# Patient Record
Sex: MALE | Race: BLACK OR AFRICAN AMERICAN | Employment: UNEMPLOYED | URBAN - METROPOLITAN AREA
[De-identification: names, ages, dates, MRNs, and addresses within clinical notes are randomized per-mention and may not be internally consistent; named-entity substitution may affect disease eponyms.]

---

## 2019-04-18 ENCOUNTER — OP HISTORICAL/CONVERTED ENCOUNTER (OUTPATIENT)
Dept: OTHER | Age: 59
End: 2019-04-18

## 2020-01-27 ENCOUNTER — ED HISTORICAL/CONVERTED ENCOUNTER (OUTPATIENT)
Dept: OTHER | Age: 60
End: 2020-01-27

## 2020-06-16 ENCOUNTER — OP HISTORICAL/CONVERTED ENCOUNTER (OUTPATIENT)
Dept: OTHER | Age: 60
End: 2020-06-16

## 2020-07-14 ENCOUNTER — OP HISTORICAL/CONVERTED ENCOUNTER (OUTPATIENT)
Dept: OTHER | Age: 60
End: 2020-07-14

## 2020-07-16 ENCOUNTER — OP HISTORICAL/CONVERTED ENCOUNTER (OUTPATIENT)
Dept: OTHER | Age: 60
End: 2020-07-16

## 2021-01-01 ENCOUNTER — TRANSCRIBE ORDER (OUTPATIENT)
Dept: SCHEDULING | Age: 61
End: 2021-01-01

## 2021-01-01 ENCOUNTER — HOSPITAL ENCOUNTER (OUTPATIENT)
Dept: MAMMOGRAPHY | Age: 61
Discharge: HOME | End: 2021-07-01
Payer: COMMERCIAL

## 2021-01-01 ENCOUNTER — HOSPITAL ENCOUNTER (OUTPATIENT)
Dept: ULTRASOUND IMAGING | Age: 61
Discharge: HOME | End: 2021-06-28
Payer: COMMERCIAL

## 2021-01-01 DIAGNOSIS — Z13.29 SCREENING FOR THYROID DISORDER: ICD-10-CM

## 2021-01-01 DIAGNOSIS — N63.20 LEFT BREAST MASS: ICD-10-CM

## 2021-01-01 DIAGNOSIS — N63.20 LEFT BREAST MASS: Primary | ICD-10-CM

## 2021-01-01 DIAGNOSIS — Z13.29 SCREENING FOR THYROID DISORDER: Primary | ICD-10-CM

## 2021-01-01 PROCEDURE — 77062 BREAST TOMOSYNTHESIS BI: CPT

## 2021-01-01 PROCEDURE — 76700 US EXAM ABDOM COMPLETE: CPT

## 2021-01-01 PROCEDURE — 76642 ULTRASOUND BREAST LIMITED: CPT

## 2022-01-01 ENCOUNTER — APPOINTMENT (OUTPATIENT)
Dept: GENERAL RADIOLOGY | Age: 62
DRG: 316 | End: 2022-01-01
Attending: FAMILY MEDICINE
Payer: MEDICAID

## 2022-01-01 ENCOUNTER — APPOINTMENT (OUTPATIENT)
Dept: INTERVENTIONAL RADIOLOGY/VASCULAR | Age: 62
DRG: 720 | End: 2022-01-01
Attending: INTERNAL MEDICINE
Payer: COMMERCIAL

## 2022-01-01 ENCOUNTER — APPOINTMENT (OUTPATIENT)
Dept: INTERVENTIONAL RADIOLOGY/VASCULAR | Age: 62
End: 2022-01-01
Attending: FAMILY MEDICINE
Payer: COMMERCIAL

## 2022-01-01 ENCOUNTER — HOSPITAL ENCOUNTER (INPATIENT)
Age: 62
LOS: 1 days | DRG: 951 | End: 2022-04-29
Attending: FAMILY MEDICINE | Admitting: FAMILY MEDICINE
Payer: OTHER MISCELLANEOUS

## 2022-01-01 ENCOUNTER — TELEPHONE (OUTPATIENT)
Dept: UROLOGY | Age: 62
End: 2022-01-01

## 2022-01-01 ENCOUNTER — APPOINTMENT (OUTPATIENT)
Dept: ENDOSCOPY | Age: 62
DRG: 720 | End: 2022-01-01
Attending: INTERNAL MEDICINE
Payer: COMMERCIAL

## 2022-01-01 ENCOUNTER — APPOINTMENT (OUTPATIENT)
Dept: CT IMAGING | Age: 62
DRG: 720 | End: 2022-01-01
Attending: NURSE PRACTITIONER
Payer: COMMERCIAL

## 2022-01-01 ENCOUNTER — APPOINTMENT (OUTPATIENT)
Dept: CT IMAGING | Age: 62
DRG: 720 | End: 2022-01-01
Attending: EMERGENCY MEDICINE
Payer: COMMERCIAL

## 2022-01-01 ENCOUNTER — HOSPITAL ENCOUNTER (INPATIENT)
Age: 62
LOS: 8 days | Discharge: NURSING, REHAB OR LONG-TERM CARE | DRG: 720 | End: 2022-03-04
Attending: EMERGENCY MEDICINE | Admitting: INTERNAL MEDICINE
Payer: COMMERCIAL

## 2022-01-01 ENCOUNTER — APPOINTMENT (OUTPATIENT)
Dept: INTERVENTIONAL RADIOLOGY/VASCULAR | Age: 62
DRG: 720 | End: 2022-01-01
Attending: FAMILY MEDICINE
Payer: COMMERCIAL

## 2022-01-01 ENCOUNTER — APPOINTMENT (OUTPATIENT)
Dept: ULTRASOUND IMAGING | Age: 62
End: 2022-01-01
Attending: FAMILY MEDICINE
Payer: COMMERCIAL

## 2022-01-01 ENCOUNTER — APPOINTMENT (OUTPATIENT)
Dept: ULTRASOUND IMAGING | Age: 62
DRG: 720 | End: 2022-01-01
Attending: FAMILY MEDICINE
Payer: COMMERCIAL

## 2022-01-01 ENCOUNTER — APPOINTMENT (OUTPATIENT)
Dept: GENERAL RADIOLOGY | Age: 62
DRG: 720 | End: 2022-01-01
Attending: NURSE PRACTITIONER
Payer: COMMERCIAL

## 2022-01-01 ENCOUNTER — APPOINTMENT (OUTPATIENT)
Dept: GENERAL RADIOLOGY | Age: 62
DRG: 720 | End: 2022-01-01
Attending: FAMILY MEDICINE
Payer: COMMERCIAL

## 2022-01-01 ENCOUNTER — ANESTHESIA (OUTPATIENT)
Dept: ENDOSCOPY | Age: 62
DRG: 720 | End: 2022-01-01
Payer: COMMERCIAL

## 2022-01-01 ENCOUNTER — APPOINTMENT (OUTPATIENT)
Dept: CT IMAGING | Age: 62
End: 2022-01-01
Attending: PHYSICIAN ASSISTANT
Payer: COMMERCIAL

## 2022-01-01 ENCOUNTER — APPOINTMENT (OUTPATIENT)
Dept: GENERAL RADIOLOGY | Age: 62
DRG: 316 | End: 2022-01-01
Attending: NURSE PRACTITIONER
Payer: MEDICAID

## 2022-01-01 ENCOUNTER — ANESTHESIA EVENT (OUTPATIENT)
Dept: ENDOSCOPY | Age: 62
DRG: 720 | End: 2022-01-01
Payer: COMMERCIAL

## 2022-01-01 ENCOUNTER — ANESTHESIA (OUTPATIENT)
Dept: SURGERY | Age: 62
DRG: 316 | End: 2022-01-01
Payer: MEDICAID

## 2022-01-01 ENCOUNTER — APPOINTMENT (OUTPATIENT)
Dept: GENERAL RADIOLOGY | Age: 62
DRG: 720 | End: 2022-01-01
Attending: INTERNAL MEDICINE
Payer: COMMERCIAL

## 2022-01-01 ENCOUNTER — APPOINTMENT (OUTPATIENT)
Dept: GENERAL RADIOLOGY | Age: 62
End: 2022-01-01
Attending: PHYSICIAN ASSISTANT
Payer: COMMERCIAL

## 2022-01-01 ENCOUNTER — APPOINTMENT (OUTPATIENT)
Dept: GENERAL RADIOLOGY | Age: 62
DRG: 316 | End: 2022-01-01
Attending: STUDENT IN AN ORGANIZED HEALTH CARE EDUCATION/TRAINING PROGRAM
Payer: MEDICAID

## 2022-01-01 ENCOUNTER — HOSPITAL ENCOUNTER (OUTPATIENT)
Age: 62
Setting detail: OBSERVATION
Discharge: HOME | End: 2022-01-23
Attending: FAMILY MEDICINE | Admitting: FAMILY MEDICINE
Payer: COMMERCIAL

## 2022-01-01 ENCOUNTER — APPOINTMENT (OUTPATIENT)
Dept: NON INVASIVE DIAGNOSTICS | Age: 62
DRG: 720 | End: 2022-01-01
Attending: INTERNAL MEDICINE
Payer: COMMERCIAL

## 2022-01-01 ENCOUNTER — APPOINTMENT (OUTPATIENT)
Dept: GENERAL RADIOLOGY | Age: 62
DRG: 720 | End: 2022-01-01
Attending: EMERGENCY MEDICINE
Payer: COMMERCIAL

## 2022-01-01 ENCOUNTER — APPOINTMENT (OUTPATIENT)
Dept: INTERVENTIONAL RADIOLOGY/VASCULAR | Age: 62
DRG: 316 | End: 2022-01-01
Attending: FAMILY MEDICINE
Payer: MEDICAID

## 2022-01-01 ENCOUNTER — APPOINTMENT (OUTPATIENT)
Dept: ULTRASOUND IMAGING | Age: 62
DRG: 720 | End: 2022-01-01
Attending: INTERNAL MEDICINE
Payer: COMMERCIAL

## 2022-01-01 ENCOUNTER — HOSPITAL ENCOUNTER (INPATIENT)
Age: 62
LOS: 21 days | Discharge: HOSPICE | DRG: 720 | End: 2022-04-28
Attending: FAMILY MEDICINE | Admitting: FAMILY MEDICINE
Payer: COMMERCIAL

## 2022-01-01 ENCOUNTER — ANESTHESIA EVENT (OUTPATIENT)
Dept: SURGERY | Age: 62
DRG: 316 | End: 2022-01-01
Payer: MEDICAID

## 2022-01-01 ENCOUNTER — APPOINTMENT (OUTPATIENT)
Dept: NON INVASIVE DIAGNOSTICS | Age: 62
DRG: 316 | End: 2022-01-01
Attending: FAMILY MEDICINE
Payer: MEDICAID

## 2022-01-01 ENCOUNTER — HOSPICE ADMISSION (OUTPATIENT)
Dept: HOSPICE | Facility: HOSPICE | Age: 62
End: 2022-01-01
Payer: COMMERCIAL

## 2022-01-01 ENCOUNTER — HOSPITAL ENCOUNTER (INPATIENT)
Age: 62
LOS: 14 days | Discharge: NURSING, REHAB OR LONG-TERM CARE | DRG: 316 | End: 2022-04-05
Attending: STUDENT IN AN ORGANIZED HEALTH CARE EDUCATION/TRAINING PROGRAM | Admitting: FAMILY MEDICINE
Payer: MEDICAID

## 2022-01-01 ENCOUNTER — HOSPITAL ENCOUNTER (EMERGENCY)
Age: 62
Discharge: HOME | DRG: 720 | End: 2022-04-06
Attending: STUDENT IN AN ORGANIZED HEALTH CARE EDUCATION/TRAINING PROGRAM
Payer: COMMERCIAL

## 2022-01-01 VITALS
SYSTOLIC BLOOD PRESSURE: 151 MMHG | DIASTOLIC BLOOD PRESSURE: 64 MMHG | WEIGHT: 190 LBS | TEMPERATURE: 98.5 F | OXYGEN SATURATION: 100 % | HEIGHT: 75 IN | RESPIRATION RATE: 18 BRPM | HEART RATE: 100 BPM | BODY MASS INDEX: 23.62 KG/M2

## 2022-01-01 VITALS
BODY MASS INDEX: 23.85 KG/M2 | TEMPERATURE: 98.1 F | SYSTOLIC BLOOD PRESSURE: 98 MMHG | HEART RATE: 75 BPM | RESPIRATION RATE: 16 BRPM | DIASTOLIC BLOOD PRESSURE: 65 MMHG | OXYGEN SATURATION: 96 % | WEIGHT: 185.85 LBS | HEIGHT: 74 IN

## 2022-01-01 VITALS
OXYGEN SATURATION: 93 % | HEIGHT: 70 IN | RESPIRATION RATE: 16 BRPM | WEIGHT: 204.15 LBS | HEART RATE: 70 BPM | BODY MASS INDEX: 29.23 KG/M2 | DIASTOLIC BLOOD PRESSURE: 34 MMHG | SYSTOLIC BLOOD PRESSURE: 45 MMHG | TEMPERATURE: 97.4 F

## 2022-01-01 VITALS
HEIGHT: 74 IN | BODY MASS INDEX: 23.74 KG/M2 | OXYGEN SATURATION: 100 % | TEMPERATURE: 97.5 F | HEART RATE: 82 BPM | DIASTOLIC BLOOD PRESSURE: 110 MMHG | RESPIRATION RATE: 25 BRPM | WEIGHT: 185 LBS | SYSTOLIC BLOOD PRESSURE: 140 MMHG

## 2022-01-01 VITALS
WEIGHT: 204.15 LBS | HEART RATE: 89 BPM | BODY MASS INDEX: 29.23 KG/M2 | OXYGEN SATURATION: 98 % | SYSTOLIC BLOOD PRESSURE: 107 MMHG | TEMPERATURE: 98.6 F | RESPIRATION RATE: 18 BRPM | HEIGHT: 70 IN | DIASTOLIC BLOOD PRESSURE: 80 MMHG

## 2022-01-01 VITALS
OXYGEN SATURATION: 99 % | SYSTOLIC BLOOD PRESSURE: 109 MMHG | DIASTOLIC BLOOD PRESSURE: 79 MMHG | WEIGHT: 185.19 LBS | HEIGHT: 74 IN | HEART RATE: 74 BPM | TEMPERATURE: 98.1 F | RESPIRATION RATE: 18 BRPM | BODY MASS INDEX: 23.77 KG/M2

## 2022-01-01 DIAGNOSIS — K70.11 ASCITES DUE TO ALCOHOLIC HEPATITIS: ICD-10-CM

## 2022-01-01 DIAGNOSIS — N17.9 ACUTE RENAL FAILURE, UNSPECIFIED ACUTE RENAL FAILURE TYPE (HCC): Primary | ICD-10-CM

## 2022-01-01 DIAGNOSIS — R56.9 SEIZURE (HCC): Primary | ICD-10-CM

## 2022-01-01 DIAGNOSIS — A41.9 SEVERE SEPSIS (HCC): ICD-10-CM

## 2022-01-01 DIAGNOSIS — R65.20 SEVERE SEPSIS (HCC): ICD-10-CM

## 2022-01-01 DIAGNOSIS — L08.9 FINGER INFECTION: ICD-10-CM

## 2022-01-01 DIAGNOSIS — N39.0 URINARY TRACT INFECTION WITHOUT HEMATURIA, SITE UNSPECIFIED: ICD-10-CM

## 2022-01-01 DIAGNOSIS — N17.9 AKI (ACUTE KIDNEY INJURY) (HCC): ICD-10-CM

## 2022-01-01 DIAGNOSIS — R18.8 CIRRHOSIS OF LIVER WITH ASCITES, UNSPECIFIED HEPATIC CIRRHOSIS TYPE (HCC): ICD-10-CM

## 2022-01-01 DIAGNOSIS — A41.9 SEPSIS, DUE TO UNSPECIFIED ORGANISM, UNSPECIFIED WHETHER ACUTE ORGAN DYSFUNCTION PRESENT (HCC): Primary | ICD-10-CM

## 2022-01-01 DIAGNOSIS — M86.9 OSTEOMYELITIS OF RIGHT HAND, UNSPECIFIED TYPE (HCC): ICD-10-CM

## 2022-01-01 DIAGNOSIS — R46.89 ABNORMAL BEHAVIOR: ICD-10-CM

## 2022-01-01 DIAGNOSIS — M86.9 FINGER OSTEOMYELITIS, RIGHT (HCC): ICD-10-CM

## 2022-01-01 DIAGNOSIS — F10.930 ALCOHOL WITHDRAWAL SYNDROME WITHOUT COMPLICATION (HCC): ICD-10-CM

## 2022-01-01 DIAGNOSIS — E87.20 LACTIC ACIDOSIS: ICD-10-CM

## 2022-01-01 DIAGNOSIS — K74.60 CIRRHOSIS OF LIVER WITHOUT ASCITES, UNSPECIFIED HEPATIC CIRRHOSIS TYPE (HCC): ICD-10-CM

## 2022-01-01 DIAGNOSIS — K74.60 CIRRHOSIS OF LIVER WITH ASCITES, UNSPECIFIED HEPATIC CIRRHOSIS TYPE (HCC): ICD-10-CM

## 2022-01-01 DIAGNOSIS — R33.9 URINARY RETENTION: ICD-10-CM

## 2022-01-01 DIAGNOSIS — R18.8 OTHER ASCITES: ICD-10-CM

## 2022-01-01 DIAGNOSIS — G93.41 METABOLIC ENCEPHALOPATHY: ICD-10-CM

## 2022-01-01 DIAGNOSIS — T68.XXXS HYPOTHERMIA, SEQUELA: ICD-10-CM

## 2022-01-01 DIAGNOSIS — N39.0 URINARY TRACT INFECTION WITHOUT HEMATURIA, SITE UNSPECIFIED: Primary | ICD-10-CM

## 2022-01-01 DIAGNOSIS — M25.441 FINGER JOINT SWELLING, RIGHT: Primary | ICD-10-CM

## 2022-01-01 LAB
25(OH)D3 SERPL-MCNC: 17.6 NG/ML (ref 30–100)
ABO + RH BLD: NORMAL
ABO + RH BLD: NORMAL
ALBUMIN FLD-MCNC: 0.4 G/DL
ALBUMIN SERPL ELPH-MCNC: 2.8 G/DL (ref 2.9–4.4)
ALBUMIN SERPL-MCNC: 1.3 G/DL (ref 3.5–5)
ALBUMIN SERPL-MCNC: 1.6 G/DL (ref 3.5–5)
ALBUMIN SERPL-MCNC: 1.6 G/DL (ref 3.5–5)
ALBUMIN SERPL-MCNC: 1.7 G/DL (ref 3.5–5)
ALBUMIN SERPL-MCNC: 1.8 G/DL (ref 3.5–5)
ALBUMIN SERPL-MCNC: 1.9 G/DL (ref 3.5–5)
ALBUMIN SERPL-MCNC: 2 G/DL (ref 3.5–5)
ALBUMIN SERPL-MCNC: 2.1 G/DL (ref 3.5–5)
ALBUMIN SERPL-MCNC: 2.2 G/DL (ref 3.5–5)
ALBUMIN SERPL-MCNC: 2.3 G/DL (ref 3.5–5)
ALBUMIN SERPL-MCNC: 2.3 G/DL (ref 3.5–5)
ALBUMIN SERPL-MCNC: 2.4 G/DL (ref 3.5–5)
ALBUMIN SERPL-MCNC: 2.5 G/DL (ref 3.5–5)
ALBUMIN SERPL-MCNC: 2.7 G/DL (ref 3.5–5)
ALBUMIN SERPL-MCNC: 2.7 G/DL (ref 3.5–5)
ALBUMIN/GLOB SERPL: 0.3 {RATIO} (ref 1.1–2.2)
ALBUMIN/GLOB SERPL: 0.4 {RATIO} (ref 1.1–2.2)
ALBUMIN/GLOB SERPL: 0.5 {RATIO} (ref 1.1–2.2)
ALBUMIN/GLOB SERPL: 0.6 {RATIO} (ref 0.7–1.7)
ALBUMIN/GLOB SERPL: 0.6 {RATIO} (ref 1.1–2.2)
ALBUMIN/GLOB SERPL: 0.7 {RATIO} (ref 1.1–2.2)
ALBUMIN/GLOB SERPL: 0.8 {RATIO} (ref 1.1–2.2)
ALBUMIN/GLOB SERPL: 0.9 {RATIO} (ref 1.1–2.2)
ALBUMIN/GLOB SERPL: 1 {RATIO} (ref 1.1–2.2)
ALP SERPL-CCNC: 107 U/L (ref 45–117)
ALP SERPL-CCNC: 121 U/L (ref 45–117)
ALP SERPL-CCNC: 189 U/L (ref 45–117)
ALP SERPL-CCNC: 244 U/L (ref 45–117)
ALP SERPL-CCNC: 46 U/L (ref 45–117)
ALP SERPL-CCNC: 48 U/L (ref 45–117)
ALP SERPL-CCNC: 49 U/L (ref 45–117)
ALP SERPL-CCNC: 53 U/L (ref 45–117)
ALP SERPL-CCNC: 55 U/L (ref 45–117)
ALP SERPL-CCNC: 57 U/L (ref 45–117)
ALP SERPL-CCNC: 59 U/L (ref 45–117)
ALP SERPL-CCNC: 61 U/L (ref 45–117)
ALP SERPL-CCNC: 61 U/L (ref 45–117)
ALP SERPL-CCNC: 64 U/L (ref 45–117)
ALP SERPL-CCNC: 66 U/L (ref 45–117)
ALP SERPL-CCNC: 67 U/L (ref 45–117)
ALP SERPL-CCNC: 69 U/L (ref 45–117)
ALP SERPL-CCNC: 72 U/L (ref 45–117)
ALP SERPL-CCNC: 73 U/L (ref 45–117)
ALP SERPL-CCNC: 77 U/L (ref 45–117)
ALP SERPL-CCNC: 82 U/L (ref 45–117)
ALP SERPL-CCNC: 84 U/L (ref 45–117)
ALP SERPL-CCNC: 95 U/L (ref 45–117)
ALP SERPL-CCNC: 96 U/L (ref 45–117)
ALPHA1 GLOB SERPL ELPH-MCNC: 0.2 G/DL (ref 0–0.4)
ALPHA2 GLOB SERPL ELPH-MCNC: 0.5 G/DL (ref 0.4–1)
ALT SERPL-CCNC: 10 U/L (ref 12–78)
ALT SERPL-CCNC: 11 U/L (ref 12–78)
ALT SERPL-CCNC: 12 U/L (ref 12–78)
ALT SERPL-CCNC: 13 U/L (ref 12–78)
ALT SERPL-CCNC: 14 U/L (ref 12–78)
ALT SERPL-CCNC: 14 U/L (ref 12–78)
ALT SERPL-CCNC: 15 U/L (ref 12–78)
ALT SERPL-CCNC: 16 U/L (ref 12–78)
ALT SERPL-CCNC: 17 U/L (ref 12–78)
ALT SERPL-CCNC: 20 U/L (ref 12–78)
ALT SERPL-CCNC: 20 U/L (ref 12–78)
ALT SERPL-CCNC: 23 U/L (ref 12–78)
ALT SERPL-CCNC: 25 U/L (ref 12–78)
ALT SERPL-CCNC: 28 U/L (ref 12–78)
ALT SERPL-CCNC: 30 U/L (ref 12–78)
AMMONIA PLAS-SCNC: 104 UMOL/L
AMMONIA PLAS-SCNC: 111 UMOL/L
AMMONIA PLAS-SCNC: 14 UMOL/L
AMMONIA PLAS-SCNC: 19 UMOL/L
AMMONIA PLAS-SCNC: 24 UMOL/L
AMMONIA PLAS-SCNC: 25 UMOL/L
AMMONIA PLAS-SCNC: 28 UMOL/L
AMMONIA PLAS-SCNC: 30 UMOL/L
AMMONIA PLAS-SCNC: 34 UMOL/L
AMMONIA PLAS-SCNC: 36 UMOL/L
AMMONIA PLAS-SCNC: 39 UMOL/L
AMMONIA PLAS-SCNC: 48 UMOL/L
AMMONIA PLAS-SCNC: 65 UMOL/L
AMPHET UR QL SCN: NEGATIVE
AMYLASE SERPL-CCNC: 105 U/L (ref 25–115)
ANION GAP SERPL CALC-SCNC: 10 MMOL/L (ref 5–15)
ANION GAP SERPL CALC-SCNC: 11 MMOL/L (ref 5–15)
ANION GAP SERPL CALC-SCNC: 12 MMOL/L (ref 5–15)
ANION GAP SERPL CALC-SCNC: 4 MMOL/L (ref 5–15)
ANION GAP SERPL CALC-SCNC: 4 MMOL/L (ref 5–15)
ANION GAP SERPL CALC-SCNC: 5 MMOL/L (ref 5–15)
ANION GAP SERPL CALC-SCNC: 6 MMOL/L (ref 5–15)
ANION GAP SERPL CALC-SCNC: 7 MMOL/L (ref 5–15)
ANION GAP SERPL CALC-SCNC: 8 MMOL/L (ref 5–15)
ANION GAP SERPL CALC-SCNC: 9 MMOL/L (ref 5–15)
APPEARANCE FLD: ABNORMAL
APPEARANCE FLD: CLEAR
APPEARANCE UR: ABNORMAL
APTT PPP: 40.2 SEC (ref 21.2–34.1)
ARTERIAL PATENCY WRIST A: ABNORMAL
AST SERPL W P-5'-P-CCNC: 100 U/L (ref 15–37)
AST SERPL W P-5'-P-CCNC: 18 U/L (ref 15–37)
AST SERPL W P-5'-P-CCNC: 20 U/L (ref 15–37)
AST SERPL W P-5'-P-CCNC: 21 U/L (ref 15–37)
AST SERPL W P-5'-P-CCNC: 22 U/L (ref 15–37)
AST SERPL W P-5'-P-CCNC: 24 U/L (ref 15–37)
AST SERPL W P-5'-P-CCNC: 26 U/L (ref 15–37)
AST SERPL W P-5'-P-CCNC: 27 U/L (ref 15–37)
AST SERPL W P-5'-P-CCNC: 29 U/L (ref 15–37)
AST SERPL W P-5'-P-CCNC: 31 U/L (ref 15–37)
AST SERPL W P-5'-P-CCNC: 32 U/L (ref 15–37)
AST SERPL W P-5'-P-CCNC: 35 U/L (ref 15–37)
AST SERPL W P-5'-P-CCNC: 35 U/L (ref 15–37)
AST SERPL W P-5'-P-CCNC: 36 U/L (ref 15–37)
AST SERPL W P-5'-P-CCNC: 38 U/L (ref 15–37)
AST SERPL W P-5'-P-CCNC: 39 U/L (ref 15–37)
AST SERPL W P-5'-P-CCNC: 42 U/L (ref 15–37)
AST SERPL W P-5'-P-CCNC: 42 U/L (ref 15–37)
AST SERPL W P-5'-P-CCNC: 47 U/L (ref 15–37)
AST SERPL W P-5'-P-CCNC: 51 U/L (ref 15–37)
AST SERPL W P-5'-P-CCNC: 67 U/L (ref 15–37)
AST SERPL W P-5'-P-CCNC: 70 U/L (ref 15–37)
AST SERPL W P-5'-P-CCNC: ABNORMAL U/L (ref 15–37)
ATRIAL RATE: 113 BPM
ATRIAL RATE: 277 BPM
ATRIAL RATE: 72 BPM
ATRIAL RATE: 98 BPM
B-GLOBULIN SERPL ELPH-MCNC: 0.9 G/DL (ref 0.7–1.3)
BACTERIA SPEC CULT: ABNORMAL
BACTERIA SPEC CULT: NORMAL
BACTERIA URNS QL MICRO: ABNORMAL /HPF
BACTERIA URNS QL MICRO: ABNORMAL /HPF
BACTERIA URNS QL MICRO: NEGATIVE /HPF
BARBITURATES UR QL SCN: NEGATIVE
BASE DEFICIT BLDA-SCNC: 1.1 MMOL/L (ref 0–2)
BASE DEFICIT BLDA-SCNC: 1.7 MMOL/L (ref 0–2)
BASE DEFICIT BLDA-SCNC: 2.1 MMOL/L (ref 0–2)
BASE DEFICIT BLDA-SCNC: 2.2 MMOL/L (ref 0–2)
BASE DEFICIT BLDA-SCNC: 4.3 MMOL/L (ref 0–2)
BASE DEFICIT BLDA-SCNC: 4.8 MMOL/L (ref 0–2)
BASE DEFICIT BLDA-SCNC: 5.4 MMOL/L (ref 0–2)
BASOPHILS # BLD: 0 K/UL (ref 0–0.1)
BASOPHILS # BLD: 0.1 K/UL (ref 0–0.1)
BASOPHILS NFR BLD: 0 % (ref 0–1)
BASOPHILS NFR BLD: 1 % (ref 0–1)
BDY SITE: ABNORMAL
BENZODIAZ UR QL: POSITIVE
BILIRUB DIRECT SERPL-MCNC: 1.2 MG/DL (ref 0–0.2)
BILIRUB SERPL-MCNC: 0.9 MG/DL (ref 0.2–1)
BILIRUB SERPL-MCNC: 0.9 MG/DL (ref 0.2–1)
BILIRUB SERPL-MCNC: 1 MG/DL (ref 0.2–1)
BILIRUB SERPL-MCNC: 1.1 MG/DL (ref 0.2–1)
BILIRUB SERPL-MCNC: 1.2 MG/DL (ref 0.2–1)
BILIRUB SERPL-MCNC: 1.2 MG/DL (ref 0.2–1)
BILIRUB SERPL-MCNC: 1.3 MG/DL (ref 0.2–1)
BILIRUB SERPL-MCNC: 1.4 MG/DL (ref 0.2–1)
BILIRUB SERPL-MCNC: 1.5 MG/DL (ref 0.2–1)
BILIRUB SERPL-MCNC: 1.6 MG/DL (ref 0.2–1)
BILIRUB SERPL-MCNC: 1.8 MG/DL (ref 0.2–1)
BILIRUB SERPL-MCNC: 1.8 MG/DL (ref 0.2–1)
BILIRUB SERPL-MCNC: 1.9 MG/DL (ref 0.2–1)
BILIRUB SERPL-MCNC: 1.9 MG/DL (ref 0.2–1)
BILIRUB SERPL-MCNC: 2.3 MG/DL (ref 0.2–1)
BILIRUB SERPL-MCNC: 3.3 MG/DL (ref 0.2–1)
BILIRUB SERPL-MCNC: 3.4 MG/DL (ref 0.2–1)
BILIRUB SERPL-MCNC: 3.6 MG/DL (ref 0.2–1)
BILIRUB UR QL: NEGATIVE
BLD PROD TYP BPU: NORMAL
BLOOD GROUP ANTIBODIES SERPL: NEGATIVE
BLOOD GROUP ANTIBODIES SERPL: NEGATIVE
BNP SERPL-MCNC: 1142 PG/ML
BNP SERPL-MCNC: 1765 PG/ML
BNP SERPL-MCNC: 1922 PG/ML
BNP SERPL-MCNC: 2485 PG/ML
BNP SERPL-MCNC: 4645 PG/ML
BNP SERPL-MCNC: 667 PG/ML
BNP SERPL-MCNC: 990 PG/ML
BPU ID: NORMAL
BUN SERPL-MCNC: 14 MG/DL (ref 6–20)
BUN SERPL-MCNC: 15 MG/DL (ref 6–20)
BUN SERPL-MCNC: 16 MG/DL (ref 6–20)
BUN SERPL-MCNC: 17 MG/DL (ref 6–20)
BUN SERPL-MCNC: 17 MG/DL (ref 6–20)
BUN SERPL-MCNC: 18 MG/DL (ref 6–20)
BUN SERPL-MCNC: 19 MG/DL (ref 6–20)
BUN SERPL-MCNC: 20 MG/DL (ref 6–20)
BUN SERPL-MCNC: 21 MG/DL (ref 6–20)
BUN SERPL-MCNC: 21 MG/DL (ref 6–20)
BUN SERPL-MCNC: 22 MG/DL (ref 6–20)
BUN SERPL-MCNC: 23 MG/DL (ref 6–20)
BUN SERPL-MCNC: 23 MG/DL (ref 6–20)
BUN SERPL-MCNC: 24 MG/DL (ref 6–20)
BUN SERPL-MCNC: 25 MG/DL (ref 6–20)
BUN SERPL-MCNC: 26 MG/DL (ref 6–20)
BUN SERPL-MCNC: 29 MG/DL (ref 6–20)
BUN SERPL-MCNC: 30 MG/DL (ref 6–20)
BUN SERPL-MCNC: 31 MG/DL (ref 6–20)
BUN SERPL-MCNC: 32 MG/DL (ref 6–20)
BUN SERPL-MCNC: 33 MG/DL (ref 6–20)
BUN SERPL-MCNC: 33 MG/DL (ref 6–20)
BUN SERPL-MCNC: 34 MG/DL (ref 6–20)
BUN SERPL-MCNC: 34 MG/DL (ref 6–20)
BUN SERPL-MCNC: 35 MG/DL (ref 6–20)
BUN SERPL-MCNC: 42 MG/DL (ref 6–20)
BUN SERPL-MCNC: 43 MG/DL (ref 6–20)
BUN SERPL-MCNC: 45 MG/DL (ref 6–20)
BUN SERPL-MCNC: 51 MG/DL (ref 6–20)
BUN/CREAT SERPL: 10 (ref 12–20)
BUN/CREAT SERPL: 11 (ref 12–20)
BUN/CREAT SERPL: 12 (ref 12–20)
BUN/CREAT SERPL: 13 (ref 12–20)
BUN/CREAT SERPL: 14 (ref 12–20)
BUN/CREAT SERPL: 14 (ref 12–20)
BUN/CREAT SERPL: 15 (ref 12–20)
BUN/CREAT SERPL: 16 (ref 12–20)
BUN/CREAT SERPL: 16 (ref 12–20)
BUN/CREAT SERPL: 17 (ref 12–20)
BUN/CREAT SERPL: 8 (ref 12–20)
BUN/CREAT SERPL: 9 (ref 12–20)
C DIFF TOX GENS STL QL NAA+PROBE: NEGATIVE
CA-I BLD-MCNC: 7.4 MG/DL (ref 8.5–10.1)
CA-I BLD-MCNC: 7.6 MG/DL (ref 8.5–10.1)
CA-I BLD-MCNC: 7.6 MG/DL (ref 8.5–10.1)
CA-I BLD-MCNC: 7.7 MG/DL (ref 8.5–10.1)
CA-I BLD-MCNC: 7.7 MG/DL (ref 8.5–10.1)
CA-I BLD-MCNC: 7.8 MG/DL (ref 8.5–10.1)
CA-I BLD-MCNC: 7.9 MG/DL (ref 8.5–10.1)
CA-I BLD-MCNC: 7.9 MG/DL (ref 8.5–10.1)
CA-I BLD-MCNC: 8 MG/DL (ref 8.5–10.1)
CA-I BLD-MCNC: 8.1 MG/DL (ref 8.5–10.1)
CA-I BLD-MCNC: 8.1 MG/DL (ref 8.5–10.1)
CA-I BLD-MCNC: 8.2 MG/DL (ref 8.5–10.1)
CA-I BLD-MCNC: 8.3 MG/DL (ref 8.5–10.1)
CA-I BLD-MCNC: 8.3 MG/DL (ref 8.5–10.1)
CA-I BLD-MCNC: 8.4 MG/DL (ref 8.5–10.1)
CA-I BLD-MCNC: 8.5 MG/DL (ref 8.5–10.1)
CA-I BLD-MCNC: 8.6 MG/DL (ref 8.5–10.1)
CA-I BLD-MCNC: 8.7 MG/DL (ref 8.5–10.1)
CA-I BLD-MCNC: 8.8 MG/DL (ref 8.5–10.1)
CA-I BLD-MCNC: 8.9 MG/DL (ref 8.5–10.1)
CA-I BLD-MCNC: 9 MG/DL (ref 8.5–10.1)
CA-I BLD-MCNC: 9.2 MG/DL (ref 8.5–10.1)
CALCULATED P AXIS, ECG09: 20 DEGREES
CALCULATED P AXIS, ECG09: 25 DEGREES
CALCULATED P AXIS, ECG09: 49 DEGREES
CALCULATED R AXIS, ECG10: -11 DEGREES
CALCULATED R AXIS, ECG10: -18 DEGREES
CALCULATED R AXIS, ECG10: 42 DEGREES
CALCULATED T AXIS, ECG11: 36 DEGREES
CALCULATED T AXIS, ECG11: 67 DEGREES
CALCULATED T AXIS, ECG11: 9 DEGREES
CALCULATED T AXIS, ECG11: 99 DEGREES
CANNABINOIDS UR QL SCN: POSITIVE
CAOX CRY URNS QL MICRO: ABNORMAL
CHLORIDE SERPL-SCNC: 100 MMOL/L (ref 97–108)
CHLORIDE SERPL-SCNC: 101 MMOL/L (ref 97–108)
CHLORIDE SERPL-SCNC: 101 MMOL/L (ref 97–108)
CHLORIDE SERPL-SCNC: 102 MMOL/L (ref 97–108)
CHLORIDE SERPL-SCNC: 105 MMOL/L (ref 97–108)
CHLORIDE SERPL-SCNC: 108 MMOL/L (ref 97–108)
CHLORIDE SERPL-SCNC: 108 MMOL/L (ref 97–108)
CHLORIDE SERPL-SCNC: 109 MMOL/L (ref 97–108)
CHLORIDE SERPL-SCNC: 110 MMOL/L (ref 97–108)
CHLORIDE SERPL-SCNC: 110 MMOL/L (ref 97–108)
CHLORIDE SERPL-SCNC: 111 MMOL/L (ref 97–108)
CHLORIDE SERPL-SCNC: 112 MMOL/L (ref 97–108)
CHLORIDE SERPL-SCNC: 112 MMOL/L (ref 97–108)
CHLORIDE SERPL-SCNC: 113 MMOL/L (ref 97–108)
CHLORIDE SERPL-SCNC: 114 MMOL/L (ref 97–108)
CHLORIDE SERPL-SCNC: 115 MMOL/L (ref 97–108)
CHLORIDE SERPL-SCNC: 116 MMOL/L (ref 97–108)
CHLORIDE SERPL-SCNC: 117 MMOL/L (ref 97–108)
CHLORIDE SERPL-SCNC: 118 MMOL/L (ref 97–108)
CHLORIDE SERPL-SCNC: 119 MMOL/L (ref 97–108)
CHLORIDE SERPL-SCNC: 120 MMOL/L (ref 97–108)
CHLORIDE SERPL-SCNC: 121 MMOL/L (ref 97–108)
CHLORIDE SERPL-SCNC: 121 MMOL/L (ref 97–108)
CHLORIDE SERPL-SCNC: 122 MMOL/L (ref 97–108)
CHLORIDE SERPL-SCNC: 124 MMOL/L (ref 97–108)
CHLORIDE SERPL-SCNC: 124 MMOL/L (ref 97–108)
CHLORIDE SERPL-SCNC: 125 MMOL/L (ref 97–108)
CHLORIDE SERPL-SCNC: 125 MMOL/L (ref 97–108)
CHLORIDE SERPL-SCNC: 96 MMOL/L (ref 97–108)
CHLORIDE UR-SCNC: 56 MMOL/L
CK SERPL-CCNC: 26 U/L (ref 39–308)
CO2 SERPL-SCNC: 16 MMOL/L (ref 21–32)
CO2 SERPL-SCNC: 17 MMOL/L (ref 21–32)
CO2 SERPL-SCNC: 18 MMOL/L (ref 21–32)
CO2 SERPL-SCNC: 19 MMOL/L (ref 21–32)
CO2 SERPL-SCNC: 20 MMOL/L (ref 21–32)
CO2 SERPL-SCNC: 21 MMOL/L (ref 21–32)
CO2 SERPL-SCNC: 22 MMOL/L (ref 21–32)
CO2 SERPL-SCNC: 22 MMOL/L (ref 21–32)
CO2 SERPL-SCNC: 23 MMOL/L (ref 21–32)
CO2 SERPL-SCNC: 24 MMOL/L (ref 21–32)
CO2 SERPL-SCNC: 26 MMOL/L (ref 21–32)
COCAINE UR QL SCN: NEGATIVE
COLONY COUNT,CNT: ABNORMAL
COLOR FLD: YELLOW
COLOR FLD: YELLOW
COLOR UR: ABNORMAL
COLOR UR: YELLOW
CORTIS SERPL-MCNC: 11.2 UG/DL
CORTIS SERPL-MCNC: 9 UG/DL
COVID-19 RAPID TEST, COVR: NOT DETECTED
CREAT SERPL-MCNC: 1.38 MG/DL (ref 0.7–1.3)
CREAT SERPL-MCNC: 1.43 MG/DL (ref 0.7–1.3)
CREAT SERPL-MCNC: 1.47 MG/DL (ref 0.7–1.3)
CREAT SERPL-MCNC: 1.47 MG/DL (ref 0.7–1.3)
CREAT SERPL-MCNC: 1.49 MG/DL (ref 0.7–1.3)
CREAT SERPL-MCNC: 1.52 MG/DL (ref 0.7–1.3)
CREAT SERPL-MCNC: 1.61 MG/DL (ref 0.7–1.3)
CREAT SERPL-MCNC: 1.62 MG/DL (ref 0.7–1.3)
CREAT SERPL-MCNC: 1.63 MG/DL (ref 0.7–1.3)
CREAT SERPL-MCNC: 1.68 MG/DL (ref 0.7–1.3)
CREAT SERPL-MCNC: 1.68 MG/DL (ref 0.7–1.3)
CREAT SERPL-MCNC: 1.69 MG/DL (ref 0.7–1.3)
CREAT SERPL-MCNC: 1.72 MG/DL (ref 0.7–1.3)
CREAT SERPL-MCNC: 1.74 MG/DL (ref 0.7–1.3)
CREAT SERPL-MCNC: 1.74 MG/DL (ref 0.7–1.3)
CREAT SERPL-MCNC: 1.75 MG/DL (ref 0.7–1.3)
CREAT SERPL-MCNC: 1.76 MG/DL (ref 0.7–1.3)
CREAT SERPL-MCNC: 1.77 MG/DL (ref 0.7–1.3)
CREAT SERPL-MCNC: 1.77 MG/DL (ref 0.7–1.3)
CREAT SERPL-MCNC: 1.78 MG/DL (ref 0.7–1.3)
CREAT SERPL-MCNC: 1.82 MG/DL (ref 0.7–1.3)
CREAT SERPL-MCNC: 1.88 MG/DL (ref 0.7–1.3)
CREAT SERPL-MCNC: 1.88 MG/DL (ref 0.7–1.3)
CREAT SERPL-MCNC: 1.89 MG/DL (ref 0.7–1.3)
CREAT SERPL-MCNC: 1.91 MG/DL (ref 0.7–1.3)
CREAT SERPL-MCNC: 1.95 MG/DL (ref 0.7–1.3)
CREAT SERPL-MCNC: 1.97 MG/DL (ref 0.7–1.3)
CREAT SERPL-MCNC: 2.01 MG/DL (ref 0.7–1.3)
CREAT SERPL-MCNC: 2.05 MG/DL (ref 0.7–1.3)
CREAT SERPL-MCNC: 2.09 MG/DL (ref 0.7–1.3)
CREAT SERPL-MCNC: 2.16 MG/DL (ref 0.7–1.3)
CREAT SERPL-MCNC: 2.17 MG/DL (ref 0.7–1.3)
CREAT SERPL-MCNC: 2.18 MG/DL (ref 0.7–1.3)
CREAT SERPL-MCNC: 2.18 MG/DL (ref 0.7–1.3)
CREAT SERPL-MCNC: 2.21 MG/DL (ref 0.7–1.3)
CREAT SERPL-MCNC: 2.25 MG/DL (ref 0.7–1.3)
CREAT SERPL-MCNC: 2.31 MG/DL (ref 0.7–1.3)
CREAT SERPL-MCNC: 2.4 MG/DL (ref 0.7–1.3)
CREAT SERPL-MCNC: 2.43 MG/DL (ref 0.7–1.3)
CREAT SERPL-MCNC: 2.47 MG/DL (ref 0.7–1.3)
CREAT SERPL-MCNC: 2.51 MG/DL (ref 0.7–1.3)
CREAT SERPL-MCNC: 2.52 MG/DL (ref 0.7–1.3)
CREAT SERPL-MCNC: 2.59 MG/DL (ref 0.7–1.3)
CREAT SERPL-MCNC: 2.63 MG/DL (ref 0.7–1.3)
CREAT SERPL-MCNC: 2.66 MG/DL (ref 0.7–1.3)
CREAT SERPL-MCNC: 2.69 MG/DL (ref 0.7–1.3)
CREAT SERPL-MCNC: 2.75 MG/DL (ref 0.7–1.3)
CREAT SERPL-MCNC: 3.11 MG/DL (ref 0.7–1.3)
CREAT SERPL-MCNC: 3.15 MG/DL (ref 0.7–1.3)
CREAT SERPL-MCNC: 3.52 MG/DL (ref 0.7–1.3)
CREAT SERPL-MCNC: 4.12 MG/DL (ref 0.7–1.3)
CREAT SERPL-MCNC: 4.21 MG/DL (ref 0.7–1.3)
CREAT UR-MCNC: 217 MG/DL
CROSSMATCH RESULT,%XM: NORMAL
CRP SERPL-MCNC: 0.6 MG/DL (ref 0–0.6)
CRP SERPL-MCNC: 1.38 MG/DL (ref 0–0.6)
CRP SERPL-MCNC: 1.59 MG/DL (ref 0–0.6)
CRP SERPL-MCNC: 3.15 MG/DL (ref 0–0.6)
DATE LAST DOSE: ABNORMAL
DATE LAST DOSE: NORMAL
DATE LAST DOSE: NORMAL
DIAGNOSIS, 93000: NORMAL
DIFFERENTIAL METHOD BLD: ABNORMAL
DRUG SCRN COMMENT,DRGCM: ABNORMAL
ECHO AO ASC DIAM: 3.2 CM
ECHO AO ASCENDING AORTA INDEX: 1.5 CM/M2
ECHO AO ROOT DIAM: 3.7 CM
ECHO AO ROOT INDEX: 1.74 CM/M2
ECHO AV AREA PEAK VELOCITY: 5.1 CM2
ECHO AV AREA VTI: 5.1 CM2
ECHO AV AREA/BSA PEAK VELOCITY: 2.4 CM2/M2
ECHO AV AREA/BSA VTI: 2.4 CM2/M2
ECHO AV MEAN GRADIENT: 8 MMHG
ECHO AV MEAN VELOCITY: 1.3 M/S
ECHO AV PEAK GRADIENT: 13 MMHG
ECHO AV PEAK VELOCITY: 1.8 M/S
ECHO AV VELOCITY RATIO: 1.22
ECHO AV VTI: 26.3 CM
ECHO EST RA PRESSURE: 8 MMHG
ECHO LA AREA 4C: 11.9 CM2
ECHO LA DIAMETER INDEX: 1.17 CM/M2
ECHO LA DIAMETER: 2.5 CM
ECHO LA MAJOR AXIS: 4.6 CM
ECHO LA TO AORTIC ROOT RATIO: 0.68
ECHO LV E' LATERAL VELOCITY: 7 CM/S
ECHO LV E' SEPTAL VELOCITY: 6 CM/S
ECHO LV EDV A4C: 22 ML
ECHO LV EDV INDEX A4C: 10 ML/M2
ECHO LV EJECTION FRACTION A4C: 77 %
ECHO LV ESV A4C: 5 ML
ECHO LV ESV INDEX A4C: 2 ML/M2
ECHO LV FRACTIONAL SHORTENING: 8 % (ref 28–44)
ECHO LV INTERNAL DIMENSION DIASTOLE INDEX: 2.49 CM/M2
ECHO LV INTERNAL DIMENSION DIASTOLIC: 5.3 CM (ref 4.2–5.9)
ECHO LV INTERNAL DIMENSION SYSTOLIC INDEX: 2.3 CM/M2
ECHO LV INTERNAL DIMENSION SYSTOLIC: 4.9 CM
ECHO LV IVSD: 1 CM (ref 0.6–1)
ECHO LV MASS 2D: 200.4 G (ref 88–224)
ECHO LV MASS INDEX 2D: 94.1 G/M2 (ref 49–115)
ECHO LV POSTERIOR WALL DIASTOLIC: 1 CM (ref 0.6–1)
ECHO LV RELATIVE WALL THICKNESS RATIO: 0.38
ECHO LVOT AREA: 4.2 CM2
ECHO LVOT AV VTI INDEX: 1.22
ECHO LVOT DIAM: 2.3 CM
ECHO LVOT MEAN GRADIENT: 10 MMHG
ECHO LVOT PEAK GRADIENT: 20 MMHG
ECHO LVOT PEAK VELOCITY: 2.2 M/S
ECHO LVOT STROKE VOLUME INDEX: 62.6 ML/M2
ECHO LVOT SV: 133.3 ML
ECHO LVOT VTI: 32.1 CM
ECHO MV A VELOCITY: 0.98 M/S
ECHO MV E VELOCITY: 0.57 M/S
ECHO MV E/A RATIO: 0.58
ECHO MV E/E' LATERAL: 8.14
ECHO MV E/E' RATIO (AVERAGED): 8.82
ECHO MV E/E' SEPTAL: 9.5
ECHO MV REGURGITANT PEAK GRADIENT: 77 MMHG
ECHO MV REGURGITANT PEAK VELOCITY: 4.4 M/S
ECHO MV REGURGITANT VTIA: 66.9 CM
ECHO PERICARDIUM POSTERIOR DIMENSION: 0.9 CM
ECHO PV MAX VELOCITY: 0.8 M/S
ECHO PV MEAN GRADIENT: 2 MMHG
ECHO PV MEAN VELOCITY: 0.6 M/S
ECHO PV PEAK GRADIENT: 3 MMHG
ECHO PV VTI: 16.6 CM
ECHO RV BASAL DIMENSION: 3 CM
ECHO RV MID DIMENSION: 2.8 CM
EOSINOPHIL # BLD: 0 K/UL (ref 0–0.4)
EOSINOPHIL # BLD: 0.1 K/UL (ref 0–0.4)
EOSINOPHIL # BLD: 0.2 K/UL (ref 0–0.4)
EOSINOPHIL # BLD: 0.2 K/UL (ref 0–0.4)
EOSINOPHIL # BLD: 0.3 K/UL (ref 0–0.4)
EOSINOPHIL NFR BLD: 0 % (ref 0–7)
EOSINOPHIL NFR BLD: 1 % (ref 0–7)
EOSINOPHIL NFR BLD: 2 % (ref 0–7)
EOSINOPHIL NFR BLD: 3 % (ref 0–7)
EOSINOPHIL NFR BLD: 3 % (ref 0–7)
EOSINOPHIL NFR BLD: 6 % (ref 0–7)
EPAP/CPAP/PEEP, PAPEEP: 5
EPAP/CPAP/PEEP, PAPEEP: 8
ERYTHROCYTE [DISTWIDTH] IN BLOOD BY AUTOMATED COUNT: 15.6 % (ref 11.5–14.5)
ERYTHROCYTE [DISTWIDTH] IN BLOOD BY AUTOMATED COUNT: 16.1 % (ref 11.5–14.5)
ERYTHROCYTE [DISTWIDTH] IN BLOOD BY AUTOMATED COUNT: 16.2 % (ref 11.5–14.5)
ERYTHROCYTE [DISTWIDTH] IN BLOOD BY AUTOMATED COUNT: 16.6 % (ref 11.5–14.5)
ERYTHROCYTE [DISTWIDTH] IN BLOOD BY AUTOMATED COUNT: 16.9 % (ref 11.5–14.5)
ERYTHROCYTE [DISTWIDTH] IN BLOOD BY AUTOMATED COUNT: 17.3 % (ref 11.5–14.5)
ERYTHROCYTE [DISTWIDTH] IN BLOOD BY AUTOMATED COUNT: 17.4 % (ref 11.5–14.5)
ERYTHROCYTE [DISTWIDTH] IN BLOOD BY AUTOMATED COUNT: 17.5 % (ref 11.5–14.5)
ERYTHROCYTE [DISTWIDTH] IN BLOOD BY AUTOMATED COUNT: 17.6 % (ref 11.5–14.5)
ERYTHROCYTE [DISTWIDTH] IN BLOOD BY AUTOMATED COUNT: 17.7 % (ref 11.5–14.5)
ERYTHROCYTE [DISTWIDTH] IN BLOOD BY AUTOMATED COUNT: 17.9 % (ref 11.5–14.5)
ERYTHROCYTE [DISTWIDTH] IN BLOOD BY AUTOMATED COUNT: 18.1 % (ref 11.5–14.5)
ERYTHROCYTE [DISTWIDTH] IN BLOOD BY AUTOMATED COUNT: 18.2 % (ref 11.5–14.5)
ERYTHROCYTE [DISTWIDTH] IN BLOOD BY AUTOMATED COUNT: 18.2 % (ref 11.5–14.5)
ERYTHROCYTE [DISTWIDTH] IN BLOOD BY AUTOMATED COUNT: 18.3 % (ref 11.5–14.5)
ERYTHROCYTE [DISTWIDTH] IN BLOOD BY AUTOMATED COUNT: 18.3 % (ref 11.5–14.5)
ERYTHROCYTE [DISTWIDTH] IN BLOOD BY AUTOMATED COUNT: 18.4 % (ref 11.5–14.5)
ERYTHROCYTE [DISTWIDTH] IN BLOOD BY AUTOMATED COUNT: 18.5 % (ref 11.5–14.5)
ERYTHROCYTE [DISTWIDTH] IN BLOOD BY AUTOMATED COUNT: 18.5 % (ref 11.5–14.5)
ERYTHROCYTE [DISTWIDTH] IN BLOOD BY AUTOMATED COUNT: 18.6 % (ref 11.5–14.5)
ERYTHROCYTE [DISTWIDTH] IN BLOOD BY AUTOMATED COUNT: 18.7 % (ref 11.5–14.5)
ERYTHROCYTE [DISTWIDTH] IN BLOOD BY AUTOMATED COUNT: 18.8 % (ref 11.5–14.5)
ERYTHROCYTE [DISTWIDTH] IN BLOOD BY AUTOMATED COUNT: 19 % (ref 11.5–14.5)
ERYTHROCYTE [DISTWIDTH] IN BLOOD BY AUTOMATED COUNT: 19.1 % (ref 11.5–14.5)
ERYTHROCYTE [DISTWIDTH] IN BLOOD BY AUTOMATED COUNT: 19.4 % (ref 11.5–14.5)
ERYTHROCYTE [DISTWIDTH] IN BLOOD BY AUTOMATED COUNT: 19.4 % (ref 11.5–14.5)
ERYTHROCYTE [DISTWIDTH] IN BLOOD BY AUTOMATED COUNT: 20.4 % (ref 11.5–14.5)
ERYTHROCYTE [DISTWIDTH] IN BLOOD BY AUTOMATED COUNT: 22.4 % (ref 11.5–14.5)
ERYTHROCYTE [DISTWIDTH] IN BLOOD BY AUTOMATED COUNT: 22.6 % (ref 11.5–14.5)
ERYTHROCYTE [DISTWIDTH] IN BLOOD BY AUTOMATED COUNT: 22.8 % (ref 11.5–14.5)
ERYTHROCYTE [DISTWIDTH] IN BLOOD BY AUTOMATED COUNT: 22.8 % (ref 11.5–14.5)
ERYTHROCYTE [DISTWIDTH] IN BLOOD BY AUTOMATED COUNT: 23.5 % (ref 11.5–14.5)
ERYTHROCYTE [DISTWIDTH] IN BLOOD BY AUTOMATED COUNT: 23.6 % (ref 11.5–14.5)
ERYTHROCYTE [DISTWIDTH] IN BLOOD BY AUTOMATED COUNT: 23.6 % (ref 11.5–14.5)
ERYTHROCYTE [DISTWIDTH] IN BLOOD BY AUTOMATED COUNT: 23.7 % (ref 11.5–14.5)
ERYTHROCYTE [DISTWIDTH] IN BLOOD BY AUTOMATED COUNT: 24.1 % (ref 11.5–14.5)
ERYTHROCYTE [SEDIMENTATION RATE] IN BLOOD: 28 MM/HR (ref 0–20)
ERYTHROCYTE [SEDIMENTATION RATE] IN BLOOD: 35 MM/HR (ref 0–20)
ERYTHROCYTE [SEDIMENTATION RATE] IN BLOOD: 36 MM/HR (ref 0–20)
ETHANOL SERPL-MCNC: <4 MG/DL
ETHANOL SERPL-MCNC: <4 MG/DL
FIO2 ON VENT: 21 %
FIO2 ON VENT: 30 %
FLUAV RNA SPEC QL NAA+PROBE: NOT DETECTED
FLUBV RNA SPEC QL NAA+PROBE: NOT DETECTED
GAMMA GLOB SERPL ELPH-MCNC: 2.7 G/DL (ref 0.4–1.8)
GAS FLOW.O2 SETTING OXYMISER: 16 L/MIN
GAS FLOW.O2 SETTING OXYMISER: 16 L/MIN
GAS FLOW.O2 SETTING OXYMISER: 20 L/MIN
GLOBULIN SER CALC-MCNC: 2.7 G/DL (ref 2–4)
GLOBULIN SER CALC-MCNC: 3 G/DL (ref 2–4)
GLOBULIN SER CALC-MCNC: 3 G/DL (ref 2–4)
GLOBULIN SER CALC-MCNC: 3.1 G/DL (ref 2–4)
GLOBULIN SER CALC-MCNC: 3.1 G/DL (ref 2–4)
GLOBULIN SER CALC-MCNC: 3.2 G/DL (ref 2–4)
GLOBULIN SER CALC-MCNC: 3.3 G/DL (ref 2–4)
GLOBULIN SER CALC-MCNC: 3.3 G/DL (ref 2–4)
GLOBULIN SER CALC-MCNC: 3.5 G/DL (ref 2–4)
GLOBULIN SER CALC-MCNC: 3.6 G/DL (ref 2–4)
GLOBULIN SER CALC-MCNC: 3.7 G/DL (ref 2–4)
GLOBULIN SER CALC-MCNC: 3.9 G/DL (ref 2–4)
GLOBULIN SER CALC-MCNC: 4.1 G/DL (ref 2–4)
GLOBULIN SER CALC-MCNC: 4.1 G/DL (ref 2–4)
GLOBULIN SER CALC-MCNC: 4.2 G/DL (ref 2–4)
GLOBULIN SER CALC-MCNC: 4.3 G/DL (ref 2–4)
GLOBULIN SER CALC-MCNC: 4.3 G/DL (ref 2–4)
GLOBULIN SER CALC-MCNC: 4.4 G/DL (ref 2.2–3.9)
GLOBULIN SER CALC-MCNC: 4.4 G/DL (ref 2–4)
GLOBULIN SER CALC-MCNC: 4.5 G/DL (ref 2–4)
GLOBULIN SER CALC-MCNC: 4.7 G/DL (ref 2–4)
GLOBULIN SER CALC-MCNC: 5.1 G/DL (ref 2–4)
GLOBULIN SER CALC-MCNC: 5.3 G/DL (ref 2–4)
GLOBULIN SER CALC-MCNC: 5.5 G/DL (ref 2–4)
GLOBULIN SER CALC-MCNC: 6.1 G/DL (ref 2–4)
GLUCOSE BLD STRIP.AUTO-MCNC: 100 MG/DL (ref 65–117)
GLUCOSE BLD STRIP.AUTO-MCNC: 104 MG/DL (ref 65–117)
GLUCOSE BLD STRIP.AUTO-MCNC: 106 MG/DL (ref 65–117)
GLUCOSE BLD STRIP.AUTO-MCNC: 107 MG/DL (ref 65–117)
GLUCOSE BLD STRIP.AUTO-MCNC: 111 MG/DL (ref 65–117)
GLUCOSE BLD STRIP.AUTO-MCNC: 114 MG/DL (ref 65–117)
GLUCOSE BLD STRIP.AUTO-MCNC: 122 MG/DL (ref 65–117)
GLUCOSE BLD STRIP.AUTO-MCNC: 66 MG/DL (ref 65–117)
GLUCOSE BLD STRIP.AUTO-MCNC: 72 MG/DL (ref 65–117)
GLUCOSE BLD STRIP.AUTO-MCNC: 74 MG/DL (ref 65–117)
GLUCOSE BLD STRIP.AUTO-MCNC: 76 MG/DL (ref 65–117)
GLUCOSE BLD STRIP.AUTO-MCNC: 77 MG/DL (ref 65–117)
GLUCOSE BLD STRIP.AUTO-MCNC: 79 MG/DL (ref 65–117)
GLUCOSE BLD STRIP.AUTO-MCNC: 80 MG/DL (ref 65–117)
GLUCOSE BLD STRIP.AUTO-MCNC: 83 MG/DL (ref 65–117)
GLUCOSE BLD STRIP.AUTO-MCNC: 84 MG/DL (ref 65–117)
GLUCOSE BLD STRIP.AUTO-MCNC: 84 MG/DL (ref 65–117)
GLUCOSE BLD STRIP.AUTO-MCNC: 87 MG/DL (ref 65–117)
GLUCOSE BLD STRIP.AUTO-MCNC: 91 MG/DL (ref 65–117)
GLUCOSE BLD STRIP.AUTO-MCNC: 98 MG/DL (ref 65–117)
GLUCOSE FLD-MCNC: 93 MG/DL
GLUCOSE SERPL-MCNC: 102 MG/DL (ref 65–100)
GLUCOSE SERPL-MCNC: 103 MG/DL (ref 65–100)
GLUCOSE SERPL-MCNC: 104 MG/DL (ref 65–100)
GLUCOSE SERPL-MCNC: 105 MG/DL (ref 65–100)
GLUCOSE SERPL-MCNC: 107 MG/DL (ref 65–100)
GLUCOSE SERPL-MCNC: 107 MG/DL (ref 65–100)
GLUCOSE SERPL-MCNC: 108 MG/DL (ref 65–100)
GLUCOSE SERPL-MCNC: 108 MG/DL (ref 65–100)
GLUCOSE SERPL-MCNC: 111 MG/DL (ref 65–100)
GLUCOSE SERPL-MCNC: 114 MG/DL (ref 65–100)
GLUCOSE SERPL-MCNC: 117 MG/DL (ref 65–100)
GLUCOSE SERPL-MCNC: 66 MG/DL (ref 65–100)
GLUCOSE SERPL-MCNC: 67 MG/DL (ref 65–100)
GLUCOSE SERPL-MCNC: 68 MG/DL (ref 65–100)
GLUCOSE SERPL-MCNC: 68 MG/DL (ref 65–100)
GLUCOSE SERPL-MCNC: 72 MG/DL (ref 65–100)
GLUCOSE SERPL-MCNC: 74 MG/DL (ref 65–100)
GLUCOSE SERPL-MCNC: 75 MG/DL (ref 65–100)
GLUCOSE SERPL-MCNC: 76 MG/DL (ref 65–100)
GLUCOSE SERPL-MCNC: 79 MG/DL (ref 65–100)
GLUCOSE SERPL-MCNC: 80 MG/DL (ref 65–100)
GLUCOSE SERPL-MCNC: 81 MG/DL (ref 65–100)
GLUCOSE SERPL-MCNC: 81 MG/DL (ref 65–100)
GLUCOSE SERPL-MCNC: 83 MG/DL (ref 65–100)
GLUCOSE SERPL-MCNC: 84 MG/DL (ref 65–100)
GLUCOSE SERPL-MCNC: 86 MG/DL (ref 65–100)
GLUCOSE SERPL-MCNC: 86 MG/DL (ref 65–100)
GLUCOSE SERPL-MCNC: 87 MG/DL (ref 65–100)
GLUCOSE SERPL-MCNC: 87 MG/DL (ref 65–100)
GLUCOSE SERPL-MCNC: 88 MG/DL (ref 65–100)
GLUCOSE SERPL-MCNC: 90 MG/DL (ref 65–100)
GLUCOSE SERPL-MCNC: 90 MG/DL (ref 65–100)
GLUCOSE SERPL-MCNC: 92 MG/DL (ref 65–100)
GLUCOSE SERPL-MCNC: 93 MG/DL (ref 65–100)
GLUCOSE SERPL-MCNC: 94 MG/DL (ref 65–100)
GLUCOSE SERPL-MCNC: 94 MG/DL (ref 65–100)
GLUCOSE SERPL-MCNC: 95 MG/DL (ref 65–100)
GLUCOSE SERPL-MCNC: 96 MG/DL (ref 65–100)
GLUCOSE SERPL-MCNC: 96 MG/DL (ref 65–100)
GLUCOSE SERPL-MCNC: 97 MG/DL (ref 65–100)
GLUCOSE SERPL-MCNC: 97 MG/DL (ref 65–100)
GLUCOSE SERPL-MCNC: 98 MG/DL (ref 65–100)
GLUCOSE UR STRIP.AUTO-MCNC: NEGATIVE MG/DL
GRAM STN SPEC: NORMAL
HCO3 BLDA-SCNC: 20 MMOL/L (ref 22–26)
HCO3 BLDA-SCNC: 20 MMOL/L (ref 22–26)
HCO3 BLDA-SCNC: 21 MMOL/L (ref 22–26)
HCO3 BLDA-SCNC: 23 MMOL/L (ref 22–26)
HCO3 BLDA-SCNC: 24 MMOL/L (ref 22–26)
HCT VFR BLD AUTO: 18.6 % (ref 36.6–50.3)
HCT VFR BLD AUTO: 19.4 % (ref 36.6–50.3)
HCT VFR BLD AUTO: 19.8 % (ref 36.6–50.3)
HCT VFR BLD AUTO: 20.2 % (ref 36.6–50.3)
HCT VFR BLD AUTO: 20.6 % (ref 36.6–50.3)
HCT VFR BLD AUTO: 21.2 % (ref 36.6–50.3)
HCT VFR BLD AUTO: 21.6 % (ref 36.6–50.3)
HCT VFR BLD AUTO: 22.7 % (ref 36.6–50.3)
HCT VFR BLD AUTO: 22.8 % (ref 36.6–50.3)
HCT VFR BLD AUTO: 22.9 % (ref 36.6–50.3)
HCT VFR BLD AUTO: 23.1 % (ref 36.6–50.3)
HCT VFR BLD AUTO: 23.6 % (ref 36.6–50.3)
HCT VFR BLD AUTO: 23.8 % (ref 36.6–50.3)
HCT VFR BLD AUTO: 24.1 % (ref 36.6–50.3)
HCT VFR BLD AUTO: 24.4 % (ref 36.6–50.3)
HCT VFR BLD AUTO: 24.5 % (ref 36.6–50.3)
HCT VFR BLD AUTO: 24.5 % (ref 36.6–50.3)
HCT VFR BLD AUTO: 24.8 % (ref 36.6–50.3)
HCT VFR BLD AUTO: 25.1 % (ref 36.6–50.3)
HCT VFR BLD AUTO: 25.5 % (ref 36.6–50.3)
HCT VFR BLD AUTO: 26 % (ref 36.6–50.3)
HCT VFR BLD AUTO: 26 % (ref 36.6–50.3)
HCT VFR BLD AUTO: 26.2 % (ref 36.6–50.3)
HCT VFR BLD AUTO: 26.2 % (ref 36.6–50.3)
HCT VFR BLD AUTO: 26.6 % (ref 36.6–50.3)
HCT VFR BLD AUTO: 26.7 % (ref 36.6–50.3)
HCT VFR BLD AUTO: 26.7 % (ref 36.6–50.3)
HCT VFR BLD AUTO: 27.4 % (ref 36.6–50.3)
HCT VFR BLD AUTO: 27.6 % (ref 36.6–50.3)
HCT VFR BLD AUTO: 27.7 % (ref 36.6–50.3)
HCT VFR BLD AUTO: 28 % (ref 36.6–50.3)
HCT VFR BLD AUTO: 28.1 % (ref 36.6–50.3)
HCT VFR BLD AUTO: 28.1 % (ref 36.6–50.3)
HCT VFR BLD AUTO: 28.3 % (ref 36.6–50.3)
HCT VFR BLD AUTO: 28.7 % (ref 36.6–50.3)
HCT VFR BLD AUTO: 28.9 % (ref 36.6–50.3)
HCT VFR BLD AUTO: 30.8 % (ref 36.6–50.3)
HCT VFR BLD AUTO: 31.7 % (ref 36.6–50.3)
HCT VFR BLD AUTO: 31.9 % (ref 36.6–50.3)
HGB BLD-MCNC: 10.1 G/DL (ref 12.1–17)
HGB BLD-MCNC: 10.1 G/DL (ref 12.1–17)
HGB BLD-MCNC: 10.7 G/DL (ref 12.1–17)
HGB BLD-MCNC: 10.7 G/DL (ref 12.1–17)
HGB BLD-MCNC: 11.1 G/DL (ref 12.1–17)
HGB BLD-MCNC: 6.5 G/DL (ref 12.1–17)
HGB BLD-MCNC: 6.6 G/DL (ref 12.1–17)
HGB BLD-MCNC: 6.8 G/DL (ref 12.1–17)
HGB BLD-MCNC: 6.9 G/DL (ref 12.1–17)
HGB BLD-MCNC: 7 G/DL (ref 12.1–17)
HGB BLD-MCNC: 7.4 G/DL (ref 12.1–17)
HGB BLD-MCNC: 7.4 G/DL (ref 12.1–17)
HGB BLD-MCNC: 7.7 G/DL (ref 12.1–17)
HGB BLD-MCNC: 7.7 G/DL (ref 12.1–17)
HGB BLD-MCNC: 7.8 G/DL (ref 12.1–17)
HGB BLD-MCNC: 7.9 G/DL (ref 12.1–17)
HGB BLD-MCNC: 8 G/DL (ref 12.1–17)
HGB BLD-MCNC: 8.2 G/DL (ref 12.1–17)
HGB BLD-MCNC: 8.2 G/DL (ref 12.1–17)
HGB BLD-MCNC: 8.3 G/DL (ref 12.1–17)
HGB BLD-MCNC: 8.3 G/DL (ref 12.1–17)
HGB BLD-MCNC: 8.5 G/DL (ref 12.1–17)
HGB BLD-MCNC: 8.5 G/DL (ref 12.1–17)
HGB BLD-MCNC: 8.6 G/DL (ref 12.1–17)
HGB BLD-MCNC: 8.8 G/DL (ref 12.1–17)
HGB BLD-MCNC: 9 G/DL (ref 12.1–17)
HGB BLD-MCNC: 9.1 G/DL (ref 12.1–17)
HGB BLD-MCNC: 9.2 G/DL (ref 12.1–17)
HGB BLD-MCNC: 9.2 G/DL (ref 12.1–17)
HGB BLD-MCNC: 9.3 G/DL (ref 12.1–17)
HGB BLD-MCNC: 9.4 G/DL (ref 12.1–17)
HGB BLD-MCNC: 9.5 G/DL (ref 12.1–17)
HGB BLD-MCNC: 9.6 G/DL (ref 12.1–17)
HGB BLD-MCNC: 9.6 G/DL (ref 12.1–17)
HGB BLD-MCNC: 9.7 G/DL (ref 12.1–17)
HGB BLD-MCNC: 9.9 G/DL (ref 12.1–17)
HGB BLD-MCNC: 9.9 G/DL (ref 12.1–17)
HGB UR QL STRIP: ABNORMAL
HGB UR QL STRIP: NEGATIVE
HYALINE CASTS URNS QL MICRO: ABNORMAL /LPF (ref 0–5)
IGA SERPL-MCNC: 1072 MG/DL (ref 61–437)
IGG SERPL-MCNC: 2302 MG/DL (ref 603–1613)
IGM SERPL-MCNC: 253 MG/DL (ref 20–172)
IMM GRANULOCYTES # BLD AUTO: 0 K/UL (ref 0–0.04)
IMM GRANULOCYTES # BLD AUTO: 0.1 K/UL (ref 0–0.04)
IMM GRANULOCYTES # BLD AUTO: 0.1 K/UL (ref 0–0.04)
IMM GRANULOCYTES NFR BLD AUTO: 0 % (ref 0–0.5)
IMM GRANULOCYTES NFR BLD AUTO: 1 % (ref 0–0.5)
INR PPP: 1.4 (ref 0.9–1.1)
INR PPP: 1.4 (ref 0.9–1.1)
IPAP/PIP, IPAPIP: 0
KAPPA LC FREE SER-MCNC: 135.1 MG/L (ref 3.3–19.4)
KAPPA LC FREE/LAMBDA FREE SER: 1.52 {RATIO} (ref 0.26–1.65)
KETONES UR QL STRIP.AUTO: NEGATIVE MG/DL
LACTATE SERPL-SCNC: 2.5 MMOL/L (ref 0.4–2)
LACTATE SERPL-SCNC: 2.6 MMOL/L (ref 0.4–2)
LACTATE SERPL-SCNC: 3 MMOL/L (ref 0.4–2)
LACTATE SERPL-SCNC: 3.4 MMOL/L (ref 0.4–2)
LACTATE SERPL-SCNC: 3.6 MMOL/L (ref 0.4–2)
LACTATE SERPL-SCNC: 4 MMOL/L (ref 0.4–2)
LAMBDA LC FREE SERPL-MCNC: 88.6 MG/L (ref 5.7–26.3)
LDH FLD L TO P-CCNC: 42 U/L
LDH FLD L TO P-CCNC: 59 U/L
LEUKOCYTE ESTERASE UR QL STRIP.AUTO: ABNORMAL
LEUKOCYTE ESTERASE UR QL STRIP.AUTO: NEGATIVE
LEUKOCYTE ESTERASE UR QL STRIP.AUTO: NEGATIVE
LIPASE SERPL-CCNC: 129 U/L (ref 73–393)
LIPASE SERPL-CCNC: 192 U/L (ref 73–393)
LYMPHOCYTES # BLD: 0.6 K/UL (ref 0.8–3.5)
LYMPHOCYTES # BLD: 0.7 K/UL (ref 0.8–3.5)
LYMPHOCYTES # BLD: 0.7 K/UL (ref 0.8–3.5)
LYMPHOCYTES # BLD: 0.9 K/UL (ref 0.8–3.5)
LYMPHOCYTES # BLD: 0.9 K/UL (ref 0.8–3.5)
LYMPHOCYTES # BLD: 1 K/UL (ref 0.8–3.5)
LYMPHOCYTES # BLD: 1.2 K/UL (ref 0.8–3.5)
LYMPHOCYTES # BLD: 1.2 K/UL (ref 0.8–3.5)
LYMPHOCYTES # BLD: 1.3 K/UL (ref 0.8–3.5)
LYMPHOCYTES # BLD: 1.5 K/UL (ref 0.8–3.5)
LYMPHOCYTES # BLD: 1.9 K/UL (ref 0.8–3.5)
LYMPHOCYTES NFR BLD: 11 % (ref 12–49)
LYMPHOCYTES NFR BLD: 11 % (ref 12–49)
LYMPHOCYTES NFR BLD: 15 % (ref 12–49)
LYMPHOCYTES NFR BLD: 18 % (ref 12–49)
LYMPHOCYTES NFR BLD: 22 % (ref 12–49)
LYMPHOCYTES NFR BLD: 22 % (ref 12–49)
LYMPHOCYTES NFR BLD: 23 % (ref 12–49)
LYMPHOCYTES NFR BLD: 24 % (ref 12–49)
LYMPHOCYTES NFR BLD: 26 % (ref 12–49)
LYMPHOCYTES NFR BLD: 5 % (ref 12–49)
LYMPHOCYTES NFR BLD: 6 % (ref 12–49)
LYMPHOCYTES NFR BLD: 8 % (ref 12–49)
LYMPHOCYTES NFR BLD: 9 % (ref 12–49)
LYMPHOCYTES NFR FLD: 27 %
M PROTEIN SERPL ELPH-MCNC: 0.8 G/DL
MAGNESIUM SERPL-MCNC: 1.6 MG/DL (ref 1.6–2.4)
MAGNESIUM SERPL-MCNC: 1.6 MG/DL (ref 1.6–2.4)
MAGNESIUM SERPL-MCNC: 1.7 MG/DL (ref 1.6–2.4)
MAGNESIUM SERPL-MCNC: 1.9 MG/DL (ref 1.6–2.4)
MAGNESIUM SERPL-MCNC: 1.9 MG/DL (ref 1.6–2.4)
MCH RBC QN AUTO: 27.3 PG (ref 26–34)
MCH RBC QN AUTO: 27.4 PG (ref 26–34)
MCH RBC QN AUTO: 27.4 PG (ref 26–34)
MCH RBC QN AUTO: 27.6 PG (ref 26–34)
MCH RBC QN AUTO: 27.6 PG (ref 26–34)
MCH RBC QN AUTO: 27.8 PG (ref 26–34)
MCH RBC QN AUTO: 27.8 PG (ref 26–34)
MCH RBC QN AUTO: 28 PG (ref 26–34)
MCH RBC QN AUTO: 28.1 PG (ref 26–34)
MCH RBC QN AUTO: 28.2 PG (ref 26–34)
MCH RBC QN AUTO: 28.3 PG (ref 26–34)
MCH RBC QN AUTO: 28.4 PG (ref 26–34)
MCH RBC QN AUTO: 28.4 PG (ref 26–34)
MCH RBC QN AUTO: 28.5 PG (ref 26–34)
MCH RBC QN AUTO: 28.7 PG (ref 26–34)
MCH RBC QN AUTO: 28.7 PG (ref 26–34)
MCH RBC QN AUTO: 28.8 PG (ref 26–34)
MCH RBC QN AUTO: 28.8 PG (ref 26–34)
MCH RBC QN AUTO: 29 PG (ref 26–34)
MCH RBC QN AUTO: 29.2 PG (ref 26–34)
MCH RBC QN AUTO: 29.4 PG (ref 26–34)
MCH RBC QN AUTO: 29.5 PG (ref 26–34)
MCH RBC QN AUTO: 29.5 PG (ref 26–34)
MCH RBC QN AUTO: 29.6 PG (ref 26–34)
MCH RBC QN AUTO: 29.7 PG (ref 26–34)
MCH RBC QN AUTO: 29.7 PG (ref 26–34)
MCH RBC QN AUTO: 30 PG (ref 26–34)
MCH RBC QN AUTO: 30.5 PG (ref 26–34)
MCH RBC QN AUTO: 30.9 PG (ref 26–34)
MCHC RBC AUTO-ENTMCNC: 33.2 G/DL (ref 30–36.5)
MCHC RBC AUTO-ENTMCNC: 33.5 G/DL (ref 30–36.5)
MCHC RBC AUTO-ENTMCNC: 33.6 G/DL (ref 30–36.5)
MCHC RBC AUTO-ENTMCNC: 33.6 G/DL (ref 30–36.5)
MCHC RBC AUTO-ENTMCNC: 33.7 G/DL (ref 30–36.5)
MCHC RBC AUTO-ENTMCNC: 33.8 G/DL (ref 30–36.5)
MCHC RBC AUTO-ENTMCNC: 33.9 G/DL (ref 30–36.5)
MCHC RBC AUTO-ENTMCNC: 34 G/DL (ref 30–36.5)
MCHC RBC AUTO-ENTMCNC: 34 G/DL (ref 30–36.5)
MCHC RBC AUTO-ENTMCNC: 34.2 G/DL (ref 30–36.5)
MCHC RBC AUTO-ENTMCNC: 34.3 G/DL (ref 30–36.5)
MCHC RBC AUTO-ENTMCNC: 34.4 G/DL (ref 30–36.5)
MCHC RBC AUTO-ENTMCNC: 34.5 G/DL (ref 30–36.5)
MCHC RBC AUTO-ENTMCNC: 34.6 G/DL (ref 30–36.5)
MCHC RBC AUTO-ENTMCNC: 34.7 G/DL (ref 30–36.5)
MCHC RBC AUTO-ENTMCNC: 34.7 G/DL (ref 30–36.5)
MCHC RBC AUTO-ENTMCNC: 34.8 G/DL (ref 30–36.5)
MCHC RBC AUTO-ENTMCNC: 34.8 G/DL (ref 30–36.5)
MCHC RBC AUTO-ENTMCNC: 34.9 G/DL (ref 30–36.5)
MCHC RBC AUTO-ENTMCNC: 35 G/DL (ref 30–36.5)
MCHC RBC AUTO-ENTMCNC: 35.1 G/DL (ref 30–36.5)
MCHC RBC AUTO-ENTMCNC: 35.2 G/DL (ref 30–36.5)
MCHC RBC AUTO-ENTMCNC: 35.3 G/DL (ref 30–36.5)
MCHC RBC AUTO-ENTMCNC: 35.5 G/DL (ref 30–36.5)
MCHC RBC AUTO-ENTMCNC: 35.7 G/DL (ref 30–36.5)
MCV RBC AUTO: 78.2 FL (ref 80–99)
MCV RBC AUTO: 78.2 FL (ref 80–99)
MCV RBC AUTO: 78.9 FL (ref 80–99)
MCV RBC AUTO: 78.9 FL (ref 80–99)
MCV RBC AUTO: 79 FL (ref 80–99)
MCV RBC AUTO: 79 FL (ref 80–99)
MCV RBC AUTO: 79.3 FL (ref 80–99)
MCV RBC AUTO: 80.3 FL (ref 80–99)
MCV RBC AUTO: 80.6 FL (ref 80–99)
MCV RBC AUTO: 82.3 FL (ref 80–99)
MCV RBC AUTO: 82.4 FL (ref 80–99)
MCV RBC AUTO: 82.5 FL (ref 80–99)
MCV RBC AUTO: 82.8 FL (ref 80–99)
MCV RBC AUTO: 82.9 FL (ref 80–99)
MCV RBC AUTO: 83.1 FL (ref 80–99)
MCV RBC AUTO: 83.4 FL (ref 80–99)
MCV RBC AUTO: 83.7 FL (ref 80–99)
MCV RBC AUTO: 83.8 FL (ref 80–99)
MCV RBC AUTO: 83.9 FL (ref 80–99)
MCV RBC AUTO: 84.1 FL (ref 80–99)
MCV RBC AUTO: 84.1 FL (ref 80–99)
MCV RBC AUTO: 84.2 FL (ref 80–99)
MCV RBC AUTO: 84.5 FL (ref 80–99)
MCV RBC AUTO: 84.7 FL (ref 80–99)
MCV RBC AUTO: 84.8 FL (ref 80–99)
MCV RBC AUTO: 84.8 FL (ref 80–99)
MCV RBC AUTO: 84.9 FL (ref 80–99)
MCV RBC AUTO: 85.1 FL (ref 80–99)
MCV RBC AUTO: 86.1 FL (ref 80–99)
MCV RBC AUTO: 86.3 FL (ref 80–99)
MCV RBC AUTO: 86.7 FL (ref 80–99)
MCV RBC AUTO: 87 FL (ref 80–99)
MCV RBC AUTO: 87.6 FL (ref 80–99)
MCV RBC AUTO: 87.9 FL (ref 80–99)
MCV RBC AUTO: 88.4 FL (ref 80–99)
MESOTHL CELL NFR FLD: 2 %
METHADONE UR QL: NEGATIVE
MONOCYTES # BLD: 0.5 K/UL (ref 0–1)
MONOCYTES # BLD: 0.6 K/UL (ref 0–1)
MONOCYTES # BLD: 0.7 K/UL (ref 0–1)
MONOCYTES # BLD: 0.7 K/UL (ref 0–1)
MONOCYTES # BLD: 1 K/UL (ref 0–1)
MONOCYTES # BLD: 1.2 K/UL (ref 0–1)
MONOCYTES # BLD: 1.2 K/UL (ref 0–1)
MONOCYTES NFR BLD: 10 % (ref 5–13)
MONOCYTES NFR BLD: 10 % (ref 5–13)
MONOCYTES NFR BLD: 12 % (ref 5–13)
MONOCYTES NFR BLD: 7 % (ref 5–13)
MONOCYTES NFR BLD: 7 % (ref 5–13)
MONOCYTES NFR BLD: 8 % (ref 5–13)
MONOCYTES NFR BLD: 9 % (ref 5–13)
MONOCYTES NFR FLD: 50 %
MONOS+MACROS NFR FLD: 18 %
MRSA DNA SPEC QL NAA+PROBE: NOT DETECTED
MUCOUS THREADS URNS QL MICRO: ABNORMAL /LPF
NEUTROPHILS NFR FLD: 3 %
NEUTS SEG # BLD: 11.2 K/UL (ref 1.8–8)
NEUTS SEG # BLD: 12.5 K/UL (ref 1.8–8)
NEUTS SEG # BLD: 3 K/UL (ref 1.8–8)
NEUTS SEG # BLD: 3.8 K/UL (ref 1.8–8)
NEUTS SEG # BLD: 3.8 K/UL (ref 1.8–8)
NEUTS SEG # BLD: 3.9 K/UL (ref 1.8–8)
NEUTS SEG # BLD: 4 K/UL (ref 1.8–8)
NEUTS SEG # BLD: 4.2 K/UL (ref 1.8–8)
NEUTS SEG # BLD: 4.7 K/UL (ref 1.8–8)
NEUTS SEG # BLD: 6.6 K/UL (ref 1.8–8)
NEUTS SEG # BLD: 7.6 K/UL (ref 1.8–8)
NEUTS SEG # BLD: 9.1 K/UL (ref 1.8–8)
NEUTS SEG # BLD: 9.5 K/UL (ref 1.8–8)
NEUTS SEG NFR BLD: 57 % (ref 32–75)
NEUTS SEG NFR BLD: 60 % (ref 32–75)
NEUTS SEG NFR BLD: 65 % (ref 32–75)
NEUTS SEG NFR BLD: 65 % (ref 32–75)
NEUTS SEG NFR BLD: 67 % (ref 32–75)
NEUTS SEG NFR BLD: 72 % (ref 32–75)
NEUTS SEG NFR BLD: 75 % (ref 32–75)
NEUTS SEG NFR BLD: 80 % (ref 32–75)
NEUTS SEG NFR BLD: 81 % (ref 32–75)
NEUTS SEG NFR BLD: 83 % (ref 32–75)
NEUTS SEG NFR BLD: 84 % (ref 32–75)
NEUTS SEG NFR BLD: 85 % (ref 32–75)
NEUTS SEG NFR BLD: 85 % (ref 32–75)
NITRITE UR QL STRIP.AUTO: NEGATIVE
NRBC # BLD: 0 K/UL (ref 0–0.01)
NRBC # BLD: 0.02 K/UL (ref 0–0.01)
NRBC # BLD: 0.03 K/UL (ref 0–0.01)
NRBC # BLD: 0.03 K/UL (ref 0–0.01)
NRBC # BLD: 0.04 K/UL (ref 0–0.01)
NRBC BLD-RTO: 0 PER 100 WBC
NRBC BLD-RTO: 0.2 PER 100 WBC
NRBC BLD-RTO: 0.3 PER 100 WBC
NRBC BLD-RTO: 0.4 PER 100 WBC
NRBC BLD-RTO: 0.4 PER 100 WBC
NRBC BLD-RTO: 0.5 PER 100 WBC
NUC CELL # FLD: 16 /CU MM
NUC CELL # FLD: 270 /CU MM
OPIATES UR QL: NEGATIVE
P-R INTERVAL, ECG05: 164 MS
P-R INTERVAL, ECG05: 174 MS
P-R INTERVAL, ECG05: 186 MS
PCO2 BLDA: 24 MMHG (ref 35–45)
PCO2 BLDA: 25 MMHG (ref 35–45)
PCO2 BLDA: 27 MMHG (ref 35–45)
PCO2 BLDA: 28 MMHG (ref 35–45)
PCO2 BLDA: 31 MMHG (ref 35–45)
PCO2 BLDA: 32 MMHG (ref 35–45)
PCO2 BLDA: 34 MMHG (ref 35–45)
PCP UR QL: NEGATIVE
PERFORMED BY, TECHID: ABNORMAL
PERFORMED BY, TECHID: NORMAL
PH BLDA: 7.38 [PH] (ref 7.35–7.45)
PH BLDA: 7.4 [PH] (ref 7.35–7.45)
PH BLDA: 7.41 [PH] (ref 7.35–7.45)
PH BLDA: 7.48 [PH] (ref 7.35–7.45)
PH BLDA: 7.48 [PH] (ref 7.35–7.45)
PH BLDA: 7.52 [PH] (ref 7.35–7.45)
PH BLDA: 7.53 [PH] (ref 7.35–7.45)
PH UR STRIP: 5 [PH] (ref 5–8)
PH UR STRIP: 6 [PH] (ref 5–8)
PHOSPHATE SERPL-MCNC: 2.5 MG/DL (ref 2.6–4.7)
PHOSPHATE SERPL-MCNC: 2.7 MG/DL (ref 2.6–4.7)
PHOSPHATE SERPL-MCNC: 2.9 MG/DL (ref 2.6–4.7)
PHOSPHATE SERPL-MCNC: 3 MG/DL (ref 2.6–4.7)
PHOSPHATE SERPL-MCNC: 3.1 MG/DL (ref 2.6–4.7)
PHOSPHATE SERPL-MCNC: 3.2 MG/DL (ref 2.6–4.7)
PHOSPHATE SERPL-MCNC: 3.4 MG/DL (ref 2.6–4.7)
PHOSPHATE SERPL-MCNC: 3.4 MG/DL (ref 2.6–4.7)
PHOSPHATE SERPL-MCNC: 3.6 MG/DL (ref 2.6–4.7)
PHOSPHATE SERPL-MCNC: 3.8 MG/DL (ref 2.6–4.7)
PHOSPHATE SERPL-MCNC: 4.3 MG/DL (ref 2.6–4.7)
PHOSPHATE SERPL-MCNC: 4.3 MG/DL (ref 2.6–4.7)
PLATELET # BLD AUTO: 105 K/UL (ref 150–400)
PLATELET # BLD AUTO: 106 K/UL (ref 150–400)
PLATELET # BLD AUTO: 108 K/UL (ref 150–400)
PLATELET # BLD AUTO: 109 K/UL (ref 150–400)
PLATELET # BLD AUTO: 109 K/UL (ref 150–400)
PLATELET # BLD AUTO: 110 K/UL (ref 150–400)
PLATELET # BLD AUTO: 124 K/UL (ref 150–400)
PLATELET # BLD AUTO: 135 K/UL (ref 150–400)
PLATELET # BLD AUTO: 53 K/UL (ref 150–400)
PLATELET # BLD AUTO: 53 K/UL (ref 150–400)
PLATELET # BLD AUTO: 57 K/UL (ref 150–400)
PLATELET # BLD AUTO: 57 K/UL (ref 150–400)
PLATELET # BLD AUTO: 58 K/UL (ref 150–400)
PLATELET # BLD AUTO: 61 K/UL (ref 150–400)
PLATELET # BLD AUTO: 64 K/UL (ref 150–400)
PLATELET # BLD AUTO: 66 K/UL (ref 150–400)
PLATELET # BLD AUTO: 67 K/UL (ref 150–400)
PLATELET # BLD AUTO: 69 K/UL (ref 150–400)
PLATELET # BLD AUTO: 69 K/UL (ref 150–400)
PLATELET # BLD AUTO: 70 K/UL (ref 150–400)
PLATELET # BLD AUTO: 70 K/UL (ref 150–400)
PLATELET # BLD AUTO: 72 K/UL (ref 150–400)
PLATELET # BLD AUTO: 73 K/UL (ref 150–400)
PLATELET # BLD AUTO: 73 K/UL (ref 150–400)
PLATELET # BLD AUTO: 76 K/UL (ref 150–400)
PLATELET # BLD AUTO: 77 K/UL (ref 150–400)
PLATELET # BLD AUTO: 77 K/UL (ref 150–400)
PLATELET # BLD AUTO: 79 K/UL (ref 150–400)
PLATELET # BLD AUTO: 80 K/UL (ref 150–400)
PLATELET # BLD AUTO: 81 K/UL (ref 150–400)
PLATELET # BLD AUTO: 83 K/UL (ref 150–400)
PLATELET # BLD AUTO: 89 K/UL (ref 150–400)
PLATELET # BLD AUTO: 94 K/UL (ref 150–400)
PLATELET # BLD AUTO: 99 K/UL (ref 150–400)
PMV BLD AUTO: 10.2 FL (ref 8.9–12.9)
PMV BLD AUTO: 10.4 FL (ref 8.9–12.9)
PMV BLD AUTO: 10.6 FL (ref 8.9–12.9)
PMV BLD AUTO: 10.6 FL (ref 8.9–12.9)
PMV BLD AUTO: 10.7 FL (ref 8.9–12.9)
PMV BLD AUTO: 10.8 FL (ref 8.9–12.9)
PMV BLD AUTO: 10.9 FL (ref 8.9–12.9)
PMV BLD AUTO: 11.1 FL (ref 8.9–12.9)
PMV BLD AUTO: 11.2 FL (ref 8.9–12.9)
PMV BLD AUTO: 11.3 FL (ref 8.9–12.9)
PMV BLD AUTO: 11.5 FL (ref 8.9–12.9)
PMV BLD AUTO: 11.6 FL (ref 8.9–12.9)
PMV BLD AUTO: 11.7 FL (ref 8.9–12.9)
PMV BLD AUTO: 11.7 FL (ref 8.9–12.9)
PMV BLD AUTO: 11.8 FL (ref 8.9–12.9)
PMV BLD AUTO: 11.9 FL (ref 8.9–12.9)
PMV BLD AUTO: 12 FL (ref 8.9–12.9)
PMV BLD AUTO: 12.3 FL (ref 8.9–12.9)
PMV BLD AUTO: 12.4 FL (ref 8.9–12.9)
PMV BLD AUTO: 12.5 FL (ref 8.9–12.9)
PMV BLD AUTO: 12.6 FL (ref 8.9–12.9)
PMV BLD AUTO: 12.7 FL (ref 8.9–12.9)
PMV BLD AUTO: 9.7 FL (ref 8.9–12.9)
PO2 BLDA: 107 MMHG (ref 75–100)
PO2 BLDA: 59 MMHG (ref 75–100)
PO2 BLDA: 68 MMHG (ref 75–100)
PO2 BLDA: 72 MMHG (ref 75–100)
PO2 BLDA: 80 MMHG (ref 75–100)
PO2 BLDA: 81 MMHG (ref 75–100)
PO2 BLDA: 81 MMHG (ref 75–100)
POTASSIUM SERPL-SCNC: 2.6 MMOL/L (ref 3.5–5.1)
POTASSIUM SERPL-SCNC: 2.6 MMOL/L (ref 3.5–5.1)
POTASSIUM SERPL-SCNC: 3.1 MMOL/L (ref 3.5–5.1)
POTASSIUM SERPL-SCNC: 3.2 MMOL/L (ref 3.5–5.1)
POTASSIUM SERPL-SCNC: 3.2 MMOL/L (ref 3.5–5.1)
POTASSIUM SERPL-SCNC: 3.3 MMOL/L (ref 3.5–5.1)
POTASSIUM SERPL-SCNC: 3.4 MMOL/L (ref 3.5–5.1)
POTASSIUM SERPL-SCNC: 3.5 MMOL/L (ref 3.5–5.1)
POTASSIUM SERPL-SCNC: 3.5 MMOL/L (ref 3.5–5.1)
POTASSIUM SERPL-SCNC: 3.6 MMOL/L (ref 3.5–5.1)
POTASSIUM SERPL-SCNC: 3.7 MMOL/L (ref 3.5–5.1)
POTASSIUM SERPL-SCNC: 3.8 MMOL/L (ref 3.5–5.1)
POTASSIUM SERPL-SCNC: 3.9 MMOL/L (ref 3.5–5.1)
POTASSIUM SERPL-SCNC: 4 MMOL/L (ref 3.5–5.1)
POTASSIUM SERPL-SCNC: 4.2 MMOL/L (ref 3.5–5.1)
POTASSIUM SERPL-SCNC: 4.7 MMOL/L (ref 3.5–5.1)
POTASSIUM SERPL-SCNC: 4.8 MMOL/L (ref 3.5–5.1)
POTASSIUM SERPL-SCNC: 5 MMOL/L (ref 3.5–5.1)
POTASSIUM SERPL-SCNC: 5.2 MMOL/L (ref 3.5–5.1)
POTASSIUM SERPL-SCNC: 5.3 MMOL/L (ref 3.5–5.1)
POTASSIUM SERPL-SCNC: 5.3 MMOL/L (ref 3.5–5.1)
POTASSIUM SERPL-SCNC: ABNORMAL MMOL/L (ref 3.5–5.1)
POTASSIUM UR-SCNC: 65 MMOL/L
PRESSURE SUPPORT SETTING VENT: 6 CM[H2O]
PROT FLD-MCNC: 1 G/DL
PROT PATTERN SERPL IFE-IMP: ABNORMAL
PROT SERPL-MCNC: 5 G/DL (ref 6.4–8.2)
PROT SERPL-MCNC: 5.1 G/DL (ref 6.4–8.2)
PROT SERPL-MCNC: 5.3 G/DL (ref 6.4–8.2)
PROT SERPL-MCNC: 5.3 G/DL (ref 6.4–8.2)
PROT SERPL-MCNC: 5.4 G/DL (ref 6.4–8.2)
PROT SERPL-MCNC: 5.5 G/DL (ref 6.4–8.2)
PROT SERPL-MCNC: 5.5 G/DL (ref 6.4–8.2)
PROT SERPL-MCNC: 5.6 G/DL (ref 6.4–8.2)
PROT SERPL-MCNC: 5.7 G/DL (ref 6.4–8.2)
PROT SERPL-MCNC: 5.8 G/DL (ref 6.4–8.2)
PROT SERPL-MCNC: 5.9 G/DL (ref 6.4–8.2)
PROT SERPL-MCNC: 6.1 G/DL (ref 6.4–8.2)
PROT SERPL-MCNC: 6.1 G/DL (ref 6.4–8.2)
PROT SERPL-MCNC: 6.3 G/DL (ref 6.4–8.2)
PROT SERPL-MCNC: 6.5 G/DL (ref 6.4–8.2)
PROT SERPL-MCNC: 6.8 G/DL (ref 6.4–8.2)
PROT SERPL-MCNC: 7.1 G/DL (ref 6.4–8.2)
PROT SERPL-MCNC: 7.2 G/DL (ref 6–8.5)
PROT SERPL-MCNC: 7.5 G/DL (ref 6.4–8.2)
PROT SERPL-MCNC: 7.6 G/DL (ref 6.4–8.2)
PROT SERPL-MCNC: 8.2 G/DL (ref 6.4–8.2)
PROT UR STRIP-MCNC: 100 MG/DL
PROT UR STRIP-MCNC: NEGATIVE MG/DL
PROT UR STRIP-MCNC: NEGATIVE MG/DL
PROT UR-MCNC: 43 MG/DL (ref 0–11.9)
PROT/CREAT UR-RTO: 0.2
PROTHROMBIN TIME: 16.5 SEC (ref 11.9–14.6)
PROTHROMBIN TIME: 16.5 SEC (ref 11.9–14.6)
PTH-INTACT SERPL-MCNC: 45.9 PG/ML (ref 18.4–88)
Q-T INTERVAL, ECG07: 340 MS
Q-T INTERVAL, ECG07: 372 MS
Q-T INTERVAL, ECG07: 404 MS
Q-T INTERVAL, ECG07: 540 MS
QRS DURATION, ECG06: 76 MS
QRS DURATION, ECG06: 80 MS
QRS DURATION, ECG06: 86 MS
QRS DURATION, ECG06: 86 MS
QTC CALCULATION (BEZET), ECG08: 457 MS
QTC CALCULATION (BEZET), ECG08: 466 MS
QTC CALCULATION (BEZET), ECG08: 474 MS
QTC CALCULATION (BEZET), ECG08: 591 MS
RBC # BLD AUTO: 2.26 M/UL (ref 4.1–5.7)
RBC # BLD AUTO: 2.29 M/UL (ref 4.1–5.7)
RBC # BLD AUTO: 2.4 M/UL (ref 4.1–5.7)
RBC # BLD AUTO: 2.45 M/UL (ref 4.1–5.7)
RBC # BLD AUTO: 2.46 M/UL (ref 4.1–5.7)
RBC # BLD AUTO: 2.6 M/UL (ref 4.1–5.7)
RBC # BLD AUTO: 2.66 M/UL (ref 4.1–5.7)
RBC # BLD AUTO: 2.7 M/UL (ref 4.1–5.7)
RBC # BLD AUTO: 2.71 M/UL (ref 4.1–5.7)
RBC # BLD AUTO: 2.72 M/UL (ref 4.1–5.7)
RBC # BLD AUTO: 2.77 M/UL (ref 4.1–5.7)
RBC # BLD AUTO: 2.83 M/UL (ref 4.1–5.7)
RBC # BLD AUTO: 2.91 M/UL (ref 4.1–5.7)
RBC # BLD AUTO: 2.92 M/UL (ref 4.1–5.7)
RBC # BLD AUTO: 2.98 M/UL (ref 4.1–5.7)
RBC # BLD AUTO: 2.99 M/UL (ref 4.1–5.7)
RBC # BLD AUTO: 2.99 M/UL (ref 4.1–5.7)
RBC # BLD AUTO: 3.04 M/UL (ref 4.1–5.7)
RBC # BLD AUTO: 3.08 M/UL (ref 4.1–5.7)
RBC # BLD AUTO: 3.14 M/UL (ref 4.1–5.7)
RBC # BLD AUTO: 3.15 M/UL (ref 4.1–5.7)
RBC # BLD AUTO: 3.16 M/UL (ref 4.1–5.7)
RBC # BLD AUTO: 3.17 M/UL (ref 4.1–5.7)
RBC # BLD AUTO: 3.17 M/UL (ref 4.1–5.7)
RBC # BLD AUTO: 3.23 M/UL (ref 4.1–5.7)
RBC # BLD AUTO: 3.23 M/UL (ref 4.1–5.7)
RBC # BLD AUTO: 3.25 M/UL (ref 4.1–5.7)
RBC # BLD AUTO: 3.29 M/UL (ref 4.1–5.7)
RBC # BLD AUTO: 3.31 M/UL (ref 4.1–5.7)
RBC # BLD AUTO: 3.34 M/UL (ref 4.1–5.7)
RBC # BLD AUTO: 3.34 M/UL (ref 4.1–5.7)
RBC # BLD AUTO: 3.51 M/UL (ref 4.1–5.7)
RBC # BLD AUTO: 3.56 M/UL (ref 4.1–5.7)
RBC # BLD AUTO: 3.57 M/UL (ref 4.1–5.7)
RBC # BLD AUTO: 3.61 M/UL (ref 4.1–5.7)
RBC # BLD AUTO: 3.66 M/UL (ref 4.1–5.7)
RBC # BLD AUTO: 3.95 M/UL (ref 4.1–5.7)
RBC # FLD: 0 /CU MM
RBC # FLD: >100 /CU MM
RBC #/AREA URNS HPF: >100 /HPF (ref 0–5)
RBC #/AREA URNS HPF: >100 /HPF (ref 0–5)
RBC #/AREA URNS HPF: ABNORMAL /HPF (ref 0–5)
REPORTED DOSE,DOSE: ABNORMAL UNITS
REPORTED DOSE,DOSE: NORMAL UNITS
REPORTED DOSE,DOSE: NORMAL UNITS
SAO2 % BLD: 95 %
SAO2 % BLD: 95 %
SAO2 % BLD: 96 %
SAO2 % BLD: 98 %
SAO2 % BLD: 99 %
SAO2% DEVICE SAO2% SENSOR NAME: ABNORMAL
SARS-COV-2, COV2: NOT DETECTED
SODIUM SERPL-SCNC: 132 MMOL/L (ref 136–145)
SODIUM SERPL-SCNC: 135 MMOL/L (ref 136–145)
SODIUM SERPL-SCNC: 136 MMOL/L (ref 136–145)
SODIUM SERPL-SCNC: 138 MMOL/L (ref 136–145)
SODIUM SERPL-SCNC: 139 MMOL/L (ref 136–145)
SODIUM SERPL-SCNC: 140 MMOL/L (ref 136–145)
SODIUM SERPL-SCNC: 140 MMOL/L (ref 136–145)
SODIUM SERPL-SCNC: 141 MMOL/L (ref 136–145)
SODIUM SERPL-SCNC: 142 MMOL/L (ref 136–145)
SODIUM SERPL-SCNC: 143 MMOL/L (ref 136–145)
SODIUM SERPL-SCNC: 144 MMOL/L (ref 136–145)
SODIUM SERPL-SCNC: 145 MMOL/L (ref 136–145)
SODIUM SERPL-SCNC: 146 MMOL/L (ref 136–145)
SODIUM SERPL-SCNC: 147 MMOL/L (ref 136–145)
SODIUM SERPL-SCNC: 147 MMOL/L (ref 136–145)
SODIUM SERPL-SCNC: 148 MMOL/L (ref 136–145)
SODIUM SERPL-SCNC: 148 MMOL/L (ref 136–145)
SODIUM SERPL-SCNC: 149 MMOL/L (ref 136–145)
SODIUM SERPL-SCNC: 150 MMOL/L (ref 136–145)
SODIUM SERPL-SCNC: 150 MMOL/L (ref 136–145)
SODIUM UR-SCNC: 43 MMOL/L
SP GR UR REFRACTOMETRY: 1.01 (ref 1–1.03)
SP GR UR REFRACTOMETRY: 1.02 (ref 1–1.03)
SP GR UR REFRACTOMETRY: 1.03 (ref 1–1.03)
SPECIAL REQUESTS,SREQ: ABNORMAL
SPECIAL REQUESTS,SREQ: NORMAL
SPECIMEN EXP DATE BLD: NORMAL
SPECIMEN EXP DATE BLD: NORMAL
SPECIMEN SOURCE FLD: ABNORMAL
SPECIMEN SOURCE FLD: ABNORMAL
SPECIMEN SOURCE FLD: NORMAL
SPERM URNS QL MICRO: PRESENT
STATUS OF UNIT,%ST: NORMAL
T4 FREE SERPL-MCNC: 1.2 NG/DL (ref 0.8–1.5)
THERAPEUTIC RANGE,PTTT: ABNORMAL SEC (ref 82–109)
TRANSFUSION STATUS PATIENT QL: NORMAL
TROPONIN-HIGH SENSITIVITY: 11 NG/L (ref 0–76)
TROPONIN-HIGH SENSITIVITY: 20 NG/L (ref 0–76)
TROPONIN-HIGH SENSITIVITY: 34 NG/L (ref 0–76)
TSH SERPL DL<=0.05 MIU/L-ACNC: 29 UIU/ML (ref 0.36–3.74)
UA: UC IF INDICATED,UAUC: ABNORMAL
UNIT DIVISION, %UDIV: 0
URATE SERPL-MCNC: 8.7 MG/DL (ref 3.5–7.2)
UROBILINOGEN UR QL STRIP.AUTO: 0.1 EU/DL (ref 0.1–1)
VANCOMYCIN SERPL-MCNC: 11.6 UG/ML
VANCOMYCIN SERPL-MCNC: 12.5 UG/ML
VANCOMYCIN SERPL-MCNC: 13.4 UG/ML
VANCOMYCIN SERPL-MCNC: 13.7 UG/ML
VANCOMYCIN SERPL-MCNC: 13.9 UG/ML
VANCOMYCIN SERPL-MCNC: 14.6 UG/ML
VANCOMYCIN SERPL-MCNC: 15.7 UG/ML
VANCOMYCIN SERPL-MCNC: 15.7 UG/ML
VANCOMYCIN SERPL-MCNC: 16 UG/ML
VANCOMYCIN SERPL-MCNC: 16 UG/ML
VANCOMYCIN SERPL-MCNC: 16.1 UG/ML
VANCOMYCIN SERPL-MCNC: 16.3 UG/ML
VANCOMYCIN SERPL-MCNC: 16.5 UG/ML
VANCOMYCIN SERPL-MCNC: 16.7 UG/ML
VANCOMYCIN SERPL-MCNC: 16.7 UG/ML
VANCOMYCIN SERPL-MCNC: 17.6 UG/ML
VANCOMYCIN SERPL-MCNC: 17.7 UG/ML
VANCOMYCIN SERPL-MCNC: 20.6 UG/ML
VANCOMYCIN SERPL-MCNC: 21.5 UG/ML
VANCOMYCIN SERPL-MCNC: 7.2 UG/ML
VANCOMYCIN SERPL-MCNC: 9.6 UG/ML
VANCOMYCIN TROUGH SERPL-MCNC: 16.2 UG/ML (ref 5–10)
VENTILATION MODE VENT: ABNORMAL
VENTRICULAR RATE, ECG03: 113 BPM
VENTRICULAR RATE, ECG03: 72 BPM
VENTRICULAR RATE, ECG03: 77 BPM
VENTRICULAR RATE, ECG03: 98 BPM
VT SETTING VENT: 500 ML
VT SETTING VENT: 540 ML
WBC # BLD AUTO: 10.5 K/UL (ref 4.1–11.1)
WBC # BLD AUTO: 10.6 K/UL (ref 4.1–11.1)
WBC # BLD AUTO: 10.8 K/UL (ref 4.1–11.1)
WBC # BLD AUTO: 11 K/UL (ref 4.1–11.1)
WBC # BLD AUTO: 11.4 K/UL (ref 4.1–11.1)
WBC # BLD AUTO: 12.6 K/UL (ref 4.1–11.1)
WBC # BLD AUTO: 13.2 K/UL (ref 4.1–11.1)
WBC # BLD AUTO: 14.8 K/UL (ref 4.1–11.1)
WBC # BLD AUTO: 3.8 K/UL (ref 4.1–11.1)
WBC # BLD AUTO: 4.1 K/UL (ref 4.1–11.1)
WBC # BLD AUTO: 4.3 K/UL (ref 4.1–11.1)
WBC # BLD AUTO: 4.5 K/UL (ref 4.1–11.1)
WBC # BLD AUTO: 5.1 K/UL (ref 4.1–11.1)
WBC # BLD AUTO: 5.1 K/UL (ref 4.1–11.1)
WBC # BLD AUTO: 5.2 K/UL (ref 4.1–11.1)
WBC # BLD AUTO: 5.3 K/UL (ref 4.1–11.1)
WBC # BLD AUTO: 5.6 K/UL (ref 4.1–11.1)
WBC # BLD AUTO: 5.7 K/UL (ref 4.1–11.1)
WBC # BLD AUTO: 5.7 K/UL (ref 4.1–11.1)
WBC # BLD AUTO: 6 K/UL (ref 4.1–11.1)
WBC # BLD AUTO: 6.1 K/UL (ref 4.1–11.1)
WBC # BLD AUTO: 6.1 K/UL (ref 4.1–11.1)
WBC # BLD AUTO: 6.2 K/UL (ref 4.1–11.1)
WBC # BLD AUTO: 6.2 K/UL (ref 4.1–11.1)
WBC # BLD AUTO: 6.3 K/UL (ref 4.1–11.1)
WBC # BLD AUTO: 6.4 K/UL (ref 4.1–11.1)
WBC # BLD AUTO: 6.5 K/UL (ref 4.1–11.1)
WBC # BLD AUTO: 6.6 K/UL (ref 4.1–11.1)
WBC # BLD AUTO: 6.6 K/UL (ref 4.1–11.1)
WBC # BLD AUTO: 6.8 K/UL (ref 4.1–11.1)
WBC # BLD AUTO: 7.2 K/UL (ref 4.1–11.1)
WBC # BLD AUTO: 7.3 K/UL (ref 4.1–11.1)
WBC # BLD AUTO: 7.4 K/UL (ref 4.1–11.1)
WBC # BLD AUTO: 8 K/UL (ref 4.1–11.1)
WBC # BLD AUTO: 8.1 K/UL (ref 4.1–11.1)
WBC # BLD AUTO: 8.1 K/UL (ref 4.1–11.1)
WBC # BLD AUTO: 8.6 K/UL (ref 4.1–11.1)
WBC # BLD AUTO: 9 K/UL (ref 4.1–11.1)
WBC # BLD AUTO: 9.4 K/UL (ref 4.1–11.1)
WBC URNS QL MICRO: >100 /HPF (ref 0–4)
WBC URNS QL MICRO: >100 /HPF (ref 0–4)
WBC URNS QL MICRO: ABNORMAL /HPF (ref 0–4)
YEAST URNS QL MICRO: PRESENT

## 2022-01-01 PROCEDURE — 65610000006 HC RM INTENSIVE CARE

## 2022-01-01 PROCEDURE — 65270000029 HC RM PRIVATE

## 2022-01-01 PROCEDURE — 30233N1 TRANSFUSION OF NONAUTOLOGOUS RED BLOOD CELLS INTO PERIPHERAL VEIN, PERCUTANEOUS APPROACH: ICD-10-PCS | Performed by: FAMILY MEDICINE

## 2022-01-01 PROCEDURE — 85027 COMPLETE CBC AUTOMATED: CPT

## 2022-01-01 PROCEDURE — 74011250637 HC RX REV CODE- 250/637: Performed by: INTERNAL MEDICINE

## 2022-01-01 PROCEDURE — 36415 COLL VENOUS BLD VENIPUNCTURE: CPT

## 2022-01-01 PROCEDURE — 80202 ASSAY OF VANCOMYCIN: CPT

## 2022-01-01 PROCEDURE — 87641 MR-STAPH DNA AMP PROBE: CPT

## 2022-01-01 PROCEDURE — 74011250637 HC RX REV CODE- 250/637: Performed by: FAMILY MEDICINE

## 2022-01-01 PROCEDURE — 74011250636 HC RX REV CODE- 250/636: Performed by: INTERNAL MEDICINE

## 2022-01-01 PROCEDURE — C9113 INJ PANTOPRAZOLE SODIUM, VIA: HCPCS | Performed by: INTERNAL MEDICINE

## 2022-01-01 PROCEDURE — P9016 RBC LEUKOCYTES REDUCED: HCPCS

## 2022-01-01 PROCEDURE — 74011250636 HC RX REV CODE- 250/636: Performed by: FAMILY MEDICINE

## 2022-01-01 PROCEDURE — 74011000258 HC RX REV CODE- 258: Performed by: INTERNAL MEDICINE

## 2022-01-01 PROCEDURE — 74011250636 HC RX REV CODE- 250/636: Performed by: NURSE ANESTHETIST, CERTIFIED REGISTERED

## 2022-01-01 PROCEDURE — 74011250636 HC RX REV CODE- 250/636: Performed by: PSYCHIATRY & NEUROLOGY

## 2022-01-01 PROCEDURE — 77030012058: Performed by: INTERNAL MEDICINE

## 2022-01-01 PROCEDURE — 97530 THERAPEUTIC ACTIVITIES: CPT

## 2022-01-01 PROCEDURE — 74011250637 HC RX REV CODE- 250/637: Performed by: ORTHOPAEDIC SURGERY

## 2022-01-01 PROCEDURE — 82042 OTHER SOURCE ALBUMIN QUAN EA: CPT

## 2022-01-01 PROCEDURE — 80069 RENAL FUNCTION PANEL: CPT

## 2022-01-01 PROCEDURE — 74018 RADEX ABDOMEN 1 VIEW: CPT

## 2022-01-01 PROCEDURE — 73130 X-RAY EXAM OF HAND: CPT

## 2022-01-01 PROCEDURE — 97161 PT EVAL LOW COMPLEX 20 MIN: CPT

## 2022-01-01 PROCEDURE — 82140 ASSAY OF AMMONIA: CPT

## 2022-01-01 PROCEDURE — 71045 X-RAY EXAM CHEST 1 VIEW: CPT

## 2022-01-01 PROCEDURE — 87205 SMEAR GRAM STAIN: CPT

## 2022-01-01 PROCEDURE — 87086 URINE CULTURE/COLONY COUNT: CPT

## 2022-01-01 PROCEDURE — 87040 BLOOD CULTURE FOR BACTERIA: CPT

## 2022-01-01 PROCEDURE — 85025 COMPLETE CBC W/AUTO DIFF WBC: CPT

## 2022-01-01 PROCEDURE — 82306 VITAMIN D 25 HYDROXY: CPT

## 2022-01-01 PROCEDURE — 82533 TOTAL CORTISOL: CPT

## 2022-01-01 PROCEDURE — 81001 URINALYSIS AUTO W/SCOPE: CPT

## 2022-01-01 PROCEDURE — 99232 SBSQ HOSP IP/OBS MODERATE 35: CPT | Performed by: INTERNAL MEDICINE

## 2022-01-01 PROCEDURE — 84484 ASSAY OF TROPONIN QUANT: CPT

## 2022-01-01 PROCEDURE — 74011000250 HC RX REV CODE- 250: Performed by: ORTHOPAEDIC SURGERY

## 2022-01-01 PROCEDURE — 84157 ASSAY OF PROTEIN OTHER: CPT

## 2022-01-01 PROCEDURE — 71046 X-RAY EXAM CHEST 2 VIEWS: CPT

## 2022-01-01 PROCEDURE — 74011000250 HC RX REV CODE- 250: Performed by: INTERNAL MEDICINE

## 2022-01-01 PROCEDURE — 82436 ASSAY OF URINE CHLORIDE: CPT

## 2022-01-01 PROCEDURE — 80053 COMPREHEN METABOLIC PANEL: CPT

## 2022-01-01 PROCEDURE — 49083 ABD PARACENTESIS W/IMAGING: CPT

## 2022-01-01 PROCEDURE — 77010033678 HC OXYGEN DAILY

## 2022-01-01 PROCEDURE — 74011000258 HC RX REV CODE- 258: Performed by: FAMILY MEDICINE

## 2022-01-01 PROCEDURE — 92610 EVALUATE SWALLOWING FUNCTION: CPT

## 2022-01-01 PROCEDURE — 96374 THER/PROPH/DIAG INJ IV PUSH: CPT

## 2022-01-01 PROCEDURE — 83605 ASSAY OF LACTIC ACID: CPT

## 2022-01-01 PROCEDURE — 82803 BLOOD GASES ANY COMBINATION: CPT

## 2022-01-01 PROCEDURE — 74011000250 HC RX REV CODE- 250: Performed by: STUDENT IN AN ORGANIZED HEALTH CARE EDUCATION/TRAINING PROGRAM

## 2022-01-01 PROCEDURE — 51700 IRRIGATION OF BLADDER: CPT

## 2022-01-01 PROCEDURE — 74011000250 HC RX REV CODE- 250

## 2022-01-01 PROCEDURE — 74011000250 HC RX REV CODE- 250: Performed by: ANESTHESIOLOGY

## 2022-01-01 PROCEDURE — 74011000250 HC RX REV CODE- 250: Performed by: NURSE ANESTHETIST, CERTIFIED REGISTERED

## 2022-01-01 PROCEDURE — 70450 CT HEAD/BRAIN W/O DYE: CPT

## 2022-01-01 PROCEDURE — 99203 OFFICE O/P NEW LOW 30 MIN: CPT | Performed by: UROLOGY

## 2022-01-01 PROCEDURE — 36430 TRANSFUSION BLD/BLD COMPNT: CPT

## 2022-01-01 PROCEDURE — 77030018870 HC TY PARCNT BD -B: Performed by: INTERNAL MEDICINE

## 2022-01-01 PROCEDURE — 86923 COMPATIBILITY TEST ELECTRIC: CPT

## 2022-01-01 PROCEDURE — 74011250636 HC RX REV CODE- 250/636: Performed by: NURSE PRACTITIONER

## 2022-01-01 PROCEDURE — 0W9G3ZZ DRAINAGE OF PERITONEAL CAVITY, PERCUTANEOUS APPROACH: ICD-10-PCS | Performed by: INTERNAL MEDICINE

## 2022-01-01 PROCEDURE — 36600 WITHDRAWAL OF ARTERIAL BLOOD: CPT

## 2022-01-01 PROCEDURE — C1894 INTRO/SHEATH, NON-LASER: HCPCS

## 2022-01-01 PROCEDURE — 74011250636 HC RX REV CODE- 250/636: Performed by: EMERGENCY MEDICINE

## 2022-01-01 PROCEDURE — 5A1945Z RESPIRATORY VENTILATION, 24-96 CONSECUTIVE HOURS: ICD-10-PCS | Performed by: INTERNAL MEDICINE

## 2022-01-01 PROCEDURE — P9047 ALBUMIN (HUMAN), 25%, 50ML: HCPCS | Performed by: RADIOLOGY

## 2022-01-01 PROCEDURE — 76060000032 HC ANESTHESIA 0.5 TO 1 HR: Performed by: ORTHOPAEDIC SURGERY

## 2022-01-01 PROCEDURE — 86140 C-REACTIVE PROTEIN: CPT

## 2022-01-01 PROCEDURE — 74011250636 HC RX REV CODE- 250/636: Performed by: STUDENT IN AN ORGANIZED HEALTH CARE EDUCATION/TRAINING PROGRAM

## 2022-01-01 PROCEDURE — P9045 ALBUMIN (HUMAN), 5%, 250 ML: HCPCS | Performed by: INTERNAL MEDICINE

## 2022-01-01 PROCEDURE — 84300 ASSAY OF URINE SODIUM: CPT

## 2022-01-01 PROCEDURE — 06L38CZ OCCLUSION OF ESOPHAGEAL VEIN WITH EXTRALUMINAL DEVICE, VIA NATURAL OR ARTIFICIAL OPENING ENDOSCOPIC: ICD-10-PCS | Performed by: INTERNAL MEDICINE

## 2022-01-01 PROCEDURE — 82962 GLUCOSE BLOOD TEST: CPT

## 2022-01-01 PROCEDURE — 76705 ECHO EXAM OF ABDOMEN: CPT

## 2022-01-01 PROCEDURE — 94003 VENT MGMT INPAT SUBQ DAY: CPT

## 2022-01-01 PROCEDURE — 0T9B70Z DRAINAGE OF BLADDER WITH DRAINAGE DEVICE, VIA NATURAL OR ARTIFICIAL OPENING: ICD-10-PCS | Performed by: FAMILY MEDICINE

## 2022-01-01 PROCEDURE — 76010000138 HC OR TIME 0.5 TO 1 HR: Performed by: ORTHOPAEDIC SURGERY

## 2022-01-01 PROCEDURE — 83880 ASSAY OF NATRIURETIC PEPTIDE: CPT

## 2022-01-01 PROCEDURE — 74011250636 HC RX REV CODE- 250/636: Performed by: RADIOLOGY

## 2022-01-01 PROCEDURE — 74011000250 HC RX REV CODE- 250: Performed by: PSYCHIATRY & NEUROLOGY

## 2022-01-01 PROCEDURE — 3E0G76Z INTRODUCTION OF NUTRITIONAL SUBSTANCE INTO UPPER GI, VIA NATURAL OR ARTIFICIAL OPENING: ICD-10-PCS | Performed by: FAMILY MEDICINE

## 2022-01-01 PROCEDURE — 74011250636 HC RX REV CODE- 250/636

## 2022-01-01 PROCEDURE — 92526 ORAL FUNCTION THERAPY: CPT

## 2022-01-01 PROCEDURE — 80048 BASIC METABOLIC PNL TOTAL CA: CPT

## 2022-01-01 PROCEDURE — 2709999900 HC NON-CHARGEABLE SUPPLY: Performed by: INTERNAL MEDICINE

## 2022-01-01 PROCEDURE — 31500 INSERT EMERGENCY AIRWAY: CPT

## 2022-01-01 PROCEDURE — 85730 THROMBOPLASTIN TIME PARTIAL: CPT

## 2022-01-01 PROCEDURE — 97162 PT EVAL MOD COMPLEX 30 MIN: CPT

## 2022-01-01 PROCEDURE — 93005 ELECTROCARDIOGRAM TRACING: CPT

## 2022-01-01 PROCEDURE — 0656 HSPC GENERAL INPATIENT

## 2022-01-01 PROCEDURE — G0378 HOSPITAL OBSERVATION PER HR: HCPCS

## 2022-01-01 PROCEDURE — 0W9G3ZX DRAINAGE OF PERITONEAL CAVITY, PERCUTANEOUS APPROACH, DIAGNOSTIC: ICD-10-PCS | Performed by: RADIOLOGY

## 2022-01-01 PROCEDURE — 76770 US EXAM ABDO BACK WALL COMP: CPT

## 2022-01-01 PROCEDURE — C1729 CATH, DRAINAGE: HCPCS

## 2022-01-01 PROCEDURE — 0W9G3ZX DRAINAGE OF PERITONEAL CAVITY, PERCUTANEOUS APPROACH, DIAGNOSTIC: ICD-10-PCS | Performed by: INTERNAL MEDICINE

## 2022-01-01 PROCEDURE — 85610 PROTHROMBIN TIME: CPT

## 2022-01-01 PROCEDURE — 77001 FLUOROGUIDE FOR VEIN DEVICE: CPT

## 2022-01-01 PROCEDURE — 83735 ASSAY OF MAGNESIUM: CPT

## 2022-01-01 PROCEDURE — 82077 ASSAY SPEC XCP UR&BREATH IA: CPT

## 2022-01-01 PROCEDURE — 0H9FXZZ DRAINAGE OF RIGHT HAND SKIN, EXTERNAL APPROACH: ICD-10-PCS | Performed by: ORTHOPAEDIC SURGERY

## 2022-01-01 PROCEDURE — 87186 SC STD MICRODIL/AGAR DIL: CPT

## 2022-01-01 PROCEDURE — 74011250637 HC RX REV CODE- 250/637: Performed by: NURSE PRACTITIONER

## 2022-01-01 PROCEDURE — 0PBT0ZZ EXCISION OF RIGHT FINGER PHALANX, OPEN APPROACH: ICD-10-PCS | Performed by: ORTHOPAEDIC SURGERY

## 2022-01-01 PROCEDURE — 99285 EMERGENCY DEPT VISIT HI MDM: CPT

## 2022-01-01 PROCEDURE — 97165 OT EVAL LOW COMPLEX 30 MIN: CPT

## 2022-01-01 PROCEDURE — 51798 US URINE CAPACITY MEASURE: CPT

## 2022-01-01 PROCEDURE — 97110 THERAPEUTIC EXERCISES: CPT

## 2022-01-01 PROCEDURE — 88108 CYTOPATH CONCENTRATE TECH: CPT

## 2022-01-01 PROCEDURE — 87493 C DIFF AMPLIFIED PROBE: CPT

## 2022-01-01 PROCEDURE — 99283 EMERGENCY DEPT VISIT LOW MDM: CPT

## 2022-01-01 PROCEDURE — 74176 CT ABD & PELVIS W/O CONTRAST: CPT

## 2022-01-01 PROCEDURE — 84100 ASSAY OF PHOSPHORUS: CPT

## 2022-01-01 PROCEDURE — 99222 1ST HOSP IP/OBS MODERATE 55: CPT | Performed by: INTERNAL MEDICINE

## 2022-01-01 PROCEDURE — 76060000033 HC ANESTHESIA 1 TO 1.5 HR: Performed by: INTERNAL MEDICINE

## 2022-01-01 PROCEDURE — 83970 ASSAY OF PARATHORMONE: CPT

## 2022-01-01 PROCEDURE — 02HV33Z INSERTION OF INFUSION DEVICE INTO SUPERIOR VENA CAVA, PERCUTANEOUS APPROACH: ICD-10-PCS | Performed by: NURSE PRACTITIONER

## 2022-01-01 PROCEDURE — 85652 RBC SED RATE AUTOMATED: CPT

## 2022-01-01 PROCEDURE — 77030013805 HC LIGTR VRCES BSC -B: Performed by: INTERNAL MEDICINE

## 2022-01-01 PROCEDURE — 84165 PROTEIN E-PHORESIS SERUM: CPT

## 2022-01-01 PROCEDURE — 84443 ASSAY THYROID STIM HORMONE: CPT

## 2022-01-01 PROCEDURE — 82150 ASSAY OF AMYLASE: CPT

## 2022-01-01 PROCEDURE — 74011000258 HC RX REV CODE- 258: Performed by: NURSE PRACTITIONER

## 2022-01-01 PROCEDURE — 83690 ASSAY OF LIPASE: CPT

## 2022-01-01 PROCEDURE — 76060000031 HC ANESTHESIA FIRST 0.5 HR: Performed by: INTERNAL MEDICINE

## 2022-01-01 PROCEDURE — 87635 SARS-COV-2 COVID-19 AMP PRB: CPT

## 2022-01-01 PROCEDURE — 3336500001 HSPC ELECTION

## 2022-01-01 PROCEDURE — 0W9G3ZZ DRAINAGE OF PERITONEAL CAVITY, PERCUTANEOUS APPROACH: ICD-10-PCS | Performed by: NURSE PRACTITIONER

## 2022-01-01 PROCEDURE — 96375 TX/PRO/DX INJ NEW DRUG ADDON: CPT

## 2022-01-01 PROCEDURE — 84550 ASSAY OF BLOOD/URIC ACID: CPT

## 2022-01-01 PROCEDURE — 74011000258 HC RX REV CODE- 258: Performed by: ORTHOPAEDIC SURGERY

## 2022-01-01 PROCEDURE — 87015 SPECIMEN INFECT AGNT CONCNTJ: CPT

## 2022-01-01 PROCEDURE — 83615 LACTATE (LD) (LDH) ENZYME: CPT

## 2022-01-01 PROCEDURE — 99284 EMERGENCY DEPT VISIT MOD MDM: CPT

## 2022-01-01 PROCEDURE — 89050 BODY FLUID CELL COUNT: CPT

## 2022-01-01 PROCEDURE — 0DH63UZ INSERTION OF FEEDING DEVICE INTO STOMACH, PERCUTANEOUS APPROACH: ICD-10-PCS | Performed by: INTERNAL MEDICINE

## 2022-01-01 PROCEDURE — 86900 BLOOD TYPING SEROLOGIC ABO: CPT

## 2022-01-01 PROCEDURE — 74011000250 HC RX REV CODE- 250: Performed by: FAMILY MEDICINE

## 2022-01-01 PROCEDURE — 77030040361 HC SLV COMPR DVT MDII -B: Performed by: ORTHOPAEDIC SURGERY

## 2022-01-01 PROCEDURE — 94002 VENT MGMT INPAT INIT DAY: CPT

## 2022-01-01 PROCEDURE — 73140 X-RAY EXAM OF FINGER(S): CPT

## 2022-01-01 PROCEDURE — 84133 ASSAY OF URINE POTASSIUM: CPT

## 2022-01-01 PROCEDURE — 96361 HYDRATE IV INFUSION ADD-ON: CPT

## 2022-01-01 PROCEDURE — P9047 ALBUMIN (HUMAN), 25%, 50ML: HCPCS | Performed by: INTERNAL MEDICINE

## 2022-01-01 PROCEDURE — 76937 US GUIDE VASCULAR ACCESS: CPT

## 2022-01-01 PROCEDURE — 76040000008: Performed by: INTERNAL MEDICINE

## 2022-01-01 PROCEDURE — 88304 TISSUE EXAM BY PATHOLOGIST: CPT

## 2022-01-01 PROCEDURE — 74011000250 HC RX REV CODE- 250: Performed by: EMERGENCY MEDICINE

## 2022-01-01 PROCEDURE — 93306 TTE W/DOPPLER COMPLETE: CPT

## 2022-01-01 PROCEDURE — 0RBW0ZZ EXCISION OF RIGHT FINGER PHALANGEAL JOINT, OPEN APPROACH: ICD-10-PCS | Performed by: ORTHOPAEDIC SURGERY

## 2022-01-01 PROCEDURE — 84156 ASSAY OF PROTEIN URINE: CPT

## 2022-01-01 PROCEDURE — 82565 ASSAY OF CREATININE: CPT

## 2022-01-01 PROCEDURE — 80076 HEPATIC FUNCTION PANEL: CPT

## 2022-01-01 PROCEDURE — 83521 IG LIGHT CHAINS FREE EACH: CPT

## 2022-01-01 PROCEDURE — 76040000007: Performed by: INTERNAL MEDICINE

## 2022-01-01 PROCEDURE — 77030013708 HC HNDPC SUC IRR PULS STRY –B: Performed by: ORTHOPAEDIC SURGERY

## 2022-01-01 PROCEDURE — 87077 CULTURE AEROBIC IDENTIFY: CPT

## 2022-01-01 PROCEDURE — 87106 FUNGI IDENTIFICATION YEAST: CPT

## 2022-01-01 PROCEDURE — P9045 ALBUMIN (HUMAN), 5%, 250 ML: HCPCS

## 2022-01-01 PROCEDURE — 84439 ASSAY OF FREE THYROXINE: CPT

## 2022-01-01 PROCEDURE — 82945 GLUCOSE OTHER FLUID: CPT

## 2022-01-01 PROCEDURE — 76040000019: Performed by: INTERNAL MEDICINE

## 2022-01-01 PROCEDURE — 87636 SARSCOV2 & INF A&B AMP PRB: CPT

## 2022-01-01 PROCEDURE — 75810000289 HC I&D ABSCESS SIMP/COMP/MULT

## 2022-01-01 PROCEDURE — 0BH17EZ INSERTION OF ENDOTRACHEAL AIRWAY INTO TRACHEA, VIA NATURAL OR ARTIFICIAL OPENING: ICD-10-PCS | Performed by: INTERNAL MEDICINE

## 2022-01-01 PROCEDURE — 80307 DRUG TEST PRSMV CHEM ANLYZR: CPT

## 2022-01-01 PROCEDURE — 0DJ08ZZ INSPECTION OF UPPER INTESTINAL TRACT, VIA NATURAL OR ARTIFICIAL OPENING ENDOSCOPIC: ICD-10-PCS | Performed by: INTERNAL MEDICINE

## 2022-01-01 PROCEDURE — 82550 ASSAY OF CK (CPK): CPT

## 2022-01-01 PROCEDURE — 2709999900 HC NON-CHARGEABLE SUPPLY: Performed by: ORTHOPAEDIC SURGERY

## 2022-01-01 PROCEDURE — 99231 SBSQ HOSP IP/OBS SF/LOW 25: CPT | Performed by: INTERNAL MEDICINE

## 2022-01-01 PROCEDURE — 74011250636 HC RX REV CODE- 250/636: Performed by: ORTHOPAEDIC SURGERY

## 2022-01-01 PROCEDURE — 77030005122 HC CATH GASTMY PEG BSC -B: Performed by: INTERNAL MEDICINE

## 2022-01-01 PROCEDURE — 76210000063 HC OR PH I REC FIRST 0.5 HR: Performed by: ORTHOPAEDIC SURGERY

## 2022-01-01 PROCEDURE — 82784 ASSAY IGA/IGD/IGG/IGM EACH: CPT

## 2022-01-01 PROCEDURE — 93971 EXTREMITY STUDY: CPT

## 2022-01-01 RX ORDER — ETOMIDATE 2 MG/ML
20 INJECTION INTRAVENOUS ONCE
Status: COMPLETED | OUTPATIENT
Start: 2022-01-01 | End: 2022-01-01

## 2022-01-01 RX ORDER — HYDROCODONE BITARTRATE AND ACETAMINOPHEN 5; 325 MG/1; MG/1
1 TABLET ORAL
Qty: 10 TABLET | Refills: 0 | Status: SHIPPED | OUTPATIENT
Start: 2022-01-01 | End: 2022-01-01

## 2022-01-01 RX ORDER — METOCLOPRAMIDE HYDROCHLORIDE 5 MG/ML
10 INJECTION INTRAMUSCULAR; INTRAVENOUS 4 TIMES DAILY
Status: DISCONTINUED | OUTPATIENT
Start: 2022-01-01 | End: 2022-01-01 | Stop reason: HOSPADM

## 2022-01-01 RX ORDER — KETOROLAC TROMETHAMINE 30 MG/ML
30 INJECTION, SOLUTION INTRAMUSCULAR; INTRAVENOUS
Status: DISCONTINUED | OUTPATIENT
Start: 2022-01-01 | End: 2022-01-01 | Stop reason: HOSPADM

## 2022-01-01 RX ORDER — ONDANSETRON 4 MG/1
4 TABLET, ORALLY DISINTEGRATING ORAL
Status: DISCONTINUED | OUTPATIENT
Start: 2022-01-01 | End: 2022-01-01 | Stop reason: HOSPADM

## 2022-01-01 RX ORDER — SODIUM CHLORIDE, SODIUM LACTATE, POTASSIUM CHLORIDE, CALCIUM CHLORIDE 600; 310; 30; 20 MG/100ML; MG/100ML; MG/100ML; MG/100ML
INJECTION, SOLUTION INTRAVENOUS
Status: DISCONTINUED | OUTPATIENT
Start: 2022-01-01 | End: 2022-01-01 | Stop reason: HOSPADM

## 2022-01-01 RX ORDER — POTASSIUM CHLORIDE 7.45 MG/ML
10 INJECTION INTRAVENOUS
Status: COMPLETED | OUTPATIENT
Start: 2022-01-01 | End: 2022-01-01

## 2022-01-01 RX ORDER — LORAZEPAM 2 MG/ML
1 INJECTION INTRAMUSCULAR
Status: DISCONTINUED | OUTPATIENT
Start: 2022-01-01 | End: 2022-01-01 | Stop reason: HOSPADM

## 2022-01-01 RX ORDER — POTASSIUM CHLORIDE 7.45 MG/ML
10 INJECTION INTRAVENOUS
Status: DISCONTINUED | OUTPATIENT
Start: 2022-01-01 | End: 2022-01-01

## 2022-01-01 RX ORDER — DEXTROSE MONOHYDRATE AND SODIUM CHLORIDE 5; .45 G/100ML; G/100ML
100 INJECTION, SOLUTION INTRAVENOUS CONTINUOUS
Status: DISCONTINUED | OUTPATIENT
Start: 2022-01-01 | End: 2022-01-01

## 2022-01-01 RX ORDER — ONDANSETRON 2 MG/ML
4 INJECTION INTRAMUSCULAR; INTRAVENOUS
Status: DISCONTINUED | OUTPATIENT
Start: 2022-01-01 | End: 2022-01-01 | Stop reason: HOSPADM

## 2022-01-01 RX ORDER — LIDOCAINE HYDROCHLORIDE 10 MG/ML
INJECTION INFILTRATION; PERINEURAL AS NEEDED
Status: DISCONTINUED | OUTPATIENT
Start: 2022-01-01 | End: 2022-01-01 | Stop reason: HOSPADM

## 2022-01-01 RX ORDER — LORAZEPAM 2 MG/ML
4 INJECTION INTRAMUSCULAR
Status: COMPLETED | OUTPATIENT
Start: 2022-01-01 | End: 2022-01-01

## 2022-01-01 RX ORDER — SODIUM BICARBONATE 650 MG/1
650 TABLET ORAL 3 TIMES DAILY
Qty: 90 TABLET | Refills: 0 | Status: SHIPPED | OUTPATIENT
Start: 2022-01-01

## 2022-01-01 RX ORDER — TAMSULOSIN HYDROCHLORIDE 0.4 MG/1
0.4 CAPSULE ORAL DAILY
Status: DISCONTINUED | OUTPATIENT
Start: 2022-01-01 | End: 2022-01-01 | Stop reason: HOSPADM

## 2022-01-01 RX ORDER — LIDOCAINE HYDROCHLORIDE 10 MG/ML
2 INJECTION INFILTRATION; PERINEURAL ONCE
Status: COMPLETED | OUTPATIENT
Start: 2022-01-01 | End: 2022-01-01

## 2022-01-01 RX ORDER — ALLOPURINOL 300 MG/1
300 TABLET ORAL DAILY
Status: DISCONTINUED | OUTPATIENT
Start: 2022-01-01 | End: 2022-01-01

## 2022-01-01 RX ORDER — MIDAZOLAM HYDROCHLORIDE 5 MG/ML
5 INJECTION INTRAMUSCULAR; INTRAVENOUS
Status: DISCONTINUED | OUTPATIENT
Start: 2022-01-01 | End: 2022-01-01 | Stop reason: HOSPADM

## 2022-01-01 RX ORDER — SODIUM POLYSTYRENE SULFONATE 15 G/60ML
30 SUSPENSION ORAL; RECTAL
Status: COMPLETED | OUTPATIENT
Start: 2022-01-01 | End: 2022-01-01

## 2022-01-01 RX ORDER — CHLORDIAZEPOXIDE HYDROCHLORIDE 5 MG/1
10 CAPSULE, GELATIN COATED ORAL 3 TIMES DAILY
Status: DISCONTINUED | OUTPATIENT
Start: 2022-01-01 | End: 2022-01-01 | Stop reason: HOSPADM

## 2022-01-01 RX ORDER — FUROSEMIDE 10 MG/ML
40 INJECTION INTRAMUSCULAR; INTRAVENOUS ONCE
Status: COMPLETED | OUTPATIENT
Start: 2022-01-01 | End: 2022-01-01

## 2022-01-01 RX ORDER — PROPRANOLOL HYDROCHLORIDE 10 MG/1
20 TABLET ORAL 2 TIMES DAILY
Status: DISCONTINUED | OUTPATIENT
Start: 2022-01-01 | End: 2022-01-01 | Stop reason: HOSPADM

## 2022-01-01 RX ORDER — LEVETIRACETAM 500 MG/1
1000 TABLET ORAL 2 TIMES DAILY
Status: DISCONTINUED | OUTPATIENT
Start: 2022-01-01 | End: 2022-01-01

## 2022-01-01 RX ORDER — SODIUM CHLORIDE 0.9 % (FLUSH) 0.9 %
5-40 SYRINGE (ML) INJECTION AS NEEDED
Status: DISCONTINUED | OUTPATIENT
Start: 2022-01-01 | End: 2022-01-01

## 2022-01-01 RX ORDER — FENTANYL CITRATE 50 UG/ML
INJECTION, SOLUTION INTRAMUSCULAR; INTRAVENOUS AS NEEDED
Status: DISCONTINUED | OUTPATIENT
Start: 2022-01-01 | End: 2022-01-01 | Stop reason: HOSPADM

## 2022-01-01 RX ORDER — FOLIC ACID 1 MG/1
1 TABLET ORAL DAILY
Status: DISCONTINUED | OUTPATIENT
Start: 2022-01-01 | End: 2022-01-01 | Stop reason: HOSPADM

## 2022-01-01 RX ORDER — LEVETIRACETAM 500 MG/1
1500 TABLET ORAL 2 TIMES DAILY
Status: DISCONTINUED | OUTPATIENT
Start: 2022-01-01 | End: 2022-01-01

## 2022-01-01 RX ORDER — AMILORIDE HYDROCHLORIDE 5 MG/1
5 TABLET ORAL DAILY
Status: DISCONTINUED | OUTPATIENT
Start: 2022-01-01 | End: 2022-01-01 | Stop reason: HOSPADM

## 2022-01-01 RX ORDER — ALBUMIN HUMAN 250 G/1000ML
12.5 SOLUTION INTRAVENOUS ONCE
Status: COMPLETED | OUTPATIENT
Start: 2022-01-01 | End: 2022-01-01

## 2022-01-01 RX ORDER — LEVOFLOXACIN 250 MG/1
500 TABLET ORAL DAILY
Status: DISCONTINUED | OUTPATIENT
Start: 2022-01-01 | End: 2022-01-01 | Stop reason: HOSPADM

## 2022-01-01 RX ORDER — LEVOTHYROXINE SODIUM 25 UG/1
25 TABLET ORAL
Status: DISCONTINUED | OUTPATIENT
Start: 2022-01-01 | End: 2022-01-01

## 2022-01-01 RX ORDER — SODIUM CHLORIDE 9 MG/ML
50 INJECTION, SOLUTION INTRAVENOUS CONTINUOUS
Status: DISCONTINUED | OUTPATIENT
Start: 2022-01-01 | End: 2022-01-01

## 2022-01-01 RX ORDER — ONDANSETRON 2 MG/ML
4 INJECTION INTRAMUSCULAR; INTRAVENOUS AS NEEDED
Status: DISCONTINUED | OUTPATIENT
Start: 2022-01-01 | End: 2022-01-01

## 2022-01-01 RX ORDER — LEVETIRACETAM 15 MG/ML
1500 INJECTION INTRAVASCULAR EVERY 12 HOURS
Status: DISCONTINUED | OUTPATIENT
Start: 2022-01-01 | End: 2022-01-01

## 2022-01-01 RX ORDER — PROPOFOL 10 MG/ML
INJECTION, EMULSION INTRAVENOUS AS NEEDED
Status: DISCONTINUED | OUTPATIENT
Start: 2022-01-01 | End: 2022-01-01 | Stop reason: HOSPADM

## 2022-01-01 RX ORDER — ASPIRIN 325 MG/1
100 TABLET, FILM COATED ORAL DAILY
Status: DISCONTINUED | OUTPATIENT
Start: 2022-01-01 | End: 2022-01-01 | Stop reason: SDUPTHER

## 2022-01-01 RX ORDER — NORETHINDRONE AND ETHINYL ESTRADIOL 0.5-0.035
5 KIT ORAL AS NEEDED
Status: DISCONTINUED | OUTPATIENT
Start: 2022-01-01 | End: 2022-01-01

## 2022-01-01 RX ORDER — POLYETHYLENE GLYCOL 3350 17 G/17G
17 POWDER, FOR SOLUTION ORAL DAILY PRN
Status: DISCONTINUED | OUTPATIENT
Start: 2022-01-01 | End: 2022-01-01 | Stop reason: HOSPADM

## 2022-01-01 RX ORDER — LEVETIRACETAM 10 MG/ML
1000 INJECTION INTRAVASCULAR EVERY 12 HOURS
Status: DISCONTINUED | OUTPATIENT
Start: 2022-01-01 | End: 2022-01-01

## 2022-01-01 RX ORDER — SODIUM CHLORIDE 0.9 % (FLUSH) 0.9 %
5-40 SYRINGE (ML) INJECTION EVERY 8 HOURS
Status: DISCONTINUED | OUTPATIENT
Start: 2022-01-01 | End: 2022-01-01 | Stop reason: SDUPTHER

## 2022-01-01 RX ORDER — LEVOFLOXACIN 500 MG/1
500 TABLET, FILM COATED ORAL DAILY
Qty: 7 TABLET | Refills: 0 | Status: SHIPPED | OUTPATIENT
Start: 2022-01-01 | End: 2022-01-01

## 2022-01-01 RX ORDER — FAMOTIDINE 20 MG/1
20 TABLET, FILM COATED ORAL EVERY 12 HOURS
Status: DISCONTINUED | OUTPATIENT
Start: 2022-01-01 | End: 2022-01-01

## 2022-01-01 RX ORDER — ALLOPURINOL 300 MG/1
300 TABLET ORAL DAILY
Status: DISCONTINUED | OUTPATIENT
Start: 2022-01-01 | End: 2022-01-01 | Stop reason: HOSPADM

## 2022-01-01 RX ORDER — SODIUM CHLORIDE 9 MG/ML
75 INJECTION, SOLUTION INTRAVENOUS CONTINUOUS
Status: DISCONTINUED | OUTPATIENT
Start: 2022-01-01 | End: 2022-01-01

## 2022-01-01 RX ORDER — LIDOCAINE HYDROCHLORIDE 20 MG/ML
INJECTION, SOLUTION EPIDURAL; INFILTRATION; INTRACAUDAL; PERINEURAL AS NEEDED
Status: DISCONTINUED | OUTPATIENT
Start: 2022-01-01 | End: 2022-01-01 | Stop reason: HOSPADM

## 2022-01-01 RX ORDER — AMILORIDE HYDROCHLORIDE 5 MG/1
5 TABLET ORAL DAILY
Status: DISCONTINUED | OUTPATIENT
Start: 2022-01-01 | End: 2022-01-01

## 2022-01-01 RX ORDER — ALBUMIN HUMAN 50 G/1000ML
25 SOLUTION INTRAVENOUS EVERY 8 HOURS
Status: COMPLETED | OUTPATIENT
Start: 2022-01-01 | End: 2022-01-01

## 2022-01-01 RX ORDER — DIPHENHYDRAMINE HYDROCHLORIDE 50 MG/ML
12.5 INJECTION, SOLUTION INTRAMUSCULAR; INTRAVENOUS AS NEEDED
Status: DISCONTINUED | OUTPATIENT
Start: 2022-01-01 | End: 2022-01-01

## 2022-01-01 RX ORDER — SODIUM CHLORIDE 0.9 % (FLUSH) 0.9 %
5 SYRINGE (ML) INJECTION AS NEEDED
Status: DISCONTINUED | OUTPATIENT
Start: 2022-01-01 | End: 2022-01-01 | Stop reason: HOSPADM

## 2022-01-01 RX ORDER — POTASSIUM CHLORIDE 750 MG/1
40 TABLET, FILM COATED, EXTENDED RELEASE ORAL
Status: COMPLETED | OUTPATIENT
Start: 2022-01-01 | End: 2022-01-01

## 2022-01-01 RX ORDER — SODIUM BICARBONATE 650 MG/1
1300 TABLET ORAL 3 TIMES DAILY
Status: DISCONTINUED | OUTPATIENT
Start: 2022-01-01 | End: 2022-01-01

## 2022-01-01 RX ORDER — PROPOFOL 10 MG/ML
0-50 VIAL (ML) INTRAVENOUS
Status: DISCONTINUED | OUTPATIENT
Start: 2022-01-01 | End: 2022-01-01

## 2022-01-01 RX ORDER — SODIUM BICARBONATE 650 MG/1
1300 TABLET ORAL 2 TIMES DAILY
Status: DISCONTINUED | OUTPATIENT
Start: 2022-01-01 | End: 2022-01-01 | Stop reason: HOSPADM

## 2022-01-01 RX ORDER — MAGNESIUM SULFATE HEPTAHYDRATE 40 MG/ML
2 INJECTION, SOLUTION INTRAVENOUS ONCE
Status: COMPLETED | OUTPATIENT
Start: 2022-01-01 | End: 2022-01-01

## 2022-01-01 RX ORDER — HYDROXYZINE PAMOATE 25 MG/1
25 CAPSULE ORAL
Status: DISCONTINUED | OUTPATIENT
Start: 2022-01-01 | End: 2022-01-01 | Stop reason: HOSPADM

## 2022-01-01 RX ORDER — HYDROXYZINE 50 MG/ML
25 INJECTION, SOLUTION INTRAMUSCULAR
Status: DISCONTINUED | OUTPATIENT
Start: 2022-01-01 | End: 2022-01-01

## 2022-01-01 RX ORDER — SODIUM CHLORIDE 0.9 % (FLUSH) 0.9 %
5-40 SYRINGE (ML) INJECTION EVERY 8 HOURS
Status: DISCONTINUED | OUTPATIENT
Start: 2022-01-01 | End: 2022-01-01

## 2022-01-01 RX ORDER — METOPROLOL TARTRATE 5 MG/5ML
2.5 INJECTION INTRAVENOUS
Status: CANCELLED | OUTPATIENT
Start: 2022-01-01

## 2022-01-01 RX ORDER — HEPARIN SODIUM 5000 [USP'U]/ML
5000 INJECTION, SOLUTION INTRAVENOUS; SUBCUTANEOUS EVERY 8 HOURS
Status: DISCONTINUED | OUTPATIENT
Start: 2022-01-01 | End: 2022-01-01

## 2022-01-01 RX ORDER — LEVOTHYROXINE SODIUM 25 UG/1
25 TABLET ORAL
COMMUNITY

## 2022-01-01 RX ORDER — ASPIRIN 325 MG/1
100 TABLET, FILM COATED ORAL DAILY
Status: DISCONTINUED | OUTPATIENT
Start: 2022-01-01 | End: 2022-01-01 | Stop reason: HOSPADM

## 2022-01-01 RX ORDER — ERGOCALCIFEROL 1.25 MG/1
50000 CAPSULE ORAL
Status: DISCONTINUED | OUTPATIENT
Start: 2022-01-01 | End: 2022-01-01 | Stop reason: HOSPADM

## 2022-01-01 RX ORDER — ACETAMINOPHEN 325 MG/1
650 TABLET ORAL
Status: DISCONTINUED | OUTPATIENT
Start: 2022-01-01 | End: 2022-01-01 | Stop reason: HOSPADM

## 2022-01-01 RX ORDER — NOREPINEPHRINE BIT/0.9 % NACL 8 MG/250ML
.5-12 INFUSION BOTTLE (ML) INTRAVENOUS
Status: DISCONTINUED | OUTPATIENT
Start: 2022-01-01 | End: 2022-01-01

## 2022-01-01 RX ORDER — CHLORDIAZEPOXIDE HYDROCHLORIDE 5 MG/1
10 CAPSULE, GELATIN COATED ORAL
Qty: 9 CAPSULE | Refills: 0 | Status: SHIPPED | OUTPATIENT
Start: 2022-01-01 | End: 2022-01-01

## 2022-01-01 RX ORDER — PROPRANOLOL HYDROCHLORIDE 20 MG/1
20 TABLET ORAL 2 TIMES DAILY
Status: DISCONTINUED | OUTPATIENT
Start: 2022-01-01 | End: 2022-01-01 | Stop reason: HOSPADM

## 2022-01-01 RX ORDER — LIDOCAINE HYDROCHLORIDE 10 MG/ML
0.1 INJECTION, SOLUTION EPIDURAL; INFILTRATION; INTRACAUDAL; PERINEURAL AS NEEDED
Status: CANCELLED | OUTPATIENT
Start: 2022-01-01

## 2022-01-01 RX ORDER — HYDROCODONE BITARTRATE AND ACETAMINOPHEN 5; 325 MG/1; MG/1
1 TABLET ORAL
Status: DISCONTINUED | OUTPATIENT
Start: 2022-01-01 | End: 2022-01-01 | Stop reason: HOSPADM

## 2022-01-01 RX ORDER — DEXTROSE MONOHYDRATE 50 MG/ML
75 INJECTION, SOLUTION INTRAVENOUS CONTINUOUS
Status: DISCONTINUED | OUTPATIENT
Start: 2022-01-01 | End: 2022-01-01

## 2022-01-01 RX ORDER — ALBUMIN HUMAN 50 G/1000ML
50 SOLUTION INTRAVENOUS ONCE
Status: DISCONTINUED | OUTPATIENT
Start: 2022-01-01 | End: 2022-01-01

## 2022-01-01 RX ORDER — MIDAZOLAM HYDROCHLORIDE 1 MG/ML
1 INJECTION, SOLUTION INTRAMUSCULAR; INTRAVENOUS AS NEEDED
Status: CANCELLED | OUTPATIENT
Start: 2022-01-01

## 2022-01-01 RX ORDER — MIDODRINE HYDROCHLORIDE 5 MG/1
5 TABLET ORAL 3 TIMES DAILY
Status: DISCONTINUED | OUTPATIENT
Start: 2022-01-01 | End: 2022-01-01

## 2022-01-01 RX ORDER — SODIUM BICARBONATE 650 MG/1
1300 TABLET ORAL 4 TIMES DAILY
Status: DISCONTINUED | OUTPATIENT
Start: 2022-01-01 | End: 2022-01-01 | Stop reason: HOSPADM

## 2022-01-01 RX ORDER — POTASSIUM CHLORIDE 20 MEQ/1
40 TABLET, EXTENDED RELEASE ORAL EVERY 4 HOURS
Status: COMPLETED | OUTPATIENT
Start: 2022-01-01 | End: 2022-01-01

## 2022-01-01 RX ORDER — ALLOPURINOL 300 MG/1
1 TABLET ORAL DAILY
COMMUNITY
Start: 2022-01-01

## 2022-01-01 RX ORDER — LEVOTHYROXINE SODIUM 25 UG/1
25 TABLET ORAL
Status: DISCONTINUED | OUTPATIENT
Start: 2022-01-01 | End: 2022-01-01 | Stop reason: HOSPADM

## 2022-01-01 RX ORDER — MIDODRINE HYDROCHLORIDE 5 MG/1
10 TABLET ORAL
Status: DISCONTINUED | OUTPATIENT
Start: 2022-01-01 | End: 2022-01-01 | Stop reason: HOSPADM

## 2022-01-01 RX ORDER — FACIAL-BODY WIPES
10 EACH TOPICAL DAILY PRN
Status: DISCONTINUED | OUTPATIENT
Start: 2022-01-01 | End: 2022-01-01 | Stop reason: HOSPADM

## 2022-01-01 RX ORDER — SODIUM BICARBONATE 650 MG/1
650 TABLET ORAL 3 TIMES DAILY
Status: DISCONTINUED | OUTPATIENT
Start: 2022-01-01 | End: 2022-01-01

## 2022-01-01 RX ORDER — AMILORIDE HYDROCHLORIDE 5 MG/1
5 TABLET ORAL DAILY
COMMUNITY

## 2022-01-01 RX ORDER — LEVETIRACETAM 1000 MG/1
1500 TABLET ORAL 2 TIMES DAILY
Qty: 60 TABLET | Refills: 0 | Status: SHIPPED | OUTPATIENT
Start: 2022-01-01

## 2022-01-01 RX ORDER — MIDAZOLAM HYDROCHLORIDE 1 MG/ML
INJECTION INTRAMUSCULAR; INTRAVENOUS AS NEEDED
Status: DISCONTINUED | OUTPATIENT
Start: 2022-01-01 | End: 2022-01-01

## 2022-01-01 RX ORDER — ASPIRIN 325 MG/1
100 TABLET, FILM COATED ORAL DAILY
Qty: 30 TABLET | Refills: 0 | Status: SHIPPED | OUTPATIENT
Start: 2022-01-01

## 2022-01-01 RX ORDER — SODIUM BICARBONATE 1 MEQ/ML
50 SYRINGE (ML) INTRAVENOUS ONCE
Status: COMPLETED | OUTPATIENT
Start: 2022-01-01 | End: 2022-01-01

## 2022-01-01 RX ORDER — LORAZEPAM 2 MG/ML
INJECTION INTRAMUSCULAR
Status: COMPLETED
Start: 2022-01-01 | End: 2022-01-01

## 2022-01-01 RX ORDER — HYDROXYZINE 50 MG/ML
50 INJECTION, SOLUTION INTRAMUSCULAR
Status: DISCONTINUED | OUTPATIENT
Start: 2022-01-01 | End: 2022-01-01

## 2022-01-01 RX ORDER — LINEZOLID 600 MG/1
600 TABLET, FILM COATED ORAL 2 TIMES DAILY
Qty: 20 TABLET | Refills: 0 | Status: SHIPPED | OUTPATIENT
Start: 2022-01-01 | End: 2022-01-01 | Stop reason: SDUPTHER

## 2022-01-01 RX ORDER — FAMOTIDINE 20 MG/1
20 TABLET, FILM COATED ORAL 2 TIMES DAILY
Status: DISCONTINUED | OUTPATIENT
Start: 2022-01-01 | End: 2022-01-01 | Stop reason: HOSPADM

## 2022-01-01 RX ORDER — BUPIVACAINE HYDROCHLORIDE 2.5 MG/ML
INJECTION, SOLUTION EPIDURAL; INFILTRATION; INTRACAUDAL AS NEEDED
Status: DISCONTINUED | OUTPATIENT
Start: 2022-01-01 | End: 2022-01-01 | Stop reason: HOSPADM

## 2022-01-01 RX ORDER — FENTANYL CITRATE 50 UG/ML
INJECTION, SOLUTION INTRAMUSCULAR; INTRAVENOUS AS NEEDED
Status: DISCONTINUED | OUTPATIENT
Start: 2022-01-01 | End: 2022-01-01

## 2022-01-01 RX ORDER — LEVOFLOXACIN 5 MG/ML
750 INJECTION, SOLUTION INTRAVENOUS EVERY 24 HOURS
Status: DISCONTINUED | OUTPATIENT
Start: 2022-01-01 | End: 2022-01-01

## 2022-01-01 RX ORDER — LIDOCAINE HYDROCHLORIDE 10 MG/ML
INJECTION INFILTRATION; PERINEURAL
Status: DISPENSED
Start: 2022-01-01 | End: 2022-01-01

## 2022-01-01 RX ORDER — SUCCINYLCHOLINE CHLORIDE 20 MG/ML INJECTION SOLUTION
100 SOLUTION
Status: COMPLETED | OUTPATIENT
Start: 2022-01-01 | End: 2022-01-01

## 2022-01-01 RX ORDER — ACETAMINOPHEN 325 MG/1
650 TABLET ORAL
COMMUNITY
End: 2022-01-01

## 2022-01-01 RX ORDER — TAMSULOSIN HYDROCHLORIDE 0.4 MG/1
0.4 CAPSULE ORAL DAILY
Qty: 30 CAPSULE | Refills: 1 | Status: SHIPPED | OUTPATIENT
Start: 2022-01-01

## 2022-01-01 RX ORDER — MAGNESIUM SULFATE 1 G/100ML
1 INJECTION INTRAVENOUS ONCE
Status: COMPLETED | OUTPATIENT
Start: 2022-01-01 | End: 2022-01-01

## 2022-01-01 RX ORDER — ALBUMIN HUMAN 50 G/1000ML
12.5 SOLUTION INTRAVENOUS ONCE
Status: COMPLETED | OUTPATIENT
Start: 2022-01-01 | End: 2022-01-01

## 2022-01-01 RX ORDER — MIDAZOLAM HYDROCHLORIDE 1 MG/ML
INJECTION, SOLUTION INTRAMUSCULAR; INTRAVENOUS AS NEEDED
Status: DISCONTINUED | OUTPATIENT
Start: 2022-01-01 | End: 2022-01-01 | Stop reason: HOSPADM

## 2022-01-01 RX ORDER — POTASSIUM CHLORIDE 20 MEQ/1
40 TABLET, EXTENDED RELEASE ORAL
Status: COMPLETED | OUTPATIENT
Start: 2022-01-01 | End: 2022-01-01

## 2022-01-01 RX ORDER — HYDROMORPHONE HYDROCHLORIDE 1 MG/ML
1 INJECTION, SOLUTION INTRAMUSCULAR; INTRAVENOUS; SUBCUTANEOUS
Status: DISCONTINUED | OUTPATIENT
Start: 2022-01-01 | End: 2022-01-01 | Stop reason: HOSPADM

## 2022-01-01 RX ORDER — ALLOPURINOL 100 MG/1
300 TABLET ORAL DAILY
Status: DISCONTINUED | OUTPATIENT
Start: 2022-01-01 | End: 2022-01-01 | Stop reason: HOSPADM

## 2022-01-01 RX ORDER — SODIUM CHLORIDE 9 MG/ML
INJECTION, SOLUTION INTRAVENOUS
Status: DISCONTINUED | OUTPATIENT
Start: 2022-01-01 | End: 2022-01-01 | Stop reason: HOSPADM

## 2022-01-01 RX ORDER — LORAZEPAM 2 MG/ML
2 INJECTION INTRAMUSCULAR
Status: DISCONTINUED | OUTPATIENT
Start: 2022-01-01 | End: 2022-01-01 | Stop reason: HOSPADM

## 2022-01-01 RX ORDER — ACETAMINOPHEN 650 MG/1
650 SUPPOSITORY RECTAL
Status: DISCONTINUED | OUTPATIENT
Start: 2022-01-01 | End: 2022-01-01 | Stop reason: HOSPADM

## 2022-01-01 RX ORDER — POTASSIUM CHLORIDE 750 MG/1
40 TABLET, FILM COATED, EXTENDED RELEASE ORAL
Status: DISCONTINUED | OUTPATIENT
Start: 2022-01-01 | End: 2022-01-01

## 2022-01-01 RX ORDER — DEXTROSE MONOHYDRATE AND SODIUM CHLORIDE 5; .9 G/100ML; G/100ML
100 INJECTION, SOLUTION INTRAVENOUS CONTINUOUS
Status: DISCONTINUED | OUTPATIENT
Start: 2022-01-01 | End: 2022-01-01

## 2022-01-01 RX ORDER — SODIUM CHLORIDE 9 MG/ML
40 INJECTION, SOLUTION INTRAVENOUS CONTINUOUS
Status: DISCONTINUED | OUTPATIENT
Start: 2022-01-01 | End: 2022-01-01

## 2022-01-01 RX ORDER — INDOMETHACIN 50 MG/1
1 CAPSULE ORAL 3 TIMES DAILY
COMMUNITY
Start: 2022-01-01 | End: 2022-01-01

## 2022-01-01 RX ORDER — ENOXAPARIN SODIUM 100 MG/ML
30 INJECTION SUBCUTANEOUS DAILY
Status: DISCONTINUED | OUTPATIENT
Start: 2022-01-01 | End: 2022-01-01 | Stop reason: HOSPADM

## 2022-01-01 RX ORDER — NALOXONE HYDROCHLORIDE 0.4 MG/ML
0.4 INJECTION, SOLUTION INTRAMUSCULAR; INTRAVENOUS; SUBCUTANEOUS AS NEEDED
Status: DISCONTINUED | OUTPATIENT
Start: 2022-01-01 | End: 2022-01-01 | Stop reason: HOSPADM

## 2022-01-01 RX ORDER — MORPHINE SULFATE 2 MG/ML
2 INJECTION, SOLUTION INTRAMUSCULAR; INTRAVENOUS
Status: DISCONTINUED | OUTPATIENT
Start: 2022-01-01 | End: 2022-01-01 | Stop reason: HOSPADM

## 2022-01-01 RX ORDER — SODIUM CHLORIDE 0.9 % (FLUSH) 0.9 %
5-40 SYRINGE (ML) INJECTION EVERY 8 HOURS
Status: CANCELLED | OUTPATIENT
Start: 2022-01-01

## 2022-01-01 RX ORDER — SODIUM CHLORIDE 0.9 % (FLUSH) 0.9 %
5-40 SYRINGE (ML) INJECTION AS NEEDED
Status: DISCONTINUED | OUTPATIENT
Start: 2022-01-01 | End: 2022-01-01 | Stop reason: HOSPADM

## 2022-01-01 RX ORDER — ERGOCALCIFEROL 1.25 MG/1
1 CAPSULE ORAL
COMMUNITY
Start: 2022-01-01

## 2022-01-01 RX ORDER — HYDROCODONE BITARTRATE AND ACETAMINOPHEN 5; 325 MG/1; MG/1
1 TABLET ORAL AS NEEDED
Status: DISCONTINUED | OUTPATIENT
Start: 2022-01-01 | End: 2022-01-01

## 2022-01-01 RX ORDER — FAMOTIDINE 20 MG/1
20 TABLET, FILM COATED ORAL AT BEDTIME
COMMUNITY

## 2022-01-01 RX ORDER — LORAZEPAM 2 MG/ML
2 INJECTION INTRAMUSCULAR ONCE
Status: COMPLETED | OUTPATIENT
Start: 2022-01-01 | End: 2022-01-01

## 2022-01-01 RX ORDER — POTASSIUM CHLORIDE 20 MEQ/1
20 TABLET, EXTENDED RELEASE ORAL DAILY
Qty: 30 TABLET | Refills: 0 | Status: SHIPPED | OUTPATIENT
Start: 2022-01-01

## 2022-01-01 RX ORDER — FAMOTIDINE 20 MG/1
20 TABLET, FILM COATED ORAL EVERY EVENING
Status: DISCONTINUED | OUTPATIENT
Start: 2022-01-01 | End: 2022-01-01 | Stop reason: HOSPADM

## 2022-01-01 RX ORDER — LABETALOL HCL 20 MG/4 ML
5 SYRINGE (ML) INTRAVENOUS
Status: CANCELLED | OUTPATIENT
Start: 2022-01-01

## 2022-01-01 RX ORDER — SODIUM CHLORIDE 9 MG/ML
250 INJECTION, SOLUTION INTRAVENOUS AS NEEDED
Status: DISCONTINUED | OUTPATIENT
Start: 2022-01-01 | End: 2022-01-01 | Stop reason: HOSPADM

## 2022-01-01 RX ORDER — GLYCOPYRROLATE 0.2 MG/ML
INJECTION INTRAMUSCULAR; INTRAVENOUS AS NEEDED
Status: DISCONTINUED | OUTPATIENT
Start: 2022-01-01 | End: 2022-01-01 | Stop reason: HOSPADM

## 2022-01-01 RX ORDER — GLYCOPYRROLATE 0.2 MG/ML
0.2 INJECTION INTRAMUSCULAR; INTRAVENOUS
Status: DISCONTINUED | OUTPATIENT
Start: 2022-01-01 | End: 2022-01-01 | Stop reason: HOSPADM

## 2022-01-01 RX ORDER — SODIUM BICARBONATE 1 MEQ/ML
SYRINGE (ML) INTRAVENOUS
Status: COMPLETED
Start: 2022-01-01 | End: 2022-01-01

## 2022-01-01 RX ORDER — DEXTROSE MONOHYDRATE 50 MG/ML
50 INJECTION, SOLUTION INTRAVENOUS CONTINUOUS
Status: DISCONTINUED | OUTPATIENT
Start: 2022-01-01 | End: 2022-01-01

## 2022-01-01 RX ORDER — LEVOTHYROXINE SODIUM 25 UG/1
50 TABLET ORAL
Status: DISCONTINUED | OUTPATIENT
Start: 2022-01-01 | End: 2022-01-01 | Stop reason: HOSPADM

## 2022-01-01 RX ORDER — MIDAZOLAM HYDROCHLORIDE 1 MG/ML
0.5 INJECTION, SOLUTION INTRAMUSCULAR; INTRAVENOUS
Status: DISCONTINUED | OUTPATIENT
Start: 2022-01-01 | End: 2022-01-01

## 2022-01-01 RX ORDER — SODIUM BICARBONATE 650 MG/1
650 TABLET ORAL 2 TIMES DAILY
Status: DISCONTINUED | OUTPATIENT
Start: 2022-01-01 | End: 2022-01-01

## 2022-01-01 RX ORDER — FOLIC ACID 1 MG/1
1 TABLET ORAL DAILY
COMMUNITY

## 2022-01-01 RX ORDER — LANOLIN ALCOHOL/MO/W.PET/CERES
100 CREAM (GRAM) TOPICAL DAILY
COMMUNITY

## 2022-01-01 RX ORDER — FUROSEMIDE 40 MG/1
40 TABLET ORAL DAILY
COMMUNITY
End: 2022-01-01

## 2022-01-01 RX ORDER — POTASSIUM CHLORIDE 750 MG/1
40 TABLET, FILM COATED, EXTENDED RELEASE ORAL EVERY 4 HOURS
Status: COMPLETED | OUTPATIENT
Start: 2022-01-01 | End: 2022-01-01

## 2022-01-01 RX ORDER — HYDRALAZINE HYDROCHLORIDE 20 MG/ML
10 INJECTION INTRAMUSCULAR; INTRAVENOUS
Status: CANCELLED | OUTPATIENT
Start: 2022-01-01

## 2022-01-01 RX ORDER — CHLORDIAZEPOXIDE HYDROCHLORIDE 25 MG/1
25 CAPSULE, GELATIN COATED ORAL 3 TIMES DAILY
Status: DISCONTINUED | OUTPATIENT
Start: 2022-01-01 | End: 2022-01-01

## 2022-01-01 RX ORDER — SODIUM CHLORIDE 0.9 % (FLUSH) 0.9 %
5-40 SYRINGE (ML) INJECTION EVERY 8 HOURS
Status: DISCONTINUED | OUTPATIENT
Start: 2022-01-01 | End: 2022-01-01 | Stop reason: HOSPADM

## 2022-01-01 RX ORDER — SODIUM CHLORIDE 0.9 % (FLUSH) 0.9 %
5-40 SYRINGE (ML) INJECTION AS NEEDED
Status: CANCELLED | OUTPATIENT
Start: 2022-01-01

## 2022-01-01 RX ORDER — LEVETIRACETAM 15 MG/ML
1500 INJECTION INTRAVASCULAR EVERY 12 HOURS
Status: DISCONTINUED | OUTPATIENT
Start: 2022-01-01 | End: 2022-01-01 | Stop reason: HOSPADM

## 2022-01-01 RX ORDER — POTASSIUM CHLORIDE 20 MEQ/1
20 TABLET, EXTENDED RELEASE ORAL DAILY
Status: DISCONTINUED | OUTPATIENT
Start: 2022-01-01 | End: 2022-01-01 | Stop reason: HOSPADM

## 2022-01-01 RX ORDER — SODIUM CHLORIDE 0.9 % (FLUSH) 0.9 %
5-10 SYRINGE (ML) INJECTION AS NEEDED
Status: DISCONTINUED | OUTPATIENT
Start: 2022-01-01 | End: 2022-01-01 | Stop reason: HOSPADM

## 2022-01-01 RX ORDER — FLUDROCORTISONE ACETATE 0.1 MG/1
0.1 TABLET ORAL DAILY
Status: DISCONTINUED | OUTPATIENT
Start: 2022-01-01 | End: 2022-01-01 | Stop reason: HOSPADM

## 2022-01-01 RX ORDER — SODIUM CHLORIDE 9 MG/ML
25 INJECTION, SOLUTION INTRAVENOUS CONTINUOUS
Status: DISCONTINUED | OUTPATIENT
Start: 2022-01-01 | End: 2022-01-01 | Stop reason: HOSPADM

## 2022-01-01 RX ORDER — PROPRANOLOL HYDROCHLORIDE 20 MG/1
20 TABLET ORAL 2 TIMES DAILY
COMMUNITY

## 2022-01-01 RX ORDER — SODIUM BICARBONATE 650 MG/1
650 TABLET ORAL 3 TIMES DAILY
Status: DISCONTINUED | OUTPATIENT
Start: 2022-01-01 | End: 2022-01-01 | Stop reason: HOSPADM

## 2022-01-01 RX ORDER — FLUCONAZOLE 100 MG/1
200 TABLET ORAL DAILY
Status: DISPENSED | OUTPATIENT
Start: 2022-01-01 | End: 2022-01-01

## 2022-01-01 RX ORDER — FENTANYL CITRATE 50 UG/ML
50 INJECTION, SOLUTION INTRAMUSCULAR; INTRAVENOUS AS NEEDED
Status: CANCELLED | OUTPATIENT
Start: 2022-01-01

## 2022-01-01 RX ORDER — ALBUMIN HUMAN 250 G/1000ML
25 SOLUTION INTRAVENOUS EVERY 6 HOURS
Status: COMPLETED | OUTPATIENT
Start: 2022-01-01 | End: 2022-01-01

## 2022-01-01 RX ORDER — ALBUMIN HUMAN 50 G/1000ML
SOLUTION INTRAVENOUS
Status: COMPLETED
Start: 2022-01-01 | End: 2022-01-01

## 2022-01-01 RX ORDER — POTASSIUM CHLORIDE 1.5 G/1.77G
40 POWDER, FOR SOLUTION ORAL
Status: COMPLETED | OUTPATIENT
Start: 2022-01-01 | End: 2022-01-01

## 2022-01-01 RX ORDER — BUSPIRONE HYDROCHLORIDE 5 MG/1
15 TABLET ORAL 2 TIMES DAILY
Status: DISCONTINUED | OUTPATIENT
Start: 2022-01-01 | End: 2022-01-01 | Stop reason: HOSPADM

## 2022-01-01 RX ORDER — FENTANYL CITRATE 50 UG/ML
50 INJECTION, SOLUTION INTRAMUSCULAR; INTRAVENOUS
Status: DISCONTINUED | OUTPATIENT
Start: 2022-01-01 | End: 2022-01-01

## 2022-01-01 RX ORDER — FAMOTIDINE 20 MG/1
20 TABLET, FILM COATED ORAL
Status: DISCONTINUED | OUTPATIENT
Start: 2022-01-01 | End: 2022-01-01 | Stop reason: CLARIF

## 2022-01-01 RX ORDER — LEVETIRACETAM 10 MG/ML
1000 INJECTION INTRAVASCULAR EVERY 12 HOURS
Status: DISCONTINUED | OUTPATIENT
Start: 2022-01-01 | End: 2022-01-01 | Stop reason: HOSPADM

## 2022-01-01 RX ORDER — LINEZOLID 600 MG/1
600 TABLET, FILM COATED ORAL 2 TIMES DAILY
Qty: 20 TABLET | Refills: 0 | Status: SHIPPED | OUTPATIENT
Start: 2022-01-01 | End: 2022-01-01

## 2022-01-01 RX ORDER — ACETAMINOPHEN 325 MG/1
650 TABLET ORAL EVERY 8 HOURS
Status: DISCONTINUED | OUTPATIENT
Start: 2022-01-01 | End: 2022-01-01 | Stop reason: HOSPADM

## 2022-01-01 RX ORDER — HYDROMORPHONE HYDROCHLORIDE 1 MG/ML
0.4 INJECTION, SOLUTION INTRAMUSCULAR; INTRAVENOUS; SUBCUTANEOUS
Status: DISCONTINUED | OUTPATIENT
Start: 2022-01-01 | End: 2022-01-01

## 2022-01-01 RX ADMIN — SODIUM BICARBONATE 1300 MG: 650 TABLET ORAL at 17:34

## 2022-01-01 RX ADMIN — TAMSULOSIN HYDROCHLORIDE 0.4 MG: 0.4 CAPSULE ORAL at 11:47

## 2022-01-01 RX ADMIN — SODIUM CHLORIDE, PRESERVATIVE FREE 10 ML: 5 INJECTION INTRAVENOUS at 05:26

## 2022-01-01 RX ADMIN — FAMOTIDINE 20 MG: 20 TABLET, FILM COATED ORAL at 22:14

## 2022-01-01 RX ADMIN — MIDODRINE HYDROCHLORIDE 10 MG: 5 TABLET ORAL at 09:06

## 2022-01-01 RX ADMIN — LEVETIRACETAM 1500 MG: 15 INJECTION INTRAVENOUS at 17:29

## 2022-01-01 RX ADMIN — SODIUM CHLORIDE 75 ML/HR: 9 INJECTION, SOLUTION INTRAVENOUS at 06:25

## 2022-01-01 RX ADMIN — MIDODRINE HYDROCHLORIDE 10 MG: 5 TABLET ORAL at 16:13

## 2022-01-01 RX ADMIN — LACTULOSE 15 ML: 20 SOLUTION ORAL at 21:50

## 2022-01-01 RX ADMIN — SODIUM CHLORIDE 125 ML/HR: 9 INJECTION, SOLUTION INTRAVENOUS at 21:53

## 2022-01-01 RX ADMIN — PIPERACILLIN AND TAZOBACTAM 3.38 G: 3; .375 INJECTION, POWDER, LYOPHILIZED, FOR SOLUTION INTRAVENOUS at 09:22

## 2022-01-01 RX ADMIN — LEVETIRACETAM 1500 MG: 15 INJECTION INTRAVENOUS at 23:37

## 2022-01-01 RX ADMIN — ALLOPURINOL 300 MG: 100 TABLET ORAL at 08:11

## 2022-01-01 RX ADMIN — TAMSULOSIN HYDROCHLORIDE 0.4 MG: 0.4 CAPSULE ORAL at 08:41

## 2022-01-01 RX ADMIN — FAMOTIDINE 20 MG: 20 TABLET ORAL at 17:06

## 2022-01-01 RX ADMIN — SODIUM CHLORIDE, PRESERVATIVE FREE 10 ML: 5 INJECTION INTRAVENOUS at 05:14

## 2022-01-01 RX ADMIN — MIDODRINE HYDROCHLORIDE 5 MG: 5 TABLET ORAL at 08:41

## 2022-01-01 RX ADMIN — LACTULOSE 15 ML: 20 SOLUTION ORAL at 08:04

## 2022-01-01 RX ADMIN — POTASSIUM CHLORIDE 40 MEQ: 750 TABLET, FILM COATED, EXTENDED RELEASE ORAL at 11:14

## 2022-01-01 RX ADMIN — FOLIC ACID 1 MG: 1 TABLET ORAL at 09:11

## 2022-01-01 RX ADMIN — SODIUM BICARBONATE 1300 MG: 650 TABLET ORAL at 18:18

## 2022-01-01 RX ADMIN — SODIUM CHLORIDE, POTASSIUM CHLORIDE, SODIUM LACTATE AND CALCIUM CHLORIDE: 600; 310; 30; 20 INJECTION, SOLUTION INTRAVENOUS at 14:50

## 2022-01-01 RX ADMIN — VANCOMYCIN HYDROCHLORIDE 1000 MG: 1 INJECTION, POWDER, LYOPHILIZED, FOR SOLUTION INTRAVENOUS at 09:01

## 2022-01-01 RX ADMIN — ALBUMIN (HUMAN) 25 G: 0.25 INJECTION, SOLUTION INTRAVENOUS at 13:29

## 2022-01-01 RX ADMIN — PROPRANOLOL HYDROCHLORIDE 20 MG: 10 TABLET ORAL at 21:51

## 2022-01-01 RX ADMIN — DEXTROSE AND SODIUM CHLORIDE 100 ML/HR: 5; 450 INJECTION, SOLUTION INTRAVENOUS at 08:05

## 2022-01-01 RX ADMIN — ALLOPURINOL 300 MG: 100 TABLET ORAL at 09:03

## 2022-01-01 RX ADMIN — PIPERACILLIN AND TAZOBACTAM 3.38 G: 3; .375 INJECTION, POWDER, LYOPHILIZED, FOR SOLUTION INTRAVENOUS at 08:18

## 2022-01-01 RX ADMIN — SODIUM BICARBONATE 1300 MG: 650 TABLET ORAL at 09:15

## 2022-01-01 RX ADMIN — HYDROXYZINE HYDROCHLORIDE 25 MG: 50 INJECTION, SOLUTION INTRAMUSCULAR at 21:26

## 2022-01-01 RX ADMIN — SODIUM CHLORIDE, PRESERVATIVE FREE 10 ML: 5 INJECTION INTRAVENOUS at 05:11

## 2022-01-01 RX ADMIN — LEVOFLOXACIN 500 MG: 500 TABLET, FILM COATED ORAL at 09:51

## 2022-01-01 RX ADMIN — LACTULOSE 15 ML: 20 SOLUTION ORAL at 12:30

## 2022-01-01 RX ADMIN — HEPARIN SODIUM 5000 UNITS: 5000 INJECTION INTRAVENOUS; SUBCUTANEOUS at 14:22

## 2022-01-01 RX ADMIN — ALLOPURINOL 300 MG: 300 TABLET ORAL at 09:14

## 2022-01-01 RX ADMIN — FAMOTIDINE 20 MG: 20 TABLET ORAL at 17:37

## 2022-01-01 RX ADMIN — PROPRANOLOL HYDROCHLORIDE 20 MG: 10 TABLET ORAL at 20:26

## 2022-01-01 RX ADMIN — PROPRANOLOL HYDROCHLORIDE 20 MG: 10 TABLET ORAL at 20:40

## 2022-01-01 RX ADMIN — SODIUM CHLORIDE 1000 ML: 9 INJECTION, SOLUTION INTRAVENOUS at 10:10

## 2022-01-01 RX ADMIN — SODIUM CHLORIDE, PRESERVATIVE FREE 10 ML: 5 INJECTION INTRAVENOUS at 21:05

## 2022-01-01 RX ADMIN — FAMOTIDINE 20 MG: 20 TABLET ORAL at 09:49

## 2022-01-01 RX ADMIN — LACTULOSE 15 ML: 20 SOLUTION ORAL at 22:32

## 2022-01-01 RX ADMIN — LORAZEPAM 2 MG: 2 INJECTION INTRAMUSCULAR; INTRAVENOUS at 22:14

## 2022-01-01 RX ADMIN — FAMOTIDINE 20 MG: 20 TABLET ORAL at 21:35

## 2022-01-01 RX ADMIN — LEVETIRACETAM 1000 MG: 10 INJECTION, SOLUTION INTRAVENOUS at 17:58

## 2022-01-01 RX ADMIN — SODIUM BICARBONATE 1300 MG: 650 TABLET ORAL at 09:33

## 2022-01-01 RX ADMIN — THIAMINE HCL TAB 100 MG 100 MG: 100 TAB at 08:40

## 2022-01-01 RX ADMIN — MAGNESIUM SULFATE HEPTAHYDRATE 1 G: 1 INJECTION, SOLUTION INTRAVENOUS at 10:19

## 2022-01-01 RX ADMIN — FLUCONAZOLE 200 MG: 100 TABLET ORAL at 09:04

## 2022-01-01 RX ADMIN — LEVETIRACETAM 1500 MG: 15 INJECTION INTRAVENOUS at 17:11

## 2022-01-01 RX ADMIN — MIDODRINE HYDROCHLORIDE 5 MG: 5 TABLET ORAL at 21:27

## 2022-01-01 RX ADMIN — FOLIC ACID 1 MG: 1 TABLET ORAL at 11:47

## 2022-01-01 RX ADMIN — ACETAMINOPHEN 650 MG: 325 TABLET, FILM COATED ORAL at 05:12

## 2022-01-01 RX ADMIN — FLUDROCORTISONE ACETATE 0.1 MG: 0.1 TABLET ORAL at 18:20

## 2022-01-01 RX ADMIN — LEVETIRACETAM 1500 MG: 500 TABLET, FILM COATED ORAL at 08:13

## 2022-01-01 RX ADMIN — LEVETIRACETAM 1000 MG: 10 INJECTION, SOLUTION INTRAVENOUS at 06:18

## 2022-01-01 RX ADMIN — SODIUM CHLORIDE, PRESERVATIVE FREE 10 ML: 5 INJECTION INTRAVENOUS at 21:52

## 2022-01-01 RX ADMIN — LEVETIRACETAM 1500 MG: 15 INJECTION INTRAVENOUS at 12:44

## 2022-01-01 RX ADMIN — LEVOTHYROXINE SODIUM 25 MCG: 0.03 TABLET ORAL at 06:06

## 2022-01-01 RX ADMIN — LEVOTHYROXINE SODIUM 50 MCG: 0.03 TABLET ORAL at 05:30

## 2022-01-01 RX ADMIN — MIDODRINE HYDROCHLORIDE 5 MG: 5 TABLET ORAL at 08:11

## 2022-01-01 RX ADMIN — FOLIC ACID 1 MG: 1 TABLET ORAL at 09:17

## 2022-01-01 RX ADMIN — SODIUM CHLORIDE, PRESERVATIVE FREE 10 ML: 5 INJECTION INTRAVENOUS at 06:49

## 2022-01-01 RX ADMIN — SODIUM CHLORIDE 40 ML/HR: 9 INJECTION, SOLUTION INTRAVENOUS at 03:25

## 2022-01-01 RX ADMIN — SODIUM BICARBONATE 650 MG: 650 TABLET ORAL at 10:34

## 2022-01-01 RX ADMIN — SODIUM BICARBONATE 1300 MG: 650 TABLET ORAL at 12:44

## 2022-01-01 RX ADMIN — LEVETIRACETAM 1000 MG: 500 TABLET, FILM COATED ORAL at 21:25

## 2022-01-01 RX ADMIN — HYDROXYZINE PAMOATE 25 MG: 25 CAPSULE ORAL at 21:12

## 2022-01-01 RX ADMIN — SODIUM CHLORIDE, PRESERVATIVE FREE 40 MG: 5 INJECTION INTRAVENOUS at 17:09

## 2022-01-01 RX ADMIN — LACTULOSE 15 ML: 20 SOLUTION ORAL at 21:49

## 2022-01-01 RX ADMIN — FAMOTIDINE 20 MG: 20 TABLET ORAL at 08:30

## 2022-01-01 RX ADMIN — LACTULOSE 15 ML: 20 SOLUTION ORAL at 08:00

## 2022-01-01 RX ADMIN — LEVETIRACETAM 1500 MG: 15 INJECTION INTRAVENOUS at 11:44

## 2022-01-01 RX ADMIN — LEVETIRACETAM 1500 MG: 15 INJECTION INTRAVENOUS at 17:40

## 2022-01-01 RX ADMIN — SODIUM BICARBONATE 1300 MG: 650 TABLET ORAL at 22:01

## 2022-01-01 RX ADMIN — LEVETIRACETAM 1500 MG: 15 INJECTION INTRAVENOUS at 11:56

## 2022-01-01 RX ADMIN — HEPARIN SODIUM 5000 UNITS: 5000 INJECTION INTRAVENOUS; SUBCUTANEOUS at 06:25

## 2022-01-01 RX ADMIN — FLUCONAZOLE 200 MG: 100 TABLET ORAL at 09:49

## 2022-01-01 RX ADMIN — FOLIC ACID 1 MG: 1 TABLET ORAL at 08:13

## 2022-01-01 RX ADMIN — TAMSULOSIN HYDROCHLORIDE 0.4 MG: 0.4 CAPSULE ORAL at 09:17

## 2022-01-01 RX ADMIN — LACTULOSE 30 ML: 20 SOLUTION ORAL at 08:02

## 2022-01-01 RX ADMIN — PROPRANOLOL HYDROCHLORIDE 20 MG: 10 TABLET ORAL at 09:03

## 2022-01-01 RX ADMIN — LEVETIRACETAM 1000 MG: 500 TABLET, FILM COATED ORAL at 21:52

## 2022-01-01 RX ADMIN — LEVOTHYROXINE SODIUM 50 MCG: 0.03 TABLET ORAL at 05:07

## 2022-01-01 RX ADMIN — SODIUM CHLORIDE, PRESERVATIVE FREE 10 ML: 5 INJECTION INTRAVENOUS at 21:12

## 2022-01-01 RX ADMIN — SODIUM CHLORIDE, PRESERVATIVE FREE 10 ML: 5 INJECTION INTRAVENOUS at 21:58

## 2022-01-01 RX ADMIN — SODIUM CHLORIDE, PRESERVATIVE FREE 10 ML: 5 INJECTION INTRAVENOUS at 05:46

## 2022-01-01 RX ADMIN — LACTULOSE 15 ML: 20 SOLUTION ORAL at 09:17

## 2022-01-01 RX ADMIN — LEVETIRACETAM 1500 MG: 15 INJECTION INTRAVENOUS at 23:32

## 2022-01-01 RX ADMIN — LACTULOSE 15 ML: 20 SOLUTION ORAL at 23:40

## 2022-01-01 RX ADMIN — LACTULOSE 15 ML: 20 SOLUTION ORAL at 18:28

## 2022-01-01 RX ADMIN — PROPRANOLOL HYDROCHLORIDE 20 MG: 10 TABLET ORAL at 21:35

## 2022-01-01 RX ADMIN — SODIUM CHLORIDE, PRESERVATIVE FREE 5 ML: 5 INJECTION INTRAVENOUS at 22:00

## 2022-01-01 RX ADMIN — LEVETIRACETAM 1500 MG: 15 INJECTION INTRAVENOUS at 23:45

## 2022-01-01 RX ADMIN — PROPOFOL 10 MG: 10 INJECTION, EMULSION INTRAVENOUS at 14:55

## 2022-01-01 RX ADMIN — LEVETIRACETAM 1500 MG: 15 INJECTION INTRAVENOUS at 17:09

## 2022-01-01 RX ADMIN — LACTULOSE 15 ML: 20 SOLUTION ORAL at 18:18

## 2022-01-01 RX ADMIN — PIPERACILLIN AND TAZOBACTAM 3.38 G: 3; .375 INJECTION, POWDER, LYOPHILIZED, FOR SOLUTION INTRAVENOUS at 19:59

## 2022-01-01 RX ADMIN — POTASSIUM CHLORIDE 40 MEQ: 750 TABLET, FILM COATED, EXTENDED RELEASE ORAL at 20:55

## 2022-01-01 RX ADMIN — LACTULOSE 15 ML: 20 SOLUTION ORAL at 15:23

## 2022-01-01 RX ADMIN — SODIUM BICARBONATE 1300 MG: 650 TABLET ORAL at 21:04

## 2022-01-01 RX ADMIN — THIAMINE HCL TAB 100 MG 100 MG: 100 TAB at 09:49

## 2022-01-01 RX ADMIN — SODIUM BICARBONATE 650 MG: 650 TABLET ORAL at 21:18

## 2022-01-01 RX ADMIN — SODIUM CHLORIDE, PRESERVATIVE FREE 10 ML: 5 INJECTION INTRAVENOUS at 14:00

## 2022-01-01 RX ADMIN — SODIUM CHLORIDE 40 ML/HR: 9 INJECTION, SOLUTION INTRAVENOUS at 23:36

## 2022-01-01 RX ADMIN — SODIUM CHLORIDE, POTASSIUM CHLORIDE, SODIUM LACTATE AND CALCIUM CHLORIDE: 600; 310; 30; 20 INJECTION, SOLUTION INTRAVENOUS at 16:38

## 2022-01-01 RX ADMIN — PROPRANOLOL HYDROCHLORIDE 20 MG: 10 TABLET ORAL at 22:32

## 2022-01-01 RX ADMIN — LACTULOSE 15 ML: 20 SOLUTION ORAL at 21:15

## 2022-01-01 RX ADMIN — SODIUM CHLORIDE, PRESERVATIVE FREE 10 ML: 5 INJECTION INTRAVENOUS at 21:30

## 2022-01-01 RX ADMIN — SODIUM CHLORIDE, PRESERVATIVE FREE 10 ML: 5 INJECTION INTRAVENOUS at 22:12

## 2022-01-01 RX ADMIN — SODIUM CHLORIDE 100 MG: 0.9 INJECTION INTRAVENOUS at 16:26

## 2022-01-01 RX ADMIN — POTASSIUM CHLORIDE 40 MEQ: 1.5 POWDER, FOR SOLUTION ORAL at 12:49

## 2022-01-01 RX ADMIN — LEVETIRACETAM 1500 MG: 15 INJECTION INTRAVENOUS at 00:14

## 2022-01-01 RX ADMIN — FUROSEMIDE 40 MG: 10 INJECTION, SOLUTION INTRAMUSCULAR; INTRAVENOUS at 13:21

## 2022-01-01 RX ADMIN — SODIUM CHLORIDE 100 ML/HR: 9 INJECTION, SOLUTION INTRAVENOUS at 01:38

## 2022-01-01 RX ADMIN — LACTULOSE 15 ML: 20 SOLUTION ORAL at 08:41

## 2022-01-01 RX ADMIN — MIDODRINE HYDROCHLORIDE 10 MG: 5 TABLET ORAL at 17:42

## 2022-01-01 RX ADMIN — ALLOPURINOL 300 MG: 100 TABLET ORAL at 09:22

## 2022-01-01 RX ADMIN — SODIUM BICARBONATE 1300 MG: 650 TABLET ORAL at 08:00

## 2022-01-01 RX ADMIN — LEVOTHYROXINE SODIUM 25 MCG: 0.03 TABLET ORAL at 08:13

## 2022-01-01 RX ADMIN — POTASSIUM CHLORIDE 40 MEQ: 750 TABLET, FILM COATED, EXTENDED RELEASE ORAL at 19:56

## 2022-01-01 RX ADMIN — THIAMINE HCL TAB 100 MG 100 MG: 100 TAB at 09:10

## 2022-01-01 RX ADMIN — PROPRANOLOL HYDROCHLORIDE 20 MG: 10 TABLET ORAL at 21:05

## 2022-01-01 RX ADMIN — SODIUM BICARBONATE 1300 MG: 650 TABLET ORAL at 21:41

## 2022-01-01 RX ADMIN — WHITE PETROLATUM,ZINC OXIDE: 20; 75 PASTE TOPICAL at 02:45

## 2022-01-01 RX ADMIN — LACTULOSE 15 ML: 20 SOLUTION ORAL at 10:16

## 2022-01-01 RX ADMIN — MIDODRINE HYDROCHLORIDE 10 MG: 5 TABLET ORAL at 17:28

## 2022-01-01 RX ADMIN — SODIUM BICARBONATE 1300 MG: 650 TABLET ORAL at 21:42

## 2022-01-01 RX ADMIN — LACTULOSE 15 ML: 20 SOLUTION ORAL at 08:05

## 2022-01-01 RX ADMIN — PROPRANOLOL HYDROCHLORIDE 20 MG: 20 TABLET ORAL at 23:31

## 2022-01-01 RX ADMIN — LEVOTHYROXINE SODIUM 50 MCG: 0.03 TABLET ORAL at 05:10

## 2022-01-01 RX ADMIN — LACTULOSE 15 ML: 20 SOLUTION ORAL at 21:03

## 2022-01-01 RX ADMIN — LEVETIRACETAM 1000 MG: 10 INJECTION INTRAVENOUS at 00:21

## 2022-01-01 RX ADMIN — FLUCONAZOLE 200 MG: 100 TABLET ORAL at 09:14

## 2022-01-01 RX ADMIN — MIDODRINE HYDROCHLORIDE 5 MG: 5 TABLET ORAL at 17:24

## 2022-01-01 RX ADMIN — MIDODRINE HYDROCHLORIDE 10 MG: 5 TABLET ORAL at 12:30

## 2022-01-01 RX ADMIN — ACETAMINOPHEN 650 MG: 325 TABLET, FILM COATED ORAL at 05:39

## 2022-01-01 RX ADMIN — LEVETIRACETAM 1500 MG: 15 INJECTION INTRAVENOUS at 06:45

## 2022-01-01 RX ADMIN — PIPERACILLIN AND TAZOBACTAM 3.38 G: 3; .375 INJECTION, POWDER, LYOPHILIZED, FOR SOLUTION INTRAVENOUS at 20:44

## 2022-01-01 RX ADMIN — FAMOTIDINE 20 MG: 20 TABLET ORAL at 11:47

## 2022-01-01 RX ADMIN — SODIUM BICARBONATE 650 MG: 650 TABLET ORAL at 16:09

## 2022-01-01 RX ADMIN — ACETAMINOPHEN 650 MG: 325 TABLET, FILM COATED ORAL at 21:27

## 2022-01-01 RX ADMIN — FAMOTIDINE 20 MG: 20 TABLET ORAL at 09:05

## 2022-01-01 RX ADMIN — POLYETHYLENE GLYCOL 3350 17 G: 17 POWDER, FOR SOLUTION ORAL at 09:30

## 2022-01-01 RX ADMIN — LEVOTHYROXINE SODIUM 25 MCG: 0.03 TABLET ORAL at 04:12

## 2022-01-01 RX ADMIN — LEVETIRACETAM 1500 MG: 15 INJECTION INTRAVENOUS at 00:25

## 2022-01-01 RX ADMIN — LACTULOSE 15 ML: 20 SOLUTION ORAL at 21:32

## 2022-01-01 RX ADMIN — THIAMINE HCL TAB 100 MG 100 MG: 100 TAB at 11:18

## 2022-01-01 RX ADMIN — LEVETIRACETAM 1500 MG: 500 TABLET, FILM COATED ORAL at 21:40

## 2022-01-01 RX ADMIN — ALBUMIN (HUMAN) 12.5 G: 0.25 INJECTION, SOLUTION INTRAVENOUS at 14:20

## 2022-01-01 RX ADMIN — ALBUMIN (HUMAN) 25 G: 0.25 INJECTION, SOLUTION INTRAVENOUS at 09:39

## 2022-01-01 RX ADMIN — SODIUM BICARBONATE 1300 MG: 650 TABLET ORAL at 21:11

## 2022-01-01 RX ADMIN — DEXTROSE MONOHYDRATE 50 ML/HR: 50 INJECTION, SOLUTION INTRAVENOUS at 05:38

## 2022-01-01 RX ADMIN — FOLIC ACID 1 MG: 1 TABLET ORAL at 09:03

## 2022-01-01 RX ADMIN — ALLOPURINOL 300 MG: 300 TABLET ORAL at 09:00

## 2022-01-01 RX ADMIN — HYDROXYZINE PAMOATE 25 MG: 25 CAPSULE ORAL at 11:18

## 2022-01-01 RX ADMIN — TAMSULOSIN HYDROCHLORIDE 0.4 MG: 0.4 CAPSULE ORAL at 09:04

## 2022-01-01 RX ADMIN — SODIUM CHLORIDE, PRESERVATIVE FREE 10 ML: 5 INJECTION INTRAVENOUS at 06:27

## 2022-01-01 RX ADMIN — DEXTROSE MONOHYDRATE 75 ML/HR: 50 INJECTION, SOLUTION INTRAVENOUS at 21:28

## 2022-01-01 RX ADMIN — LEVETIRACETAM 1500 MG: 15 INJECTION INTRAVENOUS at 13:21

## 2022-01-01 RX ADMIN — LORAZEPAM 2 MG: 2 INJECTION INTRAMUSCULAR; INTRAVENOUS at 04:40

## 2022-01-01 RX ADMIN — FAMOTIDINE 20 MG: 20 TABLET ORAL at 21:00

## 2022-01-01 RX ADMIN — ALLOPURINOL 300 MG: 100 TABLET ORAL at 09:15

## 2022-01-01 RX ADMIN — MIDODRINE HYDROCHLORIDE 10 MG: 5 TABLET ORAL at 09:17

## 2022-01-01 RX ADMIN — SODIUM CHLORIDE, PRESERVATIVE FREE 10 ML: 5 INJECTION INTRAVENOUS at 05:47

## 2022-01-01 RX ADMIN — THIAMINE HCL TAB 100 MG 100 MG: 100 TAB at 08:11

## 2022-01-01 RX ADMIN — FLUDROCORTISONE ACETATE 0.1 MG: 0.1 TABLET ORAL at 11:55

## 2022-01-01 RX ADMIN — PIPERACILLIN AND TAZOBACTAM 3.38 G: 3; .375 INJECTION, POWDER, LYOPHILIZED, FOR SOLUTION INTRAVENOUS at 20:57

## 2022-01-01 RX ADMIN — MIDODRINE HYDROCHLORIDE 10 MG: 5 TABLET ORAL at 07:57

## 2022-01-01 RX ADMIN — THIAMINE HCL TAB 100 MG 100 MG: 100 TAB at 09:04

## 2022-01-01 RX ADMIN — PROPRANOLOL HYDROCHLORIDE 20 MG: 10 TABLET ORAL at 08:55

## 2022-01-01 RX ADMIN — MIDODRINE HYDROCHLORIDE 10 MG: 5 TABLET ORAL at 08:06

## 2022-01-01 RX ADMIN — PIPERACILLIN AND TAZOBACTAM 3.38 G: 3; .375 INJECTION, POWDER, LYOPHILIZED, FOR SOLUTION INTRAVENOUS at 08:40

## 2022-01-01 RX ADMIN — PROPOFOL 30 MG: 10 INJECTION, EMULSION INTRAVENOUS at 12:41

## 2022-01-01 RX ADMIN — ALLOPURINOL 300 MG: 100 TABLET ORAL at 08:05

## 2022-01-01 RX ADMIN — FOLIC ACID 1 MG: 1 TABLET ORAL at 09:00

## 2022-01-01 RX ADMIN — ALBUMIN (HUMAN) 25 G: 12.5 INJECTION, SOLUTION INTRAVENOUS at 14:11

## 2022-01-01 RX ADMIN — ALBUMIN (HUMAN) 12.5 G: 0.25 INJECTION, SOLUTION INTRAVENOUS at 14:30

## 2022-01-01 RX ADMIN — LACTULOSE 15 ML: 20 SOLUTION ORAL at 22:11

## 2022-01-01 RX ADMIN — SODIUM BICARBONATE 1300 MG: 650 TABLET ORAL at 08:07

## 2022-01-01 RX ADMIN — PIPERACILLIN AND TAZOBACTAM 3.38 G: 3; .375 INJECTION, POWDER, LYOPHILIZED, FOR SOLUTION INTRAVENOUS at 08:33

## 2022-01-01 RX ADMIN — SUCCINYLCHOLINE CHLORIDE 100 MG: 20 INJECTION, SOLUTION INTRAMUSCULAR; INTRAVENOUS at 17:57

## 2022-01-01 RX ADMIN — FLUCONAZOLE 200 MG: 100 TABLET ORAL at 09:17

## 2022-01-01 RX ADMIN — ACETAMINOPHEN 650 MG: 325 TABLET, FILM COATED ORAL at 16:09

## 2022-01-01 RX ADMIN — THIAMINE HCL TAB 100 MG 100 MG: 100 TAB at 09:43

## 2022-01-01 RX ADMIN — LEVETIRACETAM 1500 MG: 15 INJECTION INTRAVENOUS at 13:01

## 2022-01-01 RX ADMIN — SODIUM CHLORIDE, PRESERVATIVE FREE 10 ML: 5 INJECTION INTRAVENOUS at 06:13

## 2022-01-01 RX ADMIN — SODIUM BICARBONATE 1300 MG: 650 TABLET ORAL at 17:13

## 2022-01-01 RX ADMIN — ALLOPURINOL 300 MG: 100 TABLET ORAL at 08:30

## 2022-01-01 RX ADMIN — ACETAMINOPHEN 650 MG: 325 TABLET, FILM COATED ORAL at 21:11

## 2022-01-01 RX ADMIN — LORAZEPAM 2 MG: 2 INJECTION INTRAMUSCULAR; INTRAVENOUS at 06:22

## 2022-01-01 RX ADMIN — LACTULOSE 15 ML: 20 SOLUTION ORAL at 11:17

## 2022-01-01 RX ADMIN — PROPRANOLOL HYDROCHLORIDE 20 MG: 10 TABLET ORAL at 11:47

## 2022-01-01 RX ADMIN — FLUCONAZOLE 200 MG: 100 TABLET ORAL at 09:32

## 2022-01-01 RX ADMIN — HYDROXYZINE PAMOATE 25 MG: 25 CAPSULE ORAL at 11:39

## 2022-01-01 RX ADMIN — LACTULOSE 15 ML: 20 SOLUTION ORAL at 21:30

## 2022-01-01 RX ADMIN — LEVETIRACETAM 1500 MG: 15 INJECTION INTRAVENOUS at 12:37

## 2022-01-01 RX ADMIN — LEVETIRACETAM 1500 MG: 500 TABLET, FILM COATED ORAL at 08:40

## 2022-01-01 RX ADMIN — PROPRANOLOL HYDROCHLORIDE 20 MG: 10 TABLET ORAL at 08:39

## 2022-01-01 RX ADMIN — SODIUM CHLORIDE 500 ML: 9 INJECTION, SOLUTION INTRAVENOUS at 20:16

## 2022-01-01 RX ADMIN — FAMOTIDINE 20 MG: 20 TABLET ORAL at 09:13

## 2022-01-01 RX ADMIN — LEVOTHYROXINE SODIUM 50 MCG: 0.03 TABLET ORAL at 05:28

## 2022-01-01 RX ADMIN — SODIUM CHLORIDE, PRESERVATIVE FREE 10 ML: 5 INJECTION INTRAVENOUS at 13:47

## 2022-01-01 RX ADMIN — CHLORDIAZEPOXIDE HYDROCHLORIDE 10 MG: 5 CAPSULE ORAL at 17:29

## 2022-01-01 RX ADMIN — LEVETIRACETAM 1500 MG: 15 INJECTION INTRAVENOUS at 23:40

## 2022-01-01 RX ADMIN — PIPERACILLIN AND TAZOBACTAM 3.38 G: 3; .375 INJECTION, POWDER, LYOPHILIZED, FOR SOLUTION INTRAVENOUS at 12:46

## 2022-01-01 RX ADMIN — MIDODRINE HYDROCHLORIDE 10 MG: 5 TABLET ORAL at 16:39

## 2022-01-01 RX ADMIN — PROPRANOLOL HYDROCHLORIDE 20 MG: 10 TABLET ORAL at 09:23

## 2022-01-01 RX ADMIN — HEPARIN SODIUM 5000 UNITS: 5000 INJECTION INTRAVENOUS; SUBCUTANEOUS at 21:16

## 2022-01-01 RX ADMIN — FLUDROCORTISONE ACETATE 0.1 MG: 0.1 TABLET ORAL at 09:33

## 2022-01-01 RX ADMIN — DEXTROSE MONOHYDRATE 50 ML/HR: 50 INJECTION, SOLUTION INTRAVENOUS at 16:46

## 2022-01-01 RX ADMIN — LACTULOSE 15 ML: 20 SOLUTION ORAL at 09:42

## 2022-01-01 RX ADMIN — LEVETIRACETAM 1500 MG: 15 INJECTION INTRAVENOUS at 23:57

## 2022-01-01 RX ADMIN — FAMOTIDINE 20 MG: 20 TABLET ORAL at 21:39

## 2022-01-01 RX ADMIN — SODIUM CHLORIDE 50 ML/HR: 9 INJECTION, SOLUTION INTRAVENOUS at 18:36

## 2022-01-01 RX ADMIN — LACTULOSE 15 ML: 20 SOLUTION ORAL at 16:37

## 2022-01-01 RX ADMIN — LORAZEPAM 1 MG: 2 INJECTION INTRAMUSCULAR; INTRAVENOUS at 17:33

## 2022-01-01 RX ADMIN — PROPRANOLOL HYDROCHLORIDE 20 MG: 10 TABLET ORAL at 20:58

## 2022-01-01 RX ADMIN — SODIUM BICARBONATE 1300 MG: 650 TABLET ORAL at 17:42

## 2022-01-01 RX ADMIN — LACTULOSE 15 ML: 20 SOLUTION ORAL at 22:13

## 2022-01-01 RX ADMIN — LEVETIRACETAM 1500 MG: 500 SOLUTION ORAL at 21:49

## 2022-01-01 RX ADMIN — LACTULOSE 15 ML: 20 SOLUTION ORAL at 22:07

## 2022-01-01 RX ADMIN — PROPOFOL 30 MCG/KG/MIN: 10 INJECTION, EMULSION INTRAVENOUS at 06:18

## 2022-01-01 RX ADMIN — LACTULOSE 30 ML: 20 SOLUTION ORAL at 08:42

## 2022-01-01 RX ADMIN — ZIPRASIDONE MESYLATE 10 MG: 20 INJECTION, POWDER, LYOPHILIZED, FOR SOLUTION INTRAMUSCULAR at 22:17

## 2022-01-01 RX ADMIN — LIDOCAINE HYDROCHLORIDE 100 MG: 20 INJECTION, SOLUTION EPIDURAL; INFILTRATION; INTRACAUDAL; PERINEURAL at 14:52

## 2022-01-01 RX ADMIN — ALLOPURINOL 300 MG: 300 TABLET ORAL at 11:32

## 2022-01-01 RX ADMIN — SODIUM BICARBONATE 1300 MG: 650 TABLET ORAL at 16:06

## 2022-01-01 RX ADMIN — LACTULOSE 15 ML: 20 SOLUTION ORAL at 08:32

## 2022-01-01 RX ADMIN — PIPERACILLIN AND TAZOBACTAM 3.38 G: 3; .375 INJECTION, POWDER, LYOPHILIZED, FOR SOLUTION INTRAVENOUS at 03:04

## 2022-01-01 RX ADMIN — VANCOMYCIN HYDROCHLORIDE 500 MG: 500 INJECTION, POWDER, LYOPHILIZED, FOR SOLUTION INTRAVENOUS at 18:46

## 2022-01-01 RX ADMIN — SODIUM BICARBONATE 650 MG: 650 TABLET ORAL at 17:29

## 2022-01-01 RX ADMIN — FLUCONAZOLE 200 MG: 100 TABLET ORAL at 11:46

## 2022-01-01 RX ADMIN — LEVOTHYROXINE SODIUM 25 MCG: 0.03 TABLET ORAL at 07:57

## 2022-01-01 RX ADMIN — LEVETIRACETAM 1500 MG: 15 INJECTION INTRAVENOUS at 01:32

## 2022-01-01 RX ADMIN — LEVETIRACETAM 1500 MG: 15 INJECTION INTRAVENOUS at 12:33

## 2022-01-01 RX ADMIN — LACTULOSE 15 ML: 20 SOLUTION ORAL at 20:40

## 2022-01-01 RX ADMIN — LACTULOSE 15 ML: 20 SOLUTION ORAL at 21:35

## 2022-01-01 RX ADMIN — HEPARIN SODIUM 5000 UNITS: 5000 INJECTION INTRAVENOUS; SUBCUTANEOUS at 05:58

## 2022-01-01 RX ADMIN — FAMOTIDINE 20 MG: 20 TABLET ORAL at 17:20

## 2022-01-01 RX ADMIN — LACTULOSE 30 ML: 20 SOLUTION ORAL at 23:58

## 2022-01-01 RX ADMIN — MIDODRINE HYDROCHLORIDE 10 MG: 5 TABLET ORAL at 11:37

## 2022-01-01 RX ADMIN — ACETAMINOPHEN 325MG 650 MG: 325 TABLET ORAL at 20:07

## 2022-01-01 RX ADMIN — SODIUM BICARBONATE 1300 MG: 650 TABLET ORAL at 16:52

## 2022-01-01 RX ADMIN — PROPRANOLOL HYDROCHLORIDE 20 MG: 20 TABLET ORAL at 10:22

## 2022-01-01 RX ADMIN — PIPERACILLIN AND TAZOBACTAM 3.38 G: 3; .375 INJECTION, POWDER, LYOPHILIZED, FOR SOLUTION INTRAVENOUS at 03:23

## 2022-01-01 RX ADMIN — VANCOMYCIN HYDROCHLORIDE 500 MG: 500 INJECTION, POWDER, LYOPHILIZED, FOR SOLUTION INTRAVENOUS at 09:15

## 2022-01-01 RX ADMIN — LACTULOSE 15 ML: 20 SOLUTION ORAL at 09:10

## 2022-01-01 RX ADMIN — KETOROLAC TROMETHAMINE 30 MG: 30 INJECTION, SOLUTION INTRAMUSCULAR; INTRAVENOUS at 19:56

## 2022-01-01 RX ADMIN — SODIUM BICARBONATE 50 MEQ: 84 INJECTION, SOLUTION INTRAVENOUS at 11:33

## 2022-01-01 RX ADMIN — PIPERACILLIN AND TAZOBACTAM 3.38 G: 3; .375 INJECTION, POWDER, LYOPHILIZED, FOR SOLUTION INTRAVENOUS at 08:28

## 2022-01-01 RX ADMIN — HYDROXYZINE HYDROCHLORIDE 50 MG: 50 INJECTION, SOLUTION INTRAMUSCULAR at 02:35

## 2022-01-01 RX ADMIN — LACTULOSE 15 ML: 20 SOLUTION ORAL at 18:20

## 2022-01-01 RX ADMIN — EPOETIN ALFA-EPBX 10000 UNITS: 10000 INJECTION, SOLUTION INTRAVENOUS; SUBCUTANEOUS at 19:12

## 2022-01-01 RX ADMIN — MIDODRINE HYDROCHLORIDE 10 MG: 5 TABLET ORAL at 16:31

## 2022-01-01 RX ADMIN — LEVETIRACETAM 1500 MG: 15 INJECTION INTRAVENOUS at 05:46

## 2022-01-01 RX ADMIN — LEVETIRACETAM 1000 MG: 100 SOLUTION ORAL at 08:55

## 2022-01-01 RX ADMIN — LEVOTHYROXINE SODIUM 50 MCG: 0.03 TABLET ORAL at 05:12

## 2022-01-01 RX ADMIN — LEVETIRACETAM 1500 MG: 15 INJECTION INTRAVENOUS at 05:36

## 2022-01-01 RX ADMIN — THIAMINE HCL TAB 100 MG 100 MG: 100 TAB at 08:06

## 2022-01-01 RX ADMIN — LACTULOSE 15 ML: 20 SOLUTION ORAL at 10:11

## 2022-01-01 RX ADMIN — PROPOFOL 25 MCG/KG/MIN: 10 INJECTION, EMULSION INTRAVENOUS at 13:38

## 2022-01-01 RX ADMIN — TAMSULOSIN HYDROCHLORIDE 0.4 MG: 0.4 CAPSULE ORAL at 08:35

## 2022-01-01 RX ADMIN — FOLIC ACID 1 MG: 1 TABLET ORAL at 08:36

## 2022-01-01 RX ADMIN — SODIUM BICARBONATE 1300 MG: 650 TABLET ORAL at 08:55

## 2022-01-01 RX ADMIN — CHLORDIAZEPOXIDE HYDROCHLORIDE 10 MG: 5 CAPSULE ORAL at 00:29

## 2022-01-01 RX ADMIN — LACTULOSE 15 ML: 20 SOLUTION ORAL at 16:54

## 2022-01-01 RX ADMIN — ACETAMINOPHEN 650 MG: 325 TABLET, FILM COATED ORAL at 05:30

## 2022-01-01 RX ADMIN — LEVETIRACETAM 1500 MG: 15 INJECTION INTRAVENOUS at 12:19

## 2022-01-01 RX ADMIN — ALBUMIN (HUMAN) 25 G: 12.5 INJECTION, SOLUTION INTRAVENOUS at 21:29

## 2022-01-01 RX ADMIN — LORAZEPAM 2 MG: 2 INJECTION INTRAMUSCULAR; INTRAVENOUS at 16:31

## 2022-01-01 RX ADMIN — METOCLOPRAMIDE HYDROCHLORIDE 10 MG: 5 INJECTION INTRAMUSCULAR; INTRAVENOUS at 18:09

## 2022-01-01 RX ADMIN — DEXTROSE AND SODIUM CHLORIDE 125 ML/HR: 5; 450 INJECTION, SOLUTION INTRAVENOUS at 09:10

## 2022-01-01 RX ADMIN — LACTULOSE 15 ML: 20 SOLUTION ORAL at 15:26

## 2022-01-01 RX ADMIN — LEVETIRACETAM 1000 MG: 500 TABLET, FILM COATED ORAL at 21:32

## 2022-01-01 RX ADMIN — POTASSIUM CHLORIDE 40 MEQ: 1500 TABLET, EXTENDED RELEASE ORAL at 16:00

## 2022-01-01 RX ADMIN — FENTANYL CITRATE 50 MCG: 50 INJECTION, SOLUTION INTRAMUSCULAR; INTRAVENOUS at 16:45

## 2022-01-01 RX ADMIN — MIDODRINE HYDROCHLORIDE 10 MG: 5 TABLET ORAL at 12:44

## 2022-01-01 RX ADMIN — LEVETIRACETAM 1500 MG: 15 INJECTION INTRAVENOUS at 13:42

## 2022-01-01 RX ADMIN — SODIUM BICARBONATE 1300 MG: 650 TABLET ORAL at 21:30

## 2022-01-01 RX ADMIN — ACETAMINOPHEN 650 MG: 325 TABLET ORAL at 06:12

## 2022-01-01 RX ADMIN — SODIUM CHLORIDE, PRESERVATIVE FREE 10 ML: 5 INJECTION INTRAVENOUS at 05:36

## 2022-01-01 RX ADMIN — MIDODRINE HYDROCHLORIDE 10 MG: 5 TABLET ORAL at 12:37

## 2022-01-01 RX ADMIN — Medication 50 MEQ: at 11:33

## 2022-01-01 RX ADMIN — SODIUM BICARBONATE 1300 MG: 650 TABLET ORAL at 14:30

## 2022-01-01 RX ADMIN — FAMOTIDINE 20 MG: 20 TABLET ORAL at 16:09

## 2022-01-01 RX ADMIN — FOLIC ACID 1 MG: 1 TABLET ORAL at 09:43

## 2022-01-01 RX ADMIN — SODIUM CHLORIDE, PRESERVATIVE FREE 10 ML: 5 INJECTION INTRAVENOUS at 13:56

## 2022-01-01 RX ADMIN — LEVETIRACETAM 1000 MG: 100 SOLUTION ORAL at 20:28

## 2022-01-01 RX ADMIN — FAMOTIDINE 20 MG: 20 TABLET ORAL at 23:44

## 2022-01-01 RX ADMIN — ACETAMINOPHEN 650 MG: 325 TABLET, FILM COATED ORAL at 05:40

## 2022-01-01 RX ADMIN — ALLOPURINOL 300 MG: 100 TABLET ORAL at 08:07

## 2022-01-01 RX ADMIN — PROPRANOLOL HYDROCHLORIDE 20 MG: 10 TABLET ORAL at 21:46

## 2022-01-01 RX ADMIN — LEVETIRACETAM 1000 MG: 10 INJECTION INTRAVENOUS at 00:04

## 2022-01-01 RX ADMIN — ERGOCALCIFEROL 50000 UNITS: 1.25 CAPSULE ORAL at 16:06

## 2022-01-01 RX ADMIN — CHLORDIAZEPOXIDE HYDROCHLORIDE 10 MG: 5 CAPSULE ORAL at 10:19

## 2022-01-01 RX ADMIN — SODIUM BICARBONATE 1300 MG: 650 TABLET ORAL at 17:51

## 2022-01-01 RX ADMIN — DEXTROSE MONOHYDRATE 50 ML/HR: 50 INJECTION, SOLUTION INTRAVENOUS at 11:35

## 2022-01-01 RX ADMIN — SODIUM BICARBONATE 1300 MG: 650 TABLET ORAL at 21:00

## 2022-01-01 RX ADMIN — LACTULOSE 15 ML: 20 SOLUTION ORAL at 09:52

## 2022-01-01 RX ADMIN — MIDODRINE HYDROCHLORIDE 5 MG: 5 TABLET ORAL at 22:14

## 2022-01-01 RX ADMIN — MIDODRINE HYDROCHLORIDE 10 MG: 5 TABLET ORAL at 11:47

## 2022-01-01 RX ADMIN — ONDANSETRON 4 MG: 2 INJECTION INTRAMUSCULAR; INTRAVENOUS at 00:50

## 2022-01-01 RX ADMIN — PROPRANOLOL HYDROCHLORIDE 20 MG: 10 TABLET ORAL at 20:39

## 2022-01-01 RX ADMIN — TAMSULOSIN HYDROCHLORIDE 0.4 MG: 0.4 CAPSULE ORAL at 08:30

## 2022-01-01 RX ADMIN — THIAMINE HCL TAB 100 MG 100 MG: 100 TAB at 09:15

## 2022-01-01 RX ADMIN — PROPOFOL 25 MCG/KG/MIN: 10 INJECTION, EMULSION INTRAVENOUS at 01:54

## 2022-01-01 RX ADMIN — LEVETIRACETAM 1500 MG: 15 INJECTION INTRAVENOUS at 06:27

## 2022-01-01 RX ADMIN — SODIUM CHLORIDE, PRESERVATIVE FREE 10 ML: 5 INJECTION INTRAVENOUS at 06:00

## 2022-01-01 RX ADMIN — LACTULOSE 15 ML: 20 SOLUTION ORAL at 17:08

## 2022-01-01 RX ADMIN — LEVETIRACETAM 1000 MG: 10 INJECTION, SOLUTION INTRAVENOUS at 05:47

## 2022-01-01 RX ADMIN — TAMSULOSIN HYDROCHLORIDE 0.4 MG: 0.4 CAPSULE ORAL at 08:13

## 2022-01-01 RX ADMIN — METOCLOPRAMIDE HYDROCHLORIDE 10 MG: 5 INJECTION INTRAMUSCULAR; INTRAVENOUS at 12:44

## 2022-01-01 RX ADMIN — SODIUM CHLORIDE, PRESERVATIVE FREE 40 MG: 5 INJECTION INTRAVENOUS at 21:10

## 2022-01-01 RX ADMIN — FLUDROCORTISONE ACETATE 0.1 MG: 0.1 TABLET ORAL at 09:13

## 2022-01-01 RX ADMIN — LEVETIRACETAM 1500 MG: 15 INJECTION INTRAVENOUS at 02:05

## 2022-01-01 RX ADMIN — SODIUM BICARBONATE 650 MG: 650 TABLET ORAL at 09:42

## 2022-01-01 RX ADMIN — POTASSIUM CHLORIDE 40 MEQ: 1500 TABLET, EXTENDED RELEASE ORAL at 12:00

## 2022-01-01 RX ADMIN — SODIUM CHLORIDE 500 ML: 9 INJECTION, SOLUTION INTRAVENOUS at 12:25

## 2022-01-01 RX ADMIN — MIDODRINE HYDROCHLORIDE 10 MG: 5 TABLET ORAL at 09:04

## 2022-01-01 RX ADMIN — SODIUM CHLORIDE 75 ML/HR: 9 INJECTION, SOLUTION INTRAVENOUS at 13:59

## 2022-01-01 RX ADMIN — ALLOPURINOL 300 MG: 300 TABLET ORAL at 08:29

## 2022-01-01 RX ADMIN — PROPRANOLOL HYDROCHLORIDE 20 MG: 20 TABLET ORAL at 00:29

## 2022-01-01 RX ADMIN — HYDROXYZINE PAMOATE 25 MG: 25 CAPSULE ORAL at 22:12

## 2022-01-01 RX ADMIN — SODIUM CHLORIDE, PRESERVATIVE FREE 10 ML: 5 INJECTION INTRAVENOUS at 14:12

## 2022-01-01 RX ADMIN — POTASSIUM CHLORIDE 20 MEQ: 1500 TABLET, EXTENDED RELEASE ORAL at 09:51

## 2022-01-01 RX ADMIN — MIDODRINE HYDROCHLORIDE 10 MG: 5 TABLET ORAL at 13:22

## 2022-01-01 RX ADMIN — FOLIC ACID 1 MG: 1 TABLET ORAL at 09:30

## 2022-01-01 RX ADMIN — HEPARIN SODIUM 5000 UNITS: 5000 INJECTION INTRAVENOUS; SUBCUTANEOUS at 22:11

## 2022-01-01 RX ADMIN — FAMOTIDINE 20 MG: 20 TABLET ORAL at 21:04

## 2022-01-01 RX ADMIN — EPOETIN ALFA-EPBX 10000 UNITS: 10000 INJECTION, SOLUTION INTRAVENOUS; SUBCUTANEOUS at 21:07

## 2022-01-01 RX ADMIN — SODIUM BICARBONATE 650 MG: 650 TABLET ORAL at 22:00

## 2022-01-01 RX ADMIN — POTASSIUM CHLORIDE 40 MEQ: 750 TABLET, FILM COATED, EXTENDED RELEASE ORAL at 05:18

## 2022-01-01 RX ADMIN — FAMOTIDINE 20 MG: 20 TABLET ORAL at 22:32

## 2022-01-01 RX ADMIN — PIPERACILLIN AND TAZOBACTAM 3.38 G: 3; .375 INJECTION, POWDER, LYOPHILIZED, FOR SOLUTION INTRAVENOUS at 10:10

## 2022-01-01 RX ADMIN — METOCLOPRAMIDE HYDROCHLORIDE 10 MG: 5 INJECTION INTRAMUSCULAR; INTRAVENOUS at 09:31

## 2022-01-01 RX ADMIN — LACTULOSE 15 ML: 20 SOLUTION ORAL at 21:04

## 2022-01-01 RX ADMIN — SODIUM BICARBONATE 1300 MG: 650 TABLET ORAL at 09:17

## 2022-01-01 RX ADMIN — PROPOFOL 20 MCG/KG/MIN: 10 INJECTION, EMULSION INTRAVENOUS at 18:01

## 2022-01-01 RX ADMIN — HEPARIN SODIUM 5000 UNITS: 5000 INJECTION INTRAVENOUS; SUBCUTANEOUS at 05:38

## 2022-01-01 RX ADMIN — LACTULOSE 15 ML: 20 SOLUTION ORAL at 17:31

## 2022-01-01 RX ADMIN — LEVETIRACETAM 1500 MG: 15 INJECTION INTRAVENOUS at 06:46

## 2022-01-01 RX ADMIN — MIDAZOLAM HYDROCHLORIDE 2 MG: 2 INJECTION, SOLUTION INTRAMUSCULAR; INTRAVENOUS at 16:38

## 2022-01-01 RX ADMIN — LEVETIRACETAM 1500 MG: 15 INJECTION INTRAVENOUS at 13:08

## 2022-01-01 RX ADMIN — LEVETIRACETAM 1500 MG: 15 INJECTION INTRAVENOUS at 12:50

## 2022-01-01 RX ADMIN — SODIUM CHLORIDE, PRESERVATIVE FREE 10 ML: 5 INJECTION INTRAVENOUS at 18:28

## 2022-01-01 RX ADMIN — MIDODRINE HYDROCHLORIDE 10 MG: 5 TABLET ORAL at 09:33

## 2022-01-01 RX ADMIN — FAMOTIDINE 20 MG: 20 TABLET ORAL at 08:40

## 2022-01-01 RX ADMIN — LACTULOSE 15 ML: 20 SOLUTION ORAL at 16:13

## 2022-01-01 RX ADMIN — ACETAMINOPHEN 650 MG: 325 TABLET, FILM COATED ORAL at 05:07

## 2022-01-01 RX ADMIN — THIAMINE HCL TAB 100 MG 100 MG: 100 TAB at 08:35

## 2022-01-01 RX ADMIN — SODIUM CHLORIDE, PRESERVATIVE FREE 10 ML: 5 INJECTION INTRAVENOUS at 05:19

## 2022-01-01 RX ADMIN — FLUDROCORTISONE ACETATE 0.1 MG: 0.1 TABLET ORAL at 09:17

## 2022-01-01 RX ADMIN — PIPERACILLIN AND TAZOBACTAM 3.38 G: 3; .375 INJECTION, POWDER, LYOPHILIZED, FOR SOLUTION INTRAVENOUS at 20:16

## 2022-01-01 RX ADMIN — VANCOMYCIN HYDROCHLORIDE 500 MG: 500 INJECTION, POWDER, LYOPHILIZED, FOR SOLUTION INTRAVENOUS at 09:27

## 2022-01-01 RX ADMIN — SODIUM CHLORIDE 125 ML/HR: 9 INJECTION, SOLUTION INTRAVENOUS at 05:35

## 2022-01-01 RX ADMIN — PIPERACILLIN AND TAZOBACTAM 3.38 G: 3; .375 INJECTION, POWDER, LYOPHILIZED, FOR SOLUTION INTRAVENOUS at 20:07

## 2022-01-01 RX ADMIN — SODIUM CHLORIDE, PRESERVATIVE FREE 10 ML: 5 INJECTION INTRAVENOUS at 17:17

## 2022-01-01 RX ADMIN — PIPERACILLIN AND TAZOBACTAM 3.38 G: 3; .375 INJECTION, POWDER, LYOPHILIZED, FOR SOLUTION INTRAVENOUS at 08:58

## 2022-01-01 RX ADMIN — VANCOMYCIN HYDROCHLORIDE 500 MG: 500 INJECTION, POWDER, LYOPHILIZED, FOR SOLUTION INTRAVENOUS at 17:37

## 2022-01-01 RX ADMIN — BUSPIRONE HYDROCHLORIDE 15 MG: 5 TABLET ORAL at 09:01

## 2022-01-01 RX ADMIN — METOCLOPRAMIDE HYDROCHLORIDE 10 MG: 5 INJECTION INTRAMUSCULAR; INTRAVENOUS at 08:31

## 2022-01-01 RX ADMIN — SODIUM CHLORIDE 50 ML/HR: 9 INJECTION, SOLUTION INTRAVENOUS at 06:08

## 2022-01-01 RX ADMIN — VANCOMYCIN HYDROCHLORIDE 1000 MG: 1 INJECTION, POWDER, LYOPHILIZED, FOR SOLUTION INTRAVENOUS at 22:52

## 2022-01-01 RX ADMIN — LACTULOSE 15 ML: 20 SOLUTION ORAL at 21:24

## 2022-01-01 RX ADMIN — ACETAMINOPHEN 650 MG: 325 TABLET, FILM COATED ORAL at 13:59

## 2022-01-01 RX ADMIN — SODIUM BICARBONATE 1300 MG: 650 TABLET ORAL at 21:03

## 2022-01-01 RX ADMIN — MIDODRINE HYDROCHLORIDE 10 MG: 5 TABLET ORAL at 09:12

## 2022-01-01 RX ADMIN — SODIUM BICARBONATE 650 MG: 650 TABLET ORAL at 00:29

## 2022-01-01 RX ADMIN — SODIUM CHLORIDE 250 ML: 9 INJECTION, SOLUTION INTRAVENOUS at 14:13

## 2022-01-01 RX ADMIN — LEVETIRACETAM 1000 MG: 10 INJECTION, SOLUTION INTRAVENOUS at 18:01

## 2022-01-01 RX ADMIN — FAMOTIDINE 20 MG: 20 TABLET ORAL at 21:16

## 2022-01-01 RX ADMIN — ALLOPURINOL 300 MG: 300 TABLET ORAL at 08:40

## 2022-01-01 RX ADMIN — LACTULOSE 15 ML: 20 SOLUTION ORAL at 08:11

## 2022-01-01 RX ADMIN — LORAZEPAM 2 MG: 2 INJECTION INTRAMUSCULAR; INTRAVENOUS at 02:14

## 2022-01-01 RX ADMIN — VANCOMYCIN HYDROCHLORIDE 750 MG: 750 INJECTION, POWDER, LYOPHILIZED, FOR SOLUTION INTRAVENOUS at 18:00

## 2022-01-01 RX ADMIN — LACTULOSE 15 ML: 20 SOLUTION ORAL at 09:14

## 2022-01-01 RX ADMIN — LACTULOSE 15 ML: 20 SOLUTION ORAL at 21:28

## 2022-01-01 RX ADMIN — THIAMINE HCL TAB 100 MG 100 MG: 100 TAB at 10:17

## 2022-01-01 RX ADMIN — SODIUM BICARBONATE 1300 MG: 650 TABLET ORAL at 09:10

## 2022-01-01 RX ADMIN — POTASSIUM CHLORIDE 20 MEQ: 1500 TABLET, EXTENDED RELEASE ORAL at 15:23

## 2022-01-01 RX ADMIN — LEVETIRACETAM 1500 MG: 15 INJECTION INTRAVENOUS at 05:19

## 2022-01-01 RX ADMIN — THIAMINE HCL TAB 100 MG 100 MG: 100 TAB at 09:51

## 2022-01-01 RX ADMIN — FOLIC ACID 1 MG: 1 TABLET ORAL at 08:02

## 2022-01-01 RX ADMIN — THIAMINE HCL TAB 100 MG 100 MG: 100 TAB at 11:47

## 2022-01-01 RX ADMIN — PROPOFOL 35 MCG/KG/MIN: 10 INJECTION, EMULSION INTRAVENOUS at 00:38

## 2022-01-01 RX ADMIN — LACTULOSE 15 ML: 20 SOLUTION ORAL at 17:05

## 2022-01-01 RX ADMIN — PROPRANOLOL HYDROCHLORIDE 20 MG: 10 TABLET ORAL at 11:32

## 2022-01-01 RX ADMIN — MIDODRINE HYDROCHLORIDE 10 MG: 5 TABLET ORAL at 09:18

## 2022-01-01 RX ADMIN — FENTANYL CITRATE 25 MCG: 50 INJECTION, SOLUTION INTRAMUSCULAR; INTRAVENOUS at 16:52

## 2022-01-01 RX ADMIN — FAMOTIDINE 20 MG: 20 TABLET ORAL at 17:31

## 2022-01-01 RX ADMIN — SODIUM CHLORIDE, PRESERVATIVE FREE 10 ML: 5 INJECTION INTRAVENOUS at 23:54

## 2022-01-01 RX ADMIN — LACTULOSE 15 ML: 20 SOLUTION ORAL at 17:09

## 2022-01-01 RX ADMIN — TAMSULOSIN HYDROCHLORIDE 0.4 MG: 0.4 CAPSULE ORAL at 10:10

## 2022-01-01 RX ADMIN — VANCOMYCIN HYDROCHLORIDE 500 MG: 500 INJECTION, POWDER, LYOPHILIZED, FOR SOLUTION INTRAVENOUS at 12:23

## 2022-01-01 RX ADMIN — SODIUM BICARBONATE 1300 MG: 650 TABLET ORAL at 21:32

## 2022-01-01 RX ADMIN — PROPRANOLOL HYDROCHLORIDE 20 MG: 10 TABLET ORAL at 21:04

## 2022-01-01 RX ADMIN — MIDODRINE HYDROCHLORIDE 10 MG: 5 TABLET ORAL at 17:13

## 2022-01-01 RX ADMIN — PHENYLEPHRINE HYDROCHLORIDE 100 MCG: 10 INJECTION INTRAVENOUS at 14:57

## 2022-01-01 RX ADMIN — Medication 12 MCG/MIN: at 20:10

## 2022-01-01 RX ADMIN — SODIUM BICARBONATE 1300 MG: 650 TABLET ORAL at 09:01

## 2022-01-01 RX ADMIN — TAMSULOSIN HYDROCHLORIDE 0.4 MG: 0.4 CAPSULE ORAL at 09:01

## 2022-01-01 RX ADMIN — SODIUM BICARBONATE 1300 MG: 650 TABLET ORAL at 09:49

## 2022-01-01 RX ADMIN — LEVETIRACETAM 1500 MG: 15 INJECTION INTRAVENOUS at 23:58

## 2022-01-01 RX ADMIN — SODIUM BICARBONATE: 84 INJECTION, SOLUTION INTRAVENOUS at 02:20

## 2022-01-01 RX ADMIN — LACTULOSE 15 ML: 20 SOLUTION ORAL at 18:24

## 2022-01-01 RX ADMIN — FAMOTIDINE 20 MG: 20 TABLET ORAL at 17:15

## 2022-01-01 RX ADMIN — SODIUM CHLORIDE, PRESERVATIVE FREE 10 ML: 5 INJECTION INTRAVENOUS at 22:00

## 2022-01-01 RX ADMIN — FAMOTIDINE 20 MG: 20 TABLET ORAL at 21:03

## 2022-01-01 RX ADMIN — FOLIC ACID 1 MG: 1 TABLET ORAL at 10:17

## 2022-01-01 RX ADMIN — FOLIC ACID 1 MG: 1 TABLET ORAL at 08:07

## 2022-01-01 RX ADMIN — LEVETIRACETAM 1500 MG: 15 INJECTION INTRAVENOUS at 12:06

## 2022-01-01 RX ADMIN — SODIUM CHLORIDE, PRESERVATIVE FREE 10 ML: 5 INJECTION INTRAVENOUS at 21:32

## 2022-01-01 RX ADMIN — FAMOTIDINE 20 MG: 20 TABLET ORAL at 22:07

## 2022-01-01 RX ADMIN — MIDODRINE HYDROCHLORIDE 10 MG: 5 TABLET ORAL at 17:34

## 2022-01-01 RX ADMIN — LACTULOSE 15 ML: 20 SOLUTION ORAL at 21:41

## 2022-01-01 RX ADMIN — PIPERACILLIN AND TAZOBACTAM 3.38 G: 3; .375 INJECTION, POWDER, LYOPHILIZED, FOR SOLUTION INTRAVENOUS at 11:17

## 2022-01-01 RX ADMIN — SODIUM BICARBONATE 1300 MG: 650 TABLET ORAL at 23:44

## 2022-01-01 RX ADMIN — CHLORDIAZEPOXIDE HYDROCHLORIDE 25 MG: 25 CAPSULE ORAL at 15:23

## 2022-01-01 RX ADMIN — ALLOPURINOL 300 MG: 300 TABLET ORAL at 09:04

## 2022-01-01 RX ADMIN — SODIUM CHLORIDE, PRESERVATIVE FREE 10 ML: 5 INJECTION INTRAVENOUS at 22:15

## 2022-01-01 RX ADMIN — ACETAMINOPHEN 650 MG: 325 TABLET, FILM COATED ORAL at 05:05

## 2022-01-01 RX ADMIN — LEVOTHYROXINE SODIUM 50 MCG: 0.03 TABLET ORAL at 06:12

## 2022-01-01 RX ADMIN — PROPRANOLOL HYDROCHLORIDE 20 MG: 20 TABLET ORAL at 09:43

## 2022-01-01 RX ADMIN — BUSPIRONE HYDROCHLORIDE 15 MG: 5 TABLET ORAL at 20:26

## 2022-01-01 RX ADMIN — BUSPIRONE HYDROCHLORIDE 15 MG: 5 TABLET ORAL at 09:13

## 2022-01-01 RX ADMIN — PROPRANOLOL HYDROCHLORIDE 20 MG: 10 TABLET ORAL at 22:07

## 2022-01-01 RX ADMIN — SODIUM CHLORIDE, PRESERVATIVE FREE 10 ML: 5 INJECTION INTRAVENOUS at 21:18

## 2022-01-01 RX ADMIN — PIPERACILLIN AND TAZOBACTAM 3.38 G: 3; .375 INJECTION, POWDER, LYOPHILIZED, FOR SOLUTION INTRAVENOUS at 17:14

## 2022-01-01 RX ADMIN — ACETAMINOPHEN 650 MG: 325 TABLET, FILM COATED ORAL at 13:06

## 2022-01-01 RX ADMIN — PROPRANOLOL HYDROCHLORIDE 20 MG: 10 TABLET ORAL at 20:50

## 2022-01-01 RX ADMIN — POTASSIUM CHLORIDE 20 MEQ: 1500 TABLET, EXTENDED RELEASE ORAL at 10:20

## 2022-01-01 RX ADMIN — POTASSIUM CHLORIDE 40 MEQ: 750 TABLET, FILM COATED, EXTENDED RELEASE ORAL at 11:44

## 2022-01-01 RX ADMIN — ALLOPURINOL 300 MG: 300 TABLET ORAL at 08:06

## 2022-01-01 RX ADMIN — LORAZEPAM 2 MG: 2 INJECTION INTRAMUSCULAR; INTRAVENOUS at 19:06

## 2022-01-01 RX ADMIN — THIAMINE HCL TAB 100 MG 100 MG: 100 TAB at 09:00

## 2022-01-01 RX ADMIN — LEVOTHYROXINE SODIUM 25 MCG: 0.03 TABLET ORAL at 06:09

## 2022-01-01 RX ADMIN — PROPRANOLOL HYDROCHLORIDE 20 MG: 10 TABLET ORAL at 21:30

## 2022-01-01 RX ADMIN — FLUDROCORTISONE ACETATE 0.1 MG: 0.1 TABLET ORAL at 09:00

## 2022-01-01 RX ADMIN — BUSPIRONE HYDROCHLORIDE 15 MG: 5 TABLET ORAL at 09:17

## 2022-01-01 RX ADMIN — ALLOPURINOL 300 MG: 300 TABLET ORAL at 09:17

## 2022-01-01 RX ADMIN — LEVETIRACETAM 1000 MG: 100 SOLUTION ORAL at 22:01

## 2022-01-01 RX ADMIN — LEVETIRACETAM 1000 MG: 100 SOLUTION ORAL at 09:23

## 2022-01-01 RX ADMIN — PIPERACILLIN AND TAZOBACTAM 3.38 G: 3; .375 INJECTION, POWDER, LYOPHILIZED, FOR SOLUTION INTRAVENOUS at 01:07

## 2022-01-01 RX ADMIN — MIDODRINE HYDROCHLORIDE 10 MG: 5 TABLET ORAL at 12:36

## 2022-01-01 RX ADMIN — MIDODRINE HYDROCHLORIDE 10 MG: 5 TABLET ORAL at 11:44

## 2022-01-01 RX ADMIN — LACTULOSE 15 ML: 20 SOLUTION ORAL at 16:51

## 2022-01-01 RX ADMIN — LACTULOSE 15 ML: 20 SOLUTION ORAL at 21:34

## 2022-01-01 RX ADMIN — PIPERACILLIN AND TAZOBACTAM 3.38 G: 3; .375 INJECTION, POWDER, LYOPHILIZED, FOR SOLUTION INTRAVENOUS at 11:35

## 2022-01-01 RX ADMIN — FAMOTIDINE 20 MG: 20 TABLET ORAL at 22:11

## 2022-01-01 RX ADMIN — THIAMINE HCL TAB 100 MG 100 MG: 100 TAB at 08:08

## 2022-01-01 RX ADMIN — THIAMINE HCL TAB 100 MG 100 MG: 100 TAB at 09:24

## 2022-01-01 RX ADMIN — LACTULOSE 15 ML: 20 SOLUTION ORAL at 17:28

## 2022-01-01 RX ADMIN — HYDROXYZINE HYDROCHLORIDE 50 MG: 50 INJECTION, SOLUTION INTRAMUSCULAR at 04:17

## 2022-01-01 RX ADMIN — ERGOCALCIFEROL 50000 UNITS: 1.25 CAPSULE ORAL at 16:47

## 2022-01-01 RX ADMIN — LORAZEPAM 1 MG: 2 INJECTION INTRAMUSCULAR; INTRAVENOUS at 09:06

## 2022-01-01 RX ADMIN — LACTULOSE 30 ML: 20 SOLUTION ORAL at 09:30

## 2022-01-01 RX ADMIN — LEVOTHYROXINE SODIUM 25 MCG: 0.03 TABLET ORAL at 09:13

## 2022-01-01 RX ADMIN — METOCLOPRAMIDE HYDROCHLORIDE 10 MG: 5 INJECTION INTRAMUSCULAR; INTRAVENOUS at 20:39

## 2022-01-01 RX ADMIN — ALBUMIN (HUMAN) 12.5 G: 0.25 INJECTION, SOLUTION INTRAVENOUS at 14:37

## 2022-01-01 RX ADMIN — SODIUM BICARBONATE 1300 MG: 650 TABLET ORAL at 09:13

## 2022-01-01 RX ADMIN — MIDODRINE HYDROCHLORIDE 10 MG: 5 TABLET ORAL at 11:33

## 2022-01-01 RX ADMIN — BUSPIRONE HYDROCHLORIDE 15 MG: 5 TABLET ORAL at 20:39

## 2022-01-01 RX ADMIN — SODIUM CHLORIDE, PRESERVATIVE FREE 10 ML: 5 INJECTION INTRAVENOUS at 21:50

## 2022-01-01 RX ADMIN — FAMOTIDINE 20 MG: 20 TABLET ORAL at 08:00

## 2022-01-01 RX ADMIN — LEVOTHYROXINE SODIUM 25 MCG: 0.03 TABLET ORAL at 06:14

## 2022-01-01 RX ADMIN — PROPRANOLOL HYDROCHLORIDE 20 MG: 10 TABLET ORAL at 21:02

## 2022-01-01 RX ADMIN — Medication 12 MCG/MIN: at 06:32

## 2022-01-01 RX ADMIN — PROPRANOLOL HYDROCHLORIDE 20 MG: 10 TABLET ORAL at 08:29

## 2022-01-01 RX ADMIN — ACETAMINOPHEN 650 MG: 325 TABLET ORAL at 08:02

## 2022-01-01 RX ADMIN — LORAZEPAM 4 MG: 2 INJECTION INTRAMUSCULAR; INTRAVENOUS at 17:49

## 2022-01-01 RX ADMIN — LEVETIRACETAM 1000 MG: 100 SOLUTION ORAL at 21:04

## 2022-01-01 RX ADMIN — DEXTROSE MONOHYDRATE 100 ML/HR: 50 INJECTION, SOLUTION INTRAVENOUS at 02:00

## 2022-01-01 RX ADMIN — LEVOTHYROXINE SODIUM 25 MCG: 0.03 TABLET ORAL at 08:40

## 2022-01-01 RX ADMIN — LEVOTHYROXINE SODIUM 25 MCG: 0.03 TABLET ORAL at 08:06

## 2022-01-01 RX ADMIN — THIAMINE HCL TAB 100 MG 100 MG: 100 TAB at 10:10

## 2022-01-01 RX ADMIN — FOLIC ACID 1 MG: 1 TABLET ORAL at 08:00

## 2022-01-01 RX ADMIN — PIPERACILLIN AND TAZOBACTAM 3.38 G: 3; .375 INJECTION, POWDER, LYOPHILIZED, FOR SOLUTION INTRAVENOUS at 03:59

## 2022-01-01 RX ADMIN — LEVOFLOXACIN 500 MG: 500 TABLET, FILM COATED ORAL at 10:35

## 2022-01-01 RX ADMIN — ACETAMINOPHEN 650 MG: 325 TABLET, FILM COATED ORAL at 13:56

## 2022-01-01 RX ADMIN — ALBUMIN (HUMAN) 25 G: 0.25 INJECTION, SOLUTION INTRAVENOUS at 18:27

## 2022-01-01 RX ADMIN — SODIUM BICARBONATE 1300 MG: 650 TABLET ORAL at 08:06

## 2022-01-01 RX ADMIN — CHLORDIAZEPOXIDE HYDROCHLORIDE 25 MG: 25 CAPSULE ORAL at 21:18

## 2022-01-01 RX ADMIN — LEVETIRACETAM 1500 MG: 15 INJECTION INTRAVENOUS at 12:43

## 2022-01-01 RX ADMIN — TAMSULOSIN HYDROCHLORIDE 0.4 MG: 0.4 CAPSULE ORAL at 08:08

## 2022-01-01 RX ADMIN — SODIUM BICARBONATE 650 MG: 650 TABLET ORAL at 09:52

## 2022-01-01 RX ADMIN — ACETAMINOPHEN 650 MG: 325 TABLET, FILM COATED ORAL at 21:04

## 2022-01-01 RX ADMIN — PIPERACILLIN AND TAZOBACTAM 3.38 G: 3; .375 INJECTION, POWDER, LYOPHILIZED, FOR SOLUTION INTRAVENOUS at 20:00

## 2022-01-01 RX ADMIN — FOLIC ACID 1 MG: 1 TABLET ORAL at 09:51

## 2022-01-01 RX ADMIN — METOCLOPRAMIDE HYDROCHLORIDE 10 MG: 5 INJECTION INTRAMUSCULAR; INTRAVENOUS at 18:24

## 2022-01-01 RX ADMIN — LACTULOSE 15 ML: 20 SOLUTION ORAL at 21:00

## 2022-01-01 RX ADMIN — HYDROXYZINE PAMOATE 25 MG: 25 CAPSULE ORAL at 20:39

## 2022-01-01 RX ADMIN — THIAMINE HCL TAB 100 MG 100 MG: 100 TAB at 09:30

## 2022-01-01 RX ADMIN — ACETAMINOPHEN 325MG 650 MG: 325 TABLET ORAL at 05:46

## 2022-01-01 RX ADMIN — THIAMINE HCL TAB 100 MG 100 MG: 100 TAB at 08:13

## 2022-01-01 RX ADMIN — SODIUM BICARBONATE 1300 MG: 650 TABLET ORAL at 11:33

## 2022-01-01 RX ADMIN — MIDODRINE HYDROCHLORIDE 5 MG: 5 TABLET ORAL at 15:47

## 2022-01-01 RX ADMIN — TAMSULOSIN HYDROCHLORIDE 0.4 MG: 0.4 CAPSULE ORAL at 09:14

## 2022-01-01 RX ADMIN — ALLOPURINOL 300 MG: 300 TABLET ORAL at 09:33

## 2022-01-01 RX ADMIN — LACTULOSE 15 ML: 20 SOLUTION ORAL at 17:24

## 2022-01-01 RX ADMIN — LEVETIRACETAM 1000 MG: 10 INJECTION INTRAVENOUS at 01:13

## 2022-01-01 RX ADMIN — ALBUMIN HUMAN 12.5 G: 50 SOLUTION INTRAVENOUS at 14:42

## 2022-01-01 RX ADMIN — TAMSULOSIN HYDROCHLORIDE 0.4 MG: 0.4 CAPSULE ORAL at 09:10

## 2022-01-01 RX ADMIN — SODIUM BICARBONATE 1300 MG: 650 TABLET ORAL at 20:26

## 2022-01-01 RX ADMIN — POTASSIUM CHLORIDE 40 MEQ: 750 TABLET, FILM COATED, EXTENDED RELEASE ORAL at 12:50

## 2022-01-01 RX ADMIN — VANCOMYCIN HYDROCHLORIDE 500 MG: 500 INJECTION, POWDER, LYOPHILIZED, FOR SOLUTION INTRAVENOUS at 22:43

## 2022-01-01 RX ADMIN — FOLIC ACID 1 MG: 1 TABLET ORAL at 08:05

## 2022-01-01 RX ADMIN — ACETAMINOPHEN 650 MG: 325 TABLET, FILM COATED ORAL at 14:05

## 2022-01-01 RX ADMIN — PROPRANOLOL HYDROCHLORIDE 20 MG: 10 TABLET ORAL at 11:17

## 2022-01-01 RX ADMIN — SODIUM CHLORIDE, PRESERVATIVE FREE 10 ML: 5 INJECTION INTRAVENOUS at 22:21

## 2022-01-01 RX ADMIN — PROPRANOLOL HYDROCHLORIDE 20 MG: 10 TABLET ORAL at 09:00

## 2022-01-01 RX ADMIN — LEVOTHYROXINE SODIUM 25 MCG: 0.03 TABLET ORAL at 11:32

## 2022-01-01 RX ADMIN — PROPOFOL 10 MG: 10 INJECTION, EMULSION INTRAVENOUS at 15:01

## 2022-01-01 RX ADMIN — ALLOPURINOL 300 MG: 300 TABLET ORAL at 09:01

## 2022-01-01 RX ADMIN — MIDODRINE HYDROCHLORIDE 10 MG: 5 TABLET ORAL at 16:01

## 2022-01-01 RX ADMIN — ACETAMINOPHEN 650 MG: 325 TABLET, FILM COATED ORAL at 22:01

## 2022-01-01 RX ADMIN — ACETAMINOPHEN 650 MG: 325 TABLET, FILM COATED ORAL at 05:28

## 2022-01-01 RX ADMIN — SODIUM CHLORIDE, PRESERVATIVE FREE 10 ML: 5 INJECTION INTRAVENOUS at 15:23

## 2022-01-01 RX ADMIN — SODIUM CHLORIDE 50 ML/HR: 9 INJECTION, SOLUTION INTRAVENOUS at 16:09

## 2022-01-01 RX ADMIN — FAMOTIDINE 20 MG: 20 TABLET ORAL at 17:07

## 2022-01-01 RX ADMIN — PROPRANOLOL HYDROCHLORIDE 20 MG: 10 TABLET ORAL at 23:44

## 2022-01-01 RX ADMIN — LACTULOSE 15 ML: 20 SOLUTION ORAL at 09:32

## 2022-01-01 RX ADMIN — ALBUMIN (HUMAN) 12.5 G: 0.25 INJECTION, SOLUTION INTRAVENOUS at 14:44

## 2022-01-01 RX ADMIN — POTASSIUM CHLORIDE 40 MEQ: 750 TABLET, FILM COATED, EXTENDED RELEASE ORAL at 00:05

## 2022-01-01 RX ADMIN — LEVETIRACETAM 1000 MG: 100 SOLUTION ORAL at 20:39

## 2022-01-01 RX ADMIN — ALLOPURINOL 300 MG: 300 TABLET ORAL at 09:10

## 2022-01-01 RX ADMIN — TAMSULOSIN HYDROCHLORIDE 0.4 MG: 0.4 CAPSULE ORAL at 09:15

## 2022-01-01 RX ADMIN — LEVETIRACETAM 1000 MG: 100 SOLUTION ORAL at 09:03

## 2022-01-01 RX ADMIN — SODIUM BICARBONATE 1300 MG: 650 TABLET ORAL at 10:08

## 2022-01-01 RX ADMIN — LEVETIRACETAM 1000 MG: 10 INJECTION, SOLUTION INTRAVENOUS at 17:42

## 2022-01-01 RX ADMIN — FOLIC ACID 1 MG: 1 TABLET ORAL at 08:30

## 2022-01-01 RX ADMIN — HEPARIN SODIUM 5000 UNITS: 5000 INJECTION INTRAVENOUS; SUBCUTANEOUS at 16:09

## 2022-01-01 RX ADMIN — LACTULOSE 15 ML: 20 SOLUTION ORAL at 17:42

## 2022-01-01 RX ADMIN — MIDODRINE HYDROCHLORIDE 10 MG: 5 TABLET ORAL at 09:49

## 2022-01-01 RX ADMIN — POTASSIUM CHLORIDE 10 MEQ: 7.46 INJECTION, SOLUTION INTRAVENOUS at 09:22

## 2022-01-01 RX ADMIN — SODIUM BICARBONATE 1300 MG: 650 TABLET ORAL at 22:07

## 2022-01-01 RX ADMIN — ERGOCALCIFEROL 50000 UNITS: 1.25 CAPSULE ORAL at 09:23

## 2022-01-01 RX ADMIN — POTASSIUM CHLORIDE 10 MEQ: 7.46 INJECTION, SOLUTION INTRAVENOUS at 20:57

## 2022-01-01 RX ADMIN — FAMOTIDINE 20 MG: 20 TABLET ORAL at 17:30

## 2022-01-01 RX ADMIN — SODIUM BICARBONATE 1300 MG: 650 TABLET ORAL at 21:15

## 2022-01-01 RX ADMIN — LEVETIRACETAM 1500 MG: 15 INJECTION INTRAVENOUS at 11:34

## 2022-01-01 RX ADMIN — SODIUM CHLORIDE 1000 ML: 9 INJECTION, SOLUTION INTRAVENOUS at 12:40

## 2022-01-01 RX ADMIN — ALLOPURINOL 300 MG: 300 TABLET ORAL at 11:55

## 2022-01-01 RX ADMIN — THIAMINE HCL TAB 100 MG 100 MG: 100 TAB at 08:32

## 2022-01-01 RX ADMIN — METOCLOPRAMIDE HYDROCHLORIDE 10 MG: 5 INJECTION INTRAMUSCULAR; INTRAVENOUS at 21:00

## 2022-01-01 RX ADMIN — BUSPIRONE HYDROCHLORIDE 15 MG: 5 TABLET ORAL at 21:03

## 2022-01-01 RX ADMIN — HYDROMORPHONE HYDROCHLORIDE 1 MG: 1 INJECTION, SOLUTION INTRAMUSCULAR; INTRAVENOUS; SUBCUTANEOUS at 04:40

## 2022-01-01 RX ADMIN — ZIPRASIDONE MESYLATE 10 MG: 20 INJECTION, POWDER, LYOPHILIZED, FOR SOLUTION INTRAMUSCULAR at 08:51

## 2022-01-01 RX ADMIN — PIPERACILLIN AND TAZOBACTAM 3.38 G: 3; .375 INJECTION, POWDER, LYOPHILIZED, FOR SOLUTION INTRAVENOUS at 03:53

## 2022-01-01 RX ADMIN — FOLIC ACID 1 MG: 1 TABLET ORAL at 08:40

## 2022-01-01 RX ADMIN — Medication 4 MCG/MIN: at 06:26

## 2022-01-01 RX ADMIN — MIDODRINE HYDROCHLORIDE 10 MG: 5 TABLET ORAL at 08:40

## 2022-01-01 RX ADMIN — SODIUM CHLORIDE, PRESERVATIVE FREE 10 ML: 5 INJECTION INTRAVENOUS at 20:40

## 2022-01-01 RX ADMIN — TAMSULOSIN HYDROCHLORIDE 0.4 MG: 0.4 CAPSULE ORAL at 09:24

## 2022-01-01 RX ADMIN — LEVOTHYROXINE SODIUM 25 MCG: 0.03 TABLET ORAL at 05:32

## 2022-01-01 RX ADMIN — SODIUM BICARBONATE 50 MEQ: 84 INJECTION, SOLUTION INTRAVENOUS at 17:25

## 2022-01-01 RX ADMIN — LEVETIRACETAM 1000 MG: 500 TABLET, FILM COATED ORAL at 20:50

## 2022-01-01 RX ADMIN — SODIUM CHLORIDE 75 ML/HR: 9 INJECTION, SOLUTION INTRAVENOUS at 14:12

## 2022-01-01 RX ADMIN — PROPRANOLOL HYDROCHLORIDE 20 MG: 10 TABLET ORAL at 23:40

## 2022-01-01 RX ADMIN — LACTULOSE 15 ML: 20 SOLUTION ORAL at 22:20

## 2022-01-01 RX ADMIN — LACTULOSE 30 ML: 20 SOLUTION ORAL at 22:34

## 2022-01-01 RX ADMIN — METOCLOPRAMIDE HYDROCHLORIDE 10 MG: 5 INJECTION INTRAMUSCULAR; INTRAVENOUS at 13:42

## 2022-01-01 RX ADMIN — PIPERACILLIN AND TAZOBACTAM 3.38 G: 3; .375 INJECTION, POWDER, LYOPHILIZED, FOR SOLUTION INTRAVENOUS at 20:17

## 2022-01-01 RX ADMIN — ACETAMINOPHEN 650 MG: 325 TABLET, FILM COATED ORAL at 21:40

## 2022-01-01 RX ADMIN — HYDROXYZINE PAMOATE 25 MG: 25 CAPSULE ORAL at 19:25

## 2022-01-01 RX ADMIN — SODIUM CHLORIDE, PRESERVATIVE FREE 40 MG: 5 INJECTION INTRAVENOUS at 20:00

## 2022-01-01 RX ADMIN — SODIUM CHLORIDE 75 ML/HR: 9 INJECTION, SOLUTION INTRAVENOUS at 05:56

## 2022-01-01 RX ADMIN — Medication 7 MCG/MIN: at 17:14

## 2022-01-01 RX ADMIN — SODIUM BICARBONATE 650 MG: 650 TABLET ORAL at 23:39

## 2022-01-01 RX ADMIN — SODIUM CHLORIDE 50 ML/HR: 9 INJECTION, SOLUTION INTRAVENOUS at 14:57

## 2022-01-01 RX ADMIN — LACTULOSE 15 ML: 20 SOLUTION ORAL at 22:01

## 2022-01-01 RX ADMIN — LEVOTHYROXINE SODIUM 25 MCG: 0.03 TABLET ORAL at 09:10

## 2022-01-01 RX ADMIN — LEVETIRACETAM 1500 MG: 500 TABLET, FILM COATED ORAL at 09:10

## 2022-01-01 RX ADMIN — THIAMINE HCL TAB 100 MG 100 MG: 100 TAB at 08:30

## 2022-01-01 RX ADMIN — LEVETIRACETAM 1000 MG: 10 INJECTION INTRAVENOUS at 13:06

## 2022-01-01 RX ADMIN — SODIUM CHLORIDE, PRESERVATIVE FREE 10 ML: 5 INJECTION INTRAVENOUS at 14:13

## 2022-01-01 RX ADMIN — SODIUM CHLORIDE 75 ML/HR: 9 INJECTION, SOLUTION INTRAVENOUS at 18:41

## 2022-01-01 RX ADMIN — PIPERACILLIN AND TAZOBACTAM 3.38 G: 3; .375 INJECTION, POWDER, LYOPHILIZED, FOR SOLUTION INTRAVENOUS at 07:57

## 2022-01-01 RX ADMIN — SODIUM BICARBONATE 1300 MG: 650 TABLET ORAL at 21:34

## 2022-01-01 RX ADMIN — FENTANYL CITRATE 25 MCG: 50 INJECTION, SOLUTION INTRAMUSCULAR; INTRAVENOUS at 16:59

## 2022-01-01 RX ADMIN — LACTULOSE 15 ML: 20 SOLUTION ORAL at 16:31

## 2022-01-01 RX ADMIN — VANCOMYCIN HYDROCHLORIDE 750 MG: 750 INJECTION, POWDER, LYOPHILIZED, FOR SOLUTION INTRAVENOUS at 08:29

## 2022-01-01 RX ADMIN — SODIUM CHLORIDE, PRESERVATIVE FREE 40 MG: 5 INJECTION INTRAVENOUS at 20:45

## 2022-01-01 RX ADMIN — SODIUM BICARBONATE 1300 MG: 650 TABLET ORAL at 13:22

## 2022-01-01 RX ADMIN — LEVOTHYROXINE SODIUM 50 MCG: 0.03 TABLET ORAL at 05:40

## 2022-01-01 RX ADMIN — LACTULOSE 15 ML: 20 SOLUTION ORAL at 09:02

## 2022-01-01 RX ADMIN — CHLORDIAZEPOXIDE HYDROCHLORIDE 10 MG: 5 CAPSULE ORAL at 23:30

## 2022-01-01 RX ADMIN — FOLIC ACID 1 MG: 1 TABLET ORAL at 10:19

## 2022-01-01 RX ADMIN — SODIUM BICARBONATE 1300 MG: 650 TABLET ORAL at 09:03

## 2022-01-01 RX ADMIN — PROPRANOLOL HYDROCHLORIDE 20 MG: 20 TABLET ORAL at 21:18

## 2022-01-01 RX ADMIN — SODIUM BICARBONATE 650 MG: 650 TABLET ORAL at 08:13

## 2022-01-01 RX ADMIN — FOLIC ACID 1 MG: 1 TABLET ORAL at 08:32

## 2022-01-01 RX ADMIN — AMILORIDE HYDROCLORIDE 5 MG: 5 TABLET ORAL at 09:00

## 2022-01-01 RX ADMIN — SODIUM BICARBONATE 1300 MG: 650 TABLET ORAL at 16:13

## 2022-01-01 RX ADMIN — LEVETIRACETAM 1000 MG: 10 INJECTION, SOLUTION INTRAVENOUS at 17:16

## 2022-01-01 RX ADMIN — FAMOTIDINE 20 MG: 20 TABLET ORAL at 11:32

## 2022-01-01 RX ADMIN — MIDODRINE HYDROCHLORIDE 10 MG: 5 TABLET ORAL at 09:14

## 2022-01-01 RX ADMIN — SODIUM BICARBONATE 650 MG: 650 TABLET ORAL at 11:19

## 2022-01-01 RX ADMIN — MIDODRINE HYDROCHLORIDE 10 MG: 5 TABLET ORAL at 12:19

## 2022-01-01 RX ADMIN — ALBUMIN (HUMAN) 12.5 G: 0.25 INJECTION, SOLUTION INTRAVENOUS at 10:30

## 2022-01-01 RX ADMIN — SODIUM CHLORIDE: 9 INJECTION, SOLUTION INTRAVENOUS at 12:33

## 2022-01-01 RX ADMIN — THIAMINE HCL TAB 100 MG 100 MG: 100 TAB at 10:19

## 2022-01-01 RX ADMIN — VANCOMYCIN HYDROCHLORIDE 750 MG: 750 INJECTION, POWDER, LYOPHILIZED, FOR SOLUTION INTRAVENOUS at 18:20

## 2022-01-01 RX ADMIN — ALLOPURINOL 300 MG: 300 TABLET ORAL at 08:13

## 2022-01-01 RX ADMIN — SODIUM CHLORIDE 75 ML/HR: 9 INJECTION, SOLUTION INTRAVENOUS at 22:34

## 2022-01-01 RX ADMIN — LEVETIRACETAM 1000 MG: 100 SOLUTION ORAL at 08:41

## 2022-01-01 RX ADMIN — CEFTRIAXONE SODIUM 1 G: 1 INJECTION, POWDER, FOR SOLUTION INTRAMUSCULAR; INTRAVENOUS at 23:28

## 2022-01-01 RX ADMIN — HEPARIN SODIUM 5000 UNITS: 5000 INJECTION INTRAVENOUS; SUBCUTANEOUS at 21:05

## 2022-01-01 RX ADMIN — TAMSULOSIN HYDROCHLORIDE 0.4 MG: 0.4 CAPSULE ORAL at 12:55

## 2022-01-01 RX ADMIN — SODIUM CHLORIDE, PRESERVATIVE FREE 10 ML: 5 INJECTION INTRAVENOUS at 13:16

## 2022-01-01 RX ADMIN — HYDROMORPHONE HYDROCHLORIDE 1 MG: 1 INJECTION, SOLUTION INTRAMUSCULAR; INTRAVENOUS; SUBCUTANEOUS at 19:07

## 2022-01-01 RX ADMIN — SODIUM BICARBONATE 1300 MG: 650 TABLET ORAL at 22:32

## 2022-01-01 RX ADMIN — ALLOPURINOL 300 MG: 100 TABLET ORAL at 08:55

## 2022-01-01 RX ADMIN — LEVOTHYROXINE SODIUM 25 MCG: 0.03 TABLET ORAL at 05:56

## 2022-01-01 RX ADMIN — EPOETIN ALFA-EPBX 10000 UNITS: 10000 INJECTION, SOLUTION INTRAVENOUS; SUBCUTANEOUS at 18:05

## 2022-01-01 RX ADMIN — ACETAMINOPHEN 650 MG: 325 TABLET, FILM COATED ORAL at 21:14

## 2022-01-01 RX ADMIN — TAMSULOSIN HYDROCHLORIDE 0.4 MG: 0.4 CAPSULE ORAL at 08:11

## 2022-01-01 RX ADMIN — FOLIC ACID 1 MG: 1 TABLET ORAL at 08:11

## 2022-01-01 RX ADMIN — MIDODRINE HYDROCHLORIDE 10 MG: 5 TABLET ORAL at 13:10

## 2022-01-01 RX ADMIN — LEVETIRACETAM 1000 MG: 500 TABLET, FILM COATED ORAL at 11:18

## 2022-01-01 RX ADMIN — FAMOTIDINE 20 MG: 20 TABLET ORAL at 20:51

## 2022-01-01 RX ADMIN — ACETAMINOPHEN 650 MG: 325 TABLET, FILM COATED ORAL at 21:29

## 2022-01-01 RX ADMIN — LEVOTHYROXINE SODIUM 25 MCG: 0.03 TABLET ORAL at 08:46

## 2022-01-01 RX ADMIN — PIPERACILLIN AND TAZOBACTAM 3.38 G: 3; .375 INJECTION, POWDER, LYOPHILIZED, FOR SOLUTION INTRAVENOUS at 01:17

## 2022-01-01 RX ADMIN — PROPOFOL 25 MCG/KG/MIN: 10 INJECTION, EMULSION INTRAVENOUS at 04:07

## 2022-01-01 RX ADMIN — PROPRANOLOL HYDROCHLORIDE 20 MG: 10 TABLET ORAL at 20:28

## 2022-01-01 RX ADMIN — PIPERACILLIN AND TAZOBACTAM 3.38 G: 3; .375 INJECTION, POWDER, LYOPHILIZED, FOR SOLUTION INTRAVENOUS at 10:16

## 2022-01-01 RX ADMIN — SODIUM CHLORIDE, PRESERVATIVE FREE 40 MG: 5 INJECTION INTRAVENOUS at 09:22

## 2022-01-01 RX ADMIN — LEVOTHYROXINE SODIUM 50 MCG: 0.03 TABLET ORAL at 05:05

## 2022-01-01 RX ADMIN — HEPARIN SODIUM 5000 UNITS: 5000 INJECTION INTRAVENOUS; SUBCUTANEOUS at 21:40

## 2022-01-01 RX ADMIN — BUSPIRONE HYDROCHLORIDE 15 MG: 5 TABLET ORAL at 16:47

## 2022-01-01 RX ADMIN — HYDROXYZINE PAMOATE 25 MG: 25 CAPSULE ORAL at 05:40

## 2022-01-01 RX ADMIN — LEVETIRACETAM 1000 MG: 100 SOLUTION ORAL at 08:29

## 2022-01-01 RX ADMIN — SODIUM CHLORIDE, PRESERVATIVE FREE 40 MG: 5 INJECTION INTRAVENOUS at 08:31

## 2022-01-01 RX ADMIN — SODIUM BICARBONATE 1300 MG: 650 TABLET ORAL at 13:42

## 2022-01-01 RX ADMIN — LACTULOSE 15 ML: 20 SOLUTION ORAL at 10:19

## 2022-01-01 RX ADMIN — SODIUM BICARBONATE 1300 MG: 650 TABLET ORAL at 18:00

## 2022-01-01 RX ADMIN — ETOMIDATE 20 MG: 20 INJECTION, SOLUTION INTRAVENOUS at 17:56

## 2022-01-01 RX ADMIN — SODIUM CHLORIDE, PRESERVATIVE FREE 10 ML: 5 INJECTION INTRAVENOUS at 23:42

## 2022-01-01 RX ADMIN — DEXTROSE AND SODIUM CHLORIDE 100 ML/HR: 5; 900 INJECTION, SOLUTION INTRAVENOUS at 23:30

## 2022-01-01 RX ADMIN — FOLIC ACID 1 MG: 1 TABLET ORAL at 09:01

## 2022-01-01 RX ADMIN — ACETAMINOPHEN 650 MG: 325 TABLET, FILM COATED ORAL at 05:26

## 2022-01-01 RX ADMIN — BUSPIRONE HYDROCHLORIDE 15 MG: 5 TABLET ORAL at 21:00

## 2022-01-01 RX ADMIN — MIDODRINE HYDROCHLORIDE 5 MG: 5 TABLET ORAL at 21:00

## 2022-01-01 RX ADMIN — FAMOTIDINE 20 MG: 20 TABLET ORAL at 09:04

## 2022-01-01 RX ADMIN — ACETAMINOPHEN 650 MG: 325 TABLET, FILM COATED ORAL at 21:30

## 2022-01-01 RX ADMIN — ACETAMINOPHEN 650 MG: 325 TABLET ORAL at 05:43

## 2022-01-01 RX ADMIN — BUSPIRONE HYDROCHLORIDE 15 MG: 5 TABLET ORAL at 09:33

## 2022-01-01 RX ADMIN — PIPERACILLIN AND TAZOBACTAM 3.38 G: 3; .375 INJECTION, POWDER, LYOPHILIZED, FOR SOLUTION INTRAVENOUS at 18:27

## 2022-01-01 RX ADMIN — PROPRANOLOL HYDROCHLORIDE 20 MG: 10 TABLET ORAL at 21:20

## 2022-01-01 RX ADMIN — VANCOMYCIN HYDROCHLORIDE 500 MG: 500 INJECTION, POWDER, LYOPHILIZED, FOR SOLUTION INTRAVENOUS at 11:15

## 2022-01-01 RX ADMIN — MIDODRINE HYDROCHLORIDE 10 MG: 5 TABLET ORAL at 09:01

## 2022-01-01 RX ADMIN — SODIUM CHLORIDE, PRESERVATIVE FREE 10 ML: 5 INJECTION INTRAVENOUS at 21:46

## 2022-01-01 RX ADMIN — LACTULOSE 15 ML: 20 SOLUTION ORAL at 21:45

## 2022-01-01 RX ADMIN — SODIUM BICARBONATE 1300 MG: 650 TABLET ORAL at 16:39

## 2022-01-01 RX ADMIN — LACTULOSE 15 ML: 20 SOLUTION ORAL at 11:31

## 2022-01-01 RX ADMIN — LORAZEPAM 2 MG: 2 INJECTION INTRAMUSCULAR; INTRAVENOUS at 17:43

## 2022-01-01 RX ADMIN — SODIUM CHLORIDE, PRESERVATIVE FREE 10 ML: 5 INJECTION INTRAVENOUS at 17:29

## 2022-01-01 RX ADMIN — PROPOFOL 10 MG: 10 INJECTION, EMULSION INTRAVENOUS at 14:57

## 2022-01-01 RX ADMIN — HYDROXYZINE PAMOATE 25 MG: 25 CAPSULE ORAL at 09:52

## 2022-01-01 RX ADMIN — FLUDROCORTISONE ACETATE 0.1 MG: 0.1 TABLET ORAL at 09:06

## 2022-01-01 RX ADMIN — LACTULOSE 15 ML: 20 SOLUTION ORAL at 09:01

## 2022-01-01 RX ADMIN — HYDROMORPHONE HYDROCHLORIDE 1 MG: 1 INJECTION, SOLUTION INTRAMUSCULAR; INTRAVENOUS; SUBCUTANEOUS at 14:04

## 2022-01-01 RX ADMIN — DEXTROSE MONOHYDRATE 75 ML/HR: 50 INJECTION, SOLUTION INTRAVENOUS at 10:32

## 2022-01-01 RX ADMIN — SODIUM BICARBONATE 50 MEQ: 84 INJECTION, SOLUTION INTRAVENOUS at 11:35

## 2022-01-01 RX ADMIN — FAMOTIDINE 20 MG: 20 TABLET ORAL at 09:00

## 2022-01-01 RX ADMIN — SODIUM BICARBONATE 1300 MG: 650 TABLET ORAL at 08:40

## 2022-01-01 RX ADMIN — POTASSIUM CHLORIDE 10 MEQ: 7.46 INJECTION, SOLUTION INTRAVENOUS at 19:53

## 2022-01-01 RX ADMIN — HYDROMORPHONE HYDROCHLORIDE 1 MG: 1 INJECTION, SOLUTION INTRAMUSCULAR; INTRAVENOUS; SUBCUTANEOUS at 06:22

## 2022-01-01 RX ADMIN — TAMSULOSIN HYDROCHLORIDE 0.4 MG: 0.4 CAPSULE ORAL at 08:56

## 2022-01-01 RX ADMIN — ALLOPURINOL 300 MG: 100 TABLET ORAL at 08:35

## 2022-01-01 RX ADMIN — ACETAMINOPHEN 650 MG: 325 TABLET, FILM COATED ORAL at 05:10

## 2022-01-01 RX ADMIN — FAMOTIDINE 20 MG: 20 TABLET ORAL at 21:46

## 2022-01-01 RX ADMIN — HYDROXYZINE HYDROCHLORIDE 25 MG: 50 INJECTION, SOLUTION INTRAMUSCULAR at 05:18

## 2022-01-01 RX ADMIN — LACTULOSE 15 ML: 20 SOLUTION ORAL at 23:31

## 2022-01-01 RX ADMIN — TAMSULOSIN HYDROCHLORIDE 0.4 MG: 0.4 CAPSULE ORAL at 08:00

## 2022-01-01 RX ADMIN — ALBUMIN (HUMAN) 12.5 G: 12.5 INJECTION, SOLUTION INTRAVENOUS at 14:42

## 2022-01-01 RX ADMIN — SODIUM BICARBONATE 1300 MG: 650 TABLET ORAL at 18:19

## 2022-01-01 RX ADMIN — ALBUMIN (HUMAN) 12.5 G: 0.25 INJECTION, SOLUTION INTRAVENOUS at 14:45

## 2022-01-01 RX ADMIN — MIDODRINE HYDROCHLORIDE 10 MG: 5 TABLET ORAL at 08:30

## 2022-01-01 RX ADMIN — FAMOTIDINE 20 MG: 20 TABLET ORAL at 08:06

## 2022-01-01 RX ADMIN — VANCOMYCIN HYDROCHLORIDE 750 MG: 750 INJECTION, POWDER, LYOPHILIZED, FOR SOLUTION INTRAVENOUS at 19:00

## 2022-01-01 RX ADMIN — MIDODRINE HYDROCHLORIDE 10 MG: 5 TABLET ORAL at 18:18

## 2022-01-01 RX ADMIN — THIAMINE HCL TAB 100 MG 100 MG: 100 TAB at 11:32

## 2022-01-01 RX ADMIN — PROPOFOL 25 MCG/KG/MIN: 10 INJECTION, EMULSION INTRAVENOUS at 21:30

## 2022-01-01 RX ADMIN — LACTULOSE 30 ML: 20 SOLUTION ORAL at 19:56

## 2022-01-01 RX ADMIN — THIAMINE HCL TAB 100 MG 100 MG: 100 TAB at 09:18

## 2022-01-01 RX ADMIN — SODIUM BICARBONATE 1300 MG: 650 TABLET ORAL at 20:40

## 2022-01-01 RX ADMIN — SODIUM BICARBONATE 1300 MG: 650 TABLET ORAL at 16:01

## 2022-01-01 RX ADMIN — LEVOTHYROXINE SODIUM 25 MCG: 0.03 TABLET ORAL at 09:05

## 2022-01-01 RX ADMIN — FOLIC ACID 1 MG: 1 TABLET ORAL at 08:06

## 2022-01-01 RX ADMIN — MIDODRINE HYDROCHLORIDE 10 MG: 5 TABLET ORAL at 17:00

## 2022-01-01 RX ADMIN — LEVETIRACETAM 1000 MG: 10 INJECTION INTRAVENOUS at 23:39

## 2022-01-01 RX ADMIN — POTASSIUM CHLORIDE 20 MEQ: 1500 TABLET, EXTENDED RELEASE ORAL at 09:43

## 2022-01-01 RX ADMIN — LACTULOSE 15 ML: 20 SOLUTION ORAL at 21:11

## 2022-01-01 RX ADMIN — TAMSULOSIN HYDROCHLORIDE 0.4 MG: 0.4 CAPSULE ORAL at 09:49

## 2022-01-01 RX ADMIN — SODIUM BICARBONATE 1300 MG: 650 TABLET ORAL at 17:28

## 2022-01-01 RX ADMIN — SODIUM CHLORIDE 200 MG: 9 INJECTION, SOLUTION INTRAVENOUS at 16:09

## 2022-01-01 RX ADMIN — LIDOCAINE HYDROCHLORIDE 2 ML: 10 INJECTION, SOLUTION INFILTRATION; PERINEURAL at 22:29

## 2022-01-01 RX ADMIN — FAMOTIDINE 20 MG: 20 TABLET ORAL at 23:40

## 2022-01-01 RX ADMIN — ACETAMINOPHEN 650 MG: 325 TABLET ORAL at 22:34

## 2022-01-01 RX ADMIN — LEVETIRACETAM 1000 MG: 100 SOLUTION ORAL at 21:30

## 2022-01-01 RX ADMIN — LACTULOSE 15 ML: 20 SOLUTION ORAL at 17:07

## 2022-01-01 RX ADMIN — FAMOTIDINE 20 MG: 20 TABLET ORAL at 20:39

## 2022-01-01 RX ADMIN — FOLIC ACID 1 MG: 1 TABLET ORAL at 08:55

## 2022-01-01 RX ADMIN — PROPRANOLOL HYDROCHLORIDE 20 MG: 10 TABLET ORAL at 21:32

## 2022-01-01 RX ADMIN — SODIUM BICARBONATE 1300 MG: 650 TABLET ORAL at 20:39

## 2022-01-01 RX ADMIN — SODIUM BICARBONATE 1300 MG: 650 TABLET ORAL at 09:02

## 2022-01-01 RX ADMIN — LEVETIRACETAM 1500 MG: 500 TABLET, FILM COATED ORAL at 08:00

## 2022-01-01 RX ADMIN — PIPERACILLIN AND TAZOBACTAM 3.38 G: 3; .375 INJECTION, POWDER, LYOPHILIZED, FOR SOLUTION INTRAVENOUS at 01:37

## 2022-01-01 RX ADMIN — SODIUM BICARBONATE 1300 MG: 650 TABLET ORAL at 23:40

## 2022-01-01 RX ADMIN — TAMSULOSIN HYDROCHLORIDE 0.4 MG: 0.4 CAPSULE ORAL at 08:40

## 2022-01-01 RX ADMIN — LACTULOSE 15 ML: 20 SOLUTION ORAL at 16:11

## 2022-01-01 RX ADMIN — ACETAMINOPHEN 650 MG: 325 TABLET, FILM COATED ORAL at 14:12

## 2022-01-01 RX ADMIN — LEVOFLOXACIN 750 MG: 5 INJECTION, SOLUTION INTRAVENOUS at 11:16

## 2022-01-01 RX ADMIN — LACTULOSE 15 ML: 20 SOLUTION ORAL at 09:13

## 2022-01-01 RX ADMIN — PROPRANOLOL HYDROCHLORIDE 20 MG: 10 TABLET ORAL at 08:05

## 2022-01-01 RX ADMIN — ALLOPURINOL 300 MG: 300 TABLET ORAL at 08:39

## 2022-01-01 RX ADMIN — SODIUM BICARBONATE 1300 MG: 650 TABLET ORAL at 12:45

## 2022-01-01 RX ADMIN — LEVETIRACETAM 1500 MG: 15 INJECTION INTRAVENOUS at 23:06

## 2022-01-01 RX ADMIN — ACETAMINOPHEN 650 MG: 325 TABLET ORAL at 14:10

## 2022-01-01 RX ADMIN — SODIUM CHLORIDE, PRESERVATIVE FREE 20 MG: 5 INJECTION INTRAVENOUS at 20:18

## 2022-01-01 RX ADMIN — FOLIC ACID 1 MG: 1 TABLET ORAL at 09:49

## 2022-01-01 RX ADMIN — THIAMINE HCL TAB 100 MG 100 MG: 100 TAB at 09:33

## 2022-01-01 RX ADMIN — HYDROMORPHONE HYDROCHLORIDE 1 MG: 1 INJECTION, SOLUTION INTRAMUSCULAR; INTRAVENOUS; SUBCUTANEOUS at 22:14

## 2022-01-01 RX ADMIN — GLYCOPYRROLATE 0.2 MG: 0.2 INJECTION INTRAMUSCULAR; INTRAVENOUS at 16:48

## 2022-01-01 RX ADMIN — CHLORDIAZEPOXIDE HYDROCHLORIDE 10 MG: 5 CAPSULE ORAL at 12:43

## 2022-01-01 RX ADMIN — LACTULOSE 15 ML: 20 SOLUTION ORAL at 17:06

## 2022-01-01 RX ADMIN — SODIUM BICARBONATE 1300 MG: 650 TABLET ORAL at 21:36

## 2022-01-01 RX ADMIN — LACTULOSE 15 ML: 20 SOLUTION ORAL at 21:13

## 2022-01-01 RX ADMIN — LEVOTHYROXINE SODIUM 50 MCG: 0.03 TABLET ORAL at 05:39

## 2022-01-01 RX ADMIN — LEVETIRACETAM 1500 MG: 500 TABLET, FILM COATED ORAL at 21:04

## 2022-01-01 RX ADMIN — MIDODRINE HYDROCHLORIDE 10 MG: 5 TABLET ORAL at 11:27

## 2022-01-01 RX ADMIN — HYDROXYZINE HYDROCHLORIDE 25 MG: 50 INJECTION, SOLUTION INTRAMUSCULAR at 02:16

## 2022-01-01 RX ADMIN — PIPERACILLIN AND TAZOBACTAM 3.38 G: 3; .375 INJECTION, POWDER, LYOPHILIZED, FOR SOLUTION INTRAVENOUS at 20:06

## 2022-01-01 RX ADMIN — CHLORDIAZEPOXIDE HYDROCHLORIDE 10 MG: 5 CAPSULE ORAL at 09:51

## 2022-01-01 RX ADMIN — LACTULOSE 15 ML: 20 SOLUTION ORAL at 17:11

## 2022-01-01 RX ADMIN — HYDROXYZINE HYDROCHLORIDE 25 MG: 50 INJECTION, SOLUTION INTRAMUSCULAR at 18:11

## 2022-01-01 RX ADMIN — PROPRANOLOL HYDROCHLORIDE 20 MG: 10 TABLET ORAL at 09:05

## 2022-01-01 RX ADMIN — LACTULOSE 15 ML: 20 SOLUTION ORAL at 08:56

## 2022-01-01 RX ADMIN — LACTULOSE 15 ML: 20 SOLUTION ORAL at 09:49

## 2022-01-01 RX ADMIN — THIAMINE HCL TAB 100 MG 100 MG: 100 TAB at 09:01

## 2022-01-01 RX ADMIN — LEVETIRACETAM 1500 MG: 500 SOLUTION ORAL at 08:36

## 2022-01-01 RX ADMIN — ALLOPURINOL 300 MG: 100 TABLET ORAL at 10:08

## 2022-01-01 RX ADMIN — TAMSULOSIN HYDROCHLORIDE 0.4 MG: 0.4 CAPSULE ORAL at 11:32

## 2022-01-01 RX ADMIN — VANCOMYCIN HYDROCHLORIDE 500 MG: 500 INJECTION, POWDER, LYOPHILIZED, FOR SOLUTION INTRAVENOUS at 15:48

## 2022-01-01 RX ADMIN — SODIUM CHLORIDE 25 ML/HR: 9 INJECTION, SOLUTION INTRAVENOUS at 23:28

## 2022-01-01 RX ADMIN — ALBUMIN (HUMAN) 25 G: 12.5 INJECTION, SOLUTION INTRAVENOUS at 21:27

## 2022-01-01 RX ADMIN — PROPOFOL 10 MG: 10 INJECTION, EMULSION INTRAVENOUS at 14:52

## 2022-01-01 RX ADMIN — BUSPIRONE HYDROCHLORIDE 15 MG: 5 TABLET ORAL at 22:07

## 2022-01-01 RX ADMIN — SODIUM BICARBONATE 1300 MG: 650 TABLET ORAL at 20:28

## 2022-01-01 RX ADMIN — FAMOTIDINE 20 MG: 20 TABLET ORAL at 08:13

## 2022-01-01 RX ADMIN — LACTULOSE 15 ML: 20 SOLUTION ORAL at 08:39

## 2022-01-01 RX ADMIN — THIAMINE HCL TAB 100 MG 100 MG: 100 TAB at 08:41

## 2022-01-01 RX ADMIN — LACTULOSE 15 ML: 20 SOLUTION ORAL at 08:13

## 2022-01-01 RX ADMIN — LEVETIRACETAM 1500 MG: 500 TABLET, FILM COATED ORAL at 22:12

## 2022-01-01 RX ADMIN — FAMOTIDINE 20 MG: 20 TABLET ORAL at 09:18

## 2022-01-01 RX ADMIN — FAMOTIDINE 20 MG: 20 TABLET ORAL at 21:36

## 2022-01-01 RX ADMIN — SODIUM CHLORIDE, PRESERVATIVE FREE 20 MG: 5 INJECTION INTRAVENOUS at 08:32

## 2022-01-01 RX ADMIN — LACTULOSE 15 ML: 20 SOLUTION ORAL at 23:44

## 2022-01-01 RX ADMIN — LACTULOSE 15 ML: 20 SOLUTION ORAL at 18:27

## 2022-01-01 RX ADMIN — ACETAMINOPHEN 650 MG: 325 TABLET, FILM COATED ORAL at 05:55

## 2022-01-01 RX ADMIN — FOLIC ACID 1 MG: 1 TABLET ORAL at 09:23

## 2022-01-01 RX ADMIN — MIDODRINE HYDROCHLORIDE 10 MG: 5 TABLET ORAL at 13:00

## 2022-01-01 RX ADMIN — PIPERACILLIN AND TAZOBACTAM 3.38 G: 3; .375 INJECTION, POWDER, LYOPHILIZED, FOR SOLUTION INTRAVENOUS at 16:28

## 2022-01-01 RX ADMIN — PHENYLEPHRINE HYDROCHLORIDE 100 MCG: 10 INJECTION INTRAVENOUS at 14:52

## 2022-01-01 RX ADMIN — ALBUMIN (HUMAN) 12.5 G: 0.25 INJECTION, SOLUTION INTRAVENOUS at 10:40

## 2022-01-01 RX ADMIN — FOLIC ACID 1 MG: 1 TABLET ORAL at 11:18

## 2022-01-01 RX ADMIN — MIDODRINE HYDROCHLORIDE 10 MG: 5 TABLET ORAL at 08:39

## 2022-01-01 RX ADMIN — THIAMINE HCL TAB 100 MG 100 MG: 100 TAB at 08:00

## 2022-01-01 RX ADMIN — LACTULOSE 15 ML: 20 SOLUTION ORAL at 16:47

## 2022-01-01 RX ADMIN — VANCOMYCIN HYDROCHLORIDE 500 MG: 500 INJECTION, POWDER, LYOPHILIZED, FOR SOLUTION INTRAVENOUS at 08:42

## 2022-01-01 RX ADMIN — MIDODRINE HYDROCHLORIDE 10 MG: 5 TABLET ORAL at 13:42

## 2022-01-01 RX ADMIN — ZIPRASIDONE MESYLATE 10 MG: 20 INJECTION, POWDER, LYOPHILIZED, FOR SOLUTION INTRAMUSCULAR at 17:04

## 2022-01-01 RX ADMIN — PROPRANOLOL HYDROCHLORIDE 20 MG: 10 TABLET ORAL at 09:49

## 2022-01-01 RX ADMIN — VANCOMYCIN HYDROCHLORIDE 1250 MG: 1.25 INJECTION, POWDER, LYOPHILIZED, FOR SOLUTION INTRAVENOUS at 22:20

## 2022-01-01 RX ADMIN — LEVETIRACETAM 1500 MG: 15 INJECTION INTRAVENOUS at 00:36

## 2022-01-01 RX ADMIN — PROPOFOL 50 MG: 10 INJECTION, EMULSION INTRAVENOUS at 12:38

## 2022-01-01 RX ADMIN — PIPERACILLIN AND TAZOBACTAM 3.38 G: 3; .375 INJECTION, POWDER, LYOPHILIZED, FOR SOLUTION INTRAVENOUS at 17:47

## 2022-01-01 RX ADMIN — LEVETIRACETAM 1500 MG: 15 INJECTION INTRAVENOUS at 00:13

## 2022-01-01 RX ADMIN — LACTULOSE 15 ML: 20 SOLUTION ORAL at 11:46

## 2022-01-01 RX ADMIN — FAMOTIDINE 20 MG: 20 TABLET ORAL at 21:15

## 2022-01-01 RX ADMIN — THIAMINE HCL TAB 100 MG 100 MG: 100 TAB at 09:03

## 2022-01-01 RX ADMIN — THIAMINE HCL TAB 100 MG 100 MG: 100 TAB at 08:02

## 2022-01-01 RX ADMIN — LEVOTHYROXINE SODIUM 50 MCG: 0.03 TABLET ORAL at 05:43

## 2022-01-01 RX ADMIN — ALBUMIN (HUMAN) 25 G: 12.5 INJECTION, SOLUTION INTRAVENOUS at 13:10

## 2022-01-01 RX ADMIN — THIAMINE HCL TAB 100 MG 100 MG: 100 TAB at 08:05

## 2022-01-01 RX ADMIN — ENOXAPARIN SODIUM 30 MG: 30 INJECTION SUBCUTANEOUS at 08:19

## 2022-01-01 RX ADMIN — VANCOMYCIN HYDROCHLORIDE 750 MG: 750 INJECTION, POWDER, LYOPHILIZED, FOR SOLUTION INTRAVENOUS at 19:53

## 2022-01-01 RX ADMIN — LACTULOSE 15 ML: 20 SOLUTION ORAL at 08:36

## 2022-01-01 RX ADMIN — LEVETIRACETAM 1000 MG: 100 SOLUTION ORAL at 08:11

## 2022-01-01 RX ADMIN — SODIUM CHLORIDE, PRESERVATIVE FREE 10 ML: 5 INJECTION INTRAVENOUS at 05:31

## 2022-01-01 RX ADMIN — LACTULOSE 15 ML: 20 SOLUTION ORAL at 17:34

## 2022-01-01 RX ADMIN — ALBUMIN (HUMAN) 25 G: 12.5 INJECTION, SOLUTION INTRAVENOUS at 05:13

## 2022-01-01 RX ADMIN — SODIUM BICARBONATE 1300 MG: 650 TABLET ORAL at 08:36

## 2022-01-01 RX ADMIN — ALLOPURINOL 300 MG: 100 TABLET ORAL at 11:17

## 2022-01-01 RX ADMIN — FAMOTIDINE 20 MG: 20 TABLET ORAL at 17:08

## 2022-01-01 RX ADMIN — LEVETIRACETAM 1500 MG: 15 INJECTION INTRAVENOUS at 12:30

## 2022-01-01 RX ADMIN — SODIUM BICARBONATE 1300 MG: 650 TABLET ORAL at 16:30

## 2022-01-01 RX ADMIN — ALLOPURINOL 300 MG: 300 TABLET ORAL at 08:00

## 2022-01-01 RX ADMIN — HYDROMORPHONE HYDROCHLORIDE 1 MG: 1 INJECTION, SOLUTION INTRAMUSCULAR; INTRAVENOUS; SUBCUTANEOUS at 02:14

## 2022-01-01 RX ADMIN — MIDODRINE HYDROCHLORIDE 10 MG: 5 TABLET ORAL at 16:06

## 2022-01-01 RX ADMIN — LEVETIRACETAM 1000 MG: 10 INJECTION INTRAVENOUS at 14:05

## 2022-01-01 RX ADMIN — FAMOTIDINE 20 MG: 20 TABLET ORAL at 18:27

## 2022-01-01 RX ADMIN — LACTULOSE 15 ML: 20 SOLUTION ORAL at 09:23

## 2022-01-01 RX ADMIN — HYDROXYZINE PAMOATE 25 MG: 25 CAPSULE ORAL at 15:24

## 2022-01-01 RX ADMIN — Medication 5 MCG/MIN: at 13:36

## 2022-01-01 RX ADMIN — FAMOTIDINE 20 MG: 20 TABLET ORAL at 09:10

## 2022-01-01 RX ADMIN — FLUDROCORTISONE ACETATE 0.1 MG: 0.1 TABLET ORAL at 09:49

## 2022-01-01 RX ADMIN — THIAMINE HCL TAB 100 MG 100 MG: 100 TAB at 09:13

## 2022-01-01 RX ADMIN — CHLORDIAZEPOXIDE HYDROCHLORIDE 10 MG: 5 CAPSULE ORAL at 16:00

## 2022-01-01 RX ADMIN — FAMOTIDINE 20 MG: 20 TABLET ORAL at 21:31

## 2022-01-01 RX ADMIN — FUROSEMIDE 40 MG: 10 INJECTION, SOLUTION INTRAMUSCULAR; INTRAVENOUS at 12:49

## 2022-01-01 RX ADMIN — ACETAMINOPHEN 650 MG: 325 TABLET, FILM COATED ORAL at 13:10

## 2022-01-01 RX ADMIN — ZIPRASIDONE MESYLATE 10 MG: 20 INJECTION, POWDER, LYOPHILIZED, FOR SOLUTION INTRAMUSCULAR at 21:42

## 2022-01-01 RX ADMIN — SODIUM CHLORIDE 1000 ML: 9 INJECTION, SOLUTION INTRAVENOUS at 01:30

## 2022-01-01 RX ADMIN — LEVETIRACETAM 1500 MG: 15 INJECTION INTRAVENOUS at 22:00

## 2022-01-01 RX ADMIN — MIDODRINE HYDROCHLORIDE 10 MG: 5 TABLET ORAL at 16:47

## 2022-01-01 RX ADMIN — METOCLOPRAMIDE HYDROCHLORIDE 10 MG: 5 INJECTION INTRAMUSCULAR; INTRAVENOUS at 09:01

## 2022-01-01 RX ADMIN — ZIPRASIDONE MESYLATE 10 MG: 20 INJECTION, POWDER, LYOPHILIZED, FOR SOLUTION INTRAMUSCULAR at 06:56

## 2022-01-01 RX ADMIN — MIDODRINE HYDROCHLORIDE 5 MG: 5 TABLET ORAL at 08:05

## 2022-01-01 RX ADMIN — FLUDROCORTISONE ACETATE 0.1 MG: 0.1 TABLET ORAL at 08:31

## 2022-01-01 RX ADMIN — FOLIC ACID 1 MG: 1 TABLET ORAL at 08:43

## 2022-01-01 RX ADMIN — ACETAMINOPHEN 650 MG: 325 TABLET, FILM COATED ORAL at 21:50

## 2022-01-01 RX ADMIN — PROPRANOLOL HYDROCHLORIDE 20 MG: 10 TABLET ORAL at 21:15

## 2022-01-01 RX ADMIN — SODIUM BICARBONATE 1300 MG: 650 TABLET ORAL at 11:47

## 2022-01-01 RX ADMIN — FAMOTIDINE 20 MG: 20 TABLET ORAL at 20:26

## 2022-01-01 RX ADMIN — ALBUMIN (HUMAN) 25 G: 12.5 INJECTION, SOLUTION INTRAVENOUS at 05:39

## 2022-01-01 RX ADMIN — ACETAMINOPHEN 650 MG: 325 TABLET, FILM COATED ORAL at 21:01

## 2022-01-01 RX ADMIN — ALLOPURINOL 300 MG: 300 TABLET ORAL at 09:49

## 2022-01-01 RX ADMIN — MIDODRINE HYDROCHLORIDE 10 MG: 5 TABLET ORAL at 16:11

## 2022-01-01 RX ADMIN — TAMSULOSIN HYDROCHLORIDE 0.4 MG: 0.4 CAPSULE ORAL at 08:06

## 2022-01-01 RX ADMIN — LEVETIRACETAM 1500 MG: 500 TABLET, FILM COATED ORAL at 21:15

## 2022-01-01 RX ADMIN — ACETAMINOPHEN 650 MG: 325 TABLET, FILM COATED ORAL at 22:11

## 2022-01-01 RX ADMIN — SODIUM BICARBONATE 1300 MG: 650 TABLET ORAL at 18:09

## 2022-01-01 RX ADMIN — SODIUM BICARBONATE 650 MG: 650 TABLET ORAL at 16:48

## 2022-01-01 RX ADMIN — SODIUM BICARBONATE 650 MG: 650 TABLET ORAL at 15:23

## 2022-01-01 RX ADMIN — LACTULOSE 15 ML: 20 SOLUTION ORAL at 20:26

## 2022-01-01 RX ADMIN — FOLIC ACID 1 MG: 1 TABLET ORAL at 09:02

## 2022-01-01 RX ADMIN — PROPRANOLOL HYDROCHLORIDE 20 MG: 20 TABLET ORAL at 21:11

## 2022-01-01 RX ADMIN — POTASSIUM CHLORIDE 10 MEQ: 7.46 INJECTION, SOLUTION INTRAVENOUS at 09:00

## 2022-01-01 RX ADMIN — DEXTROSE MONOHYDRATE 50 ML/HR: 50 INJECTION, SOLUTION INTRAVENOUS at 16:49

## 2022-01-01 RX ADMIN — LACTULOSE 15 ML: 20 SOLUTION ORAL at 16:39

## 2022-01-01 RX ADMIN — FOLIC ACID 1 MG: 1 TABLET ORAL at 11:32

## 2022-01-01 RX ADMIN — PROPRANOLOL HYDROCHLORIDE 20 MG: 10 TABLET ORAL at 21:03

## 2022-01-01 RX ADMIN — POTASSIUM CHLORIDE 40 MEQ: 20 TABLET, EXTENDED RELEASE ORAL at 16:48

## 2022-01-01 RX ADMIN — LEVOTHYROXINE SODIUM 25 MCG: 0.03 TABLET ORAL at 04:03

## 2022-01-01 RX ADMIN — LACTULOSE 30 ML: 20 SOLUTION ORAL at 15:28

## 2022-01-01 RX ADMIN — TAMSULOSIN HYDROCHLORIDE 0.4 MG: 0.4 CAPSULE ORAL at 09:03

## 2022-01-01 RX ADMIN — PIPERACILLIN AND TAZOBACTAM 3.38 G: 3; .375 INJECTION, POWDER, LYOPHILIZED, FOR SOLUTION INTRAVENOUS at 02:53

## 2022-01-01 RX ADMIN — BUSPIRONE HYDROCHLORIDE 15 MG: 5 TABLET ORAL at 08:30

## 2022-01-01 RX ADMIN — SODIUM CHLORIDE, PRESERVATIVE FREE 10 ML: 5 INJECTION INTRAVENOUS at 13:59

## 2022-01-01 RX ADMIN — PROPRANOLOL HYDROCHLORIDE 20 MG: 10 TABLET ORAL at 08:30

## 2022-01-01 RX ADMIN — VANCOMYCIN HYDROCHLORIDE 500 MG: 500 INJECTION, POWDER, LYOPHILIZED, FOR SOLUTION INTRAVENOUS at 09:03

## 2022-01-01 RX ADMIN — LACTULOSE 15 ML: 20 SOLUTION ORAL at 15:47

## 2022-01-01 RX ADMIN — ZIPRASIDONE MESYLATE 10 MG: 20 INJECTION, POWDER, LYOPHILIZED, FOR SOLUTION INTRAMUSCULAR at 03:04

## 2022-01-01 RX ADMIN — LEVOTHYROXINE SODIUM 25 MCG: 0.03 TABLET ORAL at 05:26

## 2022-01-01 RX ADMIN — SODIUM POLYSTYRENE SULFONATE 30 G: 15 SUSPENSION ORAL; RECTAL at 14:06

## 2022-01-01 RX ADMIN — SODIUM BICARBONATE 1300 MG: 650 TABLET ORAL at 09:24

## 2022-01-01 RX ADMIN — PROPOFOL 10 MG: 10 INJECTION, EMULSION INTRAVENOUS at 14:59

## 2022-01-01 RX ADMIN — LEVOFLOXACIN 500 MG: 500 TABLET, FILM COATED ORAL at 09:44

## 2022-01-01 RX ADMIN — MIDODRINE HYDROCHLORIDE 10 MG: 5 TABLET ORAL at 12:45

## 2022-01-01 RX ADMIN — FAMOTIDINE 20 MG: 20 TABLET ORAL at 09:33

## 2022-01-01 RX ADMIN — LEVETIRACETAM 1500 MG: 15 INJECTION INTRAVENOUS at 18:00

## 2022-01-01 RX ADMIN — LACTULOSE 15 ML: 20 SOLUTION ORAL at 09:03

## 2022-01-01 RX ADMIN — MAGNESIUM SULFATE 2 G: 2 INJECTION INTRAVENOUS at 09:37

## 2022-01-01 RX ADMIN — LACTULOSE 15 ML: 20 SOLUTION ORAL at 00:29

## 2022-01-01 RX ADMIN — BUSPIRONE HYDROCHLORIDE 15 MG: 5 TABLET ORAL at 21:35

## 2022-01-01 RX ADMIN — PROPRANOLOL HYDROCHLORIDE 20 MG: 20 TABLET ORAL at 09:51

## 2022-01-01 RX ADMIN — SODIUM CHLORIDE, PRESERVATIVE FREE 10 ML: 5 INJECTION INTRAVENOUS at 16:10

## 2022-01-01 RX ADMIN — LEVOTHYROXINE SODIUM 25 MCG: 0.03 TABLET ORAL at 09:19

## 2022-01-01 RX ADMIN — HYDROMORPHONE HYDROCHLORIDE 1 MG: 1 INJECTION, SOLUTION INTRAMUSCULAR; INTRAVENOUS; SUBCUTANEOUS at 16:15

## 2022-01-01 RX ADMIN — FAMOTIDINE 20 MG: 20 TABLET ORAL at 17:24

## 2022-01-01 RX ADMIN — SODIUM BICARBONATE: 84 INJECTION, SOLUTION INTRAVENOUS at 12:32

## 2022-01-01 RX ADMIN — FOLIC ACID 1 MG: 1 TABLET ORAL at 09:15

## 2022-01-01 RX ADMIN — PIPERACILLIN AND TAZOBACTAM 3.38 G: 3; .375 INJECTION, POWDER, LYOPHILIZED, FOR SOLUTION INTRAVENOUS at 18:22

## 2022-01-01 RX ADMIN — SODIUM CHLORIDE, PRESERVATIVE FREE 10 ML: 5 INJECTION INTRAVENOUS at 06:33

## 2022-01-01 RX ADMIN — HEPARIN SODIUM 5000 UNITS: 5000 INJECTION INTRAVENOUS; SUBCUTANEOUS at 05:23

## 2022-01-01 RX ADMIN — THIAMINE HCL TAB 100 MG 100 MG: 100 TAB at 08:56

## 2022-01-01 RX ADMIN — PROPRANOLOL HYDROCHLORIDE 20 MG: 10 TABLET ORAL at 22:13

## 2022-01-01 RX ADMIN — LORAZEPAM 2 MG: 2 INJECTION INTRAMUSCULAR at 17:43

## 2022-01-01 RX ADMIN — FAMOTIDINE 20 MG: 20 TABLET ORAL at 18:24

## 2022-01-01 RX ADMIN — SODIUM BICARBONATE 1300 MG: 650 TABLET ORAL at 21:46

## 2022-01-01 RX ADMIN — LORAZEPAM 4 MG: 2 INJECTION INTRAMUSCULAR at 17:49

## 2022-01-01 RX ADMIN — SODIUM BICARBONATE 1300 MG: 650 TABLET ORAL at 12:19

## 2022-01-01 RX ADMIN — FOLIC ACID 1 MG: 1 TABLET ORAL at 09:34

## 2022-01-01 RX ADMIN — LEVETIRACETAM 1500 MG: 15 INJECTION INTRAVENOUS at 18:28

## 2022-01-01 RX ADMIN — SODIUM BICARBONATE 1300 MG: 650 TABLET ORAL at 08:30

## 2022-01-01 RX ADMIN — MIDODRINE HYDROCHLORIDE 10 MG: 5 TABLET ORAL at 11:14

## 2022-01-01 RX ADMIN — FOLIC ACID 1 MG: 1 TABLET ORAL at 10:09

## 2022-01-01 RX ADMIN — MIDODRINE HYDROCHLORIDE 10 MG: 5 TABLET ORAL at 12:23

## 2022-01-01 RX ADMIN — SODIUM CHLORIDE 75 ML/HR: 9 INJECTION, SOLUTION INTRAVENOUS at 04:03

## 2022-01-01 RX ADMIN — SODIUM BICARBONATE 1300 MG: 650 TABLET ORAL at 09:00

## 2022-01-01 RX ADMIN — ALBUMIN (HUMAN) 25 G: 0.25 INJECTION, SOLUTION INTRAVENOUS at 23:35

## 2022-01-01 RX ADMIN — ZIPRASIDONE MESYLATE 10 MG: 20 INJECTION, POWDER, LYOPHILIZED, FOR SOLUTION INTRAMUSCULAR at 16:00

## 2022-01-01 RX ADMIN — LACTULOSE 15 ML: 20 SOLUTION ORAL at 21:18

## 2022-01-01 RX ADMIN — MIDODRINE HYDROCHLORIDE 10 MG: 5 TABLET ORAL at 18:19

## 2022-01-01 RX ADMIN — ALLOPURINOL 300 MG: 100 TABLET ORAL at 08:41

## 2022-01-20 PROBLEM — N17.9 ACUTE RENAL FAILURE (ARF) (HCC): Status: ACTIVE | Noted: 2022-01-01

## 2022-01-20 PROBLEM — N17.9 AKI (ACUTE KIDNEY INJURY) (HCC): Status: ACTIVE | Noted: 2022-01-01

## 2022-01-20 PROBLEM — K74.60 CIRRHOSIS OF LIVER (HCC): Status: ACTIVE | Noted: 2022-01-01

## 2022-01-20 NOTE — Clinical Note
Status[de-identified] INPATIENT [101]   Type of Bed: Medical [8]   Cardiac Monitoring Required?: No   Inpatient Hospitalization Certified Necessary for the Following Reasons: 3.  Patient receiving treatment that can only be provided in an inpatient setting (further clarification in H&P documentation)   Admitting Diagnosis: Acute renal failure (ARF) [facility]) [[id number]]   Admitting Physician: Lino Brewer   Attending Physician: Brissa Mejia [[id number]]   Estimated Length of Stay: 3-4 Midnights   Discharge Plan[de-identified] Home with Office Follow-up

## 2022-01-20 NOTE — H&P
History and Physical    Patient: Carolina Collins MRN: [id number]  SSN: [id number]    YOB: 1960  Age: 57 y.o. Sex: male      Subjective:      Carolina Collins is a 57 y.o. male with a PMH of hypertension, gout, and arthritis who presented to the ED with a chief complain of abdominal pain. Patient states the pain is mostly in LLQ but occasionally is diffuse. Patient reports this pain being on and off for weeks. Patient reports N, vomiting, dysuria. Patient reports no having had a bowel movement for several days but also reports not having had anything to eat for 3-4 days due to his lack of appetite. Patient went to his PCP several days ago and was told to come to the ED today after being called and told he has kidney failure. Patient has taken pepto-bismol OTC without relief. Patient has no known Hx of kidney disease and has never seen a nephrologist. Denies chest pain, fever, chills, hematemesis, hematochezia, diarrhea. Patient also reports having a prior history of drinking up to 4 drinks daily, stopped roughly a month ago. Hgb 10.7  Cr 4.21, baseline unknown  Troponin negative  Lipase negative  Lactic Acid 3.0  Ammonia 104  /112    CT abdomen:   Cirrhotic liver. Large amount of ascites. Question mild edematous thickening of the colon wall. No evidence of bowel obstruction. CXR negative           Past Medical History:   Diagnosis Date    Arthritis     Hypertension      History reviewed. No pertinent surgical history. History reviewed. No pertinent family history.   Social History     Tobacco Use    Smoking status: Never Smoker    Smokeless tobacco: Never Used   Substance Use Topics    Alcohol use: Not on file      Prior to Admission medications    Not on File        No Known Allergies    Review of Systems:  See HPI    Objective:     Vitals:    01/20/22 1027 01/20/22 1407   BP: (!) 137/100 (!) 144/112   Pulse: 100 96   Resp: 18 20   Temp: 98.4 °F (36.9 °C)    SpO2: 100% 100%   Weight: 86.2 kg (190 lb)    Height: 6' 3\" (1.905 m)         Physical Exam:  General: alert and oriented, in NAD, non-diaphoretic  Head: normocephalic, atrauamatic  Eyes: Conjunctivae normal, no jaundice, PERRL, EOM intact  Neck: ROM intact, supple  CV: Normal rate, regular rhythm   Pulm: effort normal, breath sounds normal   Skin: warm, dry   Abdominal: TTP, especially in lower quadrants. Distension.  Bowel sounds normal in all 4 quadrants  Neuro: no focal deficit, at baseline     Assessment:     Abdominal Pain  GRACIA  Ascites   Cirrhosis   Hypertension  Lactic Acidosis   Elevated Ammonia    Plan:   NPO  CT abdomen completed  CXR completed  Repeat CBC/CMP - monitor LFTs  Lisinopril   Signed By: Ian Delatorre     January 20, 2022

## 2022-01-20 NOTE — ED PROVIDER NOTES
EMERGENCY DEPARTMENT HISTORY AND PHYSICAL EXAM      Date: 1/20/2022  Patient Name: Goyo Palomo    History of Presenting Illness     Chief Complaint   Patient presents with    Abdominal Pain       History Provided By: Patient    HPI: Goyo Palomo, 57 y.o. male with a past medical history significant for hypertension, gout, and arthritis who presents to the ED with cc of diffuse abdominal pain for 2 to 3 weeks. Associate symptoms include nausea, intermittent vomiting, difficulty urinating, constipation with inability to have normal bowel movement for 2 weeks, and shortness of breath. Went to his PCP a few days ago and had lab work completedwas called today and was told he is in kidney failure and was told to come to the ED for further evaluation. Patient has used over-the-counter medications for constipation, with no relief. Has been passing very small pieces of stool. No history of kidney disease. Has never seen nephrology. No history of bowel obstruction. Denies any fever, chills, chest pain, hematochezia, hematemesis, diarrhea. Also has history of chronic alcohol use drinking 4 drinks daily stopped about 1 month ago. There are no other complaints, changes, or physical findings at this time. PCP: Javier Jacome NP    No current facility-administered medications on file prior to encounter. No current outpatient medications on file prior to encounter. Past History     Past Medical History:  Past Medical History:   Diagnosis Date    Arthritis     Hypertension        Past Surgical History:  History reviewed. No pertinent surgical history. Family History:  History reviewed. No pertinent family history.     Social History:  Social History     Tobacco Use    Smoking status: Never Smoker    Smokeless tobacco: Never Used   Substance Use Topics    Alcohol use: Not on file    Drug use: Not on file       Allergies:  No Known Allergies      Review of Systems     Review of Systems Constitutional: Negative for chills, fatigue and fever. HENT: Negative. Respiratory: Positive for shortness of breath. Negative for cough, chest tightness and wheezing. Cardiovascular: Negative for chest pain and palpitations. Gastrointestinal: Positive for abdominal pain, constipation, nausea and vomiting. Negative for diarrhea. Genitourinary: Positive for difficulty urinating. Negative for frequency and urgency. Musculoskeletal: Negative for back pain, neck pain and neck stiffness. Skin: Negative for rash. Neurological: Negative for dizziness, weakness, light-headedness and headaches. Psychiatric/Behavioral: Negative. All other systems reviewed and are negative. Physical Exam     Physical Exam  Vitals and nursing note reviewed. Constitutional:       General: He is not in acute distress. Appearance: Normal appearance. He is well-developed. He is not ill-appearing, toxic-appearing or diaphoretic. HENT:      Head: Normocephalic and atraumatic. Nose: Nose normal. No congestion or rhinorrhea. Mouth/Throat:      Mouth: Mucous membranes are moist.      Pharynx: Oropharynx is clear. No oropharyngeal exudate or posterior oropharyngeal erythema. Eyes:      General: No scleral icterus. Conjunctiva/sclera: Conjunctivae normal.      Pupils: Pupils are equal, round, and reactive to light. Cardiovascular:      Rate and Rhythm: Normal rate and regular rhythm. Pulses:           Radial pulses are 2+ on the right side and 2+ on the left side. Dorsalis pedis pulses are 2+ on the right side and 2+ on the left side. Heart sounds: No murmur heard. No friction rub. No gallop. Pulmonary:      Effort: Pulmonary effort is normal. No tachypnea, accessory muscle usage, respiratory distress or retractions. Breath sounds: Normal breath sounds. No stridor. No decreased breath sounds, wheezing, rhonchi or rales. Chest:      Chest wall: No tenderness.    Abdominal: General: Bowel sounds are normal. There is distension. Palpations: There is no mass. Tenderness: There is generalized abdominal tenderness. There is no right CVA tenderness, left CVA tenderness, guarding or rebound. Musculoskeletal:         General: No deformity. Normal range of motion. Cervical back: Normal range of motion and neck supple. No rigidity. No muscular tenderness. Right lower leg: No edema. Left lower leg: No edema. Skin:     General: Skin is warm and dry. Capillary Refill: Capillary refill takes less than 2 seconds. Coloration: Skin is not jaundiced or pale. Findings: No bruising, erythema or rash. Neurological:      General: No focal deficit present. Mental Status: He is alert and oriented to person, place, and time. Mental status is at baseline. Sensory: Sensation is intact. Motor: Motor function is intact. Psychiatric:         Mood and Affect: Mood normal.         Behavior: Behavior normal. Behavior is cooperative. Thought Content: Thought content normal.         Judgment: Judgment normal.         Lab and Diagnostic Study Results     Labs -  Recent Results (from the past 24 hour(s))   CBC WITH AUTOMATED DIFF    Collection Time: 01/20/22 11:26 AM   Result Value Ref Range    WBC 5.3 4.1 - 11.1 K/uL    RBC 3.57 (L) 4.10 - 5.70 M/uL    HGB 10.7 (L) 12.1 - 17.0 g/dL    HCT 30.8 (L) 36.6 - 50.3 %    MCV 86.3 80.0 - 99.0 FL    MCH 30.0 26.0 - 34.0 PG    MCHC 34.7 30.0 - 36.5 g/dL    RDW 17.7 (H) 11.5 - 14.5 %    PLATELET 338 (L) 786 - 400 K/uL    MPV 11.7 8.9 - 12.9 FL    NRBC 0.0 0.0  WBC    ABSOLUTE NRBC 0.00 0.00 - 0.01 K/uL    NEUTROPHILS 80 (H) 32 - 75 %    LYMPHOCYTES 11 (L) 12 - 49 %    MONOCYTES 9 5 - 13 %    EOSINOPHILS 0 0 - 7 %    BASOPHILS 0 0 - 1 %    IMMATURE GRANULOCYTES 0 0 - 0.5 %    ABS. NEUTROPHILS 4.2 1.8 - 8.0 K/UL    ABS. LYMPHOCYTES 0.6 (L) 0.8 - 3.5 K/UL    ABS. MONOCYTES 0.5 0.0 - 1.0 K/UL    ABS. EOSINOPHILS 0.0 0.0 - 0.4 K/UL    ABS. BASOPHILS 0.0 0.0 - 0.1 K/UL    ABS. IMM. GRANS. 0.0 0.00 - 0.04 K/UL    DF AUTOMATED     METABOLIC PANEL, COMPREHENSIVE    Collection Time: 01/20/22 11:26 AM   Result Value Ref Range    Sodium 132 (L) 136 - 145 mmol/L    Potassium 3.8 3.5 - 5.1 mmol/L    Chloride 96 (L) 97 - 108 mmol/L    CO2 26 21 - 32 mmol/L    Anion gap 10 5 - 15 mmol/L    Glucose 102 (H) 65 - 100 mg/dL    BUN 51 (H) 6 - 20 mg/dL    Creatinine 4.21 (H) 0.70 - 1.30 mg/dL    BUN/Creatinine ratio 12 12 - 20      GFR est AA 17 (L) >60 ml/min/1.73m2    GFR est non-AA 14 (L) >60 ml/min/1.73m2    Calcium 8.9 8.5 - 10.1 mg/dL    Bilirubin, total 3.6 (H) 0.2 - 1.0 mg/dL    AST (SGOT) 100 (H) 15 - 37 U/L    ALT (SGPT) 30 12 - 78 U/L    Alk.  phosphatase 121 (H) 45 - 117 U/L    Protein, total 8.2 6.4 - 8.2 g/dL    Albumin 2.1 (L) 3.5 - 5.0 g/dL    Globulin 6.1 (H) 2.0 - 4.0 g/dL    A-G Ratio 0.3 (L) 1.1 - 2.2     TROPONIN-HIGH SENSITIVITY    Collection Time: 01/20/22 11:26 AM   Result Value Ref Range    Troponin-High Sensitivity 20 0 - 76 ng/L   LIPASE    Collection Time: 01/20/22 11:26 AM   Result Value Ref Range    Lipase 129 73 - 393 U/L   LACTIC ACID    Collection Time: 01/20/22 11:26 AM   Result Value Ref Range    Lactic acid 3.0 (HH) 0.4 - 2.0 mmol/L   ETHYL ALCOHOL    Collection Time: 01/20/22 11:26 AM   Result Value Ref Range    ALCOHOL(ETHYL),SERUM <4 <10 mg/dL   AMMONIA    Collection Time: 01/20/22 11:26 AM   Result Value Ref Range    Ammonia 104 (H) <32 umol/L   EKG, 12 LEAD, INITIAL    Collection Time: 01/20/22 11:31 AM   Result Value Ref Range    Ventricular Rate 98 BPM    Atrial Rate 98 BPM    P-R Interval 164 ms    QRS Duration 76 ms    Q-T Interval 372 ms    QTC Calculation (Bezet) 474 ms    Calculated P Axis 20 degrees    Calculated R Axis -18 degrees    Calculated T Axis 36 degrees    Diagnosis       Sinus rhythm with occasional Premature ventricular complexes  Otherwise normal ECG  No previous ECGs available  Confirmed by Dhruv Ricardo MD, Umair Burton ([phone]) on 1/20/2022 12:37:12 PM         Radiologic Studies -   CXR Results  (Last 48 hours)               01/20/22 1105  XR CHEST PA LAT Final result    Narrative:  Chest 2 views. Lungs clear; no interstitial or alveolar pulmonary edema. Cardiac silhouette   within normal limits. Aneurysmal thoracic aorta. No pneumothorax or sizable   pleural effusion. CT Results  (Last 48 hours)               01/20/22 1114  CT ABD PELV WO CONT Final result    Impression:  Cirrhotic liver. Large amount of ascites. Question mild edematous thickening of the colon wall. No evidence of bowel obstruction. Narrative:  CT abdomen and pelvis without contrast : Transaxial and reformatted sagittal and   coronal images. History: Diffuse abdominal pain       COMPARISON: Abdomen sonogram 6/28/2021        Dose reduction: All CT scans at this facility are performed using dose reduction   optimization techniques as appropriate to the performed exam including the   following: Automated exposure control, adjustments of the mA and/or kV according   to patient size, or use of iterative reconstruction technique. CT abdomen: The lung bases are clear. The heart is normal size, no pericardial thickening or   fluid. There is pericardial fat deposition. There is a large amount of ascites. The liver is small with a lobulated capsule. Gallbladder is grossly normal, no   gross evidence of stones. The spleen is normal. Pancreas appears normal. The   adrenal glands are normal. There is a low-attenuation cortical cyst involving   the left kidney. Kidneys are otherwise normal in appearance. Suspect mild   edematous thickening of the colon wall. No small bowel distention. There is   calcific plaque involving the aorta and iliac arteries, no evidence of aneurysm. CT pelvis: There is a large accumulation of fluid in the pelvis.  The urinary bladder is   grossly normal. The prostate measures 5.9 cm in transverse dimension. Lower   thoracic and lumbar vertebra show normal alignment, no vertebral compression or   subluxation. No bone lesion. Medical Decision Making   - I am the first provider for this patient. - I reviewed the vital signs, available nursing notes, past medical history, past surgical history, family history and social history. - Initial assessment performed. The patients presenting problems have been discussed, and they are in agreement with the care plan formulated and outlined with them. I have encouraged them to ask questions as they arise throughout their visit. Vital Signs-Reviewed the patient's vital signs. Patient Vitals for the past 24 hrs:   Temp Pulse Resp BP SpO2   01/20/22 1407  96 20 (!) 144/112 100 %   01/20/22 1027 98.4 °F (36.9 °C) 100 18 (!) 137/100 100 %       Records Reviewed: Nursing Notes and Old Medical Records    The patient presents with abdominal pain, possible renal failure with a differential diagnosis of acute renal failure, GRACIA, dehydration, ascites, bowel obstruction, kidney stone, cirrhosis      ED Course:     ED Course as of 01/20/22 1430   Thu Jan 20, 2022   1217 CONSULT NOTE:  Consultant: Dr. Arenas Pae  Specialty: Admitting Medicine  Discussed pt's history, disposition, and available diagnostic and imaging results. Reviewed care plans. Patient will be admitted to the hospital for further management.   LUL Carrasquillo     [NO]      ED Course User Index  [NO] Melissa Wood PA       Orders Placed This Encounter    XR CHEST PA LAT     Standing Status:   Standing     Number of Occurrences:   1     Order Specific Question:   Transport     Answer:   Wheelchair [7]     Order Specific Question:   Reason for Exam     Answer:   shortness of breath    CT ABD PELV WO CONT     Standing Status:   Standing     Number of Occurrences:   1     Order Specific Question:   Type of contrast. PLEASE NOTE: IV contrast is NOT utilized with this order. Answer:   None     Order Specific Question:   Transport     Answer:   Wheelchair [7]     Order Specific Question:   Reason for Exam     Answer:   diffuse abd pain, constipation, r/o obstruction     Order Specific Question:   Decision Support Exception     Answer:   Emergency Medical Condition (MA) [1]    CBC WITH AUTOMATED DIFF     Standing Status:   Standing     Number of Occurrences:   1    COMPREHENSIVE METABOLIC PANEL     Standing Status:   Standing     Number of Occurrences:   1    TROPONIN-HIGH SENSITIVITY     Standing Status:   Standing     Number of Occurrences:   1    LIPASE     Standing Status:   Standing     Number of Occurrences:   1    LACTIC ACID, PLASMA     Standing Status:   Standing     Number of Occurrences:   1    BLOOD ALCOHOL (Ethyl Alcohol)     Standing Status:   Standing     Number of Occurrences:   1    AMMONIA     Standing Status:   Standing     Number of Occurrences:   1    DIET NPO     Standing Status:   Standing     Number of Occurrences:   1    EKG, 12 LEAD, INITIAL     Standing Status:   Standing     Number of Occurrences:   1     Order Specific Question:   Reason for Exam:     Answer:   chest pain, shortness of breath    INITIAL PHYSICIAN ORDER: INPATIENT Medical; No; 3. Patient receiving treatment that can only be provided in an inpatient setting (further clarification in H&P documentation)     Standing Status:   Standing     Number of Occurrences:   1     Order Specific Question:   Status: Answer:   INPATIENT [101]     Order Specific Question:   Type of Bed     Answer:   Medical [8]     Order Specific Question:   Cardiac Monitoring Required? Answer:   No     Order Specific Question:   Inpatient Hospitalization Certified Necessary for the Following Reasons     Answer:   3.  Patient receiving treatment that can only be provided in an inpatient setting (further clarification in H&P documentation)     Order Specific Question:   Admitting Diagnosis     Answer:   Acute renal failure (ARF) [facility]) [[id number]]     Order Specific Question:   Admitting Physician     Answer:   Eual Mention     Order Specific Question:   Attending Physician     Answer:   Eual Mention     Order Specific Question:   Estimated Length of Stay     Answer:   3-4 Midnights     Order Specific Question:   Discharge Plan:     Answer:   Home with Office Follow-up       Provider Notes (Medical Decision Making):     MDM  Number of Diagnoses or Management Options  Acute renal failure, unspecified acute renal failure type ([address]  Cirrhosis of liver with ascites, unspecified hepatic cirrhosis type ([address]  Other ascites  Diagnosis management comments:     65-year-old male sent here by PCP for acute renal failure. Also complaining of abdominal pain. Distended abdomen on exam.  History of alcohol use. CBC with anemia, hemoglobin 10. 7-patient not complaining of any bleeding. CMP with creatinine 4.21 and BUN 51 with no previous for comparison. Troponin negative. Lipase negative. Lactic acid elevated 3.0. Ammonia level elevated 104. Chest x-ray unremarkable. CT scan abdomen with cirrhotic liver and large amount of ascites. No evidence of bowel obstruction. Admitted patient to hospital for further evaluation management. Currently stable. Amount and/or Complexity of Data Reviewed  Clinical lab tests: ordered and reviewed  Tests in the radiology section of CPT®: ordered and reviewed  Review and summarize past medical records: yes  Discuss the patient with other providers: yes    Patient Progress  Patient progress: stable           Disposition   Disposition: Admit to hospital    Diagnosis     Clinical Impression:   1. Acute renal failure, unspecified acute renal failure type ([address]    2. Other ascites    3.  Cirrhosis of liver with ascites, unspecified hepatic cirrhosis type ([address]        Attestations:    Lupe Pierce PA    Please note that this dictation was completed with [employer], the computer voice recognition software. Quite often unanticipated grammatical, syntax, homophones, and other interpretive errors are inadvertently transcribed by the computer software. Please disregard these errors. Please excuse any errors that have escaped final proofreading. Thank you.

## 2022-01-20 NOTE — H&P
History and Physical    Patient: Magnolia Guy MRN: [id number]  SSN: [id number]    YOB: 1960  Age: 57 y.o. Sex: male      Subjective:      Magnolia Guy is a 57 y.o. male with a PMH of hypertension, gout, and arthritis who presented to the ED with a chief complain of abdominal pain. Patient states the pain is mostly in LLQ but occasionally is diffuse. Patient reports this pain being on and off for weeks. Patient reports N, vomiting, dysuria. Patient reports no having had a bowel movement for several days but also reports not having had anything to eat for 3-4 days due to his lack of appetite. Patient went to his PCP several days ago and was told to come to the ED today after being called and told he has kidney failure. Patient has taken pepto-bismol OTC without relief. Patient has no known Hx of kidney disease and has never seen a nephrologist. Denies chest pain, fever, chills, hematemesis, hematochezia, diarrhea. Patient also reports having a prior history of drinking up to 4 drinks daily, stopped roughly a month ago. Hgb 10.7  Cr 4.21, baseline unknown  Troponin negative  Lipase negative  Lactic Acid 3.0  Ammonia 104  /112    CT abdomen:   Cirrhotic liver. Large amount of ascites. Question mild edematous thickening of the colon wall. No evidence of bowel obstruction. CXR negative           Past Medical History:   Diagnosis Date    Arthritis     Hypertension      History reviewed. No pertinent surgical history. History reviewed. No pertinent family history.   Social History     Tobacco Use    Smoking status: Never Smoker    Smokeless tobacco: Never Used   Substance Use Topics    Alcohol use: Not on file      Prior to Admission medications    Not on File        No Known Allergies    Review of Systems:  See HPI    Objective:     Vitals:    01/20/22 1027 01/20/22 1407 01/20/22 1550   BP: (!) 137/100 (!) 144/112 (!) 126/99   Pulse: 100 96 84   Resp: 18 20 18   Temp: 98.4 °F (36.9 °C)     SpO2: 100% 100% 95%   Weight: 86.2 kg (190 lb)     Height: 6' 3\" (1.905 m)          Physical Exam:  General: alert and oriented, in NAD, non-diaphoretic  Head: normocephalic, atrauamatic  Eyes: Conjunctivae normal, no jaundice, PERRL, EOM intact  Neck: ROM intact, supple  CV: Normal rate, regular rhythm   Pulm: effort normal, breath sounds normal   Skin: warm, dry   Abdominal: TTP, especially in lower quadrants. Distension. Bowel sounds normal in all 4 quadrants  Neuro: no focal deficit, at baseline     Assessment:     Abdominal Pain  GRACIA  Ascites   Cirrhosis   Hypertension  Lactic Acidosis   Elevated Ammonia    Plan:   NPO  CT abdomen completed  CXR completed  Repeat CBC/CMP - monitor LFTs    GI consult and nephrology consult  Continue IV fluids  Lactulose 2 g 3 times a day monitor ammonia level  Start on thiamine and folic acid    IR consult for paracentesis      Current Facility-Administered Medications:     0.9% sodium chloride infusion, 75 mL/hr, IntraVENous, CONTINUOUS, Kelsea Ackerman MD    acetaminophen (TYLENOL) tablet 650 mg, 650 mg, Oral, Q6H PRN **OR** acetaminophen (TYLENOL) suppository 650 mg, 650 mg, Rectal, Q6H PRN, Kelsea Ackerman MD    polyethylene glycol (MIRALAX) packet 17 g, 17 g, Oral, DAILY PRN, Kelsea Ackerman MD    ondansetron (ZOFRAN ODT) tablet 4 mg, 4 mg, Oral, Q8H PRN **OR** ondansetron (ZOFRAN) injection 4 mg, 4 mg, IntraVENous, Q6H PRN, Lamberto Ackerman MD    lactulose (CHRONULAC) 10 gram/15 mL solution 30 mL, 20 g, Oral, TID, Lamberto Ackerman MD Jamas Roys  [START ON 1/21/2022] thiamine mononitrate (B-1) tablet 100 mg, 100 mg, Oral, DAILY, Lamberto Ackerman MD Jamas Roys  [START ON 0/77/6208] folic acid (FOLVITE) tablet 1 mg, 1 mg, Oral, DAILY, Kelsea Ackerman MD  No current outpatient medications on file.   Lisinopril   Signed By: Adi Lo MD     January 20, 2022

## 2022-01-20 NOTE — ED NOTES
Bedside shift change report given to Coby Guaman RN (oncoming nurse) by Tiffanie Saez RN (offgoing nurse). Report included the following information SBAR, Kardex, ED Summary, [facility] and Recent Results.

## 2022-01-21 NOTE — CONSULTS
Interventional Radiology Post Procedure Note    1/21/2022    Indications: Large-volume ascites in setting of cirrhosis    Procedure(s): US-guided paracentesis    Preliminary Findings (full report to follow):  As above    Contrast: None    Specimen: 60 mL serous ascitic fluid    Estimated blood loss: Minimal    Complications: None    Plan: Return to floor in stable condition     Follow Up: PRN

## 2022-01-21 NOTE — CONSULTS
Renal Consult Note    Admit Date: 1/20/2022      HPI:   59-year-old -American gentleman who was admitted here with a creatinine of 4.2 mg. Today the creatinine is down to 3.15 mg. He used to drink heavily before and apparently has stopped for the last few years. He used to drink moonshine. He has had hypertension and gout. He is not aware of underlying renal disease. Ultrasound showed evidence of ascites. He has been taking indomethacin 2 a day for several months. He admits to occasional nocturia. He admits to thinning of urinary stream.  He tells me that his weight has dropped over the last few months. He is unable to give me how much weight he has lost.  He sleeps on 2 pillows and denies orthopnea or PND. He tells me that he does not have much of an appetite. Food does not taste good. No history of renal stones. Current Facility-Administered Medications   Medication Dose Route Frequency    0.9% sodium chloride infusion  25 mL/hr IntraVENous CONTINUOUS    acetaminophen (TYLENOL) tablet 650 mg  650 mg Oral Q6H PRN    Or    acetaminophen (TYLENOL) suppository 650 mg  650 mg Rectal Q6H PRN    polyethylene glycol (MIRALAX) packet 17 g  17 g Oral DAILY PRN    ondansetron (ZOFRAN ODT) tablet 4 mg  4 mg Oral Q8H PRN    Or    ondansetron (ZOFRAN) injection 4 mg  4 mg IntraVENous Q6H PRN    lactulose (CHRONULAC) 10 gram/15 mL solution 30 mL  20 g Oral TID    thiamine mononitrate (B-1) tablet 100 mg  100 mg Oral DAILY    folic acid (FOLVITE) tablet 1 mg  1 mg Oral DAILY        Past Medical History:   Diagnosis Date    Arthritis     Hypertension       Past Surgical History:   Procedure Laterality Date    IR PARACENTESIS ABD W IMAGE  1/21/2022     History reviewed. No pertinent family history.    Social History     Tobacco Use    Smoking status: Never Smoker    Smokeless tobacco: Never Used   Substance Use Topics    Alcohol use: Not on file         Review of Systems    Review of Systems Respiratory: Negative for cough and shortness of breath. Cardiovascular: Negative for chest pain and leg swelling. Gastrointestinal: Positive for abdominal pain. Neurological: Negative for dizziness and headaches. Physical Exam:     Physical Exam  Cardiovascular:      Rate and Rhythm: Regular rhythm. Pulmonary:      Breath sounds: Normal breath sounds. Abdominal:      General: Bowel sounds are normal.      Palpations: Abdomen is soft. Musculoskeletal:         General: No swelling. Skin:     General: Skin is warm. Neurological:      General: No focal deficit present. Mental Status: He is alert. No asterixis  He had ascites. No data found. No intake/output data recorded. No intake/output data recorded. IR PARACENTESIS ABD W IMAGE   Final Result   Successful ultrasound-guided paracentesis of large volume ascites, with   approximately 5.3 L serous fluid removed. Appropriate amount of the albumin was given per protocol. US RETROPERITONEUM COMP   Final Result   Normal exam   Incidental note made of large volume ascites and small liver      CT ABD PELV WO CONT   Final Result   Cirrhotic liver. Large amount of ascites. Question mild edematous thickening of the colon wall. No evidence of bowel obstruction. XR CHEST PA LAT   Final Result           Data Review   Recent Labs     01/20/22  1126   WBC 5.3   HGB 10.7*   HCT 30.8*   *     Recent Labs     01/21/22  1211 01/20/22  1126    132*   K 3.4* 3.8    96*   CO2 24 26   * 102*   BUN 45* 51*   CREA 3.15* 4.21*   CA 8.7 8.9   ALB 2.0* 2.1*   ALT 25 30     No components found for: GLPOC  No results for input(s): PH, PCO2, PO2, HCO3, FIO2 in the last 72 hours. No results for input(s): INR, INREXT, INREXT in the last 72 hours.       Assessment:           Active Problems:    Acute renal failure (ARF) ([facility] (1/20/2022)      Cirrhosis of liver ([facility] (1/20/2022)      GRACIA (acute kidney injury) ([facility] (1/20/2022)    Acute kidney injury versus  GRACIA on an element of chronic kidney disease . Possible cirrhosis of liver    Hypokalemia    Normocytic anemia    Modest thrombocytopenia    Plan:     He has a high globulin level and a modestly high calcium. We will check an SPEP and immunofixation. Check a urinalysis  Check a PTH level had a phosphorus level. Replace potassium  Will follow renal function. I am encouraged by the fact that the creatinine is better today.   Thank you for this consult

## 2022-01-21 NOTE — PROGRESS NOTES
General Daily Progress Note          Patient Name:   Marquez Brown       YOB: 1960       Age:  57 y.o. Admit Date: 1/20/2022      Subjective:     Marquez Brown is a 57 y.o. male with a PMH of hypertension, gout, and arthritis who presented to the ED with a chief complain of abdominal pain. Patient states the pain is mostly in LLQ but occasionally is diffuse. Patient reports this pain being on and off for weeks. Patient reports N, vomiting, dysuria. Patient reports no having had a bowel movement for several days but also reports not having had anything to eat for 3-4 days due to his lack of appetite. Patient went to his PCP several days ago and was told to come to the ED today after being called and told he has kidney failure. Patient has taken pepto-bismol OTC without relief. Patient has no known Hx of kidney disease and has never seen a nephrologist. Denies chest pain, fever, chills, hematemesis, hematochezia, diarrhea. Patient also reports having a prior history of drinking up to 4 drinks daily, stopped roughly a month ago. Patient had paracentesis done remove 5 L    Objective:     Visit Vitals  /74   Pulse 84   Temp 98.7 °F (37.1 °C)   Resp 18   Ht 6' 3\" (1.905 m)   Wt 86.2 kg (190 lb)   SpO2 100%   BMI 23.75 kg/m²        Recent Results (from the past 24 hour(s))   AMMONIA    Collection Time: 01/20/22  5:17 PM   Result Value Ref Range    Ammonia 48 (H) <32 umol/L   AMYLASE    Collection Time: 01/20/22  5:17 PM   Result Value Ref Range    Amylase 105 25 - 115 U/L   LACTIC ACID    Collection Time: 01/20/22  5:17 PM   Result Value Ref Range    Lactic acid 2.5 (HH) 0.4 - 2.0 mmol/L     [unfilled]      Review of Systems    Constitutional: Negative for chills and fever. HENT: Negative. Eyes: Negative. Respiratory: Negative. Cardiovascular: Negative. Gastrointestinal: Negative for abdominal pain and nausea. Skin: Negative. Neurological: Negative. Physical Exam:      Constitutional: pt is oriented to person, place, and time. HENT:   Head: Normocephalic and atraumatic. Eyes: Pupils are equal, round, and reactive to light. EOM are normal.   Cardiovascular: Normal rate, regular rhythm and normal heart sounds. Pulmonary/Chest: Breath sounds normal. No wheezes. No rales. Exhibits no tenderness. Abdominal: Soft. Bowel sounds are normal. There is no abdominal tenderness. There is no rebound and no guarding. Musculoskeletal: Normal range of motion. Neurological: pt is alert and oriented to person, place, and time. US RETROPERITONEUM COMP   Final Result   Normal exam   Incidental note made of large volume ascites and small liver      CT ABD PELV WO CONT   Final Result   Cirrhotic liver. Large amount of ascites. Question mild edematous thickening of the colon wall. No evidence of bowel obstruction. XR CHEST PA LAT   Final Result      IR PARACENTESIS ABD W IMAGE    (Results Pending)        Recent Results (from the past 24 hour(s))   AMMONIA    Collection Time: 01/20/22  5:17 PM   Result Value Ref Range    Ammonia 48 (H) <32 umol/L   AMYLASE    Collection Time: 01/20/22  5:17 PM   Result Value Ref Range    Amylase 105 25 - 115 U/L   LACTIC ACID    Collection Time: 01/20/22  5:17 PM   Result Value Ref Range    Lactic acid 2.5 (HH) 0.4 - 2.0 mmol/L       Results     Procedure Component Value Units Date/Time    CULTURE, BODY FLUID Walker Face STAIN [[id number]]     Order Status: Sent Specimen:  Body Fluid from Abdominal Fluid            Labs:     Recent Labs     01/20/22  1126   WBC 5.3   HGB 10.7*   HCT 30.8*   *     Recent Labs     01/20/22  1126   *   K 3.8   CL 96*   CO2 26   BUN 51*   CREA 4.21*   *   CA 8.9     Recent Labs     01/20/22  1717 01/20/22  1126   ALT  --  30   AP  --  121*   TBILI  --  3.6*   TP  --  8.2   ALB  --  2.1*   GLOB  --  6.1*     --    LPSE  --  129     No results for input(s): INR, PTP, APTT, INREXT in the last 72 hours. No results for input(s): FE, TIBC, PSAT, FERR in the last 72 hours. No results found for: FOL, RBCF   No results for input(s): PH, PCO2, PO2 in the last 72 hours. No results for input(s): CPK, CKNDX, TROIQ in the last 72 hours.     No lab exists for component: CPKMB  No results found for: CHOL, CHOLX, CHLST, CHOLV, HDL, HDLP, LDL, LDLC, DLDLP, TGLX, TRIGL, TRIGP, CHHD, CHHDX  No results found for: GLUCPOC  No results found for: COLOR, APPRN, SPGRU, REFSG, YESSI, PROTU, GLUCU, KETU, BILU, UROU, ALDO, LEUKU, GLUKE, EPSU, BACTU, WBCU, RBCU, CASTS, UCRY      Assessment:     Abdominal Pain  GRACIA  Ascites   Cirrhosis   Hypertension  Lactic Acidosis   Elevated Ammonia      Plan:     Continue folic acid thiamine and lactulose  Decrease fluid to 25 cc/h    Follow-up today's lab still pending    If stable possible discharge home tomorrow        Current Facility-Administered Medications:     0.9% sodium chloride infusion, 75 mL/hr, IntraVENous, CONTINUOUS, Lamberto Ackerman MD, Last Rate: 75 mL/hr at 01/20/22 2234, 75 mL/hr at 01/20/22 2234    acetaminophen (TYLENOL) tablet 650 mg, 650 mg, Oral, Q6H PRN, 650 mg at 01/21/22 0802 **OR** acetaminophen (TYLENOL) suppository 650 mg, 650 mg, Rectal, Q6H PRN, Isis Ackerman MD    polyethylene glycol (MIRALAX) packet 17 g, 17 g, Oral, DAILY PRN, Isis Ackerman MD    ondansetron (ZOFRAN ODT) tablet 4 mg, 4 mg, Oral, Q8H PRN **OR** ondansetron (ZOFRAN) injection 4 mg, 4 mg, IntraVENous, Q6H PRN, Lamberto Ackerman MD    lactulose (CHRONULAC) 10 gram/15 mL solution 30 mL, 20 g, Oral, TID, Lamberto Ackerman MD, 30 mL at 01/21/22 0802    thiamine mononitrate (B-1) tablet 100 mg, 100 mg, Oral, DAILY, Lamberto Ackerman MD, 100 mg at 29/78/74 4655    folic acid (FOLVITE) tablet 1 mg, 1 mg, Oral, DAILY, Lamberto Ackerman MD, 1 mg at 01/21/22 9989

## 2022-01-21 NOTE — PROGRESS NOTES
Reason for Admission:  Acute Renal Failure; Cirrhosis of the Liver; and GRACIA                     RUR Score:   12% Low                  Plan for utilizing home health:   No prior HH. PCP: First and Last name:  Kojo Mackenzie NP     Name of Practice:  [facility]   Are you a current patient: Yes/No: Yes   Approximate date of last visit: 19 Jan 2022   Can you participate in a virtual visit with your PCP:                     Current Advanced Directive/Advance Care Plan: Full Code      Healthcare Decision Maker:     Alma Delia Lopez @ [phone] is the primary decision maker. Click here to complete [address] including selection of the Healthcare Decision Maker Relationship (ie \"Primary\")                             Transition of Care Plan:                      Patient resides w/spouse in an apartment on the third floor. No steps to enter the apartment building; however apartment building has an elevator. Last seen in PCP office 19 Jan 2022. [address] [facility]. Independent w/all ADL's & IADL's and requires no assistance at this given time. Patient doesn't drive and relies on family/public transportation. No home O2 or DME. No prior HH, SNF, and or IRF. Will continue to monitor and follow re: discharge disposition. Care Management Interventions  PCP Verified by CM: Yes  Last Visit to PCP: 01/19/22  Mode of Transport at Discharge:  Other (see comment) (Family or cab)  Transition of Care Consult (CM Consult): Discharge Planning  Discharge Durable Medical Equipment: No  Support Systems: Spouse/Significant Other  Confirm Follow Up Transport: Other (see comment) (Family or cab)  Discharge Location  Patient Expects to be Discharged to[de-identified] [address] [place], [place] [id number]

## 2022-01-21 NOTE — PROGRESS NOTES
5260 clear jacqueline fluid drained from abdomen and fluids sent to lab and orders were placed.  25g albumin administered per protocol

## 2022-01-21 NOTE — CONSULTS
Consult    Patient: Denise Greenberg MRN: [id number]  SSN: [id number]    YOB: 1960  Age: 57 y.o. Sex: male      Subjective:      Denise Greenberg is a 57 y.o. 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 Dr Jeremy Ocampo MD  Most history from medical records, patient with alcohol abuse, history drinks 4-5 times a day every day for many years, more drinking recently, comes emergency room with  Severe intermittent low pain, intermittent, lower lower quadrant, with some abdominal bloating, constipation, reported had some vomiting difficult positive urine, a CAT scan showed ascites, had the paracentesis today, patient had a lack of appetite, sometimes Difficult to sleep with this insomnia, blood test emergency show patient also had increase of BUN/creatinine, the [facility] evaluation, he had denied any chest pain, fever, hemoptysis, hematochezia, did not remember if had a colonoscopy done before, no history of varices bleeding,  Past Medical History:   Diagnosis Date    Arthritis     Hypertension      Past Surgical History:   Procedure Laterality Date    IR PARACENTESIS ABD W IMAGE  1/21/2022      History reviewed. No pertinent family history.   Social History     Tobacco Use    Smoking status: Never Smoker    Smokeless tobacco: Never Used   Substance Use Topics    Alcohol use: Not on file      Current Facility-Administered Medications   Medication Dose Route Frequency Provider Last Rate Last Admin    0.9% sodium chloride infusion  25 mL/hr IntraVENous CONTINUOUS Lamberto Ackerman MD 25 mL/hr at 01/21/22 1221 25 mL/hr at 01/21/22 1221    acetaminophen (TYLENOL) tablet 650 mg  650 mg Oral Q6H PRN Lamberto Ackerman MD   650 mg at 01/21/22 0802    Or    acetaminophen (TYLENOL) suppository 650 mg  650 mg Rectal Q6H PRN Branden Ackerman MD        polyethylene glycol (MIRALAX) packet 17 g  17 g Oral DAILY PRN MD Dwayne Wayne ondansetron (ZOFRAN ODT) tablet 4 mg  4 mg Oral Q8H PRN Otf Ackerman MD        Or    ondansetron [facility]) injection 4 mg  4 mg IntraVENous Q6H PRN Otf Ackerman MD        lactulose (CHRONULAC) 10 gram/15 mL solution 30 mL  20 g Oral TID Lamberto Ackerman MD   30 mL at 01/21/22 0802    thiamine mononitrate (B-1) tablet 100 mg  100 mg Oral DAILY Lamberto Ackerman MD   100 mg at 87/52/20 1324    folic acid (FOLVITE) tablet 1 mg  1 mg Oral DAILY Lamberto Ackerman MD   1 mg at 01/21/22 0802        No Known Allergies    Review of Systems:  Review of Systems   Constitutional: Positive for malaise/fatigue. HENT: Negative. Eyes: Negative. Respiratory: Negative. Cardiovascular: Negative. Gastrointestinal: Positive for abdominal pain, constipation and diarrhea. Genitourinary: Positive for frequency. Musculoskeletal: Positive for neck pain. Skin: Negative. Neurological: Positive for dizziness. Psychiatric/Behavioral: Positive for substance abuse. Objective:     Vitals:    01/20/22 1407 01/20/22 1550 01/20/22 2018 01/21/22 0725   BP: (!) 144/112 (!) 126/99 123/83 109/74   Pulse: 96 84 88 84   Resp: 20 18 18 18   Temp:   97.8 °F (36.6 °C) 98.7 °F (37.1 °C)   SpO2: 100% 95% 100% 100%   Weight:       Height:            Physical Exam:  Physical Exam  Constitutional:       Appearance: He is ill-appearing. HENT:      Head: Atraumatic. Mouth/Throat:      Mouth: Mucous membranes are dry. Eyes:      General: No scleral icterus. Cardiovascular:      Rate and Rhythm: Normal rate. Pulses: Normal pulses. Pulmonary:      Effort: Pulmonary effort is normal.   Abdominal:      General: Abdomen is flat. Bowel sounds are normal.      Tenderness: There is abdominal tenderness. Musculoskeletal:         General: Normal range of motion. Cervical back: Normal range of motion. Neurological:      General: No focal deficit present. Mental Status: Mental status is at baseline. Psychiatric:         Mood and Affect: Mood normal.          Recent Results (from the past 24 hour(s))   AMMONIA    Collection Time: 01/20/22  5:17 PM   Result Value Ref Range    Ammonia 48 (H) <32 umol/L   AMYLASE    Collection Time: 01/20/22  5:17 PM   Result Value Ref Range    Amylase 105 25 - 115 U/L   LACTIC ACID    Collection Time: 01/20/22  5:17 PM   Result Value Ref Range    Lactic acid 2.5 (HH) 0.4 - 2.0 mmol/L   CELL COUNT, BODY FLUID    Collection Time: 01/21/22 12:00 PM   Result Value Ref Range    BODY FLUID TYPE Abdominal fluid      FLUID COLOR Yellow      FLUID APPEARANCE Cloudy      FLUID RBC CT. >100 (H) 0 /cu mm    FLUID NUCLEATED CELLS 658 /cu mm   METABOLIC PANEL, COMPREHENSIVE    Collection Time: 01/21/22 12:11 PM   Result Value Ref Range    Sodium 136 136 - 145 mmol/L    Potassium 3.4 (L) 3.5 - 5.1 mmol/L    Chloride 101 97 - 108 mmol/L    CO2 24 21 - 32 mmol/L    Anion gap 11 5 - 15 mmol/L    Glucose 105 (H) 65 - 100 mg/dL    BUN 45 (H) 6 - 20 mg/dL    Creatinine 3.15 (H) 0.70 - 1.30 mg/dL    BUN/Creatinine ratio 14 12 - 20      GFR est AA 24 (L) >60 ml/min/1.73m2    GFR est non-AA 20 (L) >60 ml/min/1.73m2    Calcium 8.7 8.5 - 10.1 mg/dL    Bilirubin, total 3.3 (H) 0.2 - 1.0 mg/dL    AST (SGOT) 70 (H) 15 - 37 U/L    ALT (SGPT) 25 12 - 78 U/L    Alk. phosphatase 96 45 - 117 U/L    Protein, total 7.1 6.4 - 8.2 g/dL    Albumin 2.0 (L) 3.5 - 5.0 g/dL    Globulin 5.1 (H) 2.0 - 4.0 g/dL    A-G Ratio 0.4 (L) 1.1 - 2.2     AMMONIA    Collection Time: 01/21/22 12:11 PM   Result Value Ref Range    Ammonia 65 (H) <32 umol/L        IR PARACENTESIS ABD W IMAGE   Final Result   Successful ultrasound-guided paracentesis of large volume ascites, with   approximately 5.3 L serous fluid removed. Appropriate amount of the albumin was given per protocol.       US RETROPERITONEUM COMP   Final Result   Normal exam   Incidental note made of large volume ascites and small liver      CT ABD PELV WO CONT   Final Result   Cirrhotic liver. Large amount of ascites. Question mild edematous thickening of the colon wall. No evidence of bowel obstruction.             XR CHEST PA LAT   Final Result         Assessment:     Hospital Problems  Never Reviewed          Codes Class Noted POA    Acute renal failure (ARF) ([facility] ICD-10-CM: N17.9  ICD-9-CM: 584.9  1/20/2022 Unknown        Cirrhosis of liver ([facility] ICD-10-CM: K74.60  ICD-9-CM: 571.5  1/20/2022 Unknown        GRACIA (acute kidney injury) ([facility] ICD-10-CM: N17.9  ICD-9-CM: 584.9  1/20/2022 Unknown            Abdominal Pain, could be from SBP,    Ascites stone femoral history of alcohol use, likely due to the alcohol abuse Cirrhosis     Lactic Acidosis, rule out sepsis,  Plan:   Paracentesis to be sent, follow the fluid test result,  Antibiotic coverage  Alcohol withdrawal protocol  Time spent with patient regarding abstain from alcohol use    Need EGD and a colonoscopy for colon cancer screening, esophageal varices screening,  Which can be done as outpatient,    Signed By: Checo Ca MD     January 21, 2022         Thank you for allowing me to participate in this patients care  Cc Referring Physician   Belgica Mccauley NP

## 2022-01-22 NOTE — PROGRESS NOTES
Bedside and Verbal shift change report given to Raghu Marroquin RN (oncoming nurse) by Terry Garcia LPN (offgoing nurse). Report included the following information SBAR, Kardex, Intake/Output, MAR, Accordion, Recent Results and Med Rec Status.

## 2022-01-22 NOTE — PROGRESS NOTES
General Daily Progress Note          Patient Name:   Minh Uribe       YOB: 1960       Age:  57 y.o. Admit Date: 1/20/2022      Subjective:     Minh Uribe is a 57 y.o. male with a PMH of hypertension, gout, and arthritis who presented to the ED with a chief complain of abdominal pain. Patient states the pain is mostly in LLQ but occasionally is diffuse. Patient reports this pain being on and off for weeks. Patient reports N, vomiting, dysuria. Patient reports no having had a bowel movement for several days but also reports not having had anything to eat for 3-4 days due to his lack of appetite. Patient went to his PCP several days ago and was told to come to the ED today after being called and told he has kidney failure. Patient has taken pepto-bismol OTC without relief. Patient has no known Hx of kidney disease and has never seen a nephrologist. Denies chest pain, fever, chills, hematemesis, hematochezia, diarrhea. Patient also reports having a prior history of drinking up to 4 drinks daily, stopped roughly a month ago.           Patient had paracentesis done remove 5 L    Objective:     Visit Vitals  /75 (BP 1 Location: Left upper arm, BP Patient Position: Lying)   Pulse 87   Temp 97.8 °F (36.6 °C)   Resp 18   Ht 6' 3\" (1.905 m)   Wt 86.2 kg (190 lb)   SpO2 100%   BMI 23.75 kg/m²        Recent Results (from the past 24 hour(s))   METABOLIC PANEL, COMPREHENSIVE    Collection Time: 01/21/22  5:12 PM   Result Value Ref Range    Sodium 136 136 - 145 mmol/L    Potassium 2.6 (LL) 3.5 - 5.1 mmol/L    Chloride 100 97 - 108 mmol/L    CO2 26 21 - 32 mmol/L    Anion gap 10 5 - 15 mmol/L    Glucose 108 (H) 65 - 100 mg/dL    BUN 43 (H) 6 - 20 mg/dL    Creatinine 2.75 (H) 0.70 - 1.30 mg/dL    BUN/Creatinine ratio 16 12 - 20      GFR est AA 29 (L) >60 ml/min/1.73m2    GFR est non-AA 24 (L) >60 ml/min/1.73m2    Calcium 8.8 8.5 - 10.1 mg/dL    Bilirubin, total 3.4 (H) 0.2 - 1.0 mg/dL    AST (SGOT) 67 (H) 15 - 37 U/L    ALT (SGPT) 28 12 - 78 U/L    Alk. phosphatase 107 45 - 117 U/L    Protein, total 7.6 6.4 - 8.2 g/dL    Albumin 2.3 (L) 3.5 - 5.0 g/dL    Globulin 5.3 (H) 2.0 - 4.0 g/dL    A-G Ratio 0.4 (L) 1.1 - 2.2     RENAL FUNCTION PANEL    Collection Time: 01/21/22  5:12 PM   Result Value Ref Range    Sodium 136 136 - 145 mmol/L    Potassium 2.6 (LL) 3.5 - 5.1 mmol/L    Chloride 101 97 - 108 mmol/L    CO2 26 21 - 32 mmol/L    Anion gap 9 5 - 15 mmol/L    Glucose 107 (H) 65 - 100 mg/dL    BUN 42 (H) 6 - 20 mg/dL    Creatinine 2.52 (H) 0.70 - 1.30 mg/dL    BUN/Creatinine ratio 17 12 - 20      GFR est AA 32 (L) >60 ml/min/1.73m2    GFR est non-AA 26 (L) >60 ml/min/1.73m2    Calcium 8.7 8.5 - 10.1 mg/dL    Phosphorus 3.4 2.6 - 4.7 mg/dL    Albumin 2.2 (L) 3.5 - 5.0 g/dL   CBC WITH AUTOMATED DIFF    Collection Time: 01/21/22  5:12 PM   Result Value Ref Range    WBC 8.0 4.1 - 11.1 K/uL    RBC 3.31 (L) 4.10 - 5.70 M/uL    HGB 10.1 (L) 12.1 - 17.0 g/dL    HCT 28.7 (L) 36.6 - 50.3 %    MCV 86.7 80.0 - 99.0 FL    MCH 30.5 26.0 - 34.0 PG    MCHC 35.2 30.0 - 36.5 g/dL    RDW 18.2 (H) 11.5 - 14.5 %    PLATELET 125 (L) 520 - 400 K/uL    MPV 11.6 8.9 - 12.9 FL    NRBC 0.0 0.0  WBC    ABSOLUTE NRBC 0.00 0.00 - 0.01 K/uL    NEUTROPHILS 83 (H) 32 - 75 %    LYMPHOCYTES 9 (L) 12 - 49 %    MONOCYTES 8 5 - 13 %    EOSINOPHILS 0 0 - 7 %    BASOPHILS 0 0 - 1 %    IMMATURE GRANULOCYTES 0 0 - 0.5 %    ABS. NEUTROPHILS 6.6 1.8 - 8.0 K/UL    ABS. LYMPHOCYTES 0.7 (L) 0.8 - 3.5 K/UL    ABS. MONOCYTES 0.6 0.0 - 1.0 K/UL    ABS. EOSINOPHILS 0.0 0.0 - 0.4 K/UL    ABS. BASOPHILS 0.0 0.0 - 0.1 K/UL    ABS. IMM.  GRANS. 0.0 0.00 - 0.04 K/UL    DF AUTOMATED     CBC W/O DIFF    Collection Time: 01/22/22  5:05 AM   Result Value Ref Range    WBC 8.1 4.1 - 11.1 K/uL    RBC 2.98 (L) 4.10 - 5.70 M/uL    HGB 9.2 (L) 12.1 - 17.0 g/dL    HCT 26.2 (L) 36.6 - 50.3 %    MCV 87.9 80.0 - 99.0 FL    MCH 30.9 26.0 - 34.0 PG    MCHC 35.1 30.0 - 36.5 g/dL    RDW 18.4 (H) 11.5 - 14.5 %    PLATELET 083 (L) 345 - 400 K/uL    MPV 11.6 8.9 - 12.9 FL    NRBC 0.0 0.0  WBC    ABSOLUTE NRBC 0.00 0.00 - 4.67 K/uL   METABOLIC PANEL, COMPREHENSIVE    Collection Time: 01/22/22  5:05 AM   Result Value Ref Range    Sodium 135 (L) 136 - 145 mmol/L    Potassium 3.4 (L) 3.5 - 5.1 mmol/L    Chloride 102 97 - 108 mmol/L    CO2 23 21 - 32 mmol/L    Anion gap 10 5 - 15 mmol/L    Glucose 117 (H) 65 - 100 mg/dL    BUN 35 (H) 6 - 20 mg/dL    Creatinine 2.21 (H) 0.70 - 1.30 mg/dL    BUN/Creatinine ratio 16 12 - 20      GFR est AA 37 (L) >60 ml/min/1.73m2    GFR est non-AA 30 (L) >60 ml/min/1.73m2    Calcium 8.5 8.5 - 10.1 mg/dL    Bilirubin, total 2.3 (H) 0.2 - 1.0 mg/dL    AST (SGOT) 47 (H) 15 - 37 U/L    ALT (SGPT) 23 12 - 78 U/L    Alk. phosphatase 82 45 - 117 U/L    Protein, total 6.1 (L) 6.4 - 8.2 g/dL    Albumin 1.7 (L) 3.5 - 5.0 g/dL    Globulin 4.4 (H) 2.0 - 4.0 g/dL    A-G Ratio 0.4 (L) 1.1 - 2.2     URIC ACID    Collection Time: 01/22/22  5:05 AM   Result Value Ref Range    Uric acid 8.7 (H) 3.5 - 7.2 mg/dL     [unfilled]      Review of Systems    Constitutional: Negative for chills and fever. HENT: Negative. Eyes: Negative. Respiratory: Negative. Cardiovascular: Negative. Gastrointestinal: Negative for abdominal pain and nausea. Skin: Negative. Neurological: Negative. Physical Exam:      Constitutional: pt is oriented to person, place, and time. HENT:   Head: Normocephalic and atraumatic. Eyes: Pupils are equal, round, and reactive to light. EOM are normal.   Cardiovascular: Normal rate, regular rhythm and normal heart sounds. Pulmonary/Chest: Breath sounds normal. No wheezes. No rales. Exhibits no tenderness. Abdominal: Soft. Bowel sounds are normal. There is no abdominal tenderness. There is no rebound and no guarding. Musculoskeletal: Normal range of motion.    Neurological: pt is alert and oriented to person, place, and time.     IR PARACENTESIS ABD W IMAGE   Final Result   Successful ultrasound-guided paracentesis of large volume ascites, with   approximately 5.3 L serous fluid removed. Appropriate amount of the albumin was given per protocol. US RETROPERITONEUM COMP   Final Result   Normal exam   Incidental note made of large volume ascites and small liver      CT ABD PELV WO CONT   Final Result   Cirrhotic liver. Large amount of ascites. Question mild edematous thickening of the colon wall. No evidence of bowel obstruction. XR CHEST PA LAT   Final Result           Recent Results (from the past 24 hour(s))   METABOLIC PANEL, COMPREHENSIVE    Collection Time: 01/21/22  5:12 PM   Result Value Ref Range    Sodium 136 136 - 145 mmol/L    Potassium 2.6 (LL) 3.5 - 5.1 mmol/L    Chloride 100 97 - 108 mmol/L    CO2 26 21 - 32 mmol/L    Anion gap 10 5 - 15 mmol/L    Glucose 108 (H) 65 - 100 mg/dL    BUN 43 (H) 6 - 20 mg/dL    Creatinine 2.75 (H) 0.70 - 1.30 mg/dL    BUN/Creatinine ratio 16 12 - 20      GFR est AA 29 (L) >60 ml/min/1.73m2    GFR est non-AA 24 (L) >60 ml/min/1.73m2    Calcium 8.8 8.5 - 10.1 mg/dL    Bilirubin, total 3.4 (H) 0.2 - 1.0 mg/dL    AST (SGOT) 67 (H) 15 - 37 U/L    ALT (SGPT) 28 12 - 78 U/L    Alk.  phosphatase 107 45 - 117 U/L    Protein, total 7.6 6.4 - 8.2 g/dL    Albumin 2.3 (L) 3.5 - 5.0 g/dL    Globulin 5.3 (H) 2.0 - 4.0 g/dL    A-G Ratio 0.4 (L) 1.1 - 2.2     RENAL FUNCTION PANEL    Collection Time: 01/21/22  5:12 PM   Result Value Ref Range    Sodium 136 136 - 145 mmol/L    Potassium 2.6 (LL) 3.5 - 5.1 mmol/L    Chloride 101 97 - 108 mmol/L    CO2 26 21 - 32 mmol/L    Anion gap 9 5 - 15 mmol/L    Glucose 107 (H) 65 - 100 mg/dL    BUN 42 (H) 6 - 20 mg/dL    Creatinine 2.52 (H) 0.70 - 1.30 mg/dL    BUN/Creatinine ratio 17 12 - 20      GFR est AA 32 (L) >60 ml/min/1.73m2    GFR est non-AA 26 (L) >60 ml/min/1.73m2    Calcium 8.7 8.5 - 10.1 mg/dL    Phosphorus 3.4 2.6 - 4.7 mg/dL    Albumin 2.2 (L) 3.5 - 5.0 g/dL   CBC WITH AUTOMATED DIFF    Collection Time: 01/21/22  5:12 PM   Result Value Ref Range    WBC 8.0 4.1 - 11.1 K/uL    RBC 3.31 (L) 4.10 - 5.70 M/uL    HGB 10.1 (L) 12.1 - 17.0 g/dL    HCT 28.7 (L) 36.6 - 50.3 %    MCV 86.7 80.0 - 99.0 FL    MCH 30.5 26.0 - 34.0 PG    MCHC 35.2 30.0 - 36.5 g/dL    RDW 18.2 (H) 11.5 - 14.5 %    PLATELET 955 (L) 008 - 400 K/uL    MPV 11.6 8.9 - 12.9 FL    NRBC 0.0 0.0  WBC    ABSOLUTE NRBC 0.00 0.00 - 0.01 K/uL    NEUTROPHILS 83 (H) 32 - 75 %    LYMPHOCYTES 9 (L) 12 - 49 %    MONOCYTES 8 5 - 13 %    EOSINOPHILS 0 0 - 7 %    BASOPHILS 0 0 - 1 %    IMMATURE GRANULOCYTES 0 0 - 0.5 %    ABS. NEUTROPHILS 6.6 1.8 - 8.0 K/UL    ABS. LYMPHOCYTES 0.7 (L) 0.8 - 3.5 K/UL    ABS. MONOCYTES 0.6 0.0 - 1.0 K/UL    ABS. EOSINOPHILS 0.0 0.0 - 0.4 K/UL    ABS. BASOPHILS 0.0 0.0 - 0.1 K/UL    ABS. IMM.  GRANS. 0.0 0.00 - 0.04 K/UL    DF AUTOMATED     CBC W/O DIFF    Collection Time: 01/22/22  5:05 AM   Result Value Ref Range    WBC 8.1 4.1 - 11.1 K/uL    RBC 2.98 (L) 4.10 - 5.70 M/uL    HGB 9.2 (L) 12.1 - 17.0 g/dL    HCT 26.2 (L) 36.6 - 50.3 %    MCV 87.9 80.0 - 99.0 FL    MCH 30.9 26.0 - 34.0 PG    MCHC 35.1 30.0 - 36.5 g/dL    RDW 18.4 (H) 11.5 - 14.5 %    PLATELET 932 (L) 243 - 400 K/uL    MPV 11.6 8.9 - 12.9 FL    NRBC 0.0 0.0  WBC    ABSOLUTE NRBC 0.00 0.00 - 0.29 K/uL   METABOLIC PANEL, COMPREHENSIVE    Collection Time: 01/22/22  5:05 AM   Result Value Ref Range    Sodium 135 (L) 136 - 145 mmol/L    Potassium 3.4 (L) 3.5 - 5.1 mmol/L    Chloride 102 97 - 108 mmol/L    CO2 23 21 - 32 mmol/L    Anion gap 10 5 - 15 mmol/L    Glucose 117 (H) 65 - 100 mg/dL    BUN 35 (H) 6 - 20 mg/dL    Creatinine 2.21 (H) 0.70 - 1.30 mg/dL    BUN/Creatinine ratio 16 12 - 20      GFR est AA 37 (L) >60 ml/min/1.73m2    GFR est non-AA 30 (L) >60 ml/min/1.73m2    Calcium 8.5 8.5 - 10.1 mg/dL    Bilirubin, total 2.3 (H) 0.2 - 1.0 mg/dL    AST (SGOT) 47 (H) 15 - 37 U/L ALT (SGPT) 23 12 - 78 U/L    Alk. phosphatase 82 45 - 117 U/L    Protein, total 6.1 (L) 6.4 - 8.2 g/dL    Albumin 1.7 (L) 3.5 - 5.0 g/dL    Globulin 4.4 (H) 2.0 - 4.0 g/dL    A-G Ratio 0.4 (L) 1.1 - 2.2     URIC ACID    Collection Time: 01/22/22  5:05 AM   Result Value Ref Range    Uric acid 8.7 (H) 3.5 - 7.2 mg/dL       Results     Procedure Component Value Units Date/Time    CULTURE, BODY FLUID Frederick Rosen STAIN [[id number]] Collected: 01/21/22 1200    Order Status: Completed Specimen: Body Fluid from Abdominal Fluid Updated: 01/22/22 1307     Special Requests: No Special Requests        GRAM STAIN Occasional WBCs seen         No organisms seen        Culture result: No growth thus far              Labs:     Recent Labs     01/22/22  0505 01/21/22 1712   WBC 8.1 8.0   HGB 9.2* 10.1*   HCT 26.2* 28.7*   * 124*     Recent Labs     01/22/22  0505 01/21/22  1712 01/21/22  1211   * 136  136 136   K 3.4* 2.6*  2.6* 3.4*    100  101 101   CO2 23 26  26 24   BUN 35* 43*  42* 45*   CREA 2.21* 2.75*  2.52* 3.15*   * 108*  107* 105*   CA 8.5 8.8  8.7 8.7   PHOS  --  3.4  --    URICA 8.7*  --   --      Recent Labs     01/22/22  0505 01/21/22  1712 01/21/22  1211 01/20/22  1717 01/20/22  1126 01/20/22  1126   ALT 23 28 25  --    < > 30   AP 82 107 96  --    < > 121*   TBILI 2.3* 3.4* 3.3*  --    < > 3.6*   TP 6.1* 7.6 7.1  --    < > 8.2   ALB 1.7* 2.3*  2.2* 2.0*  --    < > 2.1*   GLOB 4.4* 5.3* 5.1*  --    < > 6.1*   AML  --   --   --  105  --   --    LPSE  --   --   --   --   --  129    < > = values in this interval not displayed. No results for input(s): INR, PTP, APTT, INREXT, INREXT in the last 72 hours. No results for input(s): FE, TIBC, PSAT, FERR in the last 72 hours. No results found for: FOL, RBCF   No results for input(s): PH, PCO2, PO2 in the last 72 hours. No results for input(s): CPK, CKNDX, TROIQ in the last 72 hours.     No lab exists for component: CPKMB  No results found for: CHOL, CHOLX, CHLST, CHOLV, HDL, HDLP, LDL, LDLC, DLDLP, TGLX, TRIGL, TRIGP, CHHD, CHHDX  No results found for: GLUCPOC  No results found for: COLOR, APPRN, SPGRU, REFSG, YESSI, PROTU, GLUCU, KETU, BILU, UROU, ALDO, LEUKU, GLUKE, EPSU, BACTU, WBCU, RBCU, CASTS, UCRY      Assessment:     Abdominal Pain  GRACIA  Ascites   Cirrhosis   Hypertension  Lactic Acidosis   Elevated Ammonia  Hypokalemia      Plan:     Continue folic acid thiamine and lactulose  Decrease fluid to 25 cc/h    Follow-up today's lab still pending    Replace potassium monitor renal function    If stable possible discharge home tomorrow        Current Facility-Administered Medications:     0.9% sodium chloride infusion, 25 mL/hr, IntraVENous, CONTINUOUS, Lamberto Ackerman MD, Last Rate: 25 mL/hr at 01/21/22 1221, 25 mL/hr at 01/21/22 1221    acetaminophen (TYLENOL) tablet 650 mg, 650 mg, Oral, Q6H PRN, 650 mg at 01/21/22 0802 **OR** acetaminophen (TYLENOL) suppository 650 mg, 650 mg, Rectal, Q6H PRN, Marah Ackerman MD    polyethylene glycol (MIRALAX) packet 17 g, 17 g, Oral, DAILY PRN, Lamberto Ackerman MD, 17 g at 01/22/22 0930    ondansetron (ZOFRAN ODT) tablet 4 mg, 4 mg, Oral, Q8H PRN **OR** ondansetron (ZOFRAN) injection 4 mg, 4 mg, IntraVENous, Q6H PRN, Lamberto Ackerman MD    lactulose (CHRONULAC) 10 gram/15 mL solution 30 mL, 20 g, Oral, TID, Lamberto Ackerman MD, 30 mL at 01/22/22 0930    thiamine mononitrate (B-1) tablet 100 mg, 100 mg, Oral, DAILY, Lamberto Ackerman MD, 100 mg at 95/21/07 8079    folic acid (FOLVITE) tablet 1 mg, 1 mg, Oral, DAILY, Lamberto Ackerman MD, 1 mg at 01/22/22 0958

## 2022-01-23 NOTE — DISCHARGE INSTRUCTIONS
Discharge Instructions       PATIENT ID: Dat Lakhani  MRN: [id number]   YOB: 1960    DATE OF ADMISSION: 1/20/2022 10:29 AM    DATE OF DISCHARGE: 1/23/2022    PRIMARY CARE PROVIDER: Silas Reyes NP     ATTENDING PHYSICIAN: Ryan Hayes MD  DISCHARGING PROVIDER: Dori Nolan MD    To contact this individual call [phone] and ask the  to page. If unavailable ask to be transferred the Adult Hospitalist Department. DISCHARGE DIAGNOSES alcoholic cirrhosis ascites    CONSULTATIONS: IP CONSULT TO GASTROENTEROLOGY  IP CONSULT TO NEPHROLOGY  IP CONSULT TO INTERVENTIONAL RADIOLOGY    PROCEDURES/SURGERIES: * No surgery found *    PENDING TEST RESULTS:   At the time of discharge the following test results are still pending: Acetic fluids follow-up    FOLLOW UP APPOINTMENTS:   Follow-up Information     Follow up With Specialties Details Why Contact Info    Silas Reyes NP Nurse Practitioner   [address] [place] [address]  [phone]      Silas Reyes NP Nurse Practitioner In 1 week  [address] [place] [address]  [phone]             ADDITIONAL CARE RECOMMENDATIONS: No alcohol    DIET: Renal Diet      ACTIVITY: Activity as tolerated    Wound care: Wound Care Order: submitted to [facility] Please view [email or web address]    EQUIPMENT needed: None      DISCHARGE MEDICATIONS:   See Medication Reconciliation Form    · It is important that you take the medication exactly as they are prescribed. · Keep your medication in the bottles provided by the pharmacist and keep a list of the medication names, dosages, and times to be taken in your wallet. · Do not take other medications without consulting your doctor. NOTIFY YOUR PHYSICIAN FOR ANY OF THE FOLLOWING:   Fever over 101 degrees for 24 hours.    Chest pain, shortness of breath, fever, chills, nausea, vomiting, diarrhea, change in mentation, falling, weakness, bleeding. Severe pain or pain not relieved by medications. Or, any other signs or symptoms that you may have questions about.       DISPOSITION:    Home With:   OT  PT  HH  RN       SNF/Inpatient Rehab/LTAC    Independent/assisted living    Hospice    Other:         PROBLEM LIST Updated:  Yes ***       Signed:   Huang Early MD  1/23/2022  12:36 PM

## 2022-01-23 NOTE — PROGRESS NOTES
Discharge instructions review with patient and wife via AVS, including medications, indications, and needed follow up/appointments. All possessions packed by patient. Peripheral IV removed. Telemetry discontinued per protocol. Leads and box removed. Pt denies SOB and/or discomfort at time of discharge. Discharged via wheelchair to private vehicle and accompanied by staff.

## 2022-01-23 NOTE — PROGRESS NOTES
Progress Note    Patient: Marissa Finley MRN: [id number]  SSN: [id number]    YOB: 1960  Age: 57 y.o. Sex: male      Admit Date: 1/20/2022    LOS: 2 days     Subjective:   Patient examined, no nausea no vomiting less abdominal pain, no blood in stool,  Able to make some urine,  Past Medical History:   Diagnosis Date    Arthritis     Hypertension         Current Facility-Administered Medications:     0.9% sodium chloride infusion, 25 mL/hr, IntraVENous, CONTINUOUS, Lamberto Ackerman MD, Last Rate: 25 mL/hr at 01/21/22 1221, 25 mL/hr at 01/21/22 1221    acetaminophen (TYLENOL) tablet 650 mg, 650 mg, Oral, Q6H PRN, 650 mg at 01/21/22 0802 **OR** acetaminophen (TYLENOL) suppository 650 mg, 650 mg, Rectal, Q6H PRN, Lamberto Ackerman MD    polyethylene glycol (MIRALAX) packet 17 g, 17 g, Oral, DAILY PRN, Lamberto Ackerman MD, 17 g at 01/22/22 0930    ondansetron (ZOFRAN ODT) tablet 4 mg, 4 mg, Oral, Q8H PRN **OR** ondansetron (ZOFRAN) injection 4 mg, 4 mg, IntraVENous, Q6H PRN, Lamberto Ackerman MD    lactulose (CHRONULAC) 10 gram/15 mL solution 30 mL, 20 g, Oral, TID, Lamberto Ackerman MD, 30 mL at 01/22/22 1956    thiamine mononitrate (B-1) tablet 100 mg, 100 mg, Oral, DAILY, Lamberto Ackerman MD, 100 mg at 43/61/99 3155    folic acid (FOLVITE) tablet 1 mg, 1 mg, Oral, DAILY, Lamberto Ackerman MD, 1 mg at 01/22/22 0930    Objective:     Vitals:    01/21/22 2049 01/22/22 0020 01/22/22 0744 01/22/22 1951   BP: (!) 146/78 (!) 133/99 109/75 (!) 125/90   Pulse: 88 90 87 89   Resp: 18 18 18 18   Temp: 97.8 °F (36.6 °C) 97.4 °F (36.3 °C) 97.8 °F (36.6 °C) 98.4 °F (36.9 °C)   SpO2: 98% 100% 100% 100%   Weight:       Height:            Intake and Output:  Current Shift: No intake/output data recorded. Last three shifts: No intake/output data recorded. Physical Exam:   Physical Exam  Constitutional:       Appearance: He is ill-appearing.    HENT:      Mouth/Throat:      Mouth: Mucous membranes are dry. Eyes:      General: No scleral icterus. Cardiovascular:      Rate and Rhythm: Normal rate. Pulses: Normal pulses. Pulmonary:      Effort: Pulmonary effort is normal.   Abdominal:      General: Bowel sounds are normal. There is distension. Tenderness: There is abdominal tenderness. Musculoskeletal:      Cervical back: Normal range of motion. Right lower leg: Edema present. Left lower leg: Edema present. Skin:     General: Skin is warm. Capillary Refill: Capillary refill takes less than 2 seconds. Neurological:      General: No focal deficit present. Mental Status: Mental status is at baseline. Motor: Weakness present. Lab/Data Review:  Recent Results (from the past 24 hour(s))   CBC W/O DIFF    Collection Time: 01/22/22  5:05 AM   Result Value Ref Range    WBC 8.1 4.1 - 11.1 K/uL    RBC 2.98 (L) 4.10 - 5.70 M/uL    HGB 9.2 (L) 12.1 - 17.0 g/dL    HCT 26.2 (L) 36.6 - 50.3 %    MCV 87.9 80.0 - 99.0 FL    MCH 30.9 26.0 - 34.0 PG    MCHC 35.1 30.0 - 36.5 g/dL    RDW 18.4 (H) 11.5 - 14.5 %    PLATELET 360 (L) 897 - 400 K/uL    MPV 11.6 8.9 - 12.9 FL    NRBC 0.0 0.0  WBC    ABSOLUTE NRBC 0.00 0.00 - 1.73 K/uL   METABOLIC PANEL, COMPREHENSIVE    Collection Time: 01/22/22  5:05 AM   Result Value Ref Range    Sodium 135 (L) 136 - 145 mmol/L    Potassium 3.4 (L) 3.5 - 5.1 mmol/L    Chloride 102 97 - 108 mmol/L    CO2 23 21 - 32 mmol/L    Anion gap 10 5 - 15 mmol/L    Glucose 117 (H) 65 - 100 mg/dL    BUN 35 (H) 6 - 20 mg/dL    Creatinine 2.21 (H) 0.70 - 1.30 mg/dL    BUN/Creatinine ratio 16 12 - 20      GFR est AA 37 (L) >60 ml/min/1.73m2    GFR est non-AA 30 (L) >60 ml/min/1.73m2    Calcium 8.5 8.5 - 10.1 mg/dL    Bilirubin, total 2.3 (H) 0.2 - 1.0 mg/dL    AST (SGOT) 47 (H) 15 - 37 U/L    ALT (SGPT) 23 12 - 78 U/L    Alk.  phosphatase 82 45 - 117 U/L    Protein, total 6.1 (L) 6.4 - 8.2 g/dL    Albumin 1.7 (L) 3.5 - 5.0 g/dL    Globulin 4.4 (H) 2.0 - 4.0 g/dL    A-G Ratio 0.4 (L) 1.1 - 2.2     URIC ACID    Collection Time: 01/22/22  5:05 AM   Result Value Ref Range    Uric acid 8.7 (H) 3.5 - 7.2 mg/dL        IR PARACENTESIS ABD W IMAGE   Final Result   Successful ultrasound-guided paracentesis of large volume ascites, with   approximately 5.3 L serous fluid removed. Appropriate amount of the albumin was given per protocol. US RETROPERITONEUM COMP   Final Result   Normal exam   Incidental note made of large volume ascites and small liver      CT ABD PELV WO CONT   Final Result   Cirrhotic liver. Large amount of ascites. Question mild edematous thickening of the colon wall. No evidence of bowel obstruction.             XR CHEST PA LAT   Final Result           Assessment:     Active Problems:    Acute renal failure (ARF) ([address] (1/20/2022)      Cirrhosis of liver ([address] (1/20/2022)      GRCAIA (acute kidney injury) ([facility] (1/20/2022)        Abdominal Pain, could be from SBP, peritoneal fluid showed WBC of 275, consistent with SBP     Ascites stone femoral history of alcohol use, likely due to the alcohol abuse Cirrhosis      Lactic Acidosis, rule out sepsis, resolving  Plan:   Paracentesis to be sent, follow the fluid test result,  Antibiotic coverage, Rocephin 1 g IV.  3 days  Alcohol withdrawal protocol  Time spent with patient regarding abstain from alcohol use     Need EGD and a colonoscopy for colon cancer screening, esophageal varices screening,  Which can be done as outpatient,    Plan:       Signed By: Meme Carvalho MD     January 22, 2022        Thank you for allowing me to participate in this patients care  Cc Referring Physician   Javier Jacome NP

## 2022-01-23 NOTE — DISCHARGE SUMMARY
Discharge Summary       PATIENT ID: Lexy Montero  MRN: [id number]   YOB: 1960    DATE OF ADMISSION: 1/20/2022 10:29 AM    DATE OF DISCHARGE:   PRIMARY CARE PROVIDER: Deshawn Galvin NP     ATTENDING PHYSICIAN: Colin Poe  DISCHARGING PROVIDER: Olivia Ackerman      CONSULTATIONS: IP CONSULT TO GASTROENTEROLOGY  IP CONSULT TO NEPHROLOGY  IP CONSULT TO INTERVENTIONAL RADIOLOGY    PROCEDURES/SURGERIES: * No surgery found *    ADMITTING DIAGNOSES:    Patient Active Problem List    Diagnosis Date Noted    Acute renal failure (ARF) ([facility] 01/20/2022    [facility] liver [facility] 01/20/2022    GRACIA (acute kidney injury) ([facility] 01/20/2022       DISCHARGE DIAGNOSES / PLAN:      Abdominal Pain  GRACIA  Ascites   Cirrhosis   Hypertension  Lactic Acidosis   Elevated Ammonia  Hypokalemia        DISCHARGE MEDICATIONS:  Current Discharge Medication List      START taking these medications    Details   lactulose (CHRONULAC) 10 gram/15 mL solution Take 15 mL by mouth three (3) times daily. Qty: 200 mL, Refills: 0  Start date: 1/23/2022         CONTINUE these medications which have NOT CHANGED    Details   allopurinoL (ZYLOPRIM) 300 mg tablet Take 1 Tablet by mouth daily. thiamine HCL (B-1) 100 mg tablet Take 100 mg by mouth daily. propranoloL (INDERAL) 20 mg tablet Take 20 mg by mouth two (2) times a day. folic acid (FOLVITE) 1 mg tablet Take 1 mg by mouth daily. famotidine (PEPCID) 20 mg tablet Take 20 mg by mouth At bedtime. aMILoride (MIDAMOR) 5 mg tablet Take 5 mg by mouth daily. STOP taking these medications       indomethacin (INDOCIN) 50 mg capsule Comments:   Reason for Stopping:         furosemide (LASIX) 40 mg tablet Comments:   Reason for Stopping:                 NOTIFY YOUR PHYSICIAN FOR ANY OF THE FOLLOWING:   Fever over 101 degrees for 24 hours.    Chest pain, shortness of breath, fever, chills, nausea, vomiting, diarrhea, change in mentation, falling, weakness, bleeding. Severe pain or pain not relieved by medications. Or, any other signs or symptoms that you may have questions about. DISPOSITION:  x  Home With:   OT  PT  HH  RN       Long term SNF/Inpatient Rehab    Independent/assisted living    Hospice    Other:       PATIENT CONDITION AT DISCHARGE: Stable      PHYSICAL EXAMINATION AT DISCHARGE:  General:          Alert, cooperative, no distress, appears stated age. HEENT:           Atraumatic, anicteric sclerae, pink conjunctivae                          No oral ulcers, mucosa moist, throat clear, dentition fair  Neck:               Supple, symmetrical  Lungs:             Clear to auscultation bilaterally. No Wheezing or Rhonchi. No rales. Chest wall:      No tenderness  No Accessory muscle use. Heart:              Regular  rhythm,  No  murmur   No edema  Abdomen:        Soft, non-tender. Not distended. Bowel sounds normal  Extremities:     No cyanosis. No clubbing,                            Skin turgor normal, Capillary refill normal  Skin:                Not pale. Not Jaundiced  No rashes   Psych:             Not anxious or agitated. Neurologic:      Alert, moves all extremities, answers questions appropriately and responds to commands     IR PARACENTESIS ABD W IMAGE   Final Result   Successful ultrasound-guided paracentesis of large volume ascites, with   approximately 5.3 L serous fluid removed. Appropriate amount of the albumin was given per protocol. US RETROPERITONEUM COMP   Final Result   Normal exam   Incidental note made of large volume ascites and small liver      CT ABD PELV WO CONT   Final Result   Cirrhotic liver. Large amount of ascites. Question mild edematous thickening of the colon wall. No evidence of bowel obstruction. XR CHEST PA LAT   Final Result           No results found for this or any previous visit (from the past 24 hour(s)).        HOSPITAL COURSE:    Torito Rangel is a 57 y.o. male with a PMH of hypertension, gout, and arthritis who presented to the ED with a chief complain of abdominal pain. Patient states the pain is mostly in LLQ but occasionally is diffuse. Patient reports this pain being on and off for weeks. Patient reports N, vomiting, dysuria. Patient reports no having had a bowel movement for several days but also reports not having had anything to eat for 3-4 days due to his lack of appetite. Patient went to his PCP several days ago and was told to come to the ED today after being called and told he has kidney failure. Patient has taken pepto-bismol OTC without relief. Patient has no known Hx of kidney disease and has never seen a nephrologist. Denies chest pain, fever, chills, hematemesis, hematochezia, diarrhea.  Patient also reports having a prior history of drinking up to 4 drinks daily, stopped roughly a month ago    Patient denies any chest pain or shortness of breath nausea vomiting diarrhea no constipation patient had paracentesis while in the hospital 5 L of fluid was removed seen by the GI and nephrology    Detailed discussion with the patient regarding quit drinking as sciatic fluid was sent out to the lab results still pending on    Patient need to follow-up with the GI follow-up with the pending results and PCP      Medication reconciliation done time discharge patient 35-minute 50% time spent counseling and coordination of care      Signed:   Mary Kay Horner MD  1/23/2022  12:36 PM

## 2022-01-23 NOTE — CONSULTS
NEPHROLOGY PROGRESS NOTE      Continues to have good urine output. No problems reported. Current Facility-Administered Medications   Medication Dose Route Frequency    cefTRIAXone (ROCEPHIN) 1 g in sterile water (preservative free) 10 mL IV syringe  1 g IntraVENous Q24H    0.9% sodium chloride infusion  25 mL/hr IntraVENous CONTINUOUS    acetaminophen (TYLENOL) tablet 650 mg  650 mg Oral Q6H PRN    Or    acetaminophen (TYLENOL) suppository 650 mg  650 mg Rectal Q6H PRN    polyethylene glycol (MIRALAX) packet 17 g  17 g Oral DAILY PRN    ondansetron (ZOFRAN ODT) tablet 4 mg  4 mg Oral Q8H PRN    Or    ondansetron (ZOFRAN) injection 4 mg  4 mg IntraVENous Q6H PRN    lactulose (CHRONULAC) 10 gram/15 mL solution 30 mL  20 g Oral TID    thiamine mononitrate (B-1) tablet 100 mg  100 mg Oral DAILY    folic acid (FOLVITE) tablet 1 mg  1 mg Oral DAILY        Past Medical History:   Diagnosis Date    Arthritis     Hypertension       Past Surgical History:   Procedure Laterality Date    IR PARACENTESIS ABD W IMAGE  1/21/2022     History reviewed. No pertinent family history. Social History     Tobacco Use    Smoking status: Never Smoker    Smokeless tobacco: Never Used   Substance Use Topics    Alcohol use: Not on file         Review of Systems    Review of Systems   Respiratory: Negative for cough and shortness of breath. Cardiovascular: Negative for chest pain and leg swelling. Gastrointestinal: Positive for abdominal pain. Neurological: Negative for dizziness and headaches. Physical Exam:     Physical Exam  Cardiovascular:      Rate and Rhythm: Regular rhythm. Pulmonary:      Breath sounds: Normal breath sounds. Abdominal:      General: Bowel sounds are normal.      Palpations: Abdomen is soft. Musculoskeletal:         General: No swelling. Skin:     General: Skin is warm. Neurological:      General: No focal deficit present. Mental Status: He is alert.      No asterixis  He had ascites. Patient Vitals for the past 8 hrs:   BP Temp Pulse Resp SpO2   01/23/22 0746 (!) 151/64 98.5 °F (36.9 °C) 100 18 100 %     No intake/output data recorded. No intake/output data recorded. IR PARACENTESIS ABD W IMAGE   Final Result   Successful ultrasound-guided paracentesis of large volume ascites, with   approximately 5.3 L serous fluid removed. Appropriate amount of the albumin was given per protocol. US RETROPERITONEUM COMP   Final Result   Normal exam   Incidental note made of large volume ascites and small liver      CT ABD PELV WO CONT   Final Result   Cirrhotic liver. Large amount of ascites. Question mild edematous thickening of the colon wall. No evidence of bowel obstruction. XR CHEST PA LAT   Final Result           Data Review   Recent Labs     01/22/22  0505 01/21/22 1712   WBC 8.1 8.0   HGB 9.2* 10.1*   HCT 26.2* 28.7*   * 124*     Recent Labs     01/22/22  0505 01/21/22  1712 01/21/22  1211   * 136  136 136   K 3.4* 2.6*  2.6* 3.4*    100  101 101   CO2 23 26  26 24   * 108*  107* 105*   BUN 35* 43*  42* 45*   CREA 2.21* 2.75*  2.52* 3.15*   CA 8.5 8.6  8.8  8.7 8.7   PHOS  --  3.4  --    ALB 1.7* 2.3*  2.2* 2.0*   ALT 23 28 25     No components found for: GLPOC  No results for input(s): PH, PCO2, PO2, HCO3, FIO2 in the last 72 hours. No results for input(s): INR, INREXT, INREXT, INREXT in the last 72 hours. Assessment:           Active Problems:    Acute renal failure (ARF) ([address] (1/20/2022)      Cirrhosis of liver ([address] (1/20/2022)      GRACIA (acute kidney injury) ([address] (1/20/2022)    Acute kidney injury versus  GRACIA on an element of chronic kidney disease .      Possible cirrhosis of liver    Hypokalemia    Normocytic anemia    Modest thrombocytopenia    Plan:     Renal function at baseline  Fu with nephrology clinic following discharge

## 2022-02-24 PROBLEM — G40.901 STATUS EPILEPTICUS (HCC): Status: ACTIVE | Noted: 2022-01-01

## 2022-02-24 NOTE — ED TRIAGE NOTES
Pt coming from home, family called EMS for full-body seizure lasting ~20mins. Ems gave versed 5mg on scene. Pt postictal en route and in triage.

## 2022-02-24 NOTE — ED NOTES
1758 7.5 ETT placed by Yom, 25 at lip, positive color change, bilateral breath sounds auscultated.  CXR ordered

## 2022-02-24 NOTE — ED PROVIDER NOTES
EMERGENCY DEPARTMENT HISTORY AND PHYSICAL EXAM      Date: 2/24/2022  Patient Name: Danay Gallegos      History of Presenting Illness     Chief Complaint   Patient presents with    Seizure       History Provided By: EMS    HPI: Danay Gallegos, 60 y.o. male with a past medical history significant for Cirrhosis, ?seizures presents to the ED with cc of seizure. Had ~20min GTC at home prior to EMS arrival, resolved with 5mg versed. Unable to obtain further history 2/2 pt condition. There are no other complaints, changes, or physical findings at this time. PCP: Lala Dietrich NP    Current Outpatient Medications   Medication Sig Dispense Refill    lactulose (CHRONULAC) 10 gram/15 mL solution Take 15 mL by mouth three (3) times daily. 200 mL 0    allopurinoL (ZYLOPRIM) 300 mg tablet Take 1 Tablet by mouth daily.  thiamine HCL (B-1) 100 mg tablet Take 100 mg by mouth daily.  propranoloL (INDERAL) 20 mg tablet Take 20 mg by mouth two (2) times a day.  folic acid (FOLVITE) 1 mg tablet Take 1 mg by mouth daily.  famotidine (PEPCID) 20 mg tablet Take 20 mg by mouth At bedtime.  aMILoride (MIDAMOR) 5 mg tablet Take 5 mg by mouth daily. Past History     Past Medical History:  Past Medical History:   Diagnosis Date    Arthritis     Hypertension        Past Surgical History:  Past Surgical History:   Procedure Laterality Date    IR PARACENTESIS ABD W IMAGE  1/21/2022       Family History:  No family history on file.     Social History:  Social History     Tobacco Use    Smoking status: Never Smoker    Smokeless tobacco: Never Used   Substance Use Topics    Alcohol use: Not on file    Drug use: Not on file       Allergies:  No Known Allergies      Review of Systems   Quyen 2/2 pt somnolence    Physical Exam   Constitutional: Somnolent, will breathe more deeply to sternal rub  Eyes: PERRL, pinpoint after versed, no injection or scleral icterus, no discharge  HEENT: NCAT, neck supple, MMM, no oropharyngeal exudates  CV: RRR, no m/r/g  Respiratory: CTAB, no r/r/w  GI: Abd soft, nondistended, nontender  : Deferred  MSK: Pitting edema BLE to knees  Skin: No rashes  Neuro: Somnolent, flaccid throughout. Lab and Diagnostic Study Results     Labs -   No results found for this or any previous visit (from the past 12 hour(s)). Radiologic Studies -   [unfilled]  CT Results  (Last 48 hours)      None          CXR Results  (Last 48 hours)      None            Medical Decision Making and ED Course   - I am the first and primary provider for this patient AND AM THE PRIMARY PROVIDER OF RECORD. - I reviewed the vital signs, available nursing notes, past medical history, past surgical history, family history and social history. - Initial assessment performed. The patients presenting problems have been discussed, and the staff are in agreement with the care plan formulated and outlined with them. I have encouraged them to ask questions as they arise throughout their visit. Vital Signs-Reviewed the patient's vital signs. Patient Vitals for the past 12 hrs:   Temp Pulse Resp BP SpO2   02/24/22 1624 98.8 °F (37.1 °C) (!) 109 28 123/84 93 %       EKG interpretation:       Provider Notes (Medical Decision Making):   62M w/prolonged seizure. Will get bloodwork, CT Head to eval for bleed, threshhold-lowering cause. Given AMS and prolonged nature, dispo likely admit. Will load with keppra. Unclear seizure history as none seen in history. ED Course:       ED Course as of 02/24/22 1823   Thu Feb 24, 2022   1730 CT HEAD WO CONT    IMPRESSION  Age-appropriate atrophy. No acute findings. [YA]   1730 Potassium(!): 3.2 [YA]   1730 Ammonia(!): 111 [YA]   1730 Creatinine(!): 4.12  Slightly worse than baseline. [YA]   64 Wife at bedside giving additional history, had seizure 2 yrs ago, never put on AEDs, has never had EtOH w/d seizures but is a daily drinker.  Only recent illness is diarrhea for past 2wks, will get CTAP w/o contrast to eval and admit. [YA]   1800 Admitted to Dr. Sol Nunez. [YA]      ED Course User Index  [YA] Sancho Vivas MD     Patient had GTC unresolved w/ativan 2->4mg, 4g keppra. Intubated for airway protection. ~550pm    Consultations:     Hospitalist/Intensivisit Dr. Sol Nunez. Procedures and Critical Care     Intubation    Date/Time: 2/24/2022 8:55 PM  Performed by: Sancho Vivas MD  Authorized by: Sancho Vivas MD     Consent:     Consent obtained:  Emergent situation  Procedure details:     Laryngoscope blade: Mac 4    Tube size (mm):  7.5    Tube type:  Cuffed    Number of attempts:  1  Placement assessment:     ETT to lip:  25    Breath sounds:  Equal and absent over the epigastrium    Placement verification: direct visualization and equal breath sounds    Post-procedure details:     Patient tolerance of procedure: Tolerated well, no immediate complications        CRITICAL CARE NOTE :  6:24 PM  Amount of Critical Care Time: 35(minutes)    IMPENDING DETERIORATION -Airway and CNS  ASSOCIATED RISK FACTORS - CNS Decompensation  MANAGEMENT- Bedside Assessment  INTERPRETATION -  CT Scan and Blood Pressure  INTERVENTIONS - Neurologic interventions   CASE REVIEW - Hospitalist/Intensivist  TREATMENT RESPONSE -Stable  PERFORMED BY - Self    NOTES   :  I have spent critical care time involved in lab review, consultations with specialist, family decision- making, bedside attention and documentation. This time excludes time spent in any separate billed procedures. During this entire length of time I was immediately available to the patient . Pavan Garcia MD      Disposition     Disposition: Admitted to ICU Medical ICU the case was discussed with the admitting physician Dr. Sol Nunez. Admitted      Diagnosis     Clinical Impression:   1. Seizure [facility])        Attestations:     Pavan Garcia MD

## 2022-02-24 NOTE — H&P
Hospitalist Admission Note    NAME: Marissa Finley   :  1960   MRN:  [id number]     Date/Time:  2022 6:21 PM    Patient PCP: Ayo Schneider NP  ______________________________________________________________________  Given the patient's current clinical presentation, I have a high level of concern for decompensation if discharged from the emergency department. Complex decision making was performed, which includes reviewing the patient's available past medical records, laboratory results, and x-ray films. Subjective:   CHIEF COMPLAINT: Seizures    HISTORY OF PRESENT ILLNESS:     Patient is intubated, so hx obtained from ED notes. Geovanny Mercer is a 60 y.o. Male with hx of cirrhosis, seizure disorder not on any medications, alcohol abuse, gout,  presented to ED with c/o seizures. As per patient's wife, patient was diagnosed with seizure 2 years ago, but was not placed on any anti epileptics. Patient is daily drinker. Patient had Generalized tonic clonic seizures for 20 minutes at home prior to EMS arrival, resolved with 5 mg of versed. In ED initially, patient was somnolent, again had seizure episode. Seizure was unresolved with > 4 mg of ativan. Patient was given 4gm if keppra. Was intubated for airway protection and started on propofol. We were asked to admit for work up and evaluation of the above problems. Past Medical History:   Diagnosis Date    Arthritis     Hypertension         Past Surgical History:   Procedure Laterality Date    IR PARACENTESIS ABD W IMAGE  2022       Social History     Tobacco Use    Smoking status: Never Smoker    Smokeless tobacco: Never Used   Substance Use Topics    Alcohol use: Not on file        No family history on file. Family hx reviewed and not pertinent to patient's presentation  No Known Allergies     Prior to Admission medications    Medication Sig Start Date End Date Taking?  Authorizing Provider   lactulose (CHRONULAC) 10 gram/15 mL solution Take 15 mL by mouth three (3) times daily. 1/23/22   Olivia Ackerman MD   allopurinoL (ZYLOPRIM) 300 mg tablet Take 1 Tablet by mouth daily. 1/18/22   Provider, Historical   thiamine HCL (B-1) 100 mg tablet Take 100 mg by mouth daily. Provider, Historical   propranoloL (INDERAL) 20 mg tablet Take 20 mg by mouth two (2) times a day. Provider, Historical   folic acid (FOLVITE) 1 mg tablet Take 1 mg by mouth daily. Provider, Historical   famotidine (PEPCID) 20 mg tablet Take 20 mg by mouth At bedtime. Provider, Historical   aMILoride (MIDAMOR) 5 mg tablet Take 5 mg by mouth daily. Provider, Historical       REVIEW OF SYSTEMS:     I am not able to complete the review of systems because: The patient is intubated and sedated   x The patient has altered mental status due to his acute medical problems    The patient has baseline aphasia from prior stroke(s)    The patient has baseline dementia and is not reliable historian    The patient is in acute medical distress and unable to provide information           Objective:   VITALS:    Visit Vitals  BP (!) 142/96   Pulse 90   Temp 98.8 °F (37.1 °C)   Resp 20   Ht 6' 2\" (1.88 m)   Wt 86.2 kg (190 lb)   SpO2 100%   BMI 24.39 kg/m²       PHYSICAL EXAM:    General:    Sedated and intubated    HEENT: Atraumatic, anicteric sclerae, pink conjunctivae     No oral ulcers, mucosa moist, throat clear, dentition fair  Neck:  Supple, symmetrical,  thyroid: non tender  Lungs:   Clear to auscultation bilaterally. No Wheezing or Rhonchi. No rales. Heart:   Regular  rhythm,  No  murmur   No edema  Abdomen:   Soft, distended  Extremities: No cyanosis. No clubbing,  B/L pedal edema    Skin turgor normal, Capillary refill normal, Radial dial pulse 2+  Skin:     Not pale.   Not Jaundiced  No rashes   Psych:  intubated  Neurologic: Sedated and intubated    _______________________________________________________________________      My assessment of this patient's clinical condition and my plan of care is as follows. Assessment / Plan:  Status epilepticus  Patient was intubated in ED and started on propofol  Continue propofol  Continue keppra 1000 mg iv BID  Will consult neurology    Sepsis:  Patient had white count and tachycardia, with no clear source of sepsis. Start zosyn empirically for aspiration pneumonia  CXR pending  Will get CT abdomen and pelvis to rule out infection as abdomen is distended    Acute hypoxic respiratory failure  Intubated for air way protection  Started on zosyn empirically for aspiration pneumonia  Will consult pulmonary    Acute encephalopathy  S/s seizures, hepatic encephalopathy contributing  Continue lactulose    Acute hypokalemia:  Will replete potassium    GRACIA on CKD  Start IV hydration  Trial of albumin  Will consult nephrology    Liver cirrhosis/ascites  Continue lactulose  Will order paracentesis to rule out SBP      Code Status: Full code  DVT Prophylaxis: heparin  GI Prophylaxis: not indicated          ________________________________________________________________________    I personally spent   35  minutes of critical care in patient's care. This is time spent at  patient's bedside actively involved in patient care as well as the coordination of care and discussions with the patient's family. This does not include any procedural time which has been billed separately. ________________________________________________________________________  Signed: Krystyna Ware MD    Procedures: see electronic medical records for all procedures/Xrays and details which were not copied into this note but were reviewed prior to creation of Plan.     LAB DATA REVIEWED:    Recent Results (from the past 24 hour(s))   CBC WITH AUTOMATED DIFF    Collection Time: 02/24/22  4:26 PM   Result Value Ref Range    WBC 12.6 (H) 4.1 - 11.1 K/uL    RBC 3.61 (L) 4.10 - 5.70 M/uL    HGB 10.7 (L) 12.1 - 17.0 g/dL    HCT 31.9 (L) 36.6 - 50.3 %    MCV 88.4 80.0 - 99.0 FL    MCH 29.6 26.0 - 34.0 PG    MCHC 33.5 30.0 - 36.5 g/dL    RDW 16.2 (H) 11.5 - 14.5 %    PLATELET 89 (L) 142 - 400 K/uL    MPV 11.1 8.9 - 12.9 FL    NRBC 0.0 0.0  WBC    ABSOLUTE NRBC 0.00 0.00 - 0.01 K/uL    NEUTROPHILS 75 32 - 75 %    LYMPHOCYTES 15 12 - 49 %    MONOCYTES 8 5 - 13 %    EOSINOPHILS 1 0 - 7 %    BASOPHILS 1 0 - 1 %    IMMATURE GRANULOCYTES 0 0 - 0.5 %    ABS. NEUTROPHILS 9.5 (H) 1.8 - 8.0 K/UL    ABS. LYMPHOCYTES 1.9 0.8 - 3.5 K/UL    ABS. MONOCYTES 1.0 0.0 - 1.0 K/UL    ABS. EOSINOPHILS 0.1 0.0 - 0.4 K/UL    ABS. BASOPHILS 0.1 0.0 - 0.1 K/UL    ABS. IMM. GRANS. 0.0 0.00 - 0.04 K/UL    DF AUTOMATED     PROTHROMBIN TIME + INR    Collection Time: 02/24/22  4:26 PM   Result Value Ref Range    Prothrombin time 16.5 (H) 11.9 - 14.6 sec    INR 1.4 (H) 0.9 - 1.1     METABOLIC PANEL, COMPREHENSIVE    Collection Time: 02/24/22  4:26 PM   Result Value Ref Range    Sodium 136 136 - 145 mmol/L    Potassium 3.2 (L) 3.5 - 5.1 mmol/L    Chloride 105 97 - 108 mmol/L    CO2 20 (L) 21 - 32 mmol/L    Anion gap 11 5 - 15 mmol/L    Glucose 114 (H) 65 - 100 mg/dL    BUN 34 (H) 6 - 20 mg/dL    Creatinine 4.12 (H) 0.70 - 1.30 mg/dL    BUN/Creatinine ratio 8 (L) 12 - 20      GFR est AA 18 (L) >60 ml/min/1.73m2    GFR est non-AA 15 (L) >60 ml/min/1.73m2    Calcium 8.9 8.5 - 10.1 mg/dL    Bilirubin, total 1.8 (H) 0.2 - 1.0 mg/dL    AST (SGOT) 39 (H) 15 - 37 U/L    ALT (SGPT) 20 12 - 78 U/L    Alk.  phosphatase 95 45 - 117 U/L    Protein, total 7.5 6.4 - 8.2 g/dL    Albumin 2.0 (L) 3.5 - 5.0 g/dL    Globulin 5.5 (H) 2.0 - 4.0 g/dL    A-G Ratio 0.4 (L) 1.1 - 2.2     LIPASE    Collection Time: 02/24/22  4:26 PM   Result Value Ref Range    Lipase 192 73 - 393 U/L   ETHYL ALCOHOL    Collection Time: 02/24/22  4:26 PM   Result Value Ref Range    ALCOHOL(ETHYL),SERUM <4 <10 mg/dL   NT-PRO BNP    Collection Time: 02/24/22  4:26 PM   Result Value Ref Range    NT pro-BNP 1,922 (H) <125 pg/mL   TROPONIN-HIGH SENSITIVITY Collection Time: 02/24/22  4:26 PM   Result Value Ref Range    Troponin-High Sensitivity 34 0 - 76 ng/L   AMMONIA    Collection Time: 02/24/22  4:26 PM   Result Value Ref Range    Ammonia 111 (H) <32 umol/L   GLUCOSE, POC    Collection Time: 02/24/22  4:44 PM   Result Value Ref Range    Glucose (POC) 122 (H) 65 - 117 mg/dL    Performed by Bonnie Sampson    URINALYSIS W/ REFLEX CULTURE    Collection Time: 02/24/22  4:45 PM    Specimen: Urine   Result Value Ref Range    Color Shantelle      Appearance Turbid (A) Clear      Specific gravity 1.020 1.003 - 1.030      pH (UA) 5.0 5.0 - 8.0      Protein 100 (A) Negative mg/dL    Glucose Negative Negative mg/dL    Ketone Negative Negative mg/dL    Bilirubin Negative Negative      Blood Moderate (A) Negative      Urobilinogen 0.1 0.1 - 1.0 EU/dL    Nitrites Negative Negative      Leukocyte Esterase Negative Negative      WBC  0 - 4 /hpf    RBC 20-50 0 - 5 /hpf    Bacteria Negative Negative /hpf    UA:UC IF INDICATED Urine Culture Ordered (A) Culture not indicated by UA result      Mucus 1+ (A) Negative /lpf    CA Oxalate crystals Few (A) Negative      Hyaline cast 10-20 0 - 5 /lpf    Spermatozoa Present (A) Negative

## 2022-02-25 NOTE — CONSULTS
IMPRESSION:   1. Acute hypoxic respiratory failure  2. Aspiration pneumonia  3. Status epilepticus  4. Acute on chronic kidney disease  5. History of cirrhosis EtOH abuse and ascites  6. Additional workup outlined below  7. Pt is at high risk of sudden decline and decompensation with life threatening consequenses and continued end organ dysfunction and failure  8. Pt is critically ill. Time spent with pt and staff actively rendering care, managing pt and coordinating care as stated below; 55 minutes, exclusive of any procedures      RECOMMENDATIONS/PLAN:   1. ICU monitoring  1. Ventilator for mechanical life support and prevent respiratory arrest with protective lung strategies        Patient is on assist control mode rate of 16 PEEP of 8 tidal volume 500 30% FiO2      2. Agree with Empiric IV antibiotics pending culture results on Zosyn  3. Follow culture results  4. No more seizures she is on Keppra  5. CVP monitoring  6. IV vasopressors for circulatory shock refractory to fluids to maintain SBP> 90  7. Chest x-ray so far no acute infiltrate  8. Transfuse prn to maintain Hgb > 7  9. Labs to follow electrolytes, renal function and and blood counts  10. Bronchial hygiene with respiratory therapy techniques, bronchodilators  11. Pt needs IV fluids with additives and Drug therapy requiring intensive monitoring for toxicity  12. Prescription drug management with home med reconciliation reviewed  13. DVT, SUP prophylaxis  14. Will be available to assist in medical management while in the CCU pending disposition     [x] High complexity decision making was performed  [x] See my orders for details  HPI  30-year-old male came in because of seizures significant past medical history of cirrhosis of liver alcohol abuse gout he had paracentesis done in the past also has hypertension and arthritis.   He starting having seizures received medication came to the emergency room subsequently got intubated now he is on propofol sedated unable to get any started the patient is not on any medication at home history of EtOH abuse so admitted and critical care consult was called. PMH:  has a past medical history of Arthritis and Hypertension. PSH:   has a past surgical history that includes ir paracentesis abd w image (1/21/2022). FHX: family history is not on file. SHX:  reports that he has never smoked. He has never used smokeless tobacco.    ALL: No Known Allergies     MEDS:   [x] Reviewed - As Below   [] Not reviewed    Current Facility-Administered Medications   Medication    dexmedeTOMidine (PRECEDEX) 400 mcg in 0.9% sodium chloride (MBP/ADV) 100 mL MBP    famotidine (PF) (PEPCID) 20 mg in 0.9% sodium chloride 10 mL injection    levETIRAcetam in saline (iso-os) (KEPPRA) infusion 1,000 mg    propofol (DIPRIVAN) 10 mg/mL infusion    sodium chloride (NS) flush 5-40 mL    sodium chloride (NS) flush 5-40 mL    acetaminophen (TYLENOL) tablet 650 mg    Or    acetaminophen (TYLENOL) suppository 650 mg    polyethylene glycol (MIRALAX) packet 17 g    ondansetron (ZOFRAN ODT) tablet 4 mg    Or    ondansetron (ZOFRAN) injection 4 mg    enoxaparin (LOVENOX) injection 30 mg    allopurinoL (ZYLOPRIM) tablet 300 mg    aMILoride (MIDAMOR) tablet 5 mg    folic acid (FOLVITE) tablet 1 mg    lactulose (CHRONULAC) 10 gram/15 mL solution 15 mL    thiamine mononitrate (B-1) tablet 100 mg    0.9% sodium chloride infusion    midazolam (VERSED) injection 5 mg    piperacillin-tazobactam (ZOSYN) 3.375 g in 0.9% sodium chloride (MBP/ADV) 100 mL MBP     Current Outpatient Medications   Medication Sig    ergocalciferol (ERGOCALCIFEROL) 1,250 mcg (50,000 unit) capsule Take 1 Capsule by mouth every seven (7) days.  lactulose (CHRONULAC) 10 gram/15 mL solution Take 15 mL by mouth three (3) times daily.  allopurinoL (ZYLOPRIM) 300 mg tablet Take 1 Tablet by mouth daily.  thiamine HCL (B-1) 100 mg tablet Take 100 mg by mouth daily.  propranoloL (INDERAL) 20 mg tablet Take 20 mg by mouth two (2) times a day.  folic acid (FOLVITE) 1 mg tablet Take 1 mg by mouth daily.  famotidine (PEPCID) 20 mg tablet Take 20 mg by mouth At bedtime.  aMILoride (MIDAMOR) 5 mg tablet Take 5 mg by mouth daily. MAR reviewed and pertinent medications noted or modified as needed   Current Facility-Administered Medications   Medication    dexmedeTOMidine (PRECEDEX) 400 mcg in 0.9% sodium chloride (MBP/ADV) 100 mL MBP    famotidine (PF) (PEPCID) 20 mg in 0.9% sodium chloride 10 mL injection    levETIRAcetam in saline (iso-os) (KEPPRA) infusion 1,000 mg    propofol (DIPRIVAN) 10 mg/mL infusion    sodium chloride (NS) flush 5-40 mL    sodium chloride (NS) flush 5-40 mL    acetaminophen (TYLENOL) tablet 650 mg    Or    acetaminophen (TYLENOL) suppository 650 mg    polyethylene glycol (MIRALAX) packet 17 g    ondansetron (ZOFRAN ODT) tablet 4 mg    Or    ondansetron (ZOFRAN) injection 4 mg    enoxaparin (LOVENOX) injection 30 mg    allopurinoL (ZYLOPRIM) tablet 300 mg    aMILoride (MIDAMOR) tablet 5 mg    folic acid (FOLVITE) tablet 1 mg    lactulose (CHRONULAC) 10 gram/15 mL solution 15 mL    thiamine mononitrate (B-1) tablet 100 mg    0.9% sodium chloride infusion    midazolam (VERSED) injection 5 mg    piperacillin-tazobactam (ZOSYN) 3.375 g in 0.9% sodium chloride (MBP/ADV) 100 mL MBP     Current Outpatient Medications   Medication Sig    ergocalciferol (ERGOCALCIFEROL) 1,250 mcg (50,000 unit) capsule Take 1 Capsule by mouth every seven (7) days.  lactulose (CHRONULAC) 10 gram/15 mL solution Take 15 mL by mouth three (3) times daily.  allopurinoL (ZYLOPRIM) 300 mg tablet Take 1 Tablet by mouth daily.  thiamine HCL (B-1) 100 mg tablet Take 100 mg by mouth daily.  propranoloL (INDERAL) 20 mg tablet Take 20 mg by mouth two (2) times a day.  folic acid (FOLVITE) 1 mg tablet Take 1 mg by mouth daily.     famotidine (PEPCID) 20 mg tablet Take 20 mg by mouth At bedtime.  aMILoride (MIDAMOR) 5 mg tablet Take 5 mg by mouth daily. PMH:  has a past medical history of Arthritis and Hypertension. PSH:   has a past surgical history that includes ir paracentesis abd w image (2022). FHX: family history is not on file. SHX:  reports that he has never smoked. He has never used smokeless tobacco.     ROS:  Unable to obtain patient is sedated on ventilator    Hemodynamics:    CO:    CI:    CVP:    SVR:   PAP Systolic:    PAP Diastolic:    PVR:    EC38:        Ventilator Settings:      Mode Rate TV Press PEEP FiO2 PIP Min. Vent   Assist control,Volume control    500 ml    8 cm H20 30 %  23 cm H2O  8.9 l/min        Vital Signs: Telemetry:    normal sinus rhythm Intake/Output:   Visit Vitals  BP 94/73 (BP 1 Location: Right upper arm, BP Patient Position: At rest)   Pulse 88   Temp 97.2 °F (36.2 °C)   Resp 16   Ht 6' 2\" (1.88 m)   Wt 86.2 kg (190 lb)   SpO2 100%   BMI 24.39 kg/m²       Temp (24hrs), Av °F (36.7 °C), Min:97.2 °F (36.2 °C), Max:98.8 °F (37.1 °C)        O2 Device: Ventilator,Endotracheal tube O2 Flow Rate (L/min): 2 l/min       Wt Readings from Last 4 Encounters:   22 86.2 kg (190 lb)   22 86.2 kg (190 lb)        No intake or output data in the 24 hours ending 22 0932    Last shift:      No intake/output data recorded. Last 3 shifts: No intake/output data recorded. Physical Exam:     General:  intubated on vent  HEENT: NCAT, poor dentition, lips and mucosa dry  Eyes: anicteric; conjunctiva clear  Neck: no nodes, no cuff leak, trach midline; no accessory MM use. Chest: no deformity,   Cardiac: R regular; no murmur;   Lungs: distant breath sounds; no wheezes  Abd: soft, NT, hypoactive BS  Ext: no edema; no joint swelling;  No clubbing  : NO fish, clear urine  Neuro: sedated;   Psych-  unable to assess  Skin: warm, dry, no cyanosis;   Pulses: 1-2+ Bilateral pedal, radial  Capillary: brisk; pale      DATA:    MAR reviewed and pertinent medications noted or modified as needed  MEDS:   Current Facility-Administered Medications   Medication    dexmedeTOMidine (PRECEDEX) 400 mcg in 0.9% sodium chloride (MBP/ADV) 100 mL MBP    famotidine (PF) (PEPCID) 20 mg in 0.9% sodium chloride 10 mL injection    levETIRAcetam in saline (iso-os) (KEPPRA) infusion 1,000 mg    propofol (DIPRIVAN) 10 mg/mL infusion    sodium chloride (NS) flush 5-40 mL    sodium chloride (NS) flush 5-40 mL    acetaminophen (TYLENOL) tablet 650 mg    Or    acetaminophen (TYLENOL) suppository 650 mg    polyethylene glycol (MIRALAX) packet 17 g    ondansetron (ZOFRAN ODT) tablet 4 mg    Or    ondansetron (ZOFRAN) injection 4 mg    enoxaparin (LOVENOX) injection 30 mg    allopurinoL (ZYLOPRIM) tablet 300 mg    aMILoride (MIDAMOR) tablet 5 mg    folic acid (FOLVITE) tablet 1 mg    lactulose (CHRONULAC) 10 gram/15 mL solution 15 mL    thiamine mononitrate (B-1) tablet 100 mg    0.9% sodium chloride infusion    midazolam (VERSED) injection 5 mg    piperacillin-tazobactam (ZOSYN) 3.375 g in 0.9% sodium chloride (MBP/ADV) 100 mL MBP     Current Outpatient Medications   Medication Sig    ergocalciferol (ERGOCALCIFEROL) 1,250 mcg (50,000 unit) capsule Take 1 Capsule by mouth every seven (7) days.  lactulose (CHRONULAC) 10 gram/15 mL solution Take 15 mL by mouth three (3) times daily.  allopurinoL (ZYLOPRIM) 300 mg tablet Take 1 Tablet by mouth daily.  thiamine HCL (B-1) 100 mg tablet Take 100 mg by mouth daily.  propranoloL (INDERAL) 20 mg tablet Take 20 mg by mouth two (2) times a day.  folic acid (FOLVITE) 1 mg tablet Take 1 mg by mouth daily.  famotidine (PEPCID) 20 mg tablet Take 20 mg by mouth At bedtime.  aMILoride (MIDAMOR) 5 mg tablet Take 5 mg by mouth daily.         Labs:    Recent Labs     02/25/22  0427 02/24/22  1626   WBC 11.0 12.6*   HGB 9.4* 10.7*   PLT 64* 89*   INR  --  1.4*     Recent Labs     02/25/22  0427 02/24/22  1626    136   K 3.5 3.2*    105   CO2 19* 20*   GLU 97 114*   BUN 35* 34*   CREA 3.52* 4.12*   CA 8.5 8.9   ALB  --  2.0*   ALT  --  20   LPSE  --  192     Recent Labs     02/25/22  0440 02/24/22  2130   PH 7.53* 7.52*   PCO2 25* 24*   PO2 81 59*   HCO3 24 23   FIO2 30.0 30.0     No results for input(s): CPK, CKNDX, TROIQ in the last 72 hours. No lab exists for component: CPKMB  No results found for: BNPP, BNP   Lab Results   Component Value Date/Time    Culture result: No growth 4 days 01/21/2022 12:00 PM     No results found for: TSH, TSHEXT     Imaging:    Results from Hospital Encounter encounter on 02/24/22    XR CHEST PORT    Narrative  EXAMINATION: XR CHEST PORT    HISTORY: Orogastric tube placement    COMPARISON: Multiple chest radiographs performed earlier the same day. FINDINGS: Single view. Endotracheal tube tip is in the midthoracic trachea. Orogastric tube can be followed below the diaphragm. The lungs are clear. There  is no pneumothorax or pleural effusion. There is unchanged prominence of the  aortic arch. Impression  Orogastric tube can be followed below the diaphragm. Results from [place] encounter on 02/24/22    CT ABD PELV WO CONT    Narrative  CT ABD PELV WO CONT    Technique: Noncontrasted CT scan of the abdomen and pelvis was performed  utilizing transaxial images obtained from the dome of the diaphragm to the pubic  symphysis. Coronal and sagittal reconstructions were generated. Dose Reduction:  All CT scans at this facility are performed using dose reduction optimization  techniques as appropriate to a performed exam including the following: Automated  exposure control, adjustments of the mA and/or kV according to patient size, or  use of iterative reconstruction technique. Comparison:  1/20/2022. Findings: There is mild bibasilar atelectasis.  Atherosclerotic calcifications  are present including coronary artery calcifications. Cirrhotic hepatic configuration again evident. The spleen, pancreas, adrenal  glands, and right kidney are unremarkable for noncontrast technique. Cyst of the  posterior lower pole of the left kidney measures 1.6 cm in diameter. Varices are  present. Gallbladder is unremarkable. No biliary duct dilatation apparent. The  abdominal aorta is normal in caliber. There is a large volume of ascites. There is no evidence of bowel obstruction. The appendix is normal. The urinary bladder is empty. No suspicious lytic or blastic skeletal lesion apparent. Impression  Cirrhosis. Varices. Large volume ascites. This care involved high complexity decision making which includes independently reviewing the patient's past medical records, current laboratory results, medication profiles that were immediately available to me and actual Xray images at the bedside in order to assess, support vital system function, and to treat this degree of vital organ system failure, and to prevent further life threatening deterioration of the patients condition. I was in direct communication with the nursing staff throughout this time. Medical Decision Making Today  · Reviewed the flowsheet and previous days notes  · Reviewed and summarized records or history from previous days note or discussions with staff, family  · Parenteral controlled substances - Reviewed/ Adjusted / Judyth Amend / Started  · High Risk Drug therapy requiring intensive monitoring for toxicity: eg steroids, pressors, antibiotics  · Review and order of Clinical lab tests  · Review and Order of Radiology tests  · Review and Order of Medicine tests  · Independent visualization of radiologic Images  · Reviewed Ventilator / NiPPV  · I have personally reviewed the patients ECG / Telemetry  · Diagnostic endoscopies with identified risk factors    I have provided total of 55 minutes of critical care time rendering care exclusive of any procedures. During this entire length of time the patient's condition was unstable, unpredictable and critically ill in the CCU/ ICU. I was immediately available to the patient whose care required several interactions with nursing, multidisciplinary team members leading to multiple interventions with fluid resuscitation and medication adjustments to optimize respiratory support, hemodynamic treatment, medication changes based on repeat labs results, reviews, exams and assessments. The reason for providing this level of medical care was due to a critical illness that impaired one or more vital organ systems, such that there was a high probability of sudden or life threatening deterioration in the patient's condition.

## 2022-02-25 NOTE — ED NOTES
Bedside and Verbal shift change report given to LUIS MIGUEL Hammond(oncoming nurse) by Radha Collins RN (offgoing nurse). Report included the following information SBAR, ED Summary and MAR.

## 2022-02-25 NOTE — PROGRESS NOTES
Hospitalist Progress Note         Adiel Lopes MD          Daily Progress Note: 2/25/2022      Subjective: The patient is seen for follow  up.  65-year-old gentleman with history of alcohol abuse and cirrhosis of the liver admitted for generalized tonic-clonic seizures. Intubated for airway protection. Patient seen in follow-up. Remains intubated and sedated.   No fevers overnight    Problem List:  Problem List as of 2/25/2022 Never Reviewed          Codes Class Noted - Resolved    Status epilepticus ([facility] ICD-10-CM: G40.901  ICD-9-CM: 345.3  2/24/2022 - Present        Acute renal failure (ARF) ([facility] ICD-10-CM: N17.9  ICD-9-CM: 584.9  1/20/2022 - Present        Cirrhosis of liver ([facility] ICD-10-CM: K74.60  ICD-9-CM: 571.5  1/20/2022 - Present        GRACIA (acute kidney injury) ([facility] ICD-10-CM: N17.9  ICD-9-CM: 584.9  1/20/2022 - Present              Medications reviewed  Current Facility-Administered Medications   Medication Dose Route Frequency    dexmedeTOMidine (PRECEDEX) 400 mcg in 0.9% sodium chloride (MBP/ADV) 100 mL MBP  0.1-1.5 mcg/kg/hr IntraVENous TITRATE    famotidine (PF) (PEPCID) 20 mg in 0.9% sodium chloride 10 mL injection  20 mg IntraVENous Q12H    levETIRAcetam in saline (iso-os) (KEPPRA) infusion 1,000 mg  1,000 mg IntraVENous Q12H    propofol (DIPRIVAN) 10 mg/mL infusion  0-50 mcg/kg/min IntraVENous TITRATE    sodium chloride (NS) flush 5-40 mL  5-40 mL IntraVENous Q8H    sodium chloride (NS) flush 5-40 mL  5-40 mL IntraVENous PRN    acetaminophen (TYLENOL) tablet 650 mg  650 mg Oral Q6H PRN    Or    acetaminophen (TYLENOL) suppository 650 mg  650 mg Rectal Q6H PRN    polyethylene glycol (MIRALAX) packet 17 g  17 g Oral DAILY PRN    ondansetron (ZOFRAN ODT) tablet 4 mg  4 mg Oral Q8H PRN    Or    ondansetron (ZOFRAN) injection 4 mg  4 mg IntraVENous Q6H PRN    enoxaparin (LOVENOX) injection 30 mg  30 mg SubCUTAneous DAILY    allopurinoL (ZYLOPRIM) tablet 300 mg  300 mg Oral DAILY    aMILoride (MIDAMOR) tablet 5 mg  5 mg Oral DAILY    folic acid (FOLVITE) tablet 1 mg  1 mg Oral DAILY    lactulose (CHRONULAC) 10 gram/15 mL solution 15 mL  10 g Oral TID    thiamine mononitrate (B-1) tablet 100 mg  100 mg Oral DAILY    0.9% sodium chloride infusion  50 mL/hr IntraVENous CONTINUOUS    midazolam (VERSED) injection 5 mg  5 mg IntraVENous Q4H PRN    piperacillin-tazobactam (ZOSYN) 3.375 g in 0.9% sodium chloride (MBP/ADV) 100 mL MBP  3.375 g IntraVENous Q12H     Current Outpatient Medications   Medication Sig    ergocalciferol (ERGOCALCIFEROL) 1,250 mcg (50,000 unit) capsule Take 1 Capsule by mouth every seven (7) days.  lactulose (CHRONULAC) 10 gram/15 mL solution Take 15 mL by mouth three (3) times daily.  allopurinoL (ZYLOPRIM) 300 mg tablet Take 1 Tablet by mouth daily.  thiamine HCL (B-1) 100 mg tablet Take 100 mg by mouth daily.  propranoloL (INDERAL) 20 mg tablet Take 20 mg by mouth two (2) times a day.  folic acid (FOLVITE) 1 mg tablet Take 1 mg by mouth daily.  famotidine (PEPCID) 20 mg tablet Take 20 mg by mouth At bedtime.  aMILoride (MIDAMOR) 5 mg tablet Take 5 mg by mouth daily. Review of Systems:   Review of systems not obtained due to patient factors. Objective:   Physical Exam:     Visit Vitals  /77 (BP 1 Location: Right upper arm, BP Patient Position: At rest)   Pulse 96   Temp 97.2 °F (36.2 °C)   Resp 16   Ht 6' 2\" (1.88 m)   Wt 86.2 kg (190 lb)   SpO2 100%   BMI 24.39 kg/m²    O2 Flow Rate (L/min): 2 l/min O2 Device: Ventilator    Temp (24hrs), Av °F (36.7 °C), Min:97.2 °F (36.2 °C), Max:98.8 °F (37.1 °C)    No intake/output data recorded. No intake/output data recorded. General:  Alert, cooperative, no distress, appears stated age. Lungs:   Clear to auscultation bilaterally. Chest wall:  No tenderness or deformity.    Heart:  Regular rate and rhythm, S1, S2 normal, no murmur, click, rub or gallop. Abdomen:   Soft, non-tender. Bowel sounds normal. No masses,  No organomegaly. Extremities: Extremities normal, atraumatic, no cyanosis or edema. Pulses: 2+ and symmetric all extremities. Skin: Skin color, texture, turgor normal. No rashes or lesions   Neurologic: CNII-XII intact.  No gross sensory or motor deficits     Data Review:       Recent Days:  Recent Labs     02/25/22  0427 02/24/22  1626   WBC 11.0 12.6*   HGB 9.4* 10.7*   HCT 26.7* 31.9*   PLT 64* 89*     Recent Labs     02/25/22  0427 02/24/22  1626    136   K 3.5 3.2*    105   CO2 19* 20*   GLU 97 114*   BUN 35* 34*   CREA 3.52* 4.12*   CA 8.5 8.9   ALB  --  2.0*   TBILI  --  1.8*   ALT  --  20   INR  --  1.4*     Recent Labs     02/25/22  0440 02/24/22  2130   PH 7.53* 7.52*   PCO2 25* 24*   PO2 81 59*   HCO3 24 23   FIO2 30.0 30.0       24 Hour Results:  Recent Results (from the past 24 hour(s))   EKG, 12 LEAD, INITIAL    Collection Time: 02/24/22  4:24 PM   Result Value Ref Range    Ventricular Rate 113 BPM    Atrial Rate 113 BPM    P-R Interval 174 ms    QRS Duration 80 ms    Q-T Interval 340 ms    QTC Calculation (Bezet) 466 ms    Calculated P Axis 49 degrees    Calculated R Axis -11 degrees    Calculated T Axis 67 degrees    Diagnosis       Sinus tachycardia with Premature atrial complexes  Low voltage QRS  Nonspecific ST and T wave abnormality  Abnormal ECG  When compared with ECG of 20-JAN-2022 11:31,  Premature ventricular complexes are no longer Present    Confirmed by Miguel Angel Murcia MD, [facility] ([id number]) on 2/25/2022 10:18:57 AM     CBC WITH AUTOMATED DIFF    Collection Time: 02/24/22  4:26 PM   Result Value Ref Range    WBC 12.6 (H) 4.1 - 11.1 K/uL    RBC 3.61 (L) 4.10 - 5.70 M/uL    HGB 10.7 (L) 12.1 - 17.0 g/dL    HCT 31.9 (L) 36.6 - 50.3 %    MCV 88.4 80.0 - 99.0 FL    MCH 29.6 26.0 - 34.0 PG    MCHC 33.5 30.0 - 36.5 g/dL    RDW 16.2 (H) 11.5 - 14.5 %    PLATELET 89 (L) 649 - 400 K/uL    MPV 11.1 8.9 - 12.9 FL    NRBC 0.0 0.0  WBC    ABSOLUTE NRBC 0.00 0.00 - 0.01 K/uL    NEUTROPHILS 75 32 - 75 %    LYMPHOCYTES 15 12 - 49 %    MONOCYTES 8 5 - 13 %    EOSINOPHILS 1 0 - 7 %    BASOPHILS 1 0 - 1 %    IMMATURE GRANULOCYTES 0 0 - 0.5 %    ABS. NEUTROPHILS 9.5 (H) 1.8 - 8.0 K/UL    ABS. LYMPHOCYTES 1.9 0.8 - 3.5 K/UL    ABS. MONOCYTES 1.0 0.0 - 1.0 K/UL    ABS. EOSINOPHILS 0.1 0.0 - 0.4 K/UL    ABS. BASOPHILS 0.1 0.0 - 0.1 K/UL    ABS. IMM. GRANS. 0.0 0.00 - 0.04 K/UL    DF AUTOMATED     PROTHROMBIN TIME + INR    Collection Time: 02/24/22  4:26 PM   Result Value Ref Range    Prothrombin time 16.5 (H) 11.9 - 14.6 sec    INR 1.4 (H) 0.9 - 1.1     METABOLIC PANEL, COMPREHENSIVE    Collection Time: 02/24/22  4:26 PM   Result Value Ref Range    Sodium 136 136 - 145 mmol/L    Potassium 3.2 (L) 3.5 - 5.1 mmol/L    Chloride 105 97 - 108 mmol/L    CO2 20 (L) 21 - 32 mmol/L    Anion gap 11 5 - 15 mmol/L    Glucose 114 (H) 65 - 100 mg/dL    BUN 34 (H) 6 - 20 mg/dL    Creatinine 4.12 (H) 0.70 - 1.30 mg/dL    BUN/Creatinine ratio 8 (L) 12 - 20      GFR est AA 18 (L) >60 ml/min/1.73m2    GFR est non-AA 15 (L) >60 ml/min/1.73m2    Calcium 8.9 8.5 - 10.1 mg/dL    Bilirubin, total 1.8 (H) 0.2 - 1.0 mg/dL    AST (SGOT) 39 (H) 15 - 37 U/L    ALT (SGPT) 20 12 - 78 U/L    Alk.  phosphatase 95 45 - 117 U/L    Protein, total 7.5 6.4 - 8.2 g/dL    Albumin 2.0 (L) 3.5 - 5.0 g/dL    Globulin 5.5 (H) 2.0 - 4.0 g/dL    A-G Ratio 0.4 (L) 1.1 - 2.2     LIPASE    Collection Time: 02/24/22  4:26 PM   Result Value Ref Range    Lipase 192 73 - 393 U/L   ETHYL ALCOHOL    Collection Time: 02/24/22  4:26 PM   Result Value Ref Range    ALCOHOL(ETHYL),SERUM <4 <10 mg/dL   NT-PRO BNP    Collection Time: 02/24/22  4:26 PM   Result Value Ref Range    NT pro-BNP 1,922 (H) <125 pg/mL   TROPONIN-HIGH SENSITIVITY    Collection Time: 02/24/22  4:26 PM   Result Value Ref Range    Troponin-High Sensitivity 34 0 - 76 ng/L   AMMONIA    Collection Time: 02/24/22  4:26 PM   Result Value Ref Range    Ammonia 111 (H) <32 umol/L   GLUCOSE, POC    Collection Time: 02/24/22  4:44 PM   Result Value Ref Range    Glucose (POC) 122 (H) 65 - 117 mg/dL    Performed by Bimal Campoverde    URINALYSIS W/ REFLEX CULTURE    Collection Time: 02/24/22  4:45 PM    Specimen: Urine   Result Value Ref Range    Color Shantelle      Appearance Turbid (A) Clear      Specific gravity 1.020 1.003 - 1.030      pH (UA) 5.0 5.0 - 8.0      Protein 100 (A) Negative mg/dL    Glucose Negative Negative mg/dL    Ketone Negative Negative mg/dL    Bilirubin Negative Negative      Blood Moderate (A) Negative      Urobilinogen 0.1 0.1 - 1.0 EU/dL    Nitrites Negative Negative      Leukocyte Esterase Negative Negative      WBC  0 - 4 /hpf    RBC 20-50 0 - 5 /hpf    Bacteria Negative Negative /hpf    UA:UC IF INDICATED Urine Culture Ordered (A) Culture not indicated by UA result      Mucus 1+ (A) Negative /lpf    CA Oxalate crystals Few (A) Negative      Hyaline cast 10-20 0 - 5 /lpf    Spermatozoa Present (A) Negative     BLOOD GAS, ARTERIAL    Collection Time: 02/24/22  9:30 PM   Result Value Ref Range    pH 7.52 (H) 7.35 - 7.45      PCO2 24 (L) 35 - 45 mmHg    PO2 59 (L) 75 - 100 mmHg    O2 SAT 95 (L) >95 %    BICARBONATE 23 22 - 26 mmol/L    BASE DEFICIT 2.1 (H) 0 - 2 mmol/L    O2 METHOD VENT      FIO2 30.0 %    MODE Assist Control/Volume Control      Tidal volume 540      SET RATE 20      EPAP/CPAP/PEEP 8.0      SITE Right Radial      JAM'S TEST PASS     METABOLIC PANEL, BASIC    Collection Time: 02/25/22  4:27 AM   Result Value Ref Range    Sodium 136 136 - 145 mmol/L    Potassium 3.5 3.5 - 5.1 mmol/L    Chloride 108 97 - 108 mmol/L    CO2 19 (L) 21 - 32 mmol/L    Anion gap 9 5 - 15 mmol/L    Glucose 97 65 - 100 mg/dL    BUN 35 (H) 6 - 20 mg/dL    Creatinine 3.52 (H) 0.70 - 1.30 mg/dL    BUN/Creatinine ratio 10 (L) 12 - 20      GFR est AA 21 (L) >60 ml/min/1.73m2    GFR est non-AA 18 (L) >60 ml/min/1.73m2    Calcium 8.5 8.5 - 10.1 mg/dL   CBC W/O DIFF    Collection Time: 02/25/22  4:27 AM   Result Value Ref Range    WBC 11.0 4.1 - 11.1 K/uL    RBC 3.16 (L) 4.10 - 5.70 M/uL    HGB 9.4 (L) 12.1 - 17.0 g/dL    HCT 26.7 (L) 36.6 - 50.3 %    MCV 84.5 80.0 - 99.0 FL    MCH 29.7 26.0 - 34.0 PG    MCHC 35.2 30.0 - 36.5 g/dL    RDW 15.6 (H) 11.5 - 14.5 %    PLATELET 64 (L) 950 - 400 K/uL    MPV 10.7 8.9 - 12.9 FL    NRBC 0.0 0.0  WBC    ABSOLUTE NRBC 0.00 0.00 - 0.01 K/uL   BLOOD GAS, ARTERIAL    Collection Time: 02/25/22  4:40 AM   Result Value Ref Range    pH 7.53 (H) 7.35 - 7.45      PCO2 25 (L) 35 - 45 mmHg    PO2 81 75 - 100 mmHg    O2 SAT 98 >95 %    BICARBONATE 24 22 - 26 mmol/L    BASE DEFICIT 1.1 0 - 2 mmol/L    O2 METHOD VENT      FIO2 30.0 %    MODE Assist Control/Volume Control      Tidal volume 500      SET RATE 16      EPAP/CPAP/PEEP 8.0      SITE Right Radial      JAM'S TEST PASS             Assessment/     Status epilepticus  Acute hypoxic respiratory failure  History of chronic alcohol abuse  Liver cirrhosis  Probably upper GI bleed  Likely acute gastritis      Plan:  Supportive care including ventilator management  Pepcid 20 mg IV twice daily  We will add IV Ativan as needed for alcohol withdrawal  And folate and multivitamin daily  Appreciate neurology's input  Continue IV Keppra twice daily      Care Plan discussed with: Nurse    Total time spent with patient: 30 minutes.     Manoj Cerda MD

## 2022-02-25 NOTE — PROGRESS NOTES
Pt name popped up on my list  A renal consult was already placed for Dr Coral Hall [facility])  I have infomded the hospitalist to cancel my name from the consult list, otherwise by putting two nephrologists from two different groups can create a confusion of which nephrologist needs to see the pt !!!!.  I have texted Dr Meng Hernandez and informed about the consult.

## 2022-02-25 NOTE — CONSULTS
Consult Date: 2/25/2022    Consults Laquita Cuellar is a 60 y.o. Male with hx of cirrhosis, seizure disorder not on any medications, alcohol abuse, gout,  presented to ED with a 20 min GTC. He was given 4g keppra to break the seizure. He was seen by me in 2020 for a first time seizure thought to be from alcohol abuse. No anti epileptics were started, MRI and EEG were normal. He was advised to quit drinking. He was lost to follow up after 2020. Currently patient is sedated on propofol and intubated. Ct head shows generalized atrophy otherwise no acute changes. Past Medical History:   Diagnosis Date    Arthritis     Hypertension       Past Surgical History:   Procedure Laterality Date    IR PARACENTESIS ABD W IMAGE  1/21/2022     No family history on file.    Social History     Tobacco Use    Smoking status: Never Smoker    Smokeless tobacco: Never Used   Substance Use Topics    Alcohol use: Not on file       Current Facility-Administered Medications   Medication Dose Route Frequency Provider Last Rate Last Admin    dexmedeTOMidine (PRECEDEX) 400 mcg in 0.9% sodium chloride (MBP/ADV) 100 mL MBP  0.1-1.5 mcg/kg/hr IntraVENous TITRATE Kady Cason MD        famotidine (PF) (PEPCID) 20 mg in 0.9% sodium chloride 10 mL injection  20 mg IntraVENous Q12H Kady Cason MD   20 mg at 02/25/22 1132    levETIRAcetam in saline (iso-os) (KEPPRA) infusion 1,000 mg  1,000 mg IntraVENous Q12H Bernabe Greenberg MD   IV Completed at 02/25/22 6952    propofol (DIPRIVAN) 10 mg/mL infusion  0-50 mcg/kg/min IntraVENous TITRATE Bernabe Greenberg MD 12.9 mL/hr at 02/25/22 0806 25 mcg/kg/min at 02/25/22 0806    sodium chloride (NS) flush 5-40 mL  5-40 mL IntraVENous Q8H Bernabe Greenbreg MD   10 mL at 02/25/22 1907    sodium chloride (NS) flush 5-40 mL  5-40 mL IntraVENous PRN Bernabe Greenberg MD        acetaminophen (TYLENOL) tablet 650 mg  650 mg Oral Q6H PRN Bernabe Greenberg MD        Or    acetaminophen (TYLENOL) suppository 650 mg  650 mg Rectal Q6H PRN Angela Gupta MD        polyethylene glycol (MIRALAX) packet 17 g  17 g Oral DAILY PRN Angela Gupta MD        ondansetron (ZOFRAN ODT) tablet 4 mg  4 mg Oral Q8H PRN Angela Gupta MD        Or    ondansetron [facility]) injection 4 mg  4 mg IntraVENous Q6H PRN Angela Gupta MD        enoxaparin (LOVENOX) injection 30 mg  30 mg SubCUTAneous DAILY Angela Gupta MD   30 mg at 02/25/22 5339    allopurinoL (ZYLOPRIM) tablet 300 mg  300 mg Oral DAILY Angela Gupta MD        [facility] tablet 5 mg  5 mg Oral DAILY Angela Gupta MD        folic acid (FOLVITE) tablet 1 mg  1 mg Oral DAILY Angela Gupta MD        lactulose (CHRONULAC) 10 gram/15 mL solution 15 mL  10 g Oral TID Angela Gupta MD   15 mL at 02/24/22 2213    thiamine mononitrate (B-1) tablet 100 mg  100 mg Oral DAILY Angela Gupta MD        0.9% sodium chloride infusion  50 mL/hr IntraVENous CONTINUOUS Angela Gupta MD 50 mL/hr at 02/24/22 1836 50 mL/hr at 02/24/22 1836    midazolam (VERSED) injection 5 mg  5 mg IntraVENous Q4H PRN Angela Gupta MD        piperacillin-tazobactam (ZOSYN) 3.375 g in 0.9% sodium chloride (MBP/ADV) 100 mL MBP  3.375 g IntraVENous Q12H Angela Gupta MD 25 mL/hr at 02/25/22 0818 3.375 g at 02/25/22 6863     Current Outpatient Medications   Medication Sig Dispense Refill    ergocalciferol (ERGOCALCIFEROL) 1,250 mcg (50,000 unit) capsule Take 1 Capsule by mouth every seven (7) days.  lactulose (CHRONULAC) 10 gram/15 mL solution Take 15 mL by mouth three (3) times daily. 200 mL 0    allopurinoL (ZYLOPRIM) 300 mg tablet Take 1 Tablet by mouth daily.  thiamine HCL (B-1) 100 mg tablet Take 100 mg by mouth daily.  propranoloL (INDERAL) 20 mg tablet Take 20 mg by mouth two (2) times a day.  folic acid (FOLVITE) 1 mg tablet Take 1 mg by mouth daily.       famotidine (PEPCID) 20 mg tablet Take 20 mg by mouth At bedtime.  aMILoride (MIDAMOR) 5 mg tablet Take 5 mg by mouth daily. Review of Systems   Unable to perform ROS: Intubated       Objective     Vital signs for last 24 hours:  Visit Vitals  BP 94/73 (BP 1 Location: Right upper arm, BP Patient Position: At rest)   Pulse 88   Temp 97.2 °F (36.2 °C)   Resp 16   Ht 6' 2\" (1.88 m)   Wt 86.2 kg (190 lb)   SpO2 100%   BMI 24.39 kg/m²       Intake/Output this shift:  Current Shift: No intake/output data recorded. Last 3 Shifts: No intake/output data recorded. Data Review:   Recent Results (from the past 24 hour(s))   CBC WITH AUTOMATED DIFF    Collection Time: 02/24/22  4:26 PM   Result Value Ref Range    WBC 12.6 (H) 4.1 - 11.1 K/uL    RBC 3.61 (L) 4.10 - 5.70 M/uL    HGB 10.7 (L) 12.1 - 17.0 g/dL    HCT 31.9 (L) 36.6 - 50.3 %    MCV 88.4 80.0 - 99.0 FL    MCH 29.6 26.0 - 34.0 PG    MCHC 33.5 30.0 - 36.5 g/dL    RDW 16.2 (H) 11.5 - 14.5 %    PLATELET 89 (L) 893 - 400 K/uL    MPV 11.1 8.9 - 12.9 FL    NRBC 0.0 0.0  WBC    ABSOLUTE NRBC 0.00 0.00 - 0.01 K/uL    NEUTROPHILS 75 32 - 75 %    LYMPHOCYTES 15 12 - 49 %    MONOCYTES 8 5 - 13 %    EOSINOPHILS 1 0 - 7 %    BASOPHILS 1 0 - 1 %    IMMATURE GRANULOCYTES 0 0 - 0.5 %    ABS. NEUTROPHILS 9.5 (H) 1.8 - 8.0 K/UL    ABS. LYMPHOCYTES 1.9 0.8 - 3.5 K/UL    ABS. MONOCYTES 1.0 0.0 - 1.0 K/UL    ABS. EOSINOPHILS 0.1 0.0 - 0.4 K/UL    ABS. BASOPHILS 0.1 0.0 - 0.1 K/UL    ABS. IMM.  GRANS. 0.0 0.00 - 0.04 K/UL    DF AUTOMATED     PROTHROMBIN TIME + INR    Collection Time: 02/24/22  4:26 PM   Result Value Ref Range    Prothrombin time 16.5 (H) 11.9 - 14.6 sec    INR 1.4 (H) 0.9 - 1.1     METABOLIC PANEL, COMPREHENSIVE    Collection Time: 02/24/22  4:26 PM   Result Value Ref Range    Sodium 136 136 - 145 mmol/L    Potassium 3.2 (L) 3.5 - 5.1 mmol/L    Chloride 105 97 - 108 mmol/L    CO2 20 (L) 21 - 32 mmol/L    Anion gap 11 5 - 15 mmol/L    Glucose 114 (H) 65 - 100 mg/dL    BUN 34 (H) 6 - 20 mg/dL    Creatinine 4.12 (H) 0.70 - 1.30 mg/dL    BUN/Creatinine ratio 8 (L) 12 - 20      GFR est AA 18 (L) >60 ml/min/1.73m2    GFR est non-AA 15 (L) >60 ml/min/1.73m2    Calcium 8.9 8.5 - 10.1 mg/dL    Bilirubin, total 1.8 (H) 0.2 - 1.0 mg/dL    AST (SGOT) 39 (H) 15 - 37 U/L    ALT (SGPT) 20 12 - 78 U/L    Alk.  phosphatase 95 45 - 117 U/L    Protein, total 7.5 6.4 - 8.2 g/dL    Albumin 2.0 (L) 3.5 - 5.0 g/dL    Globulin 5.5 (H) 2.0 - 4.0 g/dL    A-G Ratio 0.4 (L) 1.1 - 2.2     LIPASE    Collection Time: 02/24/22  4:26 PM   Result Value Ref Range    Lipase 192 73 - 393 U/L   ETHYL ALCOHOL    Collection Time: 02/24/22  4:26 PM   Result Value Ref Range    ALCOHOL(ETHYL),SERUM <4 <10 mg/dL   NT-PRO BNP    Collection Time: 02/24/22  4:26 PM   Result Value Ref Range    NT pro-BNP 1,922 (H) <125 pg/mL   TROPONIN-HIGH SENSITIVITY    Collection Time: 02/24/22  4:26 PM   Result Value Ref Range    Troponin-High Sensitivity 34 0 - 76 ng/L   AMMONIA    Collection Time: 02/24/22  4:26 PM   Result Value Ref Range    Ammonia 111 (H) <32 umol/L   GLUCOSE, POC    Collection Time: 02/24/22  4:44 PM   Result Value Ref Range    Glucose (POC) 122 (H) 65 - 117 mg/dL    Performed by [facility]    URINALYSIS W/ REFLEX CULTURE    Collection Time: 02/24/22  4:45 PM    Specimen: Urine   Result Value Ref Range    Color Shantelle      Appearance Turbid (A) Clear      Specific gravity 1.020 1.003 - 1.030      pH (UA) 5.0 5.0 - 8.0      Protein 100 (A) Negative mg/dL    Glucose Negative Negative mg/dL    Ketone Negative Negative mg/dL    Bilirubin Negative Negative      Blood Moderate (A) Negative      Urobilinogen 0.1 0.1 - 1.0 EU/dL    Nitrites Negative Negative      Leukocyte Esterase Negative Negative      WBC  0 - 4 /hpf    RBC 20-50 0 - 5 /hpf    Bacteria Negative Negative /hpf    UA:UC IF INDICATED Urine Culture Ordered (A) Culture not indicated by UA result      Mucus 1+ (A) Negative /lpf    CA Oxalate crystals Few (A) Negative      Hyaline cast 10-20 0 - 5 /lpf    Spermatozoa Present (A) Negative     BLOOD GAS, ARTERIAL    Collection Time: 02/24/22  9:30 PM   Result Value Ref Range    pH 7.52 (H) 7.35 - 7.45      PCO2 24 (L) 35 - 45 mmHg    PO2 59 (L) 75 - 100 mmHg    O2 SAT 95 (L) >95 %    BICARBONATE 23 22 - 26 mmol/L    BASE DEFICIT 2.1 (H) 0 - 2 mmol/L    O2 METHOD VENT      FIO2 30.0 %    MODE Assist Control/Volume Control      Tidal volume 540      SET RATE 20      EPAP/CPAP/PEEP 8.0      SITE Right Radial      JAM'S TEST PASS     METABOLIC PANEL, BASIC    Collection Time: 02/25/22  4:27 AM   Result Value Ref Range    Sodium 136 136 - 145 mmol/L    Potassium 3.5 3.5 - 5.1 mmol/L    Chloride 108 97 - 108 mmol/L    CO2 19 (L) 21 - 32 mmol/L    Anion gap 9 5 - 15 mmol/L    Glucose 97 65 - 100 mg/dL    BUN 35 (H) 6 - 20 mg/dL    Creatinine 3.52 (H) 0.70 - 1.30 mg/dL    BUN/Creatinine ratio 10 (L) 12 - 20      GFR est AA 21 (L) >60 ml/min/1.73m2    GFR est non-AA 18 (L) >60 ml/min/1.73m2    Calcium 8.5 8.5 - 10.1 mg/dL   CBC W/O DIFF    Collection Time: 02/25/22  4:27 AM   Result Value Ref Range    WBC 11.0 4.1 - 11.1 K/uL    RBC 3.16 (L) 4.10 - 5.70 M/uL    HGB 9.4 (L) 12.1 - 17.0 g/dL    HCT 26.7 (L) 36.6 - 50.3 %    MCV 84.5 80.0 - 99.0 FL    MCH 29.7 26.0 - 34.0 PG    MCHC 35.2 30.0 - 36.5 g/dL    RDW 15.6 (H) 11.5 - 14.5 %    PLATELET 64 (L) 170 - 400 K/uL    MPV 10.7 8.9 - 12.9 FL    NRBC 0.0 0.0  WBC    ABSOLUTE NRBC 0.00 0.00 - 0.01 K/uL   BLOOD GAS, ARTERIAL    Collection Time: 02/25/22  4:40 AM   Result Value Ref Range    pH 7.53 (H) 7.35 - 7.45      PCO2 25 (L) 35 - 45 mmHg    PO2 81 75 - 100 mmHg    O2 SAT 98 >95 %    BICARBONATE 24 22 - 26 mmol/L    BASE DEFICIT 1.1 0 - 2 mmol/L    O2 METHOD VENT      FIO2 30.0 %    MODE Assist Control/Volume Control      Tidal volume 500      SET RATE 16      EPAP/CPAP/PEEP 8.0      SITE Right Radial      JAM'S TEST PASS         Physical Exam    Neuro Physical Exam      General: Well developed, well nourished. Patient in no apparent distress. Cardiac: Regular rate and rhythm       Neurological Exam:  Mental Status: Eyes closed, does not open eyes to sternal rub, does not follow any commands. Mild withdrawal to pain in all 4 ext     Assessment and Plan: Mr. Yrn Faye is a 60year old man with status epilepticus. Currently on 1 g bid keppra which I will increase to 1500 mg bid. Wean off propofol as tolerated. Will continue to follow.

## 2022-02-25 NOTE — PROGRESS NOTES
Comprehensive Nutrition Assessment    Type and Reason for Visit: Initial (vent)    Nutrition Recommendations/Plan:   As medically able, initiate TF of Vital 1.2 at 20ml/hr, advance by 10ml, q4hrs to goal of 55ml/hr  Flush 100ml water q4hrs  Provide 3 pkt prosource daily (180kcals, 45g pro)  Provides 1764kcals (90%), 144g pro (100%), and 1671ml water (97%)       Propofol at 25mcg/kg/min providing 341kcals/d (107%)    Document TF initiation, rate, water flushes, and prosource provision. Nutrition Assessment:  Admitted for seizures, intubated for airway protection. +Acute encephalopathy, hypoxia. Came to ICU this afternoon, intubated and sedated on propofol and precedex, no pressors. Spoke to RN who endorses pt with possible GIB,  Dark brown OP from NGT, not appropriate for TF at this time. RD to leave recs if needed over weekend. Labs: H/H 9.4/26.7, BUN 35, Cr 3.52, AST 39, Tbili 1.8 Alb 2.0. Meds: IVF,  Allopurinol, precedex, pepcid, B9, lactulose, keppra, zosyn, propofol at 25mcg/kg/min, B1. Malnutrition Assessment:  Malnutrition Status:  No malnutrition    Context:  Chronic illness     Findings of the 6 clinical characteristics of malnutrition:   Energy Intake:  Unable to assess  Weight Loss:  No significant weight loss     Body Fat Loss:  Unable to assess,     Muscle Mass Loss:  Unable to assess,    Fluid Accumulation:  No significant fluid accumulation,        Estimated Daily Nutrient Needs:  Energy (kcal): 1,960kcals (PSU 2003b); Weight Used for Energy Requirements:    Protein (g): 129-147g (1.5-1.7g/kg); Weight Used for Protein Requirements: Current  Fluid (ml/day): 1724-2000ml (25-30ml/kg); Method Used for Fluid Requirements: 1 ml/kcal      Nutrition Related Findings:  NFPE grossly normal, mild-mod wasting to clavicles, noted large volume ascites with fluid wave. NGT in place, dark brown OP. Unknown hx dysphagia. No N/V/D/C, last BM pta. No edema.         Wounds:    None       Current Nutrition Therapies:  DIET NPO    Anthropometric Measures:  · Height:  6' 2.02\" (188 cm)  · Current Body Wt:  86.2 kg (190 lb 0.6 oz) (2/24)   · Admission Body Wt:  190 lb 0.6 oz (2/24)    · Usual Body Wt:        · Ideal Body Wt:  190 lbs:  100 %   · BMI Category:  Normal weight (BMI 18.5-24. 9)     Wt Readings from Last 5 Encounters:   02/24/22 86.2 kg (190 lb)   01/20/22 86.2 kg (190 lb)   no wt loss per documented wt hx    Nutrition Diagnosis:   · Inadequate oral intake related to impaired respiratory function as evidenced by intubation      Nutrition Interventions:   Food and/or Nutrient Delivery: Continue NPO,Start tube feeding  Nutrition Education and Counseling: No recommendations at this time  Coordination of Nutrition Care: Continue to monitor while inpatient    Goals:  Meet >/=50% of EENs in >5 days, Wt maintenance within +/-0.5kg in >5 days       Nutrition Monitoring and Evaluation:   Behavioral-Environmental Outcomes: None identified  Food/Nutrient Intake Outcomes: Diet advancement/tolerance,Enteral nutrition intake/tolerance  Physical Signs/Symptoms Outcomes: Weight,GI status,Biochemical data,Fluid status or edema,Hemodynamic status    Discharge Planning:     Too soon to determine     Electronically signed by Annalise Barrera on 2/25/2022 at 2:46 PM    Contact: Ext [phone], or via [email or web address]

## 2022-02-25 NOTE — PROGRESS NOTES
2/25/22. UTO interview from pt - Intubated. Wife ( Anastasia Adler @ [phone]) called - message left regarding  Completing assessment.

## 2022-02-25 NOTE — ACP (ADVANCE CARE PLANNING)
Advance Care Planning   Healthcare Decision Maker:       Primary Decision Maker: Chetan Gold - [phone]

## 2022-02-25 NOTE — CONSULTS
Consult Date: 2/25/2022    IP CONSULT TO NEPHROLOGY  Consult performed by: Yany Delcid MD  Consult ordered by: Colin Carmona MD          Subjective   HISTORY OF PRESENTING ILLNESS   71-year-old male with history of cirrhosis, hypertension, arthritis, CKD, history of paracentesis for ascites arrived to ED and is being evaluated for seizures. Intubated for respiratory failure  Treated with Keppra for seizures and then given Ativan as well. Wife was at bedside  Past Medical History:   Diagnosis Date    Arthritis     Hypertension       Past Surgical History:   Procedure Laterality Date    IR PARACENTESIS ABD W IMAGE  1/21/2022     No family history on file.    Social History     Tobacco Use    Smoking status: Never Smoker    Smokeless tobacco: Never Used   Substance Use Topics    Alcohol use: Not on file       Current Facility-Administered Medications   Medication Dose Route Frequency Provider Last Rate Last Admin    dexmedeTOMidine (PRECEDEX) 400 mcg in 0.9% sodium chloride (MBP/ADV) 100 mL MBP  0.1-1.5 mcg/kg/hr IntraVENous TITRATE Gilbert Hutson MD   Held at 02/25/22 5749    famotidine (PF) (PEPCID) 20 mg in 0.9% sodium chloride 10 mL injection  20 mg IntraVENous Q12H Gilbert Hutson MD   20 mg at 02/25/22 5819    levETIRAcetam in saline (iso-os) (KEPPRA) infusion 1,000 mg  1,000 mg IntraVENous Q12H Colin Carmona MD 0 mL/hr at 02/25/22 0633 1,000 mg at 02/25/22 1716    propofol (DIPRIVAN) 10 mg/mL infusion  0-50 mcg/kg/min IntraVENous TITRATE Colin Carmona MD 12.9 mL/hr at 02/25/22 1338 25 mcg/kg/min at 02/25/22 1338    sodium chloride (NS) flush 5-40 mL  5-40 mL IntraVENous Q8H Colin Carmona MD   10 mL at 02/25/22 1717    sodium chloride (NS) flush 5-40 mL  5-40 mL IntraVENous PRN Colin Carmona MD        acetaminophen (TYLENOL) tablet 650 mg  650 mg Oral Q6H PRN Colin Carmona MD        Or    acetaminophen (TYLENOL) suppository 650 mg  650 mg Rectal Q6H PRN Eun MD Chelsey        polyethylene glycol (MIRALAX) packet 17 g  17 g Oral DAILY PRN Colin Carmona MD        ondansetron (ZOFRAN ODT) tablet 4 mg  4 mg Oral Q8H PRN Colin Carmona MD        Or    ondansetron [facility]) injection 4 mg  4 mg IntraVENous Q6H PRN Colin Carmona MD        enoxaparin (LOVENOX) injection 30 mg  30 mg SubCUTAneous DAILY Colin Carmona MD   30 mg at 02/25/22 2091    allopurinoL (ZYLOPRIM) tablet 300 mg  300 mg Oral DAILY Colin Carmona MD        aMILoride [facility]) tablet 5 mg  5 mg Oral DAILY Colin Carmona MD        folic acid (FOLVITE) tablet 1 mg  1 mg Oral DAILY Colin Carmona MD        lactulose (CHRONULAC) 10 gram/15 mL solution 15 mL  10 g Oral TID Colin Carmona MD   15 mL at 02/24/22 2213    thiamine mononitrate (B-1) tablet 100 mg  100 mg Oral DAILY Colin Carmona MD        0.9% sodium chloride infusion  50 mL/hr IntraVENous CONTINUOUS Colin Carmona MD 50 mL/hr at 02/25/22 1457 50 mL/hr at 02/25/22 1457    midazolam (VERSED) injection 5 mg  5 mg IntraVENous Q4H PRN Colin Carmona MD        piperacillin-tazobactam (ZOSYN) 3.375 g in 0.9% sodium chloride (MBP/ADV) 100 mL MBP  3.375 g IntraVENous Q12H Colin Carmona MD 25 mL/hr at 02/25/22 0818 3.375 g at 02/25/22 0818        Review of Systems   Unable to perform ROS: Intubated   Endocrine: Negative for cold intolerance.        Objective     Vital signs for last 24 hours:  Visit Vitals  BP (!) 120/94 (BP 1 Location: Right upper arm, BP Patient Position: At rest)   Pulse 86   Temp 97.2 °F (36.2 °C)   Resp 15   Ht 6' 2.02\" (1.88 m)   Wt 86.2 kg (190 lb)   SpO2 100%   BMI 24.38 kg/m²           Recent Results (from the past 24 hour(s))   BLOOD GAS, ARTERIAL    Collection Time: 02/24/22  9:30 PM   Result Value Ref Range    pH 7.52 (H) 7.35 - 7.45      PCO2 24 (L) 35 - 45 mmHg    PO2 59 (L) 75 - 100 mmHg    O2 SAT 95 (L) >95 %    BICARBONATE 23 22 - 26 mmol/L    BASE DEFICIT 2.1 (H) 0 - 2 mmol/L    O2 METHOD VENT      FIO2 30.0 %    MODE Assist Control/Volume Control      Tidal volume 540      SET RATE 20      EPAP/CPAP/PEEP 8.0      SITE Right Radial      JAM'S TEST PASS     METABOLIC PANEL, BASIC    Collection Time: 02/25/22  4:27 AM   Result Value Ref Range    Sodium 136 136 - 145 mmol/L    Potassium 3.5 3.5 - 5.1 mmol/L    Chloride 108 97 - 108 mmol/L    CO2 19 (L) 21 - 32 mmol/L    Anion gap 9 5 - 15 mmol/L    Glucose 97 65 - 100 mg/dL    BUN 35 (H) 6 - 20 mg/dL    Creatinine 3.52 (H) 0.70 - 1.30 mg/dL    BUN/Creatinine ratio 10 (L) 12 - 20      GFR est AA 21 (L) >60 ml/min/1.73m2    GFR est non-AA 18 (L) >60 ml/min/1.73m2    Calcium 8.5 8.5 - 10.1 mg/dL   CBC W/O DIFF    Collection Time: 02/25/22  4:27 AM   Result Value Ref Range    WBC 11.0 4.1 - 11.1 K/uL    RBC 3.16 (L) 4.10 - 5.70 M/uL    HGB 9.4 (L) 12.1 - 17.0 g/dL    HCT 26.7 (L) 36.6 - 50.3 %    MCV 84.5 80.0 - 99.0 FL    MCH 29.7 26.0 - 34.0 PG    MCHC 35.2 30.0 - 36.5 g/dL    RDW 15.6 (H) 11.5 - 14.5 %    PLATELET 64 (L) 146 - 400 K/uL    MPV 10.7 8.9 - 12.9 FL    NRBC 0.0 0.0  WBC    ABSOLUTE NRBC 0.00 0.00 - 0.01 K/uL   BLOOD GAS, ARTERIAL    Collection Time: 02/25/22  4:40 AM   Result Value Ref Range    pH 7.53 (H) 7.35 - 7.45      PCO2 25 (L) 35 - 45 mmHg    PO2 81 75 - 100 mmHg    O2 SAT 98 >95 %    BICARBONATE 24 22 - 26 mmol/L    BASE DEFICIT 1.1 0 - 2 mmol/L    O2 METHOD VENT      FIO2 30.0 %    MODE Assist Control/Volume Control      Tidal volume 500      SET RATE 16      EPAP/CPAP/PEEP 8.0      SITE Right Radial      JAM'S TEST PASS     ECHO ADULT COMPLETE    Collection Time: 02/25/22 12:15 PM   Result Value Ref Range    LV EDV A4C 22 mL    LV ESV A4C 5 mL    IVSd 1.0 0.6 - 1.0 cm    LVIDd 5.3 4.2 - 5.9 cm    LVIDs 4.9 cm    LVOT Diameter 2.3 cm    LVOT Mean Gradient 10 mmHg    LVOT VTI 32.1 cm    LVOT Peak Velocity 2.2 m/s    LVOT Peak Gradient 20 mmHg    LVPWd 1.0 0.6 - 1.0 cm    LV E' Lateral Velocity 7 cm/s    LV E' Septal Velocity 6 cm/s    LV Ejection Fraction A4C 77 %    LVOT Area 4.2 cm2    LVOT .3 ml    LA Major Axis 4.6 cm    LA Area 4C 11.9 cm2    LA Diameter 2.5 cm    AV Mean Gradient 8 mmHg    AV VTI 26.3 cm    AV Mean Velocity 1.3 m/s    AV Peak Velocity 1.8 m/s    AV Peak Gradient 13 mmHg    AV Area by VTI 5.1 cm2    AV Area by Peak Velocity 5.1 cm2    Aortic Root 3.7 cm    Ascending Aorta 3.2 cm    MR VTI 66.9 cm    MR Peak Velocity 4.4 m/s    MR Peak Gradient 77 mmHg    MV A Velocity 0.98 m/s    MV E Velocity 0.57 m/s    PV Mean Gradient 2 mmHg    PV VTI 16.6 cm    PV Mean Velocity 0.6 m/s    PV Max Velocity 0.8 m/s    PV Peak Gradient 3 mmHg    Fractional Shortening 2D 8 28 - 44 %    LV ESV Index A4C 2 mL/m2    LV EDV Index A4C 10 mL/m2    LVIDd Index 2.49 cm/m2    LVIDs Index 2.30 cm/m2    LV RWT Ratio 0.38     LV Mass 2D 200.4 88 - 224 g    LV Mass 2D Index 94.1 49 - 115 g/m2    MV E/A 0.58     E/E' Ratio (Averaged) 8.82     E/E' Lateral 8.14     E/E' Septal 9.50     LVOT Stroke Volume Index 62.6 mL/m2    LA Size Index 1.17 cm/m2    LA/AO Root Ratio 0.68     Ao Root Index 1.74 cm/m2    Ascending Aorta Index 1.50 cm/m2    AV Velocity Ratio 1.22     LVOT:AV VTI Index 1.22     MYA/BSA VTI 2.4 cm2/m2    MYA/BSA Peak Velocity 2.4 cm2/m2    Est. RA Pressure 8 mmHg    RV Basal Dimension 3.0 cm    RV Mid Dimension 2.8 cm    Posterior dimension 0.9 cm          Intake/Output Summary (Last 24 hours) at 2/25/2022 1836  Last data filed at 2/25/2022 1445  Gross per 24 hour   Intake 1306.46 ml   Output 60 ml   Net 1246.46 ml      Current Shift: 02/25 0701 - 02/25 1900  In: 1306.5 [I.V.:1306.5]  Out: 60 [Urine:60]  Last 3 Shifts: No intake/output data recorded. Physical Exam  Constitutional:       Appearance: He is ill-appearing. Cardiovascular:      Rate and Rhythm: Tachycardia present. Abdominal:      General: There is distension.      Intubated and on ventilator    Intubated and on ventilatorI  Urinary catheter in place with scant to no urine output  Propofol and Precedex ongoing  Normal saline ongoing  Data Review:   Recent Results (from the past 24 hour(s))   BLOOD GAS, ARTERIAL    Collection Time: 02/24/22  9:30 PM   Result Value Ref Range    pH 7.52 (H) 7.35 - 7.45      PCO2 24 (L) 35 - 45 mmHg    PO2 59 (L) 75 - 100 mmHg    O2 SAT 95 (L) >95 %    BICARBONATE 23 22 - 26 mmol/L    BASE DEFICIT 2.1 (H) 0 - 2 mmol/L    O2 METHOD VENT      FIO2 30.0 %    MODE Assist Control/Volume Control      Tidal volume 540      SET RATE 20      EPAP/CPAP/PEEP 8.0      SITE Right Radial      JAM'S TEST PASS     METABOLIC PANEL, BASIC    Collection Time: 02/25/22  4:27 AM   Result Value Ref Range    Sodium 136 136 - 145 mmol/L    Potassium 3.5 3.5 - 5.1 mmol/L    Chloride 108 97 - 108 mmol/L    CO2 19 (L) 21 - 32 mmol/L    Anion gap 9 5 - 15 mmol/L    Glucose 97 65 - 100 mg/dL    BUN 35 (H) 6 - 20 mg/dL    Creatinine 3.52 (H) 0.70 - 1.30 mg/dL    BUN/Creatinine ratio 10 (L) 12 - 20      GFR est AA 21 (L) >60 ml/min/1.73m2    GFR est non-AA 18 (L) >60 ml/min/1.73m2    Calcium 8.5 8.5 - 10.1 mg/dL   CBC W/O DIFF    Collection Time: 02/25/22  4:27 AM   Result Value Ref Range    WBC 11.0 4.1 - 11.1 K/uL    RBC 3.16 (L) 4.10 - 5.70 M/uL    HGB 9.4 (L) 12.1 - 17.0 g/dL    HCT 26.7 (L) 36.6 - 50.3 %    MCV 84.5 80.0 - 99.0 FL    MCH 29.7 26.0 - 34.0 PG    MCHC 35.2 30.0 - 36.5 g/dL    RDW 15.6 (H) 11.5 - 14.5 %    PLATELET 64 (L) 366 - 400 K/uL    MPV 10.7 8.9 - 12.9 FL    NRBC 0.0 0.0  WBC    ABSOLUTE NRBC 0.00 0.00 - 0.01 K/uL   BLOOD GAS, ARTERIAL    Collection Time: 02/25/22  4:40 AM   Result Value Ref Range    pH 7.53 (H) 7.35 - 7.45      PCO2 25 (L) 35 - 45 mmHg    PO2 81 75 - 100 mmHg    O2 SAT 98 >95 %    BICARBONATE 24 22 - 26 mmol/L    BASE DEFICIT 1.1 0 - 2 mmol/L    O2 METHOD VENT      FIO2 30.0 %    MODE Assist Control/Volume Control      Tidal volume 500      SET RATE 16      EPAP/CPAP/PEEP 8.0      SITE Right Radial      JAM'S TEST PASS     ECHO ADULT COMPLETE    Collection Time: 02/25/22 12:15 PM   Result Value Ref Range    LV EDV A4C 22 mL    LV ESV A4C 5 mL    IVSd 1.0 0.6 - 1.0 cm    LVIDd 5.3 4.2 - 5.9 cm    LVIDs 4.9 cm    LVOT Diameter 2.3 cm    LVOT Mean Gradient 10 mmHg    LVOT VTI 32.1 cm    LVOT Peak Velocity 2.2 m/s    LVOT Peak Gradient 20 mmHg    LVPWd 1.0 0.6 - 1.0 cm    LV E' Lateral Velocity 7 cm/s    LV E' Septal Velocity 6 cm/s    LV Ejection Fraction A4C 77 %    LVOT Area 4.2 cm2    LVOT .3 ml    LA Major Axis 4.6 cm    LA Area 4C 11.9 cm2    LA Diameter 2.5 cm    AV Mean Gradient 8 mmHg    AV VTI 26.3 cm    AV Mean Velocity 1.3 m/s    AV Peak Velocity 1.8 m/s    AV Peak Gradient 13 mmHg    AV Area by VTI 5.1 cm2    AV Area by Peak Velocity 5.1 cm2    Aortic Root 3.7 cm    Ascending Aorta 3.2 cm    MR VTI 66.9 cm    MR Peak Velocity 4.4 m/s    MR Peak Gradient 77 mmHg    MV A Velocity 0.98 m/s    MV E Velocity 0.57 m/s    PV Mean Gradient 2 mmHg    PV VTI 16.6 cm    PV Mean Velocity 0.6 m/s    PV Max Velocity 0.8 m/s    PV Peak Gradient 3 mmHg    Fractional Shortening 2D 8 28 - 44 %    LV ESV Index A4C 2 mL/m2    LV EDV Index A4C 10 mL/m2    LVIDd Index 2.49 cm/m2    LVIDs Index 2.30 cm/m2    LV RWT Ratio 0.38     LV Mass 2D 200.4 88 - 224 g    LV Mass 2D Index 94.1 49 - 115 g/m2    MV E/A 0.58     E/E' Ratio (Averaged) 8.82     E/E' Lateral 8.14     E/E' Septal 9.50     LVOT Stroke Volume Index 62.6 mL/m2    LA Size Index 1.17 cm/m2    LA/AO Root Ratio 0.68     Ao Root Index 1.74 cm/m2    Ascending Aorta Index 1.50 cm/m2    AV Velocity Ratio 1.22     LVOT:AV VTI Index 1.22     MYA/BSA VTI 2.4 cm2/m2    MYA/BSA Peak Velocity 2.4 cm2/m2    Est. RA Pressure 8 mmHg    RV Basal Dimension 3.0 cm    RV Mid Dimension 2.8 cm    Posterior dimension 0.9 cm         XR CHEST PORT   Final Result   Orogastric tube can be followed below the diaphragm. CT ABD PELV WO CONT   Final Result   Cirrhosis. Varices.  Large volume ascites. XR CHEST PORT   Final Result   Dilation of the aortic arch. Further evaluation may be warranted. Endotracheal tube tip appears in appropriate position. CT HEAD WO CONT   Final Result   Age-appropriate atrophy. No acute findings. XR CHEST PORT   Final Result   The cardiomediastinal silhouette is appropriate for age, technique,   and lung expansion. Pulmonary vasculature is not congested. The lungs are   essentially clear. Mild atelectasis at hypoinflated left base. No effusion or   pneumothorax is seen. US GUIDE PARACENTESIS    (Results Pending)   XR CHEST PORT    (Results Pending)        Patient Active Problem List   Diagnosis Code    Acute renal failure (ARF) ([address] N17.9    Cirrhosis of liver ([address] K74.60    GRACIA (acute kidney injury) ([address] N17.9    Status epilepticus ([address] G40.901        DIAGNOSES:  1. Acute kidney injury on chronic kidney diseaseacute kidney injury grade 2 GRACIA and criteria  2. CKDstage IIIb; creatinine of 2.21 during previous hospitalization in January 2022  3. Ventilator dependent respiratory failure  4. Seizures  5. Hepatic cirrhosis plus large volume ascites and portal hypertension along with paralysis  6. Encephalopathy  7. Mild hypokalemia  8.   9. NoRMAL Anion gap metabolic acidosis plus respiratory alkalosis  DISCUSSION:   Anuric acute kidney injury in a patient with respiratory failure, seizures, hepatic encephalopathy and portal hypertensionprognosis is poor    PLAN:   We will need to adjust dose of allopurinol    hold amiloride   Renal panel in a.m.    continue IV fluids for now-if evidence of fluid overload would need to stop it        Thanks for consulting me. Please don't hesitate to contact me if any questions arise of if I can assist in any manner. This dictation was done by dragon, computer voice recognition software. Often unanticipated grammatical, syntax, phones and other interpretive errors are inadvertently transcribed. Please excuse errors that have escaped final proofreading. Please contact me if you suspect dictation or transcription errors.   Dr Heladio Hawk  [address], [address] [facility], [address] [facility]  Cell Phone: [phone]  Office phone: [phone]  Fax: [phone]

## 2022-02-25 NOTE — ED NOTES
Pt in bed, pt bp dropped to 83/63. Informed Dr. Astrid Brito who ordered Precedex. Writer dropped propofol from 30mcgs to 25mcg [place] [address]. Writer held the precedex at this time. Writer will continue to monitor.

## 2022-02-25 NOTE — PROGRESS NOTES
2/25/22. PCP is SKYLER James - seen last month. Wife ( Matias Loredo # [phone]) into vist pt. Per wife D/C Plan is home with home health. Pt used no DME/no home health @ this time. Wife will transport pt home if disposition is home.

## 2022-02-25 NOTE — ED NOTES
TRANSFER - OUT REPORT:    Verbal report given to Ijeoma Steiner RN (name) on Cathy Donald  being transferred to ICU (unit) for routine progression of care       Report consisted of patients Situation, Background, Assessment and   Recommendations(SBAR). Information from the following report(s) SBAR, Kardex and ED Summary was reviewed with the receiving nurse. Lines:   Peripheral IV 02/24/22 Anterior;Proximal;Right Forearm (Active)       Peripheral IV 02/24/22 Anterior; Left Forearm (Active)       Peripheral IV 02/24/22 Anterior; Left External jugular (Active)        Opportunity for questions and clarification was provided.       Patient transported with:   Registered Nurse

## 2022-02-25 NOTE — PROGRESS NOTES
Reason for Admission:   Epilepticus/Seizures                    RUR Score:     16%             PCP: First and Last name:   Ruma Smyth NP     Name of Practice:    Are you a current patient: Yes/No: Yes   Approximate date of last visit: Last month. Can you participate in a virtual visit if needed: Yes/Call    Do you (patient/family) have any concerns for transition/discharge? No              Plan for utilizing home health: None @ this time/used no DME. Current Advanced Directive/Advance Care Plan:  Full Code      Healthcare Decision Maker:             Primary Decision Maker: [facility] - [phone]    Transition of Care Plan:  D/C Plan @ this time is home with wife/home health - unless disposition is different. Wife will transport home if possible.

## 2022-02-26 NOTE — PROGRESS NOTES
Neurology Progress Note    Patient ID:  Porsceh Whyte  [id number]  60 y.o.  1960    Subjective: Patient is seen in follow-up for status epilepticus and he did not have any seizures since he was loaded with Keppra. He is still intubated but the plan is to extubate him today. Wadlo Rangel is a 60 y.o. Male with hx of cirrhosis, seizure disorder not on any medications, alcohol abuse, gout,  presented to ED on 02/24/2022 with a 20 min GTC. He was given 4g keppra to break the seizure. He was seen by Hernan Fitzgerald in 2020 for a first time seizure thought to be from alcohol abuse. No anti epileptics were started, MRI and EEG were normal. He was advised to quit drinking. He was lost to follow up after 2020. Currently patient is sedated taken off of propofol 30 minutes before my visit and intubated. Ct head shows generalized atrophy otherwise no acute changes. .    Current Facility-Administered Medications   Medication Dose Route Frequency    levETIRAcetam (KEPPRA) 1500 mg in saline (iso-osm) 100 ml IVPB  1,500 mg IntraVENous Q12H    pantoprazole (PROTONIX) 40 mg in 0.9% sodium chloride 10 mL injection  40 mg IntraVENous Q12H    dexmedeTOMidine (PRECEDEX) 400 mcg in 0.9% sodium chloride (MBP/ADV) 100 mL MBP  0.1-1.5 mcg/kg/hr IntraVENous TITRATE    propofol (DIPRIVAN) 10 mg/mL infusion  0-50 mcg/kg/min IntraVENous TITRATE    sodium chloride (NS) flush 5-40 mL  5-40 mL IntraVENous Q8H    sodium chloride (NS) flush 5-40 mL  5-40 mL IntraVENous PRN    acetaminophen (TYLENOL) tablet 650 mg  650 mg Oral Q6H PRN    Or    acetaminophen (TYLENOL) suppository 650 mg  650 mg Rectal Q6H PRN    polyethylene glycol (MIRALAX) packet 17 g  17 g Oral DAILY PRN    ondansetron (ZOFRAN ODT) tablet 4 mg  4 mg Oral Q8H PRN    Or    ondansetron (ZOFRAN) injection 4 mg  4 mg IntraVENous Q6H PRN    [Held by provider] enoxaparin (LOVENOX) injection 30 mg  30 mg SubCUTAneous DAILY    folic acid (FOLVITE) tablet 1 mg  1 mg Oral DAILY    lactulose (CHRONULAC) 10 gram/15 mL solution 15 mL  10 g Oral TID    thiamine mononitrate (B-1) tablet 100 mg  100 mg Oral DAILY    0.9% sodium chloride infusion  125 mL/hr IntraVENous CONTINUOUS    midazolam (VERSED) injection 5 mg  5 mg IntraVENous Q4H PRN    piperacillin-tazobactam (ZOSYN) 3.375 g in 0.9% sodium chloride (MBP/ADV) 100 mL MBP  3.375 g IntraVENous Q12H     Objective:     Patient Vitals for the past 8 hrs:   BP Temp Pulse Resp SpO2 Weight   02/26/22 1143   (!) 112 19 100 %    02/26/22 1100  98 °F (36.7 °C)       02/26/22 0918   (!) 101 14 100 %    02/26/22 0731  98 °F (36.7 °C)       02/26/22 0600 (!) 114/95  90 17 100 %    02/26/22 0556      85.1 kg (187 lb 9.8 oz)     02/26 0701 - 02/26 1900  In: -   Out: 100 [Urine:100]  02/24 1901 - 02/26 0700  In: 2061.5 [I.V.:2061.5]  Out: 56 [Urine:460]    Lab Review   Recent Results (from the past 24 hour(s))   MRSA SCREEN - PCR (NASAL)    Collection Time: 02/25/22  2:40 PM   Result Value Ref Range    MRSA by PCR, Nasal Not Detected Not Detected     BLOOD GAS, ARTERIAL    Collection Time: 02/26/22  4:00 AM   Result Value Ref Range    pH 7.48 (H) 7.35 - 7.45      PCO2 27 (L) 35 - 45 mmHg    PO2 107 (H) 75 - 100 mmHg    O2 SAT 99 >95 %    BICARBONATE 23 22 - 26 mmol/L    BASE DEFICIT 1.7 0 - 2 mmol/L    O2 METHOD VENT      FIO2 30.0 %    MODE Assist Control/Volume Control      Tidal volume 500      SET RATE 16      IPAP/PIP 0      EPAP/CPAP/PEEP 8.0      SITE Right Radial      JAM'S TEST PASS     CBC WITH AUTOMATED DIFF    Collection Time: 02/26/22  5:15 AM   Result Value Ref Range    WBC 10.8 4.1 - 11.1 K/uL    RBC 3.34 (L) 4.10 - 5.70 M/uL    HGB 9.9 (L) 12.1 - 17.0 g/dL    HCT 27.7 (L) 36.6 - 50.3 %    MCV 82.9 80.0 - 99.0 FL    MCH 29.6 26.0 - 34.0 PG    MCHC 35.7 30.0 - 36.5 g/dL    RDW 16.1 (H) 11.5 - 14.5 %    PLATELET 66 (L) 016 - 400 K/uL    MPV 11.7 8.9 - 12.9 FL    NRBC 0.0 0.0  WBC    ABSOLUTE NRBC 0.00 0.00 - 0.01 K/uL    NEUTROPHILS 84 (H) 32 - 75 %    LYMPHOCYTES 8 (L) 12 - 49 %    MONOCYTES 7 5 - 13 %    EOSINOPHILS 1 0 - 7 %    BASOPHILS 0 0 - 1 %    IMMATURE GRANULOCYTES 0 0 - 0.5 %    ABS. NEUTROPHILS 9.1 (H) 1.8 - 8.0 K/UL    ABS. LYMPHOCYTES 0.9 0.8 - 3.5 K/UL    ABS. MONOCYTES 0.7 0.0 - 1.0 K/UL    ABS. EOSINOPHILS 0.1 0.0 - 0.4 K/UL    ABS. BASOPHILS 0.0 0.0 - 0.1 K/UL    ABS. IMM. GRANS. 0.0 0.00 - 0.04 K/UL    DF AUTOMATED     METABOLIC PANEL, BASIC    Collection Time: 02/26/22  5:15 AM   Result Value Ref Range    Sodium 139 136 - 145 mmol/L    Potassium 3.7 3.5 - 5.1 mmol/L    Chloride 109 (H) 97 - 108 mmol/L    CO2 19 (L) 21 - 32 mmol/L    Anion gap 11 5 - 15 mmol/L    Glucose 83 65 - 100 mg/dL    BUN 31 (H) 6 - 20 mg/dL    Creatinine 3.11 (H) 0.70 - 1.30 mg/dL    BUN/Creatinine ratio 10 (L) 12 - 20      GFR est AA 25 (L) >60 ml/min/1.73m2    GFR est non-AA 20 (L) >60 ml/min/1.73m2    Calcium 8.5 8.5 - 10.1 mg/dL   PROTHROMBIN TIME + INR    Collection Time: 02/26/22 11:10 AM   Result Value Ref Range    Prothrombin time 16.5 (H) 11.9 - 14.6 sec    INR 1.4 (H) 0.9 - 1.1     PTT    Collection Time: 02/26/22 11:10 AM   Result Value Ref Range    aPTT 40.2 (H) 21.2 - 34.1 sec    aPTT, therapeutic range   82 - 109 sec     Additional comments: Patient was intubated to protect the airway with a plan to extubate him today. NEUROLOGICAL EXAM:  Appearance: The patient is well developed, well nourished, currently intubated in response to pain by grimacing and withdrawing the extremities and is in no acute distress. Mental Status:  Currently intubated and sedated. Cranial Nerves:   HEIDY, sluggish eye movements, no nystagmus, no ptosis. Facial movement is symmetric. Motor:   Moves all the extremities to deep pain symmetrically. Reflexes:   Deep tendon reflexes not checked. Sensory:    Withdraws to pain. Gait:   Deferred. Tremor:   No tremor noted. Cerebellar:   Cannot be checked. Neurovascular:  Normal heart sounds. Assessment:     Active Problems:  1. Status epilepticus ([address] (2/24/2022): The patient did not have any seizures since he was loaded with Keppra after initial benzo boluses, currently on propofol which was stopped 30 minutes before my visit and he may take some extra time to start waking up. Etiology still seems to be related to the alcohol. We will continue Keppra. 2.  Alcohol dependence: Patient may need detox program through psych. 3.  Respiratory failure currently intubated with a plan to extubate today. 4.  Hypertension  5. Arthritis    Plan:   1. Continue with Keppra for now. 2.  Agree with extubation as soon as he is ready. 3.  We will continue to follow.     Thank you,    Signed:  Lyric Granados MD  2/26/2022  1:02 PM

## 2022-02-26 NOTE — PROGRESS NOTES
IMPRESSION:   1. Acute hypoxic respiratory failure currently on the ventilator will decrease settings  2. Aspiration pneumonia chest x-ray clear  3. Status epilepticus no further seizures seen  4. Acute on chronic kidney disease  5. History of cirrhosis EtOH abuse and ascites  6. Thrombocytopenia      RECOMMENDATIONS/PLAN:   1. ICU monitoring  1. Ventilator for mechanical life support and prevent respiratory arrest with protective lung strategies. Will decrease propofol and start decreasing the        Patient is on assist control mode rate of 16 PEEP of 8 tidal volume 500 30% FiO2 have decreased rate to 10 PEEP to 5     2. Agree with Empiric IV antibiotics pending culture results on Zosyn  3. No more seizures  on Keppra  4. [x] High complexity decision making was performed  [x] See my orders for details  HPI  58-year-old male came in because of seizures significant past medical history of cirrhosis of liver alcohol abuse gout he had paracentesis done in the past also has hypertension and arthritis. He starting having seizures received medication came to the emergency room subsequently got intubated now he is on propofol sedated unable to get any started the patient is not on any medication at home history of EtOH abuse so admitted and critical care consult was called. PMH:  has a past medical history of Arthritis and Hypertension. PSH:   has a past surgical history that includes ir paracentesis abd w image (1/21/2022). FHX: family history is not on file. SHX:  reports that he has never smoked.  He has never used smokeless tobacco.    ALL: No Known Allergies     MEDS:   [x] Reviewed - As Below   [] Not reviewed    Current Facility-Administered Medications   Medication    levETIRAcetam (KEPPRA) 1500 mg in saline (iso-osm) 100 ml IVPB    pantoprazole (PROTONIX) 40 mg in 0.9% sodium chloride 10 mL injection    dexmedeTOMidine (PRECEDEX) 400 mcg in 0.9% sodium chloride (MBP/ADV) 100 mL MBP    propofol (DIPRIVAN) 10 mg/mL infusion    sodium chloride (NS) flush 5-40 mL    sodium chloride (NS) flush 5-40 mL    acetaminophen (TYLENOL) tablet 650 mg    Or    acetaminophen (TYLENOL) suppository 650 mg    polyethylene glycol (MIRALAX) packet 17 g    ondansetron (ZOFRAN ODT) tablet 4 mg    Or    ondansetron (ZOFRAN) injection 4 mg    [Held by provider] enoxaparin (LOVENOX) injection 30 mg    folic acid (FOLVITE) tablet 1 mg    lactulose (CHRONULAC) 10 gram/15 mL solution 15 mL    thiamine mononitrate (B-1) tablet 100 mg    0.9% sodium chloride infusion    midazolam (VERSED) injection 5 mg    piperacillin-tazobactam (ZOSYN) 3.375 g in 0.9% sodium chloride (MBP/ADV) 100 mL MBP      MAR reviewed and pertinent medications noted or modified as needed   Current Facility-Administered Medications   Medication    levETIRAcetam (KEPPRA) 1500 mg in saline (iso-osm) 100 ml IVPB    pantoprazole (PROTONIX) 40 mg in 0.9% sodium chloride 10 mL injection    dexmedeTOMidine (PRECEDEX) 400 mcg in 0.9% sodium chloride (MBP/ADV) 100 mL MBP    propofol (DIPRIVAN) 10 mg/mL infusion    sodium chloride (NS) flush 5-40 mL    sodium chloride (NS) flush 5-40 mL    acetaminophen (TYLENOL) tablet 650 mg    Or    acetaminophen (TYLENOL) suppository 650 mg    polyethylene glycol (MIRALAX) packet 17 g    ondansetron (ZOFRAN ODT) tablet 4 mg    Or    ondansetron (ZOFRAN) injection 4 mg    [Held by provider] enoxaparin (LOVENOX) injection 30 mg    folic acid (FOLVITE) tablet 1 mg    lactulose (CHRONULAC) 10 gram/15 mL solution 15 mL    thiamine mononitrate (B-1) tablet 100 mg    0.9% sodium chloride infusion    midazolam (VERSED) injection 5 mg    piperacillin-tazobactam (ZOSYN) 3.375 g in 0.9% sodium chloride (MBP/ADV) 100 mL MBP      PMH:  has a past medical history of Arthritis and Hypertension. PSH:   has a past surgical history that includes ir paracentesis abd w image (1/21/2022).    FHX: family history is not on file. SHX:  reports that he has never smoked. He has never used smokeless tobacco.     ROS:  Unable to obtain patient is sedated on ventilator    Hemodynamics:    CO:    CI:    CVP:    SVR:   PAP Systolic:    PAP Diastolic:    PVR:    [id number]:        Ventilator Settings:      Mode Rate TV Press PEEP FiO2 PIP Min. Vent   Assist control,Volume control    500 ml    8 cm H20 30 %  25 cm H2O  7.67 l/min        Vital Signs: Telemetry:    normal sinus rhythm Intake/Output:   Visit Vitals  BP (!) 114/95 (BP 1 Location: Right upper arm, BP Patient Position: At rest)   Pulse 90   Temp 98 °F (36.7 °C)   Resp 17   Ht 6' 2.02\" (1.88 m)   Wt 85.1 kg (187 lb 9.8 oz)   SpO2 100%   BMI 24.08 kg/m²       Temp (24hrs), Av.6 °F (36.4 °C), Min:97.4 °F (36.3 °C), Max:98 °F (36.7 °C)        O2 Device: Ventilator O2 Flow Rate (L/min): 2 l/min       Wt Readings from Last 4 Encounters:   22 85.1 kg (187 lb 9.8 oz)   22 86.2 kg (190 lb)          Intake/Output Summary (Last 24 hours) at 2022 0846  Last data filed at 2022 0557  Gross per 24 hour   Intake 2061.46 ml   Output 610 ml   Net 1451.46 ml       Last shift:      No intake/output data recorded. Last 3 shifts:  190 -  0700  In: .5 [I.V.:.5]  Out: 56 [Urine:460]       Physical Exam:     General:  intubated on vent unresponsive on IV propofol  HEENT: NCAT,   Eyes: anicteric; conjunctiva clear doll's eye reflex present  Neck: no nodes, no cuff leak, trach midline; no accessory MM use. No definite JVD  Chest: no deformity,   Cardiac: R regular; no murmur;   Lungs: distant breath sounds; no wheezes or rales  Abd: soft, NT, hypoactive BS  Ext: no edema; no joint swelling;  No clubbing  :  clear urine  Neuro: Sedated on propofol unresponsive doll's eye reflex present flaccid extremities  Psych-  unable to assess  Skin: warm, dry, no cyanosis;   Pulses: Brachial and radial pulses intact  Capillary: Normal capillary refill      DATA:    MAR reviewed and pertinent medications noted or modified as needed  MEDS:   Current Facility-Administered Medications   Medication    levETIRAcetam (KEPPRA) 1500 mg in saline (iso-osm) 100 ml IVPB    pantoprazole (PROTONIX) 40 mg in 0.9% sodium chloride 10 mL injection    dexmedeTOMidine (PRECEDEX) 400 mcg in 0.9% sodium chloride (MBP/ADV) 100 mL MBP    propofol (DIPRIVAN) 10 mg/mL infusion    sodium chloride (NS) flush 5-40 mL    sodium chloride (NS) flush 5-40 mL    acetaminophen (TYLENOL) tablet 650 mg    Or    acetaminophen (TYLENOL) suppository 650 mg    polyethylene glycol (MIRALAX) packet 17 g    ondansetron (ZOFRAN ODT) tablet 4 mg    Or    ondansetron (ZOFRAN) injection 4 mg    [Held by provider] enoxaparin (LOVENOX) injection 30 mg    folic acid (FOLVITE) tablet 1 mg    lactulose (CHRONULAC) 10 gram/15 mL solution 15 mL    thiamine mononitrate (B-1) tablet 100 mg    0.9% sodium chloride infusion    midazolam (VERSED) injection 5 mg    piperacillin-tazobactam (ZOSYN) 3.375 g in 0.9% sodium chloride (MBP/ADV) 100 mL MBP        Labs:    Recent Labs     02/26/22  0515 02/25/22  0427 02/24/22  1626   WBC 10.8 11.0 12.6*   HGB 9.9* 9.4* 10.7*   PLT 66* 64* 89*   INR  --   --  1.4*     Recent Labs     02/26/22  0515 02/25/22  0427 02/24/22  1626    136 136   K 3.7 3.5 3.2*   * 108 105   CO2 19* 19* 20*   GLU 83 97 114*   BUN 31* 35* 34*   CREA 3.11* 3.52* 4.12*   CA 8.5 8.5 8.9   ALB  --   --  2.0*   ALT  --   --  20   LPSE  --   --  192     Recent Labs     02/26/22  0400 02/25/22  0440 02/24/22  2130   PH 7.48* 7.53* 7.52*   PCO2 27* 25* 24*   PO2 107* 81 59*   HCO3 23 24 23   FIO2 30.0 30.0 30.0       Lab Results   Component Value Date/Time    Culture result: No growth 4 days 01/21/2022 12:00 PM         Imaging:    Results from Hospital Encounter encounter on 02/24/22    XR CHEST PORT    Narrative  Chest one view. AP semiupright portable at 0242 dated 2/26/2022.     Comparison 2/24/2022. An endotracheal tube and nasogastric tube remain in place. The cardiomediastinal  silhouette is stable. The pulmonary blood flow is normal. The lungs remain  clear. There is no pneumothorax or pleural effusion. Impression  Stable exam.      Results from Hospital Encounter encounter on 02/24/22    CT ABD PELV WO CONT    Narrative  CT ABD PELV WO CONT    Technique: Noncontrasted CT scan of the abdomen and pelvis was performed  utilizing transaxial images obtained from the dome of the diaphragm to the pubic  symphysis. Coronal and sagittal reconstructions were generated. Dose Reduction:  All CT scans at this facility are performed using dose reduction optimization  techniques as appropriate to a performed exam including the following: Automated  exposure control, adjustments of the mA and/or kV according to patient size, or  use of iterative reconstruction technique. Comparison:  1/20/2022. Findings: There is mild bibasilar atelectasis. Atherosclerotic calcifications  are present including coronary artery calcifications. Cirrhotic hepatic configuration again evident. The spleen, pancreas, adrenal  glands, and right kidney are unremarkable for noncontrast technique. Cyst of the  posterior lower pole of the left kidney measures 1.6 cm in diameter. Varices are  present. Gallbladder is unremarkable. No biliary duct dilatation apparent. The  abdominal aorta is normal in caliber. There is a large volume of ascites. There is no evidence of bowel obstruction. The appendix is normal. The urinary bladder is empty. No suspicious lytic or blastic skeletal lesion apparent. Impression  Cirrhosis. Varices. Large volume ascites. 2/26. No further seizures seen. We will start decreasing propofol and decreasing ventilator.   X-ray got clear hopefully can progress to extubation  Time of care 30 minutes  Livan Ambrose MD

## 2022-02-26 NOTE — PROGRESS NOTES
Dr More Whyte aware of 45 ml of urine since 11 AM. To continue with IVF at 125. Aware patient remains on ventilator and that patient has yet to wake up.

## 2022-02-26 NOTE — PROGRESS NOTES
Consult Date: 2/26/2022      Subjective     Seen and examined in the ICU. Remains on the ventilator. On propofol and normal saline drip  Past Medical History:   Diagnosis Date    Arthritis     Hypertension       Past Surgical History:   Procedure Laterality Date    IR PARACENTESIS ABD W IMAGE  1/21/2022     No family history on file.    Social History     Tobacco Use    Smoking status: Never Smoker    Smokeless tobacco: Never Used   Substance Use Topics    Alcohol use: Not on file       Current Facility-Administered Medications   Medication Dose Route Frequency Provider Last Rate Last Admin    levETIRAcetam (KEPPRA) 1500 mg in saline (iso-osm) 100 ml IVPB  1,500 mg IntraVENous Q12H Colin Carmona MD        pantoprazole (PROTONIX) 40 mg in 0.9% sodium chloride 10 mL injection  40 mg IntraVENous Q12H Colin Carmona MD        dexmedeTOMidine (PRECEDEX) 400 mcg in 0.9% sodium chloride (MBP/ADV) 100 mL MBP  0.1-1.5 mcg/kg/hr IntraVENous TITRATE Gilbert Hutson MD   Held at 02/25/22 1271    propofol (DIPRIVAN) 10 mg/mL infusion  0-50 mcg/kg/min IntraVENous TITRATE Colin Carmona MD   Stopped at 02/26/22 1034    sodium chloride (NS) flush 5-40 mL  5-40 mL IntraVENous Q8H Colin Carmona MD   10 mL at 02/26/22 0547    sodium chloride (NS) flush 5-40 mL  5-40 mL IntraVENous PRN Colin Carmona MD        acetaminophen (TYLENOL) tablet 650 mg  650 mg Oral Q6H PRN Colin Carmona MD        Or   [facility] acetaminophen (TYLENOL) suppository 650 mg  650 mg Rectal Q6H PRN Colin Carmona MD        polyethylene glycol (MIRALAX) packet 17 g  17 g Oral DAILY PRN Colin Carmona MD        ondansetron (ZOFRAN ODT) tablet 4 mg  4 mg Oral Q8H PRN Colin Carmona MD        Or    ondansetron (ZOFRAN) injection 4 mg  4 mg IntraVENous Q6H PRN Colin Carmona MD        [Held by provider] enoxaparin (LOVENOX) injection 30 mg  30 mg SubCUTAneous DAILY Colin Carmona MD   30 mg at 30/64/64 5816    folic acid (FOLVITE) tablet 1 mg  1 mg Oral DAILY Valencia Kaur MD   1 mg at 02/26/22 1017    lactulose (CHRONULAC) 10 gram/15 mL solution 15 mL  10 g Oral TID Valencia Kaur MD   15 mL at 02/26/22 1016    thiamine mononitrate (B-1) tablet 100 mg  100 mg Oral DAILY Valencia Kaur MD   100 mg at 02/26/22 1017    0.9% sodium chloride infusion  125 mL/hr IntraVENous CONTINUOUS Sharon Almanza MD 50 mL/hr at 02/25/22 1457 50 mL/hr at 02/25/22 1457    midazolam (VERSED) injection 5 mg  5 mg IntraVENous Q4H PRN Valencia Kaur MD        piperacillin-tazobactam (ZOSYN) 3.375 g in 0.9% sodium chloride (MBP/ADV) 100 mL MBP  3.375 g IntraVENous Q12H Valencia Kaur MD 25 mL/hr at 02/26/22 1016 3.375 g at 02/26/22 1016        Review of Systems   Unable to perform ROS: Intubated       Objective     Vital signs for last 24 hours:  Visit Vitals  BP (!) 114/95 (BP 1 Location: Right upper arm, BP Patient Position: At rest)   Pulse (!) 112   Temp 98 °F (36.7 °C)   Resp 19   Ht 6' 2.02\" (1.88 m)   Wt 85.1 kg (187 lb 9.8 oz)   SpO2 100%   BMI 24.08 kg/m²           Recent Results (from the past 24 hour(s))   MRSA SCREEN - PCR (NASAL)    Collection Time: 02/25/22  2:40 PM   Result Value Ref Range    MRSA by PCR, Nasal Not Detected Not Detected     BLOOD GAS, ARTERIAL    Collection Time: 02/26/22  4:00 AM   Result Value Ref Range    pH 7.48 (H) 7.35 - 7.45      PCO2 27 (L) 35 - 45 mmHg    PO2 107 (H) 75 - 100 mmHg    O2 SAT 99 >95 %    BICARBONATE 23 22 - 26 mmol/L    BASE DEFICIT 1.7 0 - 2 mmol/L    O2 METHOD VENT      FIO2 30.0 %    MODE Assist Control/Volume Control      Tidal volume 500      SET RATE 16      IPAP/PIP 0      EPAP/CPAP/PEEP 8.0      SITE Right Radial      JAM'S TEST PASS     CBC WITH AUTOMATED DIFF    Collection Time: 02/26/22  5:15 AM   Result Value Ref Range    WBC 10.8 4.1 - 11.1 K/uL    RBC 3.34 (L) 4.10 - 5.70 M/uL    HGB 9.9 (L) 12.1 - 17.0 g/dL    HCT 27.7 (L) 36.6 - 50.3 %    MCV 82.9 80.0 - 99.0 FL MCH 29.6 26.0 - 34.0 PG    MCHC 35.7 30.0 - 36.5 g/dL    RDW 16.1 (H) 11.5 - 14.5 %    PLATELET 66 (L) 917 - 400 K/uL    MPV 11.7 8.9 - 12.9 FL    NRBC 0.0 0.0  WBC    ABSOLUTE NRBC 0.00 0.00 - 0.01 K/uL    NEUTROPHILS 84 (H) 32 - 75 %    LYMPHOCYTES 8 (L) 12 - 49 %    MONOCYTES 7 5 - 13 %    EOSINOPHILS 1 0 - 7 %    BASOPHILS 0 0 - 1 %    IMMATURE GRANULOCYTES 0 0 - 0.5 %    ABS. NEUTROPHILS 9.1 (H) 1.8 - 8.0 K/UL    ABS. LYMPHOCYTES 0.9 0.8 - 3.5 K/UL    ABS. MONOCYTES 0.7 0.0 - 1.0 K/UL    ABS. EOSINOPHILS 0.1 0.0 - 0.4 K/UL    ABS. BASOPHILS 0.0 0.0 - 0.1 K/UL    ABS. IMM. GRANS. 0.0 0.00 - 0.04 K/UL    DF AUTOMATED     METABOLIC PANEL, BASIC    Collection Time: 02/26/22  5:15 AM   Result Value Ref Range    Sodium 139 136 - 145 mmol/L    Potassium 3.7 3.5 - 5.1 mmol/L    Chloride 109 (H) 97 - 108 mmol/L    CO2 19 (L) 21 - 32 mmol/L    Anion gap 11 5 - 15 mmol/L    Glucose 83 65 - 100 mg/dL    BUN 31 (H) 6 - 20 mg/dL    Creatinine 3.11 (H) 0.70 - 1.30 mg/dL    BUN/Creatinine ratio 10 (L) 12 - 20      GFR est AA 25 (L) >60 ml/min/1.73m2    GFR est non-AA 20 (L) >60 ml/min/1.73m2    Calcium 8.5 8.5 - 10.1 mg/dL   PROTHROMBIN TIME + INR    Collection Time: 02/26/22 11:10 AM   Result Value Ref Range    Prothrombin time 16.5 (H) 11.9 - 14.6 sec    INR 1.4 (H) 0.9 - 1.1     PTT    Collection Time: 02/26/22 11:10 AM   Result Value Ref Range    aPTT 40.2 (H) 21.2 - 34.1 sec    aPTT, therapeutic range   82 - 109 sec          Intake/Output Summary (Last 24 hours) at 2/26/2022 1252  Last data filed at 2/26/2022 1100  Gross per 24 hour   Intake 2061.46 ml   Output 710 ml   Net 1351.46 ml      Current Shift: 02/26 0701 - 02/26 1900  In: -   Out: 100 [Urine:100]  Last 3 Shifts: 02/24 1901 - 02/26 0700  In: 2061.5 [I.V.:2061.5]  Out: 56 [Urine:460]  Physical Exam  Constitutional:       Appearance: He is ill-appearing. Cardiovascular:      Rate and Rhythm: Tachycardia present.    Abdominal:      General: There is distension. Intubated and on ventilator    Intubated and on ventilatorI  Urinary catheter in place with minimal urine output  Propofol weaned off  Normal saline ongoing  Data Review:   Recent Results (from the past 24 hour(s))   MRSA SCREEN - PCR (NASAL)    Collection Time: 02/25/22  2:40 PM   Result Value Ref Range    MRSA by PCR, Nasal Not Detected Not Detected     BLOOD GAS, ARTERIAL    Collection Time: 02/26/22  4:00 AM   Result Value Ref Range    pH 7.48 (H) 7.35 - 7.45      PCO2 27 (L) 35 - 45 mmHg    PO2 107 (H) 75 - 100 mmHg    O2 SAT 99 >95 %    BICARBONATE 23 22 - 26 mmol/L    BASE DEFICIT 1.7 0 - 2 mmol/L    O2 METHOD VENT      FIO2 30.0 %    MODE Assist Control/Volume Control      Tidal volume 500      SET RATE 16      IPAP/PIP 0      EPAP/CPAP/PEEP 8.0      SITE Right Radial      JAM'S TEST PASS     CBC WITH AUTOMATED DIFF    Collection Time: 02/26/22  5:15 AM   Result Value Ref Range    WBC 10.8 4.1 - 11.1 K/uL    RBC 3.34 (L) 4.10 - 5.70 M/uL    HGB 9.9 (L) 12.1 - 17.0 g/dL    HCT 27.7 (L) 36.6 - 50.3 %    MCV 82.9 80.0 - 99.0 FL    MCH 29.6 26.0 - 34.0 PG    MCHC 35.7 30.0 - 36.5 g/dL    RDW 16.1 (H) 11.5 - 14.5 %    PLATELET 66 (L) 133 - 400 K/uL    MPV 11.7 8.9 - 12.9 FL    NRBC 0.0 0.0  WBC    ABSOLUTE NRBC 0.00 0.00 - 0.01 K/uL    NEUTROPHILS 84 (H) 32 - 75 %    LYMPHOCYTES 8 (L) 12 - 49 %    MONOCYTES 7 5 - 13 %    EOSINOPHILS 1 0 - 7 %    BASOPHILS 0 0 - 1 %    IMMATURE GRANULOCYTES 0 0 - 0.5 %    ABS. NEUTROPHILS 9.1 (H) 1.8 - 8.0 K/UL    ABS. LYMPHOCYTES 0.9 0.8 - 3.5 K/UL    ABS. MONOCYTES 0.7 0.0 - 1.0 K/UL    ABS. EOSINOPHILS 0.1 0.0 - 0.4 K/UL    ABS. BASOPHILS 0.0 0.0 - 0.1 K/UL    ABS. IMM.  GRANS. 0.0 0.00 - 0.04 K/UL    DF AUTOMATED     METABOLIC PANEL, BASIC    Collection Time: 02/26/22  5:15 AM   Result Value Ref Range    Sodium 139 136 - 145 mmol/L    Potassium 3.7 3.5 - 5.1 mmol/L    Chloride 109 (H) 97 - 108 mmol/L    CO2 19 (L) 21 - 32 mmol/L    Anion gap 11 5 - 15 mmol/L    Glucose 83 65 - 100 mg/dL    BUN 31 (H) 6 - 20 mg/dL    Creatinine 3.11 (H) 0.70 - 1.30 mg/dL    BUN/Creatinine ratio 10 (L) 12 - 20      GFR est AA 25 (L) >60 ml/min/1.73m2    GFR est non-AA 20 (L) >60 ml/min/1.73m2    Calcium 8.5 8.5 - 10.1 mg/dL   PROTHROMBIN TIME + INR    Collection Time: 02/26/22 11:10 AM   Result Value Ref Range    Prothrombin time 16.5 (H) 11.9 - 14.6 sec    INR 1.4 (H) 0.9 - 1.1     PTT    Collection Time: 02/26/22 11:10 AM   Result Value Ref Range    aPTT 40.2 (H) 21.2 - 34.1 sec    aPTT, therapeutic range   82 - 109 sec         XR CHEST PORT   Final Result   Stable exam.      XR CHEST PORT   Final Result   Orogastric tube can be followed below the diaphragm. CT ABD PELV WO CONT   Final Result   Cirrhosis. Varices. Large volume ascites. XR CHEST PORT   Final Result   Dilation of the aortic arch. Further evaluation may be warranted. Endotracheal tube tip appears in appropriate position. CT HEAD WO CONT   Final Result   Age-appropriate atrophy. No acute findings. XR CHEST PORT   Final Result   The cardiomediastinal silhouette is appropriate for age, technique,   and lung expansion. Pulmonary vasculature is not congested. The lungs are   essentially clear. Mild atelectasis at hypoinflated left base. No effusion or   pneumothorax is seen. US GUIDE PARACENTESIS    (Results Pending)   XR CHEST PORT    (Results Pending)        Patient Active Problem List   Diagnosis Code    Acute renal failure (ARF) ([address] N17.9    Cirrhosis of liver ([address] K74.60    GRACIA (acute kidney injury) ([address] N17.9    Status epilepticus ([address] G40.901        DIAGNOSES:  1. Acute kidney injury on chronic kidney diseaseacute kidney injury grade 2 GRACIA and criteria  2. CKDstage IIIb; creatinine of 2.21 during previous hospitalization in January 2022  3. Ventilator dependent respiratory failure  4. Seizures  5.  Hepatic cirrhosis plus large volume ascites and portal hypertension along with paralysis  6. Encephalopathy  7. Mild hypokalemia  8. NoRMAL Anion gap metabolic acidosis plus respiratory alkalosis  DISCUSSION:   Some improvement in BUN and creatinine; urine output nonoliguric   Chest x-ray remains clear, but has large volume ascites   Remains hypotensive and tachycardic    PLAN:  Continue with IV fluidin fact increase the rate of normal saline to 125 mill per hour. Thanks for consulting me. Please don't hesitate to contact me if any questions arise of if I can assist in any manner. This dictation was done by dragon, computer voice recognition software. Often unanticipated grammatical, syntax, phones and other interpretive errors are inadvertently transcribed. Please excuse errors that have escaped final proofreading. Please contact me if you suspect dictation or transcription errors.   Dr Yuli Warner  [address], [address] [place] [address], [address] [place] [address]  Cell Phone: [phone]  Office phone: [phone]  Fax: [phone]

## 2022-02-26 NOTE — PROGRESS NOTES
Hospitalist Progress Note    Subjective:   Daily Progress Note: 2/26/2022 10:01 AM    Hospital Course:  Adelina Duckworth is a 60 y.o. Male with hx of cirrhosis, seizure disorder not on any medications, alcohol abuse, gout,  presented to ED with c/o seizures. As per patient's wife, patient was diagnosed with seizure 2 years ago, but was not placed on any anti epileptics. Patient is daily drinker. Patient had Generalized tonic clonic seizures for 20 minutes at home prior to EMS arrival, resolved with 5 mg of versed. In ED initially, patient was somnolent, again had seizure episode. Seizure was unresolved with > 4 mg of ativan. Patient was given 4gm if keppra. Was intubated for airway protection and started on propofol.     Subjective:  Patient is sedated and intubated    Current Facility-Administered Medications   Medication Dose Route Frequency    levETIRAcetam (KEPPRA) 1500 mg in saline (iso-osm) 100 ml IVPB  1,500 mg IntraVENous Q12H    pantoprazole (PROTONIX) 40 mg in 0.9% sodium chloride 10 mL injection  40 mg IntraVENous Q12H    dexmedeTOMidine (PRECEDEX) 400 mcg in 0.9% sodium chloride (MBP/ADV) 100 mL MBP  0.1-1.5 mcg/kg/hr IntraVENous TITRATE    propofol (DIPRIVAN) 10 mg/mL infusion  0-50 mcg/kg/min IntraVENous TITRATE    sodium chloride (NS) flush 5-40 mL  5-40 mL IntraVENous Q8H    sodium chloride (NS) flush 5-40 mL  5-40 mL IntraVENous PRN    acetaminophen (TYLENOL) tablet 650 mg  650 mg Oral Q6H PRN    Or    acetaminophen (TYLENOL) suppository 650 mg  650 mg Rectal Q6H PRN    polyethylene glycol (MIRALAX) packet 17 g  17 g Oral DAILY PRN    ondansetron (ZOFRAN ODT) tablet 4 mg  4 mg Oral Q8H PRN    Or    ondansetron (ZOFRAN) injection 4 mg  4 mg IntraVENous Q6H PRN    [Held by provider] enoxaparin (LOVENOX) injection 30 mg  30 mg SubCUTAneous DAILY    folic acid (FOLVITE) tablet 1 mg  1 mg Oral DAILY    lactulose (CHRONULAC) 10 gram/15 mL solution 15 mL  10 g Oral TID    thiamine mononitrate (B-1) tablet 100 mg  100 mg Oral DAILY    0.9% sodium chloride infusion  50 mL/hr IntraVENous CONTINUOUS    midazolam (VERSED) injection 5 mg  5 mg IntraVENous Q4H PRN    piperacillin-tazobactam (ZOSYN) 3.375 g in 0.9% sodium chloride (MBP/ADV) 100 mL MBP  3.375 g IntraVENous Q12H        Review of Systems  Unable to obtain due to patient's condition      Objective:     Visit Vitals  BP (!) 114/95 (BP 1 Location: Right upper arm, BP Patient Position: At rest)   Pulse (!) 101   Temp 98 °F (36.7 °C)   Resp 14   Ht 6' 2.02\" (1.88 m)   Wt 85.1 kg (187 lb 9.8 oz)   SpO2 100%   BMI 24.08 kg/m²    O2 Flow Rate (L/min): 2 l/min O2 Device: Ventilator    Temp (24hrs), Av.6 °F (36.4 °C), Min:97.4 °F (36.3 °C), Max:98 °F (36.7 °C)      No intake/output data recorded. 1901 -  0700  In: 2061.5 [I.V.:2061.5]  Out: 56 [Urine:460]    PHYSICAL EXAM:  Constitutional:intubated  Skin: Extremities and face reveal no rashes. HEENT: Sclerae anicteric. Extra-occular muscles are intact. No oral ulcers. The neck is supple and no masses. Cardiovascular: Regular rate and rhythm. Respiratory:  Clear breath sounds bilaterally with no wheezes, rales, or rhonchi. GI: Abdomen nondistended, soft, and nontender. Normal active bowel sounds. Musculoskeletal: No pitting edema of the lower legs.  Able to move all ext  Neurological:  Patient is intubated and sedated  Psychiatric: Patient is intubated and sedated      Data Review    Recent Results (from the past 24 hour(s))   ECHO ADULT COMPLETE    Collection Time: 22 12:15 PM   Result Value Ref Range    LV EDV A4C 22 mL    LV ESV A4C 5 mL    IVSd 1.0 0.6 - 1.0 cm    LVIDd 5.3 4.2 - 5.9 cm    LVIDs 4.9 cm    LVOT Diameter 2.3 cm    LVOT Mean Gradient 10 mmHg    LVOT VTI 32.1 cm    LVOT Peak Velocity 2.2 m/s    LVOT Peak Gradient 20 mmHg    LVPWd 1.0 0.6 - 1.0 cm    LV E' Lateral Velocity 7 cm/s    LV E' Septal Velocity 6 cm/s    LV Ejection Fraction A4C 77 % LVOT Area 4.2 cm2    LVOT .3 ml    LA Major Axis 4.6 cm    LA Area 4C 11.9 cm2    LA Diameter 2.5 cm    AV Mean Gradient 8 mmHg    AV VTI 26.3 cm    AV Mean Velocity 1.3 m/s    AV Peak Velocity 1.8 m/s    AV Peak Gradient 13 mmHg    AV Area by VTI 5.1 cm2    AV Area by Peak Velocity 5.1 cm2    Aortic Root 3.7 cm    Ascending Aorta 3.2 cm    MR VTI 66.9 cm    MR Peak Velocity 4.4 m/s    MR Peak Gradient 77 mmHg    MV A Velocity 0.98 m/s    MV E Velocity 0.57 m/s    PV Mean Gradient 2 mmHg    PV VTI 16.6 cm    PV Mean Velocity 0.6 m/s    PV Max Velocity 0.8 m/s    PV Peak Gradient 3 mmHg    Fractional Shortening 2D 8 28 - 44 %    LV ESV Index A4C 2 mL/m2    LV EDV Index A4C 10 mL/m2    LVIDd Index 2.49 cm/m2    LVIDs Index 2.30 cm/m2    LV RWT Ratio 0.38     LV Mass 2D 200.4 88 - 224 g    LV Mass 2D Index 94.1 49 - 115 g/m2    MV E/A 0.58     E/E' Ratio (Averaged) 8.82     E/E' Lateral 8.14     E/E' Septal 9.50     LVOT Stroke Volume Index 62.6 mL/m2    LA Size Index 1.17 cm/m2    LA/AO Root Ratio 0.68     Ao Root Index 1.74 cm/m2    Ascending Aorta Index 1.50 cm/m2    AV Velocity Ratio 1.22     LVOT:AV VTI Index 1.22     MYA/BSA VTI 2.4 cm2/m2    MYA/BSA Peak Velocity 2.4 cm2/m2    Est. RA Pressure 8 mmHg    RV Basal Dimension 3.0 cm    RV Mid Dimension 2.8 cm    Posterior dimension 0.9 cm   MRSA SCREEN - PCR (NASAL)    Collection Time: 02/25/22  2:40 PM   Result Value Ref Range    MRSA by PCR, Nasal Not Detected Not Detected     BLOOD GAS, ARTERIAL    Collection Time: 02/26/22  4:00 AM   Result Value Ref Range    pH 7.48 (H) 7.35 - 7.45      PCO2 27 (L) 35 - 45 mmHg    PO2 107 (H) 75 - 100 mmHg    O2 SAT 99 >95 %    BICARBONATE 23 22 - 26 mmol/L    BASE DEFICIT 1.7 0 - 2 mmol/L    O2 METHOD VENT      FIO2 30.0 %    MODE Assist Control/Volume Control      Tidal volume 500      SET RATE 16      IPAP/PIP 0      EPAP/CPAP/PEEP 8.0      SITE Right Radial      JAM'S TEST PASS     CBC WITH AUTOMATED DIFF Collection Time: 02/26/22  5:15 AM   Result Value Ref Range    WBC 10.8 4.1 - 11.1 K/uL    RBC 3.34 (L) 4.10 - 5.70 M/uL    HGB 9.9 (L) 12.1 - 17.0 g/dL    HCT 27.7 (L) 36.6 - 50.3 %    MCV 82.9 80.0 - 99.0 FL    MCH 29.6 26.0 - 34.0 PG    MCHC 35.7 30.0 - 36.5 g/dL    RDW 16.1 (H) 11.5 - 14.5 %    PLATELET 66 (L) 831 - 400 K/uL    MPV 11.7 8.9 - 12.9 FL    NRBC 0.0 0.0  WBC    ABSOLUTE NRBC 0.00 0.00 - 0.01 K/uL    NEUTROPHILS 84 (H) 32 - 75 %    LYMPHOCYTES 8 (L) 12 - 49 %    MONOCYTES 7 5 - 13 %    EOSINOPHILS 1 0 - 7 %    BASOPHILS 0 0 - 1 %    IMMATURE GRANULOCYTES 0 0 - 0.5 %    ABS. NEUTROPHILS 9.1 (H) 1.8 - 8.0 K/UL    ABS. LYMPHOCYTES 0.9 0.8 - 3.5 K/UL    ABS. MONOCYTES 0.7 0.0 - 1.0 K/UL    ABS. EOSINOPHILS 0.1 0.0 - 0.4 K/UL    ABS. BASOPHILS 0.0 0.0 - 0.1 K/UL    ABS. IMM. GRANS. 0.0 0.00 - 0.04 K/UL    DF AUTOMATED     METABOLIC PANEL, BASIC    Collection Time: 02/26/22  5:15 AM   Result Value Ref Range    Sodium 139 136 - 145 mmol/L    Potassium 3.7 3.5 - 5.1 mmol/L    Chloride 109 (H) 97 - 108 mmol/L    CO2 19 (L) 21 - 32 mmol/L    Anion gap 11 5 - 15 mmol/L    Glucose 83 65 - 100 mg/dL    BUN 31 (H) 6 - 20 mg/dL    Creatinine 3.11 (H) 0.70 - 1.30 mg/dL    BUN/Creatinine ratio 10 (L) 12 - 20      GFR est AA 25 (L) >60 ml/min/1.73m2    GFR est non-AA 20 (L) >60 ml/min/1.73m2    Calcium 8.5 8.5 - 10.1 mg/dL           Assessment / Plan:  Status epilepticus  Patient was intubated in ED and started on propofol  Titrate propofol  Increase keppra from 100 mg iv BID to 1500 mg iv BID  Appreciate neurology consult  No further episodes of seizures     Sepsis:  Patient had white count and tachycardia, with no clear source of sepsis.   continue zosyn empirically for aspiration pneumonia       Acute hypoxic respiratory failure  Intubated for air way protection  Continue zosyn  Appreciate pulmonary consult    Acute GI bleed  NG draining dark output  Start pantoprazole   Will consult GI  Hb stable     Acute encephalopathy  S/s seizures, hepatic encephalopathy contributing  Continue lactulose     Acute hypokalemia:  resolved     GRACIA on CKD  Improving  Appreciate nephrology consult     Liver cirrhosis/ascites  Continue lactulose  Paracentesis pending      18.5 - 24.9 Normal weight / Body mass index is 24.08 kg/m². Code status: Full  Prophylaxis: SCD's  Recommended Disposition: SNF/LTC       Critical care time spent 35 minutes involving direct patient care as well as reviewing patient's labs and coordination of care with nursing staff           Total time spent with patient: 35 minutes.

## 2022-02-27 NOTE — PROGRESS NOTES
IMPRESSION:   1. Acute hypoxic respiratory failure remains on the ventilator FiO2 21%  2. Aspiration pneumonia chest x-ray clear  3. Status epilepticus no further seizures seen  4. Acute on chronic kidney disease creatinine improving  5. Hypomagnesemia to be replete  6. History of cirrhosis EtOH abuse and ascites  7. Thrombocytopenia stable      RECOMMENDATIONS/PLAN:   1. Off propofol 24 hours still poorly responsive tolerating decreased ventilator. Yesterday was having apnea when ventilator decreased today tolerating it well have placed on tube compensate with well-maintained respirations will extubate  2. Altered mental status remains poorly responsive despite no sedation in 24-hour  3. Remains on Zosyn empirically  4. No further seizures seen on Keppra  5. Creatinine improving but developing edema of the lower extremities. Will give albumin today and decrease IV fluid       [x] High complexity decision making was performed  [x] See my orders for details  HPI  79-year-old male came in because of seizures significant past medical history of cirrhosis of liver alcohol abuse gout he had paracentesis done in the past also has hypertension and arthritis. He starting having seizures received medication came to the emergency room subsequently got intubated now he is on propofol sedated unable to get any started the patient is not on any medication at home history of EtOH abuse so admitted and critical care consult was called. PMH:  has a past medical history of Arthritis and Hypertension. PSH:   has a past surgical history that includes ir paracentesis abd w image (1/21/2022). FHX: family history is not on file. SHX:  reports that he has never smoked.  He has never used smokeless tobacco.    ALL: No Known Allergies     MEDS:   [x] Reviewed - As Below   [] Not reviewed    Current Facility-Administered Medications   Medication    levETIRAcetam (KEPPRA) 1500 mg in saline (iso-osm) 100 ml IVPB    pantoprazole (PROTONIX) 40 mg in 0.9% sodium chloride 10 mL injection    dexmedeTOMidine (PRECEDEX) 400 mcg in 0.9% sodium chloride (MBP/ADV) 100 mL MBP    propofol (DIPRIVAN) 10 mg/mL infusion    sodium chloride (NS) flush 5-40 mL    sodium chloride (NS) flush 5-40 mL    acetaminophen (TYLENOL) tablet 650 mg    Or    acetaminophen (TYLENOL) suppository 650 mg    polyethylene glycol (MIRALAX) packet 17 g    ondansetron (ZOFRAN ODT) tablet 4 mg    Or    ondansetron (ZOFRAN) injection 4 mg    [Held by provider] enoxaparin (LOVENOX) injection 30 mg    folic acid (FOLVITE) tablet 1 mg    lactulose (CHRONULAC) 10 gram/15 mL solution 15 mL    thiamine mononitrate (B-1) tablet 100 mg    0.9% sodium chloride infusion    midazolam (VERSED) injection 5 mg    piperacillin-tazobactam (ZOSYN) 3.375 g in 0.9% sodium chloride (MBP/ADV) 100 mL MBP      MAR reviewed and pertinent medications noted or modified as needed   Current Facility-Administered Medications   Medication    levETIRAcetam (KEPPRA) 1500 mg in saline (iso-osm) 100 ml IVPB    pantoprazole (PROTONIX) 40 mg in 0.9% sodium chloride 10 mL injection    dexmedeTOMidine (PRECEDEX) 400 mcg in 0.9% sodium chloride (MBP/ADV) 100 mL MBP    propofol (DIPRIVAN) 10 mg/mL infusion    sodium chloride (NS) flush 5-40 mL    sodium chloride (NS) flush 5-40 mL    acetaminophen (TYLENOL) tablet 650 mg    Or    acetaminophen (TYLENOL) suppository 650 mg    polyethylene glycol (MIRALAX) packet 17 g    ondansetron (ZOFRAN ODT) tablet 4 mg    Or    ondansetron (ZOFRAN) injection 4 mg    [Held by provider] enoxaparin (LOVENOX) injection 30 mg    folic acid (FOLVITE) tablet 1 mg    lactulose (CHRONULAC) 10 gram/15 mL solution 15 mL    thiamine mononitrate (B-1) tablet 100 mg    0.9% sodium chloride infusion    midazolam (VERSED) injection 5 mg    piperacillin-tazobactam (ZOSYN) 3.375 g in 0.9% sodium chloride (MBP/ADV) 100 mL MBP      PMH:  has a past medical history of Arthritis and Hypertension. PSH:   has a past surgical history that includes ir paracentesis abd w image (2022). FHX: family history is not on file. SHX:  reports that he has never smoked. He has never used smokeless tobacco.     ROS:  Unable to obtain patient is sedated on ventilator    Hemodynamics:    CO:    CI:    CVP:    SVR:   PAP Systolic:    PAP Diastolic:    PVR:    EI53:        Ventilator Settings:      Mode Rate TV Press PEEP FiO2 PIP Min. Vent   SIMV,Volume control    500 ml 6 cm H2O  2 cm H20 21 %  18 cm H2O  10.6 l/min        Vital Signs: Telemetry:    normal sinus rhythm Intake/Output:   Visit Vitals  BP (!) 115/92 (BP 1 Location: Right upper arm, BP Patient Position: At rest)   Pulse (!) 113   Temp 97.9 °F (36.6 °C)   Resp 14   Ht 6' 2.02\" (1.88 m)   Wt 87.1 kg (192 lb 0.3 oz)   SpO2 100%   BMI 24.64 kg/m²       Temp (24hrs), Av.2 °F (36.8 °C), Min:97.9 °F (36.6 °C), Max:99 °F (37.2 °C)        O2 Device: Ventilator O2 Flow Rate (L/min): 2 l/min       Wt Readings from Last 4 Encounters:   22 87.1 kg (192 lb 0.3 oz)   22 86.2 kg (190 lb)          Intake/Output Summary (Last 24 hours) at 2022 0827  Last data filed at 2022 0549  Gross per 24 hour   Intake 3089.53 ml   Output 580 ml   Net 2509.53 ml       Last shift:      No intake/output data recorded. Last 3 shifts:  190 -  0700  In: 3844.5 [I.V.:3704.5]  Out: 36 [Urine:680]       Physical Exam:     General:  intubated on vent weakly opens his eyes does not follow any commands does not consistently track with his eyes  HEENT: NCAT,   Eyes: anicteric; conjunctiva clear random eye movement present  Neck: no nodes, no cuff leak, trach midline; no accessory MM use. No definite JVD  Chest: no deformity,   Cardiac: R regular; no murmur;   Lungs: distant breath sounds; no wheezes or rales  Abd: soft, NT, hypoactive BS  Ext: Lower extremity edema; no joint swelling;  No clubbing  :  clear urine  Neuro: Off sedation 24 hours remains poorly responsive will weakly open his eyes to stimulation does not consistently look at voice or track has random eye movement. Will withdraws extremities to noxious stimuli  Psych-  unable to assess  Skin: warm, dry, no cyanosis;   Pulses: Brachial and radial pulses intact  Capillary: Normal capillary refill      DATA:    MAR reviewed and pertinent medications noted or modified as needed  MEDS:   Current Facility-Administered Medications   Medication    levETIRAcetam (KEPPRA) 1500 mg in saline (iso-osm) 100 ml IVPB    pantoprazole (PROTONIX) 40 mg in 0.9% sodium chloride 10 mL injection    dexmedeTOMidine (PRECEDEX) 400 mcg in 0.9% sodium chloride (MBP/ADV) 100 mL MBP    propofol (DIPRIVAN) 10 mg/mL infusion    sodium chloride (NS) flush 5-40 mL    sodium chloride (NS) flush 5-40 mL    acetaminophen (TYLENOL) tablet 650 mg    Or    acetaminophen (TYLENOL) suppository 650 mg    polyethylene glycol (MIRALAX) packet 17 g    ondansetron (ZOFRAN ODT) tablet 4 mg    Or    ondansetron (ZOFRAN) injection 4 mg    [Held by provider] enoxaparin (LOVENOX) injection 30 mg    folic acid (FOLVITE) tablet 1 mg    lactulose (CHRONULAC) 10 gram/15 mL solution 15 mL    thiamine mononitrate (B-1) tablet 100 mg    0.9% sodium chloride infusion    midazolam (VERSED) injection 5 mg    piperacillin-tazobactam (ZOSYN) 3.375 g in 0.9% sodium chloride (MBP/ADV) 100 mL MBP        Labs:    Recent Labs     02/27/22  0540 02/26/22  1110 02/26/22  0515 02/25/22  0427 02/24/22  1626 02/24/22  1626   WBC 14.8*  --  10.8 11.0   < > 12.6*   HGB 9.6*  --  9.9* 9.4*   < > 10.7*   PLT 69*  --  66* 64*   < > 89*   INR  --  1.4*  --   --   --  1.4*   APTT  --  40.2*  --   --   --   --     < > = values in this interval not displayed.      Recent Labs     02/27/22  0540 02/26/22  0515 02/25/22  0427 02/24/22  1626 02/24/22  1626    139 136   < > 136   K 3.7 3.7 3.5   < > 3.2*   * 109* 108   < > 105   CO2 20* 19* 19*   < > 20*   GLU 80 83 97   < > 114*   BUN 33* 31* 35*   < > 34*   CREA 2.63* 3.11* 3.52*   < > 4.12*   CA 8.1* 8.5 8.5   < > 8.9   MG 1.6  --   --   --   --    PHOS 4.3  --   --   --   --    ALB 1.3*  --   --   --  2.0*   ALT  --   --   --   --  20   LPSE  --   --   --   --  192    < > = values in this interval not displayed. Ammonia 34  Recent Labs     02/27/22  0220 02/26/22  0400 02/25/22  0440   PH 7.48* 7.48* 7.53*   PCO2 28* 27* 25*   PO2 80 107* 81   HCO3 23 23 24   FIO2 21.0 30.0 30.0   2/27 IMV 4 tidal volume 500 pressure support 6 PEEP 5 FiO2 21    Lab Results   Component Value Date/Time    Culture result: No significant growth, <10,000 CFU/mL 02/24/2022 04:45 PM    Culture result: No growth 4 days 01/21/2022 12:00 PM         Imaging:    Results from Hospital Encounter encounter on 02/24/22    XR CHEST PORT    Narrative  Chest one view. AP semiupright portable at 0233 dated 2/27/2022. Comparison 2/26/2022. An endotracheal tube and enteric tube remain in place. The heart remains  enlarged with calcified plaque in the aorta. There is no lobar consolidation,  pleural fluid or pneumothorax. Impression  No significant change. Results from [place] encounter on 02/24/22    CT ABD PELV WO CONT    Narrative  CT ABD PELV WO CONT    Technique: Noncontrasted CT scan of the abdomen and pelvis was performed  utilizing transaxial images obtained from the dome of the diaphragm to the pubic  symphysis. Coronal and sagittal reconstructions were generated. Dose Reduction:  All CT scans at this facility are performed using dose reduction optimization  techniques as appropriate to a performed exam including the following: Automated  exposure control, adjustments of the mA and/or kV according to patient size, or  use of iterative reconstruction technique. Comparison:  1/20/2022. Findings: There is mild bibasilar atelectasis.  Atherosclerotic calcifications  are present including coronary artery calcifications. Cirrhotic hepatic configuration again evident. The spleen, pancreas, adrenal  glands, and right kidney are unremarkable for noncontrast technique. Cyst of the  posterior lower pole of the left kidney measures 1.6 cm in diameter. Varices are  present. Gallbladder is unremarkable. No biliary duct dilatation apparent. The  abdominal aorta is normal in caliber. There is a large volume of ascites. There is no evidence of bowel obstruction. The appendix is normal. The urinary bladder is empty. No suspicious lytic or blastic skeletal lesion apparent. Impression  Cirrhosis. Varices. Large volume ascites. 2/26. No further seizures seen. We will start decreasing propofol and decreasing ventilator. X-ray got clear hopefully can progress to extubation  2/27 tolerating decrease ventilator. Have placed on tube compensate respirations are nonlabored except for having cough. Will extubate to room air. He has an OG tube in we will try to maintain it if it comes out we will leave it out. He may need an NG tube if he does not wake up further.   Starting to develop edema will decrease IV fluids and give albumin today  Time of care 30 minutes  Lula Rose MD

## 2022-02-27 NOTE — PROGRESS NOTES
Progress Note    Patient: Gauri Julian MRN: [id number]  SSN: [id number]    YOB: 1960  Age: 60 y.o. Sex: male      Admit Date: 2/24/2022    LOS: 3 days     Subjective:   GI is following in consultation for GI bleed - reportedly noticing blood from NGT.     2/27: Patient seen in ICU sleepy, easily arousable. He was extubated today. OGT in place. Patient denies pain. No further seizure or bleeding since admission . Hgb stable, 9.6. Plan for EGD tomorrow. History of Present Illness: Gauri Julian is a 60 y.o. male who is seen in consultation for GI bleed. Mr. Jones Rein seen in the ICU he is sedated and intubated. [de-identified] of medical history obtained from RN and chart. He was admitted on 2/24 with seizures. Patient had generalized tonic clonic seizures for 20 minutes at home prior to EMS arrival, resolved with 5 mg of versed. In the ED patient again had a seizure which was un resolved with more than 4 mg of ativan. He was given 4 gm of Keppra and was intubated for airway protection and started on propofol. Neurology consulted for further management. Ct head shows generalized atrophy otherwise no acute changes. His wife reports he was diagnosed with seizures two years ago but was not placed on anti-epileptic medication. It is reported that the patient consumes alcohol daily. He has a past medical history significant for cirrhosis, seizure disorder, gout, diverticulosis, Esophageal varices,  Vitamin D deficiency, and alcohol abuse. He had an EGD in September 2020 showing esophageal varices, hypertensive portal gastropathy. He is on Propanolol. He was scheduled for repeat EGD on 3/7/21. RN reports she was told the patient had blood from the NG tube but states she has not noticed any. His hgB is 9.9 this am compared to 10.7 on 2/24/22. CT of abdomen shows cirrhosis, varices, and large volume ascites. He had paracentesis on 1/21/22 with removal of 5.3 Liters of serous fluid removed.  He is scheduled for US guided Paracentesis which is pending at this time. Patient does move his head with verbal stimuli. Abdomen ins distended. Ammonia 111 yesterday he is on Lactulose. Plan for EGD on Monday. Objective:     Vitals:    02/27/22 0711 02/27/22 0800 02/27/22 0900 02/27/22 1000   BP:  114/88 108/82 96/62   Pulse: (!) 115 (!) 113 (!) 121 (!) 113   Resp: 14 15 19 17   Temp:       SpO2: 100% 97% 97% 98%   Weight:       Height:            Intake and Output:  Current Shift: No intake/output data recorded. Last three shifts: 02/25 1901 - 02/27 0700  In: 3844.5 [I.V.:3704.5]  Out: 1130 [Urine:680]    Physical Exam:   Skin:  Extremities and face reveal no rashes. No ivey erythema. HEENT: Sclerae anicteric. Extra-occular muscles are intact. No abnormal pigmentation of the lips. The neck is supple. Cardiovascular: Regular rate and rhythm. Respiratory:  Comfortable breathing with no accessory muscle use. GI:  Abdomen nondistended, soft, and nontender. No enlargement of the liver or spleen. No masses palpable. Rectal:  Deferred  Musculoskeletal: Generalized weakness   Neurological:  Sleepy, easily arousable.   Psychiatric: Not agitated   Lymphatic:  No visible adenopathy      Lab/Data Review:  Recent Results (from the past 24 hour(s))   BLOOD GAS, ARTERIAL    Collection Time: 02/27/22  2:20 AM   Result Value Ref Range    pH 7.48 (H) 7.35 - 7.45      PCO2 28 (L) 35 - 45 mmHg    PO2 80 75 - 100 mmHg    O2 SAT 98 >95 %    BICARBONATE 23 22 - 26 mmol/L    BASE DEFICIT 2.2 (H) 0 - 2 mmol/L    O2 METHOD VENT      FIO2 21.0 %    MODE SIMV      Tidal volume 500      PRESSURE SUPPORT 6.0      EPAP/CPAP/PEEP 5.0      SITE Right Radial      JAM'S TEST PASS     RENAL FUNCTION PANEL    Collection Time: 02/27/22  5:40 AM   Result Value Ref Range    Sodium 139 136 - 145 mmol/L    Potassium 3.7 3.5 - 5.1 mmol/L    Chloride 111 (H) 97 - 108 mmol/L    CO2 20 (L) 21 - 32 mmol/L    Anion gap 8 5 - 15 mmol/L    Glucose 80 65 - 100 mg/dL    BUN 33 (H) 6 - 20 mg/dL    Creatinine 2.63 (H) 0.70 - 1.30 mg/dL    BUN/Creatinine ratio 13 12 - 20      GFR est AA 30 (L) >60 ml/min/1.73m2    GFR est non-AA 25 (L) >60 ml/min/1.73m2    Calcium 8.1 (L) 8.5 - 10.1 mg/dL    Phosphorus 4.3 2.6 - 4.7 mg/dL    Albumin 1.3 (L) 3.5 - 5.0 g/dL   CBC WITH AUTOMATED DIFF    Collection Time: 02/27/22  5:40 AM   Result Value Ref Range    WBC 14.8 (H) 4.1 - 11.1 K/uL    RBC 3.25 (L) 4.10 - 5.70 M/uL    HGB 9.6 (L) 12.1 - 17.0 g/dL    HCT 27.6 (L) 36.6 - 50.3 %    MCV 84.9 80.0 - 99.0 FL    MCH 29.5 26.0 - 34.0 PG    MCHC 34.8 30.0 - 36.5 g/dL    RDW 16.6 (H) 11.5 - 14.5 %    PLATELET 69 (L) 978 - 400 K/uL    MPV 11.1 8.9 - 12.9 FL    NRBC 0.0 0.0  WBC    ABSOLUTE NRBC 0.00 0.00 - 0.01 K/uL    NEUTROPHILS 85 (H) 32 - 75 %    LYMPHOCYTES 6 (L) 12 - 49 %    MONOCYTES 8 5 - 13 %    EOSINOPHILS 1 0 - 7 %    BASOPHILS 0 0 - 1 %    IMMATURE GRANULOCYTES 0 0 - 0.5 %    ABS. NEUTROPHILS 12.5 (H) 1.8 - 8.0 K/UL    ABS. LYMPHOCYTES 0.9 0.8 - 3.5 K/UL    ABS. MONOCYTES 1.2 (H) 0.0 - 1.0 K/UL    ABS. EOSINOPHILS 0.1 0.0 - 0.4 K/UL    ABS. BASOPHILS 0.0 0.0 - 0.1 K/UL    ABS. IMM. GRANS. 0.1 (H) 0.00 - 0.04 K/UL    DF AUTOMATED     MAGNESIUM    Collection Time: 02/27/22  5:40 AM   Result Value Ref Range    Magnesium 1.6 1.6 - 2.4 mg/dL   AMMONIA    Collection Time: 02/27/22  5:40 AM   Result Value Ref Range    Ammonia 34 (H) <32 umol/L              XR CHEST PORT   Final Result   No significant change. XR CHEST PORT   Final Result   Stable exam.      XR CHEST PORT   Final Result   Orogastric tube can be followed below the diaphragm. CT ABD PELV WO CONT   Final Result   Cirrhosis. Varices. Large volume ascites. XR CHEST PORT   Final Result   Dilation of the aortic arch. Further evaluation may be warranted. Endotracheal tube tip appears in appropriate position. CT HEAD WO CONT   Final Result   Age-appropriate atrophy. No acute findings.          XR CHEST PORT   Final Result   The cardiomediastinal silhouette is appropriate for age, technique,   and lung expansion. Pulmonary vasculature is not congested. The lungs are   essentially clear. Mild atelectasis at hypoinflated left base. No effusion or   pneumothorax is seen. US GUIDE PARACENTESIS    (Results Pending)   XR CHEST PORT    (Results Pending)       Assessment:     Active Problems:    Status epilepticus ([address] (2/24/2022)        Plan:   1. Acute GI Bleed      HgB 9.6 this am      CBC in the am      Protonix 40 mg BID      NPO      EGD on Monday 2/28/22. NPO Post midnight. Please get consent. 2. Seizure disorder, Status Epilepticus      Neurology input appreciated  3. Cirrohosis/Ascites/Acute Encephalopathy      CMP in the am      US guided Paracentesis pending      Ammonia level in the am      Continue Lactulose  4. GRACIA/CKD     Nephrology input appreciated     Avoid hepatotoxic medications    Patient discussed with Dr Beatrice Garcia and agrees to above impression and plan. Thank you for allowing me to participate in this patients care    Signed By: Silvano Tomas.  LIZBETH Pinedo     February 27, 2022

## 2022-02-27 NOTE — PROGRESS NOTES
Hospitalist Progress Note    Subjective:   Daily Progress Note: 2/27/2022 10:01 AM    Hospital Course:  Doug Parra is a 60 y.o. Male with hx of cirrhosis, seizure disorder not on any medications, alcohol abuse, gout,  presented to ED with c/o seizures. As per patient's wife, patient was diagnosed with seizure 2 years ago, but was not placed on any anti epileptics. Patient is daily drinker. Patient had Generalized tonic clonic seizures for 20 minutes at home prior to EMS arrival, resolved with 5 mg of versed. In ED initially, patient was somnolent, again had seizure episode. Seizure was unresolved with > 4 mg of ativan. Patient was given 4gm if keppra. Was intubated for airway protection and started on propofol. Subjective:  Patient is tolerating decrease ventilator setting. Plan for extubation today.     Current Facility-Administered Medications   Medication Dose Route Frequency    magnesium sulfate 2 g/50 ml IVPB (premix or compounded)  2 g IntraVENous ONCE    albumin human 25% (BUMINATE) solution 25 g  25 g IntraVENous Q6H    levETIRAcetam (KEPPRA) 1500 mg in saline (iso-osm) 100 ml IVPB  1,500 mg IntraVENous Q12H    pantoprazole (PROTONIX) 40 mg in 0.9% sodium chloride 10 mL injection  40 mg IntraVENous Q12H    sodium chloride (NS) flush 5-40 mL  5-40 mL IntraVENous Q8H    sodium chloride (NS) flush 5-40 mL  5-40 mL IntraVENous PRN    acetaminophen (TYLENOL) tablet 650 mg  650 mg Oral Q6H PRN    Or    acetaminophen (TYLENOL) suppository 650 mg  650 mg Rectal Q6H PRN    polyethylene glycol (MIRALAX) packet 17 g  17 g Oral DAILY PRN    ondansetron (ZOFRAN ODT) tablet 4 mg  4 mg Oral Q8H PRN    Or    ondansetron (ZOFRAN) injection 4 mg  4 mg IntraVENous Q6H PRN    [Held by provider] enoxaparin (LOVENOX) injection 30 mg  30 mg SubCUTAneous DAILY    folic acid (FOLVITE) tablet 1 mg  1 mg Oral DAILY    lactulose (CHRONULAC) 10 gram/15 mL solution 15 mL  10 g Oral TID    thiamine mononitrate (B-1) tablet 100 mg  100 mg Oral DAILY    0.9% sodium chloride infusion  40 mL/hr IntraVENous CONTINUOUS    midazolam (VERSED) injection 5 mg  5 mg IntraVENous Q4H PRN    piperacillin-tazobactam (ZOSYN) 3.375 g in 0.9% sodium chloride (MBP/ADV) 100 mL MBP  3.375 g IntraVENous Q12H        Review of Systems  Unable to obtain due to patient's condition      Objective:     Visit Vitals  /88 (BP 1 Location: Right upper arm, BP Patient Position: At rest)   Pulse (!) 113   Temp 97.9 °F (36.6 °C)   Resp 15   Ht 6' 2.02\" (1.88 m)   Wt 87.1 kg (192 lb 0.3 oz)   SpO2 97%   BMI 24.64 kg/m²    O2 Flow Rate (L/min): 2 l/min O2 Device: Ventilator    Temp (24hrs), Av.2 °F (36.8 °C), Min:97.9 °F (36.6 °C), Max:99 °F (37.2 °C)      No intake/output data recorded.  1901 -  0700  In: 3844.5 [I.V.:3704.5]  Out: 1130 [Urine:680]    PHYSICAL EXAM:  Constitutional:intubated  Skin: Extremities and face reveal no rashes. HEENT: Sclerae anicteric. Extra-occular muscles are intact. No oral ulcers. The neck is supple and no masses. Cardiovascular: Regular rate and rhythm. Respiratory:  Clear breath sounds bilaterally with no wheezes, rales, or rhonchi. GI: Abdomen nondistended, soft, and nontender. Normal active bowel sounds. Musculoskeletal: No pitting edema of the lower legs.  Able to move all ext  Neurological:  Patient is intubated, still confused  Psychiatric: Patient is intubated      Data Review    Recent Results (from the past 24 hour(s))   PROTHROMBIN TIME + INR    Collection Time: 22 11:10 AM   Result Value Ref Range    Prothrombin time 16.5 (H) 11.9 - 14.6 sec    INR 1.4 (H) 0.9 - 1.1     PTT    Collection Time: 22 11:10 AM   Result Value Ref Range    aPTT 40.2 (H) 21.2 - 34.1 sec    aPTT, therapeutic range   82 - 109 sec   BLOOD GAS, ARTERIAL    Collection Time: 22  2:20 AM   Result Value Ref Range    pH 7.48 (H) 7.35 - 7.45      PCO2 28 (L) 35 - 45 mmHg    PO2 80 75 - 100 mmHg    O2 SAT 98 >95 %    BICARBONATE 23 22 - 26 mmol/L    BASE DEFICIT 2.2 (H) 0 - 2 mmol/L    O2 METHOD VENT      FIO2 21.0 %    MODE SIMV      Tidal volume 500      PRESSURE SUPPORT 6.0      EPAP/CPAP/PEEP 5.0      SITE Right Radial      JAM'S TEST PASS     RENAL FUNCTION PANEL    Collection Time: 02/27/22  5:40 AM   Result Value Ref Range    Sodium 139 136 - 145 mmol/L    Potassium 3.7 3.5 - 5.1 mmol/L    Chloride 111 (H) 97 - 108 mmol/L    CO2 20 (L) 21 - 32 mmol/L    Anion gap 8 5 - 15 mmol/L    Glucose 80 65 - 100 mg/dL    BUN 33 (H) 6 - 20 mg/dL    Creatinine 2.63 (H) 0.70 - 1.30 mg/dL    BUN/Creatinine ratio 13 12 - 20      GFR est AA 30 (L) >60 ml/min/1.73m2    GFR est non-AA 25 (L) >60 ml/min/1.73m2    Calcium 8.1 (L) 8.5 - 10.1 mg/dL    Phosphorus 4.3 2.6 - 4.7 mg/dL    Albumin 1.3 (L) 3.5 - 5.0 g/dL   CBC WITH AUTOMATED DIFF    Collection Time: 02/27/22  5:40 AM   Result Value Ref Range    WBC 14.8 (H) 4.1 - 11.1 K/uL    RBC 3.25 (L) 4.10 - 5.70 M/uL    HGB 9.6 (L) 12.1 - 17.0 g/dL    HCT 27.6 (L) 36.6 - 50.3 %    MCV 84.9 80.0 - 99.0 FL    MCH 29.5 26.0 - 34.0 PG    MCHC 34.8 30.0 - 36.5 g/dL    RDW 16.6 (H) 11.5 - 14.5 %    PLATELET 69 (L) 691 - 400 K/uL    MPV 11.1 8.9 - 12.9 FL    NRBC 0.0 0.0  WBC    ABSOLUTE NRBC 0.00 0.00 - 0.01 K/uL    NEUTROPHILS 85 (H) 32 - 75 %    LYMPHOCYTES 6 (L) 12 - 49 %    MONOCYTES 8 5 - 13 %    EOSINOPHILS 1 0 - 7 %    BASOPHILS 0 0 - 1 %    IMMATURE GRANULOCYTES 0 0 - 0.5 %    ABS. NEUTROPHILS 12.5 (H) 1.8 - 8.0 K/UL    ABS. LYMPHOCYTES 0.9 0.8 - 3.5 K/UL    ABS. MONOCYTES 1.2 (H) 0.0 - 1.0 K/UL    ABS. EOSINOPHILS 0.1 0.0 - 0.4 K/UL    ABS. BASOPHILS 0.0 0.0 - 0.1 K/UL    ABS. IMM.  GRANS. 0.1 (H) 0.00 - 0.04 K/UL    DF AUTOMATED     MAGNESIUM    Collection Time: 02/27/22  5:40 AM   Result Value Ref Range    Magnesium 1.6 1.6 - 2.4 mg/dL   AMMONIA    Collection Time: 02/27/22  5:40 AM   Result Value Ref Range    Ammonia 34 (H) <32 umol/L           Assessment / Plan:  Status epilepticus  Patient was intubated in ED and started on propofol  Off propofol  Continue keppra 1500 mg iv BID  Appreciate neurology consult  No further episodes of seizures     Sepsis:  Patient had white count and tachycardia, with no clear source of sepsis. continue zosyn empirically for aspiration pneumonia       Acute hypoxic respiratory failure  Intubated for air way protection  Continue zosyn  Appreciate pulmonary consult    Acute GI bleed  NG was draining dark output, so started on PPI  Continue pantoprazole   Appreciate GI consult  Plan for EGD tomorrow 2/28/22  Hb stable     Acute encephalopathy  S/s seizures, hepatic encephalopathy contributing  Continue lactulose     Acute hypokalemia:  resolved     GRACIA on CKD  Improving  Albumin ordered by pulmonary  Appreciate nephrology consult     Liver cirrhosis/ascites  Ammonia on admission 111, this am 34  Continue lactulose  Paracentesis pending      18.5 - 24.9 Normal weight / Body mass index is 24.64 kg/m². Code status: Full  Prophylaxis: SCD's  Recommended Disposition: SNF/LTC       Critical care time spent 35 minutes involving direct patient care as well as reviewing patient's labs and coordination of care with nursing staff           Total time spent with patient: 35 minutes.

## 2022-02-27 NOTE — PROGRESS NOTES
Neurology Progress Note    Patient ID:  Marquez Brown  [id number]  60 y.o.  1960    Subjective: Patient is seen in follow-up for status epilepticus and he did not have any seizures since he was loaded with Keppra. He is still intubated but the plan is to extubate him today. Waldo Rangel is a 60 y.o. Male with hx of cirrhosis, seizure disorder not on any medications, alcohol abuse, gout,  presented to ED on 02/24/2022 with a 20 min GTC. He was given 4g keppra to break the seizure. He was seen by Jamar Noonan in 2020 for a first time seizure thought to be from alcohol abuse. No anti epileptics were started, MRI and EEG were normal. He was advised to quit drinking. He was lost to follow up after 2020. Currently patient is sedated taken off of propofol 30 minutes before my visit and intubated. Ct head shows generalized atrophy otherwise no acute changes. .    Current Facility-Administered Medications   Medication Dose Route Frequency    magnesium sulfate 2 g/50 ml IVPB (premix or compounded)  2 g IntraVENous ONCE    albumin human 25% (BUMINATE) solution 25 g  25 g IntraVENous Q6H    levETIRAcetam (KEPPRA) 1500 mg in saline (iso-osm) 100 ml IVPB  1,500 mg IntraVENous Q12H    pantoprazole (PROTONIX) 40 mg in 0.9% sodium chloride 10 mL injection  40 mg IntraVENous Q12H    sodium chloride (NS) flush 5-40 mL  5-40 mL IntraVENous Q8H    sodium chloride (NS) flush 5-40 mL  5-40 mL IntraVENous PRN    acetaminophen (TYLENOL) tablet 650 mg  650 mg Oral Q6H PRN    Or    acetaminophen (TYLENOL) suppository 650 mg  650 mg Rectal Q6H PRN    polyethylene glycol (MIRALAX) packet 17 g  17 g Oral DAILY PRN    ondansetron (ZOFRAN ODT) tablet 4 mg  4 mg Oral Q8H PRN    Or    ondansetron (ZOFRAN) injection 4 mg  4 mg IntraVENous Q6H PRN    [Held by provider] enoxaparin (LOVENOX) injection 30 mg  30 mg SubCUTAneous DAILY    folic acid (FOLVITE) tablet 1 mg  1 mg Oral DAILY    lactulose (CHRONULAC) 10 gram/15 mL solution 15 mL  10 g Oral TID    thiamine mononitrate (B-1) tablet 100 mg  100 mg Oral DAILY    0.9% sodium chloride infusion  40 mL/hr IntraVENous CONTINUOUS    midazolam (VERSED) injection 5 mg  5 mg IntraVENous Q4H PRN    piperacillin-tazobactam (ZOSYN) 3.375 g in 0.9% sodium chloride (MBP/ADV) 100 mL MBP  3.375 g IntraVENous Q12H     Objective:     Patient Vitals for the past 8 hrs:   BP Temp Pulse Resp SpO2 Weight   02/27/22 0900 108/82  (!) 121 19 97 %    02/27/22 0800 114/88  (!) 113 15 97 %    02/27/22 0711   (!) 115 14 100 %    02/27/22 0700 (!) 116/91 97.9 °F (36.6 °C) (!) 113 12 99 %    02/27/22 0600 (!) 115/92  (!) 107 11 98 %    02/27/22 0550      87.1 kg (192 lb 0.3 oz)   02/27/22 0500 95/71  (!) 104 16 100 %    02/27/22 0409   (!) 105 17 99 %    02/27/22 0400 116/86  (!) 108 17 100 %    02/27/22 0300 108/78 98 °F (36.7 °C) (!) 110 12 99 %      No intake/output data recorded.   02/25 1901 - 02/27 0700  In: 3844.5 [I.V.:3704.5]  Out: 1130 [Urine:680]    Lab Review   Recent Results (from the past 24 hour(s))   PROTHROMBIN TIME + INR    Collection Time: 02/26/22 11:10 AM   Result Value Ref Range    Prothrombin time 16.5 (H) 11.9 - 14.6 sec    INR 1.4 (H) 0.9 - 1.1     PTT    Collection Time: 02/26/22 11:10 AM   Result Value Ref Range    aPTT 40.2 (H) 21.2 - 34.1 sec    aPTT, therapeutic range   82 - 109 sec   BLOOD GAS, ARTERIAL    Collection Time: 02/27/22  2:20 AM   Result Value Ref Range    pH 7.48 (H) 7.35 - 7.45      PCO2 28 (L) 35 - 45 mmHg    PO2 80 75 - 100 mmHg    O2 SAT 98 >95 %    BICARBONATE 23 22 - 26 mmol/L    BASE DEFICIT 2.2 (H) 0 - 2 mmol/L    O2 METHOD VENT      FIO2 21.0 %    MODE SIMV      Tidal volume 500      PRESSURE SUPPORT 6.0      EPAP/CPAP/PEEP 5.0      SITE Right Radial      JAM'S TEST PASS     RENAL FUNCTION PANEL    Collection Time: 02/27/22  5:40 AM   Result Value Ref Range    Sodium 139 136 - 145 mmol/L    Potassium 3.7 3.5 - 5.1 mmol/L Chloride 111 (H) 97 - 108 mmol/L    CO2 20 (L) 21 - 32 mmol/L    Anion gap 8 5 - 15 mmol/L    Glucose 80 65 - 100 mg/dL    BUN 33 (H) 6 - 20 mg/dL    Creatinine 2.63 (H) 0.70 - 1.30 mg/dL    BUN/Creatinine ratio 13 12 - 20      GFR est AA 30 (L) >60 ml/min/1.73m2    GFR est non-AA 25 (L) >60 ml/min/1.73m2    Calcium 8.1 (L) 8.5 - 10.1 mg/dL    Phosphorus 4.3 2.6 - 4.7 mg/dL    Albumin 1.3 (L) 3.5 - 5.0 g/dL   CBC WITH AUTOMATED DIFF    Collection Time: 02/27/22  5:40 AM   Result Value Ref Range    WBC 14.8 (H) 4.1 - 11.1 K/uL    RBC 3.25 (L) 4.10 - 5.70 M/uL    HGB 9.6 (L) 12.1 - 17.0 g/dL    HCT 27.6 (L) 36.6 - 50.3 %    MCV 84.9 80.0 - 99.0 FL    MCH 29.5 26.0 - 34.0 PG    MCHC 34.8 30.0 - 36.5 g/dL    RDW 16.6 (H) 11.5 - 14.5 %    PLATELET 69 (L) 823 - 400 K/uL    MPV 11.1 8.9 - 12.9 FL    NRBC 0.0 0.0  WBC    ABSOLUTE NRBC 0.00 0.00 - 0.01 K/uL    NEUTROPHILS 85 (H) 32 - 75 %    LYMPHOCYTES 6 (L) 12 - 49 %    MONOCYTES 8 5 - 13 %    EOSINOPHILS 1 0 - 7 %    BASOPHILS 0 0 - 1 %    IMMATURE GRANULOCYTES 0 0 - 0.5 %    ABS. NEUTROPHILS 12.5 (H) 1.8 - 8.0 K/UL    ABS. LYMPHOCYTES 0.9 0.8 - 3.5 K/UL    ABS. MONOCYTES 1.2 (H) 0.0 - 1.0 K/UL    ABS. EOSINOPHILS 0.1 0.0 - 0.4 K/UL    ABS. BASOPHILS 0.0 0.0 - 0.1 K/UL    ABS. IMM. GRANS. 0.1 (H) 0.00 - 0.04 K/UL    DF AUTOMATED     MAGNESIUM    Collection Time: 02/27/22  5:40 AM   Result Value Ref Range    Magnesium 1.6 1.6 - 2.4 mg/dL   AMMONIA    Collection Time: 02/27/22  5:40 AM   Result Value Ref Range    Ammonia 34 (H) <32 umol/L     Additional comments: Patient was intubated to protect the airway with a plan to extubate him today. NEUROLOGICAL EXAM:  Appearance: The patient is well developed, well nourished, currently intubated opens eyes to the voice but not following any commands and withdrawing the extremities to touch and pain. Mental Status:  Currently intubated but more awake today than yesterday.    Cranial Nerves:   HEIDY, sluggish eye movements, no nystagmus, no ptosis. Facial movement is symmetric. Motor:  Moves all the extremities to deep pain symmetrically. Reflexes:   Deep tendon reflexes not checked. Sensory:   Withdraws to pain. Gait:  Deferred. Tremor:   No tremor noted. Cerebellar:  Cannot be checked. Neurovascular:  Normal heart sounds. Assessment:     Active Problems:  1. Status post Status epilepticus ([address] (2/24/2022): The patient did not have any seizures since he was loaded with Keppra after initial benzo boluses, currently on propofol which was stopped 30 minutes before my visit and he may take some extra time to start waking up. Etiology still seems to be related to the alcohol. We will continue Keppra. 2.  Alcohol dependence: Patient may need detox program through psych. 3.  Respiratory failure currently intubated with a plan to extubate today. 4.  Hypertension  5. Arthritis    Plan:   1. Continue with Keppra for now. 2.  Agree with extubation as soon as he is ready. 3.  We will continue to follow. No additional neurological work-up is indicated at this time.     Thank you,    Signed:  Ashwin Atkinson MD  2/27/2022  1:02 PM

## 2022-02-27 NOTE — PROGRESS NOTES
Consult Date: 2/27/2022      Subjective     Seen and examined in the ICU. Extubated and resting in bed. Awake alert and talkative. In restraints. Undependable historian  Past Medical History:   Diagnosis Date    Arthritis     Hypertension       Past Surgical History:   Procedure Laterality Date    IR PARACENTESIS ABD W IMAGE  1/21/2022     No family history on file.    Social History     Tobacco Use    Smoking status: Never Smoker    Smokeless tobacco: Never Used   Substance Use Topics    Alcohol use: Not on file       Current Facility-Administered Medications   Medication Dose Route Frequency Provider Last Rate Last Admin    albumin human 25% (BUMINATE) solution 25 g  25 g IntraVENous Q6H Jonatan Dempsey MD   25 g at 02/27/22 1329    levETIRAcetam (KEPPRA) 1500 mg in saline (iso-osm) 100 ml IVPB  1,500 mg IntraVENous Q12H Ronak Cash  mL/hr at 02/27/22 0536 1,500 mg at 02/27/22 0536    pantoprazole (PROTONIX) 40 mg in 0.9% sodium chloride 10 mL injection  40 mg IntraVENous Q12H Ronak Cash MD   40 mg at 02/27/22 0831    sodium chloride (NS) flush 5-40 mL  5-40 mL IntraVENous Q8H Ronak Cash MD   10 mL at 02/27/22 0536    sodium chloride (NS) flush 5-40 mL  5-40 mL IntraVENous PRN Ronak Cash MD        acetaminophen (TYLENOL) tablet 650 mg  650 mg Oral Q6H PRN Ronak Cash MD        Or   Waldemar Bark acetaminophen (TYLENOL) suppository 650 mg  650 mg Rectal Q6H PRN Ronak Cash MD        polyethylene glycol (MIRALAX) packet 17 g  17 g Oral DAILY PRN Ronak Cash MD        ondansetron (ZOFRAN ODT) tablet 4 mg  4 mg Oral Q8H PRN Ronak Cash MD        Or    ondansetron (ZOFRAN) injection 4 mg  4 mg IntraVENous Q6H PRN Ronak Cash MD        [Held by provider] enoxaparin (LOVENOX) injection 30 mg  30 mg SubCUTAneous DAILY Ronak Cash MD   30 mg at 50/45/29 4315    folic acid (FOLVITE) tablet 1 mg  1 mg Oral DAILY Ronak Cash MD   1 mg at 02/27/22 5999  lactulose (CHRONULAC) 10 gram/15 mL solution 15 mL  10 g Oral TID Surya Davis MD   15 mL at 02/27/22 9757    thiamine mononitrate (B-1) tablet 100 mg  100 mg Oral DAILY Surya Davis MD   100 mg at 02/27/22 7358    0.9% sodium chloride infusion  40 mL/hr IntraVENous CONTINUOUS Cuca Stevens MD 40 mL/hr at 02/27/22 0942 40 mL/hr at 02/27/22 0942    midazolam (VERSED) injection 5 mg  5 mg IntraVENous Q4H PRN Surya Davis MD        piperacillin-tazobactam (ZOSYN) 3.375 g in 0.9% sodium chloride (MBP/ADV) 100 mL MBP  3.375 g IntraVENous Q12H Surya Davis MD 25 mL/hr at 02/27/22 0833 3.375 g at 02/27/22 6767        Review of Systems   Unable to perform ROS: Mental status change       Objective     Vital signs for last 24 hours:  Visit Vitals  BP (!) 118/90 (BP 1 Location: Right upper arm, BP Patient Position: At rest)   Pulse (!) 109   Temp (!) 96.7 °F (35.9 °C)   Resp 23   Ht 6' 2.02\" (1.88 m)   Wt 87.1 kg (192 lb 0.3 oz)   SpO2 100%   BMI 24.64 kg/m²           Recent Results (from the past 24 hour(s))   BLOOD GAS, ARTERIAL    Collection Time: 02/27/22  2:20 AM   Result Value Ref Range    pH 7.48 (H) 7.35 - 7.45      PCO2 28 (L) 35 - 45 mmHg    PO2 80 75 - 100 mmHg    O2 SAT 98 >95 %    BICARBONATE 23 22 - 26 mmol/L    BASE DEFICIT 2.2 (H) 0 - 2 mmol/L    O2 METHOD VENT      FIO2 21.0 %    MODE SIMV      Tidal volume 500      PRESSURE SUPPORT 6.0      EPAP/CPAP/PEEP 5.0      SITE Right Radial      JAM'S TEST PASS     RENAL FUNCTION PANEL    Collection Time: 02/27/22  5:40 AM   Result Value Ref Range    Sodium 139 136 - 145 mmol/L    Potassium 3.7 3.5 - 5.1 mmol/L    Chloride 111 (H) 97 - 108 mmol/L    CO2 20 (L) 21 - 32 mmol/L    Anion gap 8 5 - 15 mmol/L    Glucose 80 65 - 100 mg/dL    BUN 33 (H) 6 - 20 mg/dL    Creatinine 2.63 (H) 0.70 - 1.30 mg/dL    BUN/Creatinine ratio 13 12 - 20      GFR est AA 30 (L) >60 ml/min/1.73m2    GFR est non-AA 25 (L) >60 ml/min/1.73m2    Calcium 8.1 (L) 8.5 - 10.1 mg/dL    Phosphorus 4.3 2.6 - 4.7 mg/dL    Albumin 1.3 (L) 3.5 - 5.0 g/dL   CBC WITH AUTOMATED DIFF    Collection Time: 02/27/22  5:40 AM   Result Value Ref Range    WBC 14.8 (H) 4.1 - 11.1 K/uL    RBC 3.25 (L) 4.10 - 5.70 M/uL    HGB 9.6 (L) 12.1 - 17.0 g/dL    HCT 27.6 (L) 36.6 - 50.3 %    MCV 84.9 80.0 - 99.0 FL    MCH 29.5 26.0 - 34.0 PG    MCHC 34.8 30.0 - 36.5 g/dL    RDW 16.6 (H) 11.5 - 14.5 %    PLATELET 69 (L) 969 - 400 K/uL    MPV 11.1 8.9 - 12.9 FL    NRBC 0.0 0.0  WBC    ABSOLUTE NRBC 0.00 0.00 - 0.01 K/uL    NEUTROPHILS 85 (H) 32 - 75 %    LYMPHOCYTES 6 (L) 12 - 49 %    MONOCYTES 8 5 - 13 %    EOSINOPHILS 1 0 - 7 %    BASOPHILS 0 0 - 1 %    IMMATURE GRANULOCYTES 0 0 - 0.5 %    ABS. NEUTROPHILS 12.5 (H) 1.8 - 8.0 K/UL    ABS. LYMPHOCYTES 0.9 0.8 - 3.5 K/UL    ABS. MONOCYTES 1.2 (H) 0.0 - 1.0 K/UL    ABS. EOSINOPHILS 0.1 0.0 - 0.4 K/UL    ABS. BASOPHILS 0.0 0.0 - 0.1 K/UL    ABS. IMM. GRANS. 0.1 (H) 0.00 - 0.04 K/UL    DF AUTOMATED     MAGNESIUM    Collection Time: 02/27/22  5:40 AM   Result Value Ref Range    Magnesium 1.6 1.6 - 2.4 mg/dL   AMMONIA    Collection Time: 02/27/22  5:40 AM   Result Value Ref Range    Ammonia 34 (H) <32 umol/L          Intake/Output Summary (Last 24 hours) at 2/27/2022 1436  Last data filed at 2/27/2022 0549  Gross per 24 hour   Intake 3089.53 ml   Output 480 ml   Net 2609.53 ml      Current Shift: No intake/output data recorded. Last 3 Shifts: 02/25 1901 - 02/27 0700  In: 3844.5 [I.V.:3704.5]  Out: 1130 [Urine:680]  Physical Exam  Constitutional:       Appearance: He is ill-appearing. Cardiovascular:      Rate and Rhythm: Tachycardia present. Abdominal:      General: There is distension.      Intubated and on ventilator    Intubated and on ventilatorI  Urinary catheter in place with minimal urine output  Propofol weaned off  Normal saline ongoing  Data Review:   Recent Results (from the past 24 hour(s))   BLOOD GAS, ARTERIAL    Collection Time: 02/27/22  2:20 AM   Result Value Ref Range    pH 7.48 (H) 7.35 - 7.45      PCO2 28 (L) 35 - 45 mmHg    PO2 80 75 - 100 mmHg    O2 SAT 98 >95 %    BICARBONATE 23 22 - 26 mmol/L    BASE DEFICIT 2.2 (H) 0 - 2 mmol/L    O2 METHOD VENT      FIO2 21.0 %    MODE SIMV      Tidal volume 500      PRESSURE SUPPORT 6.0      EPAP/CPAP/PEEP 5.0      SITE Right Radial      JAM'S TEST PASS     RENAL FUNCTION PANEL    Collection Time: 02/27/22  5:40 AM   Result Value Ref Range    Sodium 139 136 - 145 mmol/L    Potassium 3.7 3.5 - 5.1 mmol/L    Chloride 111 (H) 97 - 108 mmol/L    CO2 20 (L) 21 - 32 mmol/L    Anion gap 8 5 - 15 mmol/L    Glucose 80 65 - 100 mg/dL    BUN 33 (H) 6 - 20 mg/dL    Creatinine 2.63 (H) 0.70 - 1.30 mg/dL    BUN/Creatinine ratio 13 12 - 20      GFR est AA 30 (L) >60 ml/min/1.73m2    GFR est non-AA 25 (L) >60 ml/min/1.73m2    Calcium 8.1 (L) 8.5 - 10.1 mg/dL    Phosphorus 4.3 2.6 - 4.7 mg/dL    Albumin 1.3 (L) 3.5 - 5.0 g/dL   CBC WITH AUTOMATED DIFF    Collection Time: 02/27/22  5:40 AM   Result Value Ref Range    WBC 14.8 (H) 4.1 - 11.1 K/uL    RBC 3.25 (L) 4.10 - 5.70 M/uL    HGB 9.6 (L) 12.1 - 17.0 g/dL    HCT 27.6 (L) 36.6 - 50.3 %    MCV 84.9 80.0 - 99.0 FL    MCH 29.5 26.0 - 34.0 PG    MCHC 34.8 30.0 - 36.5 g/dL    RDW 16.6 (H) 11.5 - 14.5 %    PLATELET 69 (L) 543 - 400 K/uL    MPV 11.1 8.9 - 12.9 FL    NRBC 0.0 0.0  WBC    ABSOLUTE NRBC 0.00 0.00 - 0.01 K/uL    NEUTROPHILS 85 (H) 32 - 75 %    LYMPHOCYTES 6 (L) 12 - 49 %    MONOCYTES 8 5 - 13 %    EOSINOPHILS 1 0 - 7 %    BASOPHILS 0 0 - 1 %    IMMATURE GRANULOCYTES 0 0 - 0.5 %    ABS. NEUTROPHILS 12.5 (H) 1.8 - 8.0 K/UL    ABS. LYMPHOCYTES 0.9 0.8 - 3.5 K/UL    ABS. MONOCYTES 1.2 (H) 0.0 - 1.0 K/UL    ABS. EOSINOPHILS 0.1 0.0 - 0.4 K/UL    ABS. BASOPHILS 0.0 0.0 - 0.1 K/UL    ABS. IMM.  GRANS. 0.1 (H) 0.00 - 0.04 K/UL    DF AUTOMATED     MAGNESIUM    Collection Time: 02/27/22  5:40 AM   Result Value Ref Range    Magnesium 1.6 1.6 - 2.4 mg/dL   AMMONIA Collection Time: 02/27/22  5:40 AM   Result Value Ref Range    Ammonia 34 (H) <32 umol/L         XR CHEST PORT   Final Result   No significant change. XR CHEST PORT   Final Result   Stable exam.      XR CHEST PORT   Final Result   Orogastric tube can be followed below the diaphragm. CT ABD PELV WO CONT   Final Result   Cirrhosis. Varices. Large volume ascites. XR CHEST PORT   Final Result   Dilation of the aortic arch. Further evaluation may be warranted. Endotracheal tube tip appears in appropriate position. CT HEAD WO CONT   Final Result   Age-appropriate atrophy. No acute findings. XR CHEST PORT   Final Result   The cardiomediastinal silhouette is appropriate for age, technique,   and lung expansion. Pulmonary vasculature is not congested. The lungs are   essentially clear. Mild atelectasis at hypoinflated left base. No effusion or   pneumothorax is seen. US GUIDE PARACENTESIS    (Results Pending)   XR CHEST PORT    (Results Pending)        Patient Active Problem List   Diagnosis Code    Acute renal failure (ARF) ([address] N17.9    Cirrhosis of liver ([address] K74.60    GRACIA (acute kidney injury) ([address] N17.9    Status epilepticus ([address] G40.901        DIAGNOSES:  1. Acute kidney injury on chronic kidney diseaseacute kidney injury grade 2 GRACIA and criteriaimproving  2. CKDstage IIIb; creatinine of 2.21 during previous hospitalization in January 2022  3. Seizures  4. Hepatic cirrhosis plus large volume ascites and portal hypertension along with paralysis  5. Encephalopathy  6. Mild hypokalemia  7. NoRMAL Anion gap metabolic acidosis plus respiratory alkalosis  DISCUSSION:   Renal function has improved.  However urine output is oliguric   Successfully extubated, but mental status is? ConfusedI wonder what his baseline  Mental status is    PLAN:  Continue with IV fluid  Monitor renal panel in the morning  Avoid hypotension        Thanks for consulting me.  Please don't hesitate to contact me if any questions arise of if I can assist in any manner. This dictation was done by dragon, computer voice recognition software. Often unanticipated grammatical, syntax, phones and other interpretive errors are inadvertently transcribed. Please excuse errors that have escaped final proofreading. Please contact me if you suspect dictation or transcription errors.   Dr Jerald Ashford  [address] [place] [address], [address] [facility], [facility]  Cell Phone: [phone]  Office phone: [phone]  Fax: [phone]

## 2022-02-27 NOTE — ROUTINE PROCESS
Extubated patient to room air. SpO2 04% with no complications noted. Patient resting comfortably with RN at bedside.

## 2022-02-27 NOTE — PROGRESS NOTES
Respiratory placed patient back on vent settings after talking to Dr Jerel Mcdonald. Patient was having periods of apnea on pressure support of 6. Pt continues to be somulent and not obeying any commands.

## 2022-02-28 NOTE — PROGRESS NOTES
Hospitalist Progress Note    Subjective:   Daily Progress Note: 2/28/2022 10:01 AM    Hospital Course:  Doug Parra is a 60 y.o. Male with hx of cirrhosis, seizure disorder not on any medications, alcohol abuse, gout,  presented to ED with c/o seizures. As per patient's wife, patient was diagnosed with seizure 2 years ago, but was not placed on any anti epileptics. Patient is daily drinker. Patient had Generalized tonic clonic seizures for 20 minutes at home prior to EMS arrival, resolved with 5 mg of versed. In ED initially, patient was somnolent, again had seizure episode. Seizure was unresolved with > 4 mg of ativan. Patient was given 4gm if keppra. Was intubated for airway protection and started on propofol. Patient was extubated on 2/27/28. Patient has dark brown drainage from NG, started on PPI, plan for EGD today. Paracentesis pending. Subjective:  Patient is awake and alert, still confused.     Current Facility-Administered Medications   Medication Dose Route Frequency    levETIRAcetam (KEPPRA) 1500 mg in saline (iso-osm) 100 ml IVPB  1,500 mg IntraVENous Q12H    pantoprazole (PROTONIX) 40 mg in 0.9% sodium chloride 10 mL injection  40 mg IntraVENous Q12H    sodium chloride (NS) flush 5-40 mL  5-40 mL IntraVENous Q8H    sodium chloride (NS) flush 5-40 mL  5-40 mL IntraVENous PRN    acetaminophen (TYLENOL) tablet 650 mg  650 mg Oral Q6H PRN    Or    acetaminophen (TYLENOL) suppository 650 mg  650 mg Rectal Q6H PRN    polyethylene glycol (MIRALAX) packet 17 g  17 g Oral DAILY PRN    ondansetron (ZOFRAN ODT) tablet 4 mg  4 mg Oral Q8H PRN    Or    ondansetron (ZOFRAN) injection 4 mg  4 mg IntraVENous Q6H PRN    [Held by provider] enoxaparin (LOVENOX) injection 30 mg  30 mg SubCUTAneous DAILY    folic acid (FOLVITE) tablet 1 mg  1 mg Oral DAILY    lactulose (CHRONULAC) 10 gram/15 mL solution 15 mL  10 g Oral TID    thiamine mononitrate (B-1) tablet 100 mg  100 mg Oral DAILY    0.9% sodium chloride infusion  40 mL/hr IntraVENous CONTINUOUS    midazolam (VERSED) injection 5 mg  5 mg IntraVENous Q4H PRN    piperacillin-tazobactam (ZOSYN) 3.375 g in 0.9% sodium chloride (MBP/ADV) 100 mL MBP  3.375 g IntraVENous Q12H        Review of Systems  Unable to obtain due to patient's condition      Objective:     Visit Vitals  BP 99/68 (BP 1 Location: Right upper arm, BP Patient Position: At rest)   Pulse 95   Temp 98 °F (36.7 °C)   Resp 14   Ht 6' 2.02\" (1.88 m)   Wt 84.4 kg (186 lb 1.1 oz)   SpO2 98%   BMI 23.88 kg/m²    O2 Flow Rate (L/min): 2 l/min O2 Device: None (Room air)    Temp (24hrs), Av °F (36.7 °C), Min:97.6 °F (36.4 °C), Max:98.3 °F (36.8 °C)      No intake/output data recorded.  1901 -  0700  In: 2020 [I.V.:1980]  Out: 685 [Urine:385]    PHYSICAL EXAM:  Constitutional:patient is in no acute distress. Skin: Extremities and face reveal no rashes. HEENT: Sclerae anicteric. Extra-occular muscles are intact. No oral ulcers. The neck is supple and no masses. Cardiovascular: Regular rate and rhythm. Respiratory:  Clear breath sounds bilaterally with no wheezes, rales, or rhonchi. GI: Abdomen nondistended, soft, and nontender. Normal active bowel sounds. Musculoskeletal: No pitting edema of the lower legs.  Able to move all ext  Neurological:  Patient isawake and alert, but confused  Psychiatric: mood stable      Data Review    Recent Results (from the past 24 hour(s))   GLUCOSE, POC    Collection Time: 22  4:04 PM   Result Value Ref Range    Glucose (POC) 98 65 - 117 mg/dL    Performed by Deidra Schwartz    RENAL FUNCTION PANEL    Collection Time: 22  4:00 AM   Result Value Ref Range    Sodium 144 136 - 145 mmol/L    Potassium 3.3 (L) 3.5 - 5.1 mmol/L    Chloride 114 (H) 97 - 108 mmol/L    CO2 19 (L) 21 - 32 mmol/L    Anion gap 11 5 - 15 mmol/L    Glucose 90 65 - 100 mg/dL    BUN 30 (H) 6 - 20 mg/dL    Creatinine 2.25 (H) 0.70 - 1.30 mg/dL    BUN/Creatinine ratio 13 12 - 20      GFR est AA 36 (L) >60 ml/min/1.73m2    GFR est non-AA 30 (L) >60 ml/min/1.73m2    Calcium 8.4 (L) 8.5 - 10.1 mg/dL    Phosphorus 3.8 2.6 - 4.7 mg/dL    Albumin 2.5 (L) 3.5 - 5.0 g/dL   MAGNESIUM    Collection Time: 02/28/22  4:00 AM   Result Value Ref Range    Magnesium 1.9 1.6 - 2.4 mg/dL   BLOOD GAS, ARTERIAL    Collection Time: 02/28/22  4:00 AM   Result Value Ref Range    pH 7.41 7.35 - 7.45      PCO2 32 (L) 35 - 45 mmHg    PO2 72 (L) 75 - 100 mmHg    O2 SAT 96 >95 %    BICARBONATE 21 (L) 22 - 26 mmol/L    BASE DEFICIT 4.3 (H) 0 - 2 mmol/L    O2 METHOD Room air      FIO2 21.0 %    SITE Left Radial      JAM'S TEST PASS     CBC WITH AUTOMATED DIFF    Collection Time: 02/28/22  4:00 AM   Result Value Ref Range    WBC 13.2 (H) 4.1 - 11.1 K/uL    RBC 2.66 (L) 4.10 - 5.70 M/uL    HGB 7.9 (L) 12.1 - 17.0 g/dL    HCT 22.9 (L) 36.6 - 50.3 %    MCV 86.1 80.0 - 99.0 FL    MCH 29.7 26.0 - 34.0 PG    MCHC 34.5 30.0 - 36.5 g/dL    RDW 16.9 (H) 11.5 - 14.5 %    PLATELET 64 (L) 199 - 400 K/uL    MPV 11.3 8.9 - 12.9 FL    NRBC 0.0 0.0  WBC    ABSOLUTE NRBC 0.00 0.00 - 0.01 K/uL    NEUTROPHILS 85 (H) 32 - 75 %    LYMPHOCYTES 5 (L) 12 - 49 %    MONOCYTES 9 5 - 13 %    EOSINOPHILS 0 0 - 7 %    BASOPHILS 0 0 - 1 %    IMMATURE GRANULOCYTES 1 (H) 0 - 0.5 %    ABS. NEUTROPHILS 11.2 (H) 1.8 - 8.0 K/UL    ABS. LYMPHOCYTES 0.7 (L) 0.8 - 3.5 K/UL    ABS. MONOCYTES 1.2 (H) 0.0 - 1.0 K/UL    ABS. EOSINOPHILS 0.1 0.0 - 0.4 K/UL    ABS. BASOPHILS 0.0 0.0 - 0.1 K/UL    ABS. IMM. GRANS. 0.1 (H) 0.00 - 0.04 K/UL    DF AUTOMATED     NT-PRO BNP    Collection Time: 02/28/22  4:00 AM   Result Value Ref Range    NT pro-BNP 2,485 (H) <125 pg/mL           Assessment / Plan:  Status epilepticus  Patient was intubated in ED and started on propofol.  S/p extubation on 2/27/22  Off propofol  Continue keppra 1500 mg iv BID  Appreciate neurology consult  No further episodes of seizures       Acute GI bleed  NG was draining dark output, so started on PPI  Continue pantoprazole   Appreciate GI consult  Plan for EGD today. Acute blood loss anemia:  Likely s/s acute GI bleed  Hb trending down today  Will transfuse if Hb<7    Sepsis:  Patient had white count and tachycardia, with no clear source of sepsis. continue zosyn empirically for aspiration pneumonia       Acute hypoxic respiratory failure  Intubated for air way protection. Nos s/p extubation. Continue zosyn  Appreciate pulmonary consult       Acute encephalopathy  S/s seizures, hepatic encephalopathy contributing  Mental status improving, still confused. Continue lactulose     Acute hypokalemia:  resolved     GRACIA on CKD  Improving  Albumin ordered by pulmonary  Appreciate nephrology consult     Liver cirrhosis/ascites  Ammonia on admission 111, 34 on 2/27/22  Continue lactulose  Paracentesis pending      18.5 - 24.9 Normal weight / Body mass index is 23.88 kg/m². Code status: Full  Prophylaxis: SCD's  Recommended Disposition: SNF/LTC       Critical care time spent 35 minutes involving direct patient care as well as reviewing patient's labs and coordination of care with nursing staff           Total time spent with patient: 35 minutes.

## 2022-02-28 NOTE — PROGRESS NOTES
Renal Daily Progress Note    Admit Date: 2/24/2022      Subjective:   He is awake and responsive but confused. Urine output documented at 250 mL. More urine in the Gerard bag noted.       Current Facility-Administered Medications   Medication Dose Route Frequency    levETIRAcetam (KEPPRA) 1500 mg in saline (iso-osm) 100 ml IVPB  1,500 mg IntraVENous Q12H    pantoprazole (PROTONIX) 40 mg in 0.9% sodium chloride 10 mL injection  40 mg IntraVENous Q12H    sodium chloride (NS) flush 5-40 mL  5-40 mL IntraVENous Q8H    sodium chloride (NS) flush 5-40 mL  5-40 mL IntraVENous PRN    acetaminophen (TYLENOL) tablet 650 mg  650 mg Oral Q6H PRN    Or    acetaminophen (TYLENOL) suppository 650 mg  650 mg Rectal Q6H PRN    polyethylene glycol (MIRALAX) packet 17 g  17 g Oral DAILY PRN    ondansetron (ZOFRAN ODT) tablet 4 mg  4 mg Oral Q8H PRN    Or    ondansetron (ZOFRAN) injection 4 mg  4 mg IntraVENous Q6H PRN    [Held by provider] enoxaparin (LOVENOX) injection 30 mg  30 mg SubCUTAneous DAILY    folic acid (FOLVITE) tablet 1 mg  1 mg Oral DAILY    lactulose (CHRONULAC) 10 gram/15 mL solution 15 mL  10 g Oral TID    thiamine mononitrate (B-1) tablet 100 mg  100 mg Oral DAILY    0.9% sodium chloride infusion  40 mL/hr IntraVENous CONTINUOUS    midazolam (VERSED) injection 5 mg  5 mg IntraVENous Q4H PRN    piperacillin-tazobactam (ZOSYN) 3.375 g in 0.9% sodium chloride (MBP/ADV) 100 mL MBP  3.375 g IntraVENous Q12H        Review of Systems    ROS   No headache  Denies shortness of breath  Denies chest pain  Complaining of pain in his right elbow    Objective:     Patient Vitals for the past 8 hrs:   BP Temp Pulse Resp SpO2 Weight   02/28/22 1100 99/68 98 °F (36.7 °C) 95 14 98 %    02/28/22 0900 (!) 122/95  (!) 111 22 98 %    02/28/22 0800 (!) 119/90  100 20 100 %    02/28/22 0700 108/78 98.3 °F (36.8 °C) 95 21 98 %    02/28/22 0617      84.4 kg (186 lb 1.1 oz)   02/28/22 0600 (!) 119/90  (!) 107 23 98 %      No intake/output data recorded. 02/26 1901 - 02/28 0700  In: 2020 [I.V.:1980]  Out: 26 [Urine:385]    Physical Exam:   Physical Exam  Cardiovascular:      Rate and Rhythm: Regular rhythm. Pulmonary:      Breath sounds: Normal breath sounds. Abdominal:      Palpations: Abdomen is soft. Neurological:      Mental Status: He is alert.     + ascites  No asterixis      XR CHEST PORT   Final Result   Interval extubation. No significant interval change otherwise. XR CHEST PORT   Final Result   No significant change. XR CHEST PORT   Final Result   Stable exam.      XR CHEST PORT   Final Result   Orogastric tube can be followed below the diaphragm. CT ABD PELV WO CONT   Final Result   Cirrhosis. Varices. Large volume ascites. XR CHEST PORT   Final Result   Dilation of the aortic arch. Further evaluation may be warranted. Endotracheal tube tip appears in appropriate position. CT HEAD WO CONT   Final Result   Age-appropriate atrophy. No acute findings. XR CHEST PORT   Final Result   The cardiomediastinal silhouette is appropriate for age, technique,   and lung expansion. Pulmonary vasculature is not congested. The lungs are   essentially clear. Mild atelectasis at hypoinflated left base. No effusion or   pneumothorax is seen.          IR PARACENTESIS ABD W IMAGE    (Results Pending)        Data Review   Recent Labs     02/28/22  0400 02/27/22  0540 02/26/22  0515   WBC 13.2* 14.8* 10.8   HGB 7.9* 9.6* 9.9*   HCT 22.9* 27.6* 27.7*   PLT 64* 69* 66*     Recent Labs     02/28/22  0400 02/27/22  0540 02/26/22  1110 02/26/22  0515    139  --  139   K 3.3* 3.7  --  3.7   * 111*  --  109*   CO2 19* 20*  --  19*   GLU 90 80  --  83   BUN 30* 33*  --  31*   CREA 2.25* 2.63*  --  3.11*   CA 8.4* 8.1*  --  8.5   MG 1.9 1.6  --   --    PHOS 3.8 4.3  --   --    ALB 2.5* 1.3*  --   --    INR  --   --  1.4*  --      No components found for: [place]  Recent Labs     02/28/22  0400 02/27/22  0220 02/26/22  0400   PH 7.41 7.48* 7.48*   PCO2 32* 28* 27*   PO2 72* 80 107*   HCO3 21* 23 23   FIO2 21.0 21.0 30.0     Recent Labs     02/26/22  1110   INR 1.4*         Assessment:           Active Problems:    Status epilepticus ([address] (2/24/2022)    Acute kidney injury on chronic kidney disease. GRACIA improving. CKD stage IIIb during his previous hospitalization earlier this year    Hepatic cirrhosis with portal hypertension    [place] metabolic acidosis    Seizure disorder    Plan:   He is scheduled for an EGD today. If paracentesis is done he will need 25% albumin  Will follow renal function and expect further improvement in the renal function.

## 2022-02-28 NOTE — PROGRESS NOTES
IMPRESSION:   1. Acute hypoxic respiratory failure resolved  2. Aspiration pneumonia chest x-ray clear  3. Status epilepticus no further seizures seen  4. Acute on chronic kidney disease creatinine improving  5. Hypomagnesemia to be replete  6. History of cirrhosis EtOH abuse and ascites  7. Thrombocytopenia stable      RECOMMENDATIONS/PLAN:   1. Extubated now on room air tolerating well  2. Altered mental status remains poorly responsive despite no sedation in 24-hour  3. Remains on Zosyn empirically  4. No further seizures seen on Keppra  5. Creatinine improving but developing edema of the lower extremities. Will give albumin today and decrease IV fluid   6. For EGD today       [x] High complexity decision making was performed  [x] See my orders for details  HPI  59-year-old male came in because of seizures significant past medical history of cirrhosis of liver alcohol abuse gout he had paracentesis done in the past also has hypertension and arthritis. He starting having seizures received medication came to the emergency room subsequently got intubated now he is on propofol sedated unable to get any started the patient is not on any medication at home history of EtOH abuse so admitted and critical care consult was called. PMH:  has a past medical history of Arthritis and Hypertension. PSH:   has a past surgical history that includes ir paracentesis abd w image (1/21/2022). FHX: family history is not on file. SHX:  reports that he has never smoked.  He has never used smokeless tobacco.    ALL: No Known Allergies     MEDS:   [x] Reviewed - As Below   [] Not reviewed    Current Facility-Administered Medications   Medication    potassium chloride 10 mEq in 100 ml IVPB    levETIRAcetam (KEPPRA) 1500 mg in saline (iso-osm) 100 ml IVPB    pantoprazole (PROTONIX) 40 mg in 0.9% sodium chloride 10 mL injection    sodium chloride (NS) flush 5-40 mL    sodium chloride (NS) flush 5-40 mL    acetaminophen (TYLENOL) tablet 650 mg    Or    acetaminophen (TYLENOL) suppository 650 mg    polyethylene glycol (MIRALAX) packet 17 g    ondansetron (ZOFRAN ODT) tablet 4 mg    Or    ondansetron (ZOFRAN) injection 4 mg    [Held by provider] enoxaparin (LOVENOX) injection 30 mg    folic acid (FOLVITE) tablet 1 mg    lactulose (CHRONULAC) 10 gram/15 mL solution 15 mL    thiamine mononitrate (B-1) tablet 100 mg    0.9% sodium chloride infusion    midazolam (VERSED) injection 5 mg    piperacillin-tazobactam (ZOSYN) 3.375 g in 0.9% sodium chloride (MBP/ADV) 100 mL MBP      MAR reviewed and pertinent medications noted or modified as needed   Current Facility-Administered Medications   Medication    potassium chloride 10 mEq in 100 ml IVPB    levETIRAcetam (KEPPRA) 1500 mg in saline (iso-osm) 100 ml IVPB    pantoprazole (PROTONIX) 40 mg in 0.9% sodium chloride 10 mL injection    sodium chloride (NS) flush 5-40 mL    sodium chloride (NS) flush 5-40 mL    acetaminophen (TYLENOL) tablet 650 mg    Or    acetaminophen (TYLENOL) suppository 650 mg    polyethylene glycol (MIRALAX) packet 17 g    ondansetron (ZOFRAN ODT) tablet 4 mg    Or    ondansetron (ZOFRAN) injection 4 mg    [Held by provider] enoxaparin (LOVENOX) injection 30 mg    folic acid (FOLVITE) tablet 1 mg    lactulose (CHRONULAC) 10 gram/15 mL solution 15 mL    thiamine mononitrate (B-1) tablet 100 mg    0.9% sodium chloride infusion    midazolam (VERSED) injection 5 mg    piperacillin-tazobactam (ZOSYN) 3.375 g in 0.9% sodium chloride (MBP/ADV) 100 mL MBP      PMH:  has a past medical history of Arthritis and Hypertension. PSH:   has a past surgical history that includes ir paracentesis abd w image (1/21/2022). FHX: family history is not on file. SHX:  reports that he has never smoked.  He has never used smokeless tobacco.     ROS:  Lethargic confused    Hemodynamics:    CO:    CI:    CVP:    SVR:   PAP Systolic:    PAP Diastolic:    PVR: SV02:        Ventilator Settings:      Mode Rate TV Press PEEP FiO2 PIP Min. Vent   SIMV,Volume control    500 ml 6 cm H2O  2 cm H20 21 %  18 cm H2O  10.6 l/min        Vital Signs: Telemetry:    normal sinus rhythm Intake/Output:   Visit Vitals  BP (!) 119/90 (BP 1 Location: Right upper arm, BP Patient Position: At rest)   Pulse (!) 107   Temp 98.3 °F (36.8 °C)   Resp 23   Ht 6' 2.02\" (1.88 m)   Wt 84.4 kg (186 lb 1.1 oz)   SpO2 98%   BMI 23.88 kg/m²       Temp (24hrs), Av.8 °F (36.6 °C), Min:96.7 °F (35.9 °C), Max:98.3 °F (36.8 °C)        O2 Device: None (Room air) O2 Flow Rate (L/min): 2 l/min       Wt Readings from Last 4 Encounters:   22 84.4 kg (186 lb 1.1 oz)   22 86.2 kg (190 lb)          Intake/Output Summary (Last 24 hours) at 2022 0759  Last data filed at 2022 0618  Gross per 24 hour   Intake 480 ml   Output 250 ml   Net 230 ml       Last shift:      No intake/output data recorded. Last 3 shifts:  1901 -  0700  In:  [I.V.:1980]  Out:  [Urine:385]       Physical Exam:     General: Awake does not follow command on room air   HEENT: NCAT,   Eyes: anicteric; conjunctiva clear random eye movement present  Neck: no nodes, , trach midline; no accessory MM use. No definite JVD  Chest: no deformity,   Cardiac: R regular; no murmur;   Lungs: distant breath sounds; no wheezes or rales  Abd: soft, NT, hypoactive BS  Ext: Lower extremity edema; no joint swelling; No clubbing  :  clear urine  Neuro:  Tolerating room air alert awake  Psych-  unable to assess  Skin: warm, dry, no cyanosis;   Pulses: Brachial and radial pulses intact  Capillary: Normal capillary refill      DATA:    MAR reviewed and pertinent medications noted or modified as needed  MEDS:   Current Facility-Administered Medications   Medication    potassium chloride 10 mEq in 100 ml IVPB    levETIRAcetam (KEPPRA) 1500 mg in saline (iso-osm) 100 ml IVPB    pantoprazole (PROTONIX) 40 mg in 0.9% sodium chloride 10 mL injection    sodium chloride (NS) flush 5-40 mL    sodium chloride (NS) flush 5-40 mL    acetaminophen (TYLENOL) tablet 650 mg    Or    acetaminophen (TYLENOL) suppository 650 mg    polyethylene glycol (MIRALAX) packet 17 g    ondansetron (ZOFRAN ODT) tablet 4 mg    Or    ondansetron (ZOFRAN) injection 4 mg    [Held by provider] enoxaparin (LOVENOX) injection 30 mg    folic acid (FOLVITE) tablet 1 mg    lactulose (CHRONULAC) 10 gram/15 mL solution 15 mL    thiamine mononitrate (B-1) tablet 100 mg    0.9% sodium chloride infusion    midazolam (VERSED) injection 5 mg    piperacillin-tazobactam (ZOSYN) 3.375 g in 0.9% sodium chloride (MBP/ADV) 100 mL MBP        Labs:    Recent Labs     02/28/22  0400 02/27/22  0540 02/26/22  1110 02/26/22  0515   WBC 13.2* 14.8*  --  10.8   HGB 7.9* 9.6*  --  9.9*   PLT 64* 69*  --  66*   INR  --   --  1.4*  --    APTT  --   --  40.2*  --      Recent Labs     02/28/22  0400 02/27/22  0540 02/26/22  0515    139 139   K 3.3* 3.7 3.7   * 111* 109*   CO2 19* 20* 19*   GLU 90 80 83   BUN 30* 33* 31*   CREA 2.25* 2.63* 3.11*   CA 8.4* 8.1* 8.5   MG 1.9 1.6  --    PHOS 3.8 4.3  --    ALB 2.5* 1.3*  --    Ammonia 34  Recent Labs     02/28/22  0400 02/27/22  0220 02/26/22  0400   PH 7.41 7.48* 7.48*   PCO2 32* 28* 27*   PO2 72* 80 107*   HCO3 21* 23 23   FIO2 21.0 21.0 30.0   2/27 IMV 4 tidal volume 500 pressure support 6 PEEP 5 FiO2 21    Lab Results   Component Value Date/Time    Culture result: No significant growth, <10,000 CFU/mL 02/24/2022 04:45 PM    Culture result: No growth 4 days 01/21/2022 12:00 PM         Imaging:    Results from Hospital Encounter encounter on 02/24/22    XR CHEST PORT    Narrative  Chest, frontal view, 2/28/2022    History: Respiratory failure. Comparison: Including chest 2/27/2022. Findings: Endotracheal and feeding tubes have not removed. The cardiac  silhouette is stable. Lung volumes remain low.   Bibasilar atelectasis/opacities  are not significantly changed. Pulmonary vascular congestion and possible  hydrostatic edema are not significantly changed. No pleural effusion or  pneumothorax is identified. The osseous structures are stable. Impression  Interval extubation. No significant interval change otherwise. Results from [place] encounter on 02/24/22    CT ABD PELV WO CONT    Narrative  CT ABD PELV WO CONT    Technique: Noncontrasted CT scan of the abdomen and pelvis was performed  utilizing transaxial images obtained from the dome of the diaphragm to the pubic  symphysis. Coronal and sagittal reconstructions were generated. Dose Reduction:  All CT scans at this facility are performed using dose reduction optimization  techniques as appropriate to a performed exam including the following: Automated  exposure control, adjustments of the mA and/or kV according to patient size, or  use of iterative reconstruction technique. Comparison:  1/20/2022. Findings: There is mild bibasilar atelectasis. Atherosclerotic calcifications  are present including coronary artery calcifications. Cirrhotic hepatic configuration again evident. The spleen, pancreas, adrenal  glands, and right kidney are unremarkable for noncontrast technique. Cyst of the  posterior lower pole of the left kidney measures 1.6 cm in diameter. Varices are  present. Gallbladder is unremarkable. No biliary duct dilatation apparent. The  abdominal aorta is normal in caliber. There is a large volume of ascites. There is no evidence of bowel obstruction. The appendix is normal. The urinary bladder is empty. No suspicious lytic or blastic skeletal lesion apparent. Impression  Cirrhosis. Varices. Large volume ascites. 2/26. No further seizures seen. We will start decreasing propofol and decreasing ventilator. X-ray got clear hopefully can progress to extubation  2/27 tolerating decrease ventilator.   Have placed on tube compensate respirations are nonlabored except for having cough. Will extubate to room air. He has an OG tube in we will try to maintain it if it comes out we will leave it out. He may need an NG tube if he does not wake up further.   Starting to develop edema will decrease IV fluids and give albumin today  2/28 on room air tolerating well, for EGD to  Time of care 30 minutes

## 2022-02-28 NOTE — PROGRESS NOTES
Neurology Progress Note    Patient ID:  Curtistine Nissen  [id number]  60 y.o.  1960    Subjective: Patient is seen in follow-up for status epilepticus and he did not have any seizures since he was loaded with Keppra. He was extubated yesterday and tolerating it fairly well without any problems. He did not have any more seizures      Waldo Rangel is a 60 y.o. Male with hx of cirrhosis, seizure disorder not on any medications, alcohol abuse, gout,  presented to ED on 02/24/2022 with a 20 min GTC. He was given 4g keppra to break the seizure. He was seen by Josselin Larose in 2020 for a first time seizure thought to be from alcohol abuse. No anti epileptics were started, MRI and EEG were normal. He was advised to quit drinking. He was lost to follow up after 2020. Currently patient is sedated taken off of propofol 30 minutes before my visit and intubated. Ct head shows generalized atrophy otherwise no acute changes. .    Current Facility-Administered Medications   Medication Dose Route Frequency    potassium chloride 10 mEq in 100 ml IVPB  10 mEq IntraVENous Q1H    levETIRAcetam (KEPPRA) 1500 mg in saline (iso-osm) 100 ml IVPB  1,500 mg IntraVENous Q12H    pantoprazole (PROTONIX) 40 mg in 0.9% sodium chloride 10 mL injection  40 mg IntraVENous Q12H    sodium chloride (NS) flush 5-40 mL  5-40 mL IntraVENous Q8H    sodium chloride (NS) flush 5-40 mL  5-40 mL IntraVENous PRN    acetaminophen (TYLENOL) tablet 650 mg  650 mg Oral Q6H PRN    Or    acetaminophen (TYLENOL) suppository 650 mg  650 mg Rectal Q6H PRN    polyethylene glycol (MIRALAX) packet 17 g  17 g Oral DAILY PRN    ondansetron (ZOFRAN ODT) tablet 4 mg  4 mg Oral Q8H PRN    Or    ondansetron (ZOFRAN) injection 4 mg  4 mg IntraVENous Q6H PRN    [Held by provider] enoxaparin (LOVENOX) injection 30 mg  30 mg SubCUTAneous DAILY    folic acid (FOLVITE) tablet 1 mg  1 mg Oral DAILY    lactulose (CHRONULAC) 10 gram/15 mL solution 15 mL  10 g Oral TID  thiamine mononitrate (B-1) tablet 100 mg  100 mg Oral DAILY    0.9% sodium chloride infusion  40 mL/hr IntraVENous CONTINUOUS    midazolam (VERSED) injection 5 mg  5 mg IntraVENous Q4H PRN    piperacillin-tazobactam (ZOSYN) 3.375 g in 0.9% sodium chloride (MBP/ADV) 100 mL MBP  3.375 g IntraVENous Q12H     Objective:     Patient Vitals for the past 8 hrs:   BP Temp Pulse Resp SpO2 Weight   02/28/22 0700  98.3 °F (36.8 °C)       02/28/22 0617      84.4 kg (186 lb 1.1 oz)   02/28/22 0600 (!) 119/90  (!) 107 23 98 %    02/28/22 0500 114/81  (!) 106 24 97 %    02/28/22 0400 118/84 98.1 °F (36.7 °C) (!) 110 21 98 %    02/28/22 0300 103/69  (!) 103 24 95 %    02/28/22 0200 (!) 127/98  (!) 110 20 100 %      No intake/output data recorded.   02/26 1901 - 02/28 0700  In: 2020 [I.V.:1980]  Out: 26 [Urine:385]    Lab Review   Recent Results (from the past 24 hour(s))   GLUCOSE, POC    Collection Time: 02/27/22  4:04 PM   Result Value Ref Range    Glucose (POC) 98 65 - 117 mg/dL    Performed by Angelic Santos    RENAL FUNCTION PANEL    Collection Time: 02/28/22  4:00 AM   Result Value Ref Range    Sodium 144 136 - 145 mmol/L    Potassium 3.3 (L) 3.5 - 5.1 mmol/L    Chloride 114 (H) 97 - 108 mmol/L    CO2 19 (L) 21 - 32 mmol/L    Anion gap 11 5 - 15 mmol/L    Glucose 90 65 - 100 mg/dL    BUN 30 (H) 6 - 20 mg/dL    Creatinine 2.25 (H) 0.70 - 1.30 mg/dL    BUN/Creatinine ratio 13 12 - 20      GFR est AA 36 (L) >60 ml/min/1.73m2    GFR est non-AA 30 (L) >60 ml/min/1.73m2    Calcium 8.4 (L) 8.5 - 10.1 mg/dL    Phosphorus 3.8 2.6 - 4.7 mg/dL    Albumin 2.5 (L) 3.5 - 5.0 g/dL   MAGNESIUM    Collection Time: 02/28/22  4:00 AM   Result Value Ref Range    Magnesium 1.9 1.6 - 2.4 mg/dL   BLOOD GAS, ARTERIAL    Collection Time: 02/28/22  4:00 AM   Result Value Ref Range    pH 7.41 7.35 - 7.45      PCO2 32 (L) 35 - 45 mmHg    PO2 72 (L) 75 - 100 mmHg    O2 SAT 96 >95 %    BICARBONATE 21 (L) 22 - 26 mmol/L    BASE DEFICIT 4.3 (H) 0 - 2 mmol/L    O2 METHOD Room air      FIO2 21.0 %    SITE Left Radial      JAM'S TEST PASS     CBC WITH AUTOMATED DIFF    Collection Time: 02/28/22  4:00 AM   Result Value Ref Range    WBC 13.2 (H) 4.1 - 11.1 K/uL    RBC 2.66 (L) 4.10 - 5.70 M/uL    HGB 7.9 (L) 12.1 - 17.0 g/dL    HCT 22.9 (L) 36.6 - 50.3 %    MCV 86.1 80.0 - 99.0 FL    MCH 29.7 26.0 - 34.0 PG    MCHC 34.5 30.0 - 36.5 g/dL    RDW 16.9 (H) 11.5 - 14.5 %    PLATELET 64 (L) 535 - 400 K/uL    MPV 11.3 8.9 - 12.9 FL    NRBC 0.0 0.0  WBC    ABSOLUTE NRBC 0.00 0.00 - 0.01 K/uL    NEUTROPHILS 85 (H) 32 - 75 %    LYMPHOCYTES 5 (L) 12 - 49 %    MONOCYTES 9 5 - 13 %    EOSINOPHILS 0 0 - 7 %    BASOPHILS 0 0 - 1 %    IMMATURE GRANULOCYTES 1 (H) 0 - 0.5 %    ABS. NEUTROPHILS 11.2 (H) 1.8 - 8.0 K/UL    ABS. LYMPHOCYTES 0.7 (L) 0.8 - 3.5 K/UL    ABS. MONOCYTES 1.2 (H) 0.0 - 1.0 K/UL    ABS. EOSINOPHILS 0.1 0.0 - 0.4 K/UL    ABS. BASOPHILS 0.0 0.0 - 0.1 K/UL    ABS. IMM. GRANS. 0.1 (H) 0.00 - 0.04 K/UL    DF AUTOMATED     NT-PRO BNP    Collection Time: 02/28/22  4:00 AM   Result Value Ref Range    NT pro-BNP 2,485 (H) <125 pg/mL     Additional comments: Patient was intubated to protect the airway with a plan to extubate him today. NEUROLOGICAL EXAM:  Appearance: The patient is well developed, well nourished, not conversing and following commands    Mental Status:  Extubated successfully yesterday. Now conversing and following commands   Cranial Nerves:   HEIDY, sluggish eye movements, no nystagmus, no ptosis. Facial movement is symmetric. Motor:  Moves all the extremities to deep pain symmetrically. Reflexes:   Deep tendon reflexes not checked. Sensory:   Withdraws to pain. Gait:  Deferred. Tremor:   No tremor noted. Cerebellar:  Cannot be checked. Neurovascular:  Normal heart sounds. Assessment:     Active Problems:  1. Status post Status epilepticus ([facility] [address] (2/24/2022):  The patient did not have any seizures since he was loaded with Keppra after initial benzo boluses. He was successfully extubated yesterday and tolerating it very well. Etiology still seems to be related to the alcohol. We will continue Keppra. 2.  Alcohol dependence: Patient may need detox program through psych. 3.  Respiratory failure currently intubated with a plan to extubate today. 4.  Hypertension  5. Arthritis    Plan:   1. Continue with Keppra which can be switched to p.o. whenever patient is able to swallow. 2.  Tolerating extubation very well. 3.  No additional neurological work-up is indicated at this time. We will sign off for now, however, please do not hesitate to call if needed again.   Let him follow-up in the clinic with Dr. Meaghan Hammonds in 2 to 3 weeks after discharge    Thank you,    Signed:  Ehsan Guan MD  2/28/2022  1:02 PM

## 2022-02-28 NOTE — PROGRESS NOTES
Clinical chart reviewed by CM. Patient's discharge plan is to return home with home health services. CM contacted patient's wife, Marry Gee ([phone]) to get choice for home health company. She stated that she has no preference. Choice letter completed and placed on beside chart. Referral sent via [employer] CM will continue to follow.

## 2022-02-28 NOTE — PROGRESS NOTES
Progress Note    Patient: Porsche Whyte MRN: [id number]  SSN: [id number]    YOB: 1960  Age: 60 y.o. Sex: male      Admit Date: 2/24/2022    LOS: 4 days     Subjective:   GI is following in consultation for GI bleed - reportedly noticing blood from NGT.      2/28: Patient seen in ICU sleepy, easily arousable, mild confusion. Family at bedside. Patient denies pain. No further seizure or bleeding since admission. EGD today showed Esophageal varices without bleeding grade III, Portal hypertensive gastropathy. Plan for banding of varices tomorrow under anesthesia. Hgb today, 7.9.     2/28 EGD:  Esophageal varices without bleeding grade III, Portal hypertensive gastropathy. History of Present Illness: Waldo Rangel is a 60 y. o. male who is seen in consultation for GI bleed. Mr. Collins Seen seen in the ICU he is sedated and intubated. [de-identified] of medical history obtained from RN and chart. He was admitted on 2/24 with seizures. Patient had generalized tonic clonic seizures for 20 minutes at home prior to EMS arrival, resolved with 5 mg of versed. In the ED patient again had a seizure which was un resolved with more than 4 mg of ativan. He was given 4 gm of Keppra and was intubated for airway protection and started on propofol. Neurology consulted for further management. Ct head shows generalized atrophy otherwise no acute changes.  His wife reports he was diagnosed with seizures two years ago but was not placed on anti-epileptic medication.  It is reported that the patient consumes alcohol daily. He has a past medical history significant for cirrhosis, seizure disorder, gout, diverticulosis, Esophageal varices,  Vitamin D deficiency, and alcohol abuse. He had an EGD in September 2020 showing esophageal varices, hypertensive portal gastropathy. He is on Propanolol. He was scheduled for repeat EGD on 3/7/21.  RN reports she was told the patient had blood from the NG tube but states she has not noticed any. His hgB is 9.9 this am compared to 10.7 on 2/24/22.  CT of abdomen shows cirrhosis, varices, and large volume ascites.  He had paracentesis on 1/21/22 with removal of 5.3 Liters of serous fluid removed. He is scheduled for US guided Paracentesis which is pending at this time. Patient does move his head with verbal stimuli. Abdomen ins distended. Ammonia 111 yesterday he is on Lactulose. Plan for EGD on Monday. Objective:           Objective:     Vitals:    02/28/22 1314 02/28/22 1319 02/28/22 1323 02/28/22 1400   BP: 120/88 102/73 99/69 111/75   Pulse: 96 92 100 96   Resp: 13 20 17 21   Temp:   97.2 °F (36.2 °C)    SpO2: 100% 97% 98% 97%   Weight:       Height:            Intake and Output:  Current Shift: No intake/output data recorded. Last three shifts: 02/26 1901 - 02/28 0700  In: 2648 [I.V.:2608]  Out: 685 [Urine:385]    Physical Exam:   Skin:  Extremities and face reveal no rashes. No ivey erythema. HEENT: Sclerae anicteric. Extra-occular muscles are intact. No abnormal pigmentation of the lips. The neck is supple. Cardiovascular: Regular rate and rhythm. Respiratory:  Comfortable breathing with no accessory muscle use. GI:  Abdomen nondistended, soft, and nontender. No enlargement of the liver or spleen. No masses palpable. Rectal:  Deferred  Musculoskeletal: Generalized weakness  Neurological: sleepy, arousable  Psychiatric:  Not agitated.   Lymphatic:  No visible adenopathy      Lab/Data Review:  Recent Results (from the past 24 hour(s))   GLUCOSE, POC    Collection Time: 02/27/22  4:04 PM   Result Value Ref Range    Glucose (POC) 98 65 - 117 mg/dL    Performed by Monserrat Barnes    RENAL FUNCTION PANEL    Collection Time: 02/28/22  4:00 AM   Result Value Ref Range    Sodium 144 136 - 145 mmol/L    Potassium 3.3 (L) 3.5 - 5.1 mmol/L    Chloride 114 (H) 97 - 108 mmol/L    CO2 19 (L) 21 - 32 mmol/L    Anion gap 11 5 - 15 mmol/L    Glucose 90 65 - 100 mg/dL    BUN 30 (H) 6 - 20 mg/dL Creatinine 2.25 (H) 0.70 - 1.30 mg/dL    BUN/Creatinine ratio 13 12 - 20      GFR est AA 36 (L) >60 ml/min/1.73m2    GFR est non-AA 30 (L) >60 ml/min/1.73m2    Calcium 8.4 (L) 8.5 - 10.1 mg/dL    Phosphorus 3.8 2.6 - 4.7 mg/dL    Albumin 2.5 (L) 3.5 - 5.0 g/dL   MAGNESIUM    Collection Time: 02/28/22  4:00 AM   Result Value Ref Range    Magnesium 1.9 1.6 - 2.4 mg/dL   BLOOD GAS, ARTERIAL    Collection Time: 02/28/22  4:00 AM   Result Value Ref Range    pH 7.41 7.35 - 7.45      PCO2 32 (L) 35 - 45 mmHg    PO2 72 (L) 75 - 100 mmHg    O2 SAT 96 >95 %    BICARBONATE 21 (L) 22 - 26 mmol/L    BASE DEFICIT 4.3 (H) 0 - 2 mmol/L    O2 METHOD Room air      FIO2 21.0 %    SITE Left Radial      JAM'S TEST PASS     CBC WITH AUTOMATED DIFF    Collection Time: 02/28/22  4:00 AM   Result Value Ref Range    WBC 13.2 (H) 4.1 - 11.1 K/uL    RBC 2.66 (L) 4.10 - 5.70 M/uL    HGB 7.9 (L) 12.1 - 17.0 g/dL    HCT 22.9 (L) 36.6 - 50.3 %    MCV 86.1 80.0 - 99.0 FL    MCH 29.7 26.0 - 34.0 PG    MCHC 34.5 30.0 - 36.5 g/dL    RDW 16.9 (H) 11.5 - 14.5 %    PLATELET 64 (L) 462 - 400 K/uL    MPV 11.3 8.9 - 12.9 FL    NRBC 0.0 0.0  WBC    ABSOLUTE NRBC 0.00 0.00 - 0.01 K/uL    NEUTROPHILS 85 (H) 32 - 75 %    LYMPHOCYTES 5 (L) 12 - 49 %    MONOCYTES 9 5 - 13 %    EOSINOPHILS 0 0 - 7 %    BASOPHILS 0 0 - 1 %    IMMATURE GRANULOCYTES 1 (H) 0 - 0.5 %    ABS. NEUTROPHILS 11.2 (H) 1.8 - 8.0 K/UL    ABS. LYMPHOCYTES 0.7 (L) 0.8 - 3.5 K/UL    ABS. MONOCYTES 1.2 (H) 0.0 - 1.0 K/UL    ABS. EOSINOPHILS 0.1 0.0 - 0.4 K/UL    ABS. BASOPHILS 0.0 0.0 - 0.1 K/UL    ABS. IMM. GRANS. 0.1 (H) 0.00 - 0.04 K/UL    DF AUTOMATED     NT-PRO BNP    Collection Time: 02/28/22  4:00 AM   Result Value Ref Range    NT pro-BNP 2,485 (H) <125 pg/mL              XR CHEST PORT   Final Result   Interval extubation. No significant interval change otherwise. XR CHEST PORT   Final Result   No significant change.       XR CHEST PORT   Final Result   Stable exam.      XR CHEST PORT   Final Result   Orogastric tube can be followed below the diaphragm. CT ABD PELV WO CONT   Final Result   Cirrhosis. Varices. Large volume ascites. XR CHEST PORT   Final Result   Dilation of the aortic arch. Further evaluation may be warranted. Endotracheal tube tip appears in appropriate position. CT HEAD WO CONT   Final Result   Age-appropriate atrophy. No acute findings. XR CHEST PORT   Final Result   The cardiomediastinal silhouette is appropriate for age, technique,   and lung expansion. Pulmonary vasculature is not congested. The lungs are   essentially clear. Mild atelectasis at hypoinflated left base. No effusion or   pneumothorax is seen. IR PARACENTESIS ABD W IMAGE    (Results Pending)       Assessment:     Active Problems:    Status epilepticus ([address] (2/24/2022)        Plan:   1. Acute GI Bleed      -HgB 7.9 this am. Monitor and transfuse as needed.      -2/28 EGD:  Esophageal varices without bleeding grade III, Portal hypertensive gastropathy.      -start Propanolol BID with parameters.      -Protonix 40 mg BID       -EGD with banding of varices in AM. NPO post midnight. Get consent. 2. Seizure disorder, Status Epilepticus      -Neurology input appreciated  3. Cirrohosis/Ascites/Acute Encephalopathy      -CMP in the am      -US guided Paracentesis pending      -Ammonia level in the am      -Continue Lactulose  4. GRACIA/CKD     -Nephrology input appreciated     -Avoid hepatotoxic medications    Patient discussed with Dr French Mohan and agrees to above impression and plan. Thank you for allowing me to participate in this patients care    Signed By: Marybeth Goltz.  LIZBETH Pinedo     February 28, 2022

## 2022-02-28 NOTE — PROGRESS NOTES
Spiritual Care Assessment/Progress Note  [facility]      NAME: Bindu Dowling      MRN: [id number]  AGE: 60 y.o. SEX: male  Orthodox Affiliation: No preference   Language: English     2/28/2022     Total Time (in minutes): 10     Spiritual Assessment begun in [address] ICU through conversation with:         [x]Patient        [] Family    [] Friend(s)        Reason for Consult: Initial visit     Spiritual beliefs: (Please include comment if needed)     [] Identifies with a maribel tradition:         [] Supported by a maribel community:            [] Claims no spiritual orientation:           [] Seeking spiritual identity:                [] Adheres to an individual form of spirituality:           [x] Not able to assess:                           Identified resources for coping:      [] Prayer                               [] Music                  [] Guided Imagery     [] Family/friends                 [] Pet visits     [] Devotional reading                         [x] Unknown     [] Other:                                             Interventions offered during this visit: (See comments for more details)    Patient Interventions: Initial visit,Other (comment) (Silent support)           Plan of Care:     [] Support spiritual and/or cultural needs    [] Support AMD and/or advance care planning process      [] Support grieving process   [] Coordinate Rites and/or Rituals    [] Coordination with community clergy   [] No spiritual needs identified at this time   [] Detailed Plan of Care below (See Comments)  [] Make referral to Music Therapy  [] Make referral to Pet Therapy     [] Make referral to Addiction services  [] Make referral to [facility]  [] Make referral to Spiritual Care Partner  [] No future visits requested        [x] Contact Spiritual Care for further referrals     Visit attempted for patient in the 2 ICU unit for initial assessment. Patient seemed to be resting during the visit. There were no visitors in the room. Provided silent support by the door. Contact chaplains for further spiritual care and support.  Yadira Garcia M.Div.    can be reached by calling the  at [facility]  [phone]

## 2022-03-01 NOTE — PROGRESS NOTES
Renal Daily Progress Note    Admit Date: 2/24/2022      Subjective:   He is awake but confused. Urine output documented at 625 mL. Denies abdominal pain.     Current Facility-Administered Medications   Medication Dose Route Frequency    chlordiazePOXIDE (LIBRIUM) capsule 25 mg  25 mg Oral TID    midazolam (PF) (VERSED) 1 mg/mL injection    PRN    fentaNYL citrate (PF) injection    PRN    propranoloL (INDERAL) tablet 20 mg  20 mg Oral BID    levETIRAcetam (KEPPRA) 1500 mg in saline (iso-osm) 100 ml IVPB  1,500 mg IntraVENous Q12H    sodium chloride (NS) flush 5-40 mL  5-40 mL IntraVENous Q8H    sodium chloride (NS) flush 5-40 mL  5-40 mL IntraVENous PRN    acetaminophen (TYLENOL) tablet 650 mg  650 mg Oral Q6H PRN    Or    acetaminophen (TYLENOL) suppository 650 mg  650 mg Rectal Q6H PRN    polyethylene glycol (MIRALAX) packet 17 g  17 g Oral DAILY PRN    ondansetron (ZOFRAN ODT) tablet 4 mg  4 mg Oral Q8H PRN    Or    ondansetron (ZOFRAN) injection 4 mg  4 mg IntraVENous Q6H PRN    [Held by provider] enoxaparin (LOVENOX) injection 30 mg  30 mg SubCUTAneous DAILY    folic acid (FOLVITE) tablet 1 mg  1 mg Oral DAILY    lactulose (CHRONULAC) 10 gram/15 mL solution 15 mL  10 g Oral TID    thiamine mononitrate (B-1) tablet 100 mg  100 mg Oral DAILY    midazolam (VERSED) injection 5 mg  5 mg IntraVENous Q4H PRN    piperacillin-tazobactam (ZOSYN) 3.375 g in 0.9% sodium chloride (MBP/ADV) 100 mL MBP  3.375 g IntraVENous Q12H        Review of Systems    ROS   No headache  Denies shortness of breath  Denies chest pain      Objective:     Patient Vitals for the past 8 hrs:   BP Temp Pulse Resp SpO2   03/01/22 1100 121/82 98 °F (36.7 °C) 86 21 100 %   03/01/22 1018 117/89  80 18 99 %   03/01/22 0904 (!) 119/92  80 20 100 %   03/01/22 0809 116/85  77 21 93 %   03/01/22 0701 (!) 124/91 97.5 °F (36.4 °C) 81 23 94 %   03/01/22 0600 (!) 110/94  83 20 100 %   03/01/22 0500 (!) 118/94  84 17 98 %     03/01 0701 - 03/01 1900  In: 100 [I.V.:100]  Out: -   02/27 1901 - 03/01 0700  In: 2628 [I.V.:2628]  Out: 894 [Urine:875]    Physical Exam:   Physical Exam  Cardiovascular:      Rate and Rhythm: Regular rhythm. Pulmonary:      Breath sounds: Normal breath sounds. Abdominal:      Palpations: Abdomen is soft. Neurological:      Mental Status: He is alert.     + ascites  No asterixis      XR CHEST PORT   Final Result   Interval extubation. No significant interval change otherwise. XR CHEST PORT   Final Result   No significant change. XR CHEST PORT   Final Result   Stable exam.      XR CHEST PORT   Final Result   Orogastric tube can be followed below the diaphragm. CT ABD PELV WO CONT   Final Result   Cirrhosis. Varices. Large volume ascites. XR CHEST PORT   Final Result   Dilation of the aortic arch. Further evaluation may be warranted. Endotracheal tube tip appears in appropriate position. CT HEAD WO CONT   Final Result   Age-appropriate atrophy. No acute findings. XR CHEST PORT   Final Result   The cardiomediastinal silhouette is appropriate for age, technique,   and lung expansion. Pulmonary vasculature is not congested. The lungs are   essentially clear. Mild atelectasis at hypoinflated left base. No effusion or   pneumothorax is seen.          IR PARACENTESIS ABD W IMAGE    (Results Pending)        Data Review   Recent Labs     03/01/22  0852 02/28/22  0400 02/27/22  0540   WBC 11.4* 13.2* 14.8*   HGB 8.6* 7.9* 9.6*   HCT 25.5* 22.9* 27.6*   PLT 80* 64* 69*     Recent Labs     03/01/22  0852 02/28/22  0400 02/27/22  0540    144 139   K 3.2* 3.3* 3.7   * 114* 111*   CO2 20* 19* 20*   GLU 90 90 80   BUN 29* 30* 33*   CREA 1.88* 2.25* 2.63*   CA 8.6 8.4* 8.1*   MG  --  1.9 1.6   PHOS  --  3.8 4.3   ALB 2.2*  2.1* 2.5* 1.3*   ALT 10*  11*  --   --      No components found for: [place]  Recent Labs     02/28/22  0400 02/27/22  0220   PH 7.41 7.48*   PCO2 32* 28*   PO2 72* 80 HCO3 21* 23   FIO2 21.0 21.0     No results for input(s): INR, INREXT, INREXT, INREXT in the last 72 hours. Assessment:           Active Problems:    Status epilepticus ([address] (2/24/2022)    Acute kidney injury on chronic kidney disease. GRACIA improving. Creatinine down to 1.88 mg today. CKD stage IIIb during his previous hospitalization earlier this year    Hepatic cirrhosis with portal hypertension    [place] metabolic acidosis. AG 9    Seizure disorder    Hypokalemia. Replete    Plan:   Replace potassium orally  Expect further improvement in the renal function. Oral sodium bicarbonate to correct metabolic acidosis.

## 2022-03-01 NOTE — ANESTHESIA POSTPROCEDURE EVALUATION
Procedure(s):  ENDOSCOPIC BANDING OR LIGATION  ESOPHAGOGASTRODUODENOSCOPY (EGD).     total IV anesthesia, MAC    Anesthesia Post Evaluation      Multimodal analgesia: multimodal analgesia not used between 6 hours prior to anesthesia start to PACU discharge  Patient location during evaluation: ICU  Patient participation: complete - patient cannot participate  Level of consciousness: responsive to light touch  Pain score: 0  Airway patency: patent  Anesthetic complications: no  Cardiovascular status: acceptable and blood pressure returned to baseline  Respiratory status: acceptable  Hydration status: acceptable  Post anesthesia nausea and vomiting:  none  Final Post Anesthesia Temperature Assessment:  Normothermia (36.0-37.5 degrees C)      INITIAL Post-op Vital signs:   Vitals Value Taken Time   /97 03/01/22 1500   Temp     Pulse 88 03/01/22 1500   Resp 21 03/01/22 1500   SpO2 98 % 03/01/22 1500

## 2022-03-01 NOTE — PROGRESS NOTES
[facility] referrals (28) sent out on patient's behalf; and no accepting [facility] agency thus far. Still pending acceptance.           Ana Marshall, MSW

## 2022-03-01 NOTE — PROGRESS NOTES
Progress Note    Patient: Sunil Cameron MRN: [id number]  SSN: [id number]    YOB: 1960  Age: 60 y.o. Sex: male      Admit Date: 2/24/2022    LOS: 5 days     Subjective:   GI is following in consultation for GI bleed - reportedly noticing blood from NGT.       3/1: Patient seen in ICU getting ready for a paracentesis with IR. 4,360ml fluid drained. He had banding of esophageal varices this morning. Hgb stable, 8.6.    3/1 Paracentesis - 4,360ml fluid drained. 3/1: Banding of varices   2/28 EGD:  Esophageal varices without bleeding grade III, Portal hypertensive gastropathy.     History of Present Illness: Waldo Rangel is a 60 y. o. male who is seen in consultation for GI bleed. Mr. Pete Anderson seen in the ICU he is sedated and intubated. [de-identified] of medical history obtained from RN and chart. He was admitted on 2/24 with seizures. Patient had generalized tonic clonic seizures for 20 minutes at home prior to EMS arrival, resolved with 5 mg of versed. In the ED patient again had a seizure which was un resolved with more than 4 mg of ativan. He was given 4 gm of Keppra and was intubated for airway protection and started on propofol. Neurology consulted for further management. Ct head shows generalized atrophy otherwise no acute changes.  His wife reports he was diagnosed with seizures two years ago but was not placed on anti-epileptic medication.  It is reported that the patient consumes alcohol daily. He has a past medical history significant for cirrhosis, seizure disorder, gout, diverticulosis, Esophageal varices,  Vitamin D deficiency, and alcohol abuse. He had an EGD in September 2020 showing esophageal varices, hypertensive portal gastropathy. He is on Propanolol. He was scheduled for repeat EGD on 3/7/21. RN reports she was told the patient had blood from the NG tube but states she has not noticed any.  His hgB is 9.9 this am compared to 10.7 on 2/24/22.  CT of abdomen shows cirrhosis, varices, and large volume ascites.  He had paracentesis on 1/21/22 with removal of 5.3 Liters of serous fluid removed. He is scheduled for US guided Paracentesis which is pending at this time. Patient does move his head with verbal stimuli. Abdomen ins distended. Ammonia 111 yesterday he is on Lactulose. Plan for EGD on Monday.       Objective:     Vitals:    03/01/22 1245 03/01/22 1302 03/01/22 1404 03/01/22 1500   BP: 97/70 (!) 125/93 112/89 (!) 121/97   Pulse: 93 (!) 102 96 88   Resp: 16 22 17 21   Temp:       SpO2: 98% 98% 94% 98%   Weight:       Height:            Intake and Output:  Current Shift: 03/01 0701 - 03/01 1900  In: 400 [I.V.:400]  Out: -   Last three shifts: 02/27 1901 - 03/01 0700  In: 2628 [I.V.:2628]  Out: 776 [Urine:875]    Physical Exam:   Skin:  Extremities and face reveal no rashes. No ivey erythema. HEENT: Sclerae anicteric. Extra-occular muscles are intact. No abnormal pigmentation of the lips. The neck is supple. Cardiovascular: Regular rate and rhythm. Respiratory:  Comfortable breathing with no accessory muscle use. GI:  Abdomen distended, firm and nontender. Rectal:  Deferred  Musculoskeletal: Generalized weakness  Neurological: Sleepy, arousable  Psychiatric:  Mood appears appropriate with judgement intact.   Lymphatic:  No visible adenopathy      Lab/Data Review:  Recent Results (from the past 24 hour(s))   AMMONIA    Collection Time: 03/01/22  5:10 AM   Result Value Ref Range    Ammonia 39 (H) <32 umol/L   CBC W/O DIFF    Collection Time: 03/01/22  8:52 AM   Result Value Ref Range    WBC 11.4 (H) 4.1 - 11.1 K/uL    RBC 2.91 (L) 4.10 - 5.70 M/uL    HGB 8.6 (L) 12.1 - 17.0 g/dL    HCT 25.5 (L) 36.6 - 50.3 %    MCV 87.6 80.0 - 99.0 FL    MCH 29.6 26.0 - 34.0 PG    MCHC 33.7 30.0 - 36.5 g/dL    RDW 17.3 (H) 11.5 - 14.5 %    PLATELET 80 (L) 911 - 400 K/uL    MPV 11.5 8.9 - 12.9 FL    NRBC 0.0 0.0  WBC    ABSOLUTE NRBC 0.00 0.00 - 9.31 K/uL   METABOLIC PANEL, COMPREHENSIVE Collection Time: 03/01/22  8:52 AM   Result Value Ref Range    Sodium 144 136 - 145 mmol/L    Potassium 3.2 (L) 3.5 - 5.1 mmol/L    Chloride 115 (H) 97 - 108 mmol/L    CO2 20 (L) 21 - 32 mmol/L    Anion gap 9 5 - 15 mmol/L    Glucose 90 65 - 100 mg/dL    BUN 29 (H) 6 - 20 mg/dL    Creatinine 1.88 (H) 0.70 - 1.30 mg/dL    BUN/Creatinine ratio 15 12 - 20      GFR est AA 44 (L) >60 ml/min/1.73m2    GFR est non-AA 36 (L) >60 ml/min/1.73m2    Calcium 8.6 8.5 - 10.1 mg/dL    Bilirubin, total 1.9 (H) 0.2 - 1.0 mg/dL    AST (SGOT) 36 15 - 37 U/L    ALT (SGPT) 10 (L) 12 - 78 U/L    Alk. phosphatase 49 45 - 117 U/L    Protein, total 5.8 (L) 6.4 - 8.2 g/dL    Albumin 2.2 (L) 3.5 - 5.0 g/dL    Globulin 3.6 2.0 - 4.0 g/dL    A-G Ratio 0.6 (L) 1.1 - 2.2     HEPATIC FUNCTION PANEL    Collection Time: 03/01/22  8:52 AM   Result Value Ref Range    Protein, total 5.8 (L) 6.4 - 8.2 g/dL    Albumin 2.1 (L) 3.5 - 5.0 g/dL    Globulin 3.7 2.0 - 4.0 g/dL    A-G Ratio 0.6 (L) 1.1 - 2.2      Bilirubin, total 1.9 (H) 0.2 - 1.0 mg/dL    Bilirubin, direct 1.2 (H) 0.0 - 0.2 mg/dL    Alk. phosphatase 48 45 - 117 U/L    AST (SGOT) 35 15 - 37 U/L    ALT (SGPT) 11 (L) 12 - 78 U/L              XR CHEST PORT   Final Result   Interval extubation. No significant interval change otherwise. XR CHEST PORT   Final Result   No significant change. XR CHEST PORT   Final Result   Stable exam.      XR CHEST PORT   Final Result   Orogastric tube can be followed below the diaphragm. CT ABD PELV WO CONT   Final Result   Cirrhosis. Varices. Large volume ascites. XR CHEST PORT   Final Result   Dilation of the aortic arch. Further evaluation may be warranted. Endotracheal tube tip appears in appropriate position. CT HEAD WO CONT   Final Result   Age-appropriate atrophy. No acute findings. XR CHEST PORT   Final Result   The cardiomediastinal silhouette is appropriate for age, technique,   and lung expansion.  Pulmonary vasculature is not congested. The lungs are   essentially clear. Mild atelectasis at hypoinflated left base. No effusion or   pneumothorax is seen. IR PARACENTESIS ABD W IMAGE    (Results Pending)   IR PARACENTESIS ABD W IMAGE    (Results Pending)       Assessment:     Active Problems:    Status epilepticus ([address] (2/24/2022)        Plan:   1. Acute GI Bleed      -HgB 7.9 this am. Monitor and transfuse as needed.      -3/1 banding of varices      -2/28 EGD:  Esophageal varices without bleeding grade III, Portal hypertensive gastropathy.      -start Propanolol BID with parameters.      -Protonix 40 mg BID        -start Levaquin 500 mg po daily x 7days. 2. Seizure disorder, Status Epilepticus      -Neurology input appreciated  3. Cirrohosis/Ascites/Acute Encephalopathy      -CMP in the am      -3/1 US guided Paracentesis- 4,360 ml drained      -Ammonia level in the am      -Continue Lactulose  4. GRACIA/CKD     -Nephrology input appreciated     -Avoid hepatotoxic medications       Patient discussed with Dr Joy Armas and agrees to above impression and plan. Thank you for allowing me to participate in this patients care    Signed By: Loretto Lombard.  LIZBETH Pinedo     March 1, 2022

## 2022-03-01 NOTE — CONSULTS
Hematology/Oncology Consult    Patient: Nicolas Saldana MRN: [id number]     YOB: 1960  Age: 60 y.o. Sex: male      HPI      Nicolas Saldana is a 60 y.o. male who is being seen for thrombocytopenia. Mr. Yuan Dutton is a 18-year-old male patient with history of cirrhosis, EtOH abuse and seizure disorder who was brought to the emergency room with witnessed seizures. He reportedly had seizure in the ER it was intubated emergently for status epilepticus and transferred to the ICU. He has now been extubated. There is no reported bleeding. He had paracentesis done during previous hospitalization on 1/21/2022 with removal of 5.3 L. He had another paracentesis earlier today with removal of 436 0 cc of fluid. He also had elevated ammonia during this hospitalization and is on lactulose. He is being followed by GI for acute GI bleeding and had a EGD on 2/28/2022 showing esophageal varices without bleeding, grade 3, portal hypertensive gastropathy. Banding of varices were done. The patient was seen in the ICU. He is awake but is not a good historian. He denies any pain. Past Medical History:   Diagnosis Date    Arthritis     Hypertension      Past Surgical History:   Procedure Laterality Date    IR PARACENTESIS ABD W IMAGE  1/21/2022    IR PARACENTESIS ABD W IMAGE  3/1/2022      History reviewed. No pertinent family history.   Social History     Tobacco Use    Smoking status: Never Smoker    Smokeless tobacco: Never Used   Substance Use Topics    Alcohol use: Not on file      Current Facility-Administered Medications   Medication Dose Route Frequency Provider Last Rate Last Admin    chlordiazePOXIDE (LIBRIUM) capsule 25 mg  25 mg Oral TID Aruna Caldera MD   25 mg at 03/01/22 1523    sodium bicarbonate tablet 650 mg  650 mg Oral TID James Sampson MD   650 mg at 03/01/22 1523    potassium chloride (K-DUR, KLOR-CON M20) SR tablet 20 mEq  20 mEq Oral DAILY Adrián James KU MD   20 mEq at 03/01/22 1523    [START ON 3/2/2022] levoFLOXacin (LEVAQUIN) tablet 500 mg  500 mg Oral DAILY Tommy Pinedoield., NP        midazolam (PF) (VERSED) 1 mg/mL injection    PRN Riya Lazaro MD   2 mg at 02/28/22 1313    fentaNYL citrate (PF) injection    PRN Riya Lazaro MD   50 mcg at 02/28/22 1313    propranoloL (INDERAL) tablet 20 mg  20 mg Oral BID Dc Pinedo NP   20 mg at 02/28/22 2111    levETIRAcetam (KEPPRA) 1500 mg in saline (iso-osm) 100 ml IVPB  1,500 mg IntraVENous Q12H Annette May  mL/hr at 03/01/22 0627 1,500 mg at 03/01/22 1485    sodium chloride (NS) flush 5-40 mL  5-40 mL IntraVENous Q8H Annette May MD   10 mL at 03/01/22 1523    sodium chloride (NS) flush 5-40 mL  5-40 mL IntraVENous PRN Annette May MD        acetaminophen (TYLENOL) tablet 650 mg  650 mg Oral Q6H PRN Annette May MD   650 mg at 02/28/22 0375    Or    acetaminophen (TYLENOL) suppository 650 mg  650 mg Rectal Q6H PRN Annette May MD        polyethylene glycol (MIRALAX) packet 17 g  17 g Oral DAILY PRN Annette May MD        ondansetron (ZOFRAN ODT) tablet 4 mg  4 mg Oral Q8H PRN Annette May MD        Or    ondansetron [facility]) injection 4 mg  4 mg IntraVENous Q6H PRN Annette May MD        [Held by provider] enoxaparin (LOVENOX) injection 30 mg  30 mg SubCUTAneous DAILY Annette May MD   30 mg at 24/23/56 4699    folic acid (FOLVITE) tablet 1 mg  1 mg Oral DAILY Annette May MD   1 mg at 02/27/22 3965    lactulose (CHRONULAC) 10 gram/15 mL solution 15 mL  10 g Oral TID Annette May MD   15 mL at 03/01/22 1523    thiamine mononitrate (B-1) tablet 100 mg  100 mg Oral DAILY Annette May MD   100 mg at 02/27/22 5635    midazolam (VERSED) injection 5 mg  5 mg IntraVENous Q4H PRN Annette May MD        piperacillin-tazobactam (ZOSYN) 3.375 g in 0.9% sodium chloride (MBP/ADV) 100 mL MBP  3.375 g IntraVENous Q12H Eun, MD Chelsey 25 mL/hr at 03/01/22 0757 3.375 g at 03/01/22 0757        No Known Allergies    Review of Systems:    Could not be obtained due to mental status. Objective:     Vitals:    03/01/22 1302 03/01/22 1404 03/01/22 1500 03/01/22 1607   BP: (!) 125/93 112/89 (!) 121/97 (!) 133/95   Pulse: (!) 102 96 88 87   Resp: 22 17 21 15   Temp:       SpO2: 98% 94% 98% 100%   Weight:       Height:            Physical Exam:  Constitutional  ill-appearing, confused   Head Normocephalic; no scars   Eyes Conjunctivae and sclerae are clear and without icterus. Pupils are reactive and equal.   ENMT Sinuses are nontender. No oral exudates, ulcers, masses, thrush or mucositis. Oropharynx clear. Tongue normal.   Neck Supple without masses or thyromegaly. No jugular venous distension. Hematologic/Lymphatic No petechiae or purpura. No tender or palpable lymph nodes in the cervical, supraclavicular, axillary or inguinal area. Respiratory Lungs are clear to auscultation without rhonchi or wheezing. Cardiovascular Regular rate and rhythm of heart without murmurs, gallops or rubs. Chest / Line Site Chest is symmetric with no chest wall deformities. Abdomen Non-tender, non-distended, no masses, ascites or hepatosplenomegaly. Good bowel sounds. No guarding or rebound tenderness. No pulsatile masses. Musculoskeletal No tenderness or swelling, normal range of motion without obvious weakness. Extremities No visible deformities, no cyanosis, clubbing or edema. Skin No rashes, scars, or lesions suggestive of malignancy. No petechiae, purpura, or ecchymoses. No excoriations.     Neurologic  confused     Lab/Data Review:  Recent Labs     03/01/22  0852 02/28/22  0400 02/27/22  0540   WBC 11.4* 13.2* 14.8*   HGB 8.6* 7.9* 9.6*   HCT 25.5* 22.9* 27.6*   PLT 80* 64* 69*     Recent Labs     03/01/22  0852 02/28/22  0400 02/27/22  0540    144 139   K 3.2* 3.3* 3.7   * 114* 111*   CO2 20* 19* 20*   GLU 90 90 80   BUN 29* 30* 33*   CREA 1.88* 2.25* 2.63*   CA 8.6 8.4* 8.1*   MG  --  1.9 1.6   PHOS  --  3.8 4.3   ALB 2.2*  2.1* 2.5* 1.3*   TBILI 1.9*  1.9*  --   --    ALT 10*  11*  --   --      Recent Labs     02/28/22  0400 02/27/22  0220   PH 7.41 7.48*   PCO2 32* 28*   PO2 72* 80   HCO3 21* 23   FIO2 21.0 21.0     Recent Results (from the past 24 hour(s))   AMMONIA    Collection Time: 03/01/22  5:10 AM   Result Value Ref Range    Ammonia 39 (H) <32 umol/L   CBC W/O DIFF    Collection Time: 03/01/22  8:52 AM   Result Value Ref Range    WBC 11.4 (H) 4.1 - 11.1 K/uL    RBC 2.91 (L) 4.10 - 5.70 M/uL    HGB 8.6 (L) 12.1 - 17.0 g/dL    HCT 25.5 (L) 36.6 - 50.3 %    MCV 87.6 80.0 - 99.0 FL    MCH 29.6 26.0 - 34.0 PG    MCHC 33.7 30.0 - 36.5 g/dL    RDW 17.3 (H) 11.5 - 14.5 %    PLATELET 80 (L) 682 - 400 K/uL    MPV 11.5 8.9 - 12.9 FL    NRBC 0.0 0.0  WBC    ABSOLUTE NRBC 0.00 0.00 - 6.46 K/uL   METABOLIC PANEL, COMPREHENSIVE    Collection Time: 03/01/22  8:52 AM   Result Value Ref Range    Sodium 144 136 - 145 mmol/L    Potassium 3.2 (L) 3.5 - 5.1 mmol/L    Chloride 115 (H) 97 - 108 mmol/L    CO2 20 (L) 21 - 32 mmol/L    Anion gap 9 5 - 15 mmol/L    Glucose 90 65 - 100 mg/dL    BUN 29 (H) 6 - 20 mg/dL    Creatinine 1.88 (H) 0.70 - 1.30 mg/dL    BUN/Creatinine ratio 15 12 - 20      GFR est AA 44 (L) >60 ml/min/1.73m2    GFR est non-AA 36 (L) >60 ml/min/1.73m2    Calcium 8.6 8.5 - 10.1 mg/dL    Bilirubin, total 1.9 (H) 0.2 - 1.0 mg/dL    AST (SGOT) 36 15 - 37 U/L    ALT (SGPT) 10 (L) 12 - 78 U/L    Alk.  phosphatase 49 45 - 117 U/L    Protein, total 5.8 (L) 6.4 - 8.2 g/dL    Albumin 2.2 (L) 3.5 - 5.0 g/dL    Globulin 3.6 2.0 - 4.0 g/dL    A-G Ratio 0.6 (L) 1.1 - 2.2     HEPATIC FUNCTION PANEL    Collection Time: 03/01/22  8:52 AM   Result Value Ref Range    Protein, total 5.8 (L) 6.4 - 8.2 g/dL    Albumin 2.1 (L) 3.5 - 5.0 g/dL    Globulin 3.7 2.0 - 4.0 g/dL    A-G Ratio 0.6 (L) 1.1 - 2.2      Bilirubin, total 1.9 (H) 0.2 - 1.0 mg/dL    Bilirubin, direct 1.2 (H) 0.0 - 0.2 mg/dL    Alk. phosphatase 48 45 - 117 U/L    AST (SGOT) 35 15 - 37 U/L    ALT (SGPT) 11 (L) 12 - 78 U/L        CT HEAD WO CONT    Result Date: 2/24/2022  Age-appropriate atrophy. No acute findings. CT ABD PELV WO CONT    Result Date: 2/24/2022  Cirrhosis. Varices. Large volume ascites. XR CHEST PORT    Result Date: 2/28/2022  Interval extubation. No significant interval change otherwise. XR CHEST PORT    Result Date: 2/27/2022  No significant change. XR CHEST PORT    Result Date: 2/26/2022  Stable exam.    XR CHEST PORT    Result Date: 2/24/2022  Orogastric tube can be followed below the diaphragm. XR CHEST PORT    Result Date: 2/24/2022  Dilation of the aortic arch. Further evaluation may be warranted. Endotracheal tube tip appears in appropriate position. XR CHEST PORT    Result Date: 2/24/2022  The cardiomediastinal silhouette is appropriate for age, technique, and lung expansion. Pulmonary vasculature is not congested. The lungs are essentially clear. Mild atelectasis at hypoinflated left base. No effusion or pneumothorax is seen. IR PARACENTESIS ABD W IMAGE    Result Date: 3/1/2022  Successful paracentesis. Assessment and Plan:     Hospital Problems  Never Reviewed          Codes Class Noted POA    Status epilepticus ([facility] [address] ICD-10-CM: G40.901  ICD-9-CM: 345.3  2/24/2022 Unknown              Thrombocytopenia:  -Recent baseline platelet count has been around 100.  -Platelet count on admission was 89, then dropped to 64 and today it is back up to 80.  -Most likely secondary to advanced liver disease/cirrhosis. Meds including antiepileptics and antibiotics also likely contributing.  -Monitor closely and consider platelet transfusion for any bleeding or platelet count less than 10,000.     Anemia:  -Admitted with a hemoglobin of 10.7 which dropped down to 7.9.  -This was thought to be secondary to GI bleeding, perhaps from known varices.  -Status post EGD showing grade 3 esophageal varices, portal hypertensive gastropathy  -Banding of the varices was done  -Hemoglobin is stable at 8.6 today. Cirrhosis:  -Patient appears to have advanced liver disease based on radiology findings including esophageal varices, ascites and low albumin.  -Management per gastroenterology  -Status post paracentesis earlier today with removal of 4 4000 cc of fluid. Seizure disorder:  -On antiepileptics. Thank you for the consult.     Signed By: Joann Thomas MD     March 1, 2022

## 2022-03-01 NOTE — PROGRESS NOTES
Hospitalist Progress Note    Subjective:   Daily Progress Note: 3/1/2022 10:01 AM    Hospital Course:  Berna Myaer is a 60 y.o. Male with hx of cirrhosis, seizure disorder not on any medications, alcohol abuse, gout,  presented to ED with c/o seizures. As per patient's wife, patient was diagnosed with seizure 2 years ago, but was not placed on any anti epileptics. Patient is daily drinker. Patient had Generalized tonic clonic seizures for 20 minutes at home prior to EMS arrival, resolved with 5 mg of versed. In ED initially, patient was somnolent, again had seizure episode. Seizure was unresolved with > 4 mg of ativan. Patient was given 4gm if keppra. Was intubated for airway protection and started on propofol. Patient was extubated on 2/27/28. Patient has dark brown drainage from NG, started on PPI, plan for EGD today. Paracentesis pending.     Subjective:  Patient seen and evaluated at bedside, of note patient more alert, patient states still has complaints of generalized weakness, patient is unable to tell me how much alcohol he drinks on a regular basis, states his last drink was 2 weeks ago, states he has a drinking problem and wants to go to rehab, discussed with RN    Current Facility-Administered Medications   Medication Dose Route Frequency    chlordiazePOXIDE (LIBRIUM) capsule 25 mg  25 mg Oral TID    midazolam (PF) (VERSED) 1 mg/mL injection    PRN    fentaNYL citrate (PF) injection    PRN    propranoloL (INDERAL) tablet 20 mg  20 mg Oral BID    levETIRAcetam (KEPPRA) 1500 mg in saline (iso-osm) 100 ml IVPB  1,500 mg IntraVENous Q12H    sodium chloride (NS) flush 5-40 mL  5-40 mL IntraVENous Q8H    sodium chloride (NS) flush 5-40 mL  5-40 mL IntraVENous PRN    acetaminophen (TYLENOL) tablet 650 mg  650 mg Oral Q6H PRN    Or    acetaminophen (TYLENOL) suppository 650 mg  650 mg Rectal Q6H PRN    polyethylene glycol (MIRALAX) packet 17 g  17 g Oral DAILY PRN    ondansetron (ZOFRAN ODT) tablet 4 mg  4 mg Oral Q8H PRN    Or    ondansetron (ZOFRAN) injection 4 mg  4 mg IntraVENous Q6H PRN    [Held by provider] enoxaparin (LOVENOX) injection 30 mg  30 mg SubCUTAneous DAILY    folic acid (FOLVITE) tablet 1 mg  1 mg Oral DAILY    lactulose (CHRONULAC) 10 gram/15 mL solution 15 mL  10 g Oral TID    thiamine mononitrate (B-1) tablet 100 mg  100 mg Oral DAILY    midazolam (VERSED) injection 5 mg  5 mg IntraVENous Q4H PRN    piperacillin-tazobactam (ZOSYN) 3.375 g in 0.9% sodium chloride (MBP/ADV) 100 mL MBP  3.375 g IntraVENous Q12H        Review of Systems  Unable to obtain due to patient's condition      Objective:     Visit Vitals  BP (!) 124/91 (BP 1 Location: Right upper arm, BP Patient Position: At rest)   Pulse 81   Temp 97.9 °F (36.6 °C)   Resp 23   Ht 6' 2.02\" (1.88 m)   Wt 79.1 kg (174 lb 6.1 oz)   SpO2 94%   BMI 22.38 kg/m²    O2 Flow Rate (L/min): 2 l/min O2 Device: None (Room air)    Temp (24hrs), Av.5 °F (36.4 °C), Min:97.2 °F (36.2 °C), Max:98 °F (36.7 °C)      No intake/output data recorded.  1901 -  0700  In: 2628 [I.V.:2628]  Out: 875 [Urine:875]    PHYSICAL EXAM:  Constitutional:patient is in no acute distress. Skin: Extremities and face reveal no rashes. HEENT: Sclerae anicteric. Extra-occular muscles are intact. No oral ulcers. The neck is supple and no masses. Cardiovascular: Regular rate and rhythm. Respiratory:  Clear breath sounds bilaterally with no wheezes, rales, or rhonchi. GI: Abdomen nondistended, soft, and nontender. Normal active bowel sounds. Musculoskeletal: No pitting edema of the lower legs.  Able to move all ext  Neurological:  Patient isawake and alert, but confused  Psychiatric: mood stable      Data Review    Recent Results (from the past 24 hour(s))   AMMONIA    Collection Time: 22  5:10 AM   Result Value Ref Range    Ammonia 39 (H) <32 umol/L           Assessment / Plan:  Status epilepticusof note patient presented with status epilepticus, was intubated for airway protection, currently extubated, has remained seizure-free, could be component of alcohol withdrawal  Continue Keppra twice daily  Continue to monitor for seizure activity  Ativan as needed  Neurology consult appreciated, continue to follow recommendations    GI bleedsecondary to varices from liver cirrhosis, patient scheduled to undergo endoscopy with banding  N.p.o. at this time  Maintain active type and screen  Trend hemoglobin and hematocrit every 8 hours  Follow-up upper endoscopy results  Gastroenterology consult appreciated, continue to follow recommendations    Acute respiratory failure with hypoxia secondary to aspiration pneumoniapatient currently status post extubation, did meet sepsis criteria upon presentation given tachycardia as well as leukocytosis, remains on Zosyn  Follow blood cultures  Follow-up sputum cultures  Continue Zosyn for antibiotic coverage  Pulmonology consult appreciated, continue to follow recommendations    Sepsissecondary to problem #3  Management as documented above    Hypokalemiaobtain repeat potassium to assess for resolution    Acute decompensated liver cirrhosis with ascitescontinue current medications  Continue to follow gastroenterology recommendations    Altered mental statuslikely multifactorial including secondary to hepatic encephalopathy as well as metabolic encephalopathy as well as due to seizures secondary to alcohol withdrawal, patient currently back to baseline mental status  Continue to monitor mental status    Hepatic encephalopathycontinue lactulose at current doses  Continue to trend serum ammonia  Follow neurology recommendation    History of alcohol abuseof note patient endorses history of alcohol abuse, endorses he has a drinking problem, wants to seek counseling as well as options for alcohol rehab  Continue thiamine multivitamin and folate  Frequent CIWA checks  Start Librium 3 times daily  Obtain psychiatry consult    Acute on chronic kidney disease stage IIIcurrently serum creatinine downtrending  Avoid nephrotoxic medications  Follow-up nephrology recommendations    ProphylaxisSCDs  FENn.p.o., replete potassium and magnesium  Full code, will clarify about surrogate decision maker  Dispositiontransfer to remote telemetry out of medical ICU after endoscopy, PT OT ordered, patient likely stable for discharge in 24 hours    Critical care time spent 35 minutes involving direct patient care as well as reviewing patient's labs and coordination of care with nursing staff    18.5 - 24.9 Normal weight / Body mass index is 22.38 kg/m².     Code status: Full  Prophylaxis: SCD's  Recommended Disposition: SNF/LTC

## 2022-03-01 NOTE — ANESTHESIA PREPROCEDURE EVALUATION
Relevant Problems   NEUROLOGY   (+) Status epilepticus (HCC)      GASTROINTESTINAL   (+) Cirrhosis of liver (HCC)      RENAL FAILURE   (+) GRACIA (acute kidney injury) ([facility] [address]   (+) Acute renal failure (ARF) (HCC)       Anesthetic History   No history of anesthetic complications            Review of Systems / Medical History  Patient summary reviewed, nursing notes reviewed and pertinent labs reviewed    Pulmonary  Within defined limits                 Neuro/Psych   Within defined limits  seizures         Cardiovascular  Within defined limits  Hypertension                   GI/Hepatic/Renal  Within defined limits       Renal disease (Creatinine 1.88): CRI  Liver disease     Endo/Other  Within defined limits      Arthritis     Other Findings              Physical Exam    Airway  Mallampati: II  TM Distance: 4 - 6 cm  Neck ROM: normal range of motion   Mouth opening: Normal     Cardiovascular    Rhythm: regular  Rate: normal         Dental  No notable dental hx       Pulmonary  Breath sounds clear to auscultation               Abdominal  GI exam deferred       Other Findings            Anesthetic Plan    ASA: 4  Anesthesia type: total IV anesthesia and MAC          Induction: Intravenous  Anesthetic plan and risks discussed with: Patient and Family

## 2022-03-01 NOTE — PROGRESS NOTES
Comprehensive Nutrition Assessment    Type and Reason for Visit: Reassess (interim)    Nutrition Recommendations/Plan:   Advance diet as medically able to goal of low sodium  RD to add ONS as appropriate once diet advanced    Consider initiating dextrose-containing fluids if unable to advance diet today     Nutrition Assessment:  Admitted for seizures, intubated for airway protection. +Acute encephalopathy, hypoxia. (2/26) Extube, (2/28) EGD, plan for banding of esophageal varices today. Overall NPO x5d. Rec'd initiating D5 for protein-sparing kcals if unable to advance diet today. Labs: H/H 8.6/25.5, K+ 3.2, BUN 29, Cr 1.88, ALT 10, TBili 1.9, Alb 2.2. Meds: librium, PRN fentanyl, B9, lactulose, keppra, versed, zosyn       Malnutrition Assessment:  Malnutrition Status:  No malnutrition    Context:  Chronic illness         Estimated Daily Nutrient Needs:  Energy (kcal): 1822-2153kcals (MSJ x1.1-1.3 for cirrhosis); Weight Used for Energy Requirements: Current  Protein (g): 95-111g (1.2-1.4g/kg); Weight Used for Protein Requirements: Current  Fluid (ml/day): 1500ml; Method Used for Fluid Requirements: Other (comment) (Adult minimum)      Nutrition Related Findings:  NFPE grossly normal, mild-mod wasting to clavicles, noted large volume ascites with fluid wave. NGT now removed. Unknown hx dysphagia. No N/V/D/C, last BM 3/1. No edema.        Wounds:    None       Current Nutrition Therapies:  DIET NPO    Anthropometric Measures:  · Height:  6' 2.02\" (188 cm)  · Current Body Wt:  79.1 kg (174 lb 6.1 oz) (3/1)   · Admission Body Wt:  190 lb 0.6 oz (2/24)    · Usual Body Wt:   (eleuterio)     · Ideal Body Wt:  190 lbs:  91.8 %   · Adjusted Body Weight:   ; Weight Adjustment for: No adjustment   · Adjusted BMI:       · BMI Category:  Normal weight (BMI 22.0-24.9) age over 72       Nutrition Diagnosis:   · Inadequate oral intake related to impaired respiratory function as evidenced by intubation      Nutrition Interventions: Food and/or Nutrient Delivery: Start oral diet  Nutrition Education and Counseling: No recommendations at this time,Education not appropriate  Coordination of Nutrition Care: Continue to monitor while inpatient,Swallow evaluation,Feeding assistance/environmental change    Goals:  Meet >/=50% of EENs in >5 days, Wt maintenance within +/-0.5kg in >5 days       Nutrition Monitoring and Evaluation:   Behavioral-Environmental Outcomes: None identified  Food/Nutrient Intake Outcomes: Diet advancement/tolerance,Food and nutrient intake,Supplement intake  Physical Signs/Symptoms Outcomes: Weight,GI status,Biochemical data,Fluid status or edema,Meal time behavior    Discharge Planning:     Too soon to determine     Electronically signed by [employer] on 3/1/2022 at 12:22 PM    Contact: Ext [phone], or via [employer]

## 2022-03-01 NOTE — PROGRESS NOTES
IMPRESSION:   1. Acute hypoxic respiratory failure resolved  2. Aspiration pneumonia chest x-ray clear  3. Status epilepticus no further seizures seen  4. Acute on chronic kidney disease creatinine improving  5. Hypomagnesemia to be replete  6. History of cirrhosis EtOH abuse and ascites  7. Thrombocytopenia stable      RECOMMENDATIONS/PLAN:   1. Extubated now on room air tolerating well  2. Altered mental status remains poorly responsive despite no sedation in 24-hour  3. Remains on Zosyn empirically  4. No further seizures seen on Keppra  5. Creatinine improving but developing edema of the lower extremities. 6. Status post EGD again he is going for EGD with banding     [x] High complexity decision making was performed  [x] See my orders for details  HPI  60-year-old male came in because of seizures significant past medical history of cirrhosis of liver alcohol abuse gout he had paracentesis done in the past also has hypertension and arthritis. He starting having seizures received medication came to the emergency room subsequently got intubated now he is on propofol sedated unable to get any started the patient is not on any medication at home history of EtOH abuse so admitted and critical care consult was called. PMH:  has a past medical history of Arthritis and Hypertension. PSH:   has a past surgical history that includes ir paracentesis abd w image (1/21/2022). FHX: family history is not on file. SHX:  reports that he has never smoked.  He has never used smokeless tobacco.    ALL: No Known Allergies     MEDS:   [x] Reviewed - As Below   [] Not reviewed    Current Facility-Administered Medications   Medication    chlordiazePOXIDE (LIBRIUM) capsule 25 mg    midazolam (PF) (VERSED) 1 mg/mL injection    fentaNYL citrate (PF) injection    propranoloL (INDERAL) tablet 20 mg    levETIRAcetam (KEPPRA) 1500 mg in saline (iso-osm) 100 ml IVPB    sodium chloride (NS) flush 5-40 mL    sodium chloride (NS) flush 5-40 mL    acetaminophen (TYLENOL) tablet 650 mg    Or    acetaminophen (TYLENOL) suppository 650 mg    polyethylene glycol (MIRALAX) packet 17 g    ondansetron (ZOFRAN ODT) tablet 4 mg    Or    ondansetron (ZOFRAN) injection 4 mg    [Held by provider] enoxaparin (LOVENOX) injection 30 mg    folic acid (FOLVITE) tablet 1 mg    lactulose (CHRONULAC) 10 gram/15 mL solution 15 mL    thiamine mononitrate (B-1) tablet 100 mg    midazolam (VERSED) injection 5 mg    piperacillin-tazobactam (ZOSYN) 3.375 g in 0.9% sodium chloride (MBP/ADV) 100 mL MBP      MAR reviewed and pertinent medications noted or modified as needed   Current Facility-Administered Medications   Medication    chlordiazePOXIDE (LIBRIUM) capsule 25 mg    midazolam (PF) (VERSED) 1 mg/mL injection    fentaNYL citrate (PF) injection    propranoloL (INDERAL) tablet 20 mg    levETIRAcetam (KEPPRA) 1500 mg in saline (iso-osm) 100 ml IVPB    sodium chloride (NS) flush 5-40 mL    sodium chloride (NS) flush 5-40 mL    acetaminophen (TYLENOL) tablet 650 mg    Or    acetaminophen (TYLENOL) suppository 650 mg    polyethylene glycol (MIRALAX) packet 17 g    ondansetron (ZOFRAN ODT) tablet 4 mg    Or    ondansetron (ZOFRAN) injection 4 mg    [Held by provider] enoxaparin (LOVENOX) injection 30 mg    folic acid (FOLVITE) tablet 1 mg    lactulose (CHRONULAC) 10 gram/15 mL solution 15 mL    thiamine mononitrate (B-1) tablet 100 mg    midazolam (VERSED) injection 5 mg    piperacillin-tazobactam (ZOSYN) 3.375 g in 0.9% sodium chloride (MBP/ADV) 100 mL MBP      PMH:  has a past medical history of Arthritis and Hypertension. PSH:   has a past surgical history that includes ir paracentesis abd w image (1/21/2022). FHX: family history is not on file. SHX:  reports that he has never smoked.  He has never used smokeless tobacco.     ROS:  Lethargic confused    Hemodynamics:    CO:    CI:    CVP:    SVR:   PAP Systolic:    PAP Diastolic:    PVR:    TC21:        Ventilator Settings:      Mode Rate TV Press PEEP FiO2 PIP Min. Vent   SIMV,Volume control    500 ml 6 cm H2O  2 cm H20 21 %  18 cm H2O  10.6 l/min        Vital Signs: Telemetry:    normal sinus rhythm Intake/Output:   Visit Vitals  /85 (BP Patient Position: At rest)   Pulse 77   Temp 97.5 °F (36.4 °C)   Resp 21   Ht 6' 2.02\" (1.88 m)   Wt 79.1 kg (174 lb 6.1 oz)   SpO2 93%   BMI 22.38 kg/m²       Temp (24hrs), Av.5 °F (36.4 °C), Min:97.2 °F (36.2 °C), Max:98 °F (36.7 °C)        O2 Device: None (Room air) O2 Flow Rate (L/min): 2 l/min       Wt Readings from Last 4 Encounters:   22 79.1 kg (174 lb 6.1 oz)   22 86.2 kg (190 lb)          Intake/Output Summary (Last 24 hours) at 3/1/2022 0854  Last data filed at 3/1/2022 0757  Gross per 24 hour   Intake 1620 ml   Output 625 ml   Net 995 ml       Last shift:       07 -  1900  In: 100 [I.V.:100]  Out: -   Last 3 shifts:  190 -  0700  In: 2628 [I.V.:2628]  Out: 832 [Urine:875]       Physical Exam:     General: Awake does not follow command on room air   HEENT: NCAT,   Eyes: anicteric; conjunctiva clear random eye movement present  Neck: no nodes, , trach midline; no accessory MM use. No definite JVD  Chest: no deformity,   Cardiac: R regular; no murmur;   Lungs: distant breath sounds; no wheezes or rales  Abd: soft, NT, hypoactive BS  Ext: Lower extremity edema; no joint swelling; No clubbing  :  clear urine  Neuro:  Tolerating room air alert awake  Psych-  unable to assess  Skin: warm, dry, no cyanosis;   Pulses: Brachial and radial pulses intact  Capillary: Normal capillary refill      DATA:    MAR reviewed and pertinent medications noted or modified as needed  MEDS:   Current Facility-Administered Medications   Medication    chlordiazePOXIDE (LIBRIUM) capsule 25 mg    midazolam (PF) (VERSED) 1 mg/mL injection    fentaNYL citrate (PF) injection    propranoloL (INDERAL) tablet 20 mg    levETIRAcetam (KEPPRA) 1500 mg in saline (iso-osm) 100 ml IVPB    sodium chloride (NS) flush 5-40 mL    sodium chloride (NS) flush 5-40 mL    acetaminophen (TYLENOL) tablet 650 mg    Or    acetaminophen (TYLENOL) suppository 650 mg    polyethylene glycol (MIRALAX) packet 17 g    ondansetron (ZOFRAN ODT) tablet 4 mg    Or    ondansetron (ZOFRAN) injection 4 mg    [Held by provider] enoxaparin (LOVENOX) injection 30 mg    folic acid (FOLVITE) tablet 1 mg    lactulose (CHRONULAC) 10 gram/15 mL solution 15 mL    thiamine mononitrate (B-1) tablet 100 mg    midazolam (VERSED) injection 5 mg    piperacillin-tazobactam (ZOSYN) 3.375 g in 0.9% sodium chloride (MBP/ADV) 100 mL MBP        Labs:    Recent Labs     02/28/22  0400 02/27/22  0540 02/26/22  1110   WBC 13.2* 14.8*  --    HGB 7.9* 9.6*  --    PLT 64* 69*  --    INR  --   --  1.4*   APTT  --   --  40.2*     Recent Labs     02/28/22  0400 02/27/22  0540    139   K 3.3* 3.7   * 111*   CO2 19* 20*   GLU 90 80   BUN 30* 33*   CREA 2.25* 2.63*   CA 8.4* 8.1*   MG 1.9 1.6   PHOS 3.8 4.3   ALB 2.5* 1.3*   Ammonia 34  Recent Labs     02/28/22  0400 02/27/22  0220   PH 7.41 7.48*   PCO2 32* 28*   PO2 72* 80   HCO3 21* 23   FIO2 21.0 21.0   2/27 IMV 4 tidal volume 500 pressure support 6 PEEP 5 FiO2 21    Lab Results   Component Value Date/Time    Culture result: No significant growth, <10,000 CFU/mL 02/24/2022 04:45 PM    Culture result: No growth 4 days 01/21/2022 12:00 PM         Imaging:    Results from Hospital Encounter encounter on 02/24/22    XR CHEST PORT    Narrative  Chest, frontal view, 2/28/2022    History: Respiratory failure. Comparison: Including chest 2/27/2022. Findings: Endotracheal and feeding tubes have not removed. The cardiac  silhouette is stable. Lung volumes remain low. Bibasilar atelectasis/opacities  are not significantly changed.   Pulmonary vascular congestion and possible  hydrostatic edema are not significantly changed. No pleural effusion or  pneumothorax is identified. The osseous structures are stable. Impression  Interval extubation. No significant interval change otherwise. Results from [place] encounter on 02/24/22    CT ABD PELV WO CONT    Narrative  CT ABD PELV WO CONT    Technique: Noncontrasted CT scan of the abdomen and pelvis was performed  utilizing transaxial images obtained from the dome of the diaphragm to the pubic  symphysis. Coronal and sagittal reconstructions were generated. Dose Reduction:  All CT scans at this facility are performed using dose reduction optimization  techniques as appropriate to a performed exam including the following: Automated  exposure control, adjustments of the mA and/or kV according to patient size, or  use of iterative reconstruction technique. Comparison:  1/20/2022. Findings: There is mild bibasilar atelectasis. Atherosclerotic calcifications  are present including coronary artery calcifications. Cirrhotic hepatic configuration again evident. The spleen, pancreas, adrenal  glands, and right kidney are unremarkable for noncontrast technique. Cyst of the  posterior lower pole of the left kidney measures 1.6 cm in diameter. Varices are  present. Gallbladder is unremarkable. No biliary duct dilatation apparent. The  abdominal aorta is normal in caliber. There is a large volume of ascites. There is no evidence of bowel obstruction. The appendix is normal. The urinary bladder is empty. No suspicious lytic or blastic skeletal lesion apparent. Impression  Cirrhosis. Varices. Large volume ascites. 2/26. No further seizures seen. We will start decreasing propofol and decreasing ventilator. X-ray got clear hopefully can progress to extubation  2/27 tolerating decrease ventilator. Have placed on tube compensate respirations are nonlabored except for having cough. Will extubate to room air.   He has an OG tube in we will try to maintain it if it comes out we will leave it out. He may need an NG tube if he does not wake up further.   Starting to develop edema will decrease IV fluids and give albumin today  2/28 on room air tolerating well, for EGD   3/1 he had EGD done yesterday now he is going for EGD with banding because of varices  Time of care 30 minutes

## 2022-03-02 NOTE — PROGRESS NOTES
Hospitalist Progress Note    Subjective:   Daily Progress Note: 3/2/2022 10:01 AM    Hospital Course:  Adrian Champion is a 60 y.o. Male with hx of cirrhosis, seizure disorder not on any medications, alcohol abuse, gout,  presented to ED with c/o seizures. As per patient's wife, patient was diagnosed with seizure 2 years ago, but was not placed on any anti epileptics. Patient is daily drinker. Patient had Generalized tonic clonic seizures for 20 minutes at home prior to EMS arrival, resolved with 5 mg of versed. In ED initially, patient was somnolent, again had seizure episode. Seizure was unresolved with > 4 mg of ativan. Patient was given 4gm if keppra. Was intubated for airway protection and started on propofol. Patient was extubated on 2/27/28.  Patient has dark brown drainage from NG, started on PPI, status post EGD and banding as well as paracentesis, currently doing well, awaiting PT for discharge    Subjective:  Patient seen and evaluated at bedside, of note patient back to baseline mental status, still complaining of generalized weakness, unclear as to why patient did not work with physical therapy yesterday, discussed with RN    Current Facility-Administered Medications   Medication Dose Route Frequency    chlordiazePOXIDE (LIBRIUM) capsule 10 mg  10 mg Oral TID    sodium bicarbonate tablet 650 mg  650 mg Oral TID    potassium chloride (K-DUR, KLOR-CON M20) SR tablet 20 mEq  20 mEq Oral DAILY    levoFLOXacin (LEVAQUIN) tablet 500 mg  500 mg Oral DAILY    propranoloL (INDERAL) tablet 20 mg  20 mg Oral BID    levETIRAcetam (KEPPRA) 1500 mg in saline (iso-osm) 100 ml IVPB  1,500 mg IntraVENous Q12H    sodium chloride (NS) flush 5-40 mL  5-40 mL IntraVENous Q8H    sodium chloride (NS) flush 5-40 mL  5-40 mL IntraVENous PRN    acetaminophen (TYLENOL) tablet 650 mg  650 mg Oral Q6H PRN    Or    acetaminophen (TYLENOL) suppository 650 mg  650 mg Rectal Q6H PRN    polyethylene glycol (MIRALAX) packet 17 g  17 g Oral DAILY PRN    ondansetron (ZOFRAN ODT) tablet 4 mg  4 mg Oral Q8H PRN    Or    ondansetron (ZOFRAN) injection 4 mg  4 mg IntraVENous Q6H PRN    [Held by provider] enoxaparin (LOVENOX) injection 30 mg  30 mg SubCUTAneous DAILY    folic acid (FOLVITE) tablet 1 mg  1 mg Oral DAILY    lactulose (CHRONULAC) 10 gram/15 mL solution 15 mL  10 g Oral TID    thiamine mononitrate (B-1) tablet 100 mg  100 mg Oral DAILY    midazolam (VERSED) injection 5 mg  5 mg IntraVENous Q4H PRN        Review of Systems  Unable to obtain due to patient's condition      Objective:     Visit Vitals  /88   Pulse (!) 59   Temp 98 °F (36.7 °C)   Resp 19   Ht 6' 2.02\" (1.88 m)   Wt 84 kg (185 lb 3 oz)   SpO2 99%   BMI 23.77 kg/m²    O2 Flow Rate (L/min): 2 l/min O2 Device: None (Room air)    Temp (24hrs), Av.9 °F (36.6 °C), Min:97.5 °F (36.4 °C), Max:98 °F (36.7 °C)      No intake/output data recorded.  1901 -  0700  In: 1941.8 [P.O.:522; I.V.:1419.8]  Out: 875 [Urine:875]    PHYSICAL EXAM:  Constitutional:patient is in no acute distress. Skin: Extremities and face reveal no rashes. HEENT: Sclerae anicteric. Extra-occular muscles are intact. No oral ulcers. The neck is supple and no masses. Cardiovascular: Regular rate and rhythm. Respiratory:  Clear breath sounds bilaterally with no wheezes, rales, or rhonchi. GI: Abdomen nondistended, soft, and nontender. Normal active bowel sounds. Musculoskeletal: No pitting edema of the lower legs.  Able to move all ext  Neurological:  Patient isawake and alert, but confused  Psychiatric: mood stable      Data Review    Recent Results (from the past 24 hour(s))   CBC W/O DIFF    Collection Time: 22  8:52 AM   Result Value Ref Range    WBC 11.4 (H) 4.1 - 11.1 K/uL    RBC 2.91 (L) 4.10 - 5.70 M/uL    HGB 8.6 (L) 12.1 - 17.0 g/dL    HCT 25.5 (L) 36.6 - 50.3 %    MCV 87.6 80.0 - 99.0 FL    MCH 29.6 26.0 - 34.0 PG    MCHC 33.7 30.0 - 36.5 g/dL    RDW 17.3 (H) 11.5 - 14.5 %    PLATELET 80 (L) 864 - 400 K/uL    MPV 11.5 8.9 - 12.9 FL    NRBC 0.0 0.0  WBC    ABSOLUTE NRBC 0.00 0.00 - 6.91 K/uL   METABOLIC PANEL, COMPREHENSIVE    Collection Time: 03/01/22  8:52 AM   Result Value Ref Range    Sodium 144 136 - 145 mmol/L    Potassium 3.2 (L) 3.5 - 5.1 mmol/L    Chloride 115 (H) 97 - 108 mmol/L    CO2 20 (L) 21 - 32 mmol/L    Anion gap 9 5 - 15 mmol/L    Glucose 90 65 - 100 mg/dL    BUN 29 (H) 6 - 20 mg/dL    Creatinine 1.88 (H) 0.70 - 1.30 mg/dL    BUN/Creatinine ratio 15 12 - 20      GFR est AA 44 (L) >60 ml/min/1.73m2    GFR est non-AA 36 (L) >60 ml/min/1.73m2    Calcium 8.6 8.5 - 10.1 mg/dL    Bilirubin, total 1.9 (H) 0.2 - 1.0 mg/dL    AST (SGOT) 36 15 - 37 U/L    ALT (SGPT) 10 (L) 12 - 78 U/L    Alk. phosphatase 49 45 - 117 U/L    Protein, total 5.8 (L) 6.4 - 8.2 g/dL    Albumin 2.2 (L) 3.5 - 5.0 g/dL    Globulin 3.6 2.0 - 4.0 g/dL    A-G Ratio 0.6 (L) 1.1 - 2.2     HEPATIC FUNCTION PANEL    Collection Time: 03/01/22  8:52 AM   Result Value Ref Range    Protein, total 5.8 (L) 6.4 - 8.2 g/dL    Albumin 2.1 (L) 3.5 - 5.0 g/dL    Globulin 3.7 2.0 - 4.0 g/dL    A-G Ratio 0.6 (L) 1.1 - 2.2      Bilirubin, total 1.9 (H) 0.2 - 1.0 mg/dL    Bilirubin, direct 1.2 (H) 0.0 - 0.2 mg/dL    Alk.  phosphatase 48 45 - 117 U/L    AST (SGOT) 35 15 - 37 U/L    ALT (SGPT) 11 (L) 12 - 78 U/L   CBC W/O DIFF    Collection Time: 03/02/22  3:40 AM   Result Value Ref Range    WBC 10.5 4.1 - 11.1 K/uL    RBC 3.23 (L) 4.10 - 5.70 M/uL    HGB 9.5 (L) 12.1 - 17.0 g/dL    HCT 28.1 (L) 36.6 - 50.3 %    MCV 87.0 80.0 - 99.0 FL    MCH 29.4 26.0 - 34.0 PG    MCHC 33.8 30.0 - 36.5 g/dL    RDW 17.4 (H) 11.5 - 14.5 %    PLATELET 736 (L) 559 - 400 K/uL    MPV 12.4 8.9 - 12.9 FL    NRBC 0.0 0.0  WBC    ABSOLUTE NRBC 0.00 0.00 - 4.84 K/uL   METABOLIC PANEL, COMPREHENSIVE    Collection Time: 03/02/22  3:40 AM   Result Value Ref Range    Sodium 141 136 - 145 mmol/L    Potassium 3.6 3.5 - 5.1 mmol/L    Chloride 114 (H) 97 - 108 mmol/L    CO2 20 (L) 21 - 32 mmol/L    Anion gap 7 5 - 15 mmol/L    Glucose 86 65 - 100 mg/dL    BUN 26 (H) 6 - 20 mg/dL    Creatinine 1.78 (H) 0.70 - 1.30 mg/dL    BUN/Creatinine ratio 15 12 - 20      GFR est AA 47 (L) >60 ml/min/1.73m2    GFR est non-AA 39 (L) >60 ml/min/1.73m2    Calcium 9.0 8.5 - 10.1 mg/dL    Bilirubin, total 1.8 (H) 0.2 - 1.0 mg/dL    AST (SGOT) 42 (H) 15 - 37 U/L    ALT (SGPT) 12 12 - 78 U/L    Alk.  phosphatase 46 45 - 117 U/L    Protein, total 6.3 (L) 6.4 - 8.2 g/dL    Albumin 2.0 (L) 3.5 - 5.0 g/dL    Globulin 4.3 (H) 2.0 - 4.0 g/dL    A-G Ratio 0.5 (L) 1.1 - 2.2     AMMONIA    Collection Time: 03/02/22  3:40 AM   Result Value Ref Range    Ammonia 30 <32 umol/L           Assessment / Plan:  Status epilepticusof note patient presented with status epilepticus, was intubated for airway protection, currently extubated, has remained seizure-free, could be component of alcohol withdrawal  Continue Keppra twice daily  Continue to monitor for seizure activity  Ativan as needed  Neurology consult appreciated, continue to follow recommendations    GI bleedsecondary to varices from liver cirrhosis, patient currently status post repeat endoscopy and banding, hemoglobin and hematocrit remained stable  Maintain active type and screen, continue to trend hemoglobin and hematocrit  Gastroenterology consult appreciated, continue to follow recommendations    Acute respiratory failure with hypoxia secondary to aspiration pneumoniapatient currently status post extubation, did meet sepsis criteria upon presentation given tachycardia as well as leukocytosis, antibiotics deescalated to  Follow blood cultures  Follow-up sputum cultures  Continue Levaquin 500 mg once daily for 7 days  Pulmonology consult appreciated, continue to follow recommendations    Sepsissecondary to problem #3  Management as documented above    Hypokalemiareplete potassium and recheck potassium    Acute decompensated liver cirrhosis with ascitesstatus post diagnostic/therapeutic paracentesis  Continue to follow gastroenterology recommendations    Altered mental statuslikely multifactorial including secondary to hepatic encephalopathy as well as metabolic encephalopathy as well as due to seizures secondary to alcohol withdrawal, patient currently back to baseline mental status  Continue to monitor mental status    Hepatic encephalopathycontinue lactulose at current doses  Continue to trend serum ammonia  Follow neurology recommendation    History of alcohol abuseof note patient endorses history of alcohol abuse, endorses he has a drinking problem, wants to seek counseling as well as options for alcohol rehab  Continue thiamine multivitamin and folate  Frequent CIWA checks  Taper Librium to 10 mg 3 times daily  Follow-up psychiatry recommendation    Acute on chronic kidney disease stage IIIcurrently serum creatinine downtrending  Avoid nephrotoxic medications  Follow-up nephrology recommendations    ProphylaxisSCDs  FENclear liquid diet, advance as per speech and swallow, replete potassium and magnesium  Full code, will clarify about surrogate decision maker  Dispositiontransfer out of ICU, currently awaiting bed, unclear as to why physical therapy did not evaluate the patient yesterday, patient is stable for discharge after evaluation by physical therapy    Critical care time spent 35 minutes involving direct patient care as well as reviewing patient's labs and coordination of care with nursing staff    18.5 - 24.9 Normal weight / Body mass index is 23.77 kg/m².     Code status: Full  Prophylaxis: SCD's  Recommended Disposition: SNF/LTC

## 2022-03-02 NOTE — DISCHARGE SUMMARY
Hospitalist Discharge Summary     Patient ID:  Tony Murphy  [id number]  61 y.o.  1960  2/24/2022    PCP on record: Magdalena Frost NP    Admit date: 2/24/2022  Discharge date and time: 3/2/2022    DISCHARGE DIAGNOSIS:    Status epilepticus/GI bleed/acute respiratory failure with hypoxia secondary to aspiration pneumonia/sepsis/hypokalemia/acute decompensated liver cirrhosis with ascites/altered mental status/hepatic encephalopathy/history of alcohol abuse/acute on chronic kidney disease stage III    CONSULTATIONS:  IP CONSULT TO NEUROLOGY  IP CONSULT TO NEPHROLOGY  IP CONSULT TO GASTROENTEROLOGY  IP CONSULT TO HEMATOLOGY  IP CONSULT TO PSYCHIATRY  IP CONSULT TO PULMONOLOGY    Excerpted HPI from H&P of Lázaro Garcia MD:  Patient is intubated, so hx obtained from ED notes. Marilynn Meneses is a 60 y.o. Male with hx of cirrhosis, seizure disorder not on any medications, alcohol abuse, gout,  presented to ED with c/o seizures. As per patient's wife, patient was diagnosed with seizure 2 years ago, but was not placed on any anti epileptics. Patient is daily drinker. Patient had Generalized tonic clonic seizures for 20 minutes at home prior to EMS arrival, resolved with 5 mg of versed. In ED initially, patient was somnolent, again had seizure episode. Seizure was unresolved with > 4 mg of ativan. Patient was given 4gm if keppra. Was intubated for airway protection and started on propofol.     We were asked to admit for work up and evaluation of the above problems. ______________________________________________________________________  DISCHARGE SUMMARY/HOSPITAL COURSE:  for full details see H&P, daily progress notes, labs, consult notes. Waldo Rangel is a 60 y.o. Male with hx of cirrhosis, seizure disorder not on any medications, alcohol abuse, gout,  presented to ED with c/o seizures. As per patient's wife, patient was diagnosed with seizure 2 years ago, but was not placed on any anti epileptics. Patient is daily drinker. Patient had Generalized tonic clonic seizures for 20 minutes at home prior to EMS arrival, resolved with 5 mg of versed.   In ED initially, patient was somnolent, again had seizure episode. Seizure was unresolved with > 4 mg of ativan. Patient was given 4gm if keppra. Was intubated for airway protection and started on propofol. Patient was extubated on 2/27/28. Patient has dark brown drainage from NG, started on PPI, status post EGD and banding as well as paracentesis, currently doing well, antibiotics were changed to Levaquin, patient was evaluated by physical therapy as well as Occupational Therapy, diet was advanced following which patient was deemed stable for discharge as per physical therapy/case management recommendations with outpatient follow-up with psychiatry/pulmonology/gastroenterology        _______________________________________________________________________  Patient seen and examined by me on discharge day. Pertinent Findings:  Gen:    Not in distress  Chest: Decreased air entry bilaterally in bilateral lower lung zones  CVS:   Regular rhythm, s1/s2 no m/r/g  No edema  Abd:  Soft, not distended, not tender  Neuro:  Alert, Oriented at baseline  _______________________________________________________________________  DISCHARGE MEDICATIONS:   Current Discharge Medication List      START taking these medications    Details   levoFLOXacin (LEVAQUIN) 500 mg tablet Take 1 Tablet by mouth daily for 7 doses. Qty: 7 Tablet, Refills: 0  Start date: 3/2/2022, End date: 3/9/2022      potassium chloride (K-DUR, KLOR-CON M20) 20 mEq tablet Take 1 Tablet by mouth daily. Qty: 30 Tablet, Refills: 0  Start date: 3/2/2022      sodium bicarbonate 650 mg tablet Take 1 Tablet by mouth three (3) times daily. Qty: 90 Tablet, Refills: 0  Start date: 3/2/2022      chlordiazePOXIDE (LIBRIUM) 5 mg capsule Take 2 Capsules by mouth three (3) times daily as needed for Anxiety.  Max Daily Amount: 30 mg.  Qty: 9 Capsule, Refills: 0  Start date: 3/2/2022    Associated Diagnoses: Alcohol withdrawal syndrome without complication (HCC)      levETIRAcetam (Keppra) 1,000 mg tablet Take 1.5 Tablets by mouth two (2) times a day. Qty: 60 Tablet, Refills: 0  Start date: 3/2/2022         CONTINUE these medications which have NOT CHANGED    Details   ergocalciferol (ERGOCALCIFEROL) 1,250 mcg (50,000 unit) capsule Take 1 Capsule by mouth every seven (7) days. lactulose (CHRONULAC) 10 gram/15 mL solution Take 15 mL by mouth three (3) times daily. Qty: 200 mL, Refills: 0      allopurinoL (ZYLOPRIM) 300 mg tablet Take 1 Tablet by mouth daily. thiamine HCL (B-1) 100 mg tablet Take 100 mg by mouth daily. propranoloL (INDERAL) 20 mg tablet Take 20 mg by mouth two (2) times a day. folic acid (FOLVITE) 1 mg tablet Take 1 mg by mouth daily. famotidine (PEPCID) 20 mg tablet Take 20 mg by mouth At bedtime. aMILoride (MIDAMOR) 5 mg tablet Take 5 mg by mouth daily. Patient Follow Up Instructions: Activity: PT/OT Eval and Treat  Diet: Dysphagia diet-pureed  Wound Care: As directed    Follow-up with PCP/Psychiatry/Gastroenterology/Neurology/Pulmonology in 2 weeks.   Follow-up tests/labs As per above physicians  Follow-up Information     Follow up With Specialties Details Why Contact Info    Rachele Alba NP Nurse Practitioner In 1 week  [address] [facility] [address] [address]      Modesto Wright MD Psychiatry In 1 week  [address]  Mert Roberts MD Gastroenterology In 1 week  [address] [facility]  [address]  [address] [facility]  [phone]      Luis M Ponce MD Neurology In 1 week  [address] [facility]  [address]  [address] [facility]      Kerline Vidales MD Pulmonary Disease In 1 week  [address] [place] [address]  [phone] ________________________________________________________________    Risk of deterioration: Low    Condition at Discharge:  Stable  __________________________________________________________________    Disposition  SNF/LTC    ____________________________________________________________________    Code Status: Full Code  ___________________________________________________________________      Total time in minutes spent coordinating this discharge (includes going over instructions, follow-up, prescriptions, and preparing report for sign off to her PCP) :  45 minutes    Signed:   Homar Del Real MD

## 2022-03-02 NOTE — PROGRESS NOTES
IMPRESSION:   1. Acute hypoxic respiratory failure resolved  2. Aspiration pneumonia chest x-ray clear  3. Status epilepticus no further seizures seen  4. Esophageal varices without bleeding grade 3  5. Portal hypertension gastropathy  6. Acute on chronic kidney disease creatinine improving  7. Hypomagnesemia to be replete  8. History of cirrhosis EtOH abuse and ascites  9. Thrombocytopenia stable      RECOMMENDATIONS/PLAN:   1. Extubated now on room air tolerating well  2. Altered mental status remains poorly responsive despite no sedation in 24-hour  3. Remains on Zosyn empirically  4. No further seizures seen on Keppra  5. On propranolol for portal hypertension  6. Creatinine improving but developing edema of the lower extremities. 7. Status post EGD   8. Status post ultrasound-guided paracentesis 4360 ml of fluid drained     [x] High complexity decision making was performed  [x] See my orders for details  HPI  24-year-old male came in because of seizures significant past medical history of cirrhosis of liver alcohol abuse gout he had paracentesis done in the past also has hypertension and arthritis. He starting having seizures received medication came to the emergency room subsequently got intubated now he is on propofol sedated unable to get any started the patient is not on any medication at home history of EtOH abuse so admitted and critical care consult was called. PMH:  has a past medical history of Arthritis and Hypertension. PSH:   has a past surgical history that includes ir paracentesis abd w image (1/21/2022) and ir paracentesis abd w image (3/1/2022). FHX: family history is not on file. SHX:  reports that he has never smoked.  He has never used smokeless tobacco.    ALL: No Known Allergies     MEDS:   [x] Reviewed - As Below   [] Not reviewed    Current Facility-Administered Medications   Medication    chlordiazePOXIDE (LIBRIUM) capsule 25 mg    sodium bicarbonate tablet 650 mg    potassium chloride (K-DUR, KLOR-CON M20) SR tablet 20 mEq    levoFLOXacin (LEVAQUIN) tablet 500 mg    propranoloL (INDERAL) tablet 20 mg    levETIRAcetam (KEPPRA) 1500 mg in saline (iso-osm) 100 ml IVPB    sodium chloride (NS) flush 5-40 mL    sodium chloride (NS) flush 5-40 mL    acetaminophen (TYLENOL) tablet 650 mg    Or    acetaminophen (TYLENOL) suppository 650 mg    polyethylene glycol (MIRALAX) packet 17 g    ondansetron (ZOFRAN ODT) tablet 4 mg    Or    ondansetron (ZOFRAN) injection 4 mg    [Held by provider] enoxaparin (LOVENOX) injection 30 mg    folic acid (FOLVITE) tablet 1 mg    lactulose (CHRONULAC) 10 gram/15 mL solution 15 mL    thiamine mononitrate (B-1) tablet 100 mg    midazolam (VERSED) injection 5 mg    piperacillin-tazobactam (ZOSYN) 3.375 g in 0.9% sodium chloride (MBP/ADV) 100 mL MBP      MAR reviewed and pertinent medications noted or modified as needed   Current Facility-Administered Medications   Medication    chlordiazePOXIDE (LIBRIUM) capsule 25 mg    sodium bicarbonate tablet 650 mg    potassium chloride (K-DUR, KLOR-CON M20) SR tablet 20 mEq    levoFLOXacin (LEVAQUIN) tablet 500 mg    propranoloL (INDERAL) tablet 20 mg    levETIRAcetam (KEPPRA) 1500 mg in saline (iso-osm) 100 ml IVPB    sodium chloride (NS) flush 5-40 mL    sodium chloride (NS) flush 5-40 mL    acetaminophen (TYLENOL) tablet 650 mg    Or    acetaminophen (TYLENOL) suppository 650 mg    polyethylene glycol (MIRALAX) packet 17 g    ondansetron (ZOFRAN ODT) tablet 4 mg    Or    ondansetron (ZOFRAN) injection 4 mg    [Held by provider] enoxaparin (LOVENOX) injection 30 mg    folic acid (FOLVITE) tablet 1 mg    lactulose (CHRONULAC) 10 gram/15 mL solution 15 mL    thiamine mononitrate (B-1) tablet 100 mg    midazolam (VERSED) injection 5 mg    piperacillin-tazobactam (ZOSYN) 3.375 g in 0.9% sodium chloride (MBP/ADV) 100 mL MBP      PMH:  has a past medical history of Arthritis and Hypertension. PSH:   has a past surgical history that includes ir paracentesis abd w image (2022) and ir paracentesis abd w image (3/1/2022). FHX: family history is not on file. SHX:  reports that he has never smoked. He has never used smokeless tobacco.     ROS:  Lethargic confused    Hemodynamics:    CO:    CI:    CVP:    SVR:   PAP Systolic:    PAP Diastolic:    PVR:    AG32:        Ventilator Settings:      Mode Rate TV Press PEEP FiO2 PIP Min. Vent   SIMV,Volume control    500 ml 6 cm H2O  2 cm H20 21 %  18 cm H2O  10.6 l/min        Vital Signs: Telemetry:    normal sinus rhythm Intake/Output:   Visit Vitals  /88   Pulse (!) 59   Temp 98 °F (36.7 °C)   Resp 19   Ht 6' 2.02\" (1.88 m)   Wt 84 kg (185 lb 3 oz)   SpO2 99%   BMI 23.77 kg/m²       Temp (24hrs), Av.9 °F (36.6 °C), Min:97.5 °F (36.4 °C), Max:98 °F (36.7 °C)        O2 Device: None (Room air) O2 Flow Rate (L/min): 2 l/min       Wt Readings from Last 4 Encounters:   22 84 kg (185 lb 3 oz)   22 86.2 kg (190 lb)          Intake/Output Summary (Last 24 hours) at 3/2/2022 0756  Last data filed at 3/2/2022 0523  Gross per 24 hour   Intake 1261.83 ml   Output 600 ml   Net 661.83 ml       Last shift:      No intake/output data recorded. Last 3 shifts: 1901 -  0700  In: 1941.8 [P.O.:522; I.V.:1419.8]  Out: 875 [Urine:875]       Physical Exam:     General: Awake does not follow command on room air   HEENT: NCAT,   Eyes: anicteric; conjunctiva clear random eye movement present  Neck: no nodes, , trach midline; no accessory MM use. No definite JVD  Chest: no deformity,   Cardiac: R regular; no murmur;   Lungs: distant breath sounds; no wheezes or rales  Abd: soft, NT, hypoactive BS  Ext: Lower extremity edema; no joint swelling; No clubbing  :  clear urine  Neuro:  Tolerating room air alert awake  Psych-  unable to assess  Skin: warm, dry, no cyanosis;   Pulses: Brachial and radial pulses intact  Capillary: Normal capillary refill      DATA:    MAR reviewed and pertinent medications noted or modified as needed  MEDS:   Current Facility-Administered Medications   Medication    chlordiazePOXIDE (LIBRIUM) capsule 25 mg    sodium bicarbonate tablet 650 mg    potassium chloride (K-DUR, KLOR-CON M20) SR tablet 20 mEq    levoFLOXacin (LEVAQUIN) tablet 500 mg    propranoloL (INDERAL) tablet 20 mg    levETIRAcetam (KEPPRA) 1500 mg in saline (iso-osm) 100 ml IVPB    sodium chloride (NS) flush 5-40 mL    sodium chloride (NS) flush 5-40 mL    acetaminophen (TYLENOL) tablet 650 mg    Or    acetaminophen (TYLENOL) suppository 650 mg    polyethylene glycol (MIRALAX) packet 17 g    ondansetron (ZOFRAN ODT) tablet 4 mg    Or    ondansetron (ZOFRAN) injection 4 mg    [Held by provider] enoxaparin (LOVENOX) injection 30 mg    folic acid (FOLVITE) tablet 1 mg    lactulose (CHRONULAC) 10 gram/15 mL solution 15 mL    thiamine mononitrate (B-1) tablet 100 mg    midazolam (VERSED) injection 5 mg    piperacillin-tazobactam (ZOSYN) 3.375 g in 0.9% sodium chloride (MBP/ADV) 100 mL MBP        Labs:    Recent Labs     03/02/22  0340 03/01/22  0852 02/28/22  0400   WBC 10.5 11.4* 13.2*   HGB 9.5* 8.6* 7.9*   * 80* 64*     Recent Labs     03/02/22  0340 03/01/22  0852 02/28/22  0400    144 144   K 3.6 3.2* 3.3*   * 115* 114*   CO2 20* 20* 19*   GLU 86 90 90   BUN 26* 29* 30*   CREA 1.78* 1.88* 2.25*   CA 9.0 8.6 8.4*   MG  --   --  1.9   PHOS  --   --  3.8   ALB 2.0* 2.2*  2.1* 2.5*   ALT 12 10*  11*  --    Ammonia 34  Recent Labs     02/28/22  0400   PH 7.41   PCO2 32*   PO2 72*   HCO3 21*   FIO2 21.0   2/27 IMV 4 tidal volume 500 pressure support 6 PEEP 5 FiO2 21    Lab Results   Component Value Date/Time    Culture result: No significant growth, <10,000 CFU/mL 02/24/2022 04:45 PM    Culture result: No growth 4 days 01/21/2022 12:00 PM         Imaging:    Results from Hospital Encounter encounter on 02/24/22    XR CHEST PORT    Narrative  Chest, frontal view, 2/28/2022    History: Respiratory failure. Comparison: Including chest 2/27/2022. Findings: Endotracheal and feeding tubes have not removed. The cardiac  silhouette is stable. Lung volumes remain low. Bibasilar atelectasis/opacities  are not significantly changed. Pulmonary vascular congestion and possible  hydrostatic edema are not significantly changed. No pleural effusion or  pneumothorax is identified. The osseous structures are stable. Impression  Interval extubation. No significant interval change otherwise. Results from [place] encounter on 02/24/22    CT ABD PELV WO CONT    Narrative  CT ABD PELV WO CONT    Technique: Noncontrasted CT scan of the abdomen and pelvis was performed  utilizing transaxial images obtained from the dome of the diaphragm to the pubic  symphysis. Coronal and sagittal reconstructions were generated. Dose Reduction:  All CT scans at this facility are performed using dose reduction optimization  techniques as appropriate to a performed exam including the following: Automated  exposure control, adjustments of the mA and/or kV according to patient size, or  use of iterative reconstruction technique. Comparison:  1/20/2022. Findings: There is mild bibasilar atelectasis. Atherosclerotic calcifications  are present including coronary artery calcifications. Cirrhotic hepatic configuration again evident. The spleen, pancreas, adrenal  glands, and right kidney are unremarkable for noncontrast technique. Cyst of the  posterior lower pole of the left kidney measures 1.6 cm in diameter. Varices are  present. Gallbladder is unremarkable. No biliary duct dilatation apparent. The  abdominal aorta is normal in caliber. There is a large volume of ascites. There is no evidence of bowel obstruction. The appendix is normal. The urinary bladder is empty.     No suspicious lytic or blastic skeletal lesion apparent. Impression  Cirrhosis. Varices. Large volume ascites. 2/26. No further seizures seen. We will start decreasing propofol and decreasing ventilator. X-ray got clear hopefully can progress to extubation  2/27 tolerating decrease ventilator. Have placed on tube compensate respirations are nonlabored except for having cough. Will extubate to room air. He has an OG tube in we will try to maintain it if it comes out we will leave it out. He may need an NG tube if he does not wake up further.   Starting to develop edema will decrease IV fluids and give albumin today  2/28 on room air tolerating well, for EGD   3/1 he had EGD done yesterday now he is going for EGD with banding because of varices  3/2 on room air tolerating well status post paracentesis done yesterday  Time of care 30 minutes

## 2022-03-02 NOTE — PROGRESS NOTES
OCCUPATIONAL THERAPY EVALUATION  Patient: Hilary Zuluaga (66 y.o. male)  Date: 3/2/2022  Primary Diagnosis: Status epilepticus ([facility] [G40.901]  Procedure(s) (LRB):  ENDOSCOPIC BANDING OR LIGATION (N/A)  ESOPHAGOGASTRODUODENOSCOPY (EGD) (N/A) 1 Day Post-Op   Precautions: fall risk, seizure precautions       ASSESSMENT  Pt is a 59 y/o M with PMH of arthritis, cirrhosis, alcohol abuse, gout, and HTN presenting to [facility] with c/o a generalized tonic clonic seizure lasting ~20 minutes prior to EMS arrival. Pt received 5 mg of versed from EMS with symptoms resolving. Pt had a second seizure episode once he arrived in the ED that was unresolved with >4 mg of ativan, so pt was then given 4 mg of keppra. Pt then intubated and transferred to the ICU. Pt was admitted on 02/24/22 and being treated for acute hypoxic respiratory failure, aspiration pneumonia, status epilepticus, acute on CKD, and cirrhosis with EtOH abuse and ascites. Pt reports he has had ~2 falls in the last 3 months. Pt received semi-supine in bed upon arrival, AxO to person, and agreeable to OT/PT evaluation at this time. Per pt report, pt lives with his children and grandchildren in a one-story home with 0 CATHERINE, was IND with ADLs and Mod I using RW for mobility at [facility]. Other DME owned includes: rollator, shower chair, and BSC. Based on current observations, pt presents with deficits in generalized strength, dynamic sitting balance, dynamic/static standing balance, functional activity tolerance, cognition, and increased pain impacting overall performance of ADLs and functional transfers/mobility. Pt currently requires mod Ax2 for all bed mobility with additional time. While seated EOB pt demonstrates good static sitting balance. Pt's hospital gown changed with mod A to locate correct arm holes and thread BUE. Pt requires mod Ax2 with additional time for sit>stand and stand>sit transfers to and from seated EOB.  Pt ambulated to toilet using RW with mod Ax2. Pt required additional assist from therapists to maneuver RW safely. Pt required max verbal cuing to initiate taking steps. Pt completed toilet transfer with mod-max Ax2 and additional time. Pt required max verbal cuing during toilet transfer to hold grab bar, maneuver feet during stand pivot transfer, and safely position self over toilet seat. Pt demonstrated confusion/ST memory loss while seated on the toilet as pt needed continuous reminders that he had a catheter in at this time. Pt required max A to wipe buttocks while standing holding grab bar. Pt ambulated back to bedside with mod Ax2. Pt returned supine with mod Ax2. Pt then ate breakfast with mod A for container management (pt able to pinch container tops and loosen them but required additional assist take top off completely). Pt able to bring drinks to mouth with mod I (additional time). Overall, pt tolerates session fair. Pt would benefit from continued skilled OT services to address current impairments and improve IND and safety with self cares and functional transfers/mobility. Recommend discharge to SNF once medically appropriate. Other factors to consider for discharge: home support, PLOF, severity of deficits     Patient will benefit from skilled therapy intervention to address the above noted impairments. PLAN :  Recommendations and Planned Interventions: self care training, functional mobility training, therapeutic exercise, balance training, therapeutic activities, endurance activities, patient education and home safety training    Frequency/Duration: Patient will be followed by occupational therapy:  3-5x/week to address goals.     Recommendation for discharge: (in order for the patient to meet his/her long term goals)  Jeremy Glover    This discharge recommendation:  Has been made in collaboration with the attending provider and/or case management    IF patient discharges home will need the following DME: patient owns DME required for discharge       SUBJECTIVE:   Patient stated I need to use the bathroom.     OBJECTIVE DATA SUMMARY:   HISTORY:   Past Medical History:   Diagnosis Date    Arthritis     Hypertension      Past Surgical History:   Procedure Laterality Date    IR PARACENTESIS ABD W IMAGE  1/21/2022    IR PARACENTESIS ABD W IMAGE  3/1/2022       Expanded or extensive additional review of patient history:     Home Situation  Home Environment: Private residence  # Steps to Enter: 0  Wheelchair Ramp: No  One/Two Story Residence: One story  Living Alone: No  Support Systems: Other Family Member(s)  Patient Expects to be Discharged to[de-identified] Home  Current DME Used/Available at Home: Albino Hawks, rolling,Walker, rollator,Shower chair,Commode, bedside    Hand dominance: Right    EXAMINATION OF PERFORMANCE DEFICITS:  Cognitive/Behavioral Status:  Neurologic State: Confused; Alert  Orientation Level: Oriented to person;Disoriented to time;Disoriented to situation;Disoriented to place  Cognition: Impaired decision making; Impulsive;Poor safety awareness;Decreased attention/concentration;Decreased command following      Hearing: Auditory  Auditory Impairment: None      Range of Motion:  AROM: Generally decreased, functional  PROM: Generally decreased, functional                      Strength:  Strength: Generally decreased, functional                Coordination:     Fine Motor Skills-Upper: Left Intact; Right Intact    Gross Motor Skills-Upper: Left Intact; Right Intact      Balance:  Sitting: Impaired; Without support  Sitting - Static: Good (unsupported)  Sitting - Dynamic: Fair (occasional); Prop sitting  Standing: Impaired; Without support  Standing - Static: Fair;Constant support  Standing - Dynamic : Poor;Constant support    Functional Mobility and Transfers for ADLs:  Bed Mobility:  Rolling: Moderate assistance;Assist x2; Additional time  Supine to Sit: Moderate assistance;Assist x2; Additional time  Sit to Supine:  Moderate assistance;Assist x2; Additional time  Scooting: Moderate assistance;Assist x2; Additional time    Transfers:  Sit to Stand: Moderate assistance;Assist x2; Additional time  Stand to Sit: Moderate assistance;Assist x2; Additional time  Toilet Transfer : Moderate assistance;Assist x2; Additional time    ADL Intervention and task modifications:  Feeding  Container Management: Moderate assistance (Pinches container top and loosens seal, A to take off)  Drink to Mouth: Modified independent (Additional time)    Toileting  Bowel Hygiene: Maximum assistance (While standing with grab bar and RW)  Clothing Management: Maximum assistance         Therapeutic Exercise:  Pt would benefit from UE HEP initiated at next session. Functional Measure:    [address] [place] [address] AM-PACTM \"6 Clicks\"                                                       Daily Activity Inpatient Short Form  How much help from another person does the patient currently need. .. Total; A Lot A Little None   1. Putting on and taking off regular lower body clothing? [x]  1 []  2 []  3 []  4   2. Bathing (including washing, rinsing, drying)? []  1 [x]  2 []  3 []  4   3. Toileting, which includes using toilet, bedpan or urinal? [] 1 [x]  2 []  3 []  4   4. Putting on and taking off regular upper body clothing? []  1 [x]  2 []  3 []  4   5. Taking care of personal grooming such as brushing teeth? []  1 []  2 [x]  3 []  4   6. Eating meals? []  1 []  2 [x]  3 []  4   © 2007, Trustees of [address] [place] [id number], under license to [employer]. All rights reserved     Score: 13/24     Interpretation of Tool:  Represents clinically-significant functional categories (i.e. Activities of daily living).   Percentage of Impairment CH    0%   CI    1-19% CJ    20-39% CK    40-59% CL    60-79% CM    80-99% CN     100%   AMPA  Score 6-24 24 23 20-22 15-19 10-14 7-9 6         Occupational Therapy Evaluation Charge Determination   History Examination Decision-Making   LOW Complexity : Brief history review  LOW Complexity : 1-3 performance deficits relating to physical, cognitive , or psychosocial skils that result in activity limitations and / or participation restrictions  LOW Complexity : No comorbidities that affect functional and no verbal or physical assistance needed to complete eval tasks       Based on the above components, the patient evaluation is determined to be of the following complexity level: LOW   Pain Rating:  Pt reported pain with activity in his L knee that resolved once sitting. Pt did not rate pain at this time due to confusion and pain subsiding. Activity Tolerance:   Fair  Please refer to the flowsheet for vital signs taken during this treatment. After treatment patient left in no apparent distress:    Supine in bed, Call bell within reach and eating breakfast     COMMUNICATION/EDUCATION:   The patients plan of care was discussed with: Physical therapist and Registered nurse. Patient/family agree to work toward stated goals and plan of care. This patients plan of care is appropriate for delegation to \[facility] [place] [facility]\"". PT/OT sessions occurred together for increased safety of pt and clinician. Thank you for this referral.  Racquel Galvez, OT  Time Calculation: 45 mins    Problem: Self Care Deficits Care Plan (Adult)  Goal: *Acute Goals and Plan of Care (Insert Text)  Description: 1. Pt will be mod I sup <> sit in prep for EOB ADLs  2. Pt will be mod I grooming sitting EOB  3. Pt will be mod I LE dressing sitting EOB/long sit  4. Pt will be mod I sit <>  prep for toileting LRAD  5. Pt will be mod I toileting/toilet transfer/cloth mgmt LRAD  6.  Pt will be IND following UE HEP in prep for self care tasks          Outcome: Not Met

## 2022-03-02 NOTE — PROGRESS NOTES
Progress Note    Patient: Sharene Soulier MRN: [id number]  SSN: [id number]    YOB: 1960  Age: 60 y.o. Sex: male      Admit Date: 2/24/2022    LOS: 6 days     Subjective:   GI is following in consultation for GI bleed - reportedly noticing blood from NGT.       3/2: Patient seen in ICU awake and alert with some confusion. Denies chest pain or abdominal pain. \"I'm ok\". Denies overt bleeding. Hgb stable, 8.6.     3/1 Paracentesis - 4,360ml fluid drained. 3/1: Banding of varices   2/28 EGD:  Esophageal varices without bleeding grade III, Portal hypertensive gastropathy.     History of Present Illness: Waldo Rangel is a 60 y. o. male who is seen in consultation for GI bleed. Mr. Daljit Voss seen in the ICU he is sedated and intubated. [de-identified] of medical history obtained from RN and chart. He was admitted on 2/24 with seizures. Patient had generalized tonic clonic seizures for 20 minutes at home prior to EMS arrival, resolved with 5 mg of versed. In the ED patient again had a seizure which was un resolved with more than 4 mg of ativan. He was given 4 gm of Keppra and was intubated for airway protection and started on propofol. Neurology consulted for further management. Ct head shows generalized atrophy otherwise no acute changes.  His wife reports he was diagnosed with seizures two years ago but was not placed on anti-epileptic medication.  It is reported that the patient consumes alcohol daily. He has a past medical history significant for cirrhosis, seizure disorder, gout, diverticulosis, Esophageal varices,  Vitamin D deficiency, and alcohol abuse. He had an EGD in September 2020 showing esophageal varices, hypertensive portal gastropathy. He is on Propanolol. He was scheduled for repeat EGD on 3/7/21. RN reports she was told the patient had blood from the NG tube but states she has not noticed any.  His hgB is 9.9 this am compared to 10.7 on 2/24/22.  CT of abdomen shows cirrhosis, varices, and large volume ascites.  He had paracentesis on 1/21/22 with removal of 5.3 Liters of serous fluid removed. He is scheduled for US guided Paracentesis which is pending at this time. Patient does move his head with verbal stimuli. Abdomen ins distended. Ammonia 111 yesterday he is on Lactulose. Plan for EGD on Monday.        Objective:     Vitals:    03/02/22 0400 03/02/22 0500 03/02/22 0700 03/02/22 1100   BP: (!) 136/96 115/88 106/75    Pulse: 80 (!) 59 77    Resp: 21 19 19    Temp:   98.2 °F (36.8 °C) 98.4 °F (36.9 °C)   SpO2: 96% 99% 100%    Weight:       Height:            Intake and Output:  Current Shift: No intake/output data recorded. Last three shifts: 02/28 1901 - 03/02 0700  In: 1941.8 [P.O.:522; I.V.:1419.8]  Out: 875 [Urine:875]    Physical Exam:   Skin:  Extremities and face reveal no rashes. No ivey erythema. HEENT: Sclerae anicteric. Extra-occular muscles are intact. No abnormal pigmentation of the lips. The neck is supple. Cardiovascular: Regular rate and rhythm. Respiratory:  Comfortable breathing with no accessory muscle use. GI:  Abdomen nondistended, soft, and nontender. No enlargement of the liver or spleen. No masses palpable. Rectal:  Deferred  Musculoskeletal: Generalized weakness. Neurological:  Awake and alert with some confusion  Psychiatric:  Not agitated.    Lymphatic:  No visible adenopathy      Lab/Data Review:  Recent Results (from the past 24 hour(s))   CBC W/O DIFF    Collection Time: 03/02/22  3:40 AM   Result Value Ref Range    WBC 10.5 4.1 - 11.1 K/uL    RBC 3.23 (L) 4.10 - 5.70 M/uL    HGB 9.5 (L) 12.1 - 17.0 g/dL    HCT 28.1 (L) 36.6 - 50.3 %    MCV 87.0 80.0 - 99.0 FL    MCH 29.4 26.0 - 34.0 PG    MCHC 33.8 30.0 - 36.5 g/dL    RDW 17.4 (H) 11.5 - 14.5 %    PLATELET 260 (L) 936 - 400 K/uL    MPV 12.4 8.9 - 12.9 FL    NRBC 0.0 0.0  WBC    ABSOLUTE NRBC 0.00 0.00 - 6.64 K/uL   METABOLIC PANEL, COMPREHENSIVE    Collection Time: 03/02/22  3:40 AM   Result Value Ref Range    Sodium 141 136 - 145 mmol/L    Potassium 3.6 3.5 - 5.1 mmol/L    Chloride 114 (H) 97 - 108 mmol/L    CO2 20 (L) 21 - 32 mmol/L    Anion gap 7 5 - 15 mmol/L    Glucose 86 65 - 100 mg/dL    BUN 26 (H) 6 - 20 mg/dL    Creatinine 1.78 (H) 0.70 - 1.30 mg/dL    BUN/Creatinine ratio 15 12 - 20      GFR est AA 47 (L) >60 ml/min/1.73m2    GFR est non-AA 39 (L) >60 ml/min/1.73m2    Calcium 9.0 8.5 - 10.1 mg/dL    Bilirubin, total 1.8 (H) 0.2 - 1.0 mg/dL    AST (SGOT) 42 (H) 15 - 37 U/L    ALT (SGPT) 12 12 - 78 U/L    Alk. phosphatase 46 45 - 117 U/L    Protein, total 6.3 (L) 6.4 - 8.2 g/dL    Albumin 2.0 (L) 3.5 - 5.0 g/dL    Globulin 4.3 (H) 2.0 - 4.0 g/dL    A-G Ratio 0.5 (L) 1.1 - 2.2     AMMONIA    Collection Time: 03/02/22  3:40 AM   Result Value Ref Range    Ammonia 30 <32 umol/L              IR PARACENTESIS ABD W IMAGE   Final Result   Successful paracentesis. XR CHEST PORT   Final Result   Interval extubation. No significant interval change otherwise. XR CHEST PORT   Final Result   No significant change. XR CHEST PORT   Final Result   Stable exam.      XR CHEST PORT   Final Result   Orogastric tube can be followed below the diaphragm. CT ABD PELV WO CONT   Final Result   Cirrhosis. Varices. Large volume ascites. XR CHEST PORT   Final Result   Dilation of the aortic arch. Further evaluation may be warranted. Endotracheal tube tip appears in appropriate position. CT HEAD WO CONT   Final Result   Age-appropriate atrophy. No acute findings. XR CHEST PORT   Final Result   The cardiomediastinal silhouette is appropriate for age, technique,   and lung expansion. Pulmonary vasculature is not congested. The lungs are   essentially clear. Mild atelectasis at hypoinflated left base. No effusion or   pneumothorax is seen. IR PARACENTESIS ABD W IMAGE    (Results Pending)       Assessment:     Active Problems:    Status epilepticus ([address] (2/24/2022)        Plan:   1. Acute GI Bleed      -HgB 9.5 this am. Monitor and transfuse as needed.      -3/1 banding of varices      -2/28 EGD:  Esophageal varices without bleeding grade III, Portal hypertensive gastropathy.      -start Propanolol BID with parameters.      -Protonix 40 mg BID        -start Levaquin 500 mg po daily x 7days. 2. Seizure disorder, Status Epilepticus      -Neurology input appreciated  3. Cirrohosis/Ascites/Acute Encephalopathy      -CMP in the am      -3/1 US guided Paracentesis- 4,360 ml drained      -Ammonia level in the am      -Continue Lactulose  4. GRACIA/CKD     -Nephrology input appreciated     -Avoid hepatotoxic medications    Follow up as outpatient 2 weeks after discharge. Patient discussed with Dr Amy Lira and agrees to above impression and plan. Thank you for allowing me to participate in this patients care    Signed By: Mckay Paula.  LIZBETH Pinedo     March 2, 2022

## 2022-03-02 NOTE — PROGRESS NOTES
SPEECH LANGUAGE PATHOLOGY BEDSIDE SWALLOW EVALUATION  Patient: Timothy Melara (89 y.o. male)  Date: 3/2/2022  Primary Diagnosis: Status epilepticus ([facility] [G40.901]  Procedure(s) (LRB):  ENDOSCOPIC BANDING OR LIGATION (N/A)  ESOPHAGOGASTRODUODENOSCOPY (EGD) (N/A) 1 Day Post-Op   Precautions: aspiration       ASSESSMENT :  Based on the objective data described below, the patient does not present w/ a significant dysphagia at this time, however patient is edentulous w/ some underlying confusion which may place him at risk for aspiration. Oral motor WFL. Patient edentulous. Has dentures at home. Administered thin liquids via straw. Patient ingested w/ single and consecutive swallows. Swallow initiation timely, HLE and protraction adequate to digital palpation. No overt s/sx of aspiration present. Patient tolerated applesauce w/o difficulty. Administered hard solid cracker. Timely mastication s/t edentulous. No oral residual after the swallow, no pharyngeal difficulty or overt s/sx of aspiration. Patient reports taking his medications whole prior to hospitalization    Patient will benefit from skilled intervention to address the above impairments. Patients rehabilitation potential is considered to be Good     PLAN :  Recommendations and Planned Interventions:  Soft and bite size, thin liquids. Medications whole in applesauce. STRICT GERD/REFLUX precautions. SLP to follow-up to ensure diet tolerance. Frequency/Duration: Patient will be followed by speech-language pathology 2 times a week to address goals. Discharge Recommendations: To Be Determined     SUBJECTIVE:   Patient alert and seen at bedside.      OBJECTIVE:     CXR Results  (Last 48 hours)      None           CT Results  (Last 48 hours)      None             Past Medical History:   Diagnosis Date    Arthritis     Hypertension      Past Surgical History:   Procedure Laterality Date    IR PARACENTESIS ABD W IMAGE  1/21/2022    IR PARACENTESIS ABD W IMAGE  3/1/2022     Prior Level of Function/Home Situation: unknown  Home Situation  Home Environment: Private residence  # Steps to Enter: 0  Wheelchair Ramp: No  One/Two Story Residence: One story  Living Alone: No  Support Systems: Other Family Member(s)  Patient Expects to be Discharged to[de-identified] Home  Current DME Used/Available at Home: Walker, rolling,Walker, rollator,Shower chair,Commode, bedside  Diet prior to admission: regular, thin  Current Diet:  clear liquid diet   Cognitive and Communication Status:  Neurologic State: Alert,Confused  Orientation Level: Oriented to person,Oriented to place  Cognition: Impaired decision making  Perception: Appears intact  Perseveration: No perseveration noted  Safety/Judgement: Awareness of environment  Swallowing Evaluation:   Oral Assessment:  Oral Assessment  Labial: No impairment  Dentition: Edentulous  Oral Hygiene: WFL  Lingual: No impairment  Velum: No impairment  Mandible: No impairment  P.O. Trials:  Patient Position: upright in bed  Vocal quality prior to P.O.: No impairment  Consistency Presented: Thin liquid; Solid;Puree  How Presented: Successive swallows;Straw;Self-fed/presented;SLP-fed/presented     Bolus Acceptance: No impairment  Bolus Formation/Control: No impairment     Propulsion: No impairment  Oral Residue: None  Initiation of Swallow: No impairment  Laryngeal Elevation: Functional  Aspiration Signs/Symptoms: None  Pharyngeal Phase Characteristics: No impairment, issues, or problems      Oral Phase Severity: No impairment  Pharyngeal Phase Severity : No impairment  Voice:        Vocal Quality: No impairment       Pain:  Pain Scale 1: Numeric (0 - 10)  Pain Intensity 1: 0  Pain Location 1: Generalized (soreness with movement)    After treatment:   Patient left in no apparent distress in bed, Call bell within reach, and Nursing notified    COMMUNICATION/EDUCATION:   Patient was educated regarding purpose of SLP assessment, POC, diet recs and sw safety precautions. Patient demonstrated Good understanding as evidenced by verbal understanding some confusion. The patient's plan of care including recommendations, planned interventions, and recommended diet changes were discussed with: Registered nurse. Patient/family have participated as able in goal setting and plan of care. Thank you for this referral.  Dori Velez, SLP M.S. CCC-SLP  Time Calculation: 18 mins           Problem: Dysphagia (Adult)  Goal: *Acute Goals and Plan of Care (Insert Text)  Description: Speech Therapy Swallow Goals  Initiated 3/2/2022  -Patient will tolerate SBS diet with thin liquids without clinical indicators of aspiration given no cues within 1-3 day(s). -Patient will tolerate PO trials without clinical indicators of aspiration given no cues within 1-3 day(s). -Patient will demonstrate understanding of swallow safety precautions and aspiration precautions, diet recs with no cues within 1-3 day(s).           Outcome: Progressing Towards Goal

## 2022-03-02 NOTE — PROGRESS NOTES
14: 08PM Accepted at [facility]. Auth to be started. JESUS Spivey                  11:33AM Spoke w/patient's spouse regarding discharge. Spouse admits after speaking w/the nurse she's made a change in discharge plan. Now desires SNF. Gave verbal consent for referral to be sent to [facility]. Referral sent pending acceptance; and insurance auth. Will notify accepting [facility] agency of change in d/c plan/disposition.          Nahomi Miranda, JESUS

## 2022-03-02 NOTE — PROGRESS NOTES
PHYSICAL THERAPY EVALUATION  Patient: Ruma Tovar (43 y.o. male)  Date: 3/2/2022  Primary Diagnosis: Status epilepticus ([facility] [address] [G40.901]  Procedure(s) (LRB):  ENDOSCOPIC BANDING OR LIGATION (N/A)  ESOPHAGOGASTRODUODENOSCOPY (EGD) (N/A) 1 Day Post-Op   Precautions: falls       ASSESSMENT  Pt is a 59 y/o M presented to [facility] with c/o a generalized tonic clonic seizure lasting ~20 minutes prior to EMS arrival. Pt received 5 mg of versed from EMS with symptoms resolving. Pt had a second seizure episode once he arrived in the ED that was unresolved with >4 mg of ativan, so pt was then given 4 mg of keppra. Pt then intubated and transferred to the ICU. Pt was admitted on 02/24/22 and being treated for acute hypoxic respiratory failure, aspiration pneumonia, status epilepticus, acute on CKD, and cirrhosis with EtOH abuse and ascites. PMH: arthritis, cirrhosis, alcohol abuse, gout, and HTN Pt reports he has had ~2 falls in the last 3 months. Pt received semi-supine in bed upon arrival, AxO to person, and agreeable to OT/PT evaluation at this time. Per pt report, pt lives with his children and grandchildren in a one-story home with 0 CATHERINE, was IND with ADLs and Mod I using RW for mobility at [facility]. Other DME owned includes: rollator, shower chair, and BSC. Based on the objective data described below, the patient presents with generalized weakness, impaired functional mobility, impaired amb, impaired balance, and decreased activity tolerance. Pt required mod Ax2 with additional time for bed mobility, mod Ax2 supine <> sit, mod to max x 2 with additional time sit <> stand transfers. Pt amb 8 feet x 2 with gt belt, RW, and min to mod A with additional time; demonstrating very, short, shuffling, step to gt pattern with generalized unsteadiness and constant bilateral knee flexion noted.  Pt did poor with session today with poor command following, poor insight into deficits, poor safety awareness requiring increased time and assistance for all mobility. Pt will benefit from continued skilled PT to address above deficits and return to PLOF. Current PT DC recommendation SNF. Current Level of Function Impacting Discharge (mobility/balance): mod to max Ax2    Other factors to consider for discharge: severity of deficits, acute medical state     PLAN :  Recommendations and Planned Interventions: bed mobility training, transfer training, gait training, therapeutic exercises, neuromuscular re-education, patient and family training/education and therapeutic activities      Recommend with staff: bedpan for toileting for safety     Frequency/Duration: Patient will be followed by physical therapy:  3-5x/week to address goals. Recommendation for discharge: (in order for the patient to meet his/her long term goals)  Jeremy Glover    This discharge recommendation:  Has been made in collaboration with the attending provider and/or case management    IF patient discharges home will need the following DME: none         SUBJECTIVE:   Patient stated my grandchildren are at home, they help me.     OBJECTIVE DATA SUMMARY:   HISTORY:    Past Medical History:   Diagnosis Date    Arthritis     Hypertension      Past Surgical History:   Procedure Laterality Date    IR PARACENTESIS ABD W IMAGE  1/21/2022    IR PARACENTESIS ABD W IMAGE  3/1/2022       Home Situation  Home Environment: Private residence  # Steps to Enter: 0  Wheelchair Ramp: No  One/Two Story Residence: One story  Living Alone: No  Support Systems: Other Family Member(s)  Patient Expects to be Discharged to[de-identified] Home  Current DME Used/Available at Home: Clide Seashore, rolling,Walker, rollator,Shower chair,Commode, bedside    EXAMINATION/PRESENTATION/DECISION MAKING:   Critical Behavior:  Neurologic State: Confused,Alert  Orientation Level: Oriented to person,Disoriented to time,Disoriented to situation,Disoriented to place  Cognition: Impaired decision making,Impulsive,Poor safety awareness,Decreased attention/concentration,Decreased command following     Hearing: Auditory  Auditory Impairment: None  Skin:  Open area in gluteal folds noted, nsg in room and aware, j-paste applied   Edema: none noted   Range Of Motion:  AROM: Generally decreased, functional           PROM: Generally decreased, functional           Strength:    Strength: Generally decreased, functional      Functional Mobility:  Bed Mobility:  Rolling: Moderate assistance;Assist x2; Additional time  Supine to Sit: Moderate assistance;Assist x2; Additional time  Sit to Supine: Moderate assistance;Assist x2; Additional time  Scooting: Moderate assistance;Assist x2; Additional time  Transfers:  Sit to Stand: Moderate assistance;Assist x2; Additional time  Stand to Sit: Moderate assistance;Assist x2; Additional time                       Balance:   Sitting: Impaired; Without support  Sitting - Static: Good (unsupported)  Sitting - Dynamic: Fair (occasional); Prop sitting  Standing: Impaired; Without support  Standing - Static: Fair;Constant support  Standing - Dynamic : Poor;Constant support  Ambulation/Gait Training:  Distance (ft): 16 Feet (ft) (8x2)  Assistive Device: Gait belt;Walker, rolling  Ambulation - Level of Assistance: Moderate assistance;Assist x2; Additional time     Gait Description (WDL): Exceptions to WDL  Gait Abnormalities: Shuffling gait; Steppage gait; Other (bilateral knee flexion)        Base of Support: Narrowed     Speed/Giselle: Slow;Shuffled  Step Length: Right shortened;Left shortened      Functional Measure:  [facility] [address] [address] AM-PAC 6 Clicks         Basic Mobility Inpatient Short Form  How much difficulty does the patient currently have. .. Unable A Lot A Little None   1. Turning over in bed (including adjusting bedclothes, sheets and blankets)? [] 1   [x] 2   [] 3   [] 4   2. Sitting down on and standing up from a chair with arms ( e.g., wheelchair, bedside commode, etc.)   [] 1   [x] 2   [] 3   [] 4   3. Moving from lying on back to sitting on the side of the bed? [] 1   [x] 2   [] 3   [] 4          How much help from another person does the patient currently need. .. Total A Lot A Little None   4. Moving to and from a bed to a chair (including a wheelchair)? [] 1   [x] 2   [] 3   [] 4   5. Need to walk in hospital room? [] 1   [x] 2   [] 3   [] 4   6. Climbing 3-5 steps with a railing? [x] 1   [] 2   [] 3   [] 4   © , Trustees of [address] [place] [address] [address] [id number], under license to [employer]. All rights reserved     Score:  Initial:  Most Recent: X (Date: 3/2/22 )   Interpretation of Tool:  Represents activities that are increasingly more difficult (i.e. Bed mobility, Transfers, Gait). Score 24 23 22-20 19-15 14-10 9-7 6   Modifier CH CI CJ CK CL CM CN         Physical Therapy Evaluation Charge Determination   History Examination Presentation Decision-Making   HIGH Complexity :3+ comorbidities / personal factors will impact the outcome/ POC  HIGH Complexity : 4+ Standardized tests and measures addressing body structure, function, activity limitation and / or participation in recreation  LOW Complexity : Stable, uncomplicated  Other outcome measures ampac 6  mod      Based on the above components, the patient evaluation is determined to be of the following complexity level: LOW     Pain Ratin/10 reported, however, pt cried out when LE lifted to remove SCDs and during sit <> stand transfers    Activity Tolerance:   Fair and requires rest breaks    After treatment patient left in no apparent distress:   Supine in bed, Call bell within reach, Bed / chair alarm activated and Side rails x 3 and nsg updated. GOALS:    Problem: Mobility Impaired (Adult and Pediatric)  Goal: *Acute Goals and Plan of Care (Insert Text)  Description: Pt will be I with LE HEP in 7 days. Pt will perform bed mobility with CGA in 7 days. Pt will perform transfers with CGA in 7 days.    Pt will amb  feet with LRAD safely with CGA in 7 days. Pt will demonstrate improvement in dynamic standing balance from mod Ax2 to CGA in 7 days. Outcome: Not Met       COMMUNICATION/EDUCATION:   The patients plan of care was discussed with: Occupational therapist, Registered nurse, and Case management. Fall prevention education was provided and the patient/caregiver indicated understanding., Patient/family have participated as able in goal setting and plan of care. , and Patient/family agree to work toward stated goals and plan of care. Required reinforcement     PT/OT sessions occurred together for increased safety of pt and clinician.        Thank you for this referral.  Morgan Bess, PT, DPT

## 2022-03-02 NOTE — PROGRESS NOTES
Renal Daily Progress Note    Admit Date: 2/24/2022      Subjective:   He is awake and responsive but confused. Urine output documented at 600 mL. Current Facility-Administered Medications   Medication Dose Route Frequency    chlordiazePOXIDE (LIBRIUM) capsule 10 mg  10 mg Oral TID    sodium bicarbonate tablet 650 mg  650 mg Oral TID    potassium chloride (K-DUR, KLOR-CON M20) SR tablet 20 mEq  20 mEq Oral DAILY    levoFLOXacin (LEVAQUIN) tablet 500 mg  500 mg Oral DAILY    propranoloL (INDERAL) tablet 20 mg  20 mg Oral BID    levETIRAcetam (KEPPRA) 1500 mg in saline (iso-osm) 100 ml IVPB  1,500 mg IntraVENous Q12H    sodium chloride (NS) flush 5-40 mL  5-40 mL IntraVENous Q8H    sodium chloride (NS) flush 5-40 mL  5-40 mL IntraVENous PRN    acetaminophen (TYLENOL) tablet 650 mg  650 mg Oral Q6H PRN    Or    acetaminophen (TYLENOL) suppository 650 mg  650 mg Rectal Q6H PRN    polyethylene glycol (MIRALAX) packet 17 g  17 g Oral DAILY PRN    ondansetron (ZOFRAN ODT) tablet 4 mg  4 mg Oral Q8H PRN    Or    ondansetron (ZOFRAN) injection 4 mg  4 mg IntraVENous Q6H PRN    [Held by provider] enoxaparin (LOVENOX) injection 30 mg  30 mg SubCUTAneous DAILY    folic acid (FOLVITE) tablet 1 mg  1 mg Oral DAILY    lactulose (CHRONULAC) 10 gram/15 mL solution 15 mL  10 g Oral TID    thiamine mononitrate (B-1) tablet 100 mg  100 mg Oral DAILY    midazolam (VERSED) injection 5 mg  5 mg IntraVENous Q4H PRN        Review of Systems    ROS   No headache  Denies shortness of breath  Denies chest pain      Objective:     Patient Vitals for the past 8 hrs:   BP Temp Pulse Resp SpO2 Weight   03/02/22 0700 106/75 98.2 °F (36.8 °C) 77 19 100 %    03/02/22 0500 115/88  (!) 59 19 99 %    03/02/22 0400 (!) 136/96  80 21 96 %    03/02/22 0300 (!) 120/92 98 °F (36.7 °C) 72 15 97 % 84 kg (185 lb 3 oz)     No intake/output data recorded. 02/28 1901 - 03/02 0700  In: 1941.8 [P.O.:522;  I.V.:1419.8]  Out: 202 [Urine:875]    Physical Exam:   Physical Exam  Cardiovascular:      Rate and Rhythm: Regular rhythm. Pulmonary:      Breath sounds: Normal breath sounds. Abdominal:      Palpations: Abdomen is soft. Neurological:      Mental Status: He is alert. IR PARACENTESIS ABD W IMAGE   Final Result   Successful paracentesis. XR CHEST PORT   Final Result   Interval extubation. No significant interval change otherwise. XR CHEST PORT   Final Result   No significant change. XR CHEST PORT   Final Result   Stable exam.      XR CHEST PORT   Final Result   Orogastric tube can be followed below the diaphragm. CT ABD PELV WO CONT   Final Result   Cirrhosis. Varices. Large volume ascites. XR CHEST PORT   Final Result   Dilation of the aortic arch. Further evaluation may be warranted. Endotracheal tube tip appears in appropriate position. CT HEAD WO CONT   Final Result   Age-appropriate atrophy. No acute findings. XR CHEST PORT   Final Result   The cardiomediastinal silhouette is appropriate for age, technique,   and lung expansion. Pulmonary vasculature is not congested. The lungs are   essentially clear. Mild atelectasis at hypoinflated left base. No effusion or   pneumothorax is seen.          IR PARACENTESIS ABD W IMAGE    (Results Pending)        Data Review   Recent Labs     03/02/22  0340 03/01/22  0852 02/28/22  0400   WBC 10.5 11.4* 13.2*   HGB 9.5* 8.6* 7.9*   HCT 28.1* 25.5* 22.9*   * 80* 64*     Recent Labs     03/02/22  0340 03/01/22  0852 02/28/22  0400    144 144   K 3.6 3.2* 3.3*   * 115* 114*   CO2 20* 20* 19*   GLU 86 90 90   BUN 26* 29* 30*   CREA 1.78* 1.88* 2.25*   CA 9.0 8.6 8.4*   MG  --   --  1.9   PHOS  --   --  3.8   ALB 2.0* 2.2*  2.1* 2.5*   ALT 12 10*  11*  --      No components found for: GLPOC  Recent Labs     02/28/22  0400   PH 7.41   PCO2 32*   PO2 72*   HCO3 21*   FIO2 21.0     No results for input(s): INR, INREXT, INREXT, INREXT in the last 72 hours. Assessment:           Active Problems:    Status epilepticus ([address] (2/24/2022)    Acute kidney injury on chronic kidney disease. GRACIA improving. Creatinine down to 1.78 mg today. No significant improvement in the creatinine over the last 24 hours. This could indicate that he is at baseline. CKD stage III during his previous hospitalization earlier this year    Hepatic cirrhosis with portal hypertension    Nongap metabolic acidosis. AG 7    Seizure disorder    Hypokalemia.   Repleted    Plan:   Replace potassium orally  Continue oral sodium bicarbonate to correct metabolic acidosis

## 2022-03-03 NOTE — PROGRESS NOTES
Problem: Pressure Injury - Risk of  Goal: *Prevention of pressure injury  Description: Document Curtis Scale and appropriate interventions in the flowsheet. Outcome: Progressing Towards Goal  Note: Pressure Injury Interventions:  Sensory Interventions: Assess need for specialty bed,Avoid rigorous massage over bony prominences,Keep linens dry and wrinkle-free    Moisture Interventions: Apply protective barrier, creams and emollients,Check for incontinence Q2 hours and as needed    Activity Interventions: Assess need for specialty bed,Pressure redistribution bed/mattress(bed type)    Mobility Interventions: Assess need for specialty bed,Pressure redistribution bed/mattress (bed type)    Nutrition Interventions: Discuss nutritional consult with provider    Friction and Shear Interventions: Minimize layers,Apply protective barrier, creams and emollients,Foam dressings/transparent film/skin sealants                Problem: Patient Education: Go to Patient Education Activity  Goal: Patient/Family Education  Outcome: Progressing Towards Goal     Problem: Falls - Risk of  Goal: *Absence of Falls  Description: Document Elba Fall Risk and appropriate interventions in the flowsheet.   Outcome: Progressing Towards Goal  Note: Fall Risk Interventions:       Mentation Interventions: Bed/chair exit alarm    Medication Interventions: Evaluate medications/consider consulting pharmacy    Elimination Interventions: Bed/chair exit alarm              Problem: Patient Education: Go to Patient Education Activity  Goal: Patient/Family Education  Outcome: Progressing Towards Goal     Problem: Discharge Planning  Goal: *Discharge to safe environment  Outcome: Progressing Towards Goal  Goal: *Knowledge of medication management  Outcome: Progressing Towards Goal  Goal: *Knowledge of discharge instructions  Outcome: Progressing Towards Goal     Problem: Patient Education: Go to Patient Education Activity  Goal: Patient/Family Education  Outcome: Progressing Towards Goal     Problem: Aspiration - Risk of  Goal: *Absence of aspiration  Outcome: Progressing Towards Goal     Problem: Patient Education: Go to Patient Education Activity  Goal: Patient/Family Education  Outcome: Progressing Towards Goal     Problem: Patient Education: Go to Patient Education Activity  Goal: Patient/Family Education  Outcome: Progressing Towards Goal     Problem: Patient Education: Go to Patient Education Activity  Goal: Patient/Family Education  Outcome: Progressing Towards Goal

## 2022-03-03 NOTE — PROGRESS NOTES
Hematology/Oncology   Progress Note    Patient: Ruma Tovar MRN: [id number]     YOB: 1960  Age: 60 y.o. Sex: male      Admit Date: 2/24/2022    LOS: 7 days     Chief Complaint: Admitted with status epilepticus    Subjective:     No complaints voiced. Not a good historian. Constitutional No fevers, chills, night sweats, excessive fatigue or weight loss. Allergic/Immunologic No recent allergic reactions   Eyes No significant visual difficulties. No diplopia. ENMT No problems with hearing, no sore throat, no sinus drainage. Endocrine No hot flashes or night sweats. No cold intolerance, polyuria, or polydipsia   Hematologic/Lymphatic No easy bruising or bleeding. The patient denies any tender or palpable lymph nodes   Breasts No abnormal masses of breast, nipple discharge or pain. Respiratory No dyspnea on exertion, orthopnea, chest pain, cough or hemoptysis. Cardiovascular No anginal chest pain, irregular heart beat, tachycardia, palpitations or orthopnea. Gastrointestinal No nausea, vomiting, diarrhea, constipation, cramping, dysphagia, reflux, heartburn, GI bleeding, or early satiety. No change in bowel habits. Genitourinary (M) No hematuria, dysuria, increased frequency, urgency, hesitancy or incontinence. Musculoskeletal No joint pain, swelling or redness. No decreased range of motion. Integumentary No chronic rashes, inflammation, ulcerations, pruritus, petechiae, purpura, ecchymoses, or skin changes. Neurologic No headache, blurred vision, and no areas of focal weakness or numbness. Normal gait. No sensory problems. Psychiatric No insomnia, depression, enrrique or mood swings. No psychotropic drugs.         Current Facility-Administered Medications:     chlordiazePOXIDE (LIBRIUM) capsule 10 mg, 10 mg, Oral, TID, Akira Card MD, 10 mg at 03/03/22 1019    sodium bicarbonate tablet 650 mg, 650 mg, Oral, TID, James Sampson MD, 650 mg at 03/03/22 1648    potassium chloride (K-DUR, KLOR-CON M20) SR tablet 20 mEq, 20 mEq, Oral, DAILY, James Sampson MD, 20 mEq at 03/03/22 1020    levoFLOXacin (LEVAQUIN) tablet 500 mg, 500 mg, Oral, DAILY, Matulin, Ludy R., NP, 500 mg at 03/03/22 1035    propranoloL (INDERAL) tablet 20 mg, 20 mg, Oral, BID, Matulin, Ludy R., NP, 20 mg at 03/03/22 1022    levETIRAcetam (KEPPRA) 1500 mg in saline (iso-osm) 100 ml IVPB, 1,500 mg, IntraVENous, Q12H, Ashley Jane MD, Last Rate: 400 mL/hr at 03/03/22 0646, 1,500 mg at 03/03/22 0646    sodium chloride (NS) flush 5-40 mL, 5-40 mL, IntraVENous, Q8H, Chelsey Haq MD, 10 mL at 03/03/22 1316    sodium chloride (NS) flush 5-40 mL, 5-40 mL, IntraVENous, PRN, Ashley Jane MD    acetaminophen (TYLENOL) tablet 650 mg, 650 mg, Oral, Q6H PRN, 650 mg at 03/01/22 2007 **OR** acetaminophen (TYLENOL) suppository 650 mg, 650 mg, Rectal, Q6H PRN, Ashley Jane MD    polyethylene glycol (MIRALAX) packet 17 g, 17 g, Oral, DAILY PRN, Ashley Jane MD    ondansetron (ZOFRAN ODT) tablet 4 mg, 4 mg, Oral, Q8H PRN **OR** ondansetron (ZOFRAN) injection 4 mg, 4 mg, IntraVENous, Q6H PRN, Ashley Jane MD    [Held by provider] enoxaparin (LOVENOX) injection 30 mg, 30 mg, SubCUTAneous, DAILY, Lanell Bosworth, Pratima, MD, 30 mg at 35/32/36 3134    folic acid (FOLVITE) tablet 1 mg, 1 mg, Oral, DAILY, Lanell Bosworth, Pratima, MD, 1 mg at 03/03/22 1019    lactulose (CHRONULAC) 10 gram/15 mL solution 15 mL, 10 g, Oral, TID, Ashley Jane MD, 15 mL at 03/03/22 1647    thiamine mononitrate (B-1) tablet 100 mg, 100 mg, Oral, DAILY, Eun, MD Chelsey, 100 mg at 03/03/22 1019    midazolam (VERSED) injection 5 mg, 5 mg, IntraVENous, Q4H PRN, Ashley Jane MD     Objective:     Vitals:    03/02/22 1958 03/02/22 2331 03/03/22 0809 03/03/22 1459   BP: (!) 99/96 (!) 121/91 103/72 124/63   Pulse: 88 86 83 99   Resp: 18  18 18   Temp: 98.3 °F (36.8 °C)  97.9 °F (36.6 °C) 98 °F (36.7 °C)   SpO2: 92%  97% 94%   Weight:       Height:              Physical Exam:   Constitutional Alert, cooperative, oriented. Mood and affect appropriate. Appears close to chronological age. Well nourished. Well developed. Head Normocephalic; no scars   Eyes Conjunctivae and sclerae are clear and without icterus. Pupils are reactive and equal.   ENMT Sinuses are nontender. No oral exudates, ulcers, masses, thrush or mucositis. Oropharynx clear. Tongue normal.   Neck Supple without masses or thyromegaly. No jugular venous distension. Hematologic/Lymphatic No petechiae or purpura. No tender or palpable lymph nodes in the cervical, supraclavicular, axillary or inguinal area. Respiratory Lungs are clear to auscultation without rhonchi or wheezing. Cardiovascular Regular rate and rhythm of heart without murmurs, gallops or rubs. Chest / Line Site Chest is symmetric with no chest wall deformities. Abdomen Non-tender, non-distended, no masses, ascites or hepatosplenomegaly. Good bowel sounds. No guarding or rebound tenderness. No pulsatile masses. Musculoskeletal No tenderness or swelling, normal range of motion without obvious weakness. Extremities No visible deformities, no cyanosis, clubbing or edema. Skin No rashes, scars, or lesions suggestive of malignancy. No petechiae, purpura, or ecchymoses. No excoriations. Neurologic No sensory or motor deficits, normal cerebellar function, normal gait, cranial nerves intact. Psychiatric Alert and oriented times three. Coherent speech. Verbalizes understanding of our discussions today.        Lab/Data Review:  Recent Labs     03/03/22  0727 03/02/22  0340 03/01/22  0852   WBC 9.0 10.5 11.4*   HGB 9.3* 9.5* 8.6*   HCT 26.7* 28.1* 25.5*   * 106* 80*     Recent Labs     03/03/22  0727 03/02/22  0340 03/01/22  0852    141 144   K 3.1* 3.6 3.2*   * 114* 115*   CO2 19* 20* 20*   GLU 87 86 90   BUN 26* 26* 29*   CREA 1.76* 1.78* 1.88* CA 8.7 9.0 8.6   MG 1.7  --   --    ALB  --  2.0* 2.2*  2.1*   TBILI  --  1.8* 1.9*  1.9*   ALT  --  12 10*  11*     No results for input(s): PH, PCO2, PO2, HCO3, FIO2 in the last 72 hours. Recent Results (from the past 24 hour(s))   GLUCOSE, POC    Collection Time: 03/02/22 10:52 PM   Result Value Ref Range    Glucose (POC) 76 65 - 117 mg/dL    Performed by Brant Baldwin    CBC W/O DIFF    Collection Time: 03/03/22  7:27 AM   Result Value Ref Range    WBC 9.0 4.1 - 11.1 K/uL    RBC 3.15 (L) 4.10 - 5.70 M/uL    HGB 9.3 (L) 12.1 - 17.0 g/dL    HCT 26.7 (L) 36.6 - 50.3 %    MCV 84.8 80.0 - 99.0 FL    MCH 29.5 26.0 - 34.0 PG    MCHC 34.8 30.0 - 36.5 g/dL    RDW 17.5 (H) 11.5 - 14.5 %    PLATELET 992 (L) 433 - 400 K/uL    MPV 11.2 8.9 - 12.9 FL    NRBC 0.0 0.0  WBC    ABSOLUTE NRBC 0.00 0.00 - 8.13 K/uL   METABOLIC PANEL, BASIC    Collection Time: 03/03/22  7:27 AM   Result Value Ref Range    Sodium 139 136 - 145 mmol/L    Potassium 3.1 (L) 3.5 - 5.1 mmol/L    Chloride 113 (H) 97 - 108 mmol/L    CO2 19 (L) 21 - 32 mmol/L    Anion gap 7 5 - 15 mmol/L    Glucose 87 65 - 100 mg/dL    BUN 26 (H) 6 - 20 mg/dL    Creatinine 1.76 (H) 0.70 - 1.30 mg/dL    BUN/Creatinine ratio 15 12 - 20      GFR est AA 48 (L) >60 ml/min/1.73m2    GFR est non-AA 39 (L) >60 ml/min/1.73m2    Calcium 8.7 8.5 - 10.1 mg/dL   MAGNESIUM    Collection Time: 03/03/22  7:27 AM   Result Value Ref Range    Magnesium 1.7 1.6 - 2.4 mg/dL        Radiology:   CT Results  (Last 48 hours)    None           Assessment and Plan: Active Problems:    Status epilepticus ([facility] [address] (2/24/2022)    Thrombocytopenia:  -Recent baseline platelet count has been around 100.  -Platelet count on admission was 89, then dropped to 64 and today it is back up to 109  -Most likely secondary to advanced liver disease/cirrhosis.   Meds including antiepileptics and antibiotics also likely contributing.     Anemia:  -Admitted with a hemoglobin of 10.7 which dropped down to 7.9.  -This was thought to be secondary to GI bleeding, perhaps from known varices.  -Status post EGD showing grade 3 esophageal varices, portal hypertensive gastropathy  -Banding of the varices was done  -Hemoglobin is stable at 9.3 today.     Cirrhosis:  -Patient appears to have advanced liver disease based on radiology findings including esophageal varices, ascites and low albumin.  -Management per gastroenterology  -Status post paracentesis earlier today with removal of 4400 cc of fluid.     Seizure disorder:  -On antiepileptics. Will sign off.       Signed By: Adelina Mendoza MD     March 3, 2022

## 2022-03-03 NOTE — PROGRESS NOTES
Report called to [facility]east nurse. Patient transferred to room 508 per orders. Nurse aware fish recently removed and patient has not voided yet. Pt spouse aware of transfer.

## 2022-03-03 NOTE — PROGRESS NOTES
Pt irrigated with 40ML with assistance of another nurse at bedside, no return of urine into collection bag.      Pt bladder scanned with 658+ showing in bladder    Will continue to monitor and relay to nightshift

## 2022-03-03 NOTE — PROGRESS NOTES
IMPRESSION:   1. Acute hypoxic respiratory failure resolved now on room air  2. Aspiration pneumonia chest x-ray clear  3. Status epilepticus no further seizures seen  4. Esophageal varices without bleeding grade 3  5. Portal hypertension gastropathy  6. Acute on chronic kidney disease creatinine improving  7. Hypomagnesemia to be replete  8. History of cirrhosis EtOH abuse and ascites  9. Thrombocytopenia stable      RECOMMENDATIONS/PLAN:   1. Doing well on room air  2. Awake alert questionably back to baseline mental function  3. No further seizures seen on Keppra  4. On propranolol for portal hypertension   5. Status post EGD   6. Status post ultrasound-guided paracentesis 4360 ml of fluid drained     [x] High complexity decision making was performed  [x] See my orders for details  HPI  77-year-old male came in because of seizures significant past medical history of cirrhosis of liver alcohol abuse gout he had paracentesis done in the past also has hypertension and arthritis. He starting having seizures received medication came to the emergency room subsequently got intubated now he is on propofol sedated unable to get any started the patient is not on any medication at home history of EtOH abuse so admitted and critical care consult was called. PMH:  has a past medical history of Arthritis and Hypertension. PSH:   has a past surgical history that includes ir paracentesis abd w image (1/21/2022) and ir paracentesis abd w image (3/1/2022). FHX: family history is not on file. SHX:  reports that he has never smoked.  He has never used smokeless tobacco.    ALL: No Known Allergies     MEDS:   [x] Reviewed - As Below   [] Not reviewed    Current Facility-Administered Medications   Medication    potassium chloride (K-DUR, KLOR-CON M20) SR tablet 40 mEq    chlordiazePOXIDE (LIBRIUM) capsule 10 mg    sodium bicarbonate tablet 650 mg    potassium chloride (K-DUR, KLOR-CON M20) SR tablet 20 mEq    levoFLOXacin (LEVAQUIN) tablet 500 mg    propranoloL (INDERAL) tablet 20 mg    levETIRAcetam (KEPPRA) 1500 mg in saline (iso-osm) 100 ml IVPB    sodium chloride (NS) flush 5-40 mL    sodium chloride (NS) flush 5-40 mL    acetaminophen (TYLENOL) tablet 650 mg    Or    acetaminophen (TYLENOL) suppository 650 mg    polyethylene glycol (MIRALAX) packet 17 g    ondansetron (ZOFRAN ODT) tablet 4 mg    Or    ondansetron (ZOFRAN) injection 4 mg    [Held by provider] enoxaparin (LOVENOX) injection 30 mg    folic acid (FOLVITE) tablet 1 mg    lactulose (CHRONULAC) 10 gram/15 mL solution 15 mL    thiamine mononitrate (B-1) tablet 100 mg    midazolam (VERSED) injection 5 mg      MAR reviewed and pertinent medications noted or modified as needed   Current Facility-Administered Medications   Medication    potassium chloride (K-DUR, KLOR-CON M20) SR tablet 40 mEq    chlordiazePOXIDE (LIBRIUM) capsule 10 mg    sodium bicarbonate tablet 650 mg    potassium chloride (K-DUR, KLOR-CON M20) SR tablet 20 mEq    levoFLOXacin (LEVAQUIN) tablet 500 mg    propranoloL (INDERAL) tablet 20 mg    levETIRAcetam (KEPPRA) 1500 mg in saline (iso-osm) 100 ml IVPB    sodium chloride (NS) flush 5-40 mL    sodium chloride (NS) flush 5-40 mL    acetaminophen (TYLENOL) tablet 650 mg    Or    acetaminophen (TYLENOL) suppository 650 mg    polyethylene glycol (MIRALAX) packet 17 g    ondansetron (ZOFRAN ODT) tablet 4 mg    Or    ondansetron (ZOFRAN) injection 4 mg    [Held by provider] enoxaparin (LOVENOX) injection 30 mg    folic acid (FOLVITE) tablet 1 mg    lactulose (CHRONULAC) 10 gram/15 mL solution 15 mL    thiamine mononitrate (B-1) tablet 100 mg    midazolam (VERSED) injection 5 mg      PMH:  has a past medical history of Arthritis and Hypertension. PSH:   has a past surgical history that includes ir paracentesis abd w image (1/21/2022) and ir paracentesis abd w image (3/1/2022). FHX: family history is not on file. SHX:  reports that he has never smoked. He has never used smokeless tobacco.     ROS:  Lethargic confused    Hemodynamics:    CO:    CI:    CVP:    SVR:   PAP Systolic:    PAP Diastolic:    PVR:    FT49:        Ventilator Settings:      Mode Rate TV Press PEEP FiO2 PIP Min. Vent   SIMV,Volume control    500 ml 6 cm H2O  2 cm H20 50 %  18 cm H2O  10.6 l/min        Vital Signs: Telemetry:    normal sinus rhythm Intake/Output:   Visit Vitals  /63   Pulse 99   Temp 98 °F (36.7 °C)   Resp 18   Ht 6' 2.02\" (1.88 m)   Wt 84 kg (185 lb 3 oz)   SpO2 94%   BMI 23.77 kg/m²       Temp (24hrs), Av.1 °F (36.7 °C), Min:97.9 °F (36.6 °C), Max:98.3 °F (36.8 °C)        O2 Device: None (Room air) O2 Flow Rate (L/min): 2 l/min       Wt Readings from Last 4 Encounters:   22 84 kg (185 lb 3 oz)   22 86.2 kg (190 lb)          Intake/Output Summary (Last 24 hours) at 3/3/2022 1546  Last data filed at 3/3/2022 1151  Gross per 24 hour   Intake 350 ml   Output 225 ml   Net 125 ml       Last shift:       07 -  1900  In: 350 [P.O.:350]  Out: 0   Last 3 shifts:  190 -  0700  In: 861.8 [P.O.:522; I.V.:339.8]  Out: 475 [Urine:475]       Physical Exam:     General: Awake alert answers appropriately follows command  HEENT: NCAT,   Eyes: anicteric; conjunctiva clear extraocular movements intact  Neck: no nodes, trach midline; no accessory MM use. No definite JVD  Chest: no deformity,   Cardiac: R regular; no murmur;   Lungs: distant breath sounds; no wheezes or rales  Abd: soft, NT, hypoactive BS  Ext: Trace edema; no joint swelling;  No clubbing  :  clear urine  Neuro: Awake alert speech is clear moves all 4 extremity  Psych-oriented to person  Skin: warm, dry, no cyanosis;   Pulses: Brachial and radial pulses intact  Capillary: Normal capillary refill      DATA:    MAR reviewed and pertinent medications noted or modified as needed  MEDS:   Current Facility-Administered Medications   Medication    potassium chloride (K-DUR, KLOR-CON M20) SR tablet 40 mEq    chlordiazePOXIDE (LIBRIUM) capsule 10 mg    sodium bicarbonate tablet 650 mg    potassium chloride (K-DUR, KLOR-CON M20) SR tablet 20 mEq    levoFLOXacin (LEVAQUIN) tablet 500 mg    propranoloL (INDERAL) tablet 20 mg    levETIRAcetam (KEPPRA) 1500 mg in saline (iso-osm) 100 ml IVPB    sodium chloride (NS) flush 5-40 mL    sodium chloride (NS) flush 5-40 mL    acetaminophen (TYLENOL) tablet 650 mg    Or    acetaminophen (TYLENOL) suppository 650 mg    polyethylene glycol (MIRALAX) packet 17 g    ondansetron (ZOFRAN ODT) tablet 4 mg    Or    ondansetron (ZOFRAN) injection 4 mg    [Held by provider] enoxaparin (LOVENOX) injection 30 mg    folic acid (FOLVITE) tablet 1 mg    lactulose (CHRONULAC) 10 gram/15 mL solution 15 mL    thiamine mononitrate (B-1) tablet 100 mg    midazolam (VERSED) injection 5 mg        Labs:    Recent Labs     03/03/22 0727 03/02/22  0340 03/01/22  0852   WBC 9.0 10.5 11.4*   HGB 9.3* 9.5* 8.6*   * 106* 80*     Recent Labs     03/03/22 0727 03/02/22 0340 03/01/22  0852    141 144   K 3.1* 3.6 3.2*   * 114* 115*   CO2 19* 20* 20*   GLU 87 86 90   BUN 26* 26* 29*   CREA 1.76* 1.78* 1.88*   CA 8.7 9.0 8.6   MG 1.7  --   --    ALB  --  2.0* 2.2*  2.1*   ALT  --  12 10*  11*   Ammonia 34  3/3 room air oxygen saturation 98%      Lab Results   Component Value Date/Time    Culture result: No significant growth, <10,000 CFU/mL 02/24/2022 04:45 PM    Culture result: No growth 4 days 01/21/2022 12:00 PM         Imaging:    Results from Hospital Encounter encounter on 02/24/22    XR HAND RT MIN 3 V    Narrative  Indication: Finger swelling. 3 views right hand 3/3/2022. Narrowing, spurring in erosions and subluxation second DIP joint. Adjacent 2 mm  radial aspect soft tissue calcification versus less likely foreign body. Soft  tissue swelling associated with the second DIP joint region.   Cystic changes of ulnar styloid process. Cystic changes proximal aspect of hamate bone. No acute fracture. Impression  Soft tissue swelling and destructive changes of distal second digit,  nonspecific. Differential diagnosis includes gout, other inflammatory  arthropathy, and septic joint. Other findings as described. Results from [place] encounter on 02/24/22    CT ABD PELV WO CONT    Narrative  CT ABD PELV WO CONT    Technique: Noncontrasted CT scan of the abdomen and pelvis was performed  utilizing transaxial images obtained from the dome of the diaphragm to the pubic  symphysis. Coronal and sagittal reconstructions were generated. Dose Reduction:  All CT scans at this facility are performed using dose reduction optimization  techniques as appropriate to a performed exam including the following: Automated  exposure control, adjustments of the mA and/or kV according to patient size, or  use of iterative reconstruction technique. Comparison:  1/20/2022. Findings: There is mild bibasilar atelectasis. Atherosclerotic calcifications  are present including coronary artery calcifications. Cirrhotic hepatic configuration again evident. The spleen, pancreas, adrenal  glands, and right kidney are unremarkable for noncontrast technique. Cyst of the  posterior lower pole of the left kidney measures 1.6 cm in diameter. Varices are  present. Gallbladder is unremarkable. No biliary duct dilatation apparent. The  abdominal aorta is normal in caliber. There is a large volume of ascites. There is no evidence of bowel obstruction. The appendix is normal. The urinary bladder is empty. No suspicious lytic or blastic skeletal lesion apparent. Impression  Cirrhosis. Varices. Large volume ascites. 2/26. No further seizures seen. We will start decreasing propofol and decreasing ventilator. X-ray got clear hopefully can progress to extubation  2/27 tolerating decrease ventilator.   Have placed on tube compensate respirations are nonlabored except for having cough. Will extubate to room air. He has an OG tube in we will try to maintain it if it comes out we will leave it out. He may need an NG tube if he does not wake up further.   Starting to develop edema will decrease IV fluids and give albumin today  2/28 on room air tolerating well, for EGD   3/1 he had EGD done yesterday now he is going for EGD with banding because of varices  3/2 on room air tolerating well status post paracentesis done yesterday  3/3 doing well on room air stable respiratory wise will not follow further

## 2022-03-03 NOTE — DISCHARGE SUMMARY
Hospitalist Discharge Summary     Patient ID:  Magnolia Guy  [id number]  54 y.o.  1960  2/24/2022    PCP on record: Ruma Smyth NP    Admit date: 2/24/2022  Discharge date and time: 3/3/2022    DISCHARGE DIAGNOSIS:    Status epilepticus/GI bleed/acute respiratory failure with hypoxia secondary to aspiration pneumonia/sepsis/hypokalemia/acute decompensated liver cirrhosis with ascites/altered mental status/hepatic encephalopathy/history of alcohol abuse/acute on chronic kidney disease stage III    CONSULTATIONS:  IP CONSULT TO NEUROLOGY  IP CONSULT TO NEPHROLOGY  IP CONSULT TO GASTROENTEROLOGY  IP CONSULT TO HEMATOLOGY  IP CONSULT TO PSYCHIATRY  IP CONSULT TO PULMONOLOGY    Excerpted HPI from H&P of Neelam Rasheed MD:  Patient is intubated, so hx obtained from ED notes. Wyatt Davis is a 60 y.o. Male with hx of cirrhosis, seizure disorder not on any medications, alcohol abuse, gout,  presented to ED with c/o seizures. As per patient's wife, patient was diagnosed with seizure 2 years ago, but was not placed on any anti epileptics. Patient is daily drinker. Patient had Generalized tonic clonic seizures for 20 minutes at home prior to EMS arrival, resolved with 5 mg of versed. In ED initially, patient was somnolent, again had seizure episode. Seizure was unresolved with > 4 mg of ativan. Patient was given 4gm if keppra. Was intubated for airway protection and started on propofol.     We were asked to admit for work up and evaluation of the above problems. ______________________________________________________________________  DISCHARGE SUMMARY/HOSPITAL COURSE:  for full details see H&P, daily progress notes, labs, consult notes. Waldo Morrowose is a 60 y.o. Male with hx of cirrhosis, seizure disorder not on any medications, alcohol abuse, gout,  presented to ED with c/o seizures. As per patient's wife, patient was diagnosed with seizure 2 years ago, but was not placed on any anti epileptics. Patient is daily drinker. Patient had Generalized tonic clonic seizures for 20 minutes at home prior to EMS arrival, resolved with 5 mg of versed.   In ED initially, patient was somnolent, again had seizure episode. Seizure was unresolved with > 4 mg of ativan. Patient was given 4gm if keppra. Was intubated for airway protection and started on propofol. Patient was extubated on 2/27/28. Patient has dark brown drainage from NG, started on PPI, status post EGD and banding as well as paracentesis, currently doing well, antibiotics were changed to Levaquin, patient was evaluated by physical therapy as well as Occupational Therapy, diet was advanced following which patient was deemed stable for discharge as per physical therapy/case management recommendations with outpatient follow-up with psychiatry/pulmonology/gastroenterology        _______________________________________________________________________  Patient seen and examined by me on discharge day. Pertinent Findings:  Gen:    Not in distress  Chest: Decreased air entry bilaterally in bilateral lower lung zones  CVS:   Regular rhythm, s1/s2 no m/r/g  No edema  Abd:  Soft, not distended, not tender  Neuro:  Alert, Oriented at baseline  _______________________________________________________________________  DISCHARGE MEDICATIONS:   Current Discharge Medication List      START taking these medications    Details   levoFLOXacin (LEVAQUIN) 500 mg tablet Take 1 Tablet by mouth daily for 7 doses. Qty: 7 Tablet, Refills: 0  Start date: 3/2/2022, End date: 3/9/2022      potassium chloride (K-DUR, KLOR-CON M20) 20 mEq tablet Take 1 Tablet by mouth daily. Qty: 30 Tablet, Refills: 0  Start date: 3/2/2022      sodium bicarbonate 650 mg tablet Take 1 Tablet by mouth three (3) times daily. Qty: 90 Tablet, Refills: 0  Start date: 3/2/2022      chlordiazePOXIDE (LIBRIUM) 5 mg capsule Take 2 Capsules by mouth three (3) times daily as needed for Anxiety.  Max Daily Amount: 30 mg.  Qty: 9 Capsule, Refills: 0  Start date: 3/2/2022    Associated Diagnoses: Alcohol withdrawal syndrome without complication (HCC)      levETIRAcetam (Keppra) 1,000 mg tablet Take 1.5 Tablets by mouth two (2) times a day. Qty: 60 Tablet, Refills: 0  Start date: 3/2/2022         CONTINUE these medications which have NOT CHANGED    Details   ergocalciferol (ERGOCALCIFEROL) 1,250 mcg (50,000 unit) capsule Take 1 Capsule by mouth every seven (7) days. lactulose (CHRONULAC) 10 gram/15 mL solution Take 15 mL by mouth three (3) times daily. Qty: 200 mL, Refills: 0      allopurinoL (ZYLOPRIM) 300 mg tablet Take 1 Tablet by mouth daily. thiamine HCL (B-1) 100 mg tablet Take 100 mg by mouth daily. propranoloL (INDERAL) 20 mg tablet Take 20 mg by mouth two (2) times a day. folic acid (FOLVITE) 1 mg tablet Take 1 mg by mouth daily. famotidine (PEPCID) 20 mg tablet Take 20 mg by mouth At bedtime. aMILoride (MIDAMOR) 5 mg tablet Take 5 mg by mouth daily. Patient Follow Up Instructions: Activity: PT/OT Eval and Treat  Diet: Dysphagia diet-pureed  Wound Care: As directed    Follow-up with PCP/Psychiatry/Gastroenterology/Neurology/Pulmonology in 2 weeks.   Follow-up tests/labs As per above physicians  Follow-up Information     Follow up With Specialties Details Why Contact Info    Rohini Bo NP Nurse Practitioner In 1 week  [address] [facility] [address] [address]      Anil Santos MD Psychiatry In 1 week  [address]  Ilana Boucher MD Gastroenterology In 1 week  [address] [facility]  [address]  [address]  [phone]      Bonifacio Neal MD Neurology In 1 week  [facility]  [address]  [address] [place] [address]      James Cleary MD Pulmonary Disease In 1 week  [address] [facility]  [phone] ________________________________________________________________    Risk of deterioration: Low    Condition at Discharge:  Stable  __________________________________________________________________    Disposition  SNF/LTC    ____________________________________________________________________    Code Status: Full Code  ___________________________________________________________________      Total time in minutes spent coordinating this discharge (includes going over instructions, follow-up, prescriptions, and preparing report for sign off to her PCP) :  45 minutes    Signed:   Stefany Rosen MD

## 2022-03-03 NOTE — PROGRESS NOTES
Clinical chart reviewed by ALFRED. Patient's discharge plan is to go to [facility]. CM is currently working on completing the [facility]. CM will continue to follow.

## 2022-03-03 NOTE — PROGRESS NOTES
Renal Daily Progress Note    Admit Date: 2/24/2022      Subjective:   He is awake and responsive . He is not eating well. He denies chest pain or shortness of breath. Current Facility-Administered Medications   Medication Dose Route Frequency    potassium chloride (K-DUR, KLOR-CON M20) SR tablet 40 mEq  40 mEq Oral Q4H    chlordiazePOXIDE (LIBRIUM) capsule 10 mg  10 mg Oral TID    sodium bicarbonate tablet 650 mg  650 mg Oral TID    potassium chloride (K-DUR, KLOR-CON M20) SR tablet 20 mEq  20 mEq Oral DAILY    levoFLOXacin (LEVAQUIN) tablet 500 mg  500 mg Oral DAILY    propranoloL (INDERAL) tablet 20 mg  20 mg Oral BID    levETIRAcetam (KEPPRA) 1500 mg in saline (iso-osm) 100 ml IVPB  1,500 mg IntraVENous Q12H    sodium chloride (NS) flush 5-40 mL  5-40 mL IntraVENous Q8H    sodium chloride (NS) flush 5-40 mL  5-40 mL IntraVENous PRN    acetaminophen (TYLENOL) tablet 650 mg  650 mg Oral Q6H PRN    Or    acetaminophen (TYLENOL) suppository 650 mg  650 mg Rectal Q6H PRN    polyethylene glycol (MIRALAX) packet 17 g  17 g Oral DAILY PRN    ondansetron (ZOFRAN ODT) tablet 4 mg  4 mg Oral Q8H PRN    Or    ondansetron (ZOFRAN) injection 4 mg  4 mg IntraVENous Q6H PRN    [Held by provider] enoxaparin (LOVENOX) injection 30 mg  30 mg SubCUTAneous DAILY    folic acid (FOLVITE) tablet 1 mg  1 mg Oral DAILY    lactulose (CHRONULAC) 10 gram/15 mL solution 15 mL  10 g Oral TID    thiamine mononitrate (B-1) tablet 100 mg  100 mg Oral DAILY    midazolam (VERSED) injection 5 mg  5 mg IntraVENous Q4H PRN        Review of Systems    ROS   No headache  Denies shortness of breath  Denies chest pain      Objective:     Patient Vitals for the past 8 hrs:   BP Temp Pulse Resp SpO2   03/03/22 1459 124/63 98 °F (36.7 °C) 99 18 94 %     03/03 0701 - 03/03 1900  In: 350 [P.O.:350]  Out: 0   03/01 1901 - 03/03 0700  In: 861.8 [P.O.:522;  I.V.:339.8]  Out: 475 [Urine:475]    Physical Exam:   Physical Exam  Cardiovascular: Rate and Rhythm: Regular rhythm. Pulmonary:      Breath sounds: Normal breath sounds. Abdominal:      Palpations: Abdomen is soft. Neurological:      Mental Status: He is alert. XR HAND RT MIN 3 V   Final Result      Soft tissue swelling and destructive changes of distal second digit,   nonspecific. Differential diagnosis includes gout, other inflammatory   arthropathy, and septic joint. Other findings as described. IR PARACENTESIS ABD W IMAGE   Final Result   Successful paracentesis. XR CHEST PORT   Final Result   Interval extubation. No significant interval change otherwise. XR CHEST PORT   Final Result   No significant change. XR CHEST PORT   Final Result   Stable exam.      XR CHEST PORT   Final Result   Orogastric tube can be followed below the diaphragm. CT ABD PELV WO CONT   Final Result   Cirrhosis. Varices. Large volume ascites. XR CHEST PORT   Final Result   Dilation of the aortic arch. Further evaluation may be warranted. Endotracheal tube tip appears in appropriate position. CT HEAD WO CONT   Final Result   Age-appropriate atrophy. No acute findings. XR CHEST PORT   Final Result   The cardiomediastinal silhouette is appropriate for age, technique,   and lung expansion. Pulmonary vasculature is not congested. The lungs are   essentially clear. Mild atelectasis at hypoinflated left base. No effusion or   pneumothorax is seen.          IR PARACENTESIS ABD W IMAGE    (Results Pending)        Data Review   Recent Labs     03/03/22  0727 03/02/22  0340 03/01/22  0852   WBC 9.0 10.5 11.4*   HGB 9.3* 9.5* 8.6*   HCT 26.7* 28.1* 25.5*   * 106* 80*     Recent Labs     03/03/22  0727 03/02/22  0340 03/01/22  0852    141 144   K 3.1* 3.6 3.2*   * 114* 115*   CO2 19* 20* 20*   GLU 87 86 90   BUN 26* 26* 29*   CREA 1.76* 1.78* 1.88*   CA 8.7 9.0 8.6   MG 1.7  --   --    ALB  --  2.0* 2.2*  2.1*   ALT  --  12 10*  11*     No components found for: [place]  No results for input(s): PH, PCO2, PO2, HCO3, FIO2 in the last 72 hours. No results for input(s): INR, INREXT, INREXT, INREXT in the last 72 hours. Assessment:           Active Problems:    Status epilepticus ([address] (2/24/2022)    Acute kidney injury on chronic kidney disease. GRACIA resolved. Creatinine stable at 1.76 mg which should place him in stage CKD 3A. Hepatic cirrhosis with portal hypertension    [place] metabolic acidosis. AG 7    Seizure disorder    Hypokalemia. Repleted    Plan:   Replace potassium orally  Continue oral sodium bicarbonate to correct metabolic acidosis  He could benefit from spironolactone 25 mg daily.

## 2022-03-03 NOTE — PROGRESS NOTES
Gerard catheter placed by urologist at 1728 as of now no  urine return in collection bag and leaking around meatus.

## 2022-03-03 NOTE — PROGRESS NOTES
Of note this patient's Gerard catheter was removed without notification of the attending physician that was myself, this patient has urinary retention, unable to place straight catheter due to a lot of resistance, urology has now been consulted for Gerard catheter placement    Please DO NOT REMOVE Gerard catheter from this patient without the explicit consent of the attending physician

## 2022-03-03 NOTE — PROGRESS NOTES
Progress Note    Patient: Claudia Sanchez MRN: [id number]  SSN: [id number]    YOB: 1960  Age: 60 y.o. Sex: male      Admit Date: 2/24/2022    LOS: 7 days     Subjective:   GI is following in consultation for GI bleed-reportedly blood from the NGT    3/3: Patient transferred from ICU, he is alert but confused. Abdomen distended, non tender, He reports fair appetite. He had EGD on 2/28 showing esophageal varices without bleeding grade III. Portal hypertensive gastropathy. He underwent banding on 3/1. He is on Levaquin. 3/2: Patient seen in ICU awake and alert with some confusion. Denies chest pain or abdominal pain. \"I'm ok\". Denies overt bleeding. Hgb stable, 8.6.    3/1 Paracentesis - 4,360ml fluid drained. 3/1: Banding of varices   2/28 EGD:  Esophageal varices without bleeding grade III, Portal hypertensive gastropathy.     History of Present Illness: Waldo Rangel is a 60 y. o. male who is seen in consultation for GI bleed. Mr. Nicolas Saucedo seen in the ICU he is sedated and intubated. [de-identified] of medical history obtained from RN and chart. He was admitted on 2/24 with seizures. Patient had generalized tonic clonic seizures for 20 minutes at home prior to EMS arrival, resolved with 5 mg of versed. In the ED patient again had a seizure which was un resolved with more than 4 mg of ativan. He was given 4 gm of Keppra and was intubated for airway protection and started on propofol. Neurology consulted for further management. Ct head shows generalized atrophy otherwise no acute changes.  His wife reports he was diagnosed with seizures two years ago but was not placed on anti-epileptic medication.  It is reported that the patient consumes alcohol daily. He has a past medical history significant for cirrhosis, seizure disorder, gout, diverticulosis, Esophageal varices,  Vitamin D deficiency, and alcohol abuse.  He had an EGD in September 2020 showing esophageal varices, hypertensive portal gastropathy. He is on Propanolol. He was scheduled for repeat EGD on 3/7/21. RN reports she was told the patient had blood from the NG tube but states she has not noticed any. His hgB is 9.9 this am compared to 10.7 on 2/24/22.  CT of abdomen shows cirrhosis, varices, and large volume ascites.  He had paracentesis on 1/21/22 with removal of 5.3 Liters of serous fluid removed. He is scheduled for US guided Paracentesis which is pending at this time. Patient does move his head with verbal stimuli. Abdomen ins distended. Ammonia 111 yesterday he is on Lactulose. Plan for EGD on Monday.          Objective:     Vitals:    03/02/22 1958 03/02/22 2331 03/03/22 0809 03/03/22 1459   BP: (!) 99/96 (!) 121/91 103/72 124/63   Pulse: 88 86 83 99   Resp: 18  18 18   Temp: 98.3 °F (36.8 °C)  97.9 °F (36.6 °C) 98 °F (36.7 °C)   SpO2: 92%  97% 94%   Weight:       Height:            Intake and Output:  Current Shift: 03/03 0701 - 03/03 1900  In: 600 [P.O.:600]  Out: 0   Last three shifts: 03/01 1901 - 03/03 0700  In: 861.8 [P.O.:522; I.V.:339.8]  Out: 475 [Urine:475]    Physical Exam:   Skin:  Extremities and face reveal no rashes. No ivey erythema. HEENT: Sclerae anicteric. Extra-occular muscles are intact. No abnormal pigmentation of the lips. The neck is supple. Cardiovascular: Regular rate and rhythm. Respiratory:  Comfortable breathing with no accessory muscle use. GI:  Abdomen nondistended, soft, and nontender. No enlargement of the liver or spleen. No masses palpable. Rectal:  Deferred  Musculoskeletal: Generalized weakness. Neurological:  Awake and alert with some confusion  Psychiatric:  Not agitated.    Lymphatic:  No visible adenopathy      Lab/Data Review:  Recent Results (from the past 24 hour(s))   GLUCOSE, POC    Collection Time: 03/02/22 10:52 PM   Result Value Ref Range    Glucose (POC) 76 65 - 117 mg/dL    Performed by Dylan Ennis    CBC W/O DIFF    Collection Time: 03/03/22  7:27 AM   Result Value Ref Range    WBC 9.0 4.1 - 11.1 K/uL    RBC 3.15 (L) 4.10 - 5.70 M/uL    HGB 9.3 (L) 12.1 - 17.0 g/dL    HCT 26.7 (L) 36.6 - 50.3 %    MCV 84.8 80.0 - 99.0 FL    MCH 29.5 26.0 - 34.0 PG    MCHC 34.8 30.0 - 36.5 g/dL    RDW 17.5 (H) 11.5 - 14.5 %    PLATELET 209 (L) 498 - 400 K/uL    MPV 11.2 8.9 - 12.9 FL    NRBC 0.0 0.0  WBC    ABSOLUTE NRBC 0.00 0.00 - 2.51 K/uL   METABOLIC PANEL, BASIC    Collection Time: 03/03/22  7:27 AM   Result Value Ref Range    Sodium 139 136 - 145 mmol/L    Potassium 3.1 (L) 3.5 - 5.1 mmol/L    Chloride 113 (H) 97 - 108 mmol/L    CO2 19 (L) 21 - 32 mmol/L    Anion gap 7 5 - 15 mmol/L    Glucose 87 65 - 100 mg/dL    BUN 26 (H) 6 - 20 mg/dL    Creatinine 1.76 (H) 0.70 - 1.30 mg/dL    BUN/Creatinine ratio 15 12 - 20      GFR est AA 48 (L) >60 ml/min/1.73m2    GFR est non-AA 39 (L) >60 ml/min/1.73m2    Calcium 8.7 8.5 - 10.1 mg/dL   MAGNESIUM    Collection Time: 03/03/22  7:27 AM   Result Value Ref Range    Magnesium 1.7 1.6 - 2.4 mg/dL              XR HAND RT MIN 3 V   Final Result      Soft tissue swelling and destructive changes of distal second digit,   nonspecific. Differential diagnosis includes gout, other inflammatory   arthropathy, and septic joint. Other findings as described. IR PARACENTESIS ABD W IMAGE   Final Result   Successful paracentesis. XR CHEST PORT   Final Result   Interval extubation. No significant interval change otherwise. XR CHEST PORT   Final Result   No significant change. XR CHEST PORT   Final Result   Stable exam.      XR CHEST PORT   Final Result   Orogastric tube can be followed below the diaphragm. CT ABD PELV WO CONT   Final Result   Cirrhosis. Varices. Large volume ascites. XR CHEST PORT   Final Result   Dilation of the aortic arch. Further evaluation may be warranted. Endotracheal tube tip appears in appropriate position. CT HEAD WO CONT   Final Result   Age-appropriate atrophy. No acute findings.          XR CHEST PORT   Final Result   The cardiomediastinal silhouette is appropriate for age, technique,   and lung expansion. Pulmonary vasculature is not congested. The lungs are   essentially clear. Mild atelectasis at hypoinflated left base. No effusion or   pneumothorax is seen. IR PARACENTESIS ABD W IMAGE    (Results Pending)       Assessment:     Active Problems:    Status epilepticus ([address] (2/24/2022)        Plan:   1. Acute GI Bleed      -HgB 9.3 this am. Monitor and transfuse as needed.      -3/1 banding of varices      -2/28 EGD:  Esophageal varices without bleeding grade III, Portal hypertensive gastropathy.      -start Propanolol BID with parameters.      -Protonix 40 mg BID        -start Levaquin 500 mg po daily x 7days. 2. Seizure disorder, Status Epilepticus      -Neurology input appreciated  3. Cirrohosis/Ascites/Acute Encephalopathy      -CMP in the am      -3/1 US guided Paracentesis- 4,360 ml drained      -Ammonia level in the am      -Continue Lactulose  4. GRACIA/CKD     -Nephrology input appreciated     -Avoid hepatotoxic medications    Follow up as outpatient 2 weeks after discharge. GI signing off at this time. Please re-consult if needed. Thank you for allowing me to participate in this patients care   Plan discussed with Dr. Shauna Spivey and he approves.     Signed By: Susana Anderson NP     March 3, 2022

## 2022-03-03 NOTE — PROGRESS NOTES
Nutrition Assessment     Type and Reason for Visit: Reassess (goal)    Nutrition Recommendations/Plan:   Continue w/ current diet   Ensure x3/day  Rec' MVI     Nutrition Assessment:  Admitted for seizures, intubated for airway protection. +Acute encephalopathy, hypoxia. (2/26) Extube, (2/28) EGD, plan for banding of esophageal varices today. (3/3): moved to med floor, upgraded diet to SBS(3/2), reported good appetite a little less than half at breakfast. Agrees to try ensure vanilla. Will add ONs. Labs: K (3.1), BUN (26), GFR 39), protien (6.3), alb (2.0). meds: Levaquin, folic acid, Librium, lactulose, Keppra, sodium bicarbonate, B1. Malnutrition Assessment:  Malnutrition Status: Moderate malnutrition     Estimated Daily Nutrient Needs:  Energy (kcal):  2352kcals (28kcals/kg)  Protein (g):  100g (1.2g/kg)       Fluid (ml/day):  2100ml (25ml/kg)    Nutrition Related Findings:  Observed fat and muscle wasting. No reported n/v,c/d nor edema. dysphagia noted following by SLP, on SBS diet. Last BM (3/03).       Current Nutrition Therapies:  ADULT DIET Dysphagia - Soft & Bite Sized; no red, no orange    Anthropometric Measures:  · Height:  6' 2.02\" (188 cm)  · Current Body Wt:  79.1 kg (174 lb 6.1 oz) (3/1)  · BMI: 22.4    Nutrition Diagnosis:   · Moderate malnutrition,In context of acute illness or injury related to inadequate protein-energy intake as evidenced by intake 26-50%,mild loss of subcutaneous fat,moderate muscle loss      Nutrition Intervention:  Food and/or Nutrient Delivery: Continue current diet,Start oral nutrition supplement  Nutrition Education and Counseling: Education not indicated  Coordination of Nutrition Care: Continue to monitor while inpatient,Feeding assistance/environmental change,Speech therapy    Goals:  Meet >/=50% of EENs in >5 days, Wt maintenance within +/-0.5kg in >5 days       Nutrition Monitoring and Evaluation:   Behavioral-Environmental Outcomes: None identified  Food/Nutrient Intake Outcomes: Diet advancement/tolerance,Food and nutrient intake,Supplement intake  Physical Signs/Symptoms Outcomes: Weight,Meal time behavior,Nutrition focused physical findings,Biochemical data,Chewing or swallowing    Discharge Planning:    No discharge needs at this time     Electronically signed by Derrick Mosqueda on 3/3/2022 at 1:29 PM    Contact Number: [phone]

## 2022-03-03 NOTE — CONSULTS
[facility]    Name:  Becca Tellez OF SERVICE:  03/02/2022    PSYCHIATRIC CONSULTATION    REASON FOR CONSULTATION:  Alcohol withdrawal.    HISTORY OF PRESENT ILLNESS:  I saw this 76-year-old  male patient. I was asked to see the patient for alcohol withdrawal.  The patient has been here since 02/24/2022. The patient is a poor historian. Says \"I don't know, my stomach hurts. \"  Says \"Now feeling a lot better. \"  He gives mixed messages that he drank alcohol one month ago, another time he says two weeks ago, and another time he says he drank two bottles of wine. Nevertheless, he has been here since 02/24/2022, so this is at least seven days into hospitalization, it is unlikely alcohol withdrawal.  He is a poor historian. Currently, he has multiple medical problems. History of cirrhosis; seizure disorder, not on any medication; alcohol abuse; gout. Presented to ED complaining of seizure. As per the patient's wife, the patient was diagnosed with seizures two years ago but was not placed on any antiepileptic. The patient reportedly drinks everyday. The patient had generalized tonic-clonic seizures at home prior to EMS arrival, reportedly resolved with Versed 5 mg. Apparently also received Ativan and then Keppra. Apparently, one time he needed intubation for airway protection and hypoxia. The patient is still cognitively limited, somewhat perplexed, confusion, difficulty getting information. I saw the patient around 12:30 this morning on 03/02/2022. As of this dictation, late in the day, discharge was arranged. DISCHARGE MEDICATIONS:  1. Levaquin. 2.  K-Dur.  3.  Sodium bicarbonate. 4.  Librium 5 mg two capsules by mouth three times daily as needed for anxiety. 5.  Keppra 1000 mg twice a day. 6.  Continue ergocalciferol. 7.  Lactulose. 8.  Allopurinol. 9.  Propranolol. 10.  Folic acid. 11.  Famotidine. 12.  Amiloride.     Follow up with his family care doctor, Ms. Natalio Sotelo. I also recommended to go to [facility] for substance abuse service. He also could benefit from Topamax 25 mg twice a day, probably given once the stomach is settled down. The patient is not depressed, not suicidal, not homicidal.  No psychosis. Insight is limited. DIAGNOSES:  AXIS I:  Alcohol abuse, dependence; alcohol withdrawal seizures, I guess; alcoholic gastritis. D19 to work on substance abuse rehab. Also cirrhosis of the liver, seizure disorder. If he is still here, I will further follow. Thank you.       Roly Floyd MD      [id number]  D:  03/02/2022 23:58  T:  03/03/2022 4:41  JOB #:  [id number]

## 2022-03-03 NOTE — PROGRESS NOTES
Pt had 4 watery stools medium size today. Pt is on lactulose and does not currently meet criteria for C. DIFF at this time per hospital protocol.     MD notified of BMs

## 2022-03-03 NOTE — PROGRESS NOTES
Pt has not voided as of 6PM yesterday after removal of indwelling Gerard. Approximately 750 MLs showing in the bladder at this time.     MD perfect served

## 2022-03-03 NOTE — PROGRESS NOTES
2 unsuccessful attempts to straight cath pt by Santos Craven and another RN. Meeting resistance upon advancement. Urine that did make it into the catheter presenting with red sediments. Unable to empty bladder at this time.     MD notified

## 2022-03-04 NOTE — WOUND CARE
IP WOUND CONSULT    [address] [place] [address] RECORD NUMBER:  [id number]  AGE: 60 y.o. GENDER: male  : 1960  TODAY'S DATE:  3/4/2022    GENERAL     [] Follow-up   [x] New Consult    Kimber Flores is a 60 y.o. male referred by:   [x] Physician  [] Nursing  [] Other:         PAST MEDICAL HISTORY    Past Medical History:   Diagnosis Date    Arthritis     Hypertension         PAST SURGICAL HISTORY    Past Surgical History:   Procedure Laterality Date    IR PARACENTESIS ABD W IMAGE  2022    IR PARACENTESIS ABD W IMAGE  3/1/2022       FAMILY HISTORY    History reviewed. No pertinent family history. ALLERGIES    No Known Allergies    MEDICATIONS    No current facility-administered medications on file prior to encounter. Current Outpatient Medications on File Prior to Encounter   Medication Sig Dispense Refill    ergocalciferol (ERGOCALCIFEROL) 1,250 mcg (50,000 unit) capsule Take 1 Capsule by mouth every seven (7) days.  lactulose (CHRONULAC) 10 gram/15 mL solution Take 15 mL by mouth three (3) times daily. 200 mL 0    allopurinoL (ZYLOPRIM) 300 mg tablet Take 1 Tablet by mouth daily.  thiamine HCL (B-1) 100 mg tablet Take 100 mg by mouth daily.  propranoloL (INDERAL) 20 mg tablet Take 20 mg by mouth two (2) times a day.  folic acid (FOLVITE) 1 mg tablet Take 1 mg by mouth daily.  famotidine (PEPCID) 20 mg tablet Take 20 mg by mouth At bedtime.  aMILoride (MIDAMOR) 5 mg tablet Take 5 mg by mouth daily.            [unfilled]  Visit Vitals  /79 (BP 1 Location: Right upper arm, BP Patient Position: At rest)   Pulse 74   Temp 98.1 °F (36.7 °C)   Resp 18   Ht 6' 2.02\" (1.88 m)   Wt 84 kg (185 lb 3 oz)   SpO2 99%   BMI 23.77 kg/m²       ASSESSMENT     Wound Identification & Type: Stage 2 PI to sacrum and left buttock with 2 x satellite ulcers to right buttock  Dressing change: applied zinc paste due to large amount of loose stool  Verbal consent for picture: Yes    Contributing Factors: anticoagulation therapy, decreased mobility, shear force, incontinence of stool, incontinence of urine and Hx of alcohol abuse    Wound Gluteal fold/cleft Posterior blisters noted 02/25/22 (Active)   Wound Image   03/04/22 1708   Wound Etiology Pressure Stage 2 03/04/22 1708   Dressing Status New dressing applied 03/04/22 1708   Cleansed Other (Comment) 03/04/22 1708   Dressing/Treatment Zinc paste 03/04/22 1708   Wound Length (cm) 9 cm 03/04/22 1708   Wound Width (cm) 3 cm 03/04/22 1708   Wound Depth (cm) 0.1 cm 03/04/22 1708   Wound Surface Area (cm^2) 27 cm^2 03/04/22 1708   Wound Volume (cm^3) 2.7 cm^3 03/04/22 1708   Wound Assessment Denuded;Pink/red 03/04/22 1708   Drainage Amount Scant 03/04/22 1708   Drainage Description Serosanguinous 03/04/22 1708   Wound Odor None 03/04/22 1708   Peyton-Wound/Incision Assessment Denuded;Fragile 03/04/22 1708   Edges Flat/open edges; Undefined edges 03/04/22 1708   Wound Thickness Description Partial thickness 03/04/22 1708   Number of days: 7          PLAN     Skin Care & Pressure Relief Recommendations  Minimize layers of linen  Turn/reposition approximately every 2 hours  Pillow wedges  Manage incontinence   Promote continence; Skin Protective lotion/cream to buttocks and sacrum daily and as needed with incontinence care  Offload heels pillows    Curtis 15  Blood Glucose: 86 on 3/4/22                             Albumin: 2.0 on 3/2/22  WBCs: 10.6 on 3/4/22    Support Surface: gel mattress    Physician/Provider notified:   Recommendations: Stage 2 PI noted buttocks/sacrum with friction/shear injury and MASD. Incontinent care provided for large amount of loose stool and documented in flow chart. Applied zinc paste to buttocks and wounds for skin protectant r/t GI incontinence and wound healing.   Once loose stool resolves, patient would need to have daily dressing changes of Therahoney to wound bed and cover with Optifoam Border Sacral foam dressing. Patient discharged to [facility].        Teaching completed with:   [] Patient           [] Family member       [] Caregiver          [] Nursing  [] Other    Patient/Caregiver Teaching:  Level of patient/caregiver understanding able to:   [] Indicates understanding       [] Needs reinforcement  [] Unsuccessful      [] Verbal Understanding  [] Demonstrated understanding       [] No evidence of learning  [] Refused teaching         [] N/A       Electronically signed by Brock Reich RN on 3/4/2022 at 5:12 PM

## 2022-03-04 NOTE — PROGRESS NOTES
Patient will be discharging to [facility], [address] [facility], [address] [place] [address], room [address]. Call report to [phone]. Patient is clear to discharge to [facility] (SNF) by CM. Nurse is aware. His wife was notified of his transportation time.

## 2022-03-04 NOTE — PROGRESS NOTES
Urine drained in bag overnight with minimal output. Patient not drinking much, and receiving no fluids at this time. Dr. Ruby Hill made aware and deferred to dayshift.

## 2022-03-04 NOTE — PROGRESS NOTES
Problem: Pressure Injury - Risk of  Goal: *Prevention of pressure injury  Description: Document Curtis Scale and appropriate interventions in the flowsheet. Outcome: Progressing Towards Goal  Note: Pressure Injury Interventions:  Sensory Interventions: Assess changes in LOC    Moisture Interventions: Absorbent underpads    Activity Interventions: Assess need for specialty bed    Mobility Interventions: Chair cushion    Nutrition Interventions: Document food/fluid/supplement intake    Friction and Shear Interventions: Apply protective barrier, creams and emollients                Problem: Patient Education: Go to Patient Education Activity  Goal: Patient/Family Education  Outcome: Progressing Towards Goal     Problem: Falls - Risk of  Goal: *Absence of Falls  Description: Document Elba Fall Risk and appropriate interventions in the flowsheet.   Outcome: Progressing Towards Goal  Note: Fall Risk Interventions:  Mobility Interventions: Bed/chair exit alarm    Mentation Interventions: Bed/chair exit alarm    Medication Interventions: Bed/chair exit alarm    Elimination Interventions: Bed/chair exit alarm              Problem: Patient Education: Go to Patient Education Activity  Goal: Patient/Family Education  Outcome: Progressing Towards Goal     Problem: Discharge Planning  Goal: *Discharge to safe environment  Outcome: Progressing Towards Goal  Goal: *Knowledge of medication management  Outcome: Progressing Towards Goal  Goal: *Knowledge of discharge instructions  Outcome: Progressing Towards Goal     Problem: Patient Education: Go to Patient Education Activity  Goal: Patient/Family Education  Outcome: Progressing Towards Goal     Problem: Aspiration - Risk of  Goal: *Absence of aspiration  Outcome: Progressing Towards Goal     Problem: Patient Education: Go to Patient Education Activity  Goal: Patient/Family Education  Outcome: Progressing Towards Goal     Problem: Patient Education: Go to Patient Education Activity  Goal: Patient/Family Education  Outcome: Progressing Towards Goal     Problem: Patient Education: Go to Patient Education Activity  Goal: Patient/Family Education  Outcome: Progressing Towards Goal

## 2022-03-04 NOTE — PROGRESS NOTES
Renal Daily Progress Note    Admit Date: 2/24/2022      Subjective:   He is awake and responsive but confused. Current Facility-Administered Medications   Medication Dose Route Frequency    chlordiazePOXIDE (LIBRIUM) capsule 10 mg  10 mg Oral TID    sodium bicarbonate tablet 650 mg  650 mg Oral TID    potassium chloride (K-DUR, KLOR-CON M20) SR tablet 20 mEq  20 mEq Oral DAILY    levoFLOXacin (LEVAQUIN) tablet 500 mg  500 mg Oral DAILY    propranoloL (INDERAL) tablet 20 mg  20 mg Oral BID    levETIRAcetam (KEPPRA) 1500 mg in saline (iso-osm) 100 ml IVPB  1,500 mg IntraVENous Q12H    sodium chloride (NS) flush 5-40 mL  5-40 mL IntraVENous Q8H    sodium chloride (NS) flush 5-40 mL  5-40 mL IntraVENous PRN    acetaminophen (TYLENOL) tablet 650 mg  650 mg Oral Q6H PRN    Or    acetaminophen (TYLENOL) suppository 650 mg  650 mg Rectal Q6H PRN    polyethylene glycol (MIRALAX) packet 17 g  17 g Oral DAILY PRN    ondansetron (ZOFRAN ODT) tablet 4 mg  4 mg Oral Q8H PRN    Or    ondansetron (ZOFRAN) injection 4 mg  4 mg IntraVENous Q6H PRN    [Held by provider] enoxaparin (LOVENOX) injection 30 mg  30 mg SubCUTAneous DAILY    folic acid (FOLVITE) tablet 1 mg  1 mg Oral DAILY    lactulose (CHRONULAC) 10 gram/15 mL solution 15 mL  10 g Oral TID    thiamine mononitrate (B-1) tablet 100 mg  100 mg Oral DAILY    midazolam (VERSED) injection 5 mg  5 mg IntraVENous Q4H PRN        Review of Systems    ROS   Denies shortness of breath  Denies chest pain      Objective:     Patient Vitals for the past 8 hrs:   BP Temp Pulse Resp SpO2   03/04/22 0820 109/79 98.1 °F (36.7 °C) 74 18 99 %     No intake/output data recorded. 03/02 1901 - 03/04 0700  In: 600 [P.O.:600]  Out: 200 [Urine:200]    Physical Exam:   Physical Exam  Cardiovascular:      Rate and Rhythm: Regular rhythm. Pulmonary:      Breath sounds: Normal breath sounds. Abdominal:      Palpations: Abdomen is soft.    Neurological:      Mental Status: He is alert.     Pleasantly confused      XR HAND RT MIN 3 V   Final Result      Soft tissue swelling and destructive changes of distal second digit,   nonspecific. Differential diagnosis includes gout, other inflammatory   arthropathy, and septic joint. Other findings as described. IR PARACENTESIS ABD W IMAGE   Final Result   Successful paracentesis. XR CHEST PORT   Final Result   Interval extubation. No significant interval change otherwise. XR CHEST PORT   Final Result   No significant change. XR CHEST PORT   Final Result   Stable exam.      XR CHEST PORT   Final Result   Orogastric tube can be followed below the diaphragm. CT ABD PELV WO CONT   Final Result   Cirrhosis. Varices. Large volume ascites. XR CHEST PORT   Final Result   Dilation of the aortic arch. Further evaluation may be warranted. Endotracheal tube tip appears in appropriate position. CT HEAD WO CONT   Final Result   Age-appropriate atrophy. No acute findings. XR CHEST PORT   Final Result   The cardiomediastinal silhouette is appropriate for age, technique,   and lung expansion. Pulmonary vasculature is not congested. The lungs are   essentially clear. Mild atelectasis at hypoinflated left base. No effusion or   pneumothorax is seen. IR PARACENTESIS ABD W IMAGE    (Results Pending)        Data Review   Recent Labs     03/04/22  0658 03/03/22  0727 03/02/22  0340   WBC 10.6 9.0 10.5   HGB 9.0* 9.3* 9.5*   HCT 26.2* 26.7* 28.1*   * 109* 106*     Recent Labs     03/04/22  0658 03/03/22  0727 03/02/22  0340    139 141   K 4.0 3.1* 3.6   * 113* 114*   CO2 20* 19* 20*   GLU 86 87 86   BUN 25* 26* 26*   CREA 1.72* 1.76* 1.78*   CA 8.8 8.7 9.0   MG 1.9 1.7  --    ALB  --   --  2.0*   ALT  --   --  12     No components found for: GLPOC  No results for input(s): PH, PCO2, PO2, HCO3, FIO2 in the last 72 hours. No results for input(s): INR, INREXT, INREXT, INREXT in the last 72 hours. Assessment:           Active Problems:    Status epilepticus ([facility] [address] (2/24/2022)    Acute kidney injury on chronic kidney disease. GRACIA resolved. Creatinine stable at 1.7 mg which should place him in stage CKD 3A. Hepatic cirrhosis with portal hypertension    Nongap metabolic acidosis. AG 7    Seizure disorder    Hypokalemia. Repleted    Plan:   Continue oral sodium bicarbonate to correct metabolic acidosis  Okay for discharge from a renal standpoint.

## 2022-03-04 NOTE — PROGRESS NOTES
Report called to [facility] and rehab to Nurse Sheridan Deal. Gerard was not removed per DR Eusebia Guerrero.

## 2022-03-22 PROBLEM — L08.9 FINGER INFECTION: Status: ACTIVE | Noted: 2022-01-01

## 2022-03-23 NOTE — ROUTINE PROCESS
Bedside and Verbal shift change report given to Juan Chaudhry (oncoming nurse) by Laxmi Jordan (offgoing nurse). Report included the following information SBAR and MAR.

## 2022-03-23 NOTE — H&P
HISTORY & PHYSICAL      Patient: Clarita Geiger MRN: [id number]  SSN: [id number]    YOB: 1960  Age: 60 y.o. Sex: male      Primary Care Provider: Marcy Armas NP  Source of Information: ED note      Subjective     CC:   Chief Complaint   Patient presents with    Finger Swelling       HPI: Clarita Geiger is a(n) 60 y.o. male with PMH significant for etoh abuse, encephalopathy, cirrhosis, seizure disorder, gout presents with c/o swelling to right 2nd finger. Patient is nonverbal at baseline and dependent on all ADL at [address] [place] [address]. According to nursing home staff, has future orthopedic appointment for this finger. Is non-verbal at baseline but appears awake, alert, and in no obvious distress. Patient was recently discharged from the hospital after having a seizures at that time patient received Keppra for the seizures patient had paracentesis done during the last admission also endoscopy done and had banding of the varices    I&D performed on finger in ED with purulent fluid drained. Dressing in place covering the DIP. 1g vancomycin given in ED, switched to zosyn on admission. Wound cultures sent. XR of the finger shows soft tissue swelling in gas consistent with osteomyelitis, septic joint, or gouty arthropathy. Labs: Hgb 9.6 (baseline). Creatinine 2.51. CRP 3.15. ESR 35. Wound and blood cultures pending. Past Medical History:   Diagnosis Date    Arthritis     Hypertension         Past Surgical History:   Procedure Laterality Date    IR PARACENTESIS ABD W IMAGE  1/21/2022    IR PARACENTESIS ABD W IMAGE  3/1/2022       Prior to Admission medications    Medication Sig Start Date End Date Taking? Authorizing Provider   levothyroxine (SYNTHROID) 25 mcg tablet Take 25 mcg by mouth Daily (before breakfast). Yes Provider, Historical   acetaminophen (TylenoL) 325 mg tablet Take 650 mg by mouth every six (6) hours as needed for Pain.    Yes Provider, Historical   tamsulosin (Flomax) 0.4 mg capsule Take 1 Capsule by mouth daily. 3/4/22   Marva Whyte PA-C   potassium chloride (K-DUR, KLOR-CON M20) 20 mEq tablet Take 1 Tablet by mouth daily. 3/2/22   Suraj Jacques MD   sodium bicarbonate 650 mg tablet Take 1 Tablet by mouth three (3) times daily. 3/2/22   Suraj Jacques MD   chlordiazePOXIDE (LIBRIUM) 5 mg capsule Take 2 Capsules by mouth three (3) times daily as needed for Anxiety. Max Daily Amount: 30 mg. 3/2/22   Suraj Jacques MD   levETIRAcetam (Keppra) 1,000 mg tablet Take 1.5 Tablets by mouth two (2) times a day. 3/2/22   Suraj Jacques MD   ergocalciferol (ERGOCALCIFEROL) 1,250 mcg (50,000 unit) capsule Take 1 Capsule by mouth every seven (7) days. 2/9/22   Provider, Historical   lactulose (CHRONULAC) 10 gram/15 mL solution Take 15 mL by mouth three (3) times daily. 1/23/22   Patti Ackerman MD   allopurinoL (ZYLOPRIM) 300 mg tablet Take 1 Tablet by mouth daily. 1/18/22   Provider, Historical   thiamine HCL (B-1) 100 mg tablet Take 100 mg by mouth daily. Provider, Historical   propranoloL (INDERAL) 20 mg tablet Take 20 mg by mouth two (2) times a day. Provider, Historical   folic acid (FOLVITE) 1 mg tablet Take 1 mg by mouth daily. Provider, Historical   famotidine (PEPCID) 20 mg tablet Take 20 mg by mouth At bedtime. Provider, Historical   aMILoride (MIDAMOR) 5 mg tablet Take 5 mg by mouth daily. Provider, Historical       No Known Allergies     No family history on file. Social History     Socioeconomic History    Marital status:    Tobacco Use    Smoking status: Never Smoker    Smokeless tobacco: Never Used        Review of Systems   Reason unable to perform ROS: nonverbal at baseline.          Objective     Visit Vitals  /86   Pulse 74   Temp 97.7 °F (36.5 °C)   Resp 18   Ht 6' 2\" (1.88 m)   Wt 83.9 kg (185 lb)   SpO2 99%   BMI 23.75 kg/m²      O2 Device: None (Room air)    Physical Exam:   Physical Exam  Constitutional:       General: He is not in acute distress. HENT:      Head: Normocephalic and atraumatic. Cardiovascular:      Rate and Rhythm: Normal rate and regular rhythm. Pulses: Normal pulses. Heart sounds: Normal heart sounds. Pulmonary:      Effort: Pulmonary effort is normal.      Breath sounds: Normal breath sounds. Abdominal:      General: There is distension. Tenderness: There is no abdominal tenderness. There is no guarding or rebound. Skin:     Findings: Erythema present. Comments: Swelling to the right 2nd digit   Neurological:      Mental Status: Mental status is at baseline. Comments: Nonverbal         Intake & Output:  Current Shift: 03/23 0701 - 03/23 1900  In: -   Out: 275 [Urine:275]  Last three shifts: No intake/output data recorded. Lab/Data Review: All lab data reviewed      24 Hour Results:    Recent Results (from the past 24 hour(s))   CBC WITH AUTOMATED DIFF    Collection Time: 03/22/22 10:19 PM   Result Value Ref Range    WBC 6.6 4.1 - 11.1 K/uL    RBC 3.34 (L) 4.10 - 5.70 M/uL    HGB 9.6 (L) 12.1 - 17.0 g/dL    HCT 28.0 (L) 36.6 - 50.3 %    MCV 83.8 80.0 - 99.0 FL    MCH 28.7 26.0 - 34.0 PG    MCHC 34.3 30.0 - 36.5 g/dL    RDW 18.2 (H) 11.5 - 14.5 %    PLATELET 900 (L) 084 - 400 K/uL    MPV 12.0 8.9 - 12.9 FL    NRBC 0.0 0.0  WBC    ABSOLUTE NRBC 0.00 0.00 - 0.01 K/uL    NEUTROPHILS 72 32 - 75 %    LYMPHOCYTES 18 12 - 49 %    MONOCYTES 8 5 - 13 %    EOSINOPHILS 1 0 - 7 %    BASOPHILS 1 0 - 1 %    IMMATURE GRANULOCYTES 0 0 - 0.5 %    ABS. NEUTROPHILS 4.7 1.8 - 8.0 K/UL    ABS. LYMPHOCYTES 1.2 0.8 - 3.5 K/UL    ABS. MONOCYTES 0.6 0.0 - 1.0 K/UL    ABS. EOSINOPHILS 0.1 0.0 - 0.4 K/UL    ABS. BASOPHILS 0.1 0.0 - 0.1 K/UL    ABS. IMM.  GRANS. 0.0 0.00 - 0.04 K/UL    DF AUTOMATED     SED RATE (ESR)    Collection Time: 03/22/22 10:19 PM   Result Value Ref Range    Sed rate, automated 35 (H) 0 - 20 mm/hr METABOLIC PANEL, BASIC    Collection Time: 03/22/22 10:20 PM   Result Value Ref Range    Sodium 145 136 - 145 mmol/L    Potassium Hemolyzed, recollect requested 3.5 - 5.1 mmol/L    Chloride 121 (H) 97 - 108 mmol/L    CO2 20 (L) 21 - 32 mmol/L    Anion gap 4 (L) 5 - 15 mmol/L    Glucose 81 65 - 100 mg/dL    BUN 33 (H) 6 - 20 mg/dL    Creatinine 2.51 (H) 0.70 - 1.30 mg/dL    BUN/Creatinine ratio 13 12 - 20      GFR est AA 32 (L) >60 ml/min/1.73m2    GFR est non-AA 26 (L) >60 ml/min/1.73m2    Calcium 9.2 8.5 - 10.1 mg/dL   C REACTIVE PROTEIN, QT    Collection Time: 03/22/22 10:20 PM   Result Value Ref Range    C-Reactive protein 3.15 (H) 0.00 - 0.60 mg/dL   AMMONIA    Collection Time: 03/23/22 12:00 AM   Result Value Ref Range    Ammonia, plasma 14 <32 umol/L         Imaging:    XR 2ND FINGER RT MIN 2 V   Final Result      Progressive findings. The findings are nonspecific and could represent infection with osteomyelitis   and septic joint and gas in the soft tissues related to infection. Gout, or other inflammatory arthropathy is a possibility. Findings should be correlated with clinical parameters.                   Assessment     Septic joint- right 2nd finger  Anemia  Thrombocytopenia  Acute on chronic renal failure- creatinine 2.51 on admission    Hx etoh abuse  Cirrhosis of liver   History of encephalopathy  Seizure disorder  History of gout    Plan     Admit to medical telemetry floor  I&D of right 2nd finger performed in ED  Consult wound care  Consult pharmacy for vancomycin dosing  Consult nephrology    Redraw potassium- hemolyzed    Zosyn 3.375g Q8  Allopurinol 300mg daily  amloride 5mg daily  Ergocalciferol 50,000 units weekly  Famotidine 25ST daily  Folic acid 1mg daily  Lactulose 10g/15mL TID  Keppra 1000mg BID  Levothyroxine 25mcg daily  Propanolol 20mg BID  Sodium bicarbonate 650mg TID  Thiamine 100mg daily    Vancomycin 1g- once          Current Facility-Administered Medications:    allopurinoL (ZYLOPRIM) tablet 300 mg, 300 mg, Oral, DAILY, Lamberto Ackerman MD, 300 mg at 03/23/22 1117    aMILoride (MIDAMOR) tablet 5 mg, 5 mg, Oral, DAILY, Lamberto Ackerman MD, 5 mg at 03/23/22 0900    [START ON 3/24/2022] ergocalciferol capsule 50,000 Units, 50,000 Units, Oral, Q7D, Valorie Ackerman MD    famotidine (PEPCID) tablet 20 mg, 20 mg, Oral, QPM, Lamberto Ackerman MD    folic acid (FOLVITE) tablet 1 mg, 1 mg, Oral, DAILY, Lamberto Ackerman MD, 1 mg at 03/23/22 1118    piperacillin-tazobactam (ZOSYN) 3.375 g in 0.9% sodium chloride (MBP/ADV) 100 mL MBP, 3.375 g, IntraVENous, Q8H, Lamberto Ackerman MD, Last Rate: 25 mL/hr at 03/23/22 1117, 3.375 g at 03/23/22 1117    lactulose (CHRONULAC) 10 gram/15 mL solution 15 mL, 15 mL, Oral, TID, Lamberto Ackerman MD, 15 mL at 03/23/22 1117    levETIRAcetam (KEPPRA) tablet 1,000 mg, 1,000 mg, Oral, BID, Lamberto Ackerman MD, 1,000 mg at 03/23/22 1118    levothyroxine (SYNTHROID) tablet 25 mcg, 25 mcg, Oral, 6am, Lamberto Ackerman MD, 25 mcg at 03/23/22 2555    propranoloL (INDERAL) tablet 20 mg, 20 mg, Oral, BID, Lamberto Ackerman MD, 20 mg at 03/23/22 1117    sodium bicarbonate tablet 650 mg, 650 mg, Oral, TID, Lamberto Ackerman MD, 650 mg at 03/23/22 1119    thiamine mononitrate (B-1) tablet 100 mg, 100 mg, Oral, DAILY, Lamberto Ackerman MD, 100 mg at 03/23/22 1118    acetaminophen (TYLENOL) tablet 650 mg, 650 mg, Oral, Q6H PRN, Valorie Ackerman MD    Vancomycin Pharmacy Dosing, , Other, Rx Dosing/Monitoring, Valorie Ackerman MD    Vancomycin Random Level 3-23-22 at 1800, , Other, ONCE, Valorie Ackerman MD      Signed By: Ry Womack MD     March 23, 2022

## 2022-03-23 NOTE — PROGRESS NOTES
SPEECH LANGUAGE PATHOLOGY BEDSIDE SWALLOW EVALUATION  Patient: Cassandra Prieto (17 y.o. male)  Date: 3/23/2022  Primary Diagnosis: Finger infection [L08.9]  Procedure(s) (LRB):  INCISION & DRAINAGE of Right Index Finger (Right)     Precautions: aspiration       ASSESSMENT :  Based on the objective data described below, the patient presents with mild oral dysphagia. Patient edentulous negatively impacting mastication. Nsg reports patient having difficulty chewing and feeding self. Patient edentulous. Oral motor largely WFL. Patient seen sitting EOB. Administered thin liquids via cup and straw. Clinician needed to assist s/t difficulty grasping cup and bringing to mouth. Adequate bolus control. Swallow initiated w/ delay. HLE and protraction adequate to digital palpation. No clinical indicators of penetration or aspiration noted. Patient appeared to have better bolus control w/ use of straw. Administered solid. Patient w/ prolonged mastication. Intermittent liquid wash administered. Timely oral transit. Swallow delay. No clinical indicators of penetration/aspiration. No oral residual.     Patient would benefit from diet downgrade and assistance w/ all meals. Patient may benefit from OT consult for feeding deficit. Patient will benefit from skilled intervention to address the above impairments. Patients rehabilitation potential is considered to be Fair     PLAN :  Recommendations and Planned Interventions:  Minced and moist, thin liquids. Assistance w/ p.o. intake. Aspiration precautions. SLP to follow- up to ensure diet tolerance. Frequency/Duration: Patient will be followed by speech-language pathology 3 times a week to address goals. Discharge Recommendations: To Be Determined     SUBJECTIVE:   Patient alert and seen at bedside. Speech dysarthric which is baseline.      OBJECTIVE:     CXR Results  (Last 48 hours)      None           CT Results  (Last 48 hours)      None             Past Medical History:   Diagnosis Date    Arthritis     Hypertension      Past Surgical History:   Procedure Laterality Date    IR PARACENTESIS ABD W IMAGE  1/21/2022    IR PARACENTESIS ABD W IMAGE  3/1/2022     Prior Level of Function/Home Situation:  Home Situation  Home Environment: [address] [place] [address] Name:  ([facility])  One/Two Story Residence: One story  Living Alone: No  Support Systems: [facility]  Patient Expects to be Discharged to[de-identified] [address]  Current DME Used/Available at Home: Other (comment)  Diet prior to admission: unknown  Current Diet:  regular, thin  Cognitive and Communication Status:  Neurologic State: Alert,Confused     Cognition: Follows commands          After treatment:   Patient left in no apparent distress sitting up in chair and Nursing notified    COMMUNICATION/EDUCATION:   Patient was educated regarding purpose of SLP assessment, POC, diet recs and sw safety precautions. Patient demonstrated [address] understanding as evidenced by impaired cognition. The patient's plan of care including recommendations, planned interventions, and recommended diet changes were discussed with: Registered nurse. Patient/family have participated as able in goal setting and plan of care. Thank you for this referral.  Cesilia Harper, SLP M.S. CCC-SLP  Time Calculation: 13 mins           Problem: Dysphagia (Adult)  Goal: *Acute Goals and Plan of Care (Insert Text)  Description: Speech Therapy Swallow Goals  Initiated 3/23/2022  -Patient will tolerate MM diet with thin liquids without clinical indicators of aspiration given minimal cues within 5-7day(s). -Patient will tolerate PO trials without clinical indicators of aspiration given minimal cues within 5-7day(s). -Patient will participate in modified barium swallow study within 5-7 day(s).       -Patient will demonstrate understanding of swallow safety precautions and aspiration precautions, diet recs with minimal cues within 5-7 day(s).            3/23/2022 1540 by JAI Snider  Outcome: Not Progressing Towards Goal

## 2022-03-23 NOTE — CONSULTS
Consult Date: 3/23/2022    IP CONSULT TO ORTHOPEDIC SURGERY  Consult performed by: Chuck Azevedo MD  Consult ordered by: Jewel Greer MD  Reason for consult: Right index finger infection  Assessment/Recommendations: 44-year-old male was presented with right index finger infection admitted for IV antibiotics and had a small drainage done in the ER or infection. Patient does have prior history of gout and according to family the finger became swollen after gout and now he presented with signs of infection. Parents x-rays show involvement of the joint with what appears to be osteomyelitis and some gas. Sed rate and C-reactive protein are elevated. Impression-right index finger DIP joint septic arthritis with underlying gout    Plan:   Recommend right index finger incision and drainage of the abscess with debridement down to bone  Patient will likely need IV antibiotics for 4 to 6 weeks  The index DIP joint will have likely a resection arthroplasty. The treatment plan was discussed with patient's family on phone, patient's wife Yamileth Urbina. Consent process for surgery was carried out. The surgery will be planned for local anesthesia sedation surgery. Patient is being added to surgical schedule for tomorrow. Subjective    Sam Urena, 60 y.o. male with a past medical history significant Alcohol abuse, encephalopathy, cirrhosis, seizure disorder, gout,  presents to the ED with cc of swelling to right pointer finger. Patient nonverbal at baseline, totally ADL dependent. Presents from [place] [address]. for evaluation. Of note patient has history of gout, according to nursing home notes patient has orthopedic consulted for finger swelling already ordered. As per nursing home notation note history of traumatic injury to finger. Patient awake, alert, nonverbal at baseline, no obvious distress.   After the ER evaluation and x-rays limited incision of the abscess was carried out and cultures were sent. Patient was admitted to medical team for IV antibiotics and orthopedic and ID consults were initiated.     There are no other complaints, changes, or physical findings at this time. Past Medical History:   Diagnosis Date    Arthritis     Hypertension       Past Surgical History:   Procedure Laterality Date    IR PARACENTESIS ABD W IMAGE  1/21/2022    IR PARACENTESIS ABD W IMAGE  3/1/2022     No family history on file.    Social History     Tobacco Use    Smoking status: Never Smoker    Smokeless tobacco: Never Used   Substance Use Topics    Alcohol use: Not on file       Current Facility-Administered Medications   Medication Dose Route Frequency Provider Last Rate Last Admin    allopurinoL (ZYLOPRIM) tablet 300 mg  300 mg Oral DAILY Lamberto Ackerman MD   300 mg at 03/23/22 1117    aMILoride (MIDAMOR) tablet 5 mg  5 mg Oral DAILY Lamberto Ackerman MD   5 mg at 03/23/22 0900    [START ON 3/24/2022] ergocalciferol capsule 50,000 Units  50,000 Units Oral Q7D Kobe Ackerman MD        famotidine (PEPCID) tablet 20 mg  20 mg Oral QPM Kobe Ackerman MD        folic acid (FOLVITE) tablet 1 mg  1 mg Oral DAILY Lamberto Ackerman MD   1 mg at 03/23/22 1118    piperacillin-tazobactam (ZOSYN) 3.375 g in 0.9% sodium chloride (MBP/ADV) 100 mL MBP  3.375 g IntraVENous Q8H Lamberto Ackerman MD 25 mL/hr at 03/23/22 1117 3.375 g at 03/23/22 1117    lactulose (CHRONULAC) 10 gram/15 mL solution 15 mL  15 mL Oral TID Lamberto Ackerman MD   15 mL at 03/23/22 1117    levETIRAcetam (KEPPRA) tablet 1,000 mg  1,000 mg Oral BID Lamberto Ackerman MD   1,000 mg at 03/23/22 1118    levothyroxine (SYNTHROID) tablet 25 mcg  25 mcg Oral 6am Kobe Ackerman MD   25 mcg at 03/23/22 7524    propranoloL (INDERAL) tablet 20 mg  20 mg Oral BID Lamberto Ackerman MD   20 mg at 03/23/22 1117    sodium bicarbonate tablet 650 mg  650 mg Oral TID Kobe Ackerman MD   650 mg at 03/23/22 1119    thiamine mononitrate (B-1) tablet 100 mg  100 mg Oral DAILY Lamberto Ackerman MD   100 mg at 03/23/22 1118    acetaminophen (TYLENOL) tablet 650 mg  650 mg Oral Q6H PRN Gloria Ackerman MD        Vancomycin Pharmacy Dosing   Other Rx Dosing/Monitoring Gloria Ackerman MD        Vancomycin Random Level 3-23-22 at 1800   Other ONCE Gloria Ackerman MD            Review of Systems   Unable to perform ROS: Mental status change       Objective     Vital signs for last 24 hours:  Visit Vitals  /86 (BP 1 Location: Right upper arm, BP Patient Position: Sitting)   Pulse 74   Temp 97.7 °F (36.5 °C)   Resp 20   Ht 6' 2\" (1.88 m)   Wt 83.9 kg (185 lb)   SpO2 100%   BMI 23.75 kg/m²       Intake/Output this shift:  Current Shift: 03/23 0701 - 03/23 1900  In: -   Out: 275 [Urine:275]  Last 3 Shifts: No intake/output data recorded. Data Review:   Recent Results (from the past 24 hour(s))   CBC WITH AUTOMATED DIFF    Collection Time: 03/22/22 10:19 PM   Result Value Ref Range    WBC 6.6 4.1 - 11.1 K/uL    RBC 3.34 (L) 4.10 - 5.70 M/uL    HGB 9.6 (L) 12.1 - 17.0 g/dL    HCT 28.0 (L) 36.6 - 50.3 %    MCV 83.8 80.0 - 99.0 FL    MCH 28.7 26.0 - 34.0 PG    MCHC 34.3 30.0 - 36.5 g/dL    RDW 18.2 (H) 11.5 - 14.5 %    PLATELET 595 (L) 103 - 400 K/uL    MPV 12.0 8.9 - 12.9 FL    NRBC 0.0 0.0  WBC    ABSOLUTE NRBC 0.00 0.00 - 0.01 K/uL    NEUTROPHILS 72 32 - 75 %    LYMPHOCYTES 18 12 - 49 %    MONOCYTES 8 5 - 13 %    EOSINOPHILS 1 0 - 7 %    BASOPHILS 1 0 - 1 %    IMMATURE GRANULOCYTES 0 0 - 0.5 %    ABS. NEUTROPHILS 4.7 1.8 - 8.0 K/UL    ABS. LYMPHOCYTES 1.2 0.8 - 3.5 K/UL    ABS. MONOCYTES 0.6 0.0 - 1.0 K/UL    ABS. EOSINOPHILS 0.1 0.0 - 0.4 K/UL    ABS. BASOPHILS 0.1 0.0 - 0.1 K/UL    ABS. IMM.  GRANS. 0.0 0.00 - 0.04 K/UL    DF AUTOMATED     SED RATE (ESR)    Collection Time: 03/22/22 10:19 PM   Result Value Ref Range    Sed rate, automated 35 (H) 0 - 20 mm/hr   METABOLIC PANEL, BASIC    Collection Time: 03/22/22 10:20 PM Result Value Ref Range    Sodium 145 136 - 145 mmol/L    Potassium Hemolyzed, recollect requested 3.5 - 5.1 mmol/L    Chloride 121 (H) 97 - 108 mmol/L    CO2 20 (L) 21 - 32 mmol/L    Anion gap 4 (L) 5 - 15 mmol/L    Glucose 81 65 - 100 mg/dL    BUN 33 (H) 6 - 20 mg/dL    Creatinine 2.51 (H) 0.70 - 1.30 mg/dL    BUN/Creatinine ratio 13 12 - 20      GFR est AA 32 (L) >60 ml/min/1.73m2    GFR est non-AA 26 (L) >60 ml/min/1.73m2    Calcium 9.2 8.5 - 10.1 mg/dL   C REACTIVE PROTEIN, QT    Collection Time: 03/22/22 10:20 PM   Result Value Ref Range    C-Reactive protein 3.15 (H) 0.00 - 0.60 mg/dL   AMMONIA    Collection Time: 03/23/22 12:00 AM   Result Value Ref Range    Ammonia, plasma 14 <32 umol/L   LACTIC ACID    Collection Time: 03/23/22 12:12 PM   Result Value Ref Range    Lactic acid 3.4 (HH) 0.4 - 2.0 mmol/L   X-ray right index finger reveals:  IMPRESSION  Progressive findings. The findings are nonspecific and could represent infection with osteomyelitis  and septic joint and gas in the soft tissues related to infection. Gout, or other inflammatory arthropathy is a possibility. Findings should be correlated with clinical parameters.       Physical Exam  Patient is sitting on the side of bed patient speaks but does not respond appropriately to any commands or does not give any answers back for any definitive history of review of system. Patient is alert and awake. Chest-no tenderness normal effort no distress respiration  Heart-regular rate rhythm  HEENT: Normocephalic and atraumatic    Right upper extremity examination reveals right index finger DIP joint to be floppy with swelling and some purulence coming out there is no adequate drainage outflow from the finger. Finger range of motion of the index finger is decreased. Other digits patient does spontaneously move and flex them. No proximal streaking is seen. Elbow and shoulder are nontender.

## 2022-03-23 NOTE — H&P
HISTORY & PHYSICAL      Patient: Cassandra Prieto MRN: [id number]  SSN: [id number]    YOB: 1960  Age: 60 y.o. Sex: male      Primary Care Provider: Connie Dougherty NP  Source of Information: ED note      Subjective     CC:   Chief Complaint   Patient presents with    Finger Swelling       HPI: Cassandra Prieto is a(n) 60 y.o. male with PMH significant for etoh abuse, encephalopathy, cirrhosis, seizure disorder, gout presents with c/o swelling to right 2nd finger. Patient is nonverbal at baseline and dependent on all ADL at [address] [place] [address]. According to nursing home staff, has future orthopedic appointment for this finger. Is non-verbal at baseline but appears awake, alert, and in no obvious distress. I&D performed on finger in ED with purulent fluid drained. Dressing in place covering the DIP. 1g vancomycin given in ED, switched to zosyn on admission. Wound cultures sent. XR of the finger shows soft tissue swelling in gas consistent with osteomyelitis, septic joint, or gouty arthropathy. Labs: Hgb 9.6 (baseline). Creatinine 2.51. CRP 3.15. ESR 35. Wound and blood cultures pending. Past Medical History:   Diagnosis Date    Arthritis     Hypertension         Past Surgical History:   Procedure Laterality Date    IR PARACENTESIS ABD W IMAGE  1/21/2022    IR PARACENTESIS ABD W IMAGE  3/1/2022       Prior to Admission medications    Medication Sig Start Date End Date Taking? Authorizing Provider   levothyroxine (SYNTHROID) 25 mcg tablet Take 25 mcg by mouth Daily (before breakfast). Yes Provider, Historical   acetaminophen (TylenoL) 325 mg tablet Take 650 mg by mouth every six (6) hours as needed for Pain. Yes Provider, Historical   tamsulosin (Flomax) 0.4 mg capsule Take 1 Capsule by mouth daily. 3/4/22   Deep Whyte PA-C   potassium chloride (K-DUR, KLOR-CON M20) 20 mEq tablet Take 1 Tablet by mouth daily.  3/2/22   Sera Jean MD   sodium bicarbonate 650 mg tablet Take 1 Tablet by mouth three (3) times daily. 3/2/22   Piotr Noguera MD   chlordiazePOXIDE (LIBRIUM) 5 mg capsule Take 2 Capsules by mouth three (3) times daily as needed for Anxiety. Max Daily Amount: 30 mg. 3/2/22   Piotr Noguera MD   levETIRAcetam (Keppra) 1,000 mg tablet Take 1.5 Tablets by mouth two (2) times a day. 3/2/22   Piotr Noguera MD   ergocalciferol (ERGOCALCIFEROL) 1,250 mcg (50,000 unit) capsule Take 1 Capsule by mouth every seven (7) days. 2/9/22   Provider, Historical   lactulose (CHRONULAC) 10 gram/15 mL solution Take 15 mL by mouth three (3) times daily. 1/23/22   Jackeline Ackerman MD   allopurinoL (ZYLOPRIM) 300 mg tablet Take 1 Tablet by mouth daily. 1/18/22   Provider, Historical   thiamine HCL (B-1) 100 mg tablet Take 100 mg by mouth daily. Provider, Historical   propranoloL (INDERAL) 20 mg tablet Take 20 mg by mouth two (2) times a day. Provider, Historical   folic acid (FOLVITE) 1 mg tablet Take 1 mg by mouth daily. Provider, Historical   famotidine (PEPCID) 20 mg tablet Take 20 mg by mouth At bedtime. Provider, Historical   aMILoride (MIDAMOR) 5 mg tablet Take 5 mg by mouth daily. Provider, Historical       No Known Allergies     No family history on file. Social History     Socioeconomic History    Marital status:    Tobacco Use    Smoking status: Never Smoker    Smokeless tobacco: Never Used        Review of Systems   Reason unable to perform ROS: nonverbal at baseline. Objective     Visit Vitals  /87 (BP 1 Location: Right upper arm, BP Patient Position: At rest)   Pulse 72   Temp 97.7 °F (36.5 °C)   Resp 18   Ht 6' 2\" (1.88 m)   Wt 185 lb (83.9 kg)   SpO2 99%   BMI 23.75 kg/m²      O2 Device: None (Room air)    Physical Exam:   Physical Exam  Constitutional:       General: He is not in acute distress. HENT:      Head: Normocephalic and atraumatic.    Cardiovascular:      Rate and Rhythm: Normal rate and regular rhythm. Pulses: Normal pulses. Heart sounds: Normal heart sounds. Pulmonary:      Effort: Pulmonary effort is normal.      Breath sounds: Normal breath sounds. Abdominal:      General: There is distension. Tenderness: There is no abdominal tenderness. There is no guarding or rebound. Skin:     Findings: Erythema present. Comments: Swelling to the right 2nd digit   Neurological:      Mental Status: Mental status is at baseline. Comments: Nonverbal         Intake & Output:  Current Shift: 03/23 0701 - 03/23 1900  In: -   Out: 275 [Urine:275]  Last three shifts: No intake/output data recorded. Lab/Data Review: All lab data reviewed      24 Hour Results:    Recent Results (from the past 24 hour(s))   CBC WITH AUTOMATED DIFF    Collection Time: 03/22/22 10:19 PM   Result Value Ref Range    WBC 6.6 4.1 - 11.1 K/uL    RBC 3.34 (L) 4.10 - 5.70 M/uL    HGB 9.6 (L) 12.1 - 17.0 g/dL    HCT 28.0 (L) 36.6 - 50.3 %    MCV 83.8 80.0 - 99.0 FL    MCH 28.7 26.0 - 34.0 PG    MCHC 34.3 30.0 - 36.5 g/dL    RDW 18.2 (H) 11.5 - 14.5 %    PLATELET 617 (L) 358 - 400 K/uL    MPV 12.0 8.9 - 12.9 FL    NRBC 0.0 0.0  WBC    ABSOLUTE NRBC 0.00 0.00 - 0.01 K/uL    NEUTROPHILS 72 32 - 75 %    LYMPHOCYTES 18 12 - 49 %    MONOCYTES 8 5 - 13 %    EOSINOPHILS 1 0 - 7 %    BASOPHILS 1 0 - 1 %    IMMATURE GRANULOCYTES 0 0 - 0.5 %    ABS. NEUTROPHILS 4.7 1.8 - 8.0 K/UL    ABS. LYMPHOCYTES 1.2 0.8 - 3.5 K/UL    ABS. MONOCYTES 0.6 0.0 - 1.0 K/UL    ABS. EOSINOPHILS 0.1 0.0 - 0.4 K/UL    ABS. BASOPHILS 0.1 0.0 - 0.1 K/UL    ABS. IMM.  GRANS. 0.0 0.00 - 0.04 K/UL    DF AUTOMATED     SED RATE (ESR)    Collection Time: 03/22/22 10:19 PM   Result Value Ref Range    Sed rate, automated 35 (H) 0 - 20 mm/hr   METABOLIC PANEL, BASIC    Collection Time: 03/22/22 10:20 PM   Result Value Ref Range    Sodium 145 136 - 145 mmol/L    Potassium Hemolyzed, recollect requested 3.5 - 5.1 mmol/L    Chloride 121 (H) 97 - 108 mmol/L    CO2 20 (L) 21 - 32 mmol/L    Anion gap 4 (L) 5 - 15 mmol/L    Glucose 81 65 - 100 mg/dL    BUN 33 (H) 6 - 20 mg/dL    Creatinine 2.51 (H) 0.70 - 1.30 mg/dL    BUN/Creatinine ratio 13 12 - 20      GFR est AA 32 (L) >60 ml/min/1.73m2    GFR est non-AA 26 (L) >60 ml/min/1.73m2    Calcium 9.2 8.5 - 10.1 mg/dL   C REACTIVE PROTEIN, QT    Collection Time: 03/22/22 10:20 PM   Result Value Ref Range    C-Reactive protein 3.15 (H) 0.00 - 0.60 mg/dL   AMMONIA    Collection Time: 03/23/22 12:00 AM   Result Value Ref Range    Ammonia, plasma 14 <32 umol/L         Imaging:    XR 2ND FINGER RT MIN 2 V   Final Result      Progressive findings. The findings are nonspecific and could represent infection with osteomyelitis   and septic joint and gas in the soft tissues related to infection. Gout, or other inflammatory arthropathy is a possibility. Findings should be correlated with clinical parameters.                   Assessment     Infection- right 2nd finger  Anemia  Thrombocytopenia  Acute on chronic renal failure- creatinine 2.51 on admission    etoh abuse  Cirrhosis  History of encephalopathy  Seizure disorder  History of gout    Plan     Admit to medical telemetry floor  I&D of right 2nd finger performed in ED  Consult wound care  Consult pharmacy for vancomycin dosing  Consult nephrology    Redraw potassium- hemolyzed    Zosyn 3.375g Q8  Allopurinol 300mg daily  amloride 5mg daily  Ergocalciferol 50,000 units weekly  Famotidine 25KT daily  Folic acid 1mg daily  Lactulose 10g/15mL TID  Keppra 1000mg BID  Levothyroxine 25mcg daily  Propanolol 20mg BID  Sodium bicarbonate 650mg TID  Thiamine 100mg daily    Vancomycin 1g- once        Current Facility-Administered Medications:     allopurinoL (ZYLOPRIM) tablet 300 mg, 300 mg, Oral, DAILY, Lamberto Ackerman MD    [facility]) tablet 5 mg, 5 mg, Oral, DAILY, Lamberto Ackerman MD    [START ON 3/24/2022] ergocalciferol capsule 50,000 Units, 50,000 Units, Oral, Q7D, Ambar Ackerman MD    famotidine (PEPCID) tablet 20 mg, 20 mg, Oral, QPM, Ambar Ackerman MD    folic acid (FOLVITE) tablet 1 mg, 1 mg, Oral, DAILY, Ambar Ackerman MD    piperacillin-tazobactam (ZOSYN) 3.375 g in 0.9% sodium chloride (MBP/ADV) 100 mL MBP, 3.375 g, IntraVENous, Q8H, Lamberto Ackerman MD, Last Rate: 25 mL/hr at 03/23/22 0353, 3.375 g at 03/23/22 0353    lactulose (CHRONULAC) 10 gram/15 mL solution 15 mL, 15 mL, Oral, TID, Ambar Ackerman MD    levETIRAcetam (KEPPRA) tablet 1,000 mg, 1,000 mg, Oral, BID, Ambar Ackerman MD    levothyroxine (SYNTHROID) tablet 25 mcg, 25 mcg, Oral, 6am, Lamberto Ackerman MD, 25 mcg at 03/23/22 6795    propranoloL (INDERAL) tablet 20 mg, 20 mg, Oral, BID, Lamberto Ackerman MD    sodium bicarbonate tablet 650 mg, 650 mg, Oral, TID, Ambar Ackerman MD    thiamine mononitrate (B-1) tablet 100 mg, 100 mg, Oral, DAILY, Lamberto Ackerman MD    acetaminophen (TYLENOL) tablet 650 mg, 650 mg, Oral, Q6H PRN, Ambar Ackerman MD    Vancomycin Pharmacy Dosing, , Other, Rx Dosing/Monitoring, Ambar Ackerman MD    Vancomycin Random Level 3-23-22 at 1800, , Other, ONCE, Ambar Ackerman MD      Signed By: Santhosh Magaña     March 23, 2022

## 2022-03-23 NOTE — ROUTINE PROCESS
TRANSFER - OUT REPORT:    Verbal report given to Liane(name) on Bhavik Covarrubias  being transferred to ast room 402(unit) for routine progression of care       Report consisted of patients Situation, Background, Assessment and   Recommendations(SBAR). Information from the following report(s) SBAR was reviewed with the receiving nurse. Lines:   Peripheral IV Left Antecubital (Active)   Site Assessment Clean, dry, & intact 03/22/22 2111   Phlebitis Assessment 0 03/22/22 2111   Infiltration Assessment 0 03/22/22 2111   Dressing Status Clean, dry, & intact 03/22/22 2111        Opportunity for questions and clarification was provided.       Patient transported with:   Registered Nurse

## 2022-03-23 NOTE — ED NOTES
Dr Marcin Lopes at bedside. Digital block done to right 2nd finger. I&D done. Wound cx obtained and taken to lab. Pt tolerated well. Gauze 4x4 and coban bandage applied.

## 2022-03-23 NOTE — ED PROVIDER NOTES
EMERGENCY DEPARTMENT HISTORY AND PHYSICAL EXAM      Date: 3/22/2022  Patient Name: Deanna Castillo    History of Presenting Illness     Chief Complaint   Patient presents with    Finger Swelling       History Provided By: Nursing Home/SNF/Rehab Center    HPI: Deanna Castillo, 60 y.o. male with a past medical history significant Alcohol abuse, encephalopathy, cirrhosis, seizure disorder, gout,  presents to the ED with cc of swelling to right pointer finger. Patient nonverbal at baseline, totally ADL dependent. Presents from [place] [id number]. for evaluation. Of note patient has history of gout, according to nursing home notes patient has orthopedic consulted for finger swelling already ordered. As per nursing home notation note history of traumatic injury to finger. Patient awake, alert, nonverbal at baseline, no obvious distress. There are no other complaints, changes, or physical findings at this time. PCP: Jaron Aleman NP    No current facility-administered medications on file prior to encounter. Current Outpatient Medications on File Prior to Encounter   Medication Sig Dispense Refill    tamsulosin (Flomax) 0.4 mg capsule Take 1 Capsule by mouth daily. 30 Capsule 1    potassium chloride (K-DUR, KLOR-CON M20) 20 mEq tablet Take 1 Tablet by mouth daily. 30 Tablet 0    sodium bicarbonate 650 mg tablet Take 1 Tablet by mouth three (3) times daily. 90 Tablet 0    chlordiazePOXIDE (LIBRIUM) 5 mg capsule Take 2 Capsules by mouth three (3) times daily as needed for Anxiety. Max Daily Amount: 30 mg. 9 Capsule 0    levETIRAcetam (Keppra) 1,000 mg tablet Take 1.5 Tablets by mouth two (2) times a day. 60 Tablet 0    ergocalciferol (ERGOCALCIFEROL) 1,250 mcg (50,000 unit) capsule Take 1 Capsule by mouth every seven (7) days.  lactulose (CHRONULAC) 10 gram/15 mL solution Take 15 mL by mouth three (3) times daily.  200 mL 0    allopurinoL (ZYLOPRIM) 300 mg tablet Take 1 Tablet by mouth daily.      thiamine HCL (B-1) 100 mg tablet Take 100 mg by mouth daily.  propranoloL (INDERAL) 20 mg tablet Take 20 mg by mouth two (2) times a day.  folic acid (FOLVITE) 1 mg tablet Take 1 mg by mouth daily.  famotidine (PEPCID) 20 mg tablet Take 20 mg by mouth At bedtime.  aMILoride (MIDAMOR) 5 mg tablet Take 5 mg by mouth daily. Past History     Past Medical History:  Past Medical History:   Diagnosis Date    Arthritis     Hypertension        Past Surgical History:  Past Surgical History:   Procedure Laterality Date    IR PARACENTESIS ABD W IMAGE  1/21/2022    IR PARACENTESIS ABD W IMAGE  3/1/2022       Family History:  No family history on file. Social History:  Social History     Tobacco Use    Smoking status: Never Smoker    Smokeless tobacco: Never Used   Substance Use Topics    Alcohol use: Not on file    Drug use: Not on file       Allergies:  No Known Allergies      Review of Systems     Review of Systems   Unable to perform ROS: Patient nonverbal       Physical Exam     Physical Exam  Vitals and nursing note reviewed. Constitutional:       General: He is not in acute distress. HENT:      Head: Normocephalic. Nose: Nose normal.   Cardiovascular:      Rate and Rhythm: Normal rate. Pulmonary:      Effort: Pulmonary effort is normal.   Abdominal:      General: Abdomen is flat. Palpations: Abdomen is soft. Musculoskeletal:         General: Swelling present. No tenderness or deformity. Hands:       Cervical back: Normal range of motion and neck supple. Left lower leg: No edema. Skin:     General: Skin is warm and dry. Neurological:      Mental Status: He is alert. Mental status is at baseline. He is disoriented.          Diagnostic Study Results     Labs -     Recent Results (from the past 12 hour(s))   CBC WITH AUTOMATED DIFF    Collection Time: 03/22/22 10:19 PM   Result Value Ref Range    WBC 6.6 4.1 - 11.1 K/uL    RBC 3.34 (L) 4.10 - 5.70 M/uL    HGB 9.6 (L) 12.1 - 17.0 g/dL    HCT 28.0 (L) 36.6 - 50.3 %    MCV 83.8 80.0 - 99.0 FL    MCH 28.7 26.0 - 34.0 PG    MCHC 34.3 30.0 - 36.5 g/dL    RDW 18.2 (H) 11.5 - 14.5 %    PLATELET 589 (L) 282 - 400 K/uL    MPV 12.0 8.9 - 12.9 FL    NRBC 0.0 0.0  WBC    ABSOLUTE NRBC 0.00 0.00 - 0.01 K/uL    NEUTROPHILS 72 32 - 75 %    LYMPHOCYTES 18 12 - 49 %    MONOCYTES 8 5 - 13 %    EOSINOPHILS 1 0 - 7 %    BASOPHILS 1 0 - 1 %    IMMATURE GRANULOCYTES 0 0 - 0.5 %    ABS. NEUTROPHILS 4.7 1.8 - 8.0 K/UL    ABS. LYMPHOCYTES 1.2 0.8 - 3.5 K/UL    ABS. MONOCYTES 0.6 0.0 - 1.0 K/UL    ABS. EOSINOPHILS 0.1 0.0 - 0.4 K/UL    ABS. BASOPHILS 0.1 0.0 - 0.1 K/UL    ABS. IMM. GRANS. 0.0 0.00 - 0.04 K/UL    DF AUTOMATED     METABOLIC PANEL, BASIC    Collection Time: 03/22/22 10:20 PM   Result Value Ref Range    Sodium 145 136 - 145 mmol/L    Potassium Hemolyzed, recollect requested 3.5 - 5.1 mmol/L    Chloride 121 (H) 97 - 108 mmol/L    CO2 20 (L) 21 - 32 mmol/L    Anion gap 4 (L) 5 - 15 mmol/L    Glucose 81 65 - 100 mg/dL    BUN 33 (H) 6 - 20 mg/dL    Creatinine 2.51 (H) 0.70 - 1.30 mg/dL    BUN/Creatinine ratio 13 12 - 20      GFR est AA 32 (L) >60 ml/min/1.73m2    GFR est non-AA 26 (L) >60 ml/min/1.73m2    Calcium 9.2 8.5 - 10.1 mg/dL   C REACTIVE PROTEIN, QT    Collection Time: 03/22/22 10:20 PM   Result Value Ref Range    C-Reactive protein 3.15 (H) 0.00 - 0.60 mg/dL       Radiologic Studies -   @lastxrresult@  CT Results  (Last 48 hours)    None        CXR Results  (Last 48 hours)    None            Medical Decision Making   I am the first provider for this patient. I reviewed the vital signs, available nursing notes, past medical history, past surgical history, family history and social history. Vital Signs-Reviewed the patient's vital signs.   Patient Vitals for the past 12 hrs:   Temp Pulse Resp BP SpO2   03/22/22 2308  73 16 (!) 122/99 100 %   03/22/22 2154  71 18 109/85 99 %   03/22/22 2059 97.7 °F (36.5 °C) 74 20 (!) 111/92 99 %       Records Reviewed: Nursing Notes, old xray of hand    The patient presents with right index DIP swelling with a differential diagnosis of paronychia, felon, finger fracture,  gouty flareup, osteoarthritis      Provider Notes (Medical Decision Making):     MDM     58-year-old male, history of alcohol abuse, gout, encephalopathy, nonverbal at baseline presents emergency department from nursing home for evaluation of right index finger swelling    As per nursing, no note of acute injury or traumatic event. Physical shows well-appearing male, no distress, nonverbal at baseline, right index finger shows soft tissue swelling to DIP joint, no erythema, +  fluctuance noted, no significant tenderness elicited on palpation    X-ray of finger completed, showing soft tissue swelling to DIP joint with joint erosion, air noted in joint space  Possible differentials include chronic osteoarthritis with joint erosion, gouty arthritis, septic joint      ED Course:   Initial assessment performed. The patients presenting problems have been discussed, and they are in agreement with the care plan formulated and outlined with them. I have encouraged them to ask questions as they arise throughout their visit. ED Course as of 03/22/22 2357   Tue Mar 22, 2022   2205 Xray resulted by radiology, concerning for gas in joint space, bony erosion. Spoke with Dr. Rowan Thomson orthopedist, as patient is nonverbal unreliable recommends I&D of swollen area, wound culture, basic lab work. If white count, CRP, ESR elevated, recommends admission for IV antibiotics and further management. [PZ]   0126 Finger drained, purulent bloody fluid drained from incision. Culture sent. Will order 1 g vancomycin [PZ]   5135 Spoke with Dr. Emi Cheadle, familiar with patient, feels the patient should probably be admitted for IV antibiotics, orthopedic follow-up. Will admit patient place orthopedic consult.  [PZ]      ED Course User Index  [PZ] Celina Blancas MD         PROCEDURES  I&D Abcess Simple    Date/Time: 3/22/2022 10:45 PM  Performed by: Celina Blancas MD  Authorized by: Celina Blancas MD     Consent:     Alternatives discussed:  No treatment and delayed treatment  Location:     Type:  Fluid collection    Size:  1    Location:  Upper extremity    Upper extremity location:  Finger    Finger location:  R index finger  Pre-procedure details:     Skin preparation:  Betadine  Anesthesia (see MAR for exact dosages): Anesthesia method:  Nerve block    Block location:  Ring block    Block anesthetic:  Lidocaine 1% w/o epi    Block injection procedure:  Introduced needle and negative aspiration for blood    Block outcome:  Anesthesia achieved  Procedure type:     Complexity:  Simple  Procedure details:     Needle aspiration: no      Incision types:  Stab incision    Incision depth:  Dermal    Scalpel blade:  11    Wound management:  Probed and deloculated    Drainage:  Bloody and purulent    Drainage amount: Moderate    Wound treatment:  Wound left open    Packing materials:  None  Post-procedure details:     Patient tolerance of procedure: Tolerated well, no immediate complications                 Diagnosis     Clinical Impression:   1. Finger joint swelling, right    2. Finger infection    3.  Osteomyelitis of right hand, unspecified type ([address]

## 2022-03-23 NOTE — ED NOTES
PT INCONTINENT OF URINE AND SMALL AMT OF LIGHT BROWN STOOL. PT CLEANED. DEPENDS PLACED. WOUND NOTED TO SACRAL AREA.

## 2022-03-23 NOTE — PROGRESS NOTES
Reason for Readmission:  Finger infection            RUR Score/Risk Level:  20%       PCP: First and Last name:  Fanny Hill MD   Name of Practice:    Are you a current patient: Yes/No:    Approximate date of last visit: Seen at SNF   Can you participate in a virtual visit with your PCP:     Is a Care Conference indicated:     Did you attend your follow up appointment (s): If not, why not:     Resources/supports as identified by patient/family:  Spouse        Top Challenges facing patient (as identified by patient/family and CM): Finances/Medication cost?  No concerns-Medicaid     Transportation  Will require stretcher transport upon DC per spouse      Support system or lack thereof? Spouse    Living arrangements? LTC at [facility]         Self-care/ADLs/Cognition? Dependent in all care           Current Advanced Directive/Advance Care Plan: Full code    Spouse: Rosa Isela Alexandre- [phone]            Plan for utilizing home health:   None             Transition of Care Plan:    Based on readmission, the patient's previous Plan of Care   has been evaluated and/or modified. The current Transition of Care Plan is:  CM called patients spouse to complete assessment. Patient is a LTC resident at [facility] and will require [employer] upon DC. DC plan: return to [facility] as LTC resident.

## 2022-03-23 NOTE — CONSULTS
Infectious Disease Consult Note    Reason for Consult: Right index finger infection/osteomyelitis   Date of Consultation: March 23, 2022  Date of Admission: 3/22/2022  Referring Physician: Dr Keturah Bernheim     HPI: 60 y.o. BM admitted w  right index finger swelling/erythema, ID consulted due to concerns of infection. His medical history is significant for Alcohol abuse, encephalopathy, cirrhosis, hypothyroidism, seizure d/o, and gouty arthritis. He is a long term resident of [place], and is a very limited historian, history gleaned from chart documentations. He has been afebrile since admit w a normal WBC on routine labs. CRP is mildly elevated at 3.15 and ESR 35. Blood (03/23) and wound Cx (03/22) are pending. X-ray if right hand on 03/22 revealed nonspecific changes concerning for osteomyelitis and septic joint and gas in the soft tissues related to infection. He had a failed attempt at L&D in the ED. He has been seen by orthopedics, Dr Melvi Maza, and I&D is planned. MRSA screen was neg in 02/2022.  He is on Vanc and Zosyn.      Review of Systems:     Gen: Negative for chills, fevers, weight loss, weight gain   CV:  Negative for chest pain, dyspnea on exertion, leg edema   Lungs: Negative for shortness of breath, cough, wheezing   Abdomen: Negative for abdominal pain, nausea, vomiting, diarrhea, constipation   Genitourinary: Negative for genital pain or genital discharge     Neuro: Negative for headache, numbness, tingling, extremity weakness   Skin: Negative for rash, sores/open wounds   Musculoskeletal: Negative for joint pain, joint swelling, joint erythema    Psych: Confused     Past Medical History:  Past Medical History:   Diagnosis Date    Arthritis     Hypertension      Past surgical history   Past Surgical History:   Procedure Laterality Date    IR PARACENTESIS ABD W IMAGE  1/21/2022    IR PARACENTESIS ABD W IMAGE  3/1/2022        Social History   Social History     Tobacco Use    Smoking status: Never Smoker    Smokeless tobacco: Never Used   Substance Use Topics    Alcohol use: Not on file    Drug use: Not on file        Family history   No family history on file. Allergies:  No Known Allergies      Medications:  No current facility-administered medications on file prior to encounter. Current Outpatient Medications on File Prior to Encounter   Medication Sig Dispense Refill    levothyroxine (SYNTHROID) 25 mcg tablet Take 25 mcg by mouth Daily (before breakfast).  acetaminophen (TylenoL) 325 mg tablet Take 650 mg by mouth every six (6) hours as needed for Pain.  tamsulosin (Flomax) 0.4 mg capsule Take 1 Capsule by mouth daily. 30 Capsule 1    potassium chloride (K-DUR, KLOR-CON M20) 20 mEq tablet Take 1 Tablet by mouth daily. 30 Tablet 0    sodium bicarbonate 650 mg tablet Take 1 Tablet by mouth three (3) times daily. 90 Tablet 0    chlordiazePOXIDE (LIBRIUM) 5 mg capsule Take 2 Capsules by mouth three (3) times daily as needed for Anxiety. Max Daily Amount: 30 mg. 9 Capsule 0    levETIRAcetam (Keppra) 1,000 mg tablet Take 1.5 Tablets by mouth two (2) times a day. 60 Tablet 0    ergocalciferol (ERGOCALCIFEROL) 1,250 mcg (50,000 unit) capsule Take 1 Capsule by mouth every seven (7) days.  lactulose (CHRONULAC) 10 gram/15 mL solution Take 15 mL by mouth three (3) times daily. 200 mL 0    allopurinoL (ZYLOPRIM) 300 mg tablet Take 1 Tablet by mouth daily.  thiamine HCL (B-1) 100 mg tablet Take 100 mg by mouth daily.  propranoloL (INDERAL) 20 mg tablet Take 20 mg by mouth two (2) times a day.  folic acid (FOLVITE) 1 mg tablet Take 1 mg by mouth daily.  famotidine (PEPCID) 20 mg tablet Take 20 mg by mouth At bedtime.  aMILoride (MIDAMOR) 5 mg tablet Take 5 mg by mouth daily.          Physical Exam:    Vitals:   Patient Vitals for the past 24 hrs:   Temp Pulse Resp BP SpO2   03/23/22 1448  80  125/60 99 %   03/23/22 1117  74 20 120/86 100 %   03/23/22 0900  73  120/86    03/23/22 0054  72 18 115/87 99 %   03/22/22 2308  73 16 (!) 122/99 100 %   03/22/22 2154  71 18 109/85 99 %   03/22/22 2059 97.7 °F (36.5 °C) 74 20 (!) 111/92 99 %   ·   · GEN: NAD, AAO x 4  · HEENT: NCAT, PERRLA  · HEART: S1, S2+, RRR, No murmur   · Lungs: CTA B/l, no wheeze/rhonchi   · Abdomen: soft, ND, NT, +BS   · Genitourinary: no genital discharge, no fish  · Extremities: right index finger swelling, some tenderness, no drainage   · Skin: no rash      Labs:   Recent Labs     03/22/22 2219   WBC 6.6   HGB 9.6*   HCT 28.0*   *     Recent Labs     03/22/22  2220   BUN 33*   CREA 2.51*       Lab Results   Component Value Date/Time    C-Reactive protein 3.15 (H) 03/22/2022 10:20 PM     Microbiology Data:   - Blood Cx 03/23: Pending               - Wound Cx 03/22: Pending     Imaging:   Right hand X-ray 03/22: nonspecific and could represent infection with osteomyelitis  and septic joint and gas in the soft tissues related to infection    Assessment / Plan:     1. Right index finger swelling/tenderness, soft tissue swelling/septic arthritis and osteomyelitis on X-ray       Still w sig swelling, some tenderness on exam, no drainage appreciated       Afebrile w a normal WBC on routine labs. Mildly elevated CRP of 3.15 and ESR 35      Blood and superficial wound Cx on current admit at pending, MRSA screen neg in 02/2022      I&D planned by ortho. Continue on Vanc and Zosyn for now      Routine labs in the morning     2. Acute renal failure: baseline renal function unknown     3. H/o gouty arthritis, ? Related to # 1    4. H/o alcoholism w resulting liver cirrhosis     5.  Seizure d/o: Controlled on Brianna Naranjo MD     3/23/2022

## 2022-03-23 NOTE — PROGRESS NOTES
Skin assessment done with Jessica Turcios RN. Wound noted to right pointer finger, buttocks and sacrum. Scattered scratches to lower extremities. Bruise to left hand. Abdomen distended.

## 2022-03-23 NOTE — PROGRESS NOTES
11:18 patient sitting up at edge of bed between rails. Assisted with meals, unable to get temperature orally and axillary. tolereated medications whole with water, tolerated few bites of waffles, 100% orange juice. Reluctant to get back into bed. Remains sitting up at edge of bed. Bed alarm in use call bell within reach. 13:30 Assisted with meals, tolerated 100%  Jello, few bites of vegetables, 100% ginger ale. Remain sitting up at edge of bed, hands weak anything patient holds falls out of his hand, patient unable to hold on to things,  Dr Tyrone Holcomb at bedside. Lactic acid 3.4 called to Dr Arnel Lu.

## 2022-03-23 NOTE — CONSULTS
Nephrology Consult    Patient: Javier Beach MRN: [id number]  SSN: [id number]    YOB: 1960  Age: 60 y.o. Sex: male      Subjective:   Reason for the consultation. Acute kidney injury on chronic kidney disease stage IIIa  History of present illness. The patient is 27-year-old man with underlying history of alcoholic abuse, encephalopathy, liver cirrhosis, seizure disorder, history of gout, chronic kidney disease stage IIIa was admitted with swelling of the right second finger diagnosed with septic arthritis status post I&D done by orthopedic and gave 1 dose of IV vancomycin 1 g and switch to Zosyn, wound cultures are pending. On admission his creatinine was elevated at 2.51 which prompted a nephrology consultation. His last creatinine was 1.7 on 3/4/2023. He was noticed to be on amiloride. No noticed lower extremity swelling. No abdominal pain nausea vomiting or loose stools. No urinary symptoms voiding issues or gross hematuria. Past Medical History:   Diagnosis Date    Arthritis     Hypertension      Past Surgical History:   Procedure Laterality Date    IR PARACENTESIS ABD W IMAGE  1/21/2022    IR PARACENTESIS ABD W IMAGE  3/1/2022      No family history on file.   Social History     Tobacco Use    Smoking status: Never Smoker    Smokeless tobacco: Never Used   Substance Use Topics    Alcohol use: Not on file      Current Facility-Administered Medications   Medication Dose Route Frequency Provider Last Rate Last Admin    allopurinoL (ZYLOPRIM) tablet 300 mg  300 mg Oral DAILY Lamberto Ackerman MD   300 mg at 03/23/22 1117    aMILoride (MIDAMOR) tablet 5 mg  5 mg Oral DAILY Lamberto Ackerman MD   5 mg at 03/23/22 0900    [START ON 3/24/2022] ergocalciferol capsule 50,000 Units  50,000 Units Oral Q7D Lamberto Ackerman MD        famotidine (PEPCID) tablet 20 mg  20 mg Oral QPM Lamberto Ackerman MD        folic acid (FOLVITE) tablet 1 mg  1 mg Oral DAILY Brad Ackerman MD   1 mg at 03/23/22 1118    piperacillin-tazobactam (ZOSYN) 3.375 g in 0.9% sodium chloride (MBP/ADV) 100 mL MBP  3.375 g IntraVENous Q8H Lamberto Ackerman MD 25 mL/hr at 03/23/22 1117 3.375 g at 03/23/22 1117    lactulose (CHRONULAC) 10 gram/15 mL solution 15 mL  15 mL Oral TID Leslee Ackerman MD   15 mL at 03/23/22 1117    levETIRAcetam (KEPPRA) tablet 1,000 mg  1,000 mg Oral BID Lamberto Ackerman MD   1,000 mg at 03/23/22 1118    levothyroxine (SYNTHROID) tablet 25 mcg  25 mcg Oral 6am Leslee Ackerman MD   25 mcg at 03/23/22 5379    propranoloL (INDERAL) tablet 20 mg  20 mg Oral BID Lamberto Ackerman MD   20 mg at 03/23/22 1117    sodium bicarbonate tablet 650 mg  650 mg Oral TID Leslee Ackerman MD   650 mg at 03/23/22 1119    thiamine mononitrate (B-1) tablet 100 mg  100 mg Oral DAILY Lamberto Ackerman MD   100 mg at 03/23/22 1118    acetaminophen (TYLENOL) tablet 650 mg  650 mg Oral Q6H PRN Leslee Ackerman MD        Vancomycin Pharmacy Dosing   Other Rx Dosing/Monitoring Leslee Ackerman MD        Vancomycin Random Level 3-23-22 at 1800   Other Damaris Wen MD            No Known Allergies    Review of Systems:  A comprehensive review of systems was negative except for that written in the History of Present Illness.     Objective:     Vitals:    03/22/22 2308 03/23/22 0054 03/23/22 0900 03/23/22 1117   BP:  115/87 120/86 120/86   Pulse:  72 73 74   Resp: 16 18  20   Temp:       SpO2: 100% 99%  100%   Weight:       Height:            Physical Exam:  General: NAD  Eyes: sclera anicteric  Oral Cavity: No thrush or ulcers  Neck: no JVD  Chest: Fair bilateral air entry  Heart: normal sounds  Abdomen: soft and non tender   : no fish  Lower Extremities: no edema  Skin: no rash  Neuro: intact  Psychiatric: non-depressed            Assessment:     Hospital Problems  Never Reviewed          Codes Class Noted POA    Finger infection ICD-10-CM: L08.9  ICD-9-CM: 686.9  3/22/2022 Unknown Plan:   1 acute kidney injury on chronic kidney disease stage IIIA. -Etiology can be prerenal plus on amiloride.    -On admission creatinine was elevated at 2.51.    -His last creatinine was 1.7 on 3/4/2022.    -Clinically euvolemic with no evidence of lower extremity edema.    -I will hold amiloride.    -I will consider starting IV fluids only if worsening renal functions. - He is not on any other potential nephrotoxins. 2.  Septic arthritis.    -He presented with swollen right hand finger diagnosed with septic arthritis status post I&D received IV antibiotics per orthopedic.    3 hypertension.    -Blood pressure was lowish on admission.    -Now in better control.    -I will hold amiloride. On no other blood pressure medications. 4.  Anemia.    -Hemoglobin is 9.6.    -I will send iron studies and ferritin level. 5.  Metabolic acidosis. -Resolved. CO2 is 20. I will discontinue p.o. sodium bicarbonate. Thank you for the consultation.     Signed By: Estefani Car MD     March 23, 2022

## 2022-03-23 NOTE — PROGRESS NOTES
03/23/22 0515  IP CONSULT TO PHARMACY - VANCOMYCIN DOSING  ONE TIME     Complete     Comments: Pharmacy will order the necessary lab work, if needed, to complete the dosing and ongoing monitoring of requested drug therapy. Details will be updated within the patients Progress Notes. Ordering Provider: Nasrin Robles MD   Authorizing Provider: Nasrin Robles MD   Question Answer Comment   Antibiotic Indications Other    Other Abx Indication infection of skin        03     Patient received Vancomycin 1000 mg at 2245 on 3-. Will order random Vancomycin level for today at 1800.   Pharmacy to follow

## 2022-03-23 NOTE — WOUND CARE
IP WOUND CONSULT    [address] [place] [address] RECORD NUMBER:  [id number]  AGE: 60 y.o. GENDER: male  : 1960  TODAY'S DATE:  3/23/2022    GENERAL     [] Follow-up   [x] New Consult    Destiny Duenas is a 60 y.o. male referred by:   [] Physician  [x] Nursing  [] Other:         PAST MEDICAL HISTORY    Past Medical History:   Diagnosis Date    Arthritis     Hypertension         PAST SURGICAL HISTORY    Past Surgical History:   Procedure Laterality Date    IR PARACENTESIS ABD W IMAGE  2022    IR PARACENTESIS ABD W IMAGE  3/1/2022       FAMILY HISTORY    No family history on file. ALLERGIES    No Known Allergies    MEDICATIONS    No current facility-administered medications on file prior to encounter. Current Outpatient Medications on File Prior to Encounter   Medication Sig Dispense Refill    levothyroxine (SYNTHROID) 25 mcg tablet Take 25 mcg by mouth Daily (before breakfast).  acetaminophen (TylenoL) 325 mg tablet Take 650 mg by mouth every six (6) hours as needed for Pain.  tamsulosin (Flomax) 0.4 mg capsule Take 1 Capsule by mouth daily. 30 Capsule 1    potassium chloride (K-DUR, KLOR-CON M20) 20 mEq tablet Take 1 Tablet by mouth daily. 30 Tablet 0    sodium bicarbonate 650 mg tablet Take 1 Tablet by mouth three (3) times daily. 90 Tablet 0    chlordiazePOXIDE (LIBRIUM) 5 mg capsule Take 2 Capsules by mouth three (3) times daily as needed for Anxiety. Max Daily Amount: 30 mg. 9 Capsule 0    levETIRAcetam (Keppra) 1,000 mg tablet Take 1.5 Tablets by mouth two (2) times a day. 60 Tablet 0    ergocalciferol (ERGOCALCIFEROL) 1,250 mcg (50,000 unit) capsule Take 1 Capsule by mouth every seven (7) days.  lactulose (CHRONULAC) 10 gram/15 mL solution Take 15 mL by mouth three (3) times daily. 200 mL 0    allopurinoL (ZYLOPRIM) 300 mg tablet Take 1 Tablet by mouth daily.  thiamine HCL (B-1) 100 mg tablet Take 100 mg by mouth daily.       propranoloL (INDERAL) 20 mg tablet Take 20 mg by mouth two (2) times a day.  folic acid (FOLVITE) 1 mg tablet Take 1 mg by mouth daily.  famotidine (PEPCID) 20 mg tablet Take 20 mg by mouth At bedtime.  aMILoride (MIDAMOR) 5 mg tablet Take 5 mg by mouth daily. [unfilled]  Visit Vitals  /60   Pulse 80   Temp 97.7 °F (36.5 °C)   Resp 20   Ht 6' 2\" (1.88 m)   Wt 83.9 kg (185 lb)   SpO2 99%   BMI 23.75 kg/m²       ASSESSMENT     Wound Identification & Type: Stage 2 PI  Dressing change: Yes, Therahoney and foam  Verbal consent for picture: Non-verbal    Contributing Factors: decreased mobility, shear force, incontinence of stool, incontinence of urine and Hx of alcohol abuse    Wound Gluteal fold/cleft Posterior blisters noted 02/25/22 (Active)   Wound Image    03/23/22 1455   Dressing Status New dressing applied 03/23/22 1455   Cleansed Cleansed with saline 03/23/22 1455   Dressing/Treatment Honey gel/honey paste; Foam 03/23/22 1455   Dressing Change Due 03/24/22 03/23/22 1455   Wound Length (cm) 10 cm 03/23/22 1455   Wound Width (cm) 3 cm 03/23/22 1455   Wound Depth (cm) 0.1 cm 03/23/22 1455   Wound Surface Area (cm^2) 30 cm^2 03/23/22 1455   Change in Wound Size % -11.11 03/23/22 1455   Wound Volume (cm^3) 3 cm^3 03/23/22 1455   Wound Healing % -11 03/23/22 1455   Wound Assessment Slough 03/23/22 1455   Drainage Amount Scant 03/23/22 1455   Drainage Description Serosanguinous 03/23/22 1455   Wound Odor None 03/23/22 1455   Peyton-Wound/Incision Assessment Intact 03/23/22 1455   Edges Defined edges 03/23/22 1455   Number of days: 26          PLAN     Skin Care & Pressure Relief Recommendations  Minimize layers of linen  Pads under patient to optimize support surface  Turn/reposition approximately every 2 hours  Pillow wedges  Manage incontinence   Promote continence; Skin Protective lotion/cream to buttocks and sacrum daily and as needed with incontinence care  Offload heels pillows    Curtis: 14  Blood Glucose: 76                             Albumin:  2.0  WBCs:  6.6    Support Surface: Isoflex Gel mattress    Physician/Provider notified:   Recommendations: Patient has stage 2 PI to left buttocks/sacrum with MASD. Patient is incontinent of bowel and bladder, patient has a primofit external catheter in place. Slough noted to wound bed, Therahoney applied to wound bed and covered with Optifoam sacral dressing. Patient is able to lay on side independently, assist patient with turning approximately every 2 hours for offloading and pressure reduction. Patient has dressing in place to right pointer finger, patient to have surgery on finger. Wound to be followed by Orthopedics. WCN to continue to follow buttocks/sacral wound, consult if any changes to skin condition.     Discharge Wound Care Needs:    Teaching completed with:   [] Patient           [] Family member       [] Caregiver          [] Nursing  [] Other    Patient/Caregiver Teaching:  Level of patient/caregiver understanding able to:   [] Indicates understanding       [] Needs reinforcement  [] Unsuccessful      [] Verbal Understanding  [] Demonstrated understanding       [] No evidence of learning  [] Refused teaching         [] N/A       Electronically signed by Amilcar Dseai on 3/23/2022 at 3:01 PM

## 2022-03-24 NOTE — PROGRESS NOTES
10: 50 Attempted x 2 to restart IV line,  Un-successfull. Dr Tiana Alvarez at bedside. 11:15 PICC team paged. And requested restart.

## 2022-03-24 NOTE — PROGRESS NOTES
Patient transferred to ICU. ST will require new orders to follow. Please place ST consult as medically indicated.

## 2022-03-24 NOTE — PROGRESS NOTES
Patient transported to ICU at approx. 1300 by Mari Steve and two other staff members. Assessment completed. Bearhugger placed. PICC placed IVs and now receiving bolus per order given. LUL Irby,  from Ortho called to notify of cancellation of I&D today. Will restart diet. Update: Patient back NPO with sips with meds at [address] per order.

## 2022-03-24 NOTE — PROGRESS NOTES
Dr Moses Ge paged. Message left to call unit. ICU called blanketrol in use,  [place] [phone] called for warming blanket(Blanketrol) not available, unable to find. Dr Mariah Harvey served. Dr Brien Knutson @ [phone]. Placed on hold. Message left  With Saira for doctor to call. Dr Moses Ge notified. 10:23 Dr Herrera Rm at bedside. Gave order for  NS bolus of 500 ml and then D5NS at 100 ml/hour,  Transfer to ICU if temperature does not improve.  at bedside.

## 2022-03-24 NOTE — PROGRESS NOTES
PROGRESS NOTE    Patient: Deanna Castillo MRN: [id number]  SSN: [id number]    YOB: 1960  Age: 60 y.o. Sex: male      Admit Date: 3/22/2022    LOS: 2 days       Subjective     Chief Complaint   Patient presents with    Finger Swelling       HPI: Deanna Castillo is a(n) 60 y.o. male with PMH significant for etoh abuse, encephalopathy, cirrhosis, seizure disorder, gout presents with c/o swelling to right 2nd finger. Patient is nonverbal at baseline and dependent on all ADL at [address] [place] [address]. According to nursing home staff, has future orthopedic appointment for this finger. Is non-verbal at baseline but appears awake, alert, and in no obvious distress.     Patient was recently discharged from the hospital after having a seizures at that time patient received Keppra for the seizures patient had paracentesis done during the last admission also endoscopy done and had banding of the varices     I&D performed on finger in ED with purulent fluid drained. Dressing in place covering the DIP. 1g vancomycin given in ED, switched to zosyn on admission. Wound cultures sent. XR of the finger shows soft tissue swelling in gas consistent with osteomyelitis, septic joint, or gouty arthropathy.     Labs: Hgb 9.6 (baseline). Creatinine 2.51. CRP 3.15. ESR 35. Wound and blood cultures pending. 3/24:  Patient seen resting in bed. Nonverbal at baseline. Rectal temperature 91.1F per nursing staff. Blood pressure soft, patient may be septic. Vanc and Zosyn started per ID consult. Lactate 3.4. Blood and wound cultures still pending. Per orthopedic surgery, patient to have debridement of joint today under local anesthesia. He will likely need IV abx for 4-6 weeks for septic joint. Per nephrology, the patients increased creatinine of 2.5 may be due to amiloride, which is now held. Iron studies also obtained for Hgb 9.6. Labs remarkable for: Hgb 9.6. Creatinine 2.47.       Past Medical History:   Diagnosis Date    Arthritis      Hypertension                 Past Surgical History:   Procedure Laterality Date    IR PARACENTESIS ABD W IMAGE   1/21/2022    IR PARACENTESIS ABD W IMAGE   3/1/2022                 Prior to Admission medications    Medication Sig Start Date End Date Taking? Authorizing Provider   levothyroxine (SYNTHROID) 25 mcg tablet Take 25 mcg by mouth Daily (before breakfast).     Yes Provider, Historical   acetaminophen (TylenoL) 325 mg tablet Take 650 mg by mouth every six (6) hours as needed for Pain.     Yes Provider, Historical   tamsulosin (Flomax) 0.4 mg capsule Take 1 Capsule by mouth daily. 3/4/22     Zoie Whyte PA-C   potassium chloride (K-DUR, KLOR-CON M20) 20 mEq tablet Take 1 Tablet by mouth daily. 3/2/22     Kali Andujar MD   sodium bicarbonate 650 mg tablet Take 1 Tablet by mouth three (3) times daily. 3/2/22     David Dewitt MD   chlordiazePOXIDE (LIBRIUM) 5 mg capsule Take 2 Capsules by mouth three (3) times daily as needed for Anxiety. Max Daily Amount: 30 mg. 3/2/22     David Dewitt MD   levETIRAcetam (Keppra) 1,000 mg tablet Take 1.5 Tablets by mouth two (2) times a day. 3/2/22     David Dewitt MD   ergocalciferol (ERGOCALCIFEROL) 1,250 mcg (50,000 unit) capsule Take 1 Capsule by mouth every seven (7) days. 2/9/22     Provider, Historical   lactulose (CHRONULAC) 10 gram/15 mL solution Take 15 mL by mouth three (3) times daily. 1/23/22     Chris Ackerman MD   allopurinoL (ZYLOPRIM) 300 mg tablet Take 1 Tablet by mouth daily.  1/18/22     Provider, Historical   thiamine HCL (B-1) 100 mg tablet Take 100 mg by mouth daily.       Provider, Historical   propranoloL (INDERAL) 20 mg tablet Take 20 mg by mouth two (2) times a day.       Provider, Historical   folic acid (FOLVITE) 1 mg tablet Take 1 mg by mouth daily.       Provider, Historical   famotidine (PEPCID) 20 mg tablet Take 20 mg by mouth At bedtime.       Provider, Historical   aMILoride (MIDAMOR) 5 mg tablet Take 5 mg by mouth daily.       Provider, Historical         No Known Allergies      No family history on file. Reason unable to perform ROS: nonverbal at baseline. Objective     Visit Vitals  /70   Pulse (!) 58 Comment: notified nurse   Temp (!) 90.4 °F (32.4 °C) Comment: notified nurse   Resp 16   Ht 6' 2\" (1.88 m)   Wt 185 lb (83.9 kg)   SpO2 100%   BMI 23.75 kg/m²      O2 Device: None (Room air)    Physical Exam:   Constitutional:       General: He is not in acute distress. HENT:      Head: Normocephalic and atraumatic. Cardiovascular:      Rate and Rhythm: Normal rate and regular rhythm. Pulses: Normal pulses. Heart sounds: Normal heart sounds. Pulmonary:      Effort: Pulmonary effort is normal.      Breath sounds: Normal breath sounds. Abdominal:      General: There is distension. Tenderness: There is no abdominal tenderness. There is no guarding or rebound. Skin:     Findings: Erythema present. Comments: Swelling to the right 2nd digit   Neurological:      Mental Status: Mental status is at baseline. Comments: Nonverbal     Intake & Output:  Current Shift: No intake/output data recorded. Last three shifts: 03/22 1901 - 03/24 0700  In: 0 [P.O.:358]  Out: 825 [Urine:825]    Lab/Data Review:   All lab data reviewed      24 Hour Results:    Recent Results (from the past 24 hour(s))   LACTIC ACID    Collection Time: 03/23/22 12:12 PM   Result Value Ref Range    Lactic acid 3.4 (HH) 0.4 - 2.0 mmol/L   RENAL FUNCTION PANEL    Collection Time: 03/23/22  2:53 PM   Result Value Ref Range    Sodium 146 (H) 136 - 145 mmol/L    Potassium Hemolyzed, recollect requested 3.5 - 5.1 mmol/L    Chloride 122 (H) 97 - 108 mmol/L    CO2 18 (L) 21 - 32 mmol/L    Anion gap 6 5 - 15 mmol/L    Glucose 98 65 - 100 mg/dL    BUN 34 (H) 6 - 20 mg/dL    Creatinine 2.47 (H) 0.70 - 1.30 mg/dL    BUN/Creatinine ratio 14 12 - 20      GFR est AA 32 (L) >60 ml/min/1.73m2    GFR est non-AA 27 (L) >60 ml/min/1.73m2    Calcium 9.0 8.5 - 10.1 mg/dL    Phosphorus 4.3 2.6 - 4.7 mg/dL    Albumin 2.1 (L) 3.5 - 5.0 g/dL   VANCOMYCIN, RANDOM    Collection Time: 03/23/22  5:36 PM   Result Value Ref Range    Vancomycin, random 9.6 ug/mL         Imaging:    XR 2ND FINGER RT MIN 2 V   Final Result      Progressive findings. The findings are nonspecific and could represent infection with osteomyelitis   and septic joint and gas in the soft tissues related to infection. Gout, or other inflammatory arthropathy is a possibility. Findings should be correlated with clinical parameters.                 Assessment     Septic joint- right 2nd finger  SEPSIS  Anemia- transfuse if Hgb < 7.0  Acute on chronic renal failure- creatinine 2.51 on admission     Hx etoh abuse  Cirrhosis of liver   History of encephalopathy  Seizure disorder  History of gout    Plan     Admit to medical telemetry floor  SEPSIS- continue vanc and zosyn  Hypothermia- warming blanket  Anemia- transfuse if Hgb < 7.0  I&D of right 2nd finger performed in ED  Consult wound care  Consult pharmacy for vancomycin dosing  Consult nephrology  Consult ID  Speech therapy  PT OT     Redraw potassium- hemolyzed     Zosyn 3.375g Q8  Allopurinol 300mg daily  amloride 5mg daily  Ergocalciferol 50,000 units weekly  Famotidine 42ZV daily  Folic acid 1mg daily  Lactulose 10g/15mL TID  Keppra 1000mg BID  Levothyroxine 25mcg daily  Propanolol 20mg BID  Sodium bicarbonate 650mg TID  Thiamine 100mg daily     Vancomycin 1g- once  Vancomycin per pharmacy dosing      Current Facility-Administered Medications:     allopurinoL (ZYLOPRIM) tablet 300 mg, 300 mg, Oral, DAILY, Lamberto Ackerman MD, 300 mg at 03/23/22 1117    [Held by provider] aMILoride (MIDAMOR) tablet 5 mg, 5 mg, Oral, DAILY, Lamberto Ackerman MD, 5 mg at 03/23/22 0900    ergocalciferol capsule 50,000 Units, 50,000 Units, Oral, Q7D, Bryant Ackreman MD  [place] famotidine (PEPCID) tablet 20 mg, 20 mg, Oral, QPM, Lamberto Ackerman MD, 20 mg at 26/17/46 4142    folic acid (FOLVITE) tablet 1 mg, 1 mg, Oral, DAILY, Lamberto Ackerman MD, 1 mg at 03/23/22 1118    piperacillin-tazobactam (ZOSYN) 3.375 g in 0.9% sodium chloride (MBP/ADV) 100 mL MBP, 3.375 g, IntraVENous, Q8H, Lamberto Ackerman MD, Last Rate: 25 mL/hr at 03/24/22 0359, 3.375 g at 03/24/22 0359    lactulose (CHRONULAC) 10 gram/15 mL solution 15 mL, 15 mL, Oral, TID, Lamberto Ackerman MD, 15 mL at 03/23/22 2220    levETIRAcetam (KEPPRA) tablet 1,000 mg, 1,000 mg, Oral, BID, Lamberto Ackerman MD, 1,000 mg at 03/23/22 2050    levothyroxine (SYNTHROID) tablet 25 mcg, 25 mcg, Oral, 6am, Lamberto Ackerman MD, 25 mcg at 03/24/22 0606    propranoloL (INDERAL) tablet 20 mg, 20 mg, Oral, BID, Lamberto Ackerman MD, 20 mg at 03/23/22 2050    thiamine mononitrate (B-1) tablet 100 mg, 100 mg, Oral, DAILY, Lamberto Ackerman MD, 100 mg at 03/23/22 1118    acetaminophen (TYLENOL) tablet 650 mg, 650 mg, Oral, Q6H PRN, Lamberto Ackerman MD    Vancomycin Pharmacy Dosing, , Other, Rx Dosing/Monitoring, Tommy Ackerman MD    Current Outpatient Medications   Medication Instructions    acetaminophen (TYLENOL) 650 mg, Oral, EVERY 6 HOURS AS NEEDED    allopurinoL (ZYLOPRIM) 300 mg tablet 1 Tablet, Oral, DAILY    aMILoride (MIDAMOR) 5 mg, Oral, DAILY    chlordiazePOXIDE (LIBRIUM) 10 mg, Oral, 3 TIMES DAILY AS NEEDED    ergocalciferol (ERGOCALCIFEROL) 1,250 mcg (50,000 unit) capsule 1 Capsule, Oral, EVERY 7 DAYS    famotidine (PEPCID) 20 mg, Oral, AT BEDTIME    folic acid (FOLVITE) 1 mg, Oral, DAILY    lactulose (CHRONULAC) 10 gram/15 mL solution 15 mL, Oral, 3 TIMES DAILY    levETIRAcetam (KEPPRA) 1,500 mg, Oral, 2 TIMES DAILY    levothyroxine (SYNTHROID) 25 mcg, Oral, DAILY BEFORE BREAKFAST    potassium chloride (K-DUR, KLOR-CON M20) 20 mEq tablet 20 mEq, Oral, DAILY    propranoloL (INDERAL) 20 mg, Oral, 2 TIMES DAILY  sodium bicarbonate 650 mg, Oral, 3 TIMES DAILY    tamsulosin (FLOMAX) 0.4 mg, Oral, DAILY    thiamine HCL (B-1) 100 mg, Oral, DAILY         Signed By: Indio Hodges     March 24, 2022

## 2022-03-24 NOTE — PROGRESS NOTES
PT consult received and PT screen completed. Pt from LTC facility and per CM is planning to return to LTC facility upon d/c. At the current time, pt appears to be at baseline and does not indicate the need for skilled acute PT. Will plan to d/c PT orders at this time, will be happy to reassess.

## 2022-03-24 NOTE — PROGRESS NOTES
TRANSFER - OUT REPORT:    Verbal report given to Ellen Xiong  being transferred to ICU(unit) for change in patient condition(temperature low, heart rate low)       Report consisted of patients Situation, Background, Assessment and   Recommendations(SBAR). Information from the following report(s) SBAR and MAR was reviewed with the receiving nurse. Lines:       Opportunity for questions and clarification was provided.       Patient transported with:   Monitor  Registered Nurse

## 2022-03-24 NOTE — PROGRESS NOTES
Problem: Falls - Risk of  Goal: *Absence of Falls  Description: Document Tsering Canales Fall Risk and appropriate interventions in the flowsheet.   Outcome: Progressing Towards Goal  Note: Fall Risk Interventions:  Mobility Interventions: Bed/chair exit alarm,Patient to call before getting OOB    Mentation Interventions: Bed/chair exit alarm,Door open when patient unattended,More frequent rounding,Reorient patient    Medication Interventions: Bed/chair exit alarm,Patient to call before getting OOB,Teach patient to arise slowly    Elimination Interventions: Bed/chair exit alarm,Call light in reach,Patient to call for help with toileting needs

## 2022-03-24 NOTE — PROGRESS NOTES
Infectious Disease Progress Note           Subjective:   Hypothermic this morning, transferred to the ICU for close monitoring. Confused, scheduled for debridement of right index finger by ortho today   Objective:   Physical Exam:     Visit Vitals  BP 99/75 (BP 1 Location: Left upper arm, BP Patient Position: At rest)   Pulse 69   Temp (!) 94.6 °F (34.8 °C)   Resp 15   Ht 6' 2\" (1.88 m)   Wt 185 lb (83.9 kg)   SpO2 99%   BMI 23.75 kg/m²      O2 Device: None (Room air)    Temp (24hrs), Av.9 °F (33.8 °C), Min:90.4 °F (32.4 °C), Max:97.9 °F (36.6 °C)    No intake/output data recorded.  1901 -  0700  In: 0 [P.O.:358]  Out: 825 [Urine:825]    General: NAD, AAO x 4  HEENT: HEIDY, Moist mucosa   Lungs: CTA b/l\, decreased at the bases, no wheeze/rhonchi   Heart: S1S2+, RRR, no murmur  Abdo: Soft, NT, ND, +BS   : No fish   Exts:Right index finger dressing in place   Skin: No pressure ulcers       Data Review:       Recent Days:  Recent Labs     22  1021 22  2219   WBC 6.2 6.6   HGB 9.0* 9.6*   HCT 26.0* 28.0*   PLT 79* 110*     Recent Labs     22  1021 22  1453 22  2220   BUN 35* 34* 33*   CREA 2.40* 2.47* 2.51*       Lab Results   Component Value Date/Time    C-Reactive protein 3.15 (H) 2022 10:20 PM          Microbiology     Results     Procedure Component Value Units Date/Time    MRSA SCREEN - PCR (NASAL) [[id number]] Collected: 22 1256    Order Status: Completed Specimen: Swab Updated: 22 1456     MRSA by PCR, Nasal Not Detected       COVID-19 RAPID TEST [[id number]] Collected: 22 0918    Order Status: Completed Specimen: Nasopharyngeal Updated: 22 0955     COVID-19 rapid test Not Detected        Comment: Rapid [employer] ID Now   Rapid NAAT:  The specimen is NEGATIVE for SARS-CoV-2, the novel coronavirus associated with COVID-19.    Negative results should be treated as presumptive and, if inconsistent with clinical signs and symptoms or necessary for patient management, should be tested with an alternative molecular assay. Negative results do not preclude SARS-CoV-2 infection and should not be used as the sole basis for patient management decisions. This test has been authorized by the FDA under   an Emergency Use Authorization (EUA) for use by authorized laboratories. Fact sheet for Healthcare Providers: My [employer] Fact sheet for Patients: My [employer]   Methodology: Isothermal Nucleic Acid Amplification         CULTURE, BLOOD, PAIRED [[id number]] Collected: 03/23/22 0000    Order Status: Completed Specimen: Blood Updated: 03/23/22 0021    CULTURE, Sherrin Pen STAIN [[id number]] Collected: 03/22/22 2219    Order Status: Completed Specimen: Wound Updated: 03/24/22 0839     Special Requests: No Special Requests        GRAM STAIN Few WBCs seen         No organisms seen        Culture result:       Few Staphylococcus species, coagulase negative                   Diagnostics   CXR Results  (Last 48 hours)    None             Assessment/Plan     1. Right index finger swelling/tenderness, soft tissue swelling/septic arthritis and osteomyelitis on X-ray       Afebrile w a normal WBC on routine labs. CoNS isolated from wound Cx (03/22)       Reportedly scheduled for wound debridement by ortho       Continue on Zosyn and Vanc pending final wound Cx        Routine labs in the morning      2. Hypothermia, ? From sepsis, hypotensive, and bradycardic       Normal WBC on routine labs. Coag neg staph isolated from wound Cx, BCx neg       Continue on vanc and Zosyn as above     3. Acute renal failure: Cr staying stable      4. H/o gouty arthritis, ?  Related to # 1     5. H/o alcoholism w resulting liver cirrhosis      6. Seizure d/o: Controlled on Tracie Tellez MD    3/24/2022

## 2022-03-24 NOTE — PROGRESS NOTES
Orthopedic progress note    Date:3/24/2022       Room:[facility]  Patient Name:Waldo Rangel     YOB: 1960     Age:62 y.o. Subjective    57-year-old male seen in follow-up for infected right index finger. Patient nonverbal.  Patient was scheduled to have surgical I&D of right index finger performed today. Surgery is being canceled today. Patient is being transferred to ICU secondary to bradycardia.   Objective           Vitals Last 24 Hours:  TEMPERATURE:  Temp  Av.7 °F (33.7 °C)  Min: 90.4 °F (32.4 °C)  Max: 97.9 °F (36.6 °C)  RESPIRATIONS RANGE: Resp  Avg: 15.8  Min: 15  Max: 18  PULSE OXIMETRY RANGE: SpO2  Av %  Min: 100 %  Max: 100 %  PULSE RANGE: Pulse  Av  Min: 55  Max: 70  BLOOD PRESSURE RANGE: Systolic (11GCS), SEC:964 , Min:100 , ZRS:978   ; Diastolic (28GII), LNX:25, Min:70, Max:87    Current Facility-Administered Medications   Medication Dose Route Frequency    sodium chloride 0.9 % bolus infusion 500 mL  500 mL IntraVENous ONCE    dextrose 5% and 0.9% NaCl infusion  100 mL/hr IntraVENous CONTINUOUS    allopurinoL (ZYLOPRIM) tablet 300 mg  300 mg Oral DAILY    [Held by provider] aMILoride (MIDAMOR) tablet 5 mg  5 mg Oral DAILY    ergocalciferol capsule 50,000 Units  50,000 Units Oral Q7D    famotidine (PEPCID) tablet 20 mg  20 mg Oral QPM    folic acid (FOLVITE) tablet 1 mg  1 mg Oral DAILY    piperacillin-tazobactam (ZOSYN) 3.375 g in 0.9% sodium chloride (MBP/ADV) 100 mL MBP  3.375 g IntraVENous Q8H    lactulose (CHRONULAC) 10 gram/15 mL solution 15 mL  15 mL Oral TID    levETIRAcetam (KEPPRA) tablet 1,000 mg  1,000 mg Oral BID    levothyroxine (SYNTHROID) tablet 25 mcg  25 mcg Oral 6am    propranoloL (INDERAL) tablet 20 mg  20 mg Oral BID    thiamine mononitrate (B-1) tablet 100 mg  100 mg Oral DAILY    acetaminophen (TYLENOL) tablet 650 mg  650 mg Oral Q6H PRN    Vancomycin Pharmacy Dosing   Other Rx Dosing/Monitoring          I/O (24Hr): Intake/Output Summary (Last 24 hours) at 3/24/2022 1609  Last data filed at 3/24/2022 0606  Gross per 24 hour   Intake    Output 550 ml   Net -550 ml     Objective  Labs/Imaging/Diagnostics    Labs:  CBC:  Recent Labs     03/24/22  1021 03/22/22  2219   WBC 6.2 6.6   RBC 3.14* 3.34*   HGB 9.0* 9.6*   HCT 26.0* 28.0*   MCV 82.8 83.8   RDW 18.3* 18.2*   PLT 79* 110*     CHEMISTRIES:  Recent Labs     03/24/22  1021 03/23/22  1453 03/22/22  2220    146* 145   K 5.2* Hemolyzed, recollect requested Hemolyzed, recollect requested   * 122* 121*   CO2 17* 18* 20*   BUN 35* 34* 33*   CREA 2.40* 2.47* 2.51*   CA 9.0 9.0 9.2   PHOS 3.8 4.3  --    PT/INR:No results for input(s): INR, INREXT in the last 72 hours. No lab exists for component: PROTIME  APTT:No results for input(s): APTT in the last 72 hours. LIVER PROFILE:No results for input(s): AST, ALT in the last 72 hours. No lab exists for component: Krystin Nailer, ALKPHOS  Lab Results   Component Value Date/Time    ALT (SGPT) 12 03/02/2022 03:40 AM    AST (SGOT) 42 (H) 03/02/2022 03:40 AM    Alk. phosphatase 46 03/02/2022 03:40 AM    Bilirubin, direct 1.2 (H) 03/01/2022 08:52 AM    Bilirubin, total 1.8 (H) 03/02/2022 03:40 AM           Assessment//Plan           Patient Active Problem List    Diagnosis Date Noted    Finger infection 03/22/2022    Status epilepticus ([address] 02/24/2022    Acute renal failure (ARF) [facility] 01/20/2022    Cirrhosis of liver ([facility] [address] 01/20/2022    GRACIA (acute kidney injury) ([facility] [address] 01/20/2022     Infected right index finger  Surgery postponed. Patient is medically unstable to proceed with surgical intervention today. We will proceed with surgical intervention once patient is medically and cardiac cleared to proceed.       Electronically signed by Darin Castorena PA-C on 3/24/2022 at 4:09 PM

## 2022-03-24 NOTE — PROGRESS NOTES
PROGRESS NOTE    Patient: Florence Rodriguez MRN: [id number]  SSN: [id number]    YOB: 1960  Age: 60 y.o. Sex: male      Admit Date: 3/22/2022    LOS: 2 days       Subjective     Chief Complaint   Patient presents with    Finger Swelling       HPI: Florence Rodriguez is a(n) 60 y.o. male with PMH significant for etoh abuse, encephalopathy, cirrhosis, seizure disorder, gout presents with c/o swelling to right 2nd finger. Patient is nonverbal at baseline and dependent on all ADL at [address] [place] [address]. According to nursing home staff, has future orthopedic appointment for this finger. Is non-verbal at baseline but appears awake, alert, and in no obvious distress.     Patient was recently discharged from the hospital after having a seizures at that time patient received Keppra for the seizures patient had paracentesis done during the last admission also endoscopy done and had banding of the varices     I&D performed on finger in ED with purulent fluid drained. Dressing in place covering the DIP. 1g vancomycin given in ED, switched to zosyn on admission. Wound cultures sent. XR of the finger shows soft tissue swelling in gas consistent with osteomyelitis, septic joint, or gouty arthropathy.     Labs: Hgb 9.6 (baseline). Creatinine 2.51. CRP 3.15. ESR 35. Wound and blood cultures pending. 3/24:  Patient seen resting in bed. Nonverbal at baseline. Rectal temperature 91.1F per nursing staff. Blood pressure soft, patient may be septic. Vanc and Zosyn started per ID consult. Lactate 3.4. Blood and wound cultures still pending. Per orthopedic surgery, patient to have debridement of joint today under local anesthesia. He will likely need IV abx for 4-6 weeks for septic joint. Per nephrology, the patients increased creatinine of 2.5 may be due to amiloride, which is now held. Iron studies also obtained for Hgb 9.6. Labs remarkable for: Hgb 9.6. Creatinine 2.47.       Past Medical History:   Diagnosis Date    Arthritis      Hypertension                 Past Surgical History:   Procedure Laterality Date    IR PARACENTESIS ABD W IMAGE   1/21/2022    IR PARACENTESIS ABD W IMAGE   3/1/2022                 Prior to Admission medications    Medication Sig Start Date End Date Taking? Authorizing Provider   levothyroxine (SYNTHROID) 25 mcg tablet Take 25 mcg by mouth Daily (before breakfast).     Yes Provider, Historical   acetaminophen (TylenoL) 325 mg tablet Take 650 mg by mouth every six (6) hours as needed for Pain.     Yes Provider, Historical   tamsulosin (Flomax) 0.4 mg capsule Take 1 Capsule by mouth daily. 3/4/22     Reasoner, Eleanore Harada, PA-C   potassium chloride (K-DUR, KLOR-CON M20) 20 mEq tablet Take 1 Tablet by mouth daily. 3/2/22     Jose Andujar MD   sodium bicarbonate 650 mg tablet Take 1 Tablet by mouth three (3) times daily. 3/2/22     Adam Menjivar MD   chlordiazePOXIDE (LIBRIUM) 5 mg capsule Take 2 Capsules by mouth three (3) times daily as needed for Anxiety. Max Daily Amount: 30 mg. 3/2/22     Adam Menjivar MD   levETIRAcetam (Keppra) 1,000 mg tablet Take 1.5 Tablets by mouth two (2) times a day. 3/2/22     Adam Menjivar MD   ergocalciferol (ERGOCALCIFEROL) 1,250 mcg (50,000 unit) capsule Take 1 Capsule by mouth every seven (7) days. 2/9/22     Provider, Historical   lactulose (CHRONULAC) 10 gram/15 mL solution Take 15 mL by mouth three (3) times daily. 1/23/22     Yeimy Ackerman MD   allopurinoL (ZYLOPRIM) 300 mg tablet Take 1 Tablet by mouth daily.  1/18/22     Provider, Historical   thiamine HCL (B-1) 100 mg tablet Take 100 mg by mouth daily.       Provider, Historical   propranoloL (INDERAL) 20 mg tablet Take 20 mg by mouth two (2) times a day.       Provider, Historical   folic acid (FOLVITE) 1 mg tablet Take 1 mg by mouth daily.       Provider, Historical   famotidine (PEPCID) 20 mg tablet Take 20 mg by mouth At bedtime.       Provider, Historical   aMILoride (MIDAMOR) 5 mg tablet Take 5 mg by mouth daily.       Provider, Historical         No Known Allergies      No family history on file. Reason unable to perform ROS: nonverbal at baseline. Objective     Visit Vitals  /70   Pulse (!) 58 Comment: notified nurse   Temp (!) 91.1 °F (32.8 °C) Comment: Notified PA since he was in the room    Resp 16   Ht 6' 2\" (1.88 m)   Wt 83.9 kg (185 lb)   SpO2 100%   BMI 23.75 kg/m²      O2 Device: None (Room air)    Physical Exam:   Constitutional:       General: He is not in acute distress. HENT:      Head: Normocephalic and atraumatic. Cardiovascular:      Rate and Rhythm: Normal rate and regular rhythm. Pulses: Normal pulses. Heart sounds: Normal heart sounds. Pulmonary:      Effort: Pulmonary effort is normal.      Breath sounds: Normal breath sounds. Abdominal:      General: There is distension. Tenderness: There is no abdominal tenderness. There is no guarding or rebound. Skin:     Findings: Erythema present. Comments: Swelling to the right 2nd digit   Neurological:      Mental Status: Mental status is at baseline. Comments: Nonverbal     Intake & Output:  Current Shift: No intake/output data recorded. Last three shifts: 03/22 1901 - 03/24 0700  In: 0 [P.O.:358]  Out: 825 [Urine:825]    Lab/Data Review:   All lab data reviewed      24 Hour Results:    Recent Results (from the past 24 hour(s))   LACTIC ACID    Collection Time: 03/23/22 12:12 PM   Result Value Ref Range    Lactic acid 3.4 (HH) 0.4 - 2.0 mmol/L   RENAL FUNCTION PANEL    Collection Time: 03/23/22  2:53 PM   Result Value Ref Range    Sodium 146 (H) 136 - 145 mmol/L    Potassium Hemolyzed, recollect requested 3.5 - 5.1 mmol/L    Chloride 122 (H) 97 - 108 mmol/L    CO2 18 (L) 21 - 32 mmol/L    Anion gap 6 5 - 15 mmol/L    Glucose 98 65 - 100 mg/dL    BUN 34 (H) 6 - 20 mg/dL    Creatinine 2.47 (H) 0.70 - 1.30 mg/dL    BUN/Creatinine ratio 14 12 - 20      GFR est AA 32 (L) >60 ml/min/1.73m2    GFR est non-AA 27 (L) >60 ml/min/1.73m2    Calcium 9.0 8.5 - 10.1 mg/dL    Phosphorus 4.3 2.6 - 4.7 mg/dL    Albumin 2.1 (L) 3.5 - 5.0 g/dL   VANCOMYCIN, RANDOM    Collection Time: 03/23/22  5:36 PM   Result Value Ref Range    Vancomycin, random 9.6 ug/mL   COVID-19 RAPID TEST    Collection Time: 03/24/22  9:18 AM   Result Value Ref Range    COVID-19 rapid test Not Detected Not Detected     GLUCOSE, POC    Collection Time: 03/24/22  9:28 AM   Result Value Ref Range    Glucose (POC) 77 65 - 117 mg/dL    Performed by Eric PALACIOS    CBC W/O DIFF    Collection Time: 03/24/22 10:21 AM   Result Value Ref Range    WBC 6.2 4.1 - 11.1 K/uL    RBC 3.14 (L) 4.10 - 5.70 M/uL    HGB 9.0 (L) 12.1 - 17.0 g/dL    HCT 26.0 (L) 36.6 - 50.3 %    MCV 82.8 80.0 - 99.0 FL    MCH 28.7 26.0 - 34.0 PG    MCHC 34.6 30.0 - 36.5 g/dL    RDW 18.3 (H) 11.5 - 14.5 %    PLATELET 79 (L) 364 - 400 K/uL    MPV 11.8 8.9 - 12.9 FL    NRBC 0.3 (H) 0.0  WBC    ABSOLUTE NRBC 0.02 (H) 0.00 - 0.01 K/uL   AMMONIA    Collection Time: 03/24/22 10:21 AM   Result Value Ref Range    Ammonia, plasma 39 (H) <32 umol/L         Imaging:    XR 2ND FINGER RT MIN 2 V   Final Result      Progressive findings. The findings are nonspecific and could represent infection with osteomyelitis   and septic joint and gas in the soft tissues related to infection. Gout, or other inflammatory arthropathy is a possibility. Findings should be correlated with clinical parameters.                 Assessment   Hypothermia  Hypoglycemic  Hypotension  /Bradycardia  Septic joint- right 2nd finger  SEPSIS  Anemia- transfuse if Hgb < 7.0  Acute on chronic renal failure- creatinine 2.51 on admission     Hx etoh abuse  Cirrhosis of liver   History of encephalopathy  Seizure disorder  History of gout    Plan     Transfer patient to ICU  Bolus 500 cc normal saline now  Change fluid to D5 normal saline at 100 cc/h  Order lactic acid pain  Bear hugger for hypothermia  SEPSIS- continue vanc and zosyn  Hypothermia- warming blanket  Anemia- transfuse if Hgb < 7.0  I&D of right 2nd finger performed in ED  Consult wound care  Consult pharmacy for vancomycin dosing  Consult nephrology  Consult ID  Speech therapy  PT OT     Redraw potassium- hemolyzed     Zosyn 3.375g Q8  Allopurinol 300mg daily  amloride 5mg daily  Ergocalciferol 50,000 units weekly  Famotidine 86MA daily  Folic acid 1mg daily  Lactulose 10g/15mL TID  Keppra 1000mg BID  Levothyroxine 25mcg daily  Propanolol 20mg BID  Sodium bicarbonate 650mg TID  Thiamine 100mg daily     Vancomycin 1g- once  Vancomycin per pharmacy dosing      Current Facility-Administered Medications:     sodium chloride 0.9 % bolus infusion 500 mL, 500 mL, IntraVENous, ONCE, Lamberto Ackerman MD    dextrose 5% and 0.9% NaCl infusion, 100 mL/hr, IntraVENous, CONTINUOUS, Yong Ackerman MD    allopurinoL (ZYLOPRIM) tablet 300 mg, 300 mg, Oral, DAILY, Lamberto Ackerman MD, 300 mg at 03/23/22 1117    [Held by provider] aMILoride (MIDAMOR) tablet 5 mg, 5 mg, Oral, DAILY, Lamberto Ackerman MD, 5 mg at 03/23/22 0900    ergocalciferol capsule 50,000 Units, 50,000 Units, Oral, Q7D, Lamberto Ackerman MD    famotidine (PEPCID) tablet 20 mg, 20 mg, Oral, QPM, Lamberto Ackerman MD, 20 mg at 85/39/27 7704    folic acid (FOLVITE) tablet 1 mg, 1 mg, Oral, DAILY, Lamberto Ackerman MD, 1 mg at 03/23/22 1118    piperacillin-tazobactam (ZOSYN) 3.375 g in 0.9% sodium chloride (MBP/ADV) 100 mL MBP, 3.375 g, IntraVENous, Q8H, Lamberto Ackerman MD, Last Rate: 25 mL/hr at 03/24/22 0359, 3.375 g at 03/24/22 0359    lactulose (CHRONULAC) 10 gram/15 mL solution 15 mL, 15 mL, Oral, TID, Lamberto Ackerman MD, 15 mL at 03/23/22 2220    levETIRAcetam (KEPPRA) tablet 1,000 mg, 1,000 mg, Oral, BID, Lamberto Ackerman MD, 1,000 mg at 03/23/22 2050    levothyroxine (SYNTHROID) tablet 25 mcg, 25 mcg, Oral, 6am, Lamberto Ackerman, MD, 25 mcg at 03/24/22 0606    propranoloL (INDERAL) tablet 20 mg, 20 mg, Oral, BID, Lamberto Ackerman MD, 20 mg at 03/23/22 2050    thiamine mononitrate (B-1) tablet 100 mg, 100 mg, Oral, DAILY, Dianne Ackerman MD, 100 mg at 03/23/22 1118    acetaminophen (TYLENOL) tablet 650 mg, 650 mg, Oral, Q6H PRN, Dianne Ackerman MD    Vancomycin Pharmacy Dosing, , Other, Rx Dosing/Monitoring, Dianne Ackerman MD    Current Outpatient Medications   Medication Instructions    acetaminophen (TYLENOL) 650 mg, Oral, EVERY 6 HOURS AS NEEDED    allopurinoL (ZYLOPRIM) 300 mg tablet 1 Tablet, Oral, DAILY    aMILoride (MIDAMOR) 5 mg, Oral, DAILY    chlordiazePOXIDE (LIBRIUM) 10 mg, Oral, 3 TIMES DAILY AS NEEDED    ergocalciferol (ERGOCALCIFEROL) 1,250 mcg (50,000 unit) capsule 1 Capsule, Oral, EVERY 7 DAYS    famotidine (PEPCID) 20 mg, Oral, AT BEDTIME    folic acid (FOLVITE) 1 mg, Oral, DAILY    lactulose (CHRONULAC) 10 gram/15 mL solution 15 mL, Oral, 3 TIMES DAILY    levETIRAcetam (KEPPRA) 1,500 mg, Oral, 2 TIMES DAILY    levothyroxine (SYNTHROID) 25 mcg, Oral, DAILY BEFORE BREAKFAST    potassium chloride (K-DUR, KLOR-CON M20) 20 mEq tablet 20 mEq, Oral, DAILY    propranoloL (INDERAL) 20 mg, Oral, 2 TIMES DAILY    sodium bicarbonate 650 mg, Oral, 3 TIMES DAILY    tamsulosin (FLOMAX) 0.4 mg, Oral, DAILY    thiamine HCL (B-1) 100 mg, Oral, DAILY         Signed By: Gigi Couch MD     March 24, 2022

## 2022-03-24 NOTE — PROGRESS NOTES
Dr Alana Lau notified of low temperature and low pulse rate, states to hold blood pressure medicatons , apply warming blanket, get blood sugar. And notify Infectious disease. Blood sugar checked same 77.  Patient NPO for I & D

## 2022-03-25 NOTE — PROGRESS NOTES
OT eval order received and acknowledged, however, patient transferred to ICU from [address] [place] [address] due to medical decline prior to completion of evaluation. OT eval orders will be discontinued at this time and will need new OT evaluation order once pt medically stable for evaluation. Thank you.

## 2022-03-25 NOTE — PROGRESS NOTES
Nephrology Consult    Patient: Lexy Bhatia MRN: [id number]  SSN: [id number]    YOB: 1960  Age: 60 y.o. Sex: male      Subjective:   Pt seen in ICU  Lethargic  R arm swelling (No DVT per venous doppler)  Cr 2.59, Na 147  Started on IVF  Low BPs  On no pressor           Past Medical History:   Diagnosis Date    Arthritis     Hypertension      Past Surgical History:   Procedure Laterality Date    IR INSERT NON TUNL CVC OVER 5 YRS  3/25/2022    IR PARACENTESIS ABD W IMAGE  1/21/2022    IR PARACENTESIS ABD W IMAGE  3/1/2022      No family history on file.   Social History     Tobacco Use    Smoking status: Never Smoker    Smokeless tobacco: Never Used   Substance Use Topics    Alcohol use: Not on file      Current Facility-Administered Medications   Medication Dose Route Frequency Provider Last Rate Last Admin    dextrose 5 % - 0.45% NaCl infusion  125 mL/hr IntraVENous CONTINUOUS Christopher Kelly  mL/hr at 03/25/22 0910 125 mL/hr at 03/25/22 0910    allopurinoL (ZYLOPRIM) tablet 300 mg  300 mg Oral DAILY Roly Ackerman MD   300 mg at 03/23/22 1117    [Held by provider] aMILoride (MIDAMOR) tablet 5 mg  5 mg Oral DAILY Lambetro Ackerman MD   5 mg at 03/23/22 0900    ergocalciferol capsule 50,000 Units  50,000 Units Oral Q7D Roly Ackerman MD        famotidine (PEPCID) tablet 20 mg  20 mg Oral QPM Lamberto Ackerman MD   20 mg at 91/04/18 4621    folic acid (FOLVITE) tablet 1 mg  1 mg Oral DAILY Lamberto Ackerman MD   1 mg at 03/23/22 1118    piperacillin-tazobactam (ZOSYN) 3.375 g in 0.9% sodium chloride (MBP/ADV) 100 mL MBP  3.375 g IntraVENous Q8H Lamberto Ackerman MD 25 mL/hr at 03/25/22 1246 3.375 g at 03/25/22 1246    lactulose (CHRONULAC) 10 gram/15 mL solution 15 mL  15 mL Oral TID Roly Ackerman MD   15 mL at 03/24/22 2132    levETIRAcetam (KEPPRA) tablet 1,000 mg  1,000 mg Oral BID Lamberto Ackerman MD   1,000 mg at 03/24/22 2132    levothyroxine (SYNTHROID) tablet 25 mcg  25 mcg Oral 6am Lamberto Ackerman MD   25 mcg at 03/24/22 0606    propranoloL (INDERAL) tablet 20 mg  20 mg Oral BID Lamberto Ackerman MD   20 mg at 03/24/22 2132    thiamine mononitrate (B-1) tablet 100 mg  100 mg Oral DAILY Lamberto Ackerman MD   100 mg at 03/23/22 1118    acetaminophen (TYLENOL) tablet 650 mg  650 mg Oral Q6H PRN Joe Ackerman MD        Vancomycin Pharmacy Dosing   Other Rx Dosing/Monitoring Nita Collier MD            No Known Allergies    Review of Systems:  A comprehensive review of systems was negative except for that written in the History of Present Illness. Objective:     Vitals:    03/25/22 0900 03/25/22 1000 03/25/22 1100 03/25/22 1154   BP: 104/78 96/70 98/80 100/70   Pulse:    80   Resp: 16 14 16 16   Temp:   97.2 °F (36.2 °C)    SpO2: 100% 100% 100% 99%   Weight:       Height:            Physical Exam:  General: NAD  Eyes: sclera anicteric  Oral Cavity: No thrush or ulcers  Neck: no JVD  Chest: Fair bilateral air entry  Heart: normal sounds  Abdomen: soft and non tender   : no fish  Lower Extremities: no edema  Skin: no rash  Neuro: intact  Psychiatric: non-depressed            Assessment:     Hospital Problems  Never Reviewed          Codes Class Noted POA    Finger infection ICD-10-CM: L08.9  ICD-9-CM: 686.9  3/22/2022 Unknown              Plan:         1 acute kidney injury on chronic kidney disease stage IIIA. -Etiology can be prerenal plus was on amiloride.    -On admission creatinine was elevated at 2.51.    -Cr is at 2.4-2.5.  -His last creatinine was 1.7 on 3/4/2022.    -Clinically euvolemic with no evidence of lower extremity edema.    -I will continue to hold amiloride.    -agreed to start on IVF. -He is not on any other potential nephrotoxins.     2.  Septic arthritis.    -He presented with swollen right hand finger diagnosed with septic arthritis status post I&D received IV antibiotics per orthopedic.  -on Vanc and zosyn    3 hypotension.    -Blood pressures are low.    -I will continue to hold amiloride.    -On no other blood pressure medications. -will add midodrine 5 mg tid. 4.  Anemia.    -Hemoglobin is 9.6.    -I will send iron studies and ferritin level. 5.  Metabolic acidosis. -Resolved. CO2 is 18.    -will resume NaHCO3 650 mg tid. -will consider HCO3 drip if worsen metabolic acidosis.     6. Hpernatremia:  -Na 147  -will add hypotonic VF       Signed By: Lakeshia Martinez MD     March 25, 2022

## 2022-03-25 NOTE — ANESTHESIA PREPROCEDURE EVALUATION
Relevant Problems   NEUROLOGY   (+) Status epilepticus (HCC)      GASTROINTESTINAL   (+) Cirrhosis of liver (HCC)      RENAL FAILURE   (+) GRACIA (acute kidney injury) ([facility] [address]   (+) Acute renal failure (ARF) (HCC)       Anesthetic History   No history of anesthetic complications            Review of Systems / Medical History  Patient summary reviewed, nursing notes reviewed and pertinent labs reviewed    Pulmonary  Within defined limits                 Neuro/Psych   Within defined limits  seizures         Cardiovascular  Within defined limits  Hypertension                Comments: Echo:    Left Ventricle: Left ventricle size is normal. Mild mid septal thickening. Normal wall motion. Normal left ventricular systolic function with a visually estimated EF of 60 - 65%. Diastolic dysfunction present with normal LV EF.   Mitral Valve: Mild transvalvular regurgitation.   Pericardium: Small (<1 cm) pericardial effusion present.    GI/Hepatic/Renal  Within defined limits       Renal disease (Creatinine 1.88): CRI  Liver disease     Endo/Other        Arthritis and anemia     Other Findings   Comments: Low PLTs       Past Medical History:   Diagnosis Date    Arthritis     Hypertension        Past Surgical History:   Procedure Laterality Date    IR INSERT NON TUNL CVC OVER 5 YRS  3/25/2022    IR PARACENTESIS ABD W IMAGE  1/21/2022    IR PARACENTESIS ABD W IMAGE  3/1/2022       Current Outpatient Medications   Medication Instructions    acetaminophen (TYLENOL) 650 mg, Oral, EVERY 6 HOURS AS NEEDED    allopurinoL (ZYLOPRIM) 300 mg tablet 1 Tablet, Oral, DAILY    aMILoride (MIDAMOR) 5 mg, Oral, DAILY    chlordiazePOXIDE (LIBRIUM) 10 mg, Oral, 3 TIMES DAILY AS NEEDED    ergocalciferol (ERGOCALCIFEROL) 1,250 mcg (50,000 unit) capsule 1 Capsule, Oral, EVERY 7 DAYS    famotidine (PEPCID) 20 mg, Oral, AT BEDTIME    folic acid (FOLVITE) 1 mg, Oral, DAILY    lactulose (CHRONULAC) 10 gram/15 mL solution 15 mL, Oral, 3 TIMES DAILY    levETIRAcetam (KEPPRA) 1,500 mg, Oral, 2 TIMES DAILY    levothyroxine (SYNTHROID) 25 mcg, Oral, DAILY BEFORE BREAKFAST    potassium chloride (K-DUR, KLOR-CON M20) 20 mEq tablet 20 mEq, Oral, DAILY    propranoloL (INDERAL) 20 mg, Oral, 2 TIMES DAILY    sodium bicarbonate 650 mg, Oral, 3 TIMES DAILY    tamsulosin (FLOMAX) 0.4 mg, Oral, DAILY    thiamine HCL (B-1) 100 mg, Oral, DAILY       Current Facility-Administered Medications   Medication Dose Route Frequency    dextrose 5 % - 0.45% NaCl infusion  100 mL/hr IntraVENous CONTINUOUS    midodrine (PROAMATINE) tablet 5 mg  5 mg Oral TID    allopurinoL (ZYLOPRIM) tablet 300 mg  300 mg Oral DAILY    [Held by provider] aMILoride (MIDAMOR) tablet 5 mg  5 mg Oral DAILY    ergocalciferol capsule 50,000 Units  50,000 Units Oral Q7D    famotidine (PEPCID) tablet 20 mg  20 mg Oral QPM    folic acid (FOLVITE) tablet 1 mg  1 mg Oral DAILY    piperacillin-tazobactam (ZOSYN) 3.375 g in 0.9% sodium chloride (MBP/ADV) 100 mL MBP  3.375 g IntraVENous Q8H    lactulose (CHRONULAC) 10 gram/15 mL solution 15 mL  15 mL Oral TID    levETIRAcetam (KEPPRA) tablet 1,000 mg  1,000 mg Oral BID    levothyroxine (SYNTHROID) tablet 25 mcg  25 mcg Oral 6am    propranoloL (INDERAL) tablet 20 mg  20 mg Oral BID    thiamine mononitrate (B-1) tablet 100 mg  100 mg Oral DAILY    acetaminophen (TYLENOL) tablet 650 mg  650 mg Oral Q6H PRN    Vancomycin Pharmacy Dosing   Other Rx Dosing/Monitoring       Patient Vitals for the past 24 hrs:   Temp Pulse Resp BP SpO2   03/25/22 1500 36.2 °C (97.2 °F) 80 14 100/81 100 %   03/25/22 1400   14 94/77 100 %   03/25/22 1300   14 98/73 100 %   03/25/22 1200   16 96/72 100 %   03/25/22 1154  80 16 100/70 99 %   03/25/22 1100 36.2 °C (97.2 °F)  16 98/80 100 %   03/25/22 1000   14 96/70 100 %   03/25/22 0900   16 104/78 100 %   03/25/22 0800   15 110/79 98 %   03/25/22 0700 36.2 °C (97.2 °F) 80 16 93/72 99 %   03/25/22 0600 36.3 °C (97.3 °F) 81 20 98/74 100 %   03/25/22 0500 36.4 °C (97.5 °F) 84 24 93/77 99 %   03/25/22 0400 36.4 °C (97.5 °F) 82 15 92/72 100 %   03/25/22 0300 36.4 °C (97.5 °F) 79 17 93/75 98 %   03/25/22 0200 36.4 °C (97.5 °F) 83 14 96/75 99 %   03/25/22 0100 36.5 °C (97.7 °F) 82 16 102/78 100 %   03/25/22 0000 36.5 °C (97.7 °F) 80 20 94/74 99 %   03/24/22 2300 36.6 °C (97.9 °F) 81 17 97/74 100 %   03/24/22 2200 36.6 °C (97.9 °F) 81 20 94/74 99 %   03/24/22 2100 36.5 °C (97.7 °F) 83 17 96/79 100 %   03/24/22 2000 36.2 °C (97.2 °F) 79 16 95/71 100 %   03/24/22 1900 36.1 °C (97 °F) 77 14 97/72 100 %   03/24/22 1800 (!) 35.5 °C (95.9 °F) 71 13 (!) 87/64 100 %   03/24/22 1700 (!) 35.2 °C (95.4 °F) 70 14 101/75 100 %       Lab Results   Component Value Date/Time    WBC 7.4 03/25/2022 04:51 AM    HGB 9.2 (L) 03/25/2022 04:51 AM    HCT 26.6 (L) 03/25/2022 04:51 AM    PLATELET 80 (L) 45/79/1744 04:51 AM    MCV 83.9 03/25/2022 04:51 AM     Lab Results   Component Value Date/Time    Sodium 147 (H) 03/25/2022 04:51 AM    Potassium 5.0 03/25/2022 04:51 AM    Chloride 124 (H) 03/25/2022 04:51 AM    CO2 18 (L) 03/25/2022 04:51 AM    Anion gap 5 03/25/2022 04:51 AM    Glucose 94 03/25/2022 04:51 AM    BUN 32 (H) 03/25/2022 04:51 AM    Creatinine 2.59 (H) 03/25/2022 04:51 AM    BUN/Creatinine ratio 12 03/25/2022 04:51 AM    GFR est AA 31 (L) 03/25/2022 04:51 AM    GFR est non-AA 25 (L) 03/25/2022 04:51 AM    Calcium 8.6 03/25/2022 04:51 AM     No results found for: APTT, PTP, INR, INREXT  Lab Results   Component Value Date/Time    Glucose 94 03/25/2022 04:51 AM    Glucose (POC) 83 03/24/2022 04:17 PM     Physical Exam    Airway    TM Distance: 4 - 6 cm  Neck ROM: normal range of motion   Mouth opening: Normal    Comments: Unable to exmaine Cardiovascular    Rhythm: regular  Rate: normal         Dental      Comments: Unable to exmaine   Pulmonary  Breath sounds clear to auscultation               Abdominal  GI exam deferred       Other Findings            Anesthetic Plan    ASA: 4, emergent  Anesthesia type: total IV anesthesia and MAC          Induction: Intravenous  Anesthetic plan and risks discussed with: Patient and Family

## 2022-03-25 NOTE — PROGRESS NOTES
RUE noted to have marked edema. Called Doctor Tyron. Dopplers ordered on RUE. PIV's discontinues. PICC team called. Unable to place a PIV at this time. IR consult ordered. Called wife for consent. Attempted to leave message but mailbox was full. Left text message to call as soon as possible for consent.

## 2022-03-25 NOTE — PROGRESS NOTES
Clinical chart reviewed by CM. Patient's discharge disposition is to return to [facility] ([facility] where he is LTC resident. CM team will continue to follow.

## 2022-03-25 NOTE — ROUTINE PROCESS
TRANSFER - OUT REPORT:    Verbal report given to Shaan(name) on Servando Walker  being transferred to ICU (unit) for routine post - op       Report consisted of patients Situation, Background, Assessment and   Recommendations(SBAR). Information from the following report(s) SBAR, OR Summary, Procedure Summary, Intake/Output, Cardiac Rhythm SR and Dual Neuro Assessment was reviewed with the receiving nurse. Opportunity for questions and clarification was provided.       Patient transported with:   Monitor  O2 @ 2 liters  Registered Nurse OR Staff

## 2022-03-25 NOTE — PROGRESS NOTES
PROGRESS NOTE    Patient: Jay Jay Ann MRN: [id number]  SSN: [id number]    YOB: 1960  Age: 60 y.o. Sex: male      Admit Date: 3/22/2022    LOS: 3 days       Subjective     Chief Complaint   Patient presents with    Finger Swelling       HPI: Jay Jay Ann is a(n) 60 y.o. male with PMH significant for etoh abuse, encephalopathy, cirrhosis, seizure disorder, gout presents with c/o swelling to right 2nd finger. Patient is nonverbal at baseline and dependent on all ADL at [address] [place] [address]. According to nursing home staff, has future orthopedic appointment for this finger. Is non-verbal at baseline but appears awake, alert, and in no obvious distress.     Patient was recently discharged from the hospital after having a seizures at that time patient received Keppra for the seizures patient had paracentesis done during the last admission also endoscopy done and had banding of the varices     I&D performed on finger in ED with purulent fluid drained. Dressing in place covering the DIP. 1g vancomycin given in ED, switched to zosyn on admission. Wound cultures sent. XR of the finger shows soft tissue swelling in gas consistent with osteomyelitis, septic joint, or gouty arthropathy.     Labs: Hgb 9.6 (baseline). Creatinine 2.51. CRP 3.15. ESR 35. Wound and blood cultures pending. 3/24:  Patient seen resting in bed. Nonverbal at baseline. Rectal temperature 91.1F per nursing staff. Blood pressure soft, patient may be septic. Vanc and Zosyn started per ID consult. Lactate 3.4. Blood and wound cultures still pending. Per orthopedic surgery, patient to have debridement of joint today under local anesthesia. He will likely need IV abx for 4-6 weeks for septic joint. Per nephrology, the patients increased creatinine of 2.5 may be due to amiloride, which is now held. Iron studies also obtained for Hgb 9.6. Labs remarkable for: Hgb 9.6. Creatinine 2.47.    3/25:  Patient seen in ICU.  Denies finger pain. Rectal temp 36.2C. I&D moved to today d/t transfer to ICU yesterday. Per ID, initial wound culture shows coag negative staph and continue on vanc and zosyn pending final culture result. Blood cultures still pending. Labs remarkable for Hgb 9.2, Creatinine 2.59, Albumin 1.8. Lactate decreased from 3.4 to 2.6. Ammonia 39. Pt had Doppler studies done this morning which shows    · No evidence of acute DVT in the right upper extremity. · Thrombus noted within the right cephalic forearm vein(s). Past Medical History:   Diagnosis Date    Arthritis      Hypertension                 Past Surgical History:   Procedure Laterality Date    IR PARACENTESIS ABD W IMAGE   1/21/2022    IR PARACENTESIS ABD W IMAGE   3/1/2022                 Prior to Admission medications    Medication Sig Start Date End Date Taking? Authorizing Provider   levothyroxine (SYNTHROID) 25 mcg tablet Take 25 mcg by mouth Daily (before breakfast).     Yes Provider, Historical   acetaminophen (TylenoL) 325 mg tablet Take 650 mg by mouth every six (6) hours as needed for Pain.     Yes Provider, Historical   tamsulosin (Flomax) 0.4 mg capsule Take 1 Capsule by mouth daily. 3/4/22     Debbie Whyte PA-C   potassium chloride (K-DUR, KLOR-CON M20) 20 mEq tablet Take 1 Tablet by mouth daily. 3/2/22     Federico Andujar MD   sodium bicarbonate 650 mg tablet Take 1 Tablet by mouth three (3) times daily. 3/2/22     Carolyn Schmitt MD   chlordiazePOXIDE (LIBRIUM) 5 mg capsule Take 2 Capsules by mouth three (3) times daily as needed for Anxiety. Max Daily Amount: 30 mg. 3/2/22     Carolyn Schmitt MD   levETIRAcetam (Keppra) 1,000 mg tablet Take 1.5 Tablets by mouth two (2) times a day. 3/2/22     Carolyn Schmitt MD   ergocalciferol (ERGOCALCIFEROL) 1,250 mcg (50,000 unit) capsule Take 1 Capsule by mouth every seven (7) days.  2/9/22     Provider, Historical   lactulose ([address] [facility]) 10 gram/15 mL solution Take 15 mL by mouth three (3) times daily. 1/23/22     Marichuy Ackerman MD   allopurinoL (ZYLOPRIM) 300 mg tablet Take 1 Tablet by mouth daily. 1/18/22     Provider, Historical   thiamine HCL (B-1) 100 mg tablet Take 100 mg by mouth daily.       Provider, Historical   propranoloL (INDERAL) 20 mg tablet Take 20 mg by mouth two (2) times a day.       Provider, Historical   folic acid (FOLVITE) 1 mg tablet Take 1 mg by mouth daily.       Provider, Historical   famotidine (PEPCID) 20 mg tablet Take 20 mg by mouth At bedtime.       Provider, Historical   aMILoride (MIDAMOR) 5 mg tablet Take 5 mg by mouth daily.       Provider, Historical         No Known Allergies      No family history on file. Reason unable to perform ROS: nonverbal at baseline. Objective     Visit Vitals  /78 (BP 1 Location: Left upper arm, BP Patient Position: At rest)   Pulse 80   Temp 97.2 °F (36.2 °C)   Resp 16   Ht 6' 2\" (1.88 m)   Wt 83.9 kg (185 lb)   SpO2 100%   BMI 23.75 kg/m²      O2 Device: None (Room air)    Physical Exam:   Constitutional:       General: He is not in acute distress. HENT:      Head: Normocephalic and atraumatic. Cardiovascular:      Rate and Rhythm: Normal rate and regular rhythm. Pulses: Normal pulses. Heart sounds: Normal heart sounds. Pulmonary:      Effort: Pulmonary effort is normal.      Breath sounds: Normal breath sounds. Abdominal:      General: There is distension. Tenderness: There is no abdominal tenderness. There is no guarding or rebound. Skin:     Findings: Erythema present. Comments: Swelling to the right 2nd digit   Neurological:      Mental Status: Mental status is at baseline. Comments: Nonverbal     Intake & Output:  Current Shift: No intake/output data recorded. Last three shifts: 03/23 1901 - 03/25 0700  In: -   Out: 750 [Urine:750]    Lab/Data Review:   All lab data reviewed      24 Hour Results:    Recent Results (from the past 24 hour(s))   MRSA SCREEN - PCR (NASAL)    Collection Time: 03/24/22 12:56 PM   Result Value Ref Range    MRSA by PCR, Nasal Not Detected Not Detected     GLUCOSE, POC    Collection Time: 03/24/22  4:17 PM   Result Value Ref Range    Glucose (POC) 83 65 - 117 mg/dL    Performed by Ardelia Oxford    CBC W/O DIFF    Collection Time: 03/25/22  4:51 AM   Result Value Ref Range    WBC 7.4 4.1 - 11.1 K/uL    RBC 3.17 (L) 4.10 - 5.70 M/uL    HGB 9.2 (L) 12.1 - 17.0 g/dL    HCT 26.6 (L) 36.6 - 50.3 %    MCV 83.9 80.0 - 99.0 FL    MCH 29.0 26.0 - 34.0 PG    MCHC 34.6 30.0 - 36.5 g/dL    RDW 17.9 (H) 11.5 - 14.5 %    PLATELET 80 (L) 435 - 400 K/uL    MPV 11.9 8.9 - 12.9 FL    NRBC 0.5 (H) 0.0  WBC    ABSOLUTE NRBC 0.04 (H) 0.00 - 4.29 K/uL   METABOLIC PANEL, COMPREHENSIVE    Collection Time: 03/25/22  4:51 AM   Result Value Ref Range    Sodium 147 (H) 136 - 145 mmol/L    Potassium 5.0 3.5 - 5.1 mmol/L    Chloride 124 (H) 97 - 108 mmol/L    CO2 18 (L) 21 - 32 mmol/L    Anion gap 5 5 - 15 mmol/L    Glucose 94 65 - 100 mg/dL    BUN 32 (H) 6 - 20 mg/dL    Creatinine 2.59 (H) 0.70 - 1.30 mg/dL    BUN/Creatinine ratio 12 12 - 20      GFR est AA 31 (L) >60 ml/min/1.73m2    GFR est non-AA 25 (L) >60 ml/min/1.73m2    Calcium 8.6 8.5 - 10.1 mg/dL    Bilirubin, total 1.3 (H) 0.2 - 1.0 mg/dL    AST (SGOT) 32 15 - 37 U/L    ALT (SGPT) 14 12 - 78 U/L    Alk. phosphatase 72 45 - 117 U/L    Protein, total 5.9 (L) 6.4 - 8.2 g/dL    Albumin 1.8 (L) 3.5 - 5.0 g/dL    Globulin 4.1 (H) 2.0 - 4.0 g/dL    A-G Ratio 0.4 (L) 1.1 - 2.2     LACTIC ACID    Collection Time: 03/25/22  4:51 AM   Result Value Ref Range    Lactic acid 2.6 (HH) 0.4 - 2.0 mmol/L         Imaging:    DUPLEX UPPER EXT VENOUS RIGHT   Final Result      XR 2ND FINGER RT MIN 2 V   Final Result      Progressive findings. The findings are nonspecific and could represent infection with osteomyelitis   and septic joint and gas in the soft tissues related to infection. Gout, or other inflammatory arthropathy is a possibility. Findings should be correlated with clinical parameters. Assessment   Hypothermia  Hypoglycemic  Hypotension  /Bradycardia  Septic joint- right 2nd finger  SEPSIS  Anemia- transfuse if Hgb < 7.0  Acute on chronic renal failure- creatinine 2.51 on admission     Hx etoh abuse  Cirrhosis of liver   History of encephalopathy  Seizure disorder  History of gout    · No evidence of acute DVT in the right upper extremity. · Thrombus noted within the right cephalic forearm vein(s).         Plan     Bear hugger for hypothermia  SEPSIS- continue vanc and zosyn  Anemia- transfuse if Hgb < 7.0  I&D of right 2nd finger performed in ED  Consult wound care  Consult pharmacy for vancomycin dosing  Consult nephrology  Consult ID  Speech therapy  PT OT        Zosyn 3.375g Q8  Allopurinol 300mg daily  amloride 5mg daily-- HOLD  Ergocalciferol 50,000 units weekly  Famotidine 51XU daily  Folic acid 1mg daily  Lactulose 10g/15mL TID  Keppra 1000mg BID  Levothyroxine 25mcg daily  Propanolol 20mg BID  Sodium bicarbonate 650mg TID  Thiamine 100mg daily     Patient going for the surgery today as patient blood pressure stable    Seen by infectious disease recommend to continue IV vancomycin and IV    Monitor renal function remained stable      Current Facility-Administered Medications:     dextrose 5 % - 0.45% NaCl infusion, 125 mL/hr, IntraVENous, CONTINUOUS, Yahir Moya MD, Last Rate: 125 mL/hr at 03/25/22 0910, 125 mL/hr at 03/25/22 0910    allopurinoL (ZYLOPRIM) tablet 300 mg, 300 mg, Oral, DAILY, Lamberto Ackerman MD, 300 mg at 03/23/22 1117    [Held by provider] aMILoride (MIDAMOR) tablet 5 mg, 5 mg, Oral, DAILY, Lamberto Ackerman MD, 5 mg at 03/23/22 0900    ergocalciferol capsule 50,000 Units, 50,000 Units, Oral, Q7D, Lamberto Ackerman MD    famotidine (PEPCID) tablet 20 mg, 20 mg, Oral, QPM, Lamberto Ackerman MD, 20 mg at 09/96/32 0773    folic acid (FOLVITE) tablet 1 mg, 1 mg, Oral, DAILY, Lamberto Ackerman MD, 1 mg at 03/23/22 1118    piperacillin-tazobactam (ZOSYN) 3.375 g in 0.9% sodium chloride (MBP/ADV) 100 mL MBP, 3.375 g, IntraVENous, Q8H, Lamberto Ackerman MD, Last Rate: 25 mL/hr at 03/25/22 0253, 3.375 g at 03/25/22 0253    lactulose (CHRONULAC) 10 gram/15 mL solution 15 mL, 15 mL, Oral, TID, Lamberto Ackerman MD, 15 mL at 03/24/22 2132    levETIRAcetam (KEPPRA) tablet 1,000 mg, 1,000 mg, Oral, BID, Lamberto Ackerman MD, 1,000 mg at 03/24/22 2132    levothyroxine (SYNTHROID) tablet 25 mcg, 25 mcg, Oral, 6am, Lamberto Ackerman MD, 25 mcg at 03/24/22 0606    propranoloL (INDERAL) tablet 20 mg, 20 mg, Oral, BID, Lamberto Ackerman MD, 20 mg at 03/24/22 2132    thiamine mononitrate (B-1) tablet 100 mg, 100 mg, Oral, DAILY, Lamberto Ackerman MD, 100 mg at 03/23/22 1118    acetaminophen (TYLENOL) tablet 650 mg, 650 mg, Oral, Q6H PRN, Lamberto Ackerman MD    Vancomycin Pharmacy Dosing, , Other, Rx Dosing/Monitoring, Gloria Ackerman MD    Current Outpatient Medications   Medication Instructions    acetaminophen (TYLENOL) 650 mg, Oral, EVERY 6 HOURS AS NEEDED    allopurinoL (ZYLOPRIM) 300 mg tablet 1 Tablet, Oral, DAILY    aMILoride (MIDAMOR) 5 mg, Oral, DAILY    chlordiazePOXIDE (LIBRIUM) 10 mg, Oral, 3 TIMES DAILY AS NEEDED    ergocalciferol (ERGOCALCIFEROL) 1,250 mcg (50,000 unit) capsule 1 Capsule, Oral, EVERY 7 DAYS    famotidine (PEPCID) 20 mg, Oral, AT BEDTIME    folic acid (FOLVITE) 1 mg, Oral, DAILY    lactulose (CHRONULAC) 10 gram/15 mL solution 15 mL, Oral, 3 TIMES DAILY    levETIRAcetam (KEPPRA) 1,500 mg, Oral, 2 TIMES DAILY    levothyroxine (SYNTHROID) 25 mcg, Oral, DAILY BEFORE BREAKFAST    potassium chloride (K-DUR, KLOR-CON M20) 20 mEq tablet 20 mEq, Oral, DAILY    propranoloL (INDERAL) 20 mg, Oral, 2 TIMES DAILY    sodium bicarbonate 650 mg, Oral, 3 TIMES DAILY    tamsulosin (FLOMAX) 0.4 mg, Oral, DAILY    thiamine HCL (B-1) 100 mg, Oral, DAILY         Signed By: oSnja Armando MD     March 25, 2022

## 2022-03-25 NOTE — OP NOTES
Operative Note    Patient: Ethan Patel  YOB: 1960  MRN: [id number]    Date of Procedure: 3/25/2022     Pre-Op Diagnosis: Right index finger distal interphalangeal joint Hand abscess [L02.519]    Post-Op Diagnosis: Same as preoperative diagnosis. Procedure(s):  INCISION & DRAINAGE of Right Index Finger to DIP (distal interphalangeal) joint with excision of bone base DP (distal phalanx) and distal MP (middle phalanx)    Surgeon(s):  Celso Miles MD    Surgical Assistant: None    Anesthesia: General     Estimated Blood Loss (mL):  Minimal    Complications: None    Specimens:   ID Type Source Tests Collected by Time Destination   1 : Distal Interphalangeal Joint Tissue  Preservative Finger  Celso Miles MD 3/25/2022 1703 Pathology   1 : Distal Interphalangeal Joint Tissue Culture Tissue Finger CULTURE, TISSUE W Woody Santos MD 3/25/2022 1702 Microbiology   2 : Distal Interphalangeal Joint Wound Culture Wound Finger CULTURE, WOUND W GRAM STAIN Celso Miles MD 3/25/2022 1704 Microbiology        Implants: * No implants in log *    Drains:   External Urinary Catheter 03/24/22 (Active)   Site Assessment Clean, dry, & intact 03/25/22 1531   Repositioned No 03/25/22 1531   Perineal Care No 03/25/22 1531   Wick Changed No 03/25/22 1531   Suction Canister/Tubing Changed No 03/25/22 1531   Urine Output (mL) 300 ml 03/25/22 1730       Findings: Purulent material mixed with gouty tophi extending from under the proximal nail fold into the DIP joint with some loss of bone. Detailed Description of Procedure:   Patient was brought to the operating table. Patient was kept in the ICU bed. Bed was positioned patient was given appropriate monitoring and sedation by anesthesiologist.  Berna Rice was carried out.   Local block with plain 1% lidocaine and 0.25% Marcaine mixture total volume 10 cc was injected 6 cc volar 4 cc dorsal.  Prep and drape was carried out in standard sterile fashion. No tourniquet was used. Timeout had already been carried out. Incisions were made on the edge of the nail fold and purulent material was removed from under the nail fold. Nailbed was undermined proximally already from the infection. Distal nailbed was then divided from the inner nail plate using a [place] the nail was removed. Proximal to the nailbed there is communication down to the joint with gouty tophi and purulent material.  Swab cultures were taken. Pieces of bone with gouty tophi was sent for cultures and some for biopsy. Thorough debridement was carried out and copious irrigation with half liter of saline using the irrigation syringe. Then packing was carried out with iodoform gauze which was cut to half inch size from its 1 in size. Dressing was applied with 4 x 4's and Kerlix. Patient tolerated procedure well was transferred to ICU in stable condition.     Electronically Signed by Antonio Bahena MD on 3/25/2022 at 5:36 PM

## 2022-03-25 NOTE — PROGRESS NOTES
PROGRESS NOTE    Patient: Denny Xiong MRN: [id number]  SSN: [id number]    YOB: 1960  Age: 60 y.o. Sex: male      Admit Date: 3/22/2022    LOS: 3 days       Subjective     Chief Complaint   Patient presents with    Finger Swelling       HPI: Denny Xiong is a(n) 60 y.o. male with PMH significant for etoh abuse, encephalopathy, cirrhosis, seizure disorder, gout presents with c/o swelling to right 2nd finger. Patient is nonverbal at baseline and dependent on all ADL at [address] [place] [address]. According to nursing home staff, has future orthopedic appointment for this finger. Is non-verbal at baseline but appears awake, alert, and in no obvious distress.     Patient was recently discharged from the hospital after having a seizures at that time patient received Keppra for the seizures patient had paracentesis done during the last admission also endoscopy done and had banding of the varices     I&D performed on finger in ED with purulent fluid drained. Dressing in place covering the DIP. 1g vancomycin given in ED, switched to zosyn on admission. Wound cultures sent. XR of the finger shows soft tissue swelling in gas consistent with osteomyelitis, septic joint, or gouty arthropathy.     Labs: Hgb 9.6 (baseline). Creatinine 2.51. CRP 3.15. ESR 35. Wound and blood cultures pending. 3/24:  Patient seen resting in bed. Nonverbal at baseline. Rectal temperature 91.1F per nursing staff. Blood pressure soft, patient may be septic. Vanc and Zosyn started per ID consult. Lactate 3.4. Blood and wound cultures still pending. Per orthopedic surgery, patient to have debridement of joint today under local anesthesia. He will likely need IV abx for 4-6 weeks for septic joint. Per nephrology, the patients increased creatinine of 2.5 may be due to amiloride, which is now held. Iron studies also obtained for Hgb 9.6. Labs remarkable for: Hgb 9.6. Creatinine 2.47.    3/25:  Patient seen in ICU.  Denies finger pain. Rectal temp 36.2C. I&D moved to today d/t transfer to ICU yesterday. Per ID, initial wound culture shows coag negative staph and continue on vanc and zosyn pending final culture result. Blood cultures still pending. Labs remarkable for Hgb 9.2, Creatinine 2.59, Albumin 1.8. Lactate decreased from 3.4 to 2.6. Ammonia 39. Past Medical History:   Diagnosis Date    Arthritis      Hypertension                 Past Surgical History:   Procedure Laterality Date    IR PARACENTESIS ABD W IMAGE   1/21/2022    IR PARACENTESIS ABD W IMAGE   3/1/2022                 Prior to Admission medications    Medication Sig Start Date End Date Taking? Authorizing Provider   levothyroxine (SYNTHROID) 25 mcg tablet Take 25 mcg by mouth Daily (before breakfast).     Yes Provider, Historical   acetaminophen (TylenoL) 325 mg tablet Take 650 mg by mouth every six (6) hours as needed for Pain.     Yes Provider, Historical   tamsulosin (Flomax) 0.4 mg capsule Take 1 Capsule by mouth daily. 3/4/22     Kirk Whyte PA-C   potassium chloride (K-DUR, KLOR-CON M20) 20 mEq tablet Take 1 Tablet by mouth daily. 3/2/22     Dai Andujar MD   sodium bicarbonate 650 mg tablet Take 1 Tablet by mouth three (3) times daily. 3/2/22     Richard Flores MD   chlordiazePOXIDE (LIBRIUM) 5 mg capsule Take 2 Capsules by mouth three (3) times daily as needed for Anxiety. Max Daily Amount: 30 mg. 3/2/22     Richard Flores MD   levETIRAcetam (Keppra) 1,000 mg tablet Take 1.5 Tablets by mouth two (2) times a day. 3/2/22     Richard Flores MD   ergocalciferol (ERGOCALCIFEROL) 1,250 mcg (50,000 unit) capsule Take 1 Capsule by mouth every seven (7) days. 2/9/22     Provider, Historical   lactulose (CHRONULAC) 10 gram/15 mL solution Take 15 mL by mouth three (3) times daily. 1/23/22     Gloria Ackerman MD   allopurinoL (ZYLOPRIM) 300 mg tablet Take 1 Tablet by mouth daily.  1/18/22     Provider, Historical   thiamine HCL (B-1) 100 mg tablet Take 100 mg by mouth daily.       Provider, Historical   propranoloL (INDERAL) 20 mg tablet Take 20 mg by mouth two (2) times a day.       Provider, Historical   folic acid (FOLVITE) 1 mg tablet Take 1 mg by mouth daily.       Provider, Historical   famotidine (PEPCID) 20 mg tablet Take 20 mg by mouth At bedtime.       Provider, Historical   aMILoride (MIDAMOR) 5 mg tablet Take 5 mg by mouth daily.       Provider, Historical         No Known Allergies      No family history on file. Reason unable to perform ROS: nonverbal at baseline. Objective     Visit Vitals  /79 (BP 1 Location: Left upper arm, BP Patient Position: At rest)   Pulse 80   Temp 97.2 °F (36.2 °C)   Resp 15   Ht 6' 2\" (1.88 m)   Wt 185 lb (83.9 kg)   SpO2 98%   BMI 23.75 kg/m²      O2 Device: None (Room air)    Physical Exam:   Constitutional:       General: He is not in acute distress. HENT:      Head: Normocephalic and atraumatic. Cardiovascular:      Rate and Rhythm: Normal rate and regular rhythm. Pulses: Normal pulses. Heart sounds: Normal heart sounds. Pulmonary:      Effort: Pulmonary effort is normal.      Breath sounds: Normal breath sounds. Abdominal:      General: There is distension. Tenderness: There is no abdominal tenderness. There is no guarding or rebound. Skin:     Findings: Erythema present. Comments: Swelling to the right 2nd digit   Neurological:      Mental Status: Mental status is at baseline. Comments: Nonverbal     Intake & Output:  Current Shift: No intake/output data recorded. Last three shifts: 03/23 1901 - 03/25 0700  In: -   Out: 750 [Urine:750]    Lab/Data Review:   All lab data reviewed      24 Hour Results:    Recent Results (from the past 24 hour(s))   COVID-19 RAPID TEST    Collection Time: 03/24/22  9:18 AM   Result Value Ref Range    COVID-19 rapid test Not Detected Not Detected     GLUCOSE, POC    Collection Time: 03/24/22  9:28 AM   Result Value Ref Range    Glucose (POC) 77 65 - 117 mg/dL    Performed by Tiffanie Arcos    CBC W/O DIFF    Collection Time: 03/24/22 10:21 AM   Result Value Ref Range    WBC 6.2 4.1 - 11.1 K/uL    RBC 3.14 (L) 4.10 - 5.70 M/uL    HGB 9.0 (L) 12.1 - 17.0 g/dL    HCT 26.0 (L) 36.6 - 50.3 %    MCV 82.8 80.0 - 99.0 FL    MCH 28.7 26.0 - 34.0 PG    MCHC 34.6 30.0 - 36.5 g/dL    RDW 18.3 (H) 11.5 - 14.5 %    PLATELET 79 (L) 524 - 400 K/uL    MPV 11.8 8.9 - 12.9 FL    NRBC 0.3 (H) 0.0  WBC    ABSOLUTE NRBC 0.02 (H) 0.00 - 0.01 K/uL   AMMONIA    Collection Time: 03/24/22 10:21 AM   Result Value Ref Range    Ammonia, plasma 39 (H) <32 umol/L   RENAL FUNCTION PANEL    Collection Time: 03/24/22 10:21 AM   Result Value Ref Range    Sodium 145 136 - 145 mmol/L    Potassium 5.2 (H) 3.5 - 5.1 mmol/L    Chloride 122 (H) 97 - 108 mmol/L    CO2 17 (L) 21 - 32 mmol/L    Anion gap 6 5 - 15 mmol/L    Glucose 88 65 - 100 mg/dL    BUN 35 (H) 6 - 20 mg/dL    Creatinine 2.40 (H) 0.70 - 1.30 mg/dL    BUN/Creatinine ratio 15 12 - 20      GFR est AA 33 (L) >60 ml/min/1.73m2    GFR est non-AA 27 (L) >60 ml/min/1.73m2    Calcium 9.0 8.5 - 10.1 mg/dL    Phosphorus 3.8 2.6 - 4.7 mg/dL    Albumin 1.9 (L) 3.5 - 5.0 g/dL   CORTISOL    Collection Time: 03/24/22 10:21 AM   Result Value Ref Range    Cortisol, random 11.2 ug/dL   MRSA SCREEN - PCR (NASAL)    Collection Time: 03/24/22 12:56 PM   Result Value Ref Range    MRSA by PCR, Nasal Not Detected Not Detected     GLUCOSE, POC    Collection Time: 03/24/22  4:17 PM   Result Value Ref Range    Glucose (POC) 83 65 - 117 mg/dL    Performed by Don Messer    CBC W/O DIFF    Collection Time: 03/25/22  4:51 AM   Result Value Ref Range    WBC 7.4 4.1 - 11.1 K/uL    RBC 3.17 (L) 4.10 - 5.70 M/uL    HGB 9.2 (L) 12.1 - 17.0 g/dL    HCT 26.6 (L) 36.6 - 50.3 %    MCV 83.9 80.0 - 99.0 FL    MCH 29.0 26.0 - 34.0 PG    MCHC 34.6 30.0 - 36.5 g/dL    RDW 17.9 (H) 11.5 - 14.5 % PLATELET 80 (L) 653 - 400 K/uL    MPV 11.9 8.9 - 12.9 FL    NRBC 0.5 (H) 0.0  WBC    ABSOLUTE NRBC 0.04 (H) 0.00 - 3.13 K/uL   METABOLIC PANEL, COMPREHENSIVE    Collection Time: 03/25/22  4:51 AM   Result Value Ref Range    Sodium 147 (H) 136 - 145 mmol/L    Potassium 5.0 3.5 - 5.1 mmol/L    Chloride 124 (H) 97 - 108 mmol/L    CO2 18 (L) 21 - 32 mmol/L    Anion gap 5 5 - 15 mmol/L    Glucose 94 65 - 100 mg/dL    BUN 32 (H) 6 - 20 mg/dL    Creatinine 2.59 (H) 0.70 - 1.30 mg/dL    BUN/Creatinine ratio 12 12 - 20      GFR est AA 31 (L) >60 ml/min/1.73m2    GFR est non-AA 25 (L) >60 ml/min/1.73m2    Calcium 8.6 8.5 - 10.1 mg/dL    Bilirubin, total 1.3 (H) 0.2 - 1.0 mg/dL    AST (SGOT) 32 15 - 37 U/L    ALT (SGPT) 14 12 - 78 U/L    Alk. phosphatase 72 45 - 117 U/L    Protein, total 5.9 (L) 6.4 - 8.2 g/dL    Albumin 1.8 (L) 3.5 - 5.0 g/dL    Globulin 4.1 (H) 2.0 - 4.0 g/dL    A-G Ratio 0.4 (L) 1.1 - 2.2     LACTIC ACID    Collection Time: 03/25/22  4:51 AM   Result Value Ref Range    Lactic acid 2.6 (HH) 0.4 - 2.0 mmol/L         Imaging:    XR 2ND FINGER RT MIN 2 V   Final Result      Progressive findings. The findings are nonspecific and could represent infection with osteomyelitis   and septic joint and gas in the soft tissues related to infection. Gout, or other inflammatory arthropathy is a possibility. Findings should be correlated with clinical parameters.          DUPLEX UPPER EXT VENOUS RIGHT    (Results Pending)          Assessment   Hypothermia  Hypoglycemic  Hypotension  /Bradycardia  Septic joint- right 2nd finger  SEPSIS  Anemia- transfuse if Hgb < 7.0  Acute on chronic renal failure- creatinine 2.51 on admission     Hx etoh abuse  Cirrhosis of liver   History of encephalopathy  Seizure disorder  History of gout    Plan     Transfer patient to ICU  Bolus 500 cc normal saline now  Change fluid to D5 normal saline at 100 cc/h  Order lactic acid pain  Bear hugger for hypothermia  SEPSIS- continue vanc and zosyn  Hypothermia- warming blanket  Anemia- transfuse if Hgb < 7.0  I&D of right 2nd finger performed in ED  Consult wound care  Consult pharmacy for vancomycin dosing  Consult nephrology  Consult ID  Speech therapy  PT OT        Zosyn 3.375g Q8  Allopurinol 300mg daily  amloride 5mg daily-- HOLD  Ergocalciferol 50,000 units weekly  Famotidine 09EC daily  Folic acid 1mg daily  Lactulose 10g/15mL TID  Keppra 1000mg BID  Levothyroxine 25mcg daily  Propanolol 20mg BID  Sodium bicarbonate 650mg TID  Thiamine 100mg daily     Vancomycin 1g- once  Vancomycin per pharmacy dosing      Current Facility-Administered Medications:     dextrose 5 % - 0.45% NaCl infusion, 125 mL/hr, IntraVENous, CONTINUOUS, Yahir Moya MD    Vancomycin random level - please draw 3/25 @ 0900.  Thanks!, , Other, ONCE, Anay Barahona MD    allopurinoL (ZYLOPRIM) tablet 300 mg, 300 mg, Oral, DAILY, Lamberto Ackerman MD, 300 mg at 03/23/22 1117    [Held by provider] aMILoride (MIDAMOR) tablet 5 mg, 5 mg, Oral, DAILY, Lamberto Ackerman MD, 5 mg at 03/23/22 0900    ergocalciferol capsule 50,000 Units, 50,000 Units, Oral, Q7D, Lamberto Ackerman MD    famotidine (PEPCID) tablet 20 mg, 20 mg, Oral, QPM, Lamberto Ackerman MD, 20 mg at 22/57/89 3517    folic acid (FOLVITE) tablet 1 mg, 1 mg, Oral, DAILY, Lamberto Ackerman MD, 1 mg at 03/23/22 1118    piperacillin-tazobactam (ZOSYN) 3.375 g in 0.9% sodium chloride (MBP/ADV) 100 mL MBP, 3.375 g, IntraVENous, Q8H, Lamberto Ackerman MD, Last Rate: 25 mL/hr at 03/25/22 0253, 3.375 g at 03/25/22 0253    lactulose (CHRONULAC) 10 gram/15 mL solution 15 mL, 15 mL, Oral, TID, Lamberto Ackerman MD, 15 mL at 03/24/22 2132    levETIRAcetam (KEPPRA) tablet 1,000 mg, 1,000 mg, Oral, BID, Lamberto Ackerman MD, 1,000 mg at 03/24/22 2132    levothyroxine (SYNTHROID) tablet 25 mcg, 25 mcg, Oral, 6am, Lamberto Ackerman MD, 25 mcg at 03/24/22 0606    propranoloL (INDERAL) tablet 20 mg, 20 mg, Oral, BID, Flaca Crawford MD, 20 mg at 03/24/22 2132    thiamine mononitrate (B-1) tablet 100 mg, 100 mg, Oral, DAILY, Jyothi Ackerman MD, 100 mg at 03/23/22 1118    acetaminophen (TYLENOL) tablet 650 mg, 650 mg, Oral, Q6H PRN, Jyothi Ackerman MD    Vancomycin Pharmacy Dosing, , Other, Rx Dosing/Monitoring, Jyothi Ackerman MD    Current Outpatient Medications   Medication Instructions    acetaminophen (TYLENOL) 650 mg, Oral, EVERY 6 HOURS AS NEEDED    allopurinoL (ZYLOPRIM) 300 mg tablet 1 Tablet, Oral, DAILY    aMILoride (MIDAMOR) 5 mg, Oral, DAILY    chlordiazePOXIDE (LIBRIUM) 10 mg, Oral, 3 TIMES DAILY AS NEEDED    ergocalciferol (ERGOCALCIFEROL) 1,250 mcg (50,000 unit) capsule 1 Capsule, Oral, EVERY 7 DAYS    famotidine (PEPCID) 20 mg, Oral, AT BEDTIME    folic acid (FOLVITE) 1 mg, Oral, DAILY    lactulose (CHRONULAC) 10 gram/15 mL solution 15 mL, Oral, 3 TIMES DAILY    levETIRAcetam (KEPPRA) 1,500 mg, Oral, 2 TIMES DAILY    levothyroxine (SYNTHROID) 25 mcg, Oral, DAILY BEFORE BREAKFAST    potassium chloride (K-DUR, KLOR-CON M20) 20 mEq tablet 20 mEq, Oral, DAILY    propranoloL (INDERAL) 20 mg, Oral, 2 TIMES DAILY    sodium bicarbonate 650 mg, Oral, 3 TIMES DAILY    tamsulosin (FLOMAX) 0.4 mg, Oral, DAILY    thiamine HCL (B-1) 100 mg, Oral, DAILY         Signed By: Modesto Morrell     March 25, 2022

## 2022-03-25 NOTE — PROGRESS NOTES
Nephrology Consult    Patient: Rashaun Mixon MRN: [id number]  SSN: [id number]    YOB: 1960  Age: 60 y.o. Sex: male      Subjective:   Pt seen in the room  Getting transferred to ICU for possible sepsis  Cr 2.4        Past Medical History:   Diagnosis Date    Arthritis     Hypertension      Past Surgical History:   Procedure Laterality Date    IR INSERT NON TUNL CVC OVER 5 YRS  3/25/2022    IR PARACENTESIS ABD W IMAGE  1/21/2022    IR PARACENTESIS ABD W IMAGE  3/1/2022      No family history on file.   Social History     Tobacco Use    Smoking status: Never Smoker    Smokeless tobacco: Never Used   Substance Use Topics    Alcohol use: Not on file      Current Facility-Administered Medications   Medication Dose Route Frequency Provider Last Rate Last Admin    dextrose 5 % - 0.45% NaCl infusion  125 mL/hr IntraVENous CONTINUOUS Cydney Peterson  mL/hr at 03/25/22 0910 125 mL/hr at 03/25/22 0910    allopurinoL (ZYLOPRIM) tablet 300 mg  300 mg Oral DAILY Kobe Ackerman MD   300 mg at 03/23/22 1117    [Held by provider] aMILoride (MIDAMOR) tablet 5 mg  5 mg Oral DAILY Lamberto Ackerman MD   5 mg at 03/23/22 0900    ergocalciferol capsule 50,000 Units  50,000 Units Oral Q7D Kobe Ackerman MD        famotidine (PEPCID) tablet 20 mg  20 mg Oral QPM Lamberto Ackerman MD   20 mg at 87/36/74 7986    folic acid (FOLVITE) tablet 1 mg  1 mg Oral DAILY Lamberto Ackerman MD   1 mg at 03/23/22 1118    piperacillin-tazobactam (ZOSYN) 3.375 g in 0.9% sodium chloride (MBP/ADV) 100 mL MBP  3.375 g IntraVENous Q8H Lamberto Ackerman MD 25 mL/hr at 03/25/22 1246 3.375 g at 03/25/22 1246    lactulose (CHRONULAC) 10 gram/15 mL solution 15 mL  15 mL Oral TID Kobe Ackerman MD   15 mL at 03/24/22 2132    levETIRAcetam (KEPPRA) tablet 1,000 mg  1,000 mg Oral BID Lamberto Ackerman MD   1,000 mg at 03/24/22 2132    levothyroxine (SYNTHROID) tablet 25 mcg  25 mcg Oral 6am Lula Feng MD 25 mcg at 03/24/22 0606    propranoloL (INDERAL) tablet 20 mg  20 mg Oral BID Lamberto Ackerman MD   20 mg at 03/24/22 2132    thiamine mononitrate (B-1) tablet 100 mg  100 mg Oral DAILY Lamberto Ackerman MD   100 mg at 03/23/22 1118    acetaminophen (TYLENOL) tablet 650 mg  650 mg Oral Q6H PRN Elliot Ackerman MD        Vancomycin Pharmacy Dosing   Other Rx Dosing/Monitoring Tres Quevedo MD            No Known Allergies    Review of Systems:  A comprehensive review of systems was negative except for that written in the History of Present Illness. Objective:     Vitals:    03/25/22 0900 03/25/22 1000 03/25/22 1100 03/25/22 1154   BP: 104/78 96/70 98/80 100/70   Pulse:    80   Resp: 16 14 16 16   Temp:   97.2 °F (36.2 °C)    SpO2: 100% 100% 100% 99%   Weight:       Height:            Physical Exam:  General: NAD  Eyes: sclera anicteric  Oral Cavity: No thrush or ulcers  Neck: no JVD  Chest: Fair bilateral air entry  Heart: normal sounds  Abdomen: soft and non tender   : no fish  Lower Extremities: no edema  Skin: no rash  Neuro: intact  Psychiatric: non-depressed            Assessment:     Hospital Problems  Never Reviewed          Codes Class Noted POA    Finger infection ICD-10-CM: L08.9  ICD-9-CM: 686.9  3/22/2022 Unknown              Plan:   1 acute kidney injury on chronic kidney disease stage IIIA. -Etiology can be prerenal plus on amiloride.    -On admission creatinine was elevated at 2.51.    -Cr is at 2.4.  -His last creatinine was 1.7 on 3/4/2022.    -Clinically euvolemic with no evidence of lower extremity edema.    -I will continue to hold amiloride.    -I will consider starting IV fluids only if worsening renal functions. - He is not on any other potential nephrotoxins.     2.  Septic arthritis.    -He presented with swollen right hand finger diagnosed with septic arthritis status post I&D received IV antibiotics per orthopedic.    3 hypertension.    -Blood pressure was lowish on admission.    -Now in better control.    -I will hold amiloride. On no other blood pressure medications. 4.  Anemia.    -Hemoglobin is 9.6.    -I will send iron studies and ferritin level. 5.  Metabolic acidosis. -Resolved. CO2 is 17.    -will resume NaHCO3 650 mg tid.        Signed By: Karen Mcfadden MD     March 25, 2022

## 2022-03-26 NOTE — PROGRESS NOTES
Nephrology Consult    Patient: Arianne Chavez MRN: [id number]  SSN: [id number]    YOB: 1960  Age: 60 y.o. Sex: male      Subjective:     Patient seen in the ICU   Confused, not opening eyes to verbal commands   Has NGT in     RT Index finger Surgical dressing C,d,i    Past Medical History:   Diagnosis Date    Arthritis     Hypertension      Past Surgical History:   Procedure Laterality Date    IR INSERT NON TUNL CVC OVER 5 YRS  3/25/2022    IR PARACENTESIS ABD W IMAGE  1/21/2022    IR PARACENTESIS ABD W IMAGE  3/1/2022      No family history on file.   Social History     Tobacco Use    Smoking status: Never Smoker    Smokeless tobacco: Never Used   Substance Use Topics    Alcohol use: Not on file      Current Facility-Administered Medications   Medication Dose Route Frequency Provider Last Rate Last Admin    [START ON 3/27/2022] VANCOMYCIN INFORMATION NOTE   Other ONCE Lamberto Ackerman MD        dextrose 5 % - 0.45% NaCl infusion  100 mL/hr IntraVENous CONTINUOUS Sarah Padron  mL/hr at 03/26/22 0805 100 mL/hr at 03/26/22 0805    midodrine (PROAMATINE) tablet 5 mg  5 mg Oral TID Sarah Padron MD   5 mg at 03/26/22 0805    sodium chloride (NS) flush 5-40 mL  5-40 mL IntraVENous Q8H Dominga Loza MD   10 mL at 03/26/22 0526    sodium chloride (NS) flush 5-40 mL  5-40 mL IntraVENous Q8H Dillon Bray MD   10 mL at 03/26/22 0526    sodium chloride (NS) flush 5-40 mL  5-40 mL IntraVENous PRN Antwon Coyle MD        acetaminophen (TYLENOL) tablet 650 mg  650 mg Oral Q8H Antwon Coyle MD   650 mg at 03/26/22 0526    HYDROcodone-acetaminophen (NORCO) 5-325 mg per tablet 1 Tablet  1 Tablet Oral Q4H PRN Antwon Coyle MD        morphine injection 2 mg  2 mg IntraVENous Q4H PRN Antwon Coyle MD        naloxone (NARCAN) injection 0.4 mg  0.4 mg IntraVENous PRN Antwon Coyle MD        ondansetron (ZOFRAN) injection 4 mg  4 mg IntraVENous Q4H PRN Jorge Huang MD        allopurinoL (ZYLOPRIM) tablet 300 mg  300 mg Oral DAILY Lamberto Ackerman MD   300 mg at 03/26/22 0805    [Held by provider] aMILoride [facility]) tablet 5 mg  5 mg Oral DAILY Lamberto Ackerman MD   5 mg at 03/23/22 0900    ergocalciferol capsule 50,000 Units  50,000 Units Oral Q7D Susana Ackerman MD        famotidine (PEPCID) tablet 20 mg  20 mg Oral QPM Lamberto Ackerman MD   20 mg at 81/15/52 6685    folic acid (FOLVITE) tablet 1 mg  1 mg Oral DAILY Lamberto Ackerman MD   1 mg at 03/26/22 0805    piperacillin-tazobactam (ZOSYN) 3.375 g in 0.9% sodium chloride (MBP/ADV) 100 mL MBP  3.375 g IntraVENous Q8H Lamberto Ackerman MD 25 mL/hr at 03/26/22 0304 3.375 g at 03/26/22 0304    lactulose (CHRONULAC) 10 gram/15 mL solution 15 mL  15 mL Oral TID Susana Ackerman MD   15 mL at 03/26/22 0804    levETIRAcetam (KEPPRA) tablet 1,000 mg  1,000 mg Oral BID Lamberto Ackerman MD   1,000 mg at 03/25/22 2152    levothyroxine (SYNTHROID) tablet 25 mcg  25 mcg Oral 6am Susana Ackerman MD   25 mcg at 03/26/22 5297    propranoloL (INDERAL) tablet 20 mg  20 mg Oral BID Lamberto Ackerman MD   20 mg at 03/26/22 0805    thiamine mononitrate (B-1) tablet 100 mg  100 mg Oral DAILY Lamberto Ackerman MD   100 mg at 03/26/22 0805    acetaminophen (TYLENOL) tablet 650 mg  650 mg Oral Q6H PRN Susana Ackerman MD        Vancomycin Pharmacy Dosing   Other Rx Dosing/Monitoring Maddison Murpyh MD            No Known Allergies    Review of Systems:  A comprehensive review of systems was negative except for that written in the History of Present Illness.     Objective:     Vitals:    03/26/22 0700 03/26/22 0806 03/26/22 0905 03/26/22 1000   BP: 97/76 102/84 98/87 (!) 115/95   Pulse: 84 79 75 74   Resp: 11 14 11 17   Temp: 97.3 °F (36.3 °C) 97.2 °F (36.2 °C)     SpO2: 100% 100% 99% 96%   Weight:       Height:            Physical Exam:  General: NAD  Eyes: sclera anicteric  Oral Cavity: No thrush or ulcers  Neck: no JVD  Chest: Fair bilateral air entry  Heart: normal sounds  Abdomen: soft and non tender   : no fish  Lower Extremities: no edema  Skin: no rash  Neuro: intact  Psychiatric: non-depressed            Assessment:     Hospital Problems  Never Reviewed          Codes Class Noted POA    Finger infection ICD-10-CM: L08.9  ICD-9-CM: 686.9  3/22/2022 Unknown              Plan:         1 acute kidney injury on chronic kidney disease stage IIIA. -Etiology can be prerenal plus was on amiloride.    -On admission creatinine was elevated at 2.51.    -Cr is at 2.4-2.5.  -His last creatinine was 1.7 on 3/4/2022.    - no evidence of lower extremity edema. Hypervolemic with abd distension ,h/o repeated paracentesis (3/24 - 4.3 L.removed,  2/22 and 01/22  )  Assess RFP after decr of intra abd pressure   -will try volume expansion with IV albumin   -I will continue to hold amiloride.    -agreed to start on IVF. -He is not on any other potential nephrotoxins. 2.  Septic arthritis.    -He presented with swollen right hand finger diagnosed with septic arthritis status post I&D received IV antibiotics per orthopedic.  -on Vanc and zosyn    3 hypotension.    -Blood pressures are low.    -I will continue to hold amiloride.    -On no other blood pressure medications. -will add midodrine 5 mg tid. 4.  Anemia.    -Hemoglobin is 9.6.    -I will send iron studies and ferritin level. 5.  Metabolic acidosis. -Resolved. CO2 is 18.    -will resume NaHCO3 650 mg tid. -will consider HCO3 drip if worsen metabolic acidosis.     6. Hpernatremia:  -Na 147  -will add hypotonic VF     Cirrhosis , portal HTN - due to etoh   Abd distension   On Propranolol   Albumin for volume expansion     Lactic acidosis - due to decr Liver clearance   Serum Co2 - decr will give 1 dose of 50 mqes sodium Bicarb     Signed By: Zeferino Duran MD     March 26, 2022

## 2022-03-26 NOTE — PROGRESS NOTES
Infectious Disease Progress Note           Subjective:   Remains in the ICU for close monitoring, s/p debridement of right index finger on  by Dr Nirmal De La Torre. No acute events since last seen. Decreased hypothermic episodes   Objective:   Physical Exam:     Visit Vitals  BP (!) 119/94 (BP 1 Location: Left upper arm, BP Patient Position: At rest)   Pulse 71   Temp (!) 96.1 °F (35.6 °C)   Resp 17   Ht 6' 2\" (1.88 m)   Wt 185 lb 13.6 oz (84.3 kg)   SpO2 97%   BMI 23.86 kg/m²    O2 Flow Rate (L/min): 2 l/min O2 Device: None (Room air)    Temp (24hrs), Av.6 °F (36.4 °C), Min:96.1 °F (35.6 °C), Max:99.3 °F (37.4 °C)    No intake/output data recorded.     1901 -  0700  In: 900 [I.V.:900]  Out: 550 [Urine:550]    General: NAD, confused, slow to respond   HEENT: HEIDY, Moist mucosa   Lungs: CTA b/l\, decreased at the bases, no wheeze/rhonchi   Heart: S1S2+, RRR, no murmur  Abdo: Soft, NT, ND, +BS   : No fish   Exts:Right index finger dressing in place   Skin: No pressure ulcers       Data Review:       Recent Days:  Recent Labs     22  0315 22  0451 22  1021   WBC 8.6 7.4 6.2   HGB 8.3* 9.2* 9.0*   HCT 24.5* 26.6* 26.0*   PLT 73* 80* 79*     Recent Labs     22  0315 22  0451 22  1021   BUN 30*  30* 32* 35*   CREA 2.66*  2.69* 2.59* 2.40*       Lab Results   Component Value Date/Time    C-Reactive protein 3.15 (H) 2022 10:20 PM        Microbiology     Results     Procedure Component Value Units Date/Time    CULTURE, Myke Riggins STAIN [[id number]] Collected: 22 1715    Order Status: Completed Specimen: Wound from Finger Updated: 22 0755    CULTURE, TISSUE W Bobby Staple [[id number]] Collected: 22 1715    Order Status: Completed Specimen: Finger Updated: 22 0813    MRSA SCREEN - PCR (NASAL) [[id number]] Collected: 22 1256    Order Status: Completed Specimen: Swab Updated: 22 1456     MRSA by PCR, Nasal Not Detected COVID-19 RAPID TEST [[id number]] Collected: 03/24/22 8602    Order Status: Completed Specimen: Nasopharyngeal Updated: 03/24/22 0955     COVID-19 rapid test Not Detected        Comment: Rapid Abbott ID Now   Rapid NAAT:  The specimen is NEGATIVE for SARS-CoV-2, the novel coronavirus associated with COVID-19. Negative results should be treated as presumptive and, if inconsistent with clinical signs and symptoms or necessary for patient management, should be tested with an alternative molecular assay. Negative results do not preclude SARS-CoV-2 infection and should not be used as the sole basis for patient management decisions. This test has been authorized by the FDA under   an Emergency Use Authorization (EUA) for use by authorized laboratories. Fact sheet for Healthcare Providers: [employer]co.nz Fact sheet for Patients: [employer]co.nz   Methodology: Isothermal Nucleic Acid Amplification         CULTURE, BLOOD, PAIRED [[id number]] Collected: 03/23/22 0000    Order Status: Completed Specimen: Blood Updated: 03/23/22 0021    CULTURE, Tito Hawks STAIN [[id number]] Collected: 03/22/22 2219    Order Status: Completed Specimen: Wound Updated: 03/25/22 0850     Special Requests: No Special Requests        GRAM STAIN Few WBCs seen         No organisms seen        Culture result:       Few Staphylococcus species, coagulase negative                   Diagnostics   CXR Results  (Last 48 hours)               03/25/22 2218  XR CHEST PORT Final result    Narrative: Indication: NG tube placement. AP semiupright portable chest radiograph 25 March 2022. Comparison examination   earlier the same date. Right central line tip over the upper right heart shadow. NG tube tip over the gastric fundus, sidehole over the distal esophagus. Underexpanded lungs with mild bibasilar atelectasis. Mild cardiomegaly. Tortuous thoracic aorta. No pneumothorax.    No apparent pleural effusion. 03/25/22 1220  XR CHEST PORT Final result    Impression:  The cardiomediastinal silhouette is appropriate for age, technique,   and lung expansion. Pulmonary vasculature is not congested. The lungs are   essentially clear. No effusion or pneumothorax is seen. Narrative:  1 new comparison February 28       Right IJ line with tip in lower SVC                    Assessment/Plan     1. Right index finger swelling/tenderness, soft tissue swelling/septic arthritis and osteomyelitis on X-ray       WBC remains WNLs. Coag neg staph isolated from wound Cx, likely skin contaminant       S/p wound debridement w excision of bone on 03/25       Continue on Zosyn pending intra-op Cx, will d/c Vanc       CBC, CRP and ESR w AM labs         2. Hypothermia, improved, hemodynamically stable off pressor support      Normal random cortisol, TSH 29.00 and F T4 1.2      Levothyroxine dose increased to 50 mcg     3.  Acute renal failure: Cr likely at baseline      4. H/o gouty arthritis, no acute flare, will continue to monitor      5. H/o alcoholism w resulting liver cirrhosis     Jas Macnini MD    3/26/2022

## 2022-03-26 NOTE — PROGRESS NOTES
Orthopedic progress note    Date:3/26/2022       [place] [place] [facility]  Patient Name:Waldo Rangel     YOB: 1960     Age:62 y.o. Subjective    59-year-old male seen in follow-up for infected right index finger. Patient nonverbal.  Patient had surgical I&D of right index finger performed 3/25/21. Patient is in ICU still. He is still not communicative. As per nursing when he sleeps his BP goes down and he has thermal warmer on to maintain temperature.   .  Objective           Vitals Last 24 Hours:  TEMPERATURE:  Temp  Av.2 °F (36.2 °C)  Min: 95.4 °F (35.2 °C)  Max: 99.3 °F (37.4 °C)  RESPIRATIONS RANGE: Resp  Avg: 15.7  Min: 11  Max: 28  PULSE OXIMETRY RANGE: SpO2  Av.9 %  Min: 93 %  Max: 100 %  PULSE RANGE: Pulse  Av.7  Min: 65  Max: 100  BLOOD PRESSURE RANGE: Systolic (80HXR), IZI:968 , Min:76 , EYH:894   ; Diastolic (17QMO), BZN:85, Min:62, Max:97    Current Facility-Administered Medications   Medication Dose Route Frequency    albumin human 5% (BUMINATE) solution 25 g  25 g IntraVENous Q8H    dextrose 5% infusion  50 mL/hr IntraVENous CONTINUOUS    [START ON 3/27/2022] levothyroxine (SYNTHROID) tablet 50 mcg  50 mcg Oral 6am    [START ON 3/27/2022] tamsulosin (FLOMAX) capsule 0.4 mg  0.4 mg Oral DAILY    midodrine (PROAMATINE) tablet 5 mg  5 mg Oral TID    sodium chloride (NS) flush 5-40 mL  5-40 mL IntraVENous Q8H    sodium chloride (NS) flush 5-40 mL  5-40 mL IntraVENous Q8H    sodium chloride (NS) flush 5-40 mL  5-40 mL IntraVENous PRN    acetaminophen (TYLENOL) tablet 650 mg  650 mg Oral Q8H    HYDROcodone-acetaminophen (NORCO) 5-325 mg per tablet 1 Tablet  1 Tablet Oral Q4H PRN    morphine injection 2 mg  2 mg IntraVENous Q4H PRN    naloxone (NARCAN) injection 0.4 mg  0.4 mg IntraVENous PRN    ondansetron (ZOFRAN) injection 4 mg  4 mg IntraVENous Q4H PRN    allopurinoL (ZYLOPRIM) tablet 300 mg  300 mg Oral DAILY    [Held by provider] aMILoride (MIDAMOR) tablet 5 mg 5 mg Oral DAILY    ergocalciferol capsule 50,000 Units  50,000 Units Oral Q7D    famotidine (PEPCID) tablet 20 mg  20 mg Oral QPM    folic acid (FOLVITE) tablet 1 mg  1 mg Oral DAILY    piperacillin-tazobactam (ZOSYN) 3.375 g in 0.9% sodium chloride (MBP/ADV) 100 mL MBP  3.375 g IntraVENous Q8H    lactulose (CHRONULAC) 10 gram/15 mL solution 15 mL  15 mL Oral TID    levETIRAcetam (KEPPRA) tablet 1,000 mg  1,000 mg Oral BID    propranoloL (INDERAL) tablet 20 mg  20 mg Oral BID    thiamine mononitrate (B-1) tablet 100 mg  100 mg Oral DAILY    acetaminophen (TYLENOL) tablet 650 mg  650 mg Oral Q6H PRN          I/O (24Hr): Intake/Output Summary (Last 24 hours) at 3/26/2022 1634  Last data filed at 3/26/2022 1336  Gross per 24 hour   Intake 900 ml   Output 600 ml   Net 300 ml     Objective:  General Appearance:  Ill-appearing and not in pain. Vital signs: (most recent): Blood pressure (!) 84/64, pulse 76, temperature (!) 96.3 °F (35.7 °C), resp. rate 13, height 6' 2\" (1.88 m), weight 84.3 kg (185 lb 13.6 oz), SpO2 100 %. (BP (!) 84/64 (BP 1 Location: Left upper arm, BP Patient Position: At rest)   Pulse 76   Temp (!) 96.3 °F (35.7 °C)   Resp 13   Ht 6' 2\" (1.88 m)   Wt 84.3 kg (185 lb 13.6 oz)   SpO2 100%   BMI 23.86 kg/m²   ). Extremities: (Right index finger has drainage bloody. Dressing changed with nursing team. Denisha Camacho with saline and packed with iodoform gauze.)  Neurological: (Sleepy and non verbral).       Labs/Imaging/Diagnostics    Labs:  CBC:  Recent Labs     03/26/22  1245 03/26/22  0315 03/25/22  0451   WBC 9.4 8.6 7.4   RBC 2.99* 2.91* 3.17*   HGB 8.5* 8.3* 9.2*   HCT 25.1* 24.5* 26.6*   MCV 83.9 84.2 83.9   RDW 18.5* 18.1* 17.9*   PLT 64* 73* 80*     CHEMISTRIES:  Recent Labs     03/26/22  0315 03/25/22  0451 03/24/22  1021   *  150* 147* 145   K 4.8  4.7 5.0 5.2*   *  124* 124* 122*   CO2 18*  18* 18* 17*   BUN 30*  30* 32* 35*   CREA 2.66*  2.69* 2.59* 2.40* CA 8.2*  8.2* 8.6 9.0   PHOS 3.8  --  3.8   PT/INR:No results for input(s): INR, INREXT, INREXT in the last 72 hours. No lab exists for component: PROTIME  APTT:No results for input(s): APTT in the last 72 hours. LIVER PROFILE:  Recent Labs     03/26/22  0315 03/25/22  0451   AST 29 32   ALT 15 14     Lab Results   Component Value Date/Time    ALT (SGPT) 15 03/26/2022 03:15 AM    AST (SGOT) 29 03/26/2022 03:15 AM    Alk. phosphatase 64 03/26/2022 03:15 AM    Bilirubin, direct 1.2 (H) 03/01/2022 08:52 AM    Bilirubin, total 1.5 (H) 03/26/2022 03:15 AM           Assessment//Plan           Patient Active Problem List    Diagnosis Date Noted    Finger infection 03/22/2022    Status epilepticus ([facility] [address] 02/24/2022    Acute renal failure (ARF) ([address] 01/20/2022    Cirrhosis of liver ([address] 01/20/2022    GRACIA (acute kidney injury) ([facility] [address] 01/20/2022     Infected right index finger  Surgery post op day 1. Dressing changed by MD, daily dressing by nursing team from tomorrow onwards. Patient's cultures of tissue and swabs are still pending. Gram stain has no organisms some WBCs. 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 Involved debridement of DIP joint index finger with removal of some base of distal phalanx and head of middle phalanx bone. The nail fold is stabilized to nailbed with a nylon suture which should remain in place for about 3 weeks. Once patient is stable his dressing can be moved to physical therapy with whirlpool treatment. IV antibiotics as per the ID team.  Continue local care  We will follow up the patient.     Electronically signed by Kenya Rizvi MD on 3/26/2022 at 4:09 PM 98.6

## 2022-03-26 NOTE — PROGRESS NOTES
Bladder scan performed on patient and indicated greater than 971 mL urine. Per protocol, straight cath performed and after 10 min only 250 mL drained. Clot found at end of catheter, 2nd bladder scan completed and 856 mL retained. Writer placed fish catheter in order to flush clots if needed. Dr. Maddison Montes paged to be updated for orders.

## 2022-03-26 NOTE — PROGRESS NOTES
Progress Note    Patient: Carrie Worthington MRN: [id number]  SSN: [id number]    YOB: 1960  Age: 60 y.o. Sex: male      Admit Date: 3/22/2022    LOS: 4 days     Subjective:     Patient is obese, alcoholic cirrhosis, with septic finger. Patient had I&D of the finger. Patient is nonverbal    Objective:     Vitals:    03/26/22 1405 03/26/22 1436 03/26/22 1500 03/26/22 1617   BP: (!) 120/90 (!) 88/69 (!) 76/62 (!) 84/64   Pulse: 65 70 79 76   Resp: 22 11 12 13   Temp: (!) 95.4 °F (35.2 °C) 98.4 °F (36.9 °C) (!) 96.3 °F (35.7 °C) (!) 96.3 °F (35.7 °C)   SpO2: 99% 100% 100% 100%   Weight:       Height:            Intake and Output:  Current Shift: 03/26 0701 - 03/26 1900  In: -   Out: 250 [Urine:250]  Last three shifts: 03/24 1901 - 03/26 0700  In: 900 [I.V.:900]  Out: 550 [Urine:550]    Physical Exam:   General:   Drowsy    Eyes:  Conjunctivae/corneas clear. PERRL, EOMs intact. Fundi benign   Ears:  Normal TMs and external ear canals both ears. Nose: Nares normal. Septum midline. Mucosa normal. No drainage or sinus tenderness. Mouth/Throat: Lips, mucosa, and tongue normal. Teeth and gums normal.   Neck: Supple, symmetrical, trachea midline, no adenopathy, thyroid: no enlargment/tenderness/nodules, no carotid bruit and no JVD. Back:   Symmetric, no curvature. ROM normal. No CVA tenderness. Lungs:   Clear to auscultation bilaterally. Heart:  Regular rate and rhythm, S1, S2 normal, no murmur, click, rub or gallop. Abdomen:   Soft, non-tender. Bowel sounds normal. No masses,  No organomegaly. Distended with ascites   Extremities: Extremities normal, atraumatic, no cyanosis or edema. Pulses: 2+ and symmetric all extremities. Skin: Skin color, texture, turgor normal. No rashes or lesions   Lymph nodes: Cervical, supraclavicular, and axillary nodes normal.   Neurologic: CNII-XII intact. Weakness diffuse and reflexes throughout. Lab/Data Review:   All lab results for the last 24 hours reviewed. Recent Results (from the past 24 hour(s))   CULTURE, WOUND Niya Quince STAIN    Collection Time: 03/25/22  5:15 PM    Specimen: Right; Wound    DISTAL INTERPHALANGEAL JOINY WND   Result Value Ref Range    Special Requests: No Special Requests      GRAM STAIN Few WBCs seen      GRAM STAIN No organisms seen      Culture result: PENDING    CK    Collection Time: 03/25/22  5:52 PM   Result Value Ref Range    CK 26 (L) 39 - 308 U/L   VANCOMYCIN, RANDOM    Collection Time: 03/25/22  5:52 PM   Result Value Ref Range    Vancomycin, random 17.7 ug/mL    Reported dose date Dose Dependent      Reported dose: Dose Dependent Units   CBC W/O DIFF    Collection Time: 03/26/22  3:15 AM   Result Value Ref Range    WBC 8.6 4.1 - 11.1 K/uL    RBC 2.91 (L) 4.10 - 5.70 M/uL    HGB 8.3 (L) 12.1 - 17.0 g/dL    HCT 24.5 (L) 36.6 - 50.3 %    MCV 84.2 80.0 - 99.0 FL    MCH 28.5 26.0 - 34.0 PG    MCHC 33.9 30.0 - 36.5 g/dL    RDW 18.1 (H) 11.5 - 14.5 %    PLATELET 73 (L) 797 - 400 K/uL    NRBC 0.0 0.0  WBC    ABSOLUTE NRBC 0.00 0.00 - 8.48 K/uL   METABOLIC PANEL, COMPREHENSIVE    Collection Time: 03/26/22  3:15 AM   Result Value Ref Range    Sodium 150 (H) 136 - 145 mmol/L    Potassium 4.8 3.5 - 5.1 mmol/L    Chloride 125 (H) 97 - 108 mmol/L    CO2 18 (L) 21 - 32 mmol/L    Anion gap 7 5 - 15 mmol/L    Glucose 92 65 - 100 mg/dL    BUN 30 (H) 6 - 20 mg/dL    Creatinine 2.66 (H) 0.70 - 1.30 mg/dL    BUN/Creatinine ratio 11 (L) 12 - 20      GFR est AA 30 (L) >60 ml/min/1.73m2    GFR est non-AA 24 (L) >60 ml/min/1.73m2    Calcium 8.2 (L) 8.5 - 10.1 mg/dL    Bilirubin, total 1.5 (H) 0.2 - 1.0 mg/dL    AST (SGOT) 29 15 - 37 U/L    ALT (SGPT) 15 12 - 78 U/L    Alk.  phosphatase 64 45 - 117 U/L    Protein, total 5.7 (L) 6.4 - 8.2 g/dL    Albumin 1.6 (L) 3.5 - 5.0 g/dL    Globulin 4.1 (H) 2.0 - 4.0 g/dL    A-G Ratio 0.4 (L) 1.1 - 2.2     NT-PRO BNP    Collection Time: 03/26/22  3:15 AM   Result Value Ref Range    NT pro- (H) <125 pg/mL   RENAL FUNCTION PANEL    Collection Time: 03/26/22  3:15 AM   Result Value Ref Range    Sodium 150 (H) 136 - 145 mmol/L    Potassium 4.7 3.5 - 5.1 mmol/L    Chloride 124 (H) 97 - 108 mmol/L    CO2 18 (L) 21 - 32 mmol/L    Anion gap 8 5 - 15 mmol/L    Glucose 92 65 - 100 mg/dL    BUN 30 (H) 6 - 20 mg/dL    Creatinine 2.69 (H) 0.70 - 1.30 mg/dL    BUN/Creatinine ratio 11 (L) 12 - 20      GFR est AA 29 (L) >60 ml/min/1.73m2    GFR est non-AA 24 (L) >60 ml/min/1.73m2    Calcium 8.2 (L) 8.5 - 10.1 mg/dL    Phosphorus 3.8 2.6 - 4.7 mg/dL    Albumin 1.6 (L) 3.5 - 5.0 g/dL   VANCOMYCIN, RANDOM    Collection Time: 03/26/22  3:15 AM   Result Value Ref Range    Vancomycin, random 16.7 ug/mL   GLUCOSE, POC    Collection Time: 03/26/22 10:57 AM   Result Value Ref Range    Glucose (POC) 91 65 - 117 mg/dL    Performed by Safia Mercado    URINALYSIS W/MICROSCOPIC    Collection Time: 03/26/22 11:15 AM   Result Value Ref Range    Color Yellow/Straw      Appearance Turbid (A) Clear      Specific gravity 1.030 1.003 - 1.030      pH (UA) 5.0 5.0 - 8.0      Protein Negative Negative mg/dL    Glucose Negative Negative mg/dL    Ketone Negative Negative mg/dL    Bilirubin Negative Negative      Blood Negative Negative      Urobilinogen 0.1 0.1 - 1.0 EU/dL    Nitrites Negative Negative      Leukocyte Esterase Negative Negative      WBC 5-10 0 - 4 /hpf    RBC 0-5 0 - 5 /hpf    Bacteria Negative Negative /hpf    Mucus Trace /lpf   PROTEIN/CREATININE RATIO, URINE    Collection Time: 03/26/22 11:15 AM   Result Value Ref Range    Protein, urine random 43 (H) 0.0 - 11.9 mg/dL    Creatinine, urine 217.00 mg/dL    Protein/Creat.  urine Ratio 0.2     CBC WITH AUTOMATED DIFF    Collection Time: 03/26/22 12:45 PM   Result Value Ref Range    WBC 9.4 4.1 - 11.1 K/uL    RBC 2.99 (L) 4.10 - 5.70 M/uL    HGB 8.5 (L) 12.1 - 17.0 g/dL    HCT 25.1 (L) 36.6 - 50.3 %    MCV 83.9 80.0 - 99.0 FL    MCH 28.4 26.0 - 34.0 PG    MCHC 33.9 30.0 - 36.5 g/dL    RDW 18.5 (H) 11.5 - 14.5 %    PLATELET 64 (L) 914 - 400 K/uL    MPV 11.6 8.9 - 12.9 FL    NRBC 0.0 0.0  WBC    ABSOLUTE NRBC 0.00 0.00 - 0.01 K/uL    NEUTROPHILS 81 (H) 32 - 75 %    LYMPHOCYTES 11 (L) 12 - 49 %    MONOCYTES 7 5 - 13 %    EOSINOPHILS 1 0 - 7 %    BASOPHILS 0 0 - 1 %    IMMATURE GRANULOCYTES 0 0 - 0.5 %    ABS. NEUTROPHILS 7.6 1.8 - 8.0 K/UL    ABS. LYMPHOCYTES 1.0 0.8 - 3.5 K/UL    ABS. MONOCYTES 0.6 0.0 - 1.0 K/UL    ABS. EOSINOPHILS 0.1 0.0 - 0.4 K/UL    ABS. BASOPHILS 0.0 0.0 - 0.1 K/UL    ABS. IMM.  GRANS. 0.0 0.00 - 0.04 K/UL    DF AUTOMATED     C REACTIVE PROTEIN, QT    Collection Time: 03/26/22 12:45 PM   Result Value Ref Range    C-Reactive protein 1.59 (H) 0.00 - 0.60 mg/dL   SED RATE (ESR)    Collection Time: 03/26/22 12:45 PM   Result Value Ref Range    Sed rate, automated 36 (H) 0 - 20 mm/hr         Assessment:     Active Problems:    Finger infection (3/22/2022)    Cellulitis of the finger index  With obesity  Cirrhosis of the liver  Ascites  Metabolic encephalopathy    Plan:     Continue present treatment    Signed By: Beba Rooney MD     March 26, 2022

## 2022-03-26 NOTE — PROGRESS NOTES
Consult for Vancomycin Dosing by Pharmacy by Dr. Stephenie Valenzuela provided for this 60y.o. year old male , for indication of Osteo/Septic Arthritis/ R index finger swelling    Day of Therapy: 4    Goal of Level(s): (trough = 10 - 15 mcg/dL)     Other Current Antibiotics: Zosyn    Significant Cultures: All Micro Results       Procedure Component Value Units Date/Time    CULTURE, Ophelia Almonte [[id number]]     Order Status: Sent Specimen: Wound from Finger     CULTURE, TISSUE Martinezruben Allison STAIN [[id number]]     Order Status: Sent Specimen: Finger     CULTURE, [place] Riggins STAIN [[id number]] Collected: 03/22/22 2219    Order Status: Completed Specimen: Wound Updated: 03/25/22 0850     Special Requests: No Special Requests        GRAM STAIN Few WBCs seen         No organisms seen        Culture result:       Few Staphylococcus species, coagulase negative          MRSA SCREEN - PCR (NASAL) [[id number]] Collected: 03/24/22 1256    Order Status: Completed Specimen: Swab Updated: 03/24/22 1456     MRSA by PCR, Nasal Not Detected       COVID-19 RAPID TEST [[id number]] Collected: 03/24/22 0918    Order Status: Completed Specimen: Nasopharyngeal Updated: 03/24/22 0955     COVID-19 rapid test Not Detected        Comment: Rapid Abbott ID Now   Rapid NAAT:  The specimen is NEGATIVE for SARS-CoV-2, the novel coronavirus associated with COVID-19. Negative results should be treated as presumptive and, if inconsistent with clinical signs and symptoms or necessary for patient management, should be tested with an alternative molecular assay. Negative results do not preclude SARS-CoV-2 infection and should not be used as the sole basis for patient management decisions. This test has been authorized by the FDA under   an Emergency Use Authorization (EUA) for use by authorized laboratories.  Fact sheet for Healthcare Providers: [employer]Update[employer]co.nz Fact sheet for Patients: [facility]   Methodology: Isothermal Nucleic Acid Amplification         CULTURE, BLOOD, PAIRED [[id number]] Collected: 03/23/22 0000    Order Status: Completed Specimen: Blood Updated: 03/23/22 0021            Serum Creatinine Creatinine   Date/Time Value Ref Range Status   03/26/2022 03:15 AM 2.66 (H) 0.70 - 1.30 mg/dL Final   03/26/2022 03:15 AM 2.69 (H) 0.70 - 1.30 mg/dL Final   03/25/2022 04:51 AM 2.59 (H) 0.70 - 1.30 mg/dL Final      Creatinine Clearance Estimated Creatinine Clearance: 33 mL/min (A) (based on SCr of 2.66 mg/dL (H)). BUN Lab Results   Component Value Date/Time    BUN 30 (H) 03/26/2022 03:15 AM    BUN 30 (H) 03/26/2022 03:15 AM      WBC Lab Results   Component Value Date/Time    WBC 8.6 03/26/2022 03:15 AM      Temp Temp Readings from Last 1 Encounters:   03/26/22 97.3 °F (36.3 °C)      C-Reactive Protein C-Reactive protein   Date Value Ref Range Status   03/22/2022 3.15 (H) 0.00 - 0.60 mg/dL Final      Procalcitonin No results found for: PCT     Last Level: No results found for: VANCT   Vancomycin, random   Date/Time Value Ref Range Status   03/26/2022 03:15 AM 16.7 ug/mL Final     Comment:     No reference range has been established. Ht Readings from Last 1 Encounters:   03/22/22 188 cm (74\")        Wt Readings from Last 1 Encounters:   03/26/22 84.3 kg (185 lb 13.6 oz)     Ideal body weight: 82.2 kg (181 lb 3.5 oz)  Adjusted ideal body weight: 83 kg (183 lb 1.1 oz)     Previous Regimen: Held    New Regimen:   Pt has GRACIA. Random level obtained this morning remains supratherapeutic at 16.7 mcg/ml. Continue to hold Vancomycin for now. Will order another level for tomorrow (3/27) with AM labs and redose when appropriate. Pharmacy to follow daily and will make changes to dose and/or frequency based on clinical status.      _________________________________     [address]

## 2022-03-27 NOTE — PROGRESS NOTES
Vancomycin Dosing Consult  Day #5 of vancomycin therapy (resuming therapy)  Consult ordered by Dr. Candance Abu for this 60 y.o. male for indication of right index finger SSTI, osteomyelitis, septic arthritis s/p wound debridement w/ excision of bone 3/25. Antibiotic regimen: Vancomycin monotherapy    Temp (24hrs), Av.7 °F (36.5 °C), Min:96.6 °F (35.9 °C), Max:98.2 °F (36.8 °C)    Recent Labs     22  1025 22  1245 22  0315 22  0451   WBC  --  9.4 8.6 7.4   CREA 2.43*  --  2.66*  2.69* 2.59*   BUN 26*  --  30*  30* 32*     Est CrCl: ~35-40 ml/min  Concomitant nephrotoxic drugs: None    Cultures:   3/22 Wound: Few CoNS, final  3/23 Blood: Pending  3/25 Wound: Scant CoNS, preliminary  3/25 Tissue: Scant probable Enterococcus sp.     MRSA Swab: Not detected 3/24    Target range: Trough 10-15 mcg/mL    Recent level history:  Date/Time Dose & Interval Measured Level (mcg/mL)   3/27 @ 1330 1250 mg (given 3/23) 11.6        Assessment/Plan:   Hypothermic, WBC WNL, CRP trending down  GRACIA with slow vancomycin clearance, pulse dose for now  Level within therapeutic range, will give 500 mg and check a random level tomorrow evening  Antimicrobial stop date TBD

## 2022-03-27 NOTE — PROGRESS NOTES
Nephrology Consult    Patient: Mason Connor MRN: [id number]  SSN: [id number]    YOB: 1960  Age: 60 y.o. Sex: male      Subjective:     Patient seen in the ICU   Awake and not following verbal commands fully , not oriented   Just smiled when asked if he was in any discomfort   Didn't appear to be in any distress     Spoke to Nurse at bedside, Gerard had to be inserted as Bladder scan showed urine >200 ml . UOP recorded for last shift 625 ml     RT Index finger Surgical dressing C,d,i    Past Medical History:   Diagnosis Date    Arthritis     Hypertension      Past Surgical History:   Procedure Laterality Date    IR INSERT NON TUNL CVC OVER 5 YRS  3/25/2022    IR PARACENTESIS ABD W IMAGE  1/21/2022    IR PARACENTESIS ABD W IMAGE  3/1/2022      No family history on file.   Social History     Tobacco Use    Smoking status: Never Smoker    Smokeless tobacco: Never Used   Substance Use Topics    Alcohol use: Not on file      Current Facility-Administered Medications   Medication Dose Route Frequency Provider Last Rate Last Admin    levETIRAcetam (KEPPRA) oral solution 1,000 mg  1,000 mg Oral BID Lamberto Ackerman MD   1,000 mg at 03/27/22 0841    albumin human 5% (BUMINATE) solution 25 g  25 g IntraVENous Q8H Nimo Sanchez MD   25 g at 03/27/22 0539    dextrose 5% infusion  50 mL/hr IntraVENous CONTINUOUS Nimo Sanchez MD 50 mL/hr at 03/27/22 0538 50 mL/hr at 03/27/22 0538    levothyroxine (SYNTHROID) tablet 50 mcg  50 mcg Oral Mohit Hanna MD   50 mcg at 03/27/22 0539    tamsulosin (FLOMAX) capsule 0.4 mg  0.4 mg Oral DAILY Raffi DENIS MD   0.4 mg at 03/27/22 0841    midodrine (PROAMATINE) tablet 5 mg  5 mg Oral TID Queen Marianne MD   5 mg at 03/27/22 0841    sodium chloride (NS) flush 5-40 mL  5-40 mL IntraVENous Q8H Daisy Newton MD   10 mL at 03/27/22 0600    sodium chloride (NS) flush 5-40 mL  5-40 mL IntraVENous Q8H Judith Mon MD   10 mL at 03/27/22 0546    sodium chloride (NS) flush 5-40 mL  5-40 mL IntraVENous PRN Dillon Bray MD        acetaminophen (TYLENOL) tablet 650 mg  650 mg Oral Q8H Ramírez Bray MD   650 mg at 03/27/22 0539    HYDROcodone-acetaminophen (NORCO) 5-325 mg per tablet 1 Tablet  1 Tablet Oral Q4H PRN Antwon Coyle MD        morphine injection 2 mg  2 mg IntraVENous Q4H PRN Antwon Coyle MD        naloxone (NARCAN) injection 0.4 mg  0.4 mg IntraVENous PRN Antwon Coyle MD        ondansetron (ZOFRAN) injection 4 mg  4 mg IntraVENous Q4H PRN Antwon Coyle MD        allopurinoL (ZYLOPRIM) tablet 300 mg  300 mg Oral DAILY Lamberto Ackerman MD   300 mg at 03/27/22 0841    [Held by provider] aMILoride (MIDAMOR) tablet 5 mg  5 mg Oral DAILY Lamberto cAkerman MD   5 mg at 03/23/22 0900    ergocalciferol capsule 50,000 Units  50,000 Units Oral Q7D Lamberto Ackerman MD        famotidine (PEPCID) tablet 20 mg  20 mg Oral QPM Lamberto Ackerman MD   20 mg at 28/37/58 3517    folic acid (FOLVITE) tablet 1 mg  1 mg Oral DAILY Lamberto Ackerman MD   1 mg at 03/27/22 0843    piperacillin-tazobactam (ZOSYN) 3.375 g in 0.9% sodium chloride (MBP/ADV) 100 mL MBP  3.375 g IntraVENous Q8H Lamberto Ackerman MD 25 mL/hr at 03/27/22 0323 3.375 g at 03/27/22 0323    lactulose (CHRONULAC) 10 gram/15 mL solution 15 mL  15 mL Oral TID Elliot Ackerman MD   15 mL at 03/27/22 0841    propranoloL (INDERAL) tablet 20 mg  20 mg Oral BID Lamberto Ackerman MD   20 mg at 03/26/22 0805    thiamine mononitrate (B-1) tablet 100 mg  100 mg Oral DAILY Lamberto Ackerman MD   100 mg at 03/27/22 5399    acetaminophen (TYLENOL) tablet 650 mg  650 mg Oral Q6H PRN Lamberto Ackerman MD   650 mg at 03/26/22 1410        No Known Allergies    Review of Systems:  A comprehensive review of systems was negative except for that written in the History of Present Illness.     Objective:     Vitals:    03/27/22 0600 03/27/22 0700 03/27/22 0841 03/27/22 0917   BP: 116/70 119/63 122/74 (!) 108/94   Pulse:  82 79 80   Resp: 12 12  10   Temp:  97.3 °F (36.3 °C)  97.2 °F (36.2 °C)   SpO2: 99% 100%  98%   Weight:       Height:            Physical Exam:  General: NAD , not oriented   Eyes: sclera anicteric  Oral Cavity: No thrush or ulcers  Neck: no JVD  Chest: Fair bilateral air entry  Heart: normal sounds  Abdomen: soft and non tender   : no fish  Lower Extremities: no edema  Skin: no rash          Assessment:     Hospital Problems  Never Reviewed          Codes Class Noted POA    Finger infection ICD-10-CM: L08.9  ICD-9-CM: 686.9  3/22/2022 Unknown              Plan:         1 acute kidney injury on chronic kidney disease stage IIIA. -Etiology can be prerenal plus was on amiloride.    -On admission creatinine was elevated at 2.51.    -Cr is at 2.4-2.5.  -His last creatinine was 1.7 on 3/4/2022.    - no evidence of lower extremity edema. Hypervolemic with abd distension ,h/o repeated paracentesis (3/24 - 4.3 L.removed,  2/22 and 01/22  )  Assess RFP after decr of intra abd pressure   -will try volume expansion with IV albumin   -I will continue to hold amiloride.    -agreed to start on IVF. -He is not on any other potential nephrotoxins. 2.  Septic arthritis.    -He presented with swollen right hand finger diagnosed with septic arthritis status post I&D received IV antibiotics per orthopedic.  -on Vanc and zosyn  S/p I&D on 3/25     3 hypotension.    -Blood pressures are low.    -I will continue to hold amiloride.    -On no other blood pressure medications. -will add midodrine 5 mg tid. Cirrhosis , portal HTN - due to etoh   Abd distension   On Propranolol   Albumin for volume expansion       4. Anemia.    -Hemoglobin is 9.6.    -I will send iron studies and ferritin level. 5.  Metabolic acidosis. -Resolved. CO2 is 18.    -will resume NaHCO3 650 mg tid. -will consider HCO3 drip if worsen metabolic acidosis.     6. Hpernatremia:  -Na 148  -will increase D5w to 75 cc/hr  VF     AG corrected for hypoalbuminemia - 12  Delta gap <1 .   Lactic acidosis - due to decr Liver clearance and probably RTA as well ,  No diarrhea reported   will give 1 dose of 100 mqes sodium Bicarb   Bicarb deficit - 230 meqs approx     Signed By: Christianne Wilkinson MD     March 27, 2022

## 2022-03-27 NOTE — PROGRESS NOTES
Infectious Disease Progress Note           Subjective:   Pt seen and examined at bedside. Stable, decreased hypothermic episodes, no acute events since last seen   Objective:   Physical Exam:     Visit Vitals  /80 (BP 1 Location: Left leg, BP Patient Position: At rest)   Pulse 77   Temp 97.2 °F (36.2 °C)   Resp 15   Ht 6' 2\" (1.88 m)   Wt 185 lb 13.6 oz (84.3 kg)   SpO2 98%   BMI 23.86 kg/m²    O2 Flow Rate (L/min): 2 l/min O2 Device: None (Room air)    Temp (24hrs), Av.5 °F (36.4 °C), Min:95.4 °F (35.2 °C), Max:98.4 °F (36.9 °C)    No intake/output data recorded.     1901 -  0700  In: 1400 [I.V.:1400]  Out: 675 [Urine:675]    General: NAD, confused, slow to respond   HEENT: HEIDY, Moist mucosa   Lungs: CTA b/l\, decreased at the bases, no wheeze/rhonchi   Heart: S1S2+, RRR, no murmur  Abdo: Soft, NT, ND, +BS   : No fish   Exts:Right index finger dressing in place   Skin: No pressure ulcers       Data Review:       Recent Days:  Recent Labs     22  1245 22  0315 22  0451   WBC 9.4 8.6 7.4   HGB 8.5* 8.3* 9.2*   HCT 25.1* 24.5* 26.6*   PLT 64* 73* 80*     Recent Labs     22  1025 22  0315 22  0451   BUN 26* 30*  30* 32*   CREA 2.43* 2.66*  2.69* 2.59*       Lab Results   Component Value Date/Time    C-Reactive protein 1.59 (H) 2022 12:45 PM        Microbiology     Results     Procedure Component Value Units Date/Time    CULTURE, Gaby Levyins STAIN [[id number]] Collected: 22 1715    Order Status: Completed Specimen: Wound from Right Updated: 22 0911     Special Requests: No Special Requests        GRAM STAIN Few WBCs seen         No organisms seen        Culture result:       Scant Staphylococcus species, coagulase negative          CULTURE, TISSUE Fairy Ganser STAIN [[id number]] Collected: 22 4344    Order Status: Completed Specimen: Right Updated: 22 1631     Special Requests: No Special Requests        GRAM STAIN Rare WBCs seen         No organisms seen        Culture result:       Scant Probable Enterococcus species          MRSA SCREEN - PCR (NASAL) [[id number]] Collected: 03/24/22 1256    Order Status: Completed Specimen: Swab Updated: 03/24/22 1456     MRSA by PCR, Nasal Not Detected       COVID-19 RAPID TEST [[id number]] Collected: 03/24/22 0918    Order Status: Completed Specimen: Nasopharyngeal Updated: 03/24/22 0955     COVID-19 rapid test Not Detected        Comment: Rapid Abbott ID Now   Rapid NAAT:  The specimen is NEGATIVE for SARS-CoV-2, the novel coronavirus associated with COVID-19. Negative results should be treated as presumptive and, if inconsistent with clinical signs and symptoms or necessary for patient management, should be tested with an alternative molecular assay. Negative results do not preclude SARS-CoV-2 infection and should not be used as the sole basis for patient management decisions. This test has been authorized by the FDA under   an Emergency Use Authorization (EUA) for use by authorized laboratories. Fact sheet for Healthcare Providers: ConventionUpdate.co.nz Fact sheet for Patients: ConventionUpdate.co.nz   Methodology: Isothermal Nucleic Acid Amplification         CULTURE, BLOOD, PAIRED [[id number]] Collected: 03/23/22 0000    Order Status: Completed Specimen: Blood Updated: 03/23/22 0021    CULTURE, [place] STAIN [[id number]] Collected: 03/22/22 2219    Order Status: Completed Specimen: Wound Updated: 03/25/22 0850     Special Requests: No Special Requests        GRAM STAIN Few WBCs seen         No organisms seen        Culture result:       Few Staphylococcus species, coagulase negative                   Diagnostics   CXR Results  (Last 48 hours)               03/25/22 2218  XR CHEST PORT Final result    Narrative: Indication: NG tube placement. AP semiupright portable chest radiograph 25 March 2022.  Comparison examination   earlier the same date. Right central line tip over the upper right heart shadow. NG tube tip over the gastric fundus, sidehole over the distal esophagus. Underexpanded lungs with mild bibasilar atelectasis. Mild cardiomegaly. Tortuous thoracic aorta. No pneumothorax. No apparent pleural effusion. Assessment/Plan     1. Right index finger swelling/tenderness, soft tissue swelling/septic arthritis and osteomyelitis on X-ray       Coag neg staph isolated from superficial wound Cx and intra-op Cx may not be a contaminant as initially thought       S/p wound debridement w excision of bone on 03/25      Remains afebrile w a normal WBC on routine labs, CRP 1.59 and ESR 36      Resume Vanc for CoNS coverage, d/c Zosyn. Routine labs in the morning      2. Hypothyroidism w elevated TSH and normal FT4, may be reason for hypothermia      Levothyroxine dose increased to 50 mcg on 03/26         3.  Acute renal failure: Cr staying stable, will continue to monitor      4. H/o alcoholism w resulting liver cirrhosis     Everet Skiff, MD    3/27/2022

## 2022-03-27 NOTE — PROGRESS NOTES
Progress Note    Patient: Houston Newton MRN: [id number]  SSN: [id number]    YOB: 1960  Age: 60 y.o. Sex: male      Admit Date: 3/22/2022    LOS: 5 days     Subjective:     60years old with right index finger septic arthritis and osteomyelitis hypothyroidism acute renal failure, history of alcoholism with liver cirrhosis nonverbal    Objective:     Vitals:    03/27/22 1301 03/27/22 1401 03/27/22 1504 03/27/22 1610   BP: 116/80 (!) 145/75 129/78 125/79   Pulse: 77 78 79 82   Resp: 15 14 14 11   Temp:    (!) 96.6 °F (35.9 °C)   SpO2: 98% 99% 100% 98%   Weight:       Height:            Intake and Output:  Current Shift: No intake/output data recorded. Last three shifts: 03/25 1901 - 03/27 0700  In: 1400 [I.V.:1400]  Out: 675 [Urine:675]    Physical Exam:   General:   Lethargic    Eyes:  Conjunctivae/corneas clear. PERRL, EOMs intact. Fundi benign   Ears:  Normal TMs and external ear canals both ears. Nose: Nares normal. Septum midline. Mucosa normal. No drainage or sinus tenderness. Mouth/Throat: Lips, mucosa, and tongue normal. Teeth and gums normal.   Neck: Supple, symmetrical, trachea midline, no adenopathy, thyroid: no enlargment/tenderness/nodules, no carotid bruit and no JVD. Back:   Symmetric, no curvature. ROM normal. No CVA tenderness. Lungs:   Clear to auscultation bilaterally. Heart:  Regular rate and rhythm, S1, S2 normal, no murmur, click, rub or gallop. Abdomen:   Soft, non-tender. Bowel sounds normal. No masses,  No organomegaly. Extremities: Extremities normal, atraumatic, no cyanosis or edema. Pulses: 2+ and symmetric all extremities. Skin: Skin color, texture, turgor normal. No rashes or lesions   Lymph nodes: Cervical, supraclavicular, and axillary nodes normal.   Neurologic: CNII-XII intact. Lethargic . Lab/Data Review: All lab results for the last 24 hours reviewed.    Recent Results (from the past 24 hour(s))   GLUCOSE, POC    Collection Time: 03/27/22  8:21 AM   Result Value Ref Range    Glucose (POC) 74 65 - 117 mg/dL    Performed by Denver Hollingshead    RENAL FUNCTION PANEL    Collection Time: 03/27/22 10:25 AM   Result Value Ref Range    Sodium 149 (H) 136 - 145 mmol/L    Potassium 4.0 3.5 - 5.1 mmol/L    Chloride 125 (H) 97 - 108 mmol/L    CO2 17 (L) 21 - 32 mmol/L    Anion gap 7 5 - 15 mmol/L    Glucose 92 65 - 100 mg/dL    BUN 26 (H) 6 - 20 mg/dL    Creatinine 2.43 (H) 0.70 - 1.30 mg/dL    BUN/Creatinine ratio 11 (L) 12 - 20      GFR est AA 33 (L) >60 ml/min/1.73m2    GFR est non-AA 27 (L) >60 ml/min/1.73m2    Calcium 8.2 (L) 8.5 - 10.1 mg/dL    Phosphorus 3.2 2.6 - 4.7 mg/dL    Albumin 2.2 (L) 3.5 - 5.0 g/dL   SODIUM, UR, RANDOM    Collection Time: 03/27/22 11:30 AM   Result Value Ref Range    Sodium,urine random 43 mmol/L   POTASSIUM, UR, RANDOM    Collection Time: 03/27/22 11:30 AM   Result Value Ref Range    Potassium urine, random 65 mmol/L   CHLORIDE, URINE RANDOM    Collection Time: 03/27/22 11:30 AM   Result Value Ref Range    Chloride,urine random 56 mmol/L   VANCOMYCIN, RANDOM    Collection Time: 03/27/22  1:30 PM   Result Value Ref Range    Vancomycin, random 11.6 ug/mL   GLUCOSE, POC    Collection Time: 03/27/22  4:24 PM   Result Value Ref Range    Glucose (POC) 100 65 - 117 mg/dL    Performed by Mani Putnam          Assessment:     Active Problems:    Finger infection (3/22/2022)    As above  Metabolic encephalopathy    Plan:   Continue present treatment.   Discussed with family members    Signed By: Gerardo Thakur MD     March 27, 2022

## 2022-03-27 NOTE — PROGRESS NOTES
Vascular access consult- Follow up on infiltration of D%, NS. Patient received TL placement from IR. Late entry- Patient assessed with US on 3/25/22 at approx. 845 am.  Patient found with infiltrated piv located in Right upper arm basilic/brachial; significant edema noted. Infusion stopped by Care Nurse David Bryant when found prior to my arrival. Limb alert placed on right arm as patient has infected/septic finger with plans for I & D today. Assessment of Left arm for piv placement found no suitable vein to cannulate. Dr. Verlean Fleischer at bedside requested TL placement. IR NP and RN on unit, and when consulted for TL placement agreed and would follow up next. Care Nurse discontinued PIV from Right Upper arm and provided elevation of extremity. Skin tearing/ blister opening from edema noted around edge of piv dsg prior to removal by Care Nurse as demonstrated in photograph of site. To be uploaded into epic.

## 2022-03-28 NOTE — PROGRESS NOTES
Comprehensive Nutrition Assessment    Type and Reason for Visit: Initial,Positive nutrition screen,RD nutrition re-screen/LOS (MST 2)    Nutrition Recommendations/Plan:   Continue NPO until cleared for diet by SLP    Initiate TF via NGT of Glucerna 1.5 at 20ml/hr, advance by 10ml q4hrs to goal of 60ml/hr  Flush 125ml water q4hrs  Provides 2160kcals, 119g pro, and 1844ml water (meets 100% needs)     D5 at 75ml/hr providing 306kcals/d, consider d/c once TF starts       Nutrition Assessment:  Admitted for finger infection, (3/25) I/D. RD familiar with pt from last  Admit where pt required intubation and banding of esophageal varices. Intakes <50% at this time. Currently pt in ICU for low temp and low HR. Deemed inappropriate for PO today by SLP d/t decreased alertness, previously on MM5 diet with <25% PO. NGT in place, RD to place TF order, discussed with attending need to initiate nutrition. Labs: H/H 8.5/25.1, Na 149, BUN 26, Cr 2.43, Tbili 1.5, alb 2.2. Meds: Allopurinol, D5 at 75ml/hr, Vit D3 weekly, Pepcid, Folic Acid, Lactulose TID, Keppra, levothyroxine, B1, Vancomycin,       Malnutrition Assessment:  Malnutrition Status:  Severe malnutrition    Context:  Acute illness     Findings of the 6 clinical characteristics of malnutrition:   Energy Intake:  7 - 50% or less of est energy requirements for 5 or more days (NPO/poor PO)  Weight Loss:  Unable to assess     Body Fat Loss:  1 - Mild body fat loss, Buccal region,Triceps   Muscle Mass Loss:  7 - Moderate muscle mass loss, Calf,Clavicles (pectoralis & deltoids),Scapula (trapezius),Temples (temporalis)  Fluid Accumulation:  No significant fluid accumulation,        Estimated Daily Nutrient Needs:  Energy (kcal): 1998-2331kcals (MSJ x1.2-1.4); Weight Used for Energy Requirements: Other (specify) (EDW 80kg)  Protein (g): 96-120g (1.2-1.5g/kg); Weight Used for Protein Requirements: Other (specify) (80kg EDW)  Fluid (ml/day): 1600-2000ml (20-25ml/kg EDW);  Method Used for Fluid Requirements: ml/kg      Nutrition Related Findings:  Unable to complete NFPE at this time, noted (2/25) NFPE grossly normal, mild-mod wasting to clavicles with large volume ascites. NGT in place since 3/26, <50ml OP. SLP following, on [place] when more alert. No N/V/D/C, last BM 3/27. Pitting BL LE, +2 RUE edema        Wounds:    Surgical incision,Stage II (Blisters d/t edema; Stage 2 Gluteal cleft; R-Hand incision)       Current Nutrition Therapies:  ADULT TUBE FEEDING Nasogastric; Diabetic; Delivery Method: Continuous; Continuous Initial Rate (mL/hr): 20; Continuous Advance Tube Feeding: Yes; Advancement Volume (mL/hr): 10; Advancement Frequency: Q 4 hours; Continuous Goal Rate (mL/hr): 60; W... Anthropometric Measures:  · Height:  6' 2.02\" (188 cm)  · Current Body Wt:  84.3 kg (185 lb 13.6 oz)   · Ideal Body Wt:  190 lbs:  97.8 %   · BMI Category:  Normal weight (BMI 18.5-24. 9)     Wt Readings from Last 6 Encounters:   03/26/22 84.3 kg (185 lb 13.6 oz)   03/02/22 84 kg (185 lb 3 oz)   01/20/22 86.2 kg (190 lb)   Last measured BW of 79.1kg (3/1), now wt gain likely d/t acites vs Edema.      Nutrition Diagnosis:   · In context of acute illness or injury,Severe malnutrition related to inadequate protein-energy intake as evidenced by NPO or clear liquid status due to medical condition,moderate muscle loss,mild loss of subcutaneous fat      Nutrition Interventions:   Food and/or Nutrient Delivery: Continue NPO,Start tube feeding  Nutrition Education and Counseling: No recommendations at this time,Education not appropriate  Coordination of Nutrition Care: Continue to monitor while inpatient,Swallow evaluation    Goals:  Meet >/=50% of EENs in >5 days, Wt maintenance within +/-0.5kg in >5 days, wound healing       Nutrition Monitoring and Evaluation:   Behavioral-Environmental Outcomes: None identified  Food/Nutrient Intake Outcomes: Diet advancement/tolerance,Enteral nutrition intake/tolerance  Physical Signs/Symptoms Outcomes: Weight,Skin,GI status,Chewing or swallowing,Meal time behavior,Fluid status or edema,Biochemical data    Discharge Planning:     Too soon to determine     Electronically signed by Manti on 3/28/2022 at 2:19 PM    Contact: Ext [phone], or via [employer]

## 2022-03-28 NOTE — PROGRESS NOTES
Orthopedic progress note    Date:3/28/2022       Room:[facility]  Patient Name:Waldo Rangel     YOB: 1960     Age:62 y.o. Subjective    60year-old male status post I&D right index finger. Postop day #3. Patient still remains in ICU. Not conversive. He does respond to stimuli.   Objective           Vitals Last 24 Hours:  TEMPERATURE:  Temp  Av.1 °F (36.2 °C)  Min: 96.1 °F (35.6 °C)  Max: 98.1 °F (36.7 °C)  RESPIRATIONS RANGE: Resp  Av.1  Min: 10  Max: 20  PULSE OXIMETRY RANGE: SpO2  Av.6 %  Min: 94 %  Max: 100 %  PULSE RANGE: Pulse  Av.2  Min: 78  Max: 90  BLOOD PRESSURE RANGE: Systolic (21YNT), FQZ:243 , Min:110 , FDD:031   ; Diastolic (49STW), WAU:33, Min:68, Max:86    Current Facility-Administered Medications   Medication Dose Route Frequency    midodrine (PROAMATINE) tablet 5 mg  5 mg Oral TID    levETIRAcetam (KEPPRA) oral solution 1,000 mg  1,000 mg Oral BID    Vancomycin - Pharmacy to Dose   Other Rx Dosing/Monitoring    Vancomycin - Please draw random level 3/28 @ 1700   Other ONCE    dextrose 5% infusion  75 mL/hr IntraVENous CONTINUOUS    levothyroxine (SYNTHROID) tablet 50 mcg  50 mcg Oral 6am    tamsulosin (FLOMAX) capsule 0.4 mg  0.4 mg Oral DAILY    sodium chloride (NS) flush 5-40 mL  5-40 mL IntraVENous Q8H    sodium chloride (NS) flush 5-40 mL  5-40 mL IntraVENous PRN    acetaminophen (TYLENOL) tablet 650 mg  650 mg Oral Q8H    HYDROcodone-acetaminophen (NORCO) 5-325 mg per tablet 1 Tablet  1 Tablet Oral Q4H PRN    morphine injection 2 mg  2 mg IntraVENous Q4H PRN    naloxone (NARCAN) injection 0.4 mg  0.4 mg IntraVENous PRN    ondansetron (ZOFRAN) injection 4 mg  4 mg IntraVENous Q4H PRN    allopurinoL (ZYLOPRIM) tablet 300 mg  300 mg Oral DAILY    [Held by provider] aMILoride (MIDAMOR) tablet 5 mg  5 mg Oral DAILY    ergocalciferol capsule 50,000 Units  50,000 Units Oral Q7D    famotidine (PEPCID) tablet 20 mg  20 mg Oral QPM    folic acid (FOLVITE) tablet 1 mg  1 mg Oral DAILY    lactulose (CHRONULAC) 10 gram/15 mL solution 15 mL  15 mL Oral TID    propranoloL (INDERAL) tablet 20 mg  20 mg Oral BID    thiamine mononitrate (B-1) tablet 100 mg  100 mg Oral DAILY    acetaminophen (TYLENOL) tablet 650 mg  650 mg Oral Q6H PRN          I/O (24Hr): Intake/Output Summary (Last 24 hours) at 3/28/2022 1355  Last data filed at 3/28/2022 1100  Gross per 24 hour   Intake 4314.17 ml   Output 1075 ml   Net 3239.17 ml     Objective  Labs/Imaging/Diagnostics    Labs:  CBC:  Recent Labs     03/26/22  1245 03/26/22  0315   WBC 9.4 8.6   RBC 2.99* 2.91*   HGB 8.5* 8.3*   HCT 25.1* 24.5*   MCV 83.9 84.2   RDW 18.5* 18.1*   PLT 64* 73*     CHEMISTRIES:  Recent Labs     03/27/22  1025 03/26/22  0315   * 150*  150*   K 4.0 4.8  4.7   * 125*  124*   CO2 17* 18*  18*   BUN 26* 30*  30*   CREA 2.43* 2.66*  2.69*   CA 8.2* 8.2*  8.2*   PHOS 3.2 3.8   PT/INR:No results for input(s): INR, INREXT in the last 72 hours. No lab exists for component: PROTIME  APTT:No results for input(s): APTT in the last 72 hours. LIVER PROFILE:  Recent Labs     03/26/22  0315   AST 29   ALT 15     Lab Results   Component Value Date/Time    ALT (SGPT) 15 03/26/2022 03:15 AM    AST (SGOT) 29 03/26/2022 03:15 AM    Alk. phosphatase 64 03/26/2022 03:15 AM    Bilirubin, direct 1.2 (H) 03/01/2022 08:52 AM    Bilirubin, total 1.5 (H) 03/26/2022 03:15 AM     Intraoperative cultures are growing oxacillin resistant staph epidermidis    Physical Exam:  Right index finger: Dressing was removed by me. There is scant bloody drainage seen on dressing. Packing material was removed. Wound was irrigated with normal saline. Radial pulses palpable. Nailbed still remains in place.     Assessment//Plan           Patient Active Problem List    Diagnosis Date Noted    Finger infection 03/22/2022    Status epilepticus ([facility] 02/24/2022    Acute renal failure (ARF) ([facility] 01/20/2022  Cirrhosis of liver ([facility] 01/20/2022    GRACIA (acute kidney injury) ([facility] 01/20/2022     Status post I&D right index finger. Postop day #3. Aquacel Ag rope was used with packing material.  This was covered by 4 x 4's soft form Kerlix. We will start every other day dressing changes with OPTi cell rope unless dressing becomes saturated. Antibiotics as per ID. Ortho following.         Electronically signed by Riya Geiger PA-C on 3/28/2022 at 1:55 PM

## 2022-03-28 NOTE — PROGRESS NOTES
PROGRESS NOTE    Patient: Citlali Grande MRN: [id number]  SSN: [id number]    YOB: 1960  Age: 60 y.o. Sex: male      Admit Date: 3/22/2022    LOS: 6 days       Subjective     Chief Complaint   Patient presents with    Finger Swelling       HPI: Citlali Grande is a(n) 60 y.o. male with PMH significant for etoh abuse, encephalopathy, cirrhosis, seizure disorder, gout presents with c/o swelling to right 2nd finger. Patient is nonverbal at baseline and dependent on all ADL at [address] [place] [address]. According to nursing home staff, has future orthopedic appointment for this finger. Is non-verbal at baseline but appears awake, alert, and in no obvious distress.     Patient was recently discharged from the hospital after having a seizures at that time patient received Keppra for the seizures patient had paracentesis done during the last admission also endoscopy done and had banding of the varices     I&D performed on finger in ED with purulent fluid drained. Dressing in place covering the DIP. 1g vancomycin given in ED, switched to zosyn on admission. Wound cultures sent. XR of the finger shows soft tissue swelling in gas consistent with osteomyelitis, septic joint, or gouty arthropathy.     Labs: Hgb 9.6 (baseline). Creatinine 2.51. CRP 3.15. ESR 35. Wound and blood cultures pending. 3/24:  Patient seen resting in bed. Nonverbal at baseline. Rectal temperature 91.1F per nursing staff. Blood pressure soft, patient may be septic. Vanc and Zosyn started per ID consult. Lactate 3.4. Blood and wound cultures still pending. Per orthopedic surgery, patient to have debridement of joint today under local anesthesia. He will likely need IV abx for 4-6 weeks for septic joint. Per nephrology, the patients increased creatinine of 2.5 may be due to amiloride, which is now held. Iron studies also obtained for Hgb 9.6. Labs remarkable for: Hgb 9.6. Creatinine 2.47.    3/25:  Patient seen in ICU.  Denies finger pain. Rectal temp 36.2C. I&D moved to today d/t transfer to ICU yesterday. Per ID, initial wound culture shows coag negative staph and continue on vanc and zosyn pending final culture result. Blood cultures still pending. Labs remarkable for Hgb 9.2, Creatinine 2.59, Albumin 1.8. Lactate decreased from 3.4 to 2.6. Ammonia 39. Pt had Doppler studies done this morning which shows    · No evidence of acute DVT in the right upper extremity. · Thrombus noted within the right cephalic forearm vein(s).    3/28:  Patient seen resting comfortably in bed, remains at baseline mental status. Current temp 36C and patient still on warming blanket. Patient had I&D of right index finger on 3/25 which revealed purulent drainage mixed with gouty tophi. DIP joint thoroughly irrigated and cultures sent. Cultures show few WBC and \"scant probably enterococcus. \" Patient remains on vanc and zosyn. Over the weekend, levothyroxine increased to 50mcg d/t elevated TSH. He was also started on midodrine 5mg TID for hypotension and was given hypotonic fluids for hypernatremia. Nephrology notes lactic acidosis with elevated CO2 and gave 150meq of sodium bicarb. He was also given albumin for cirrhosis and portal hypertension. Labs from 3/27 show Hgb 8.5, Na 149, creatinine 2.43, albumin 2.2      Past Medical History:   Diagnosis Date    Arthritis      Hypertension                 Past Surgical History:   Procedure Laterality Date    IR PARACENTESIS ABD W IMAGE   1/21/2022    IR PARACENTESIS ABD W IMAGE   3/1/2022                 Prior to Admission medications    Medication Sig Start Date End Date Taking?  Authorizing Provider   levothyroxine (SYNTHROID) 25 mcg tablet Take 25 mcg by mouth Daily (before breakfast).     Yes Provider, Historical   acetaminophen (TylenoL) 325 mg tablet Take 650 mg by mouth every six (6) hours as needed for Pain.     Yes Provider, Historical   tamsulosin (Flomax) 0.4 mg capsule Take 1 Capsule by mouth daily. 3/4/22     Oliver Whyte PA-C   potassium chloride (K-DUR, KLOR-CON M20) 20 mEq tablet Take 1 Tablet by mouth daily. 3/2/22     Yasmin Andujar MD   sodium bicarbonate 650 mg tablet Take 1 Tablet by mouth three (3) times daily. 3/2/22     David Sauceda MD   chlordiazePOXIDE (LIBRIUM) 5 mg capsule Take 2 Capsules by mouth three (3) times daily as needed for Anxiety. Max Daily Amount: 30 mg. 3/2/22     David Sauceda MD   levETIRAcetam (Keppra) 1,000 mg tablet Take 1.5 Tablets by mouth two (2) times a day. 3/2/22     David Sauceda MD   ergocalciferol (ERGOCALCIFEROL) 1,250 mcg (50,000 unit) capsule Take 1 Capsule by mouth every seven (7) days. 2/9/22     Provider, Historical   lactulose (CHRONULAC) 10 gram/15 mL solution Take 15 mL by mouth three (3) times daily. 1/23/22     Kobe Ackerman MD   allopurinoL (ZYLOPRIM) 300 mg tablet Take 1 Tablet by mouth daily. 1/18/22     Provider, Historical   thiamine HCL (B-1) 100 mg tablet Take 100 mg by mouth daily.       Provider, Historical   propranoloL (INDERAL) 20 mg tablet Take 20 mg by mouth two (2) times a day.       Provider, Historical   folic acid (FOLVITE) 1 mg tablet Take 1 mg by mouth daily.       Provider, Historical   famotidine (PEPCID) 20 mg tablet Take 20 mg by mouth At bedtime.       Provider, Historical   aMILoride (MIDAMOR) 5 mg tablet Take 5 mg by mouth daily.       Provider, Historical         No Known Allergies      No family history on file. Reason unable to perform ROS: nonverbal at baseline.       Objective     Visit Vitals  /81 (BP 1 Location: Left leg, BP Patient Position: At rest)   Pulse 86   Temp (!) 96.4 °F (35.8 °C) Comment: shemar singleton on    Resp 16   Ht 6' 2\" (1.88 m)   Wt 185 lb 13.6 oz (84.3 kg)   SpO2 97%   BMI 23.86 kg/m²    O2 Flow Rate (L/min): 2 l/min O2 Device: None (Room air)    Physical Exam:   Constitutional:       General: He is not in acute distress. HENT:      Head: Normocephalic and atraumatic. Cardiovascular:      Rate and Rhythm: Normal rate and regular rhythm. Pulses: Normal pulses. Heart sounds: Normal heart sounds. Pulmonary:      Effort: Pulmonary effort is normal.      Breath sounds: Normal breath sounds. Abdominal:      General: There is distension. Tenderness: There is no abdominal tenderness. There is no guarding or rebound. Skin:     Findings: Erythema present. Comments: Swelling to the right 2nd digit   Neurological:      Mental Status: Mental status is at baseline. Comments: Nonverbal     Intake & Output:  Current Shift: No intake/output data recorded. Last three shifts: 03/26 1901 - 03/28 0700  In: 5284.2 [I.V.:5284.2]  Out: 1150 [Urine:1150]    Lab/Data Review:   All lab data reviewed      24 Hour Results:    Recent Results (from the past 24 hour(s))   GLUCOSE, POC    Collection Time: 03/27/22  8:21 AM   Result Value Ref Range    Glucose (POC) 74 65 - 117 mg/dL    Performed by Abigail Navarro    RENAL FUNCTION PANEL    Collection Time: 03/27/22 10:25 AM   Result Value Ref Range    Sodium 149 (H) 136 - 145 mmol/L    Potassium 4.0 3.5 - 5.1 mmol/L    Chloride 125 (H) 97 - 108 mmol/L    CO2 17 (L) 21 - 32 mmol/L    Anion gap 7 5 - 15 mmol/L    Glucose 92 65 - 100 mg/dL    BUN 26 (H) 6 - 20 mg/dL    Creatinine 2.43 (H) 0.70 - 1.30 mg/dL    BUN/Creatinine ratio 11 (L) 12 - 20      GFR est AA 33 (L) >60 ml/min/1.73m2    GFR est non-AA 27 (L) >60 ml/min/1.73m2    Calcium 8.2 (L) 8.5 - 10.1 mg/dL    Phosphorus 3.2 2.6 - 4.7 mg/dL    Albumin 2.2 (L) 3.5 - 5.0 g/dL   SODIUM, UR, RANDOM    Collection Time: 03/27/22 11:30 AM   Result Value Ref Range    Sodium,urine random 43 mmol/L   POTASSIUM, UR, RANDOM    Collection Time: 03/27/22 11:30 AM   Result Value Ref Range    Potassium urine, random 65 mmol/L   CHLORIDE, URINE RANDOM    Collection Time: 03/27/22 11:30 AM   Result Value Ref Range    Chloride,urine random 56 mmol/L   VANCOMYCIN, RANDOM    Collection Time: 03/27/22  1:30 PM   Result Value Ref Range    Vancomycin, random 11.6 ug/mL   GLUCOSE, POC    Collection Time: 03/27/22  4:24 PM   Result Value Ref Range    Glucose (POC) 100 65 - 117 mg/dL    Performed by Aurelia Go          Imaging:    XR CHEST PORT   Final Result      XR CHEST PORT   Final Result   The cardiomediastinal silhouette is appropriate for age, technique,   and lung expansion. Pulmonary vasculature is not congested. The lungs are   essentially clear. No effusion or pneumothorax is seen. IR INSERT NON TUNL CVC OVER 5 YRS   Final Result      Technically successful insertion of non-tunneled triple-lumen catheter with US   guidance. Plan:       The catheter may be used after catheter placement confirmation by x-ray.   _______________________________________________________________      PROCEDURE SUMMARY:   - Venous access with ultrasound guidance   - Non-tunneled central venous catheter insertion      PROCEDURE DETAILS:      Pre-procedure   Relevant imaging review: None    Informed consent for the procedure was obtained and time-out was performed prior   to the procedure. Prophylactic antibiotic administered: Not administered   Preparation: The site was prepared and draped using all elements of maximal   sterile barrier technique including sterile gloves, sterile gown, cap, mask,   large sterile sheet, sterile ultrasound probe cover, sterile ultrasound gel,   hand hygiene and cutaneous antisepsis with 2% chlorhexidine. Medical reason for site preparation exception: Not applicable      Anesthesia/sedation   Level of anesthesia: No sedation   Anesthesia administered by: Not applicable   Duration of anesthesia/sedation: 0 minutes. Access   The vessel was sonographically evaluated and judged to be patent and appropriate   for access and a permanent image was stored. 2 cc's of 1% lidocaine was   administered to the access site. Real time ultrasound was used to visualize   needle entry into the vessel. Vein accessed: Right internal jugular vein   Access technique: 4F micropuncture set      Catheter placement   The access site was dilated and the catheter was placed into the vein over a   wire and all guidewires were removed in their entirety. Catheter ports were   aspirated and flushed. Catheter tip location was verified by portable X-ray. Catheter size: 7 Citizen of the [place]   Catheter length: 16 cm   Catheter tip position: Central. The tip is overlying the anatomic SVC. Catheter flush: Normal saline      Closure   The catheter was secured. A sterile dressing was applied. Catheter securement technique: Stat-Lock      Additional Details   Additional description of procedure: None   Additional findings: None   Additional equipment: None   Specimens removed: None   Estimated blood loss:  Less than 10 cc   Standardized report: [email or web address]_NonTunneledCatheter1.0               IR US GUIDED VASCULAR ACCESS   Final Result      Technically successful insertion of non-tunneled triple-lumen catheter with US   guidance. Plan:       The catheter may be used after catheter placement confirmation by x-ray.   _______________________________________________________________      PROCEDURE SUMMARY:   - Venous access with ultrasound guidance   - Non-tunneled central venous catheter insertion      PROCEDURE DETAILS:      Pre-procedure   Relevant imaging review: None    Informed consent for the procedure was obtained and time-out was performed prior   to the procedure. Prophylactic antibiotic administered: Not administered   Preparation: The site was prepared and draped using all elements of maximal   sterile barrier technique including sterile gloves, sterile gown, cap, mask,   large sterile sheet, sterile ultrasound probe cover, sterile ultrasound gel,   hand hygiene and cutaneous antisepsis with 2% chlorhexidine.     Medical reason for site preparation exception: Not applicable      Anesthesia/sedation   Level of anesthesia: No sedation   Anesthesia administered by: Not applicable   Duration of anesthesia/sedation: 0 minutes. Access   The vessel was sonographically evaluated and judged to be patent and appropriate   for access and a permanent image was stored. 2 cc's of 1% lidocaine was   administered to the access site. Real time ultrasound was used to visualize   needle entry into the vessel. Vein accessed: Right internal jugular vein   Access technique: 4F micropuncture set      Catheter placement   The access site was dilated and the catheter was placed into the vein over a   wire and all guidewires were removed in their entirety. Catheter ports were   aspirated and flushed. Catheter tip location was verified by portable X-ray. Catheter size: 7 [place]   Catheter length: 16 cm   Catheter tip position: Central. The tip is overlying the anatomic SVC. Catheter flush: Normal saline      Closure   The catheter was secured. A sterile dressing was applied. Catheter securement technique: Stat-Lock      Additional Details   Additional description of procedure: None   Additional findings: None   Additional equipment: None   Specimens removed: None   Estimated blood loss:  Less than 10 cc   Standardized report: [email or web address]_CVA_NonTunneledCatheter1.0               DUPLEX UPPER EXT VENOUS RIGHT   Final Result      XR 2ND FINGER RT MIN 2 V   Final Result      Progressive findings. The findings are nonspecific and could represent infection with osteomyelitis   and septic joint and gas in the soft tissues related to infection. Gout, or other inflammatory arthropathy is a possibility. Findings should be correlated with clinical parameters.                 Assessment   Hypothermia  Hypoglycemic  Hypotension  Bradycardia  Septic joint- right 2nd finger DIP  SEPSIS  Anemia- transfuse if Hgb < 7.0  Acute on chronic renal failure- creatinine 2.51 on admission     Hx etoh abuse  Cirrhosis of liver   History of encephalopathy  Seizure disorder  History of gout    · No evidence of acute DVT in the right upper extremity. · Thrombus noted within the right cephalic forearm vein(s).     Hypernatremia  Hypothyroidism    Plan     Bear hugger for hypothermia  SEPSIS- continue vanc and zosyn  Anemia- transfuse if Hgb < 7.0  I&D of right 2nd finger performed in ED  Consult wound care  Consult pharmacy for vancomycin dosing  Consult nephrology  Consult ID  Speech therapy  PT OT        Zosyn 3.375g Q8  Allopurinol 300mg daily  amloride 5mg daily-- HOLD  Ergocalciferol 50,000 units weekly  Famotidine 59VA daily  Folic acid 1mg daily  Lactulose 10g/15mL TID  Keppra 1000mg BID  Levothyroxine 25mcg daily  Propanolol 20mg BID  Sodium bicarbonate 650mg TID  Thiamine 100mg daily     Patient going for the surgery today as patient blood pressure stable    Seen by infectious disease recommend to continue IV vancomycin and IV    Monitor renal function remained stable      Current Facility-Administered Medications:     levETIRAcetam (KEPPRA) oral solution 1,000 mg, 1,000 mg, Oral, BID, Lamberto Ackerman MD, 1,000 mg at 03/27/22 2130    Vancomycin - Pharmacy to Dose, , Other, Rx Dosing/Monitoring, Ree Zaragoza MD    Vancomycin - Please draw random level 3/28 @ 1700, , Other, ONCE, Ree Zaragoza MD    dextrose 5% infusion, 75 mL/hr, IntraVENous, CONTINUOUS, Larry Albarran MD, Last Rate: 75 mL/hr at 03/27/22 2128, 75 mL/hr at 03/27/22 2128    levothyroxine (SYNTHROID) tablet 50 mcg, 50 mcg, Oral, 6am, Ree Zaragoza MD, 50 mcg at 03/28/22 0512    tamsulosin (FLOMAX) capsule 0.4 mg, 0.4 mg, Oral, DAILY, Richard Rapp MD, 0.4 mg at 03/27/22 0841    midodrine (PROAMATINE) tablet 5 mg, 5 mg, Oral, TID, Chuck Azevedo MD, 5 mg at 03/27/22 2214    sodium chloride (NS) flush 5-40 mL, 5-40 mL, IntraVENous, Q8H, Yue De La Torre MD, 10 mL at 03/28/22 0514    sodium chloride (NS) flush 5-40 mL, 5-40 mL, IntraVENous, PRN, Janet Jones MD    acetaminophen (TYLENOL) tablet 650 mg, 650 mg, Oral, Q8H, Ramírez Bray MD, 650 mg at 03/28/22 0512    HYDROcodone-acetaminophen (NORCO) 5-325 mg per tablet 1 Tablet, 1 Tablet, Oral, Q4H PRN, Siddharth DANIELS MD    morphine injection 2 mg, 2 mg, IntraVENous, Q4H PRN, Ramírez Bray MD    naloxone (NARCAN) injection 0.4 mg, 0.4 mg, IntraVENous, PRN, Janet Jones MD    ondansetron (ZOFRAN) injection 4 mg, 4 mg, IntraVENous, Q4H PRN, Janet Jones MD    allopurinoL (ZYLOPRIM) tablet 300 mg, 300 mg, Oral, DAILY, Ramírez Bray MD, 300 mg at 03/27/22 0841    [Held by provider] aMILoride (MIDAMOR) tablet 5 mg, 5 mg, Oral, DAILY, Lamberto Ackerman MD, 5 mg at 03/23/22 0900    ergocalciferol capsule 50,000 Units, 50,000 Units, Oral, Q7D, Ramírez Bray MD    famotidine (PEPCID) tablet 20 mg, 20 mg, Oral, QPM, Janet Jones MD, 20 mg at 83/81/01 7132    folic acid (FOLVITE) tablet 1 mg, 1 mg, Oral, DAILY, Janet Jones MD, 1 mg at 03/27/22 0843    lactulose (CHRONULAC) 10 gram/15 mL solution 15 mL, 15 mL, Oral, TID, Siddharth DANIELS MD, 15 mL at 03/27/22 2130    propranoloL (INDERAL) tablet 20 mg, 20 mg, Oral, BID, Janet Jones MD, 20 mg at 03/26/22 0805    thiamine mononitrate (B-1) tablet 100 mg, 100 mg, Oral, DAILY, Janet Jones MD, 100 mg at 03/27/22 0841    acetaminophen (TYLENOL) tablet 650 mg, 650 mg, Oral, Q6H PRN, Janet Jones MD, 650 mg at 03/26/22 1410    Current Outpatient Medications   Medication Instructions    acetaminophen (TYLENOL) 650 mg, Oral, EVERY 6 HOURS AS NEEDED    allopurinoL (ZYLOPRIM) 300 mg tablet 1 Tablet, Oral, DAILY    aMILoride (MIDAMOR) 5 mg, Oral, DAILY    chlordiazePOXIDE (LIBRIUM) 10 mg, Oral, 3 TIMES DAILY AS NEEDED    ergocalciferol (ERGOCALCIFEROL) 1,250 mcg (50,000 unit) capsule 1 Capsule, Oral, EVERY 7 DAYS    famotidine (PEPCID) 20 mg, Oral, AT BEDTIME    folic acid (FOLVITE) 1 mg, Oral, DAILY    lactulose (CHRONULAC) 10 gram/15 mL solution 15 mL, Oral, 3 TIMES DAILY    levETIRAcetam (KEPPRA) 1,500 mg, Oral, 2 TIMES DAILY    levothyroxine (SYNTHROID) 25 mcg, Oral, DAILY BEFORE BREAKFAST    potassium chloride (K-DUR, KLOR-CON M20) 20 mEq tablet 20 mEq, Oral, DAILY    propranoloL (INDERAL) 20 mg, Oral, 2 TIMES DAILY    sodium bicarbonate 650 mg, Oral, 3 TIMES DAILY    tamsulosin (FLOMAX) 0.4 mg, Oral, DAILY    thiamine HCL (B-1) 100 mg, Oral, DAILY         Signed By: Gustavo Gan     March 28, 2022

## 2022-03-28 NOTE — PROGRESS NOTES
Spiritual Care Assessment/Progress Note  [facility]      NAME: Ethan Patel      MRN: [id number]  AGE: 60 y.o. SEX: male  Yazidism Affiliation: No preference   Language: English     3/28/2022     Total Time (in minutes): 10     Spiritual Assessment begun in [facility] [place] ICU through conversation with:         [x]Patient        [] Family    [] Friend(s)        Reason for Consult: Initial visit     Spiritual beliefs: (Please include comment if needed)     [] Identifies with a maribel tradition:         [] Supported by a maribel community:            [] Claims no spiritual orientation:           [] Seeking spiritual identity:                [] Adheres to an individual form of spirituality:           [x] Not able to assess:                           Identified resources for coping:      [] Prayer                               [] Music                  [] Guided Imagery     [] Family/friends                 [] Pet visits     [] Devotional reading                         [x] Unknown     [] Other:                                               Interventions offered during this visit: (See comments for more details)    Patient Interventions: Initial visit,Other (comment) (Silent support)           Plan of Care:     [] Support spiritual and/or cultural needs    [] Support AMD and/or advance care planning process      [] Support grieving process   [] Coordinate Rites and/or Rituals    [] Coordination with community clergy   [] No spiritual needs identified at this time   [] Detailed Plan of Care below (See Comments)  [] Make referral to Music Therapy  [] Make referral to Pet Therapy     [] Make referral to Addiction services  [] Make referral to [facility]  [] Make referral to Spiritual Care Partner  [] No future visits requested        [x] Contact Spiritual Care for further referrals     Comments:  Visit attempted for patient in the 2  for initial assessment.   Patient appeared to be sleeping and did not respond to greeting. There were no visitors present. Provided silent support and prayer. Contact chaplains for further spiritual care and support.  Ochoa Ortiz M.Div.    can be reached by calling the  at [facility]  [phone]

## 2022-03-28 NOTE — PROGRESS NOTES
PROGRESS NOTE    Patient: Dixie Hickey MRN: [id number]  SSN: [id number]    YOB: 1960  Age: 60 y.o. Sex: male      Admit Date: 3/22/2022    LOS: 6 days       Subjective     Chief Complaint   Patient presents with    Finger Swelling       HPI: Dixie Hickey is a(n) 60 y.o. male with PMH significant for etoh abuse, encephalopathy, cirrhosis, seizure disorder, gout presents with c/o swelling to right 2nd finger. Patient is nonverbal at baseline and dependent on all ADL at [address] [place] [address]. According to nursing home staff, has future orthopedic appointment for this finger. Is non-verbal at baseline but appears awake, alert, and in no obvious distress.     Patient was recently discharged from the hospital after having a seizures at that time patient received Keppra for the seizures patient had paracentesis done during the last admission also endoscopy done and had banding of the varices     I&D performed on finger in ED with purulent fluid drained. Dressing in place covering the DIP. 1g vancomycin given in ED, switched to zosyn on admission. Wound cultures sent. XR of the finger shows soft tissue swelling in gas consistent with osteomyelitis, septic joint, or gouty arthropathy.     Labs: Hgb 9.6 (baseline). Creatinine 2.51. CRP 3.15. ESR 35. Wound and blood cultures pending. 3/24:  Patient seen resting in bed. Nonverbal at baseline. Rectal temperature 91.1F per nursing staff. Blood pressure soft, patient may be septic. Vanc and Zosyn started per ID consult. Lactate 3.4. Blood and wound cultures still pending. Per orthopedic surgery, patient to have debridement of joint today under local anesthesia. He will likely need IV abx for 4-6 weeks for septic joint. Per nephrology, the patients increased creatinine of 2.5 may be due to amiloride, which is now held. Iron studies also obtained for Hgb 9.6. Labs remarkable for: Hgb 9.6. Creatinine 2.47.    3/25:  Patient seen in ICU.  Denies finger pain. Rectal temp 36.2C. I&D moved to today d/t transfer to ICU yesterday. Per ID, initial wound culture shows coag negative staph and continue on vanc and zosyn pending final culture result. Blood cultures still pending. Labs remarkable for Hgb 9.2, Creatinine 2.59, Albumin 1.8. Lactate decreased from 3.4 to 2.6. Ammonia 39. Pt had Doppler studies done this morning which shows    · No evidence of acute DVT in the right upper extremity. · Thrombus noted within the right cephalic forearm vein(s).    3/28:  Patient seen resting comfortably in bed, remains at baseline mental status. Current temp 36C and patient still on warming blanket. Patient had I&D of right index finger on 3/25 which revealed purulent drainage mixed with gouty tophi. DIP joint thoroughly irrigated and cultures sent. Cultures show few WBC and \"scant probably enterococcus. \" Patient remains on vanc and zosyn. Over the weekend, levothyroxine increased to 50mcg d/t elevated TSH. He was also started on midodrine 5mg TID for hypotension and was given hypotonic fluids for hypernatremia      Labs from 3/27 show Hgb 8.5, Na 149, creatinine 2.43, albumin 2.2      Past Medical History:   Diagnosis Date    Arthritis      Hypertension                 Past Surgical History:   Procedure Laterality Date    IR PARACENTESIS ABD W IMAGE   1/21/2022    IR PARACENTESIS ABD W IMAGE   3/1/2022                 Prior to Admission medications    Medication Sig Start Date End Date Taking? Authorizing Provider   levothyroxine (SYNTHROID) 25 mcg tablet Take 25 mcg by mouth Daily (before breakfast).     Yes Provider, Historical   acetaminophen (TylenoL) 325 mg tablet Take 650 mg by mouth every six (6) hours as needed for Pain.     Yes Provider, Historical   tamsulosin (Flomax) 0.4 mg capsule Take 1 Capsule by mouth daily. 3/4/22     Joseph Whyte PA-C   potassium chloride (K-DUR, KLOR-CON M20) 20 mEq tablet Take 1 Tablet by mouth daily.  3/2/22     Vivian Womack MD   sodium bicarbonate 650 mg tablet Take 1 Tablet by mouth three (3) times daily. 3/2/22     Vivian Wmoack MD   chlordiazePOXIDE (LIBRIUM) 5 mg capsule Take 2 Capsules by mouth three (3) times daily as needed for Anxiety. Max Daily Amount: 30 mg. 3/2/22     Vivian Womack MD   levETIRAcetam (Keppra) 1,000 mg tablet Take 1.5 Tablets by mouth two (2) times a day. 3/2/22     Vivian Womack MD   ergocalciferol (ERGOCALCIFEROL) 1,250 mcg (50,000 unit) capsule Take 1 Capsule by mouth every seven (7) days. 2/9/22     Provider, Historical   lactulose (CHRONULAC) 10 gram/15 mL solution Take 15 mL by mouth three (3) times daily. 1/23/22     Marichuy Ackerman MD   allopurinoL (ZYLOPRIM) 300 mg tablet Take 1 Tablet by mouth daily. 1/18/22     Provider, Historical   thiamine HCL (B-1) 100 mg tablet Take 100 mg by mouth daily.       Provider, Historical   propranoloL (INDERAL) 20 mg tablet Take 20 mg by mouth two (2) times a day.       Provider, Historical   folic acid (FOLVITE) 1 mg tablet Take 1 mg by mouth daily.       Provider, Historical   famotidine (PEPCID) 20 mg tablet Take 20 mg by mouth At bedtime.       Provider, Historical   aMILoride (MIDAMOR) 5 mg tablet Take 5 mg by mouth daily.       Provider, Historical         No Known Allergies      No family history on file. Reason unable to perform ROS: nonverbal at baseline. Objective     Visit Vitals  /80 (BP 1 Location: Left leg, BP Patient Position: At rest)   Pulse 88   Temp (!) 96.6 °F (35.9 °C) Comment: shemar singleton on    Resp 12   Ht 6' 2\" (1.88 m)   Wt 84.3 kg (185 lb 13.6 oz)   SpO2 99%   BMI 23.86 kg/m²    O2 Flow Rate (L/min): 2 l/min O2 Device: None (Room air)    Physical Exam:   Constitutional:       General: He is not in acute distress. HENT:      Head: Normocephalic and atraumatic. Cardiovascular:      Rate and Rhythm: Normal rate and regular rhythm.       Pulses: Normal pulses. Heart sounds: Normal heart sounds. Pulmonary:      Effort: Pulmonary effort is normal.      Breath sounds: Normal breath sounds. Abdominal:      General: There is distension. Tenderness: There is no abdominal tenderness. There is no guarding or rebound. Skin:     Findings: Erythema present. Comments: Swelling to the right 2nd digit   Neurological:      Mental Status: Mental status is at baseline. Comments: Nonverbal     Intake & Output:  Current Shift: 03/28 0701 - 03/28 1900  In: 80   Out: 300 [Urine:300]  Last three shifts: 03/26 1901 - 03/28 0700  In: 5284.2 [I.V.:5284.2]  Out: 1150 [Urine:1150]    Lab/Data Review: All lab data reviewed      24 Hour Results:    Recent Results (from the past 24 hour(s))   SODIUM, UR, RANDOM    Collection Time: 03/27/22 11:30 AM   Result Value Ref Range    Sodium,urine random 43 mmol/L   POTASSIUM, UR, RANDOM    Collection Time: 03/27/22 11:30 AM   Result Value Ref Range    Potassium urine, random 65 mmol/L   CHLORIDE, URINE RANDOM    Collection Time: 03/27/22 11:30 AM   Result Value Ref Range    Chloride,urine random 56 mmol/L   VANCOMYCIN, RANDOM    Collection Time: 03/27/22  1:30 PM   Result Value Ref Range    Vancomycin, random 11.6 ug/mL   GLUCOSE, POC    Collection Time: 03/27/22  4:24 PM   Result Value Ref Range    Glucose (POC) 100 65 - 117 mg/dL    Performed by Ashtyn Garcia          Imaging:    XR CHEST PORT   Final Result      XR CHEST PORT   Final Result   The cardiomediastinal silhouette is appropriate for age, technique,   and lung expansion. Pulmonary vasculature is not congested. The lungs are   essentially clear. No effusion or pneumothorax is seen. IR INSERT NON TUNL CVC OVER 5 YRS   Final Result      Technically successful insertion of non-tunneled triple-lumen catheter with US   guidance. Plan:       The catheter may be used after catheter placement confirmation by x-ray. _______________________________________________________________      PROCEDURE SUMMARY:   - Venous access with ultrasound guidance   - Non-tunneled central venous catheter insertion      PROCEDURE DETAILS:      Pre-procedure   Relevant imaging review: None    Informed consent for the procedure was obtained and time-out was performed prior   to the procedure. Prophylactic antibiotic administered: Not administered   Preparation: The site was prepared and draped using all elements of maximal   sterile barrier technique including sterile gloves, sterile gown, cap, mask,   large sterile sheet, sterile ultrasound probe cover, sterile ultrasound gel,   hand hygiene and cutaneous antisepsis with 2% chlorhexidine. Medical reason for site preparation exception: Not applicable      Anesthesia/sedation   Level of anesthesia: No sedation   Anesthesia administered by: Not applicable   Duration of anesthesia/sedation: 0 minutes. Access   The vessel was sonographically evaluated and judged to be patent and appropriate   for access and a permanent image was stored. 2 cc's of 1% lidocaine was   administered to the access site. Real time ultrasound was used to visualize   needle entry into the vessel. Vein accessed: Right internal jugular vein   Access technique: 4F micropuncture set      Catheter placement   The access site was dilated and the catheter was placed into the vein over a   wire and all guidewires were removed in their entirety. Catheter ports were   aspirated and flushed. Catheter tip location was verified by portable X-ray. Catheter size: 7 Dominican   Catheter length: 16 cm   Catheter tip position: Central. The tip is overlying the anatomic SVC. Catheter flush: Normal saline      Closure   The catheter was secured. A sterile dressing was applied.    Catheter securement technique: Stat-Lock      Additional Details   Additional description of procedure: None   Additional findings: None   Additional equipment: None   Specimens removed: None   Estimated blood loss:  Less than 10 cc   Standardized report: [email or web address]_NonTunneledCatheter1.0               IR US GUIDED VASCULAR ACCESS   Final Result      Technically successful insertion of non-tunneled triple-lumen catheter with US   guidance. Plan:       The catheter may be used after catheter placement confirmation by x-ray.   _______________________________________________________________      PROCEDURE SUMMARY:   - Venous access with ultrasound guidance   - Non-tunneled central venous catheter insertion      PROCEDURE DETAILS:      Pre-procedure   Relevant imaging review: None    Informed consent for the procedure was obtained and time-out was performed prior   to the procedure. Prophylactic antibiotic administered: Not administered   Preparation: The site was prepared and draped using all elements of maximal   sterile barrier technique including sterile gloves, sterile gown, cap, mask,   large sterile sheet, sterile ultrasound probe cover, sterile ultrasound gel,   hand hygiene and cutaneous antisepsis with 2% chlorhexidine. Medical reason for site preparation exception: Not applicable      Anesthesia/sedation   Level of anesthesia: No sedation   Anesthesia administered by: Not applicable   Duration of anesthesia/sedation: 0 minutes. Access   The vessel was sonographically evaluated and judged to be patent and appropriate   for access and a permanent image was stored. 2 cc's of 1% lidocaine was   administered to the access site. Real time ultrasound was used to visualize   needle entry into the vessel. Vein accessed: Right internal jugular vein   Access technique: 4F micropuncture set      Catheter placement   The access site was dilated and the catheter was placed into the vein over a   wire and all guidewires were removed in their entirety. Catheter ports were   aspirated and flushed. Catheter tip location was verified by portable X-ray. Catheter size: 7 Mexican   Catheter length: 16 cm   Catheter tip position: Central. The tip is overlying the anatomic SVC. Catheter flush: Normal saline      Closure   The catheter was secured. A sterile dressing was applied. Catheter securement technique: Stat-Lock      Additional Details   Additional description of procedure: None   Additional findings: None   Additional equipment: None   Specimens removed: None   Estimated blood loss:  Less than 10 cc   Standardized report: [email or web address]_CVA_NonTunneledCatheter1.0               DUPLEX UPPER EXT VENOUS RIGHT   Final Result      XR 2ND FINGER RT MIN 2 V   Final Result      Progressive findings. The findings are nonspecific and could represent infection with osteomyelitis   and septic joint and gas in the soft tissues related to infection. Gout, or other inflammatory arthropathy is a possibility. Findings should be correlated with clinical parameters. Assessment   Hypothermia  Hypoglycemic  Hypotension  Bradycardia  Septic joint- right 2nd finger DIP  SEPSIS wound culturesgrows  Staphylococcus and Enterococcus  Anemia- transfuse if Hgb < 7.0  Acute on chronic renal failure- creatinine 2.51 on admission     Hx etoh abuse  Cirrhosis of liver   History of encephalopathy  Seizure disorder  History of gout    · No evidence of acute DVT in the right upper extremity. · Thrombus noted within the right cephalic forearm vein(s).     Hypernatremia  Hypothyroidism    Plan     Bear hugger for hypothermia  SEPSIS- continue vanc and zosyn  Anemia- transfuse if Hgb < 7.0  I&D of right 2nd finger performed in ED  Consult wound care  Consult pharmacy for vancomycin dosing  Consult nephrology  Consult ID  Speech therapy  PT OT      Allopurinol 300mg daily  amloride 5mg daily-- HOLD  Ergocalciferol 50,000 units weekly  Famotidine 30IF daily  Folic acid 1mg daily  Lactulose 10g/15mL TID  Keppra 1000mg BID  Levothyroxine 25mcg daily  Propanolol 20mg BID  Sodium bicarbonate 650mg TID  Thiamine 100mg daily     Patient going for the surgery today as patient blood pressure stable    Seen by infectious disease recommend to continue IV vancomycin     Monitor renal function remained stable    Repeat the lab      Current Facility-Administered Medications:     levETIRAcetam (KEPPRA) oral solution 1,000 mg, 1,000 mg, Oral, BID, Lamberto Ackerman MD, 1,000 mg at 03/28/22 0811    Vancomycin - Pharmacy to Dose, , Other, Rx Dosing/Monitoring, Jessica Deng MD    Vancomycin - Please draw random level 3/28 @ 1700, , Other, ONCE, Ree Zaragoza MD    dextrose 5% infusion, 75 mL/hr, IntraVENous, CONTINUOUS, Celso Enamorado MD, Last Rate: 75 mL/hr at 03/28/22 1032, 75 mL/hr at 03/28/22 1032    levothyroxine (SYNTHROID) tablet 50 mcg, 50 mcg, Oral, 6am, Ree Zaragoza MD, 50 mcg at 03/28/22 0512    tamsulosin (FLOMAX) capsule 0.4 mg, 0.4 mg, Oral, DAILY, Severa Boroughs I, MD, 0.4 mg at 03/28/22 0811    midodrine (PROAMATINE) tablet 5 mg, 5 mg, Oral, TID, Trevor Tucker MD, 5 mg at 03/28/22 0811    sodium chloride (NS) flush 5-40 mL, 5-40 mL, IntraVENous, Q8H, Thaddeus Love MD, 10 mL at 03/28/22 0514    sodium chloride (NS) flush 5-40 mL, 5-40 mL, IntraVENous, PRN, Trevor Tucker MD    acetaminophen (TYLENOL) tablet 650 mg, 650 mg, Oral, Q8H, Trevor Tcuker MD, 650 mg at 03/28/22 0512    HYDROcodone-acetaminophen (NORCO) 5-325 mg per tablet 1 Tablet, 1 Tablet, Oral, Q4H PRN, Libby Bray MD    morphine injection 2 mg, 2 mg, IntraVENous, Q4H PRN, Trevor Tucker MD    naloxone (NARCAN) injection 0.4 mg, 0.4 mg, IntraVENous, PRN, Trevor Tucker MD    ondansetron (ZOFRAN) injection 4 mg, 4 mg, IntraVENous, Q4H PRN, Trevor Tucker MD    allopurinoL (ZYLOPRIM) tablet 300 mg, 300 mg, Oral, DAILY, Trevor Tucker MD, 300 mg at 03/28/22 0811    [Held by provider] aMILoride (MIDAMOR) tablet 5 mg, 5 mg, Oral, DAILY, Eladio Goldberg, MD, 5 mg at 03/23/22 0900    ergocalciferol capsule 50,000 Units, 50,000 Units, Oral, Q7D, Ramírez Bray MD    famotidine (PEPCID) tablet 20 mg, 20 mg, Oral, QPM, Daya Marks MD, 20 mg at 82/36/89 1033    folic acid (FOLVITE) tablet 1 mg, 1 mg, Oral, DAILY, Ramírez Bray MD, 1 mg at 03/28/22 0811    lactulose (CHRONULAC) 10 gram/15 mL solution 15 mL, 15 mL, Oral, TID, Ramírez Bray MD, 15 mL at 03/28/22 3954    propranoloL (INDERAL) tablet 20 mg, 20 mg, Oral, BID, Daya Marks MD, 20 mg at 03/26/22 0805    thiamine mononitrate (B-1) tablet 100 mg, 100 mg, Oral, DAILY, Daya Marks MD, 100 mg at 03/28/22 7597    acetaminophen (TYLENOL) tablet 650 mg, 650 mg, Oral, Q6H PRN, Daya Marks MD, 650 mg at 03/26/22 1410    Current Outpatient Medications   Medication Instructions    acetaminophen (TYLENOL) 650 mg, Oral, EVERY 6 HOURS AS NEEDED    allopurinoL (ZYLOPRIM) 300 mg tablet 1 Tablet, Oral, DAILY    aMILoride (MIDAMOR) 5 mg, Oral, DAILY    chlordiazePOXIDE (LIBRIUM) 10 mg, Oral, 3 TIMES DAILY AS NEEDED    ergocalciferol (ERGOCALCIFEROL) 1,250 mcg (50,000 unit) capsule 1 Capsule, Oral, EVERY 7 DAYS    famotidine (PEPCID) 20 mg, Oral, AT BEDTIME    folic acid (FOLVITE) 1 mg, Oral, DAILY    lactulose (CHRONULAC) 10 gram/15 mL solution 15 mL, Oral, 3 TIMES DAILY    levETIRAcetam (KEPPRA) 1,500 mg, Oral, 2 TIMES DAILY    levothyroxine (SYNTHROID) 25 mcg, Oral, DAILY BEFORE BREAKFAST    potassium chloride (K-DUR, KLOR-CON M20) 20 mEq tablet 20 mEq, Oral, DAILY    propranoloL (INDERAL) 20 mg, Oral, 2 TIMES DAILY    sodium bicarbonate 650 mg, Oral, 3 TIMES DAILY    tamsulosin (FLOMAX) 0.4 mg, Oral, DAILY    thiamine HCL (B-1) 100 mg, Oral, DAILY         Signed By: Tony Rose MD     March 28, 2022

## 2022-03-28 NOTE — PROGRESS NOTES
Orthopedic progress note    Date:3/27/22     Room:[facility]  Patient Name:Waldo Rangel     YOB: 1960     Age:62 y.o. Subjective    79-year-old male seen in follow-up for infected right index finger. Patient nonverbal.  Patient had surgical I&D of right index finger performed 3/25/21. Patient is in ICU still. He is still not communicative. As per nursing when he sleeps his BP goes down and he has thermal warmer on to maintain temperature.   .  Objective           Vitals Last 24 Hours:  TEMPERATURE:  Temp  Av.6 °F (36.4 °C)  Min: 96.6 °F (35.9 °C)  Max: 98.2 °F (36.8 °C)  RESPIRATIONS RANGE: Resp  Av.8  Min: 10  Max: 20  PULSE OXIMETRY RANGE: SpO2  Av.2 %  Min: 95 %  Max: 100 %  PULSE RANGE: Pulse  Av.6  Min: 77  Max: 90  BLOOD PRESSURE RANGE: Systolic (07KMF), UQO:205 , Min:85 , JYI:764   ; Diastolic (88AUC), AZV:60, Min:57, Max:94    Current Facility-Administered Medications   Medication Dose Route Frequency    levETIRAcetam (KEPPRA) oral solution 1,000 mg  1,000 mg Oral BID    Vancomycin - Pharmacy to Dose   Other Rx Dosing/Monitoring    Vancomycin - Please draw random level 3/28 @ 1700   Other ONCE    albumin human 5% (BUMINATE) solution 25 g  25 g IntraVENous Q8H    dextrose 5% infusion  75 mL/hr IntraVENous CONTINUOUS    levothyroxine (SYNTHROID) tablet 50 mcg  50 mcg Oral 6am    tamsulosin (FLOMAX) capsule 0.4 mg  0.4 mg Oral DAILY    midodrine (PROAMATINE) tablet 5 mg  5 mg Oral TID    sodium chloride (NS) flush 5-40 mL  5-40 mL IntraVENous Q8H    sodium chloride (NS) flush 5-40 mL  5-40 mL IntraVENous PRN    acetaminophen (TYLENOL) tablet 650 mg  650 mg Oral Q8H    HYDROcodone-acetaminophen (NORCO) 5-325 mg per tablet 1 Tablet  1 Tablet Oral Q4H PRN    morphine injection 2 mg  2 mg IntraVENous Q4H PRN    naloxone (NARCAN) injection 0.4 mg  0.4 mg IntraVENous PRN    ondansetron (ZOFRAN) injection 4 mg  4 mg IntraVENous Q4H PRN    allopurinoL (ZYLOPRIM) tablet 300 mg  300 mg Oral DAILY    [Held by provider] aMILoride (MIDAMOR) tablet 5 mg  5 mg Oral DAILY    ergocalciferol capsule 50,000 Units  50,000 Units Oral Q7D    famotidine (PEPCID) tablet 20 mg  20 mg Oral QPM    folic acid (FOLVITE) tablet 1 mg  1 mg Oral DAILY    lactulose (CHRONULAC) 10 gram/15 mL solution 15 mL  15 mL Oral TID    propranoloL (INDERAL) tablet 20 mg  20 mg Oral BID    thiamine mononitrate (B-1) tablet 100 mg  100 mg Oral DAILY    acetaminophen (TYLENOL) tablet 650 mg  650 mg Oral Q6H PRN          I/O (24Hr): Intake/Output Summary (Last 24 hours) at 3/28/2022 0113  Last data filed at 3/27/2022 1843  Gross per 24 hour   Intake 1050 ml   Output 800 ml   Net 250 ml     Objective:  General Appearance:  Ill-appearing and not in pain. Vital signs: (most recent): Blood pressure 114/71, pulse 89, temperature 98.1 °F (36.7 °C), resp. rate 13, height 6' 2\" (1.88 m), weight 84.3 kg (185 lb 13.6 oz), SpO2 97 %. (BP (!) 84/64 (BP 1 Location: Left upper arm, BP Patient Position: At rest)   Pulse 76   Temp (!) 96.3 °F (35.7 °C)   Resp 13   Ht 6' 2\" (1.88 m)   Wt 84.3 kg (185 lb 13.6 oz)   SpO2 100%   BMI 23.86 kg/m²   ). Extremities: (Right index finger has drainage bloody. Dressing changed with nursing team. Mariah Pisano with saline and packed with iodoform gauze.)  Neurological: (Sleepy and non verbral).       Labs/Imaging/Diagnostics    Labs:  CBC:  Recent Labs     03/26/22  1245 03/26/22  0315 03/25/22  0451   WBC 9.4 8.6 7.4   RBC 2.99* 2.91* 3.17*   HGB 8.5* 8.3* 9.2*   HCT 25.1* 24.5* 26.6*   MCV 83.9 84.2 83.9   RDW 18.5* 18.1* 17.9*   PLT 64* 73* 80*     CHEMISTRIES:  Recent Labs     03/27/22  1025 03/26/22 0315 03/25/22  0451   * 150*  150* 147*   K 4.0 4.8  4.7 5.0   * 125*  124* 124*   CO2 17* 18*  18* 18*   BUN 26* 30*  30* 32*   CREA 2.43* 2.66*  2.69* 2.59*   CA 8.2* 8.2*  8.2* 8.6   PHOS 3.2 3.8  --      Recent Labs     03/26/22 0315 03/25/22  0451 AST 29 32   ALT 15 14     Lab Results   Component Value Date/Time    ALT (SGPT) 15 03/26/2022 03:15 AM    AST (SGOT) 29 03/26/2022 03:15 AM    Alk. phosphatase 64 03/26/2022 03:15 AM    Bilirubin, direct 1.2 (H) 03/01/2022 08:52 AM    Bilirubin, total 1.5 (H) 03/26/2022 03:15 AM     Results     Procedure Component Value Units Date/Time    CULTURE, Jacome Jessi STAIN [[id number]] Collected: 03/25/22 1715    Order Status: Completed Specimen: Wound from Right Updated: 03/27/22 0947     Special Requests: No Special Requests        GRAM STAIN Few WBCs seen         No organisms seen        Culture result:       Scant Staphylococcus species, coagulase negative          CULTURE, TISSUE Eugena Kevon STAIN [[id number]] Collected: 03/25/22 1715    Order Status: Completed Specimen: Right Updated: 03/27/22 1109     Special Requests: No Special Requests        GRAM STAIN Rare WBCs seen         No organisms seen        Culture result:       Scant Probable Enterococcus species          MRSA SCREEN - PCR (NASAL) [[id number]] Collected: 03/24/22 1256    Order Status: Completed Specimen: Swab Updated: 03/24/22 1456     MRSA by PCR, Nasal Not Detected       COVID-19 RAPID TEST [[id number]] Collected: 03/24/22 0918    Order Status: Completed Specimen: Nasopharyngeal Updated: 03/24/22 0955     COVID-19 rapid test Not Detected        Comment: Rapid [employer] ID Now   Rapid NAAT:  The specimen is NEGATIVE for SARS-CoV-2, the novel coronavirus associated with COVID-19. Negative results should be treated as presumptive and, if inconsistent with clinical signs and symptoms or necessary for patient management, should be tested with an alternative molecular assay. Negative results do not preclude SARS-CoV-2 infection and should not be used as the sole basis for patient management decisions. This test has been authorized by the FDA under   an Emergency Use Authorization (EUA) for use by authorized laboratories.  Fact sheet for Healthcare Providers: ConventionUpdate.co.nz Fact sheet for Patients: ConventionUpdate.co.nz   Methodology: Isothermal Nucleic Acid Amplification         CULTURE, BLOOD, PAIRED [[id number]] Collected: 03/23/22 0000    Order Status: Completed Specimen: Blood Updated: 03/23/22 0021    CULTURE, Myke Riggins STAIN [[id number]] Collected: 03/22/22 2219    Order Status: Completed Specimen: Wound Updated: 03/25/22 0850     Special Requests: No Special Requests        GRAM STAIN Few WBCs seen         No organisms seen        Culture result:       Few Staphylococcus species, coagulase negative                      Assessment//Plan           Patient Active Problem List    Diagnosis Date Noted    Finger infection 03/22/2022    Status epilepticus ([facility] [address] 02/24/2022    Acute renal failure (ARF) ([facility] [address] 01/20/2022    Cirrhosis of liver ([facility] [address] 01/20/2022    GRACIA (acute kidney injury) ([facility] [address] 01/20/2022     Infected right index finger  Surgery post op day 2.   daily dressing by nursing team   Patient's cultures of tissue and swabs swab has scant Staphylococcus species and tissue has scant Enterococcus. 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 Involved debridement of DIP joint index finger with removal of some base of distal phalanx and head of middle phalanx bone. The nail fold is stabilized to nailbed with a nylon suture which should remain in place for about 3 weeks. Once patient is stable his dressing can be moved to physical therapy with whirlpool treatment. IV antibiotics as per the ID team.  Continue local care  We will follow up the patient.     Electronically signed by Courtney Rodriguez MD on 3/28/2022 at 4:09 PM

## 2022-03-28 NOTE — PROGRESS NOTES
PROGRESS NOTE    Patient: Jessi Ray MRN: [id number]  SSN: [id number]    YOB: 1960  Age: 60 y.o. Sex: male      Admit Date: 3/22/2022    LOS: 6 days       Subjective     Chief Complaint   Patient presents with    Finger Swelling       HPI: Jessi Ray is a(n) 60 y.o. male with PMH significant for etoh abuse, encephalopathy, cirrhosis, seizure disorder, gout presents with c/o swelling to right 2nd finger. Patient is nonverbal at baseline and dependent on all ADL at [address] [place] [address]. According to nursing home staff, has future orthopedic appointment for this finger. Is non-verbal at baseline but appears awake, alert, and in no obvious distress.     Patient was recently discharged from the hospital after having a seizures at that time patient received Keppra for the seizures patient had paracentesis done during the last admission also endoscopy done and had banding of the varices     I&D performed on finger in ED with purulent fluid drained. Dressing in place covering the DIP. 1g vancomycin given in ED, switched to zosyn on admission. Wound cultures sent. XR of the finger shows soft tissue swelling in gas consistent with osteomyelitis, septic joint, or gouty arthropathy.     Labs: Hgb 9.6 (baseline). Creatinine 2.51. CRP 3.15. ESR 35. Wound and blood cultures pending. 3/24:  Patient seen resting in bed. Nonverbal at baseline. Rectal temperature 91.1F per nursing staff. Blood pressure soft, patient may be septic. Vanc and Zosyn started per ID consult. Lactate 3.4. Blood and wound cultures still pending. Per orthopedic surgery, patient to have debridement of joint today under local anesthesia. He will likely need IV abx for 4-6 weeks for septic joint. Per nephrology, the patients increased creatinine of 2.5 may be due to amiloride, which is now held. Iron studies also obtained for Hgb 9.6. Labs remarkable for: Hgb 9.6. Creatinine 2.47.    3/25:  Patient seen in ICU.  Denies finger pain. Rectal temp 36.2C. I&D moved to today d/t transfer to ICU yesterday. Per ID, initial wound culture shows coag negative staph and continue on vanc and zosyn pending final culture result. Blood cultures still pending. Labs remarkable for Hgb 9.2, Creatinine 2.59, Albumin 1.8. Lactate decreased from 3.4 to 2.6. Ammonia 39. Pt had Doppler studies done this morning which shows    · No evidence of acute DVT in the right upper extremity. · Thrombus noted within the right cephalic forearm vein(s)      128:  34-year-old male seen in ICU for follow-up for infected right index finger. Patient non-verbal; and is still not communicative. Patient had surgical I&D of right index finger performed 3/25/21. As per nursing when he sleeps his BP goes down and he has thermal warmer on to maintain temperature (as per nursing current temperature continues to remain around 96-97F)    Remarkable Labs:  Hgb 8.4 (baseline = 9.6)  Creatinine 2.43  CRP 1.59   ESR 36. Wound and blood cultures pending. Past Medical History:   Diagnosis Date    Arthritis      Hypertension                 Past Surgical History:   Procedure Laterality Date    IR PARACENTESIS ABD W IMAGE   1/21/2022    IR PARACENTESIS ABD W IMAGE   3/1/2022                 Prior to Admission medications    Medication Sig Start Date End Date Taking? Authorizing Provider   levothyroxine (SYNTHROID) 25 mcg tablet Take 25 mcg by mouth Daily (before breakfast).     Yes Provider, Historical   acetaminophen (TylenoL) 325 mg tablet Take 650 mg by mouth every six (6) hours as needed for Pain.     Yes Provider, Historical   tamsulosin (Flomax) 0.4 mg capsule Take 1 Capsule by mouth daily. 3/4/22     Marav Whyte PA-C   potassium chloride (K-DUR, KLOR-CON M20) 20 mEq tablet Take 1 Tablet by mouth daily. 3/2/22     Marta Andujar MD   sodium bicarbonate 650 mg tablet Take 1 Tablet by mouth three (3) times daily.  3/2/22     Lidia Moreno Pau Mclean MD   chlordiazePOXIDE (LIBRIUM) 5 mg capsule Take 2 Capsules by mouth three (3) times daily as needed for Anxiety. Max Daily Amount: 30 mg. 3/2/22     Ethan Pena MD   levETIRAcetam (Keppra) 1,000 mg tablet Take 1.5 Tablets by mouth two (2) times a day. 3/2/22     Ethan Pena MD   ergocalciferol (ERGOCALCIFEROL) 1,250 mcg (50,000 unit) capsule Take 1 Capsule by mouth every seven (7) days. 2/9/22     Provider, Historical   lactulose (CHRONULAC) 10 gram/15 mL solution Take 15 mL by mouth three (3) times daily. 1/23/22     Dianne Ackerman MD   allopurinoL (ZYLOPRIM) 300 mg tablet Take 1 Tablet by mouth daily. 1/18/22     Provider, Historical   thiamine HCL (B-1) 100 mg tablet Take 100 mg by mouth daily.       Provider, Historical   propranoloL (INDERAL) 20 mg tablet Take 20 mg by mouth two (2) times a day.       Provider, Historical   folic acid (FOLVITE) 1 mg tablet Take 1 mg by mouth daily.       Provider, Historical   famotidine (PEPCID) 20 mg tablet Take 20 mg by mouth At bedtime.       Provider, Historical   aMILoride (MIDAMOR) 5 mg tablet Take 5 mg by mouth daily.       Provider, Historical         No Known Allergies      No family history on file. Reason unable to perform ROS: nonverbal at baseline. Objective     Visit Vitals  /72 (BP 1 Location: Left leg, BP Patient Position: At rest)   Pulse 86   Temp (!) 96.6 °F (35.9 °C) Comment: shemar singleton on    Resp 11   Ht 6' 2\" (1.88 m)   Wt 84.3 kg (185 lb 13.6 oz)   SpO2 97%   BMI 23.86 kg/m²    O2 Flow Rate (L/min): 2 l/min O2 Device: None (Room air)    Physical Exam:   Constitutional:       General: He is not in acute distress. HENT:      Head: Normocephalic and atraumatic. Cardiovascular:      Rate and Rhythm: Normal rate and regular rhythm. Pulses: Normal pulses. Heart sounds: Normal heart sounds.    Pulmonary:      Effort: Pulmonary effort is normal.      Breath sounds: Normal breath sounds. Abdominal:      General: There is distension. Tenderness: There is no abdominal tenderness. There is no guarding or rebound. Skin:     Findings: Erythema present. Comments: Swelling to the right 2nd digit   Neurological:      Mental Status: Mental status is at baseline. Comments: Nonverbal     Intake & Output:  Current Shift: 03/28 0701 - 03/28 1900  In: 80   Out: -   Last three shifts: 03/26 1901 - 03/28 0700  In: 5284.2 [I.V.:5284.2]  Out: 1150 [Urine:1150]    Lab/Data Review:   All lab data reviewed      24 Hour Results:    Recent Results (from the past 24 hour(s))   RENAL FUNCTION PANEL    Collection Time: 03/27/22 10:25 AM   Result Value Ref Range    Sodium 149 (H) 136 - 145 mmol/L    Potassium 4.0 3.5 - 5.1 mmol/L    Chloride 125 (H) 97 - 108 mmol/L    CO2 17 (L) 21 - 32 mmol/L    Anion gap 7 5 - 15 mmol/L    Glucose 92 65 - 100 mg/dL    BUN 26 (H) 6 - 20 mg/dL    Creatinine 2.43 (H) 0.70 - 1.30 mg/dL    BUN/Creatinine ratio 11 (L) 12 - 20      GFR est AA 33 (L) >60 ml/min/1.73m2    GFR est non-AA 27 (L) >60 ml/min/1.73m2    Calcium 8.2 (L) 8.5 - 10.1 mg/dL    Phosphorus 3.2 2.6 - 4.7 mg/dL    Albumin 2.2 (L) 3.5 - 5.0 g/dL   SODIUM, UR, RANDOM    Collection Time: 03/27/22 11:30 AM   Result Value Ref Range    Sodium,urine random 43 mmol/L   POTASSIUM, UR, RANDOM    Collection Time: 03/27/22 11:30 AM   Result Value Ref Range    Potassium urine, random 65 mmol/L   CHLORIDE, URINE RANDOM    Collection Time: 03/27/22 11:30 AM   Result Value Ref Range    Chloride,urine random 56 mmol/L   VANCOMYCIN, RANDOM    Collection Time: 03/27/22  1:30 PM   Result Value Ref Range    Vancomycin, random 11.6 ug/mL   GLUCOSE, POC    Collection Time: 03/27/22  4:24 PM   Result Value Ref Range    Glucose (POC) 100 65 - 117 mg/dL    Performed by Marlon Pi          Imaging:    XR CHEST PORT   Final Result      XR CHEST PORT   Final Result   The cardiomediastinal silhouette is appropriate for age, technique,   and lung expansion. Pulmonary vasculature is not congested. The lungs are   essentially clear. No effusion or pneumothorax is seen. IR INSERT NON TUNL CVC OVER 5 YRS   Final Result      Technically successful insertion of non-tunneled triple-lumen catheter with US   guidance. Plan:       The catheter may be used after catheter placement confirmation by x-ray.   _______________________________________________________________      PROCEDURE SUMMARY:   - Venous access with ultrasound guidance   - Non-tunneled central venous catheter insertion      PROCEDURE DETAILS:      Pre-procedure   Relevant imaging review: None    Informed consent for the procedure was obtained and time-out was performed prior   to the procedure. Prophylactic antibiotic administered: Not administered   Preparation: The site was prepared and draped using all elements of maximal   sterile barrier technique including sterile gloves, sterile gown, cap, mask,   large sterile sheet, sterile ultrasound probe cover, sterile ultrasound gel,   hand hygiene and cutaneous antisepsis with 2% chlorhexidine. Medical reason for site preparation exception: Not applicable      Anesthesia/sedation   Level of anesthesia: No sedation   Anesthesia administered by: Not applicable   Duration of anesthesia/sedation: 0 minutes. Access   The vessel was sonographically evaluated and judged to be patent and appropriate   for access and a permanent image was stored. 2 cc's of 1% lidocaine was   administered to the access site. Real time ultrasound was used to visualize   needle entry into the vessel. Vein accessed: Right internal jugular vein   Access technique: 4F micropuncture set      Catheter placement   The access site was dilated and the catheter was placed into the vein over a   wire and all guidewires were removed in their entirety. Catheter ports were   aspirated and flushed. Catheter tip location was verified by portable X-ray. Catheter size: 7 Portuguese   Catheter length: 16 cm   Catheter tip position: Central. The tip is overlying the anatomic SVC. Catheter flush: Normal saline      Closure   The catheter was secured. A sterile dressing was applied. Catheter securement technique: Stat-Lock      Additional Details   Additional description of procedure: None   Additional findings: None   Additional equipment: None   Specimens removed: None   Estimated blood loss:  Less than 10 cc   Standardized report: [email or web address]_CVA_NonTunneledCatheter1.0               IR US GUIDED VASCULAR ACCESS   Final Result      Technically successful insertion of non-tunneled triple-lumen catheter with US   guidance. Plan:       The catheter may be used after catheter placement confirmation by x-ray.   _______________________________________________________________      PROCEDURE SUMMARY:   - Venous access with ultrasound guidance   - Non-tunneled central venous catheter insertion      PROCEDURE DETAILS:      Pre-procedure   Relevant imaging review: None    Informed consent for the procedure was obtained and time-out was performed prior   to the procedure. Prophylactic antibiotic administered: Not administered   Preparation: The site was prepared and draped using all elements of maximal   sterile barrier technique including sterile gloves, sterile gown, cap, mask,   large sterile sheet, sterile ultrasound probe cover, sterile ultrasound gel,   hand hygiene and cutaneous antisepsis with 2% chlorhexidine. Medical reason for site preparation exception: Not applicable      Anesthesia/sedation   Level of anesthesia: No sedation   Anesthesia administered by: Not applicable   Duration of anesthesia/sedation: 0 minutes. Access   The vessel was sonographically evaluated and judged to be patent and appropriate   for access and a permanent image was stored. 2 cc's of 1% lidocaine was   administered to the access site.   Real time ultrasound was used to visualize needle entry into the vessel. Vein accessed: Right internal jugular vein   Access technique: 4F micropuncture set      Catheter placement   The access site was dilated and the catheter was placed into the vein over a   wire and all guidewires were removed in their entirety. Catheter ports were   aspirated and flushed. Catheter tip location was verified by portable X-ray. Catheter size: 7 Fijian   Catheter length: 16 cm   Catheter tip position: Central. The tip is overlying the anatomic SVC. Catheter flush: Normal saline      Closure   The catheter was secured. A sterile dressing was applied. Catheter securement technique: Stat-Lock      Additional Details   Additional description of procedure: None   Additional findings: None   Additional equipment: None   Specimens removed: None   Estimated blood loss:  Less than 10 cc   Standardized report: SIR_CVA_NonTunneledCatheter1.0               DUPLEX UPPER EXT VENOUS RIGHT   Final Result      XR 2ND FINGER RT MIN 2 V   Final Result      Progressive findings. The findings are nonspecific and could represent infection with osteomyelitis   and septic joint and gas in the soft tissues related to infection. Gout, or other inflammatory arthropathy is a possibility. Findings should be correlated with clinical parameters. Assessment   Hypothermia  Hypoglycemic  Hypotension  /Bradycardia  Septic joint- right 2nd finger  SEPSIS  Anemia- transfuse if Hgb < 7.0  Acute on chronic renal failure- creatinine 2.51 on admission     Hx etoh abuse  Cirrhosis of liver   History of encephalopathy  Seizure disorder  History of gout    · No evidence of acute DVT in the right upper extremity. · Thrombus noted within the right cephalic forearm vein(s).         Plan     Bear hugger for hypothermia  SEPSIS- continue vanc and zosyn  Anemia- transfuse if Hgb < 7.0  I&D of right 2nd finger performed in ED  Consult wound care  Consult pharmacy for vancomycin dosing  Consult nephrology  Consult ID  Speech therapy  PT OT        Zosyn 3.375g Q8  Allopurinol 300mg daily  amloride 5mg daily-- HOLD  Ergocalciferol 50,000 units weekly  Famotidine 96SW daily  Folic acid 1mg daily  Lactulose 10g/15mL TID  Keppra 1000mg BID  Levothyroxine 25mcg daily  Propanolol 20mg BID  Sodium bicarbonate 650mg TID  Thiamine 100mg daily     Patient going for the surgery today as patient blood pressure stable    Seen by infectious disease recommend to continue IV vancomycin and IV    Monitor renal function remained stable      Current Facility-Administered Medications:     levETIRAcetam (KEPPRA) oral solution 1,000 mg, 1,000 mg, Oral, BID, Lamberto Ackerman MD, 1,000 mg at 03/28/22 9136    Vancomycin - Pharmacy to Dose, , Other, Rx Dosing/Monitoring, Deidre Madsen MD    Vancomycin - Please draw random level 3/28 @ 1700, , Other, ONCE, Ree Zaragoza MD    dextrose 5% infusion, 75 mL/hr, IntraVENous, CONTINUOUS, Efrain Eason MD, Last Rate: 75 mL/hr at 03/27/22 2128, 75 mL/hr at 03/27/22 2128    levothyroxine (SYNTHROID) tablet 50 mcg, 50 mcg, Oral, 6am, Ree Zaragoza MD, 50 mcg at 03/28/22 0512    tamsulosin (FLOMAX) capsule 0.4 mg, 0.4 mg, Oral, DAILY, Richard Rapp MD, 0.4 mg at 03/28/22 0811    midodrine (PROAMATINE) tablet 5 mg, 5 mg, Oral, TID, Daya Marks MD, 5 mg at 03/28/22 0811    sodium chloride (NS) flush 5-40 mL, 5-40 mL, IntraVENous, Q8H, Halina Levy MD, 10 mL at 03/28/22 0514    sodium chloride (NS) flush 5-40 mL, 5-40 mL, IntraVENous, PRN, Daya Marks MD    acetaminophen (TYLENOL) tablet 650 mg, 650 mg, Oral, Q8H, Daya Marks MD, 650 mg at 03/28/22 0512    HYDROcodone-acetaminophen (NORCO) 5-325 mg per tablet 1 Tablet, 1 Tablet, Oral, Q4H PRN, Ramírez Bray MD    morphine injection 2 mg, 2 mg, IntraVENous, Q4H PRN, Ramírez Bray MD    naloxone (NARCAN) injection 0.4 mg, 0.4 mg, IntraVENous, PRN, MD Yessy Guo ondansetron (ZOFRAN) injection 4 mg, 4 mg, IntraVENous, Q4H PRN, Hiral Bray MD    allopurinoL (ZYLOPRIM) tablet 300 mg, 300 mg, Oral, DAILY, Ramírez Bray MD, 300 mg at 03/28/22 0811    [Held by provider] aMILoride (MIDAMOR) tablet 5 mg, 5 mg, Oral, DAILY, Lamberto Ackerman MD, 5 mg at 03/23/22 0900    ergocalciferol capsule 50,000 Units, 50,000 Units, Oral, Q7D, Ramírez Bray MD    famotidine (PEPCID) tablet 20 mg, 20 mg, Oral, QPM, Cherrie Rosa MD, 20 mg at 09/40/53 0746    folic acid (FOLVITE) tablet 1 mg, 1 mg, Oral, DAILY, Ramírez Bray MD, 1 mg at 03/28/22 0811    lactulose (CHRONULAC) 10 gram/15 mL solution 15 mL, 15 mL, Oral, TID, Ramírez Bray MD, 15 mL at 03/28/22 2144    propranoloL (INDERAL) tablet 20 mg, 20 mg, Oral, BID, Cherrie Rosa MD, 20 mg at 03/26/22 0805    thiamine mononitrate (B-1) tablet 100 mg, 100 mg, Oral, DAILY, Cherrie Rosa MD, 100 mg at 03/28/22 7517    acetaminophen (TYLENOL) tablet 650 mg, 650 mg, Oral, Q6H PRN, Cherrie Rosa MD, 650 mg at 03/26/22 1410    Current Outpatient Medications   Medication Instructions    acetaminophen (TYLENOL) 650 mg, Oral, EVERY 6 HOURS AS NEEDED    allopurinoL (ZYLOPRIM) 300 mg tablet 1 Tablet, Oral, DAILY    aMILoride (MIDAMOR) 5 mg, Oral, DAILY    chlordiazePOXIDE (LIBRIUM) 10 mg, Oral, 3 TIMES DAILY AS NEEDED    ergocalciferol (ERGOCALCIFEROL) 1,250 mcg (50,000 unit) capsule 1 Capsule, Oral, EVERY 7 DAYS    famotidine (PEPCID) 20 mg, Oral, AT BEDTIME    folic acid (FOLVITE) 1 mg, Oral, DAILY    lactulose (CHRONULAC) 10 gram/15 mL solution 15 mL, Oral, 3 TIMES DAILY    levETIRAcetam (KEPPRA) 1,500 mg, Oral, 2 TIMES DAILY    levothyroxine (SYNTHROID) 25 mcg, Oral, DAILY BEFORE BREAKFAST    potassium chloride (K-DUR, KLOR-CON M20) 20 mEq tablet 20 mEq, Oral, DAILY    propranoloL (INDERAL) 20 mg, Oral, 2 TIMES DAILY    sodium bicarbonate 650 mg, Oral, 3 TIMES DAILY    tamsulosin (FLOMAX) 0.4 mg, Oral, DAILY    thiamine HCL (B-1) 100 mg, Oral, DAILY         Signed By: Hakeem Ashford     March 28, 2022

## 2022-03-28 NOTE — PROGRESS NOTES
Updated clinicals sent to [facility]. Patient is a LTC resident.             JESUS Collins

## 2022-03-28 NOTE — PROGRESS NOTES
Consult received for bedside swallow re-evaluation. Nsg reports patient likely not appropriate due to fluctuating alertness. Patient seen at bedside NG in place. Patient would not alert to verbal and tactile cues. Patient not appropriate for bedside swallow evaluation at this time. SLP to continue to follow.

## 2022-03-28 NOTE — PROGRESS NOTES
Infectious Disease Progress Note           Subjective:   Assessed pt at bedside. Doing well, denies new complaints. No acute events since last seen.  Confused, dry mucosa, unable to clear secretions   Objective:   Physical Exam:     Visit Vitals  /75 (BP 1 Location: Left leg, BP Patient Position: At rest)   Pulse 88   Temp 97 °F (36.1 °C)   Resp 12   Ht 6' 2\" (1.88 m)   Wt 185 lb 13.6 oz (84.3 kg)   SpO2 99%   BMI 23.86 kg/m²    O2 Flow Rate (L/min): 2 l/min O2 Device: None (Room air)    Temp (24hrs), Av.1 °F (36.2 °C), Min:96.1 °F (35.6 °C), Max:98.1 °F (36.7 °C)    701 - 1900  In: 380 [I.V.:300]  Out: 300 [Urine:300]   1901 -  0700  In: 5284.2 [I.V.:5284.2]  Out: 4353 [Urine:1150]    General: NAD, confused, slow to respond   HEENT: HEIDY, dry mucosa   Lungs: CTA b/l\, decreased at the bases, no wheeze/rhonchi   Heart: S1S2+, RRR, no murmur  Abdo: Soft, NT, ND, +BS   : No fish   Exts:Right index finger dressing in place   Skin: No pressure ulcers       Data Review:       Recent Days:  Recent Labs     22  1245 22  0315   WBC 9.4 8.6   HGB 8.5* 8.3*   HCT 25.1* 24.5*   PLT 64* 73*     Recent Labs     22  1025 22  0315   BUN 26* 30*  30*   CREA 2.43* 2.66*  2.69*       Lab Results   Component Value Date/Time    C-Reactive protein 1.59 (H) 2022 12:45 PM        Microbiology     Results     Procedure Component Value Units Date/Time    CULTURE, Alpheus May STAIN [[id number]]  (Susceptibility) Collected: 22 1715    Order Status: Completed Specimen: Wound from Right Updated: 22 1024     Special Requests: No Special Requests        GRAM STAIN Few WBCs seen         No organisms seen        Culture result:       Scant Staphylococcus epidermidis (Oxacillin resistant)          Susceptibility      Staphylococcus epidermidis     YESSENIA     Ciprofloxacin ($) Resistant     Clindamycin ($) Susceptible     Daptomycin ($$$$$) Susceptible Erythromycin ($$$$) Susceptible     Gentamicin ($) Susceptible     Levofloxacin ($) Resistant     Linezolid ($$$$$) Susceptible     Moxifloxacin ($$$$) Resistant     Oxacillin Resistant     Rifampin ($$$$) Susceptible  [1]      Tetracycline Susceptible     Trimeth/Sulfa Susceptible     Vancomycin ($) Susceptible                 [1]  Rifampin is not to be used for mono-therapy. Linear View                   CULTURE, TISSUE Pricila Sears STAIN [[id number]] Collected: 03/25/22 1715    Order Status: Completed Specimen: Right Updated: 03/27/22 1109     Special Requests: No Special Requests        GRAM STAIN Rare WBCs seen         No organisms seen        Culture result:       Scant Probable Enterococcus species          MRSA SCREEN - PCR (NASAL) [[id number]] Collected: 03/24/22 1256    Order Status: Completed Specimen: Swab Updated: 03/24/22 1456     MRSA by PCR, Nasal Not Detected       COVID-19 RAPID TEST [[id number]] Collected: 03/24/22 0918    Order Status: Completed Specimen: Nasopharyngeal Updated: 03/24/22 0955     COVID-19 rapid test Not Detected        Comment: Rapid Abbott ID Now   Rapid NAAT:  The specimen is NEGATIVE for SARS-CoV-2, the novel coronavirus associated with COVID-19. Negative results should be treated as presumptive and, if inconsistent with clinical signs and symptoms or necessary for patient management, should be tested with an alternative molecular assay. Negative results do not preclude SARS-CoV-2 infection and should not be used as the sole basis for patient management decisions. This test has been authorized by the FDA under   an Emergency Use Authorization (EUA) for use by authorized laboratories.  Fact sheet for Healthcare Providers: ConventionUpdate.co.nz Fact sheet for Patients: ConventionUpdate.co.nz   Methodology: Isothermal Nucleic Acid Amplification         CULTURE, BLOOD, PAIRED [[id number]] Collected: 03/23/22 0000    Order Status: Completed Specimen: Blood Updated: 03/23/22 0021    CULTURE, Alyse Chaves STAIN [[id number]] Collected: 03/22/22 2219    Order Status: Completed Specimen: Wound Updated: 03/25/22 0850     Special Requests: No Special Requests        GRAM STAIN Few WBCs seen         No organisms seen        Culture result:       Few Staphylococcus species, coagulase negative               Diagnostics   CXR Results  (Last 48 hours)    None         Assessment/Plan     1. Right index finger swelling/tenderness, soft tissue swelling/septic arthritis and osteomyelitis on X-ray       Coag neg staph isolated from superficial wound Cx and intra-op Cx may not be a contaminant       S/p wound debridement w excision of bone on 03/25      Intermittent episodes of hypothermia, hemodynamically stable off pressors       Continue on Vanc for CoNS coverage. Routine labs in the morning    2. Hypothyroidism w elevated TSH and normal FT4, ongoing episodes of hypothermia       Continue on Levothyroxine, may need increased dose          3. Acute renal failure: Stable Cr on routine labs       4. AMS, dry mucosa, at high risk for aspiration pneumonitis, currently has an NGT     5.  Thrombocytopenia: Reason unclear, will continue to monitor     Kermitt Sicard, MD    3/28/2022

## 2022-03-28 NOTE — PROGRESS NOTES
Consult for Vancomycin Dosing by Pharmacy by Dr. Real August provided for this 60y.o. year old male having GRACIA on CKD, for indication of bone and joint infection. Day of Therapy: 2  Goal of Level(s): 10-15mcg/dL     Other Current Antibiotics: Zosyn    Significant Cultures: All Micro Results       Procedure Component Value Units Date/Time    CULTURE, TISSUE Stann Chawla STAIN [[id number]] Collected: 03/25/22 1715    Order Status: Completed Specimen: Right Updated: 03/28/22 1544     Special Requests: No Special Requests        GRAM STAIN Rare WBCs seen         No organisms seen        Culture result:       Scant Staphylococcus epidermidis PLEASE REFER TO [id number] FOR SENSITIVITIES          CULTURE, Blanca Mort STAIN [[id number]]  (Susceptibility) Collected: 03/25/22 1715    Order Status: Completed Specimen: Wound from Right Updated: 03/28/22 1024     Special Requests: No Special Requests        GRAM STAIN Few WBCs seen         No organisms seen        Culture result:       Scant Staphylococcus epidermidis (Oxacillin resistant)          CULTURE, Blanca Mort STAIN [[id number]] Collected: 03/22/22 2219    Order Status: Completed Specimen: Wound Updated: 03/25/22 0850     Special Requests: No Special Requests        GRAM STAIN Few WBCs seen         No organisms seen        Culture result:       Few Staphylococcus species, coagulase negative          MRSA SCREEN - PCR (NASAL) [[id number]] Collected: 03/24/22 1256    Order Status: Completed Specimen: Swab Updated: 03/24/22 1456     MRSA by PCR, Nasal Not Detected       COVID-19 RAPID TEST [[id number]] Collected: 03/24/22 0918    Order Status: Completed Specimen: Nasopharyngeal Updated: 03/24/22 0955     COVID-19 rapid test Not Detected        Comment: Rapid Abbott ID Now   Rapid NAAT:  The specimen is NEGATIVE for SARS-CoV-2, the novel coronavirus associated with COVID-19.    Negative results should be treated as presumptive and, if inconsistent with clinical signs and symptoms or necessary for patient management, should be tested with an alternative molecular assay. Negative results do not preclude SARS-CoV-2 infection and should not be used as the sole basis for patient management decisions. This test has been authorized by the FDA under   an Emergency Use Authorization (EUA) for use by authorized laboratories. Fact sheet for Healthcare Providers: ConventionUpdate.co.nz Fact sheet for Patients: ConventionUpdate.co.nz   Methodology: Isothermal Nucleic Acid Amplification         CULTURE, BLOOD, PAIRED [[id number]] Collected: 03/23/22 0000    Order Status: Completed Specimen: Blood Updated: 03/23/22 0021            Serum Creatinine Creatinine   Date/Time Value Ref Range Status   03/27/2022 10:25 AM 2.43 (H) 0.70 - 1.30 mg/dL Final   03/26/2022 03:15 AM 2.66 (H) 0.70 - 1.30 mg/dL Final   03/26/2022 03:15 AM 2.69 (H) 0.70 - 1.30 mg/dL Final      Creatinine Clearance Estimated Creatinine Clearance: 36.2 mL/min (A) (based on SCr of 2.43 mg/dL (H)). BUN Lab Results   Component Value Date/Time    BUN 26 (H) 03/27/2022 10:25 AM      WBC Lab Results   Component Value Date/Time    WBC 9.4 03/26/2022 12:45 PM      Temp Temp Readings from Last 1 Encounters:   03/28/22 98.1 °F (36.7 °C)      C-Reactive Protein C-Reactive protein   Date Value Ref Range Status   03/26/2022 1.59 (H) 0.00 - 0.60 mg/dL Final   03/22/2022 3.15 (H) 0.00 - 0.60 mg/dL Final      Procalcitonin No results found for: PCT     Last Level:    Vancomycin, random   Date/Time Value Ref Range Status   03/28/2022 04:29 PM 12.5 ug/mL Final     Comment:     No reference range has been established.        Ht Readings from Last 1 Encounters:   03/28/22 188 cm (74.02\")        Wt Readings from Last 1 Encounters:   03/26/22 84.3 kg (185 lb 13.6 oz)     Ideal body weight: 82.2 kg (181 lb 4.8 oz)  Adjusted ideal body weight: 83.1 kg (183 lb 1.9 oz)     Previous Regimen: 500mg IV x1 ([email or web address])    New Regimen:   Vancomycin level is therapeutic. Start Vancomycin 500mg IV x1 today. Pharmacy will order a random level tomorrow to re-dose. Pharmacy to follow daily and will make changes to dose and/or frequency based on clinical status.      _________________________________     Elias Ng

## 2022-03-28 NOTE — PROGRESS NOTES
Nephrology Consult    Patient: Deanna Castillo MRN: [id number]  SSN: [id number]    YOB: 1960  Age: 60 y.o. Sex: male      Subjective:     Patient seen in the ICU   Awake and not following verbal commands fully ,   Didn't appear to be in any distress   On warming blanket  No labs today. On D5W at 75 mils per hour  had I&D of right index finger on 3/25 which showed purulent drainage mixed with gouty tophi. .on vanc and zosyn. On midodrine 5 mg tid. Blood pressures are better    Past Medical History:   Diagnosis Date    Arthritis     Hypertension      Past Surgical History:   Procedure Laterality Date    IR INSERT NON TUNL CVC OVER 5 YRS  3/25/2022    IR PARACENTESIS ABD W IMAGE  1/21/2022    IR PARACENTESIS ABD W IMAGE  3/1/2022      No family history on file.   Social History     Tobacco Use    Smoking status: Never Smoker    Smokeless tobacco: Never Used   Substance Use Topics    Alcohol use: Not on file      Current Facility-Administered Medications   Medication Dose Route Frequency Provider Last Rate Last Admin    levETIRAcetam (KEPPRA) oral solution 1,000 mg  1,000 mg Oral BID Lamberto Ackerman MD   1,000 mg at 03/28/22 0811    Vancomycin - Pharmacy to Dose   Other Rx Dosing/Monitoring Samira Thomason MD        Vancomycin - Please draw random level 3/28 @ 1700   Other ONCE Ree Zaragoza MD        dextrose 5% infusion  75 mL/hr IntraVENous CONTINUOUS Kallu, Velia Camarillo MD 75 mL/hr at 03/28/22 1032 75 mL/hr at 03/28/22 1032    levothyroxine (SYNTHROID) tablet 50 mcg  50 mcg Oral West Pelayo MD   50 mcg at 03/28/22 0512    tamsulosin (FLOMAX) capsule 0.4 mg  0.4 mg Oral DAILY Enzo DENIS MD   0.4 mg at 03/28/22 0811    midodrine (PROAMATINE) tablet 5 mg  5 mg Oral TID Tyrone Aguillon MD   5 mg at 03/28/22 0811    sodium chloride (NS) flush 5-40 mL  5-40 mL IntraVENous Q8H Vanessa Escudero MD   10 mL at 03/28/22 0514    sodium chloride (NS) flush 5-40 mL  5-40 mL IntraVENous PRN Bary Lennox, MD        acetaminophen (TYLENOL) tablet 650 mg  650 mg Oral Q8H Ramírez Bray MD   650 mg at 03/28/22 0512    HYDROcodone-acetaminophen (NORCO) 5-325 mg per tablet 1 Tablet  1 Tablet Oral Q4H PRN Bary Lennox, MD        morphine injection 2 mg  2 mg IntraVENous Q4H PRN Bary Lennox, MD        naloxone (NARCAN) injection 0.4 mg  0.4 mg IntraVENous PRN Bary Lennox, MD        ondansetron (ZOFRAN) injection 4 mg  4 mg IntraVENous Q4H PRN Bary Lennox, MD        allopurinoL (ZYLOPRIM) tablet 300 mg  300 mg Oral DAILY Tim DANIELS MD   300 mg at 03/28/22 0811    [Held by provider] aMILoride (MIDAMOR) tablet 5 mg  5 mg Oral DAILY Lamberto Ackerman MD   5 mg at 03/23/22 0900    ergocalciferol capsule 50,000 Units  50,000 Units Oral Q7D Ramírez Bray MD        famotidine (PEPCID) tablet 20 mg  20 mg Oral QPM Bary Lennox, MD   20 mg at 92/32/04 7167    folic acid (FOLVITE) tablet 1 mg  1 mg Oral DAILY Ramírez Bray MD   1 mg at 03/28/22 0811    lactulose (CHRONULAC) 10 gram/15 mL solution 15 mL  15 mL Oral TID Bary Lennox, MD   15 mL at 03/28/22 1363    propranoloL (INDERAL) tablet 20 mg  20 mg Oral BID Bary Lennox, MD   20 mg at 03/26/22 0805    thiamine mononitrate (B-1) tablet 100 mg  100 mg Oral DAILY Ramírez Bray MD   100 mg at 03/28/22 0221    acetaminophen (TYLENOL) tablet 650 mg  650 mg Oral Q6H PRN Bary Lennox, MD   650 mg at 03/26/22 1410        No Known Allergies    Review of Systems:  A comprehensive review of systems was negative except for that written in the History of Present Illness.     Objective:     Vitals:    03/28/22 0810 03/28/22 0900 03/28/22 1025 03/28/22 1100   BP: 116/78 115/72 124/80    Pulse: 82 86 88 86   Resp: 16 11 12 17   Temp: (!) 96.4 °F (35.8 °C) (!) 96.6 °F (35.9 °C)  97 °F (36.1 °C)   SpO2: 96% 97% 99% 100%   Weight:       Height:            Physical Exam:  General: NAD , not oriented   Eyes: sclera anicteric  Oral Cavity: No thrush or ulcers  Neck: no JVD  Chest: Fair bilateral air entry  Heart: normal sounds  Abdomen: soft and non tender   : no fish  Lower Extremities: no edema  Skin: no rash          Assessment:     Hospital Problems  Never Reviewed          Codes Class Noted POA    Finger infection ICD-10-CM: L08.9  ICD-9-CM: 686.9  3/22/2022 Unknown              Plan:       1 Acute kidney injury on chronic kidney disease stage IIIA. -Etiology can be prerenal plus was on amiloride.    -On admission creatinine was elevated at 2.51.    -Cr was 2.4 yesterday. Today's labs are pending. Baseline is unknown was 1.7 on 3/4/2022.    -no evidence of lower extremity edema.    -Hypervolemic with abd distension ,h/o repeated paracentesis (3/24  4.3 L.  -will try volume expansion with IV albumin   -I will continue to hold amiloride 2/2 hypotenion  -We will continue hypotonic IV fluids for now for hypernatremia  -He is not on any other potential nephrotoxins. 2.  Septic arthritis.    -He presented with swollen right hand finger diagnosed with septic arthritis status post I&D received IV antibiotics per orthopedic.  -on Vanc and zosyn  -S/p I&D on 3/25     3 Hypotension.    -Blood pressures are low.    -I will continue to hold amiloride.    -On no other blood pressure medications. -Give parameters to hold midodrine     4. Liver cirrhosis  -Has history of cirrhosis , portal HTN  -due to etoh   -Abd distension   -On Propranolol   -Albumin for volume expansion     4. Anemia.    -Hemoglobin is 8.5.    -We will start weekly Procrit    5. Metabolic acidosis. -CO2 is 17  -Lactic acidosis - due to decr Liver clearance and probably RTA as well . No diarrhea reported   -will increase sodium bicarb to 1300 bid. Significant bicarb deficit  -will consider HCO3 drip if worsen metabolic acidosis. 6. Hpernatremia:  -Na 149  -Continue D5W at 75 mils per hour.   Repeat labs in a..         Signed By: Marshall Brock MD     March 28, 2022

## 2022-03-29 NOTE — PROGRESS NOTES
Consult for Vancomycin Dosing by Pharmacy by Dr. Promise Mendez provided for this 60y.o. year old male having GRACIA on CKD, for indication of bone and joint infection. Day of Therapy: 3  Goal of Level(s): 10-15mcg/dL     Other Current Antibiotics: Zosyn          Serum Creatinine Creatinine   Date/Time Value Ref Range Status   03/29/2022 03:30 AM 2.31 (H) 0.70 - 1.30 mg/dL Final   03/27/2022 10:25 AM 2.43 (H) 0.70 - 1.30 mg/dL Final   03/26/2022 03:15 AM 2.66 (H) 0.70 - 1.30 mg/dL Final   03/26/2022 03:15 AM 2.69 (H) 0.70 - 1.30 mg/dL Final      Creatinine Clearance Estimated Creatinine Clearance: 38.1 mL/min (A) (based on SCr of 2.31 mg/dL (H)). BUN Lab Results   Component Value Date/Time    BUN 23 (H) 03/29/2022 03:30 AM      WBC Lab Results   Component Value Date/Time    WBC 6.6 03/29/2022 03:30 AM      Temp Temp Readings from Last 1 Encounters:   03/29/22 99.1 °F (37.3 °C)      C-Reactive Protein C-Reactive protein   Date Value Ref Range Status   03/26/2022 1.59 (H) 0.00 - 0.60 mg/dL Final   03/22/2022 3.15 (H) 0.00 - 0.60 mg/dL Final      Procalcitonin No results found for: PCT     Last Level:    Vancomycin, random   Date/Time Value Ref Range Status   03/29/2022 03:30 AM 16.5 ug/mL Final     Comment:     No reference range has been established. Ht Readings from Last 1 Encounters:   03/28/22 188 cm (74.02\")        Wt Readings from Last 1 Encounters:   03/26/22 84.3 kg (185 lb 13.6 oz)     Ideal body weight: 82.2 kg (181 lb 4.8 oz)  Adjusted ideal body weight: 83.1 kg (183 lb 1.9 oz)     Previous Regimen: 500mg IV x1 ([email or web address])    New Regimen:   Vancomycin level is therapeutic at  16.5. Give  Vancomycin 750 mg IV x1 today at 0900. A random level has been scheduled for tomorrow 3/30/22 at 0400 am to re-dose. Pharmacy to follow daily and will make changes to dose and/or frequency based on clinical status.      _________________________________     Pharmacist Anabelle Bingham PHARMD

## 2022-03-29 NOTE — PROGRESS NOTES
Problem: Falls - Risk of  Goal: *Absence of Falls  Description: Document Shanique Messer Fall Risk and appropriate interventions in the flowsheet.   Outcome: Progressing Towards Goal  Note: Fall Risk Interventions:  Mobility Interventions: Bed/chair exit alarm    Mentation Interventions: Adequate sleep, hydration, pain control,Bed/chair exit alarm,Door open when patient unattended,More frequent rounding,Reorient patient,Room close to nurse's station    Medication Interventions: Bed/chair exit alarm,Patient to call before getting OOB,Teach patient to arise slowly    Elimination Interventions: Bed/chair exit alarm,Toileting schedule/hourly rounds,Call light in reach

## 2022-03-29 NOTE — PROGRESS NOTES
Infectious Disease Progress Note           Subjective:   Ongoing episodes of hypothermia, reason unclear, does not appear septic, no acute events since last seen   Objective:   Physical Exam:     Visit Vitals  BP (!) 139/95 (BP 1 Location: Left leg, BP Patient Position: At rest)   Pulse 88   Temp 96.8 °F (36 °C) Comment: warming blanket on    Resp 12   Ht 6' 2.02\" (1.88 m)   Wt 185 lb 13.6 oz (84.3 kg)   SpO2 100%   BMI 23.85 kg/m²    O2 Flow Rate (L/min): 2 l/min O2 Device: None (Room air)    Temp (24hrs), Av.7 °F (36.5 °C), Min:96.6 °F (35.9 °C), Max:99.7 °F (37.6 °C)    701 - 1900  In: 631.8 [I.V.:250]  Out: -    1901 -  0700  In: 5402.9 [I.V.:4722.9]  Out: 975 [Urine:975]    General: NAD, confused, slow to respond   HEENT: HEIDY, dry mucosa   Lungs: CTA b/l\, decreased at the bases, no wheeze/rhonchi   Heart: S1S2+, RRR, no murmur  Abdo: Soft, NT, ND, +BS   : No fish   Exts:Right index finger dressing in place   Skin: No pressure ulcers       Data Review:       Recent Days:  Recent Labs     22  0330   WBC 6.6   HGB 6.6*   HCT 19.4*   PLT 53*     Recent Labs     22  0330 22  1025   BUN 23* 26*   CREA 2.31* 2.43*       Lab Results   Component Value Date/Time    C-Reactive protein 1.59 (H) 2022 12:45 PM        Microbiology     Results     Procedure Component Value Units Date/Time    CULTUREOrin STAIN [[id number]]  (Susceptibility) Collected: 22 1715    Order Status: Completed Specimen: Wound from Right Updated: 22 1024     Special Requests: No Special Requests        GRAM STAIN Few WBCs seen         No organisms seen        Culture result:       Scant Staphylococcus epidermidis (Oxacillin resistant)          Susceptibility      Staphylococcus epidermidis     YESSENIA     Ciprofloxacin ($) Resistant     Clindamycin ($) Susceptible     Daptomycin ($$$$$) Susceptible     Erythromycin ($$$$) Susceptible     Gentamicin ($) Susceptible     Levofloxacin ($) Resistant     Linezolid ($$$$$) Susceptible     Moxifloxacin ($$$$) Resistant     Oxacillin Resistant     Rifampin ($$$$) Susceptible  [1]      Tetracycline Susceptible     Trimeth/Sulfa Susceptible     Vancomycin ($) Susceptible                 [1]  Rifampin is not to be used for mono-therapy. Linear View                   CULTURE, TISSUE Josepha Shearing STAIN [[id number]] Collected: 03/25/22 1715    Order Status: Completed Specimen: Right Updated: 03/28/22 1544     Special Requests: No Special Requests        GRAM STAIN Rare WBCs seen         No organisms seen        Culture result:       Scant Staphylococcus epidermidis PLEASE REFER TO [id number] FOR SENSITIVITIES          MRSA SCREEN - PCR (NASAL) [[id number]] Collected: 03/24/22 1256    Order Status: Completed Specimen: Swab Updated: 03/24/22 1456     MRSA by PCR, Nasal Not Detected       COVID-19 RAPID TEST [[id number]] Collected: 03/24/22 0918    Order Status: Completed Specimen: Nasopharyngeal Updated: 03/24/22 0955     COVID-19 rapid test Not Detected        Comment: Rapid Abbott ID Now   Rapid NAAT:  The specimen is NEGATIVE for SARS-CoV-2, the novel coronavirus associated with COVID-19. Negative results should be treated as presumptive and, if inconsistent with clinical signs and symptoms or necessary for patient management, should be tested with an alternative molecular assay. Negative results do not preclude SARS-CoV-2 infection and should not be used as the sole basis for patient management decisions. This test has been authorized by the FDA under   an Emergency Use Authorization (EUA) for use by authorized laboratories.  Fact sheet for Healthcare Providers: ConventionUpdate.co.nz Fact sheet for Patients: ConventionUpdate.co.nz   Methodology: Isothermal Nucleic Acid Amplification         CULTURE, BLOOD, PAIRED [[id number]] Collected: 03/23/22 0000    Order Status: Completed Specimen: Blood Updated: 03/23/22 0021    CULTURE, Gwenevere [place] STAIN [[id number]] Collected: 03/22/22 2219    Order Status: Completed Specimen: Wound Updated: 03/25/22 0850     Special Requests: No Special Requests        GRAM STAIN Few WBCs seen         No organisms seen        Culture result:       Few Staphylococcus species, coagulase negative               Diagnostics   CXR Results  (Last 48 hours)    None         Assessment/Plan     1. Right index finger swelling/tenderness, soft tissue swelling/septic arthritis and osteomyelitis on X-ray       Coag neg staph isolated from superficial wound Cx and intra-op Cx may not be a contaminant       S/p wound debridement w excision of bone on 03/25      Continue on Vanc for CoNS coverage. Keep trough b/w 10-15      Routine labs in the morning. Will hold off on GNR coverage for now     2. Hypothyroidism w elevated TSH and normal FT4, Continue levothyroxine          3. Acute renal failure: Stable Cr on routine labs       4. AMS, Confused, no following commands     5.  Thrombocytopenia: PLTs trending down on todays labs     West Moore MD    3/29/2022

## 2022-03-29 NOTE — PROGRESS NOTES
SPEECH LANGUAGE PATHOLOGY DYSPHAGIA RE-EVALUATION  Patient: Citlali Grande (28 y.o. male)  Date: 3/29/2022  Diagnosis: Finger infection [L08.9] Procedure(s) (LRB):  INCISION & DRAINAGE of Right Index Finger (Right) 4 Days Post-Op  Precautions:      ASSESSMENT :  Patient is A&Ox2 and follows basic commands. Tolerating TF via NGT per RN. Hypernasal vocal quality. Patient seen for re-evaluation, PO trials of thin, mildly thick and moderately thick. Oral phase c/b mild disorganized bolus manipulation w/ intermittent decreased labial sea. Pharyngeal phase c/b mild swallow delay and HLE is wfl upon palpation. Overt s/s of pen/asp w/ thin and mildly thick c/b delayed cough. Patient will benefit from skilled intervention to address the above impairments. Patients rehabilitation potential is considered to be Fair     PLAN :  Recommendations and Planned Interventions:  Rec diet initiation of clear moderately thick w/ strict asp/GERD precautions and crush meds. Frequency/Duration: Patient will be followed by speech-language pathology 5 times a week to address goals. Discharge Recommendations: Skilled Nursing Facility     SUBJECTIVE:   Patient confused but cooperative.      OBJECTIVE:     Past Medical History:   Diagnosis Date    Arthritis     Hypertension        CXR Results  (Last 48 hours)      None              Current Diet:  DIET ADULT TUBE FEEDING  DIET ADULT     Cognitive and Communication Status:  Neurologic State: Alert,Confused  Orientation Level: Oriented to person,Oriented to place  Cognition: Decreased attention/concentration,Follows commands,Impaired decision making,Impulsive  Perception: Appears intact  Perseveration: No perseveration noted  Safety/Judgement: Decreased awareness of environment,Decreased insight into deficits               Pain:  Pain Scale 1: FLACC  Pain Intensity 1: 0       After treatment:   Patient left in no apparent distress in bed, Call bell within reach, Nursing notified, and Bed / chair alarm activated    COMMUNICATION/EDUCATION:   Patient was educated regarding diet recs, s/s aspiration, aspiration precautions, and swallow strategies. He demonstrated Guarded understanding as evidenced by reduced engagement and confusion. The patient's plan of care including recommendations, planned interventions, and recommended diet changes were discussed with: Registered nurse. Patient/family agree to work toward stated goals and plan of care.     Thank you for this referral.  Mariah Bose M.S., M.Ed., CCC-SLP  Time Calculation: 20 mins

## 2022-03-29 NOTE — PROGRESS NOTES
Orthopedic progress note    Date:3/29/2022       Room:[facility]  Patient Name:Waldo Rangel     YOB: 1960     Age:62 y.o. Subjective    Status post I&D right index finger. Postop day #4. Patient is more alert and awake today. He is somewhat conversive. Remains in ICU.   Objective           Vitals Last 24 Hours:  TEMPERATURE:  Temp  Av °F (36.7 °C)  Min: 97.2 °F (36.2 °C)  Max: 99.7 °F (37.6 °C)  RESPIRATIONS RANGE: Resp  Avg: 15  Min: 10  Max: 20  PULSE OXIMETRY RANGE: SpO2  Av.3 %  Min: 96 %  Max: 100 %  PULSE RANGE: Pulse  Av.6  Min: 79  Max: 100  BLOOD PRESSURE RANGE: Systolic (87QVG), SMO:900 , Min:115 , WYL:795   ; Diastolic (92MZT), ZZN:11, Min:65, Max:97    Current Facility-Administered Medications   Medication Dose Route Frequency    0.9% sodium chloride infusion 250 mL  250 mL IntraVENous PRN    [START ON 3/30/2022] Vancomycin random level to be drawn on 3/30/22 at 0400 am.   Other ONCE    midodrine (PROAMATINE) tablet 5 mg  5 mg Oral TID    levETIRAcetam (KEPPRA) oral solution 1,000 mg  1,000 mg Oral BID    Vancomycin - Pharmacy to Dose   Other Rx Dosing/Monitoring    levothyroxine (SYNTHROID) tablet 50 mcg  50 mcg Oral 6am    tamsulosin (FLOMAX) capsule 0.4 mg  0.4 mg Oral DAILY    sodium chloride (NS) flush 5-40 mL  5-40 mL IntraVENous Q8H    sodium chloride (NS) flush 5-40 mL  5-40 mL IntraVENous PRN    acetaminophen (TYLENOL) tablet 650 mg  650 mg Oral Q8H    HYDROcodone-acetaminophen (NORCO) 5-325 mg per tablet 1 Tablet  1 Tablet Oral Q4H PRN    morphine injection 2 mg  2 mg IntraVENous Q4H PRN    naloxone (NARCAN) injection 0.4 mg  0.4 mg IntraVENous PRN    ondansetron (ZOFRAN) injection 4 mg  4 mg IntraVENous Q4H PRN    allopurinoL (ZYLOPRIM) tablet 300 mg  300 mg Oral DAILY    [Held by provider] aMILoride (MIDAMOR) tablet 5 mg  5 mg Oral DAILY    ergocalciferol capsule 50,000 Units  50,000 Units Oral Q7D    famotidine (PEPCID) tablet 20 mg 20 mg Oral QPM    folic acid (FOLVITE) tablet 1 mg  1 mg Oral DAILY    lactulose (CHRONULAC) 10 gram/15 mL solution 15 mL  15 mL Oral TID    propranoloL (INDERAL) tablet 20 mg  20 mg Oral BID    thiamine mononitrate (B-1) tablet 100 mg  100 mg Oral DAILY    acetaminophen (TYLENOL) tablet 650 mg  650 mg Oral Q6H PRN          I/O (24Hr): Intake/Output Summary (Last 24 hours) at 3/29/2022 1313  Last data filed at 3/29/2022 1047  Gross per 24 hour   Intake 1720.55 ml   Output 450 ml   Net 1270.55 ml     Objective  Labs/Imaging/Diagnostics    Labs:  CBC:  Recent Labs     03/29/22  0330   WBC 6.6   RBC 2.29*   HGB 6.6*   HCT 19.4*   MCV 84.7   RDW 18.4*   PLT 53*     CHEMISTRIES:  Recent Labs     03/29/22  0330 03/27/22  1025   * 149*   K 3.6 4.0   * 125*   CO2 19* 17*   BUN 23* 26*   CREA 2.31* 2.43*   CA 8.7 8.2*   PHOS  --  3.2   PT/INR:No results for input(s): INR, INREXT in the last 72 hours. No lab exists for component: PROTIME  APTT:No results for input(s): APTT in the last 72 hours. LIVER PROFILE:  Recent Labs     03/29/22  0330   AST 20   ALT 11*     Lab Results   Component Value Date/Time    ALT (SGPT) 11 (L) 03/29/2022 03:30 AM    AST (SGOT) 20 03/29/2022 03:30 AM    Alk. phosphatase 53 03/29/2022 03:30 AM    Bilirubin, direct 1.2 (H) 03/01/2022 08:52 AM    Bilirubin, total 1.4 (H) 03/29/2022 03:30 AM       Physical Exam:  Right hand/index finger, dressing was moved by me. There was moderate serosanguineous drainage in dressing. Wound bed is healing well. No necrotic tissue or purulent drainage seen. Normal tenderness palpation throughout fingertip. Assessment//Plan           Patient Active Problem List    Diagnosis Date Noted    Finger infection 03/22/2022    Status epilepticus ([facility] 02/24/2022    Acute renal failure (ARF) ([facility] 01/20/2022    [facility] 01/20/2022    GRACIA (acute kidney injury) ([facility] 01/20/2022     Status post I&D right index finger.   Postop day #4.  Next dressing change Thursday, March 31 unless dressing becomes saturated. Orthopedics following.       Electronically signed by Yuriy Lynch PA-C on 3/29/2022 at 1:13 PM

## 2022-03-29 NOTE — PROGRESS NOTES
PROGRESS NOTE    Patient: Rashaun Mixon MRN: [id number]  SSN: [id number]    YOB: 1960  Age: 60 y.o. Sex: male      Admit Date: 3/22/2022    LOS: 7 days       Subjective     Chief Complaint   Patient presents with    Finger Swelling       HPI: Rashaun Mixon is a(n) 60 y.o. male with PMH significant for etoh abuse, encephalopathy, cirrhosis, seizure disorder, gout presents with c/o swelling to right 2nd finger. Patient is nonverbal at baseline and dependent on all ADL at [address] [place] [address]. According to nursing home staff, has future orthopedic appointment for this finger. Is non-verbal at baseline but appears awake, alert, and in no obvious distress.     Patient was recently discharged from the hospital after having a seizures at that time patient received Keppra for the seizures patient had paracentesis done during the last admission also endoscopy done and had banding of the varices     I&D performed on finger in ED with purulent fluid drained. Dressing in place covering the DIP. 1g vancomycin given in ED, switched to zosyn on admission. Wound cultures sent. XR of the finger shows soft tissue swelling in gas consistent with osteomyelitis, septic joint, or gouty arthropathy.     Labs: Hgb 9.6 (baseline). Creatinine 2.51. CRP 3.15. ESR 35. Wound and blood cultures pending. 3/24:  Patient seen resting in bed. Nonverbal at baseline. Rectal temperature 91.1F per nursing staff. Blood pressure soft, patient may be septic. Vanc and Zosyn started per ID consult. Lactate 3.4. Blood and wound cultures still pending. Per orthopedic surgery, patient to have debridement of joint today under local anesthesia. He will likely need IV abx for 4-6 weeks for septic joint. Per nephrology, the patients increased creatinine of 2.5 may be due to amiloride, which is now held. Iron studies also obtained for Hgb 9.6. Labs remarkable for: Hgb 9.6. Creatinine 2.47.    3/25:  Patient seen in ICU.  Denies finger pain. Rectal temp 36.2C. I&D moved to today d/t transfer to ICU yesterday. Per ID, initial wound culture shows coag negative staph and continue on vanc and zosyn pending final culture result. Blood cultures still pending. Labs remarkable for Hgb 9.2, Creatinine 2.59, Albumin 1.8. Lactate decreased from 3.4 to 2.6. Ammonia 39. Pt had Doppler studies done this morning which shows    · No evidence of acute DVT in the right upper extremity. · Thrombus noted within the right cephalic forearm vein(s).    3/28:  Patient seen resting comfortably in bed, remains at baseline mental status. Current temp 36C and patient still on warming blanket. Patient had I&D of right index finger on 3/25 which revealed purulent drainage mixed with gouty tophi. DIP joint thoroughly irrigated and cultures sent. Cultures show few WBC and \"scant probably enterococcus. \" Patient remains on vanc and zosyn. Over the weekend, levothyroxine increased to 50mcg d/t elevated TSH. He was also started on midodrine 5mg TID for hypotension and was given hypotonic fluids for hypernatremia      Labs from 3/27 show Hgb 8.5, Na 149, creatinine 2.43, albumin 2.2    3/29:  Patient seen resting comfortably in bed. Denies any pain. Abdomen very distended, worse than yesterday. Patient receiving NG tube feeds. Lower extremity edema also present. Patient also receiving 1 unit packed RBC for Hgb 6. 6. platelets 53, Na 491, Creatinine 2.31. Patient still hypothermic    Past Medical History:   Diagnosis Date    Arthritis      Hypertension                 Past Surgical History:   Procedure Laterality Date    IR PARACENTESIS ABD W IMAGE   1/21/2022    IR PARACENTESIS ABD W IMAGE   3/1/2022                 Prior to Admission medications    Medication Sig Start Date End Date Taking?  Authorizing Provider   levothyroxine (SYNTHROID) 25 mcg tablet Take 25 mcg by mouth Daily (before breakfast).     Yes Provider, Historical   acetaminophen (TylenoL) 325 mg tablet Take 650 mg by mouth every six (6) hours as needed for Pain.     Yes Provider, Historical   tamsulosin (Flomax) 0.4 mg capsule Take 1 Capsule by mouth daily. 3/4/22     Lashawn Whyte PA-C   potassium chloride (K-DUR, KLOR-CON M20) 20 mEq tablet Take 1 Tablet by mouth daily. 3/2/22     Ne Andujar MD   sodium bicarbonate 650 mg tablet Take 1 Tablet by mouth three (3) times daily. 3/2/22     Tabitha Srinivasan MD   chlordiazePOXIDE (LIBRIUM) 5 mg capsule Take 2 Capsules by mouth three (3) times daily as needed for Anxiety. Max Daily Amount: 30 mg. 3/2/22     Tabitha Srinivasan MD   levETIRAcetam (Keppra) 1,000 mg tablet Take 1.5 Tablets by mouth two (2) times a day. 3/2/22     Tabitha Srinivasan MD   ergocalciferol (ERGOCALCIFEROL) 1,250 mcg (50,000 unit) capsule Take 1 Capsule by mouth every seven (7) days. 2/9/22     Provider, Historical   lactulose (CHRONULAC) 10 gram/15 mL solution Take 15 mL by mouth three (3) times daily. 1/23/22     Tommy Ackerman MD   allopurinoL (ZYLOPRIM) 300 mg tablet Take 1 Tablet by mouth daily. 1/18/22     Provider, Historical   thiamine HCL (B-1) 100 mg tablet Take 100 mg by mouth daily.       Provider, Historical   propranoloL (INDERAL) 20 mg tablet Take 20 mg by mouth two (2) times a day.       Provider, Historical   folic acid (FOLVITE) 1 mg tablet Take 1 mg by mouth daily.       Provider, Historical   famotidine (PEPCID) 20 mg tablet Take 20 mg by mouth At bedtime.       Provider, Historical   aMILoride (MIDAMOR) 5 mg tablet Take 5 mg by mouth daily.       Provider, Historical         No Known Allergies      No family history on file. Reason unable to perform ROS: nonverbal at baseline. Objective     Visit Vitals  BP (!) 134/94 (BP 1 Location: Left leg, BP Patient Position: At rest)   Pulse 85   Temp 97.5 °F (36.4 °C) Comment: Simultaneous filing. User may not have seen previous data.    Resp 15   Ht 6' 2.02\" (1.88 m)   Wt 84.3 kg (185 lb 13.6 oz)   SpO2 99%   BMI 23.85 kg/m²    O2 Flow Rate (L/min): 2 l/min O2 Device: None (Room air)    Physical Exam:   Constitutional:       General: He is not in acute distress. HENT:      Head: Normocephalic and atraumatic. Cardiovascular:      Rate and Rhythm: Normal rate and regular rhythm. Pulses: Normal pulses. Heart sounds: Normal heart sounds. Pulmonary:      Effort: Pulmonary effort is normal.      Breath sounds: Normal breath sounds. Abdominal:      General: There is distension. Tenderness: There is no abdominal tenderness. There is no guarding or rebound. Skin:     Findings: Erythema present. Comments: Swelling to the right 2nd digit   Neurological:      Mental Status: Mental status is at baseline. Comments: Nonverbal     Intake & Output:  Current Shift: 03/29 0701 - 03/29 1900  In: 631.8 [I.V.:250]  Out: -   Last three shifts: 03/27 1901 - 03/29 0700  In: 5402.9 [I.V.:4722.9]  Out: 975 [Urine:975]    Lab/Data Review:   All lab data reviewed      24 Hour Results:    Recent Results (from the past 24 hour(s))   AMMONIA    Collection Time: 03/28/22 11:15 AM   Result Value Ref Range    Ammonia, plasma 19 <32 umol/L   NT-PRO BNP    Collection Time: 03/28/22 11:15 AM   Result Value Ref Range    NT pro-BNP 4,645 (H) <125 pg/mL   VANCOMYCIN, RANDOM    Collection Time: 03/28/22  4:29 PM   Result Value Ref Range    Vancomycin, random 12.5 ug/mL   CBC W/O DIFF    Collection Time: 03/29/22  3:30 AM   Result Value Ref Range    WBC 6.6 4.1 - 11.1 K/uL    RBC 2.29 (L) 4.10 - 5.70 M/uL    HGB 6.6 (L) 12.1 - 17.0 g/dL    HCT 19.4 (L) 36.6 - 50.3 %    MCV 84.7 80.0 - 99.0 FL    MCH 28.8 26.0 - 34.0 PG    MCHC 34.0 30.0 - 36.5 g/dL    RDW 18.4 (H) 11.5 - 14.5 %    PLATELET 53 (L) 839 - 400 K/uL    MPV 12.7 8.9 - 12.9 FL    NRBC 0.0 0.0  WBC    ABSOLUTE NRBC 0.00 0.00 - 3.71 K/uL   METABOLIC PANEL, COMPREHENSIVE    Collection Time: 03/29/22  3:30 AM Result Value Ref Range    Sodium 147 (H) 136 - 145 mmol/L    Potassium 3.6 3.5 - 5.1 mmol/L    Chloride 121 (H) 97 - 108 mmol/L    CO2 19 (L) 21 - 32 mmol/L    Anion gap 7 5 - 15 mmol/L    Glucose 81 65 - 100 mg/dL    BUN 23 (H) 6 - 20 mg/dL    Creatinine 2.31 (H) 0.70 - 1.30 mg/dL    BUN/Creatinine ratio 10 (L) 12 - 20      GFR est AA 35 (L) >60 ml/min/1.73m2    GFR est non-AA 29 (L) >60 ml/min/1.73m2    Calcium 8.7 8.5 - 10.1 mg/dL    Bilirubin, total 1.4 (H) 0.2 - 1.0 mg/dL    AST (SGOT) 20 15 - 37 U/L    ALT (SGPT) 11 (L) 12 - 78 U/L    Alk. phosphatase 53 45 - 117 U/L    Protein, total 5.7 (L) 6.4 - 8.2 g/dL    Albumin 2.7 (L) 3.5 - 5.0 g/dL    Globulin 3.0 2.0 - 4.0 g/dL    A-G Ratio 0.9 (L) 1.1 - 2.2     VANCOMYCIN, RANDOM    Collection Time: 03/29/22  3:30 AM   Result Value Ref Range    Vancomycin, random 16.5 ug/mL   TYPE & SCREEN    Collection Time: 03/29/22  5:15 AM   Result Value Ref Range    Crossmatch Expiration 04/01/2022,2359     ABO/Rh(D) Viky Hernandez Positive     Antibody screen Negative     Unit number [id number]     Blood component type  LR     Unit division 00     Status of unit [address] to transfuse     Crossmatch result Compatible          Imaging:    XR CHEST PORT   Final Result      XR CHEST PORT   Final Result   The cardiomediastinal silhouette is appropriate for age, technique,   and lung expansion. Pulmonary vasculature is not congested. The lungs are   essentially clear. No effusion or pneumothorax is seen. IR INSERT NON TUNL CVC OVER 5 YRS   Final Result      Technically successful insertion of non-tunneled triple-lumen catheter with US   guidance.       Plan:       The catheter may be used after catheter placement confirmation by x-ray.   _______________________________________________________________      PROCEDURE SUMMARY:   - Venous access with ultrasound guidance   - Non-tunneled central venous catheter insertion      PROCEDURE DETAILS:      Pre-procedure Relevant imaging review: None    Informed consent for the procedure was obtained and time-out was performed prior   to the procedure. Prophylactic antibiotic administered: Not administered   Preparation: The site was prepared and draped using all elements of maximal   sterile barrier technique including sterile gloves, sterile gown, cap, mask,   large sterile sheet, sterile ultrasound probe cover, sterile ultrasound gel,   hand hygiene and cutaneous antisepsis with 2% chlorhexidine. Medical reason for site preparation exception: Not applicable      Anesthesia/sedation   Level of anesthesia: No sedation   Anesthesia administered by: Not applicable   Duration of anesthesia/sedation: 0 minutes. Access   The vessel was sonographically evaluated and judged to be patent and appropriate   for access and a permanent image was stored. 2 cc's of 1% lidocaine was   administered to the access site. Real time ultrasound was used to visualize   needle entry into the vessel. Vein accessed: Right internal jugular vein   Access technique: 4F micropuncture set      Catheter placement   The access site was dilated and the catheter was placed into the vein over a   wire and all guidewires were removed in their entirety. Catheter ports were   aspirated and flushed. Catheter tip location was verified by portable X-ray. Catheter size: 7 Greek   Catheter length: 16 cm   Catheter tip position: Central. The tip is overlying the anatomic SVC. Catheter flush: Normal saline      Closure   The catheter was secured. A sterile dressing was applied.    Catheter securement technique: Stat-Lock      Additional Details   Additional description of procedure: None   Additional findings: None   Additional equipment: None   Specimens removed: None   Estimated blood loss:  Less than 10 cc   Standardized report: [email or web address]_CVA_NonTunneledCatheter1.0               IR US GUIDED VASCULAR ACCESS   Final Result      Technically successful insertion of non-tunneled triple-lumen catheter with US   guidance. Plan:       The catheter may be used after catheter placement confirmation by x-ray.   _______________________________________________________________      PROCEDURE SUMMARY:   - Venous access with ultrasound guidance   - Non-tunneled central venous catheter insertion      PROCEDURE DETAILS:      Pre-procedure   Relevant imaging review: None    Informed consent for the procedure was obtained and time-out was performed prior   to the procedure. Prophylactic antibiotic administered: Not administered   Preparation: The site was prepared and draped using all elements of maximal   sterile barrier technique including sterile gloves, sterile gown, cap, mask,   large sterile sheet, sterile ultrasound probe cover, sterile ultrasound gel,   hand hygiene and cutaneous antisepsis with 2% chlorhexidine. Medical reason for site preparation exception: Not applicable      Anesthesia/sedation   Level of anesthesia: No sedation   Anesthesia administered by: Not applicable   Duration of anesthesia/sedation: 0 minutes. Access   The vessel was sonographically evaluated and judged to be patent and appropriate   for access and a permanent image was stored. 2 cc's of 1% lidocaine was   administered to the access site. Real time ultrasound was used to visualize   needle entry into the vessel. Vein accessed: Right internal jugular vein   Access technique: 4F micropuncture set      Catheter placement   The access site was dilated and the catheter was placed into the vein over a   wire and all guidewires were removed in their entirety. Catheter ports were   aspirated and flushed. Catheter tip location was verified by portable X-ray. Catheter size: 7 Croatian   Catheter length: 16 cm   Catheter tip position: Central. The tip is overlying the anatomic SVC. Catheter flush: Normal saline      Closure   The catheter was secured. A sterile dressing was applied.    Catheter securement technique: Stat-Lock      Additional Details   Additional description of procedure: None   Additional findings: None   Additional equipment: None   Specimens removed: None   Estimated blood loss:  Less than 10 cc   Standardized report: SIR_CVA_NonTunneledCatheter1.0               DUPLEX UPPER EXT VENOUS RIGHT   Final Result      XR 2ND FINGER RT MIN 2 V   Final Result      Progressive findings. The findings are nonspecific and could represent infection with osteomyelitis   and septic joint and gas in the soft tissues related to infection. Gout, or other inflammatory arthropathy is a possibility. Findings should be correlated with clinical parameters. Assessment   Hypothermia  Hypoglycemic  Hypotension  Bradycardia  Septic joint- right 2nd finger DIP  SEPSIS wound culturesgrows  Staphylococcus and Enterococcus  Anemia- transfuse if Hgb < 7.0 status post transfusion  Acute on chronic renal failure- creatinine 2.51 on admission     Hx etoh abuse  Cirrhosis of liver   History of encephalopathy  Seizure disorder  History of gout  Thrombocytopenia    · No evidence of acute DVT in the right upper extremity. · Thrombus noted within the right cephalic forearm vein(s).     Hypernatremia  Hypothyroidism    Plan     Bear hugger for hypothermia  SEPSIS- continue vanc and zosyn  Anemia- transfuse if Hgb < 7.0  Transfuse 1 units packed RBC  Order KUB   I&D of right 2nd finger performed in ED  Consult wound care  Consult pharmacy for vancomycin dosing  Consult nephrology  Consult ID  Speech therapy  PT OT      Allopurinol 300mg daily  amloride 5mg daily-- HOLD  Ergocalciferol 50,000 units weekly  Famotidine 68ET daily  Folic acid 1mg daily  Lactulose 10g/15mL TID  Keppra 1000mg BID  Levothyroxine 25mcg daily  Propanolol 20mg BID  Sodium bicarbonate 650mg TID  Thiamine 100mg daily     Flomax 0.4 mg to      Unable to transfer to the floor due to hypothermia    Seen by infectious disease recommend to continue IV vancomycin     Monitor renal function remained stable    Repeat the lab      Current Facility-Administered Medications:     0.9% sodium chloride infusion 250 mL, 250 mL, IntraVENous, PRN, Valorie Ackerman MD    [START ON 3/30/2022] Vancomycin random level to be drawn on 3/30/22 at 0400 am., , Other, ONCE, Ree Zaragoza MD    midodrine (PROAMATINE) tablet 5 mg, 5 mg, Oral, TID, Jose Capone MD    levETIRAcetam (KEPPRA) oral solution 1,000 mg, 1,000 mg, Oral, BID, Lamberto Ackerman MD, 1,000 mg at 03/29/22 0829    Vancomycin - Pharmacy to Dose, , Other, Rx Dosing/Monitoring, Ree Zaragoza MD    levothyroxine (SYNTHROID) tablet 50 mcg, 50 mcg, Oral, 6am, Ree Zaragoza MD, 50 mcg at 03/29/22 0510    tamsulosin (FLOMAX) capsule 0.4 mg, 0.4 mg, Oral, DAILY, Lesly DENIS MD, 0.4 mg at 03/29/22 0830    sodium chloride (NS) flush 5-40 mL, 5-40 mL, IntraVENous, Q8H, Nani Bravo MD, 10 mL at 03/29/22 0511    sodium chloride (NS) flush 5-40 mL, 5-40 mL, IntraVENous, PRN, Vargas Almonte MD    acetaminophen (TYLENOL) tablet 650 mg, 650 mg, Oral, Q8H, Vargas Almonte MD, 650 mg at 03/29/22 0510    HYDROcodone-acetaminophen (NORCO) 5-325 mg per tablet 1 Tablet, 1 Tablet, Oral, Q4H PRN, Ramírez Bray MD    morphine injection 2 mg, 2 mg, IntraVENous, Q4H PRN, Vargas Almonte MD    naloxone (NARCAN) injection 0.4 mg, 0.4 mg, IntraVENous, PRN, Vargas Almonte MD    ondansetron (ZOFRAN) injection 4 mg, 4 mg, IntraVENous, Q4H PRN, Vargas Almonte MD    allopurinoL (ZYLOPRIM) tablet 300 mg, 300 mg, Oral, DAILY, Ramírez Bray MD, 300 mg at 03/29/22 0830    [Held by provider] aMILoride (MIDAMOR) tablet 5 mg, 5 mg, Oral, DAILY, Lamberto Ackerman MD, 5 mg at 03/23/22 0900    ergocalciferol capsule 50,000 Units, 50,000 Units, Oral, Q7D, Ramírez Bray MD    famotidine (PEPCID) tablet 20 mg, 20 mg, Oral, QPM, Vargas Almonte MD, 20 mg at 03/28/22 6139    folic acid (FOLVITE) tablet 1 mg, 1 mg, Oral, DAILY, Ranjan Brown MD, 1 mg at 03/29/22 0830    lactulose (CHRONULAC) 10 gram/15 mL solution 15 mL, 15 mL, Oral, TID, Ramírez Bray MD, 15 mL at 03/28/22 1526    propranoloL (INDERAL) tablet 20 mg, 20 mg, Oral, BID, Ranjan Brown MD, 20 mg at 03/29/22 6351    thiamine mononitrate (B-1) tablet 100 mg, 100 mg, Oral, DAILY, Ranjan Brown MD, 100 mg at 03/29/22 0830    acetaminophen (TYLENOL) tablet 650 mg, 650 mg, Oral, Q6H PRN, Ranjan Brown MD, 650 mg at 03/26/22 1410    Current Outpatient Medications   Medication Instructions    acetaminophen (TYLENOL) 650 mg, Oral, EVERY 6 HOURS AS NEEDED    allopurinoL (ZYLOPRIM) 300 mg tablet 1 Tablet, Oral, DAILY    aMILoride (MIDAMOR) 5 mg, Oral, DAILY    chlordiazePOXIDE (LIBRIUM) 10 mg, Oral, 3 TIMES DAILY AS NEEDED    ergocalciferol (ERGOCALCIFEROL) 1,250 mcg (50,000 unit) capsule 1 Capsule, Oral, EVERY 7 DAYS    famotidine (PEPCID) 20 mg, Oral, AT BEDTIME    folic acid (FOLVITE) 1 mg, Oral, DAILY    lactulose (CHRONULAC) 10 gram/15 mL solution 15 mL, Oral, 3 TIMES DAILY    levETIRAcetam (KEPPRA) 1,500 mg, Oral, 2 TIMES DAILY    levothyroxine (SYNTHROID) 25 mcg, Oral, DAILY BEFORE BREAKFAST    potassium chloride (K-DUR, KLOR-CON M20) 20 mEq tablet 20 mEq, Oral, DAILY    propranoloL (INDERAL) 20 mg, Oral, 2 TIMES DAILY    sodium bicarbonate 650 mg, Oral, 3 TIMES DAILY    tamsulosin (FLOMAX) 0.4 mg, Oral, DAILY    thiamine HCL (B-1) 100 mg, Oral, DAILY         Signed By: Sonja Armando MD     March 29, 2022

## 2022-03-29 NOTE — PROGRESS NOTES
Nephrology Consult    Patient: China Mims MRN: [id number]  SSN: [id number]    YOB: 1960  Age: 60 y.o. Sex: male      Subjective:     Patient seen in the ICU   Awake and not following verbal commands fully , seems confused  Didn't appear to be in any distress   On warming blanket  Sodium today 147. On D5W at 75 mils per hour  had I&D of right index finger on 3/25 which showed purulent drainage mixed with gouty tophi. .on vanc and zosyn. On midodrine 5 mg tid. Blood pressures are elevated    Past Medical History:   Diagnosis Date    Arthritis     Hypertension      Past Surgical History:   Procedure Laterality Date    IR INSERT NON TUNL CVC OVER 5 YRS  3/25/2022    IR PARACENTESIS ABD W IMAGE  1/21/2022    IR PARACENTESIS ABD W IMAGE  3/1/2022      No family history on file.   Social History     Tobacco Use    Smoking status: Never Smoker    Smokeless tobacco: Never Used   Substance Use Topics    Alcohol use: Not on file      Current Facility-Administered Medications   Medication Dose Route Frequency Provider Last Rate Last Admin    0.9% sodium chloride infusion 250 mL  250 mL IntraVENous PRN Judah Aguilar MD       [address] [place] [address] [START ON 3/30/2022] Vancomycin random level to be drawn on 3/30/22 at 0400 am.   Other ONCE Satinder Alves MD        sodium bicarbonate tablet 650 mg  650 mg Oral BID Faisal Mccarthy MD        levETIRAcetam (KEPPRA) oral solution 1,000 mg  1,000 mg Oral BID Lamberto Ackerman MD   1,000 mg at 03/29/22 0829    Vancomycin - Pharmacy to Dose   Other Rx Dosing/Monitoring Satinder Alves MD        levothyroxine (SYNTHROID) tablet 50 mcg  50 mcg Oral Gilbert Chadwick MD   50 mcg at 03/29/22 0510    tamsulosin (FLOMAX) capsule 0.4 mg  0.4 mg Oral DAILY Aurelio DENIS MD   0.4 mg at 03/29/22 0830    sodium chloride (NS) flush 5-40 mL  5-40 mL IntraVENous Q8H Xiang Barney MD   10 mL at 03/29/22 1356    sodium chloride (NS) flush 5-40 mL  5-40 mL IntraVENous PRN Jazz Alcala MD        acetaminophen (TYLENOL) tablet 650 mg  650 mg Oral Q8H Ramírez Bray MD   650 mg at 03/29/22 1356    HYDROcodone-acetaminophen (NORCO) 5-325 mg per tablet 1 Tablet  1 Tablet Oral Q4H PRN Jazz Alcala MD        morphine injection 2 mg  2 mg IntraVENous Q4H PRN Jazz Alcala MD        naloxone (NARCAN) injection 0.4 mg  0.4 mg IntraVENous PRN Jazz Alcala MD        ondansetron (ZOFRAN) injection 4 mg  4 mg IntraVENous Q4H PRN Jazz Alcala MD        allopurinoL (ZYLOPRIM) tablet 300 mg  300 mg Oral DAILY Linwood DANIELS MD   300 mg at 03/29/22 0830    [Held by provider] aMILoride (MIDAMOR) tablet 5 mg  5 mg Oral DAILY Lamberto Ackerman MD   5 mg at 03/23/22 0900    ergocalciferol capsule 50,000 Units  50,000 Units Oral Q7D Ramírez Bray MD        famotidine (PEPCID) tablet 20 mg  20 mg Oral QPM Ramírez Bray MD   20 mg at 82/35/52 4235    folic acid (FOLVITE) tablet 1 mg  1 mg Oral DAILY Jazz Alcala MD   1 mg at 03/29/22 0830    lactulose (CHRONULAC) 10 gram/15 mL solution 15 mL  15 mL Oral TID Jazz Alcala MD   15 mL at 03/28/22 1526    propranoloL (INDERAL) tablet 20 mg  20 mg Oral BID Jazz Alcala MD   20 mg at 03/29/22 7013    thiamine mononitrate (B-1) tablet 100 mg  100 mg Oral DAILY Jazz Alcala MD   100 mg at 03/29/22 0830    acetaminophen (TYLENOL) tablet 650 mg  650 mg Oral Q6H PRN Jazz Alcala MD   650 mg at 03/26/22 1410        No Known Allergies    Review of Systems:  A comprehensive review of systems was negative except for that written in the History of Present Illness.     Objective:     Vitals:    03/29/22 1209 03/29/22 1304 03/29/22 1400 03/29/22 1421   BP: (!) 137/96 (!) 143/96 (!) 143/97    Pulse: 91 80 81    Resp: 15 17 18    Temp: 97.3 °F (36.3 °C) 97.2 °F (36.2 °C) 97 °F (36.1 °C) 96.8 °F (36 °C)   SpO2: 100% 99% 98%    Weight:       Height:            Physical Exam:  General: NAD , not oriented   Eyes: sclera anicteric  Oral Cavity: No thrush or ulcers  Neck: no JVD  Chest: Fair bilateral air entry  Heart: normal sounds  Abdomen: soft and non tender   : no fish  Lower Extremities: no edema  Skin: no rash          Assessment:     Hospital Problems  Never Reviewed          Codes Class Noted POA    Finger infection ICD-10-CM: L08.9  ICD-9-CM: 686.9  3/22/2022 Unknown              Plan:       1 Acute kidney injury on chronic kidney disease stage IIIA. -Etiology can be prerenal plus was on amiloride.    -On admission creatinine was elevated at 2.51.    -Cr is gradually improving.  2.5--> 2.3 today. baseline is unknown was 1.7 on 3/4/2022.    -no evidence of lower extremity edema.    -Hypervolemic with abd distension ,h/o repeated paracentesis (3/24  4.3 L.  -will try volume expansion with IV albumin   -I will continue to hold amiloride 2/2 hypotenion  -We will continue hypotonic IV fluids for now for hypernatremia  -He is not on any other potential nephrotoxins. 2.  Septic arthritis.    -He presented with swollen right hand finger diagnosed with septic arthritis status post I&D received IV antibiotics per orthopedic.  -on Vanc and zosyn  -S/p I&D on 3/25     3 Hypotension.    -Blood pressures are low.    -I will continue to hold amiloride.    -On no other blood pressure medications. -Give parameters to hold midodrine     4. Liver cirrhosis  -Has history of cirrhosis , portal HTN  -due to etoh   -Abd distension   -On Propranolol   -Albumin for volume expansion     4. Anemia.    -Hemoglobin is 6.6. Transfuse 2 units of blood  -We will start weekly Procrit    5. Metabolic acidosis. -CO2 is 19  -Lactic acidosis - due to decr Liver clearance and probably RTA as well . No diarrhea reported   -will increase sodium bicarb to 1300 bid. Significant bicarb deficit  -will consider HCO3 drip if worsen metabolic acidosis.     6. Hpernatremia:  -Na 149-->147  -Continue D5W at 75 mils per hour. Repeat labs in a.m.         Signed By: Marshall Brock MD     March 29, 2022

## 2022-03-29 NOTE — PROGRESS NOTES
PROGRESS NOTE    Patient: Yaya Stokes MRN: [id number]  SSN: [id number]    YOB: 1960  Age: 60 y.o. Sex: male      Admit Date: 3/22/2022    LOS: 7 days       Subjective     Chief Complaint   Patient presents with    Finger Swelling       HPI: Yaya Stokes is a(n) 60 y.o. male with PMH significant for etoh abuse, encephalopathy, cirrhosis, seizure disorder, gout presents with c/o swelling to right 2nd finger. Patient is nonverbal at baseline and dependent on all ADL at [address] [place] [address]. According to nursing home staff, has future orthopedic appointment for this finger. Is non-verbal at baseline but appears awake, alert, and in no obvious distress.     Patient was recently discharged from the hospital after having a seizures at that time patient received Keppra for the seizures patient had paracentesis done during the last admission also endoscopy done and had banding of the varices     I&D performed on finger in ED with purulent fluid drained. Dressing in place covering the DIP. 1g vancomycin given in ED, switched to zosyn on admission. Wound cultures sent. XR of the finger shows soft tissue swelling in gas consistent with osteomyelitis, septic joint, or gouty arthropathy.     Labs: Hgb 9.6 (baseline). Creatinine 2.51. CRP 3.15. ESR 35. Wound and blood cultures pending. 3/24:  Patient seen resting in bed. Nonverbal at baseline. Rectal temperature 91.1F per nursing staff. Blood pressure soft, patient may be septic. Vanc and Zosyn started per ID consult. Lactate 3.4. Blood and wound cultures still pending. Per orthopedic surgery, patient to have debridement of joint today under local anesthesia. He will likely need IV abx for 4-6 weeks for septic joint. Per nephrology, the patients increased creatinine of 2.5 may be due to amiloride, which is now held. Iron studies also obtained for Hgb 9.6. Labs remarkable for: Hgb 9.6. Creatinine 2.47.    3/25:  Patient seen in ICU.  Denies finger pain. Rectal temp 36.2C. I&D moved to today d/t transfer to ICU yesterday. Per ID, initial wound culture shows coag negative staph and continue on vanc and zosyn pending final culture result. Blood cultures still pending. Labs remarkable for Hgb 9.2, Creatinine 2.59, Albumin 1.8. Lactate decreased from 3.4 to 2.6. Ammonia 39. Pt had Doppler studies done this morning which shows    · No evidence of acute DVT in the right upper extremity. · Thrombus noted within the right cephalic forearm vein(s).    3/28:  Patient seen resting comfortably in bed, remains at baseline mental status. Current temp 36C and patient still on warming blanket. Patient had I&D of right index finger on 3/25 which revealed purulent drainage mixed with gouty tophi. DIP joint thoroughly irrigated and cultures sent. Cultures show few WBC and \"scant probably enterococcus. \" Patient remains on vanc and zosyn. Over the weekend, levothyroxine increased to 50mcg d/t elevated TSH. He was also started on midodrine 5mg TID for hypotension and was given hypotonic fluids for hypernatremia      Labs from 3/27 show Hgb 8.5, Na 149, creatinine 2.43, albumin 2.2    3/29:  Patient seen resting comfortably in bed. Denies any pain. Abdomen very distended, worse than yesterday. Patient receiving NG tube feeds. Lower extremity edema also present. Patient also receiving 1 unit packed RBC for Hgb 6. 6. platelets 53, Na 563, Creatinine 2.31. Past Medical History:   Diagnosis Date    Arthritis      Hypertension                 Past Surgical History:   Procedure Laterality Date    IR PARACENTESIS ABD W IMAGE   1/21/2022    IR PARACENTESIS ABD W IMAGE   3/1/2022                 Prior to Admission medications    Medication Sig Start Date End Date Taking?  Authorizing Provider   levothyroxine (SYNTHROID) 25 mcg tablet Take 25 mcg by mouth Daily (before breakfast).     Yes Provider, Historical   acetaminophen (TylenoL) 325 mg tablet Take 650 mg by mouth every six (6) hours as needed for Pain.     Yes Provider, Historical   tamsulosin (Flomax) 0.4 mg capsule Take 1 Capsule by mouth daily. 3/4/22     Ritika Whyte PA-C   potassium chloride (K-DUR, KLOR-CON M20) 20 mEq tablet Take 1 Tablet by mouth daily. 3/2/22     Paula Andujar MD   sodium bicarbonate 650 mg tablet Take 1 Tablet by mouth three (3) times daily. 3/2/22     Kami Loomis MD   chlordiazePOXIDE (LIBRIUM) 5 mg capsule Take 2 Capsules by mouth three (3) times daily as needed for Anxiety. Max Daily Amount: 30 mg. 3/2/22     Kami Loomis MD   levETIRAcetam (Keppra) 1,000 mg tablet Take 1.5 Tablets by mouth two (2) times a day. 3/2/22     Kami Loomis MD   ergocalciferol (ERGOCALCIFEROL) 1,250 mcg (50,000 unit) capsule Take 1 Capsule by mouth every seven (7) days. 2/9/22     Provider, Historical   lactulose (CHRONULAC) 10 gram/15 mL solution Take 15 mL by mouth three (3) times daily. 1/23/22     Zenon Ackerman MD   allopurinoL (ZYLOPRIM) 300 mg tablet Take 1 Tablet by mouth daily. 1/18/22     Provider, Historical   thiamine HCL (B-1) 100 mg tablet Take 100 mg by mouth daily.       Provider, Historical   propranoloL (INDERAL) 20 mg tablet Take 20 mg by mouth two (2) times a day.       Provider, Historical   folic acid (FOLVITE) 1 mg tablet Take 1 mg by mouth daily.       Provider, Historical   famotidine (PEPCID) 20 mg tablet Take 20 mg by mouth At bedtime.       Provider, Historical   aMILoride (MIDAMOR) 5 mg tablet Take 5 mg by mouth daily.       Provider, Historical         No Known Allergies      No family history on file. Reason unable to perform ROS: nonverbal at baseline.       Objective     Visit Vitals  BP (!) 140/84   Pulse 92   Temp 99 °F (37.2 °C)   Resp 15   Ht 6' 2.02\" (1.88 m)   Wt 185 lb 13.6 oz (84.3 kg)   SpO2 100%   BMI 23.85 kg/m²    O2 Flow Rate (L/min): 2 l/min O2 Device: None (Room air)    Physical Exam: Constitutional:       General: He is not in acute distress. HENT:      Head: Normocephalic and atraumatic. Cardiovascular:      Rate and Rhythm: Normal rate and regular rhythm. Pulses: Normal pulses. Heart sounds: Normal heart sounds. Pulmonary:      Effort: Pulmonary effort is normal.      Breath sounds: Normal breath sounds. Abdominal:      General: There is distension. Tenderness: There is no abdominal tenderness. There is no guarding or rebound. Skin:     Findings: Erythema present. Comments: Swelling to the right 2nd digit   Neurological:      Mental Status: Mental status is at baseline. Comments: Nonverbal     Intake & Output:  Current Shift: No intake/output data recorded. Last three shifts: 03/27 1901 - 03/29 0700  In: 5402.9 [I.V.:4722.9]  Out: 975 [Urine:975]    Lab/Data Review:   All lab data reviewed      24 Hour Results:    Recent Results (from the past 24 hour(s))   AMMONIA    Collection Time: 03/28/22 11:15 AM   Result Value Ref Range    Ammonia, plasma 19 <32 umol/L   NT-PRO BNP    Collection Time: 03/28/22 11:15 AM   Result Value Ref Range    NT pro-BNP 4,645 (H) <125 pg/mL   VANCOMYCIN, RANDOM    Collection Time: 03/28/22  4:29 PM   Result Value Ref Range    Vancomycin, random 12.5 ug/mL   CBC W/O DIFF    Collection Time: 03/29/22  3:30 AM   Result Value Ref Range    WBC 6.6 4.1 - 11.1 K/uL    RBC 2.29 (L) 4.10 - 5.70 M/uL    HGB 6.6 (L) 12.1 - 17.0 g/dL    HCT 19.4 (L) 36.6 - 50.3 %    MCV 84.7 80.0 - 99.0 FL    MCH 28.8 26.0 - 34.0 PG    MCHC 34.0 30.0 - 36.5 g/dL    RDW 18.4 (H) 11.5 - 14.5 %    PLATELET 53 (L) 267 - 400 K/uL    MPV 12.7 8.9 - 12.9 FL    NRBC 0.0 0.0  WBC    ABSOLUTE NRBC 0.00 0.00 - 7.46 K/uL   METABOLIC PANEL, COMPREHENSIVE    Collection Time: 03/29/22  3:30 AM   Result Value Ref Range    Sodium 147 (H) 136 - 145 mmol/L    Potassium 3.6 3.5 - 5.1 mmol/L    Chloride 121 (H) 97 - 108 mmol/L    CO2 19 (L) 21 - 32 mmol/L    Anion gap 7 5 - 15 mmol/L    Glucose 81 65 - 100 mg/dL    BUN 23 (H) 6 - 20 mg/dL    Creatinine 2.31 (H) 0.70 - 1.30 mg/dL    BUN/Creatinine ratio 10 (L) 12 - 20      GFR est AA 35 (L) >60 ml/min/1.73m2    GFR est non-AA 29 (L) >60 ml/min/1.73m2    Calcium 8.7 8.5 - 10.1 mg/dL    Bilirubin, total 1.4 (H) 0.2 - 1.0 mg/dL    AST (SGOT) 20 15 - 37 U/L    ALT (SGPT) 11 (L) 12 - 78 U/L    Alk. phosphatase 53 45 - 117 U/L    Protein, total 5.7 (L) 6.4 - 8.2 g/dL    Albumin 2.7 (L) 3.5 - 5.0 g/dL    Globulin 3.0 2.0 - 4.0 g/dL    A-G Ratio 0.9 (L) 1.1 - 2.2     VANCOMYCIN, RANDOM    Collection Time: 03/29/22  3:30 AM   Result Value Ref Range    Vancomycin, random 16.5 ug/mL   TYPE & SCREEN    Collection Time: 03/29/22  5:15 AM   Result Value Ref Range    Crossmatch Expiration 04/01/2022,2359     ABO/Rh(D) [place] Positive     Antibody screen Negative     Unit number [id number]     Blood component type RC LR     Unit division 00     Status of unit [address] to transfuse     Crossmatch result Compatible          Imaging:    XR CHEST PORT   Final Result      XR CHEST PORT   Final Result   The cardiomediastinal silhouette is appropriate for age, technique,   and lung expansion. Pulmonary vasculature is not congested. The lungs are   essentially clear. No effusion or pneumothorax is seen. IR INSERT NON TUNL CVC OVER 5 YRS   Final Result      Technically successful insertion of non-tunneled triple-lumen catheter with US   guidance. Plan:       The catheter may be used after catheter placement confirmation by x-ray.   _______________________________________________________________      PROCEDURE SUMMARY:   - Venous access with ultrasound guidance   - Non-tunneled central venous catheter insertion      PROCEDURE DETAILS:      Pre-procedure   Relevant imaging review: None    Informed consent for the procedure was obtained and time-out was performed prior   to the procedure.    Prophylactic antibiotic administered: Not administered   Preparation: The site was prepared and draped using all elements of maximal   sterile barrier technique including sterile gloves, sterile gown, cap, mask,   large sterile sheet, sterile ultrasound probe cover, sterile ultrasound gel,   hand hygiene and cutaneous antisepsis with 2% chlorhexidine. Medical reason for site preparation exception: Not applicable      Anesthesia/sedation   Level of anesthesia: No sedation   Anesthesia administered by: Not applicable   Duration of anesthesia/sedation: 0 minutes. Access   The vessel was sonographically evaluated and judged to be patent and appropriate   for access and a permanent image was stored. 2 cc's of 1% lidocaine was   administered to the access site. Real time ultrasound was used to visualize   needle entry into the vessel. Vein accessed: Right internal jugular vein   Access technique: 4F micropuncture set      Catheter placement   The access site was dilated and the catheter was placed into the vein over a   wire and all guidewires were removed in their entirety. Catheter ports were   aspirated and flushed. Catheter tip location was verified by portable X-ray. Catheter size: 7 [place]ish   Catheter length: 16 cm   Catheter tip position: Central. The tip is overlying the anatomic SVC. Catheter flush: Normal saline      Closure   The catheter was secured. A sterile dressing was applied. Catheter securement technique: Stat-Lock      Additional Details   Additional description of procedure: None   Additional findings: None   Additional equipment: None   Specimens removed: None   Estimated blood loss:  Less than 10 cc   Standardized report: [email or web address]_CVA_NonTunneledCatheter1.0               IR US GUIDED VASCULAR ACCESS   Final Result      Technically successful insertion of non-tunneled triple-lumen catheter with US   guidance. Plan:       The catheter may be used after catheter placement confirmation by x-ray. _______________________________________________________________      PROCEDURE SUMMARY:   - Venous access with ultrasound guidance   - Non-tunneled central venous catheter insertion      PROCEDURE DETAILS:      Pre-procedure   Relevant imaging review: None    Informed consent for the procedure was obtained and time-out was performed prior   to the procedure. Prophylactic antibiotic administered: Not administered   Preparation: The site was prepared and draped using all elements of maximal   sterile barrier technique including sterile gloves, sterile gown, cap, mask,   large sterile sheet, sterile ultrasound probe cover, sterile ultrasound gel,   hand hygiene and cutaneous antisepsis with 2% chlorhexidine. Medical reason for site preparation exception: Not applicable      Anesthesia/sedation   Level of anesthesia: No sedation   Anesthesia administered by: Not applicable   Duration of anesthesia/sedation: 0 minutes. Access   The vessel was sonographically evaluated and judged to be patent and appropriate   for access and a permanent image was stored. 2 cc's of 1% lidocaine was   administered to the access site. Real time ultrasound was used to visualize   needle entry into the vessel. Vein accessed: Right internal jugular vein   Access technique: 4F micropuncture set      Catheter placement   The access site was dilated and the catheter was placed into the vein over a   wire and all guidewires were removed in their entirety. Catheter ports were   aspirated and flushed. Catheter tip location was verified by portable X-ray. Catheter size: 7 [place]uvian   Catheter length: 16 cm   Catheter tip position: Central. The tip is overlying the anatomic SVC. Catheter flush: Normal saline      Closure   The catheter was secured. A sterile dressing was applied.    Catheter securement technique: Stat-Lock      Additional Details   Additional description of procedure: None   Additional findings: None   Additional equipment: None   Specimens removed: None   Estimated blood loss:  Less than 10 cc   Standardized report: SIR_CVA_NonTunneledCatheter1.0               DUPLEX UPPER EXT VENOUS RIGHT   Final Result      XR 2ND FINGER RT MIN 2 V   Final Result      Progressive findings. The findings are nonspecific and could represent infection with osteomyelitis   and septic joint and gas in the soft tissues related to infection. Gout, or other inflammatory arthropathy is a possibility. Findings should be correlated with clinical parameters. Assessment   Hypothermia  Hypoglycemic  Hypotension  Bradycardia  Septic joint- right 2nd finger DIP  SEPSIS wound culturesgrows  Staphylococcus and Enterococcus  Anemia- transfuse if Hgb < 7.0  Acute on chronic renal failure- creatinine 2.51 on admission     Hx etoh abuse  Cirrhosis of liver   History of encephalopathy  Seizure disorder  History of gout    · No evidence of acute DVT in the right upper extremity. · Thrombus noted within the right cephalic forearm vein(s).     Hypernatremia  Hypothyroidism    Plan     Bear hugger for hypothermia  SEPSIS- continue vanc and zosyn  Anemia- transfuse if Hgb < 7.0  Transfuse 1 units packed RBC  Order KUB and consult GI  I&D of right 2nd finger performed in ED  Consult wound care  Consult pharmacy for vancomycin dosing  Consult nephrology  Consult ID  Speech therapy  PT OT      Allopurinol 300mg daily  amloride 5mg daily-- HOLD  Ergocalciferol 50,000 units weekly  Famotidine 32LE daily  Folic acid 1mg daily  Lactulose 10g/15mL TID  Keppra 1000mg BID  Levothyroxine 25mcg daily  Propanolol 20mg BID  Sodium bicarbonate 650mg TID  Thiamine 100mg daily     Patient going for the surgery today as patient blood pressure stable    Seen by infectious disease recommend to continue IV vancomycin     Monitor renal function remained stable    Repeat the lab      Current Facility-Administered Medications:     0.9% sodium chloride infusion 250 mL, 250 mL, IntraVENous, PRN, Reji Ackerman MD    vancomycin (VANCOCIN) 750 mg in 0.9% sodium chloride 250 mL (VIAL-MATE), 750 mg, IntraVENous, ONCE, Ree Zaragoza MD    [START ON 3/30/2022] Vancomycin random level to be drawn on 3/30/22 at 0400 am., , Other, ONCE, Ree Zaragoza MD    midodrine (PROAMATINE) tablet 5 mg, 5 mg, Oral, TID, Johnathan Dillon MD    levETIRAcetam (KEPPRA) oral solution 1,000 mg, 1,000 mg, Oral, BID, Lamberto Ackerman MD, 1,000 mg at 03/28/22 2104    Vancomycin - Pharmacy to Dose, , Other, Rx Dosing/Monitoring, Ree Zaragoza MD    levothyroxine (SYNTHROID) tablet 50 mcg, 50 mcg, Oral, 6am, Ree Zaragoza MD, 50 mcg at 03/29/22 0510    tamsulosin (FLOMAX) capsule 0.4 mg, 0.4 mg, Oral, DAILY, Severa Boroughs I, MD, 0.4 mg at 03/28/22 9621    sodium chloride (NS) flush 5-40 mL, 5-40 mL, IntraVENous, Q8H, Thaddeus Love MD, 10 mL at 03/29/22 0511    sodium chloride (NS) flush 5-40 mL, 5-40 mL, IntraVENous, PRN, Trevor Tucker MD    acetaminophen (TYLENOL) tablet 650 mg, 650 mg, Oral, Q8H, Ramírez Bray MD, 650 mg at 03/29/22 0510    HYDROcodone-acetaminophen (NORCO) 5-325 mg per tablet 1 Tablet, 1 Tablet, Oral, Q4H PRN, Ramírez Bray MD    morphine injection 2 mg, 2 mg, IntraVENous, Q4H PRN, Trevor Tucker MD    naloxone (NARCAN) injection 0.4 mg, 0.4 mg, IntraVENous, PRN, Trevor Tucker MD    ondansetron (ZOFRAN) injection 4 mg, 4 mg, IntraVENous, Q4H PRN, Trevor Tucker MD    allopurinoL (ZYLOPRIM) tablet 300 mg, 300 mg, Oral, DAILY, Ramírez Bray MD, 300 mg at 03/28/22 0811    [Held by provider] aMILoride (MIDAMOR) tablet 5 mg, 5 mg, Oral, DAILY, Lamberto Ackerman MD, 5 mg at 03/23/22 0900    ergocalciferol capsule 50,000 Units, 50,000 Units, Oral, Q7D, Ramírez Bray MD    famotidine (PEPCID) tablet 20 mg, 20 mg, Oral, QPM, Trevor Tucker MD, 20 mg at 90/08/84 0420    folic acid (FOLVITE) tablet 1 mg, 1 mg, Oral, Lisha Bowman MD, 1 mg at 03/28/22 4235    lactulose (CHRONULAC) 10 gram/15 mL solution 15 mL, 15 mL, Oral, TID, Ramírez Bray MD, 15 mL at 03/28/22 1526    propranoloL (INDERAL) tablet 20 mg, 20 mg, Oral, BID, Ramírez Bray MD, 20 mg at 03/28/22 2104    thiamine mononitrate (B-1) tablet 100 mg, 100 mg, Oral, DAILY, Ramírez Bray MD, 100 mg at 03/28/22 8261    acetaminophen (TYLENOL) tablet 650 mg, 650 mg, Oral, Q6H PRN, Janet Jones MD, 650 mg at 03/26/22 1410    Current Outpatient Medications   Medication Instructions    acetaminophen (TYLENOL) 650 mg, Oral, EVERY 6 HOURS AS NEEDED    allopurinoL (ZYLOPRIM) 300 mg tablet 1 Tablet, Oral, DAILY    aMILoride (MIDAMOR) 5 mg, Oral, DAILY    chlordiazePOXIDE (LIBRIUM) 10 mg, Oral, 3 TIMES DAILY AS NEEDED    ergocalciferol (ERGOCALCIFEROL) 1,250 mcg (50,000 unit) capsule 1 Capsule, Oral, EVERY 7 DAYS    famotidine (PEPCID) 20 mg, Oral, AT BEDTIME    folic acid (FOLVITE) 1 mg, Oral, DAILY    lactulose (CHRONULAC) 10 gram/15 mL solution 15 mL, Oral, 3 TIMES DAILY    levETIRAcetam (KEPPRA) 1,500 mg, Oral, 2 TIMES DAILY    levothyroxine (SYNTHROID) 25 mcg, Oral, DAILY BEFORE BREAKFAST    potassium chloride (K-DUR, KLOR-CON M20) 20 mEq tablet 20 mEq, Oral, DAILY    propranoloL (INDERAL) 20 mg, Oral, 2 TIMES DAILY    sodium bicarbonate 650 mg, Oral, 3 TIMES DAILY    tamsulosin (FLOMAX) 0.4 mg, Oral, DAILY    thiamine HCL (B-1) 100 mg, Oral, DAILY         Signed By: Bran Guzman     March 29, 2022

## 2022-03-29 NOTE — PROGRESS NOTES
PT consult received and PT screen completed. PT screen had been completed as well last week prior to pt transferring to ICU. Pt from LTC facility and per CM is planning to return to LTC facility upon d/c. Pt is nonverbal and dependent in care at baseline. At the current time, pt appears to continue to be at baseline and does not indicate the need for skilled acute PT. Will plan to d/c PT orders at this time, will be happy to reassess if needs/status changes.

## 2022-03-30 NOTE — ROUTINE PROCESS
Urology consult to Dr. Jaden Espinoza placed per Dr. Enriqueta Alicia for urinary retention. Dr. Jaden Espinoza notified of consult by phone.

## 2022-03-30 NOTE — PROGRESS NOTES
Updated clinicals sent via [facility] to [facility]. Patient is a LTC resident. Spoke to wife to confirm d/c plan is to return to [facility]. \"Yes, he's going back to [place]. \"         Ana Glover, MSW

## 2022-03-30 NOTE — PROGRESS NOTES
Nephrology Consult    Patient: Christine Weinstein MRN: [id number]  SSN: [id number]    YOB: 1960  Age: 60 y.o. Sex: male      Subjective:     Patient seen in the ICU   Awake and not following verbal commands fully , seems confused  Doesn't appear to be in any distress   On warming blanket  Sodium today 148. On D5W at 75 mils per hour  had I&D of right index finger on 3/25 which showed purulent drainage mixed with gouty tophi. .on vanc and zosyn. Past Medical History:   Diagnosis Date    Arthritis     Hypertension      Past Surgical History:   Procedure Laterality Date    IR INSERT NON TUNL CVC OVER 5 YRS  3/25/2022    IR PARACENTESIS ABD W IMAGE  1/21/2022    IR PARACENTESIS ABD W IMAGE  3/1/2022      No family history on file. Social History     Tobacco Use    Smoking status: Never Smoker    Smokeless tobacco: Never Used   Substance Use Topics    Alcohol use: Not on file      Current Facility-Administered Medications   Medication Dose Route Frequency Provider Last Rate Last Admin    [START ON 3/31/2022] Vancomycin random level to be drawn on 3/31/22 at 0400.    Other ONCE Moiz Gamez MD        0.9% sodium chloride infusion 250 mL  250 mL IntraVENous PRN Susana Ackerman MD        sodium bicarbonate tablet 1,300 mg  1,300 mg Oral BID Aicha Perez MD   1,300 mg at 03/30/22 0902    levETIRAcetam (KEPPRA) oral solution 1,000 mg  1,000 mg Oral BID Susana Ackerman MD   1,000 mg at 03/30/22 0903    Vancomycin - Pharmacy to Dose   Other Rx Dosing/Monitoring Moiz Gamez MD        levothyroxine (SYNTHROID) tablet 50 mcg  50 mcg Oral Zeke Lujan MD   50 mcg at 03/30/22 0505    tamsulosin (FLOMAX) capsule 0.4 mg  0.4 mg Oral DAILY Yamila DENIS MD   0.4 mg at 03/30/22 4879    sodium chloride (NS) flush 5-40 mL  5-40 mL IntraVENous Q8H Kristine Banuelos MD   10 mL at 03/30/22 0600    sodium chloride (NS) flush 5-40 mL  5-40 mL IntraVENous PRN Asa Essie Ontiveros MD        acetaminophen (TYLENOL) tablet 650 mg  650 mg Oral Q8H Ramírez Bray MD   650 mg at 03/30/22 0505    HYDROcodone-acetaminophen (NORCO) 5-325 mg per tablet 1 Tablet  1 Tablet Oral Q4H PRN Celso Miles MD        morphine injection 2 mg  2 mg IntraVENous Q4H PRN Celso Miles MD        naloxone (NARCAN) injection 0.4 mg  0.4 mg IntraVENous PRN Celso Miles MD        ondansetron (ZOFRAN) injection 4 mg  4 mg IntraVENous Q4H PRN Celso Miles MD        allopurinoL (ZYLOPRIM) tablet 300 mg  300 mg Oral DAILY Ramírez Bray MD   300 mg at 03/30/22 0903    [Held by provider] aMILoride (MIDAMOR) tablet 5 mg  5 mg Oral DAILY Lamberto Ackerman MD   5 mg at 03/23/22 0900    ergocalciferol capsule 50,000 Units  50,000 Units Oral Q7D Ramírez Bray MD        famotidine (PEPCID) tablet 20 mg  20 mg Oral QPM Celso Miles MD   20 mg at 25/38/10 3765    folic acid (FOLVITE) tablet 1 mg  1 mg Oral DAILY Ramírez Bray MD   1 mg at 03/30/22 0902    lactulose (CHRONULAC) 10 gram/15 mL solution 15 mL  15 mL Oral TID Celso Miles MD   15 mL at 03/30/22 0902    propranoloL (INDERAL) tablet 20 mg  20 mg Oral BID Celso Miles MD   20 mg at 03/30/22 8917    thiamine mononitrate (B-1) tablet 100 mg  100 mg Oral DAILY Ramírez Bray MD   100 mg at 03/30/22 0036    acetaminophen (TYLENOL) tablet 650 mg  650 mg Oral Q6H PRN Celso Miles MD   650 mg at 03/26/22 1410        No Known Allergies    Review of Systems:  A comprehensive review of systems was negative except for that written in the History of Present Illness.     Objective:     Vitals:    03/30/22 1410 03/30/22 1420 03/30/22 1430 03/30/22 1446   BP: (!) 143/89 138/87 135/86 127/74   Pulse: 78 80 81 76   Resp: 16 16 16 16   Temp:       SpO2: 100% 100% 100% 100%   Weight:       Height:            Physical Exam:  General: NAD , not oriented   Eyes: sclera anicteric  Oral Cavity: No thrush or ulcers  Neck: no JVD  Chest: Fair bilateral air entry  Heart: normal sounds  Abdomen: soft and non tender   : no fish  Lower Extremities: no edema  Skin: no rash          Assessment:     Hospital Problems  Never Reviewed          Codes Class Noted POA    Finger infection ICD-10-CM: L08.9  ICD-9-CM: 686.9  3/22/2022 Unknown              Plan:       1 Acute kidney injury on chronic kidney disease stage IIIA. -Etiology can be prerenal plus was on amiloride.    -On admission creatinine was elevated at 2.51.    -Cr is gradually improving.  2.5--> 2.3-->2.0 today. baseline is unknown was 1.7 on 3/4/2022.    -no evidence of lower extremity edema.    -Hypervolemic with abd distension ,h/o repeated paracentesis (3/24  4.3 L)  -Continue to hold amiloride for now  -We will continue hypotonic IV fluids for now for hypernatremia  -He is not on any other potential nephrotoxins. 2.  Septic arthritis.    -He presented with swollen right hand finger diagnosed with septic arthritis status post I&D received IV antibiotics per orthopedic.  -on Vanc and zosyn  -S/p I&D on 3/25     3 Hypotension.    -Blood pressures is labile. Improved currently  -I will continue to hold amiloride.    -On no other blood pressure medications. -Give parameters to hold midodrine     4. Liver cirrhosis  -Has history of cirrhosis , portal HTN  -due to etoh   -Abd distension   -On Propranolol   -Albumin for volume expansion     4. Anemia.    -Hemoglobin is 6.6. Transfuse 2 units of blood  -We will start weekly Procrit    5. Metabolic acidosis. -CO2 is 19  -Lactic acidosis - due to decr Liver clearance and probably RTA as well . No diarrhea reported   -will increase sodium bicarb to 1300 bid. Significant bicarb deficit    6.  Hpernatremia:  -Na 148  -Continue D5W but I will increase the rate to 125 mils per hour      Signed By: Jacky Gomes MD     March 30, 2022

## 2022-03-30 NOTE — PROGRESS NOTES
SPEECH LANGUAGE PATHOLOGY DYSPHAGIA TREATMENT  Patient: Zaid Gonzalez (83 y.o. male)  Date: 3/30/2022  Diagnosis: Finger infection [L08.9] Procedure(s) (LRB):  INCISION & DRAINAGE of Right Index Finger (Right) 5 Days Post-Op  Precautions:      ASSESSMENT :  Patient is A&Ox2 and follows basic commands. Patient pleasantly confused and benefits from repetition and redirection to task. NGT removed and tolerating clear moderately thick per RN. PO trials of thin, mildly thick and puree. Oral phase c/b improved bolus control and manipulation w/ delayed A-P transit of all consistencies. Pharyngeal phase c/b mild swallow delay and HLE is wfl upon palpation. Overt s/s of pen/asp w/ thin c/b delayed cough. Patient will benefit from skilled intervention to address the above impairments. Patients rehabilitation potential is considered to be Fair      PLAN :  Recommendations and Planned Interventions:  Rec diet upgrade to puree/mildly thick w/ strict asp/GERD precautions and crush meds. Frequency/Duration: Patient will be followed by speech-language pathology 5 times a week to address goals. Discharge Recommendations: Skilled Nursing Facility     SUBJECTIVE:   Patient reports he is hungry and wants to talk to his \"lady\".      OBJECTIVE:     Past Medical History:   Diagnosis Date    Arthritis     Hypertension        CXR Results  (Last 48 hours)      None            Current Diet:  DIET ADULT     Cognitive and Communication Status:  Neurologic State: Alert,Confused  Orientation Level: Oriented to person,Oriented to place  Cognition: Decreased attention/concentration,Follows commands,Impaired decision making,Impulsive  Perception: Appears intact  Perseveration: No perseveration noted  Safety/Judgement: Decreased awareness of environment,Decreased insight into deficits        Pain:  Pain Scale 1: Adult Nonverbal Pain Scale  Pain Intensity 1: 0       After treatment:   Patient left in no apparent distress in bed, Call bell within reach, and Nursing notified    COMMUNICATION/EDUCATION:   Patient receptive of education however given cognitive deficits concerns for carryover of education.      Thank you for this referral.  Brayden Stevenson M.S., M.Ed., CCC-SLP  Time Calculation: (P) 15 mins

## 2022-03-30 NOTE — PROGRESS NOTES
Spiritual Care Assessment/Progress Note  [facility]      NAME: Javier Beach      MRN: [id number]  AGE: 60 y.o. SEX: male  Taoism Affiliation: No preference   Language: English     3/30/2022     Total Time (in minutes): 15     Spiritual Assessment begun in [address] ICU through conversation with:         [x]Patient        [x] Family    [] Friend(s)        Reason for Consult: Initial/Spiritual assessment, critical care     Spiritual beliefs: (Please include comment if needed)     [] Identifies with a maribel tradition:         [] Supported by a maribel community:            [] Claims no spiritual orientation:           [] Seeking spiritual identity:                [] Adheres to an individual form of spirituality:           [x] Not able to assess:                           Identified resources for coping:      [] Prayer                               [] Music                  [] Guided Imagery     [x] Family/friends                 [] Pet visits     [] Devotional reading                         [] Unknown     [] Other:                                               Interventions offered during this visit: (See comments for more details)    Patient Interventions: Bridging,Coping skills reviewed/reinforced,Initial/Spiritual assessment, Critical care     Family/Friend(s):  Affirmation of emotions/emotional suffering,Bridging,Catharsis/review of pertinent events in supportive environment,Coping skills reviewed/reinforced,Initial Assessment,Normalization of emotional/spiritual concerns,Life review/legacy     Plan of Care:     [] Support spiritual and/or cultural needs    [] Support AMD and/or advance care planning process      [] Support grieving process   [] Coordinate Rites and/or Rituals    [] Coordination with community clergy   [] No spiritual needs identified at this time   [] Detailed Plan of Care below (See Comments)  [] Make referral to Music Therapy  [] Make referral to Pet Therapy     [] Make referral to Addiction services  [] Make referral to [facility]  [] Make referral to Spiritual Care Partner  [] No future visits requested        [x] Contact Spiritual Care for further referrals     Comments:  Visited patient in 2 ICU for spiritual assessment during daily rounds. Patient, aunt and cousin were present during the visit. Patient seemed to be confused during the visit. Family shared about their family, patient's wife who will be visiting shortly and expressed both their concern and affection towards the patient. Provided supportive presence, listened with empathy while exploring their needs. Affirmed their familial support and bond as well as his health care concerns. Advised of  availability. Contact chaplains for further referrals. Chaplain Tom Alvarenga M.Div.    can be reached by calling the  at [facility]  [phone]

## 2022-03-30 NOTE — CONSULTS
UROLOGY CONSULT NOTE  [phone]        Patient: Adelfo Roach MRN: [id number]  SSN: [id number]    YOB: 1960  Age: 60 y.o. Sex: male      REFERRING PROVIDER: Tyron  Subjective:      Adelfo Roach is a 60 y.o. male who is being seen in urologic consultation for urinary retention. Patient's been tried on multiple voiding trials unable to void. He has been Gerard cath and he has had intermittent caths. Last time he tried to void 850 cc residual.  Patient is alcoholic encephalopathy , who could certainly have a neuropathic bladder a nerve damaged bladder from his EtOH use. Unable to obtain family history or review of systems so my suggestion would be to check a PSA. Leave the Gerard in for now and follow-up as outpatient get the Gerard out in your office with a voiding trial keeping him on alpha blockers i.e. tamsulosin    Past Medical History:   Diagnosis Date    Arthritis     Hypertension      Past Surgical History:   Procedure Laterality Date    IR INSERT NON TUNL CVC OVER 5 YRS  3/25/2022    IR PARACENTESIS ABD W IMAGE  1/21/2022    IR PARACENTESIS ABD W IMAGE  3/1/2022      No family history on file. Social History     Tobacco Use    Smoking status: Never Smoker    Smokeless tobacco: Never Used   Substance Use Topics    Alcohol use: Not on file      Current Facility-Administered Medications   Medication Dose Route Frequency Provider Last Rate Last Admin    [START ON 3/31/2022] Vancomycin random level to be drawn on 3/31/22 at 0400.    Other ONCE Yocasta Carpio MD        0.9% sodium chloride infusion 250 mL  250 mL IntraVENous PRN Srini Ackerman MD        sodium bicarbonate tablet 1,300 mg  1,300 mg Oral BID Marcello Chan MD   1,300 mg at 03/30/22 0902    levETIRAcetam (KEPPRA) oral solution 1,000 mg  1,000 mg Oral BID Srini Ackerman MD   1,000 mg at 03/30/22 8244    Vancomycin - Pharmacy to Dose   Other Rx Dosing/Monitoring MD Jin Husain levothyroxine (SYNTHROID) tablet 50 mcg  50 mcg Oral Priti Pitts MD   50 mcg at 03/30/22 0505    tamsulosin (FLOMAX) capsule 0.4 mg  0.4 mg Oral DAILY Shaunna DENIS MD   0.4 mg at 03/30/22 6840    sodium chloride (NS) flush 5-40 mL  5-40 mL IntraVENous Q8H Skyla Carrillo MD   10 mL at 03/30/22 0600    sodium chloride (NS) flush 5-40 mL  5-40 mL IntraVENous PRN Radha Mckeon MD        acetaminophen (TYLENOL) tablet 650 mg  650 mg Oral Q8H Anum DANIELS MD   650 mg at 03/30/22 0505    HYDROcodone-acetaminophen ([address]) 5-325 mg per tablet 1 Tablet  1 Tablet Oral Q4H PRN Radha Mckeon MD        morphine injection 2 mg  2 mg IntraVENous Q4H PRN Radha Mckeon MD        naloxone (NARCAN) injection 0.4 mg  0.4 mg IntraVENous PRN Radha Mckeon MD        ondansetron (ZOFRAN) injection 4 mg  4 mg IntraVENous Q4H PRN Radha Mckeon MD        allopurinoL (ZYLOPRIM) tablet 300 mg  300 mg Oral DAILY Radha Mckeon MD   300 mg at 03/30/22 0903    [Held by provider] aMILoride (MIDAMOR) tablet 5 mg  5 mg Oral DAILY Lamberto Ackerman MD   5 mg at 03/23/22 0900    ergocalciferol capsule 50,000 Units  50,000 Units Oral Q7D Marylin Bray MD        famotidine (PEPCID) tablet 20 mg  20 mg Oral QPM Ramírez Bray MD   20 mg at 14/06/71 8389    folic acid (FOLVITE) tablet 1 mg  1 mg Oral DAILY Radha Mckeon MD   1 mg at 03/30/22 0902    lactulose (CHRONULAC) 10 gram/15 mL solution 15 mL  15 mL Oral TID Radha Mckeon MD   15 mL at 03/30/22 0902    propranoloL (INDERAL) tablet 20 mg  20 mg Oral BID Radha Mckeon MD   20 mg at 03/30/22 1304    thiamine mononitrate (B-1) tablet 100 mg  100 mg Oral DAILY Radha Mckeon MD   100 mg at 03/30/22 5324    acetaminophen (TYLENOL) tablet 650 mg  650 mg Oral Q6H PRN Radha Mckeon MD   650 mg at 03/26/22 1410        No Known Allergies    Review of Systems:  ROS  Nonverbal at baseline  Objective:     Vitals: 03/30/22 0900 03/30/22 1000 03/30/22 1100 03/30/22 1200   BP: 123/60 (!) 107/57 (!) 95/51 136/85   Pulse:   90    Resp: 16 14 16 18   Temp:   97.3 °F (36.3 °C)    SpO2: 99% 99% 99% 100%   Weight:       Height:            Recent Labs     03/30/22  0350 03/29/22  0330   WBC 6.4 6.6   HGB 7.8* 6.6*   HCT 23.1* 19.4*   PLT 57* 53*     Recent Labs     03/30/22  0350 03/29/22  0330   * 147*   K 3.6 3.6   * 121*   CO2 19* 19*   GLU 96 81   BUN 24* 23*   CREA 2.09* 2.31*   CA 8.9 8.7   ALB 2.5* 2.7*   TBILI 1.4* 1.4*   ALT 11* 11*     No results for input(s): PH, PCO2, PO2, HCO3, FIO2 in the last 72 hours. 24 Hour Results:  Recent Results (from the past 24 hour(s))   VANCOMYCIN, RANDOM    Collection Time: 03/30/22  3:50 AM   Result Value Ref Range    Vancomycin, random 15.7 ug/mL   CBC W/O DIFF    Collection Time: 03/30/22  3:50 AM   Result Value Ref Range    WBC 6.4 4.1 - 11.1 K/uL    RBC 2.77 (L) 4.10 - 5.70 M/uL    HGB 7.8 (L) 12.1 - 17.0 g/dL    HCT 23.1 (L) 36.6 - 50.3 %    MCV 83.4 80.0 - 99.0 FL    MCH 28.2 26.0 - 34.0 PG    MCHC 33.8 30.0 - 36.5 g/dL    RDW 18.4 (H) 11.5 - 14.5 %    PLATELET 57 (L) 092 - 400 K/uL    NRBC 0.0 0.0  WBC    ABSOLUTE NRBC 0.00 0.00 - 1.58 K/uL   METABOLIC PANEL, COMPREHENSIVE    Collection Time: 03/30/22  3:50 AM   Result Value Ref Range    Sodium 148 (H) 136 - 145 mmol/L    Potassium 3.6 3.5 - 5.1 mmol/L    Chloride 122 (H) 97 - 108 mmol/L    CO2 19 (L) 21 - 32 mmol/L    Anion gap 7 5 - 15 mmol/L    Glucose 96 65 - 100 mg/dL    BUN 24 (H) 6 - 20 mg/dL    Creatinine 2.09 (H) 0.70 - 1.30 mg/dL    BUN/Creatinine ratio 11 (L) 12 - 20      GFR est AA 39 (L) >60 ml/min/1.73m2    GFR est non-AA 32 (L) >60 ml/min/1.73m2    Calcium 8.9 8.5 - 10.1 mg/dL    Bilirubin, total 1.4 (H) 0.2 - 1.0 mg/dL    AST (SGOT) 20 15 - 37 U/L    ALT (SGPT) 11 (L) 12 - 78 U/L    Alk.  phosphatase 84 45 - 117 U/L    Protein, total 5.8 (L) 6.4 - 8.2 g/dL    Albumin 2.5 (L) 3.5 - 5.0 g/dL Globulin 3.3 2.0 - 4.0 g/dL    A-G Ratio 0.8 (L) 1.1 - 2.2         Physical Exam:  Physical Exam  Physical Exam  Constitutional:       General: He is not in acute distress. HENT:      Head: Normocephalic and atraumatic. Cardiovascular:      Rate and Rhythm: Normal rate and regular rhythm. Pulses: Normal pulses. Heart sounds: Normal heart sounds. Pulmonary:      Effort: Pulmonary effort is normal.      Breath sounds: Normal breath sounds. Abdominal:      General: There is distension. Tenderness: There is no abdominal tenderness. There is no guarding or rebound. Skin:     Findings: Erythema present. Comments: Swelling to the right 2nd digit   Neurological:      Mental Status: Mental status is at baseline. Comments: Nonverbal   Patient has Gerard catheter in place    Assessment: Urinary retention secondary to? Neuropathy?   Rule out prostate cancer     Hospital Problems  Never Reviewed          Codes Class Noted POA    Finger infection ICD-10-CM: L08.9  ICD-9-CM: 686.9  3/22/2022 Unknown              Plan: Check a PSA leave the Gerard and have follow-up with us in the clinic as per discharge     Hospital Problems  Never Reviewed          Codes Class Noted POA    Finger infection ICD-10-CM: L08.9  ICD-9-CM: 686.9  3/22/2022 Unknown                  Signed By: Lamberto Aaron MD     March 30, 2022

## 2022-03-30 NOTE — PROGRESS NOTES
Problem: Mobility Impaired (Adult and Pediatric)  Goal: *Acute Goals and Plan of Care (Insert Text)  Description: Pt will be able to participate with LE exercises with min Vcs x 7 days  Supine to sit EOB with min Ax1 x 7 days  Roll L and R with min Ax1 x7 days  Sit to stand with mod/max Ax1-2 x 7 days    Future goals TBD if pt progresses with PT  Outcome: Not Met    PHYSICAL THERAPY EVALUATION  Patient: Wojciech Cardozo (64 y.o. male)  Date: 3/30/2022  Primary Diagnosis: Finger infection [L08.9]  Procedure(s) (LRB):  INCISION & DRAINAGE of Right Index Finger (Right) 5 Days Post-Op   Precautions:          ASSESSMENT    60yo M admitted to hospital with finger infection now s/p I and D R index finger presents to PT with decreased bed mob, transfers, LE strength, activity tolerance, and overall functional mobility. PT received new PT orders as pt had been screened this hospital admission and appeared to be at reported baseline. Speech therapist notified PT that pt was able to follow commands during recent speech therapy session and recommended PT see pt again. PT obtained new PT orders and evaluated pt this session. Pt supine in bed upon PT arrival, agreeable to work with PT. Pt alert to name and birthday, confused to place and situation. Pt not able to provide hx for PLOF, however stated (garbled speech) that he did have a wife and son in law that help him. Pt was admitted to hospital from SNF/LTC facility. It was at one point documented and reported to PT that pt was a LTC resident and planned to return to [address] [place] [address]. Per speech therapist pt's spouse reported that her plan was to bring pt back home after rehab. PT asked CM to get clarification on plan, will defer to CM for further d/c planning. It appears pt may have been max to total A at SNF/LTC facility PTA, but unfortunately unable to obtain accurate PLOF.  Pt was seen once by PT during last hospital admission earlier this month after being intubated and pt was able to stand and amb approx 8ft with mod A, and poor overall tolerance of session. Currently, pt was max A for supine to sit up in bed, unable to get to EOB as pt was impulsive at times with attempt to move. Pt also on bearhugger as temp has been low, and B mittens in place, but per nsg ok for PT to work with PT for bed level activity. Pt unable to maintain sitting position upright in bed without assist.  Pt mod A for rolling L and R. Pt was instructed in LE exercises to include Heelslides, ankle pumps, SLR all AAROM B LE x 10 reps each. Pt able to participate some with exercises, but sporadic and req cues to continue with participation. Pt at this time will be placed on 3 visit PT trial to further assess potential for skilled PT. Recommend d/c to SNF/LTC at this time. PLAN :  Recommendations and Planned Interventions: bed mobility training, transfer training, therapeutic exercises, patient and family training/education, and therapeutic activities      Frequency/Duration: Patient will be followed by physical therapy:  2 times a week to address goals.     Recommendation for discharge: (in order for the patient to meet his/her long term goals)  SNF/LTC             SUBJECTIVE:   Patient semi supine in bed upon PT arrival, confused     OBJECTIVE DATA SUMMARY:   HISTORY:    Past Medical History:   Diagnosis Date    Arthritis     Hypertension      Past Surgical History:   Procedure Laterality Date    IR INSERT NON TUNL CVC OVER 5 YRS  3/25/2022    IR PARACENTESIS ABD W IMAGE  1/21/2022    IR PARACENTESIS ABD W IMAGE  3/1/2022       Personal factors and/or comorbidities impacting plan of care:     Home Situation  Home Environment: [address] [place] [address] Name:  ([facility])  One/Two Story Residence: Two story  Living Alone: No  Support Systems: [facility]  Patient Expects to be Discharged to[de-identified] [address]  Current DME Used/Available at Home: None        EXAMINATION/PRESENTATION/DECISION MAKING:   Critical Behavior:  Neurologic State: Alert,Confused  Orientation Level: Oriented to person  Cognition: Decreased attention/concentration,Follows commands,Impaired decision making,Impulsive,Poor safety awareness  Safety/Judgement: Decreased awareness of environment,Decreased insight into deficits        Range Of Motion:  AROM: Generally decreased, functional  B LE    Strength:    Strength: Generally decreased, functional  Grossly 2+/5 B LE, unable to formally assess, pt not able to follow commands for MMT    Tone & Sensation:      Appears intact to LT B LE        Functional Mobility:  Bed Mobility:  Rolling: Moderate assistance;Maximum assistance;Assist x1  Supine to Sit: Maximum assistance;Assist x1  Sit to Supine: Maximum assistance;Assist x2     Transfers:  Unable to get to EOB, pt max A for supine to sit upright in bed, poor balance      Therapeutic Exercises:   See assessment above    Functional Measure:  [address] [place] [id number] AM-PAC 6 Clicks         Basic Mobility Inpatient Short Form  How much difficulty does the patient currently have. .. Unable A Lot A Little None   1. Turning over in bed (including adjusting bedclothes, sheets and blankets)? [] 1   [x] 2   [] 3   [] 4   2. Sitting down on and standing up from a chair with arms ( e.g., wheelchair, bedside commode, etc.)   [x] 1   [] 2   [] 3   [] 4   3. Moving from lying on back to sitting on the side of the bed? [] 1   [x] 2   [] 3   [] 4          How much help from another person does the patient currently need. .. Total A Lot A Little None   4. Moving to and from a bed to a chair (including a wheelchair)? [x] 1   [] 2   [] 3   [] 4   5. Need to walk in hospital room? [x] 1   [] 2   [] 3   [] 4   6. Climbing 3-5 steps with a railing? [x] 1   [] 2   [] 3   [] 4   © 2007, Trustees of [address] [place] [address] [address] [id number], under license to [employer].  All rights reserved     Score:  Initial: 8 Most Recent: X (Date: -- )   Interpretation of Tool:  Represents activities that are increasingly more difficult (i.e. Bed mobility, Transfers, Gait). Score 24 23 22-20 19-15 14-10 9-7 6   Modifier CH CI CJ CK CL CM CN           Physical Therapy Evaluation Charge Determination   History Examination Presentation Decision-Making   HIGH Complexity :3+ comorbidities / personal factors will impact the outcome/ POC  MEDIUM Complexity : 3 Standardized tests and measures addressing body structure, function, activity limitation and / or participation in recreation  LOW Complexity : Stable, uncomplicated  Other Outcome Measure: AMPAC Other outcome measures AMPA  HIGH       Based on the above components, the patient evaluation is determined to be of the following complexity level: LOW     Pain Rating:  No c/o pain during session    Activity Tolerance:   Poor      After treatment patient left in no apparent distress:   Supine in bed and Call bell within reach          COMMUNICATION/EDUCATION:   The patients plan of care was discussed with: Registered nurse and Case management.          Thank you for this referral.  Peyton Breeze   Time Calculation: 15 mins

## 2022-03-30 NOTE — PROGRESS NOTES
Consult for Vancomycin Dosing by Pharmacy by Dr. Elan Nathan provided for this 60y.o. year old male having GRACIA on CKD, for indication of bone and joint infection. Day of Therapy: 4    Goal of Level(s): 10-15mcg/dL     Other Current Antibiotics: Zosyn          Serum Creatinine Creatinine   Date/Time Value Ref Range Status   03/30/2022 03:50 AM 2.09 (H) 0.70 - 1.30 mg/dL Final   03/29/2022 03:30 AM 2.31 (H) 0.70 - 1.30 mg/dL Final   03/27/2022 10:25 AM 2.43 (H) 0.70 - 1.30 mg/dL Final      Creatinine Clearance Estimated Creatinine Clearance: 42.1 mL/min (A) (based on SCr of 2.09 mg/dL (H)). BUN Lab Results   Component Value Date/Time    BUN 24 (H) 03/30/2022 03:50 AM      WBC Lab Results   Component Value Date/Time    WBC 6.4 03/30/2022 03:50 AM      Temp Temp Readings from Last 1 Encounters:   03/30/22 (!) 95.7 °F (35.4 °C)      C-Reactive Protein C-Reactive protein   Date Value Ref Range Status   03/26/2022 1.59 (H) 0.00 - 0.60 mg/dL Final   03/22/2022 3.15 (H) 0.00 - 0.60 mg/dL Final      Procalcitonin No results found for: PCT     Last Level:    Vancomycin, random   Date/Time Value Ref Range Status   03/30/2022 03:50 AM 15.7 ug/mL Final     Comment:     No reference range has been established. Ht Readings from Last 1 Encounters:   03/28/22 188 cm (74.02\")        Wt Readings from Last 1 Encounters:   03/26/22 84.3 kg (185 lb 13.6 oz)     Ideal body weight: 82.2 kg (181 lb 4.8 oz)  Adjusted ideal body weight: 83.1 kg (183 lb 1.9 oz)     Previous Regimen: 750mg IV x1 (Michaelata@[employer] am)    New Regimen:   Vancomycin level is therapeutic at  15.7  Give  Vancomycin 1000 mg IV x1 today at 0800. A random level has been scheduled for tomorrow 3/31/22 at 0400 am to re-dose. Pharmacy to follow daily and will make changes to dose and/or frequency based on clinical status.      _________________________________     Pharmacist Patti Shirley PHARMD

## 2022-03-30 NOTE — PROGRESS NOTES
PROGRESS NOTE    Patient: Clarita Geiger MRN: [id number]  SSN: [id number]    YOB: 1960  Age: 60 y.o. Sex: male      Admit Date: 3/22/2022    LOS: 8 days       Subjective     Chief Complaint   Patient presents with    Finger Swelling       HPI: Clarita Geiger is a(n) 60 y.o. male with PMH significant for etoh abuse, encephalopathy, cirrhosis, seizure disorder, gout presents with c/o swelling to right 2nd finger. Patient is nonverbal at baseline and dependent on all ADL at [address] [place] [address]. According to nursing home staff, has future orthopedic appointment for this finger. Is non-verbal at baseline but appears awake, alert, and in no obvious distress.     Patient was recently discharged from the hospital after having a seizures at that time patient received Keppra for the seizures patient had paracentesis done during the last admission also endoscopy done and had banding of the varices     I&D performed on finger in ED with purulent fluid drained. Dressing in place covering the DIP. 1g vancomycin given in ED, switched to zosyn on admission. Wound cultures sent. XR of the finger shows soft tissue swelling in gas consistent with osteomyelitis, septic joint, or gouty arthropathy.     Labs: Hgb 9.6 (baseline). Creatinine 2.51. CRP 3.15. ESR 35. Wound and blood cultures pending. 3/24:  Patient seen resting in bed. Nonverbal at baseline. Rectal temperature 91.1F per nursing staff. Blood pressure soft, patient may be septic. Vanc and Zosyn started per ID consult. Lactate 3.4. Blood and wound cultures still pending. Per orthopedic surgery, patient to have debridement of joint today under local anesthesia. He will likely need IV abx for 4-6 weeks for septic joint. Per nephrology, the patients increased creatinine of 2.5 may be due to amiloride, which is now held. Iron studies also obtained for Hgb 9.6. Labs remarkable for: Hgb 9.6. Creatinine 2.47.    3/25:  Patient seen in ICU.  Denies finger pain. Rectal temp 36.2C. I&D moved to today d/t transfer to ICU yesterday. Per ID, initial wound culture shows coag negative staph and continue on vanc and zosyn pending final culture result. Blood cultures still pending. Labs remarkable for Hgb 9.2, Creatinine 2.59, Albumin 1.8. Lactate decreased from 3.4 to 2.6. Ammonia 39. Pt had Doppler studies done this morning which shows    · No evidence of acute DVT in the right upper extremity. · Thrombus noted within the right cephalic forearm vein(s).    3/28:  Patient seen resting comfortably in bed, remains at baseline mental status. Current temp 36C and patient still on warming blanket. Patient had I&D of right index finger on 3/25 which revealed purulent drainage mixed with gouty tophi. DIP joint thoroughly irrigated and cultures sent. Cultures show few WBC and \"scant probably enterococcus. \" Patient remains on vanc and zosyn. Over the weekend, levothyroxine increased to 50mcg d/t elevated TSH. He was also started on midodrine 5mg TID for hypotension and was given hypotonic fluids for hypernatremia      Labs from 3/27 show Hgb 8.5, Na 149, creatinine 2.43, albumin 2.2    3/29:  Patient seen resting comfortably in bed. Denies any pain. Abdomen very distended, worse than yesterday. Patient receiving NG tube feeds. Lower extremity edema also present. Patient also receiving 1 unit packed RBC for Hgb 6. 6. platelets 53, Na 928, Creatinine 2.31. Patient still hypothermic    3/30:  Patient remains hypothermic at 95.7F. Denies finger pain, chest pain, palpitations, shortness of breath, abdominal pain. Seen by nephrology who notes creatinine gradually decreasing. Baseline is around 1.7. Seen by speech therapy who will continue to advance diet to include some solids today. Culture from DIP joint shows staph epidermidis susceptible to vancomycin, which patient is already on. Hgb 7.8, platelets 57, creatinine 2.09, Na 148. Patient still on D5W at 75mL/hr. KUB shows abdominal distension consistent with known history of ascites. Last paracentesis 3/24 with 4.3L fluid removed. Past Medical History:   Diagnosis Date    Arthritis      Hypertension                 Past Surgical History:   Procedure Laterality Date    IR PARACENTESIS ABD W IMAGE   1/21/2022    IR PARACENTESIS ABD W IMAGE   3/1/2022                 Prior to Admission medications    Medication Sig Start Date End Date Taking? Authorizing Provider   levothyroxine (SYNTHROID) 25 mcg tablet Take 25 mcg by mouth Daily (before breakfast).     Yes Provider, Historical   acetaminophen (TylenoL) 325 mg tablet Take 650 mg by mouth every six (6) hours as needed for Pain.     Yes Provider, Historical   tamsulosin (Flomax) 0.4 mg capsule Take 1 Capsule by mouth daily. 3/4/22     Abhijit Whyte PA-C   potassium chloride (K-DUR, KLOR-CON M20) 20 mEq tablet Take 1 Tablet by mouth daily. 3/2/22     Junito Andujar MD   sodium bicarbonate 650 mg tablet Take 1 Tablet by mouth three (3) times daily. 3/2/22     Abigali Mac MD   chlordiazePOXIDE (LIBRIUM) 5 mg capsule Take 2 Capsules by mouth three (3) times daily as needed for Anxiety. Max Daily Amount: 30 mg. 3/2/22     Abigail Mac MD   levETIRAcetam (Keppra) 1,000 mg tablet Take 1.5 Tablets by mouth two (2) times a day. 3/2/22     Abigail Mac MD   ergocalciferol (ERGOCALCIFEROL) 1,250 mcg (50,000 unit) capsule Take 1 Capsule by mouth every seven (7) days. 2/9/22     Provider, Historical   lactulose (CHRONULAC) 10 gram/15 mL solution Take 15 mL by mouth three (3) times daily. 1/23/22     Leslee Ackerman MD   allopurinoL (ZYLOPRIM) 300 mg tablet Take 1 Tablet by mouth daily.  1/18/22     Provider, Historical   thiamine HCL (B-1) 100 mg tablet Take 100 mg by mouth daily.       Provider, Historical   propranoloL (INDERAL) 20 mg tablet Take 20 mg by mouth two (2) times a day.       Provider, Historical   folic acid (FOLVITE) 1 mg tablet Take 1 mg by mouth daily.       Provider, Historical   famotidine (PEPCID) 20 mg tablet Take 20 mg by mouth At bedtime.       Provider, Historical   aMILoride (MIDAMOR) 5 mg tablet Take 5 mg by mouth daily.       Provider, Historical         No Known Allergies      No family history on file. Reason unable to perform ROS: nonverbal at baseline. Objective     Visit Vitals  /85 (BP 1 Location: Left leg, BP Patient Position: At rest)   Pulse 75   Temp (!) 95.7 °F (35.4 °C)   Resp 18   Ht 6' 2.02\" (1.88 m)   Wt 185 lb 13.6 oz (84.3 kg)   SpO2 97%   BMI 23.85 kg/m²    O2 Flow Rate (L/min): 2 l/min O2 Device: None (Room air)    Physical Exam:   Constitutional:       General: He is not in acute distress. HENT:      Head: Normocephalic and atraumatic. Cardiovascular:      Rate and Rhythm: Normal rate and regular rhythm. Pulses: Normal pulses. Heart sounds: Normal heart sounds. Pulmonary:      Effort: Pulmonary effort is normal.      Breath sounds: Normal breath sounds. Abdominal:      General: There is distension. Tenderness: There is no abdominal tenderness. There is no guarding or rebound. Skin:     Findings: Erythema present. Comments: Swelling to the right 2nd digit   Neurological:      Mental Status: Mental status is at baseline. Comments: Nonverbal     Intake & Output:  Current Shift: No intake/output data recorded. Last three shifts: 03/28 1901 - 03/30 0700  In: 1231.8 [I.V.:250]  Out: 450 [Urine:450]    Lab/Data Review:   All lab data reviewed      24 Hour Results:    Recent Results (from the past 24 hour(s))   GLUCOSE, POC    Collection Time: 03/29/22 11:10 AM   Result Value Ref Range    Glucose (POC) 91 65 - 117 mg/dL    Performed by Alexandra Car, RANDOM    Collection Time: 03/30/22  3:50 AM   Result Value Ref Range    Vancomycin, random 15.7 ug/mL   CBC W/O DIFF    Collection Time: 03/30/22  3:50 AM   Result Value Ref Range    WBC 6.4 4.1 - 11.1 K/uL    RBC 2.77 (L) 4.10 - 5.70 M/uL    HGB 7.8 (L) 12.1 - 17.0 g/dL    HCT 23.1 (L) 36.6 - 50.3 %    MCV 83.4 80.0 - 99.0 FL    MCH 28.2 26.0 - 34.0 PG    MCHC 33.8 30.0 - 36.5 g/dL    RDW 18.4 (H) 11.5 - 14.5 %    PLATELET 57 (L) 312 - 400 K/uL    NRBC 0.0 0.0  WBC    ABSOLUTE NRBC 0.00 0.00 - 0.84 K/uL   METABOLIC PANEL, COMPREHENSIVE    Collection Time: 03/30/22  3:50 AM   Result Value Ref Range    Sodium 148 (H) 136 - 145 mmol/L    Potassium 3.6 3.5 - 5.1 mmol/L    Chloride 122 (H) 97 - 108 mmol/L    CO2 19 (L) 21 - 32 mmol/L    Anion gap 7 5 - 15 mmol/L    Glucose 96 65 - 100 mg/dL    BUN 24 (H) 6 - 20 mg/dL    Creatinine 2.09 (H) 0.70 - 1.30 mg/dL    BUN/Creatinine ratio 11 (L) 12 - 20      GFR est AA 39 (L) >60 ml/min/1.73m2    GFR est non-AA 32 (L) >60 ml/min/1.73m2    Calcium 8.9 8.5 - 10.1 mg/dL    Bilirubin, total 1.4 (H) 0.2 - 1.0 mg/dL    AST (SGOT) 20 15 - 37 U/L    ALT (SGPT) 11 (L) 12 - 78 U/L    Alk. phosphatase 84 45 - 117 U/L    Protein, total 5.8 (L) 6.4 - 8.2 g/dL    Albumin 2.5 (L) 3.5 - 5.0 g/dL    Globulin 3.3 2.0 - 4.0 g/dL    A-G Ratio 0.8 (L) 1.1 - 2.2           Imaging:    XR ABD (KUB)   Final Result      XR CHEST PORT   Final Result      XR CHEST PORT   Final Result   The cardiomediastinal silhouette is appropriate for age, technique,   and lung expansion. Pulmonary vasculature is not congested. The lungs are   essentially clear. No effusion or pneumothorax is seen. IR INSERT NON TUNL CVC OVER 5 YRS   Final Result      Technically successful insertion of non-tunneled triple-lumen catheter with US   guidance.       Plan:       The catheter may be used after catheter placement confirmation by x-ray.   _______________________________________________________________      PROCEDURE SUMMARY:   - Venous access with ultrasound guidance   - Non-tunneled central venous catheter insertion      PROCEDURE DETAILS:      Pre-procedure   Relevant imaging review: None    Informed consent for the procedure was obtained and time-out was performed prior   to the procedure. Prophylactic antibiotic administered: Not administered   Preparation: The site was prepared and draped using all elements of maximal   sterile barrier technique including sterile gloves, sterile gown, cap, mask,   large sterile sheet, sterile ultrasound probe cover, sterile ultrasound gel,   hand hygiene and cutaneous antisepsis with 2% chlorhexidine. Medical reason for site preparation exception: Not applicable      Anesthesia/sedation   Level of anesthesia: No sedation   Anesthesia administered by: Not applicable   Duration of anesthesia/sedation: 0 minutes. Access   The vessel was sonographically evaluated and judged to be patent and appropriate   for access and a permanent image was stored. 2 cc's of 1% lidocaine was   administered to the access site. Real time ultrasound was used to visualize   needle entry into the vessel. Vein accessed: Right internal jugular vein   Access technique: 4F micropuncture set      Catheter placement   The access site was dilated and the catheter was placed into the vein over a   wire and all guidewires were removed in their entirety. Catheter ports were   aspirated and flushed. Catheter tip location was verified by portable X-ray. Catheter size: 7 Hungarian   Catheter length: 16 cm   Catheter tip position: Central. The tip is overlying the anatomic SVC. Catheter flush: Normal saline      Closure   The catheter was secured. A sterile dressing was applied.    Catheter securement technique: Stat-Lock      Additional Details   Additional description of procedure: None   Additional findings: None   Additional equipment: None   Specimens removed: None   Estimated blood loss:  Less than 10 cc   Standardized report: [email or web address]_NonTunneledCatheter1.0               IR US GUIDED VASCULAR ACCESS   Final Result      Technically successful insertion of non-tunneled triple-lumen catheter with US   guidance. Plan:       The catheter may be used after catheter placement confirmation by x-ray.   _______________________________________________________________      PROCEDURE SUMMARY:   - Venous access with ultrasound guidance   - Non-tunneled central venous catheter insertion      PROCEDURE DETAILS:      Pre-procedure   Relevant imaging review: None    Informed consent for the procedure was obtained and time-out was performed prior   to the procedure. Prophylactic antibiotic administered: Not administered   Preparation: The site was prepared and draped using all elements of maximal   sterile barrier technique including sterile gloves, sterile gown, cap, mask,   large sterile sheet, sterile ultrasound probe cover, sterile ultrasound gel,   hand hygiene and cutaneous antisepsis with 2% chlorhexidine. Medical reason for site preparation exception: Not applicable      Anesthesia/sedation   Level of anesthesia: No sedation   Anesthesia administered by: Not applicable   Duration of anesthesia/sedation: 0 minutes. Access   The vessel was sonographically evaluated and judged to be patent and appropriate   for access and a permanent image was stored. 2 cc's of 1% lidocaine was   administered to the access site. Real time ultrasound was used to visualize   needle entry into the vessel. Vein accessed: Right internal jugular vein   Access technique: 4F micropuncture set      Catheter placement   The access site was dilated and the catheter was placed into the vein over a   wire and all guidewires were removed in their entirety. Catheter ports were   aspirated and flushed. Catheter tip location was verified by portable X-ray. Catheter size: 7 Lao   Catheter length: 16 cm   Catheter tip position: Central. The tip is overlying the anatomic SVC. Catheter flush: Normal saline      Closure   The catheter was secured. A sterile dressing was applied.    Catheter securement technique: Stat-Lock      Additional Details   Additional description of procedure: None   Additional findings: None   Additional equipment: None   Specimens removed: None   Estimated blood loss:  Less than 10 cc   Standardized report: [email or web address]_CVA_NonTunneledCatheter1.0               DUPLEX UPPER EXT VENOUS RIGHT   Final Result      XR 2ND FINGER RT MIN 2 V   Final Result      Progressive findings. The findings are nonspecific and could represent infection with osteomyelitis   and septic joint and gas in the soft tissues related to infection. Gout, or other inflammatory arthropathy is a possibility. Findings should be correlated with clinical parameters. Assessment   Hypothermia  Hypoglycemic  Hypotension  Bradycardia  Septic joint- right 2nd finger DIP  SEPSIS wound culturesgrows  Staphylococcus and Enterococcus  Anemia- transfuse if Hgb < 7.0 status post transfusion  Acute on chronic renal failure- creatinine 2.51 on admission     Hx etoh abuse  Cirrhosis of liver   History of encephalopathy  Seizure disorder  History of gout  Thrombocytopenia    · No evidence of acute DVT in the right upper extremity. · Thrombus noted within the right cephalic forearm vein(s).     Hypernatremia  Hypothyroidism    Plan     Bear hugger for hypothermia  SEPSIS- continue vanc and zosyn  Anemia- transfuse if Hgb < 7.0  Transfuse 1 units packed RBC  Order KUB   I&D of right 2nd finger performed in ED  Consult wound care  Consult pharmacy for vancomycin dosing  Consult nephrology  Consult ID  Speech therapy  PT OT      Allopurinol 300mg daily  amloride 5mg daily-- HOLD  Ergocalciferol 50,000 units weekly  Famotidine 28XR daily  Folic acid 1mg daily  Lactulose 10g/15mL TID  Keppra 1000mg BID  Levothyroxine 25mcg daily  Propanolol 20mg BID  Sodium bicarbonate 650mg TID  Thiamine 100mg daily     Flomax 0.4 mg to      Unable to transfer to the floor due to hypothermia    Seen by infectious disease recommend to continue IV vancomycin     Monitor renal function remained stable    Repeat the lab      Current Facility-Administered Medications:     vancomycin (VANCOCIN) 1,000 mg in 0.9% sodium chloride 250 mL (VIAL-MATE), 1,000 mg, IntraVENous, ONCE, Ree Zaragoza MD    [START ON 3/31/2022] Vancomycin random level to be drawn on 3/31/22 at 0400., , Other, ONCE, Ree Zaragoza MD    0.9% sodium chloride infusion 250 mL, 250 mL, IntraVENous, PRN, Patti Ackerman MD    sodium bicarbonate tablet 1,300 mg, 1,300 mg, Oral, BID, Claudia Flynn MD, 1,300 mg at 03/29/22 2130    levETIRAcetam (KEPPRA) oral solution 1,000 mg, 1,000 mg, Oral, BID, Patti Ackerman MD, 1,000 mg at 03/29/22 2130    Vancomycin - Pharmacy to Dose, , Other, Rx Dosing/Monitoring, Nichelle Garsia MD    levothyroxine (SYNTHROID) tablet 50 mcg, 50 mcg, Oral, 6am, Ree Zaragoza MD, 50 mcg at 03/30/22 0505    tamsulosin (FLOMAX) capsule 0.4 mg, 0.4 mg, Oral, DAILY, Crow DENIS MD, 0.4 mg at 03/29/22 0830    sodium chloride (NS) flush 5-40 mL, 5-40 mL, IntraVENous, Q8H, Tamanna Leal MD, 10 mL at 03/30/22 0600    sodium chloride (NS) flush 5-40 mL, 5-40 mL, IntraVENous, PRN, Elana Mejia MD    acetaminophen (TYLENOL) tablet 650 mg, 650 mg, Oral, Q8H, Ramírez Bray MD, 650 mg at 03/30/22 0505    HYDROcodone-acetaminophen (NORCO) 5-325 mg per tablet 1 Tablet, 1 Tablet, Oral, Q4H PRN, Ramírez Bray MD    morphine injection 2 mg, 2 mg, IntraVENous, Q4H PRN, Elana Mejia MD    naloxone (NARCAN) injection 0.4 mg, 0.4 mg, IntraVENous, PRN, Elana Mejia MD    ondansetron (ZOFRAN) injection 4 mg, 4 mg, IntraVENous, Q4H PRN, Ramírez Bray MD    allopurinoL (ZYLOPRIM) tablet 300 mg, 300 mg, Oral, DAILY, Ramírez Bray MD, 300 mg at 03/29/22 0830    [Held by provider] aMILoride (MIDAMOR) tablet 5 mg, 5 mg, Oral, DAILY, Lamberto Ackerman MD, 5 mg at 03/23/22 0900    ergocalciferol capsule 50,000 Units, 50,000 Units, Oral, Q7D, Ramírez Bray MD    famotidine (PEPCID) tablet 20 mg, 20 mg, Oral, QPM, Ramírez Bray MD, 20 mg at 91/03/04 8189    folic acid (FOLVITE) tablet 1 mg, 1 mg, Oral, DAILY, Ramírez Bray MD, 1 mg at 03/29/22 0830    lactulose (CHRONULAC) 10 gram/15 mL solution 15 mL, 15 mL, Oral, TID, Ramírez Bray MD, 15 mL at 03/28/22 1526    propranoloL (INDERAL) tablet 20 mg, 20 mg, Oral, BID, Tyrone Aguillon MD, 20 mg at 03/29/22 2130    thiamine mononitrate (B-1) tablet 100 mg, 100 mg, Oral, DAILY, Tyrone Aguillon MD, 100 mg at 03/29/22 0830    acetaminophen (TYLENOL) tablet 650 mg, 650 mg, Oral, Q6H PRN, Tyorne Aguillon MD, 650 mg at 03/26/22 1410    Current Outpatient Medications   Medication Instructions    acetaminophen (TYLENOL) 650 mg, Oral, EVERY 6 HOURS AS NEEDED    allopurinoL (ZYLOPRIM) 300 mg tablet 1 Tablet, Oral, DAILY    aMILoride (MIDAMOR) 5 mg, Oral, DAILY    chlordiazePOXIDE (LIBRIUM) 10 mg, Oral, 3 TIMES DAILY AS NEEDED    ergocalciferol (ERGOCALCIFEROL) 1,250 mcg (50,000 unit) capsule 1 Capsule, Oral, EVERY 7 DAYS    famotidine (PEPCID) 20 mg, Oral, AT BEDTIME    folic acid (FOLVITE) 1 mg, Oral, DAILY    lactulose (CHRONULAC) 10 gram/15 mL solution 15 mL, Oral, 3 TIMES DAILY    levETIRAcetam (KEPPRA) 1,500 mg, Oral, 2 TIMES DAILY    levothyroxine (SYNTHROID) 25 mcg, Oral, DAILY BEFORE BREAKFAST    potassium chloride (K-DUR, KLOR-CON M20) 20 mEq tablet 20 mEq, Oral, DAILY    propranoloL (INDERAL) 20 mg, Oral, 2 TIMES DAILY    sodium bicarbonate 650 mg, Oral, 3 TIMES DAILY    tamsulosin (FLOMAX) 0.4 mg, Oral, DAILY    thiamine HCL (B-1) 100 mg, Oral, DAILY         Signed By: Sangeetha Howard     March 30, 2022

## 2022-03-30 NOTE — PROGRESS NOTES
OT eval order received and acknowledged. OT eval attempted at 2:27 PM, however, pt off the floor at this time. OT will continue to follow and eval at a later time. Thank you.

## 2022-03-30 NOTE — PROGRESS NOTES
Physician Progress Note      Devon Soto  CSN #:                  [id number]  :                       1960  ADMIT DATE:       3/22/2022 8:58 PM  [address] [place] DATE:  RESPONDING  PROVIDER #:        Merlin Fanny MD Melburn Bales MD          QUERY TEXT:    Pt admitted with Septic Arthritis. Pt noted to have Sepsis and acute organ dysfunction of  metabolic encephalopathy & GRACIA. If possible, please document in progress notes and discharge summary the relationship, if any, between Sepsis and GRACIA, metabolic encephalopathy. The medical record reflects the following:  Risk Factors: Sepsis, Metabolic Encephalopathy, GRACIA, CKD 3a, Alcoholic Cirrhosis of liver, Aeptic arthritis  Clinical Indicators: H&P: \"Acute on chronic renal failure- creatinine 2.51 on admission. \" Dr Jannette Rojas  PN: \"Metabolic encephalopathy. \"  Treatment: ID, Nephrology, Ortho Consulted, I&D right finger, IV ABX, BC & wound CX obtained, Lab monitoring. Thank you,  HUDSON ValenzuelaN, RN, CDI Specialist  [phone] or [email or web address]  Options provided:  -- Acute organ dysfunction of GRACIA & metabolic encephalopathy is associated with sepsis  -- Acute organ dysfunction of  GRACIA, metabolic encephalopathy is unrelated to sepsis  -- Other - I will add my own diagnosis  -- Disagree - Not applicable / Not valid  -- Disagree - Clinically unable to determine / Unknown  -- Refer to Clinical Documentation Reviewer    PROVIDER RESPONSE TEXT:    This patient has acute organ dysfunction of GRACIA & metabolic encephalopathy associated with sepsis.     Query created by: Brii Guerrero on 3/29/2022 6:35 PM      Electronically signed by:  Merlin Fanny MD Melburn Bales MD 3/30/2022 8:47 AM

## 2022-03-30 NOTE — PROGRESS NOTES
PROGRESS NOTE    Patient: China Mims MRN: [id number]  SSN: [id number]    YOB: 1960  Age: 60 y.o. Sex: male      Admit Date: 3/22/2022    LOS: 8 days       Subjective     Chief Complaint   Patient presents with    Finger Swelling       HPI: China Mims is a(n) 60 y.o. male with PMH significant for etoh abuse, encephalopathy, cirrhosis, seizure disorder, gout presents with c/o swelling to right 2nd finger. Patient is nonverbal at baseline and dependent on all ADL at [address] [place] [address]. According to nursing home staff, has future orthopedic appointment for this finger. Is non-verbal at baseline but appears awake, alert, and in no obvious distress.     Patient was recently discharged from the hospital after having a seizures at that time patient received Keppra for the seizures patient had paracentesis done during the last admission also endoscopy done and had banding of the varices     I&D performed on finger in ED with purulent fluid drained. Dressing in place covering the DIP. 1g vancomycin given in ED, switched to zosyn on admission. Wound cultures sent. XR of the finger shows soft tissue swelling in gas consistent with osteomyelitis, septic joint, or gouty arthropathy.     Labs: Hgb 9.6 (baseline). Creatinine 2.51. CRP 3.15. ESR 35. Wound and blood cultures pending. 3/24:  Patient seen resting in bed. Nonverbal at baseline. Rectal temperature 91.1F per nursing staff. Blood pressure soft, patient may be septic. Vanc and Zosyn started per ID consult. Lactate 3.4. Blood and wound cultures still pending. Per orthopedic surgery, patient to have debridement of joint today under local anesthesia. He will likely need IV abx for 4-6 weeks for septic joint. Per nephrology, the patients increased creatinine of 2.5 may be due to amiloride, which is now held. Iron studies also obtained for Hgb 9.6. Labs remarkable for: Hgb 9.6. Creatinine 2.47.    3/25:  Patient seen in ICU.  Denies finger pain. Rectal temp 36.2C. I&D moved to today d/t transfer to ICU yesterday. Per ID, initial wound culture shows coag negative staph and continue on vanc and zosyn pending final culture result. Blood cultures still pending. Labs remarkable for Hgb 9.2, Creatinine 2.59, Albumin 1.8. Lactate decreased from 3.4 to 2.6. Ammonia 39. Pt had Doppler studies done this morning which shows    · No evidence of acute DVT in the right upper extremity. · Thrombus noted within the right cephalic forearm vein(s).    3/28:  Patient seen resting comfortably in bed, remains at baseline mental status. Current temp 36C and patient still on warming blanket. Patient had I&D of right index finger on 3/25 which revealed purulent drainage mixed with gouty tophi. DIP joint thoroughly irrigated and cultures sent. Cultures show few WBC and \"scant probably enterococcus. \" Patient remains on vanc and zosyn. Over the weekend, levothyroxine increased to 50mcg d/t elevated TSH. He was also started on midodrine 5mg TID for hypotension and was given hypotonic fluids for hypernatremia      Labs from 3/27 show Hgb 8.5, Na 149, creatinine 2.43, albumin 2.2    3/29:  Patient seen resting comfortably in bed. Denies any pain. Abdomen very distended, worse than yesterday. Patient receiving NG tube feeds. Lower extremity edema also present. Patient also receiving 1 unit packed RBC for Hgb 6. 6. platelets 53, Na 220, Creatinine 2.31. Patient still hypothermic    3/30:  Patient remains hypothermic at 95.7F. Denies finger pain, chest pain, palpitations, shortness of breath, abdominal pain. Seen by nephrology who notes creatinine gradually decreasing. Baseline is around 1.7. Seen by speech therapy who will continue to advance diet to include some solids today. Culture from DIP joint shows staph epidermidis susceptible to vancomycin, which patient is already on. Hgb 7.8, platelets 57, creatinine 2.09, Na 148. KUB shows abdominal distension consistent with known history of ascites. Last paracentesis 3/24 with 4.3L fluid removed. Past Medical History:   Diagnosis Date    Arthritis      Hypertension                 Past Surgical History:   Procedure Laterality Date    IR PARACENTESIS ABD W IMAGE   1/21/2022    IR PARACENTESIS ABD W IMAGE   3/1/2022                 Prior to Admission medications    Medication Sig Start Date End Date Taking? Authorizing Provider   levothyroxine (SYNTHROID) 25 mcg tablet Take 25 mcg by mouth Daily (before breakfast).     Yes Provider, Historical   acetaminophen (TylenoL) 325 mg tablet Take 650 mg by mouth every six (6) hours as needed for Pain.     Yes Provider, Historical   tamsulosin (Flomax) 0.4 mg capsule Take 1 Capsule by mouth daily. 3/4/22     Craig Whyte PA-C   potassium chloride (K-DUR, KLOR-CON M20) 20 mEq tablet Take 1 Tablet by mouth daily. 3/2/22     Otilia Andujar MD   sodium bicarbonate 650 mg tablet Take 1 Tablet by mouth three (3) times daily. 3/2/22     Ros Echeverria MD   chlordiazePOXIDE (LIBRIUM) 5 mg capsule Take 2 Capsules by mouth three (3) times daily as needed for Anxiety. Max Daily Amount: 30 mg. 3/2/22     Ros Echeverria MD   levETIRAcetam (Keppra) 1,000 mg tablet Take 1.5 Tablets by mouth two (2) times a day. 3/2/22     Ros Echeverria MD   ergocalciferol (ERGOCALCIFEROL) 1,250 mcg (50,000 unit) capsule Take 1 Capsule by mouth every seven (7) days. 2/9/22     Provider, Historical   lactulose (CHRONULAC) 10 gram/15 mL solution Take 15 mL by mouth three (3) times daily. 1/23/22     Rashawn Ackerman MD   allopurinoL (ZYLOPRIM) 300 mg tablet Take 1 Tablet by mouth daily.  1/18/22     Provider, Historical   thiamine HCL (B-1) 100 mg tablet Take 100 mg by mouth daily.       Provider, Historical   propranoloL (INDERAL) 20 mg tablet Take 20 mg by mouth two (2) times a day.       Provider, Historical   folic acid (FOLVITE) 1 mg tablet Take 1 mg by mouth daily.       Provider, Historical   famotidine (PEPCID) 20 mg tablet Take 20 mg by mouth At bedtime.       Provider, Historical   aMILoride (MIDAMOR) 5 mg tablet Take 5 mg by mouth daily.       Provider, Historical         No Known Allergies      No family history on file. Reason unable to perform ROS: nonverbal at baseline. Objective     Visit Vitals  /85 (BP 1 Location: Left leg, BP Patient Position: At rest)   Pulse 75   Temp (!) 95.7 °F (35.4 °C)   Resp 18   Ht 6' 2.02\" (1.88 m)   Wt 84.3 kg (185 lb 13.6 oz)   SpO2 97%   BMI 23.85 kg/m²    O2 Flow Rate (L/min): 2 l/min O2 Device: None (Room air)    Physical Exam:   Constitutional:       General: He is not in acute distress. HENT:      Head: Normocephalic and atraumatic. Cardiovascular:      Rate and Rhythm: Normal rate and regular rhythm. Pulses: Normal pulses. Heart sounds: Normal heart sounds. Pulmonary:      Effort: Pulmonary effort is normal.      Breath sounds: Normal breath sounds. Abdominal:      General: There is distension. Tenderness: There is no abdominal tenderness. There is no guarding or rebound. Skin:     Findings: Erythema present. Comments: Swelling to the right 2nd digit   Neurological:      Mental Status: Mental status is at baseline. Comments: Nonverbal     Intake & Output:  Current Shift: No intake/output data recorded. Last three shifts: 03/28 1901 - 03/30 0700  In: 1231.8 [I.V.:250]  Out: 450 [Urine:450]    Lab/Data Review:   All lab data reviewed      24 Hour Results:    Recent Results (from the past 24 hour(s))   GLUCOSE, POC    Collection Time: 03/29/22 11:10 AM   Result Value Ref Range    Glucose (POC) 91 65 - 117 mg/dL    Performed by Gilford Dub, RANDOM    Collection Time: 03/30/22  3:50 AM   Result Value Ref Range    Vancomycin, random 15.7 ug/mL   CBC W/O DIFF    Collection Time: 03/30/22  3:50 AM   Result Value Ref Range    WBC 6.4 4.1 - 11.1 K/uL RBC 2.77 (L) 4.10 - 5.70 M/uL    HGB 7.8 (L) 12.1 - 17.0 g/dL    HCT 23.1 (L) 36.6 - 50.3 %    MCV 83.4 80.0 - 99.0 FL    MCH 28.2 26.0 - 34.0 PG    MCHC 33.8 30.0 - 36.5 g/dL    RDW 18.4 (H) 11.5 - 14.5 %    PLATELET 57 (L) 684 - 400 K/uL    NRBC 0.0 0.0  WBC    ABSOLUTE NRBC 0.00 0.00 - 5.93 K/uL   METABOLIC PANEL, COMPREHENSIVE    Collection Time: 03/30/22  3:50 AM   Result Value Ref Range    Sodium 148 (H) 136 - 145 mmol/L    Potassium 3.6 3.5 - 5.1 mmol/L    Chloride 122 (H) 97 - 108 mmol/L    CO2 19 (L) 21 - 32 mmol/L    Anion gap 7 5 - 15 mmol/L    Glucose 96 65 - 100 mg/dL    BUN 24 (H) 6 - 20 mg/dL    Creatinine 2.09 (H) 0.70 - 1.30 mg/dL    BUN/Creatinine ratio 11 (L) 12 - 20      GFR est AA 39 (L) >60 ml/min/1.73m2    GFR est non-AA 32 (L) >60 ml/min/1.73m2    Calcium 8.9 8.5 - 10.1 mg/dL    Bilirubin, total 1.4 (H) 0.2 - 1.0 mg/dL    AST (SGOT) 20 15 - 37 U/L    ALT (SGPT) 11 (L) 12 - 78 U/L    Alk. phosphatase 84 45 - 117 U/L    Protein, total 5.8 (L) 6.4 - 8.2 g/dL    Albumin 2.5 (L) 3.5 - 5.0 g/dL    Globulin 3.3 2.0 - 4.0 g/dL    A-G Ratio 0.8 (L) 1.1 - 2.2           Imaging:    XR ABD (KUB)   Final Result      XR CHEST PORT   Final Result      XR CHEST PORT   Final Result   The cardiomediastinal silhouette is appropriate for age, technique,   and lung expansion. Pulmonary vasculature is not congested. The lungs are   essentially clear. No effusion or pneumothorax is seen. IR INSERT NON TUNL CVC OVER 5 YRS   Final Result      Technically successful insertion of non-tunneled triple-lumen catheter with US   guidance.       Plan:       The catheter may be used after catheter placement confirmation by x-ray.   _______________________________________________________________      PROCEDURE SUMMARY:   - Venous access with ultrasound guidance   - Non-tunneled central venous catheter insertion      PROCEDURE DETAILS:      Pre-procedure   Relevant imaging review: None    Informed consent for the procedure was obtained and time-out was performed prior   to the procedure. Prophylactic antibiotic administered: Not administered   Preparation: The site was prepared and draped using all elements of maximal   sterile barrier technique including sterile gloves, sterile gown, cap, mask,   large sterile sheet, sterile ultrasound probe cover, sterile ultrasound gel,   hand hygiene and cutaneous antisepsis with 2% chlorhexidine. Medical reason for site preparation exception: Not applicable      Anesthesia/sedation   Level of anesthesia: No sedation   Anesthesia administered by: Not applicable   Duration of anesthesia/sedation: 0 minutes. Access   The vessel was sonographically evaluated and judged to be patent and appropriate   for access and a permanent image was stored. 2 cc's of 1% lidocaine was   administered to the access site. Real time ultrasound was used to visualize   needle entry into the vessel. Vein accessed: Right internal jugular vein   Access technique: 4F micropuncture set      Catheter placement   The access site was dilated and the catheter was placed into the vein over a   wire and all guidewires were removed in their entirety. Catheter ports were   aspirated and flushed. Catheter tip location was verified by portable X-ray. Catheter size: 7 Albanian   Catheter length: 16 cm   Catheter tip position: Central. The tip is overlying the anatomic SVC. Catheter flush: Normal saline      Closure   The catheter was secured. A sterile dressing was applied. Catheter securement technique: Stat-Lock      Additional Details   Additional description of procedure: None   Additional findings: None   Additional equipment: None   Specimens removed: None   Estimated blood loss:  Less than 10 cc   Standardized report: [email or web address]_NonTunneledCatheter1.0               IR US GUIDED VASCULAR ACCESS   Final Result      Technically successful insertion of non-tunneled triple-lumen catheter with US   guidance. Plan:       The catheter may be used after catheter placement confirmation by x-ray.   _______________________________________________________________      PROCEDURE SUMMARY:   - Venous access with ultrasound guidance   - Non-tunneled central venous catheter insertion      PROCEDURE DETAILS:      Pre-procedure   Relevant imaging review: None    Informed consent for the procedure was obtained and time-out was performed prior   to the procedure. Prophylactic antibiotic administered: Not administered   Preparation: The site was prepared and draped using all elements of maximal   sterile barrier technique including sterile gloves, sterile gown, cap, mask,   large sterile sheet, sterile ultrasound probe cover, sterile ultrasound gel,   hand hygiene and cutaneous antisepsis with 2% chlorhexidine. Medical reason for site preparation exception: Not applicable      Anesthesia/sedation   Level of anesthesia: No sedation   Anesthesia administered by: Not applicable   Duration of anesthesia/sedation: 0 minutes. Access   The vessel was sonographically evaluated and judged to be patent and appropriate   for access and a permanent image was stored. 2 cc's of 1% lidocaine was   administered to the access site. Real time ultrasound was used to visualize   needle entry into the vessel. Vein accessed: Right internal jugular vein   Access technique: 4F micropuncture set      Catheter placement   The access site was dilated and the catheter was placed into the vein over a   wire and all guidewires were removed in their entirety. Catheter ports were   aspirated and flushed. Catheter tip location was verified by portable X-ray. Catheter size: 7 Libyan   Catheter length: 16 cm   Catheter tip position: Central. The tip is overlying the anatomic SVC. Catheter flush: Normal saline      Closure   The catheter was secured. A sterile dressing was applied.    Catheter securement technique: Stat-Lock      Additional Details Additional description of procedure: None   Additional findings: None   Additional equipment: None   Specimens removed: None   Estimated blood loss:  Less than 10 cc   Standardized report: [email or web address]_CVA_NonTunneledCatheter1.0               DUPLEX UPPER EXT VENOUS RIGHT   Final Result      XR 2ND FINGER RT MIN 2 V   Final Result      Progressive findings. The findings are nonspecific and could represent infection with osteomyelitis   and septic joint and gas in the soft tissues related to infection. Gout, or other inflammatory arthropathy is a possibility. Findings should be correlated with clinical parameters. Assessment   Hypothermia  Hypoglycemic  Hypotension  Bradycardia  Septic joint- right 2nd finger DIP  SEPSIS wound culturesgrows  Staphylococcus and Enterococcus  Anemia- transfuse if Hgb < 7.0 status post transfusion  Acute on chronic renal failure- creatinine 2.51 on admission     Hx etoh abuse  Cirrhosis of liver   History of encephalopathy  Seizure disorder  History of gout  Thrombocytopenia    · No evidence of acute DVT in the right upper extremity. · Thrombus noted within the right cephalic forearm vein(s).     Hypernatremia  Hypothyroidism    Plan     Bear hugger for hypothermia  SEPSIS- continue vanc and zosyn  Anemia- transfuse if Hgb < 7.0  Transfuse 1 units packed RBC  Order KUB   I&D of right 2nd finger performed in ED  Consult wound care  Consult pharmacy for vancomycin dosing  Consult nephrology  Consult ID  Speech therapy  PT OT      Allopurinol 300mg daily  amloride 5mg daily-- HOLD  Ergocalciferol 50,000 units weekly  Famotidine 43WY daily  Folic acid 1mg daily  Lactulose 10g/15mL TID  Keppra 1000mg BID  Levothyroxine 50mcg daily  Propanolol 20mg BID  Sodium bicarbonate 650mg TID  Thiamine 100mg daily     Flomax 0.4 mg to    IR consult for paracentesis      Unable to transfer to the floor due to hypothermia    Seen by infectious disease recommend to continue IV vancomycin Monitor renal function remained stable    Repeat the lab      Current Facility-Administered Medications:     [START ON 3/31/2022] Vancomycin random level to be drawn on 3/31/22 at 0400., , Other, ONCE, Ree Zaragoza MD    0.9% sodium chloride infusion 250 mL, 250 mL, IntraVENous, PRN, Susana Ackerman MD    sodium bicarbonate tablet 1,300 mg, 1,300 mg, Oral, BID, Aicha Perez MD, 1,300 mg at 03/30/22 0902    levETIRAcetam (KEPPRA) oral solution 1,000 mg, 1,000 mg, Oral, BID, Susana Ackerman MD, 1,000 mg at 03/30/22 0903    Vancomycin - Pharmacy to Dose, , Other, Rx Dosing/Monitoring, Moiz Gamez MD    levothyroxine (SYNTHROID) tablet 50 mcg, 50 mcg, Oral, 6am, Ree Zaragoza MD, 50 mcg at 03/30/22 0505    tamsulosin (FLOMAX) capsule 0.4 mg, 0.4 mg, Oral, DAILY, Yamila DENIS MD, 0.4 mg at 03/30/22 0903    sodium chloride (NS) flush 5-40 mL, 5-40 mL, IntraVENous, Q8H, Kristine Banuelos MD, 10 mL at 03/30/22 0600    sodium chloride (NS) flush 5-40 mL, 5-40 mL, IntraVENous, PRN, Jorge Huang MD    acetaminophen (TYLENOL) tablet 650 mg, 650 mg, Oral, Q8H, Ramírez Bray MD, 650 mg at 03/30/22 0505    HYDROcodone-acetaminophen (NORCO) 5-325 mg per tablet 1 Tablet, 1 Tablet, Oral, Q4H PRN, Jorge Huang MD    morphine injection 2 mg, 2 mg, IntraVENous, Q4H PRN, Jorge Huang MD    naloxone (NARCAN) injection 0.4 mg, 0.4 mg, IntraVENous, PRN, Jorge Huang MD    ondansetron (ZOFRAN) injection 4 mg, 4 mg, IntraVENous, Q4H PRN, Jorge Huang MD    allopurinoL (ZYLOPRIM) tablet 300 mg, 300 mg, Oral, DAILY, Ramírez Bray MD, 300 mg at 03/30/22 0903    [Held by provider] aMILoride (MIDAMOR) tablet 5 mg, 5 mg, Oral, DAILY, Lamberto Ackerman MD, 5 mg at 03/23/22 0900    ergocalciferol capsule 50,000 Units, 50,000 Units, Oral, Q7D, Ramírez Bray MD    famotidine (PEPCID) tablet 20 mg, 20 mg, Oral, QPM, Jorge Huang MD, 20 mg at 03/29/22 0777    folic acid (FOLVITE) tablet 1 mg, 1 mg, Oral, DAILY, Maria Elena Guerin MD, 1 mg at 03/30/22 0902    lactulose (CHRONULAC) 10 gram/15 mL solution 15 mL, 15 mL, Oral, TID, Ramírez Bray MD, 15 mL at 03/30/22 0902    propranoloL (INDERAL) tablet 20 mg, 20 mg, Oral, BID, Ramírez Bray MD, 20 mg at 03/30/22 0903    thiamine mononitrate (B-1) tablet 100 mg, 100 mg, Oral, DAILY, Ramírez Bray MD, 100 mg at 03/30/22 0903    acetaminophen (TYLENOL) tablet 650 mg, 650 mg, Oral, Q6H PRN, Maria Elena Guerin MD, 650 mg at 03/26/22 1410    Current Outpatient Medications   Medication Instructions    acetaminophen (TYLENOL) 650 mg, Oral, EVERY 6 HOURS AS NEEDED    allopurinoL (ZYLOPRIM) 300 mg tablet 1 Tablet, Oral, DAILY    aMILoride (MIDAMOR) 5 mg, Oral, DAILY    chlordiazePOXIDE (LIBRIUM) 10 mg, Oral, 3 TIMES DAILY AS NEEDED    ergocalciferol (ERGOCALCIFEROL) 1,250 mcg (50,000 unit) capsule 1 Capsule, Oral, EVERY 7 DAYS    famotidine (PEPCID) 20 mg, Oral, AT BEDTIME    folic acid (FOLVITE) 1 mg, Oral, DAILY    lactulose (CHRONULAC) 10 gram/15 mL solution 15 mL, Oral, 3 TIMES DAILY    levETIRAcetam (KEPPRA) 1,500 mg, Oral, 2 TIMES DAILY    levothyroxine (SYNTHROID) 25 mcg, Oral, DAILY BEFORE BREAKFAST    potassium chloride (K-DUR, KLOR-CON M20) 20 mEq tablet 20 mEq, Oral, DAILY    propranoloL (INDERAL) 20 mg, Oral, 2 TIMES DAILY    sodium bicarbonate 650 mg, Oral, 3 TIMES DAILY    tamsulosin (FLOMAX) 0.4 mg, Oral, DAILY    thiamine HCL (B-1) 100 mg, Oral, DAILY         Signed By: Pola Del Cid MD     March 30, 2022

## 2022-03-31 NOTE — PROGRESS NOTES
Nephrology Consult    Patient: Jay Jay Ann MRN: [id number]  SSN: [id number]    YOB: 1960  Age: 60 y.o. Sex: male      Subjective:     Patient seen in the ICU   Awake and not following verbal commands fully , seems confused  Doesn't appear to be in any distress   Family at bedside. Creatinine is improved to 1.8 today  Past Medical History:   Diagnosis Date    Arthritis     Hypertension      Past Surgical History:   Procedure Laterality Date    IR INSERT NON TUNL CVC OVER 5 YRS  3/25/2022    IR PARACENTESIS ABD W IMAGE  1/21/2022    IR PARACENTESIS ABD W IMAGE  3/1/2022    IR PARACENTESIS ABD W IMAGE  3/30/2022      No family history on file. Social History     Tobacco Use    Smoking status: Never Smoker    Smokeless tobacco: Never Used   Substance Use Topics    Alcohol use: Not on file      Current Facility-Administered Medications   Medication Dose Route Frequency Provider Last Rate Last Admin    [START ON 4/1/2022] Vancomycin random level to be drawn on 4/1/22 at 0600.    Other ONCE James Reddy MD        dextrose 5% infusion  50 mL/hr IntraVENous CONTINUOUS Paola Monsalve MD 50 mL/hr at 03/30/22 1649 50 mL/hr at 03/30/22 1649    0.9% sodium chloride infusion 250 mL  250 mL IntraVENous PRN Jackeline Ackerman MD        sodium bicarbonate tablet 1,300 mg  1,300 mg Oral BID Paola Monsalve MD   1,300 mg at 03/31/22 0237    levETIRAcetam (KEPPRA) oral solution 1,000 mg  1,000 mg Oral BID Lamberto Ackerman MD   1,000 mg at 03/31/22 7050    Vancomycin - Pharmacy to Dose   Other Rx Dosing/Monitoring James Reddy MD        levothyroxine (SYNTHROID) tablet 50 mcg  50 mcg Oral Karime Felton MD   50 mcg at 03/31/22 0530    tamsulosin (FLOMAX) capsule 0.4 mg  0.4 mg Oral DAILY Eddie DENIS MD   0.4 mg at 03/31/22 4756    sodium chloride (NS) flush 5-40 mL  5-40 mL IntraVENous Q8H Jose Martin Snider MD   10 mL at 03/31/22 1413    sodium chloride (NS) flush 5-40 mL  5-40 mL IntraVENous PRN Mireya Villalobos MD        acetaminophen (TYLENOL) tablet 650 mg  650 mg Oral Q8H Ramírez Bray MD   650 mg at 03/31/22 1412    HYDROcodone-acetaminophen (NORCO) 5-325 mg per tablet 1 Tablet  1 Tablet Oral Q4H PRN Lc Bray MD        morphine injection 2 mg  2 mg IntraVENous Q4H PRN Mireya Villalobos MD        naloxone (NARCAN) injection 0.4 mg  0.4 mg IntraVENous PRN Mireya Villalobos MD        ondansetron (ZOFRAN) injection 4 mg  4 mg IntraVENous Q4H PRN Mireya Villalobos MD        allopurinoL (ZYLOPRIM) tablet 300 mg  300 mg Oral DAILY Joey DANIELS MD   300 mg at 03/31/22 3629    [Held by provider] aMILoride (MIDAMOR) tablet 5 mg  5 mg Oral DAILY Lamberto Ackerman MD   5 mg at 03/23/22 0900    ergocalciferol capsule 50,000 Units  50,000 Units Oral Q7D Mireya Villalobos MD   50,000 Units at 03/31/22 0923    famotidine (PEPCID) tablet 20 mg  20 mg Oral QPM Joey DANIELS MD   20 mg at 91/79/79 2928    folic acid (FOLVITE) tablet 1 mg  1 mg Oral DAILY Ramírez Bray MD   1 mg at 03/31/22 0923    lactulose (CHRONULAC) 10 gram/15 mL solution 15 mL  15 mL Oral TID Mireya Villalobos MD   15 mL at 03/31/22 4431    propranoloL (INDERAL) tablet 20 mg  20 mg Oral BID Mireya Villalobos MD   20 mg at 03/31/22 8619    thiamine mononitrate (B-1) tablet 100 mg  100 mg Oral DAILY Ramírez Bray MD   100 mg at 03/31/22 8862    acetaminophen (TYLENOL) tablet 650 mg  650 mg Oral Q6H PRN Mireya Villalobos MD   650 mg at 03/26/22 1410        No Known Allergies    Review of Systems:  A comprehensive review of systems was negative except for that written in the History of Present Illness.     Objective:     Vitals:    03/31/22 0923 03/31/22 1000 03/31/22 1100 03/31/22 1128   BP: 109/76 119/81     Pulse: 72 70     Resp:  10     Temp:   (!) 96.4 °F (35.8 °C) (!) 96.7 °F (35.9 °C)   SpO2:  94%     Weight:       Height:            Physical Exam:  General: NAD , not oriented   Eyes: sclera anicteric  Oral Cavity: No thrush or ulcers  Neck: no JVD  Chest: Fair bilateral air entry  Heart: normal sounds  Abdomen: soft and non tender   : no fish  Lower Extremities: no edema  Skin: no rash          Assessment:     Hospital Problems  Never Reviewed          Codes Class Noted POA    Finger infection ICD-10-CM: L08.9  ICD-9-CM: 686.9  3/22/2022 Unknown              Plan:       1 Acute kidney injury on chronic kidney disease stage IIIA. -Etiology can be prerenal plus was on amiloride.    -On admission creatinine was elevated at 2.51.    -Cr is gradually improving.  2.5--> 2.3-->2.0-->1.8 today. baseline is unknown was 1.7 on 3/4/2022.    -no evidence of lower extremity edema.    -Hypervolemic with abd distension ,h/o repeated paracentesis (3/24  4.3 L)  -Continue to hold amiloride for now  -We will continue hypotonic IV fluids for now for hypernatremia  -He is not on any other potential nephrotoxins. 2.  Septic arthritis.    -He presented with swollen right hand finger diagnosed with septic arthritis status post I&D received IV antibiotics per orthopedic.  -on Vanc and zosyn  -S/p I&D on 3/25     3 Hypotension.    -Blood pressures is labile. Noted few low readings  -I will continue to hold amiloride.    -On no other blood pressure medications. -Give parameters to hold midodrine     4. Liver cirrhosis  -Has history of cirrhosis , portal HTN  -due to etoh   -Abd distension   -On Propranolol   -Albumin for volume expansion     4. Anemia.    -Hemoglobin is improved to 7.0 today  -We will start weekly Procrit    5. Metabolic acidosis. -CO2 is 19  -Lactic acidosis - due to decr Liver clearance and probably RTA as well . No diarrhea reported   -will increase sodium bicarb to 1300 bid. Significant bicarb deficit    6. Hpernatremia:  -Na 148  -D5W was restarted yesterday at 50 mils per hour.   I will increase the rate    Signed By: Mariela Becerril MD     March 31, 2022

## 2022-03-31 NOTE — PROGRESS NOTES
Consult for Vancomycin Dosing by Pharmacy by Dr. Curtistine Nissen provided for this 60y.o. year old male having GRACIA on CKD, for indication of bone and joint infection. Day of Therapy: 5    Goal of Level(s): 10-15mcg/dL     Other Current Antibiotics: Zosyn          Serum Creatinine Creatinine   Date/Time Value Ref Range Status   03/31/2022 04:20 AM 1.88 (H) 0.70 - 1.30 mg/dL Final   03/30/2022 03:50 AM 2.09 (H) 0.70 - 1.30 mg/dL Final   03/29/2022 03:30 AM 2.31 (H) 0.70 - 1.30 mg/dL Final      Creatinine Clearance Estimated Creatinine Clearance: 46.8 mL/min (A) (based on SCr of 1.88 mg/dL (H)). BUN Lab Results   Component Value Date/Time    BUN 23 (H) 03/31/2022 04:20 AM      WBC Lab Results   Component Value Date/Time    WBC 6.0 03/31/2022 04:20 AM      Temp Temp Readings from Last 1 Encounters:   03/31/22 97 °F (36.1 °C)      C-Reactive Protein C-Reactive protein   Date Value Ref Range Status   03/26/2022 1.59 (H) 0.00 - 0.60 mg/dL Final   03/22/2022 3.15 (H) 0.00 - 0.60 mg/dL Final      Procalcitonin No results found for: PCT     Last Level:    Vancomycin, random   Date/Time Value Ref Range Status   03/31/2022 04:20 AM 21.5 ug/mL Final     Comment:     No reference range has been established. Ht Readings from Last 1 Encounters:   03/28/22 188 cm (74.02\")        Wt Readings from Last 1 Encounters:   03/26/22 84.3 kg (185 lb 13.6 oz)     Ideal body weight: 82.2 kg (181 lb 4.8 oz)  Adjusted ideal body weight: 83.1 kg (183 lb 1.9 oz)     Previous Regimen: 1000 mg IV x1 (3/30 @ 0901 am)    New Regimen:   Vancomycin level today is  21.5  Give  Vancomycin 500mg IV x1 today at 1100. A random level has been scheduled for tomorrow 4/1/22 at 0600 am to re-dose. Pharmacy to follow daily and will make changes to dose and/or frequency based on clinical status.      _________________________________     Pharmacist Ethan Sprague PHARMD

## 2022-03-31 NOTE — PROGRESS NOTES
PROGRESS NOTE    Patient: Jerica Cordova MRN: [id number]  SSN: [id number]    YOB: 1960  Age: 60 y.o. Sex: male      Admit Date: 3/22/2022    LOS: 9 days       Subjective     Chief Complaint   Patient presents with    Finger Swelling       HPI: Jerica Cordova is a(n) 60 y.o. male with PMH significant for etoh abuse, encephalopathy, cirrhosis, seizure disorder, gout presents with c/o swelling to right 2nd finger. Patient is nonverbal at baseline and dependent on all ADL at [address] [place] [address]. According to nursing home staff, has future orthopedic appointment for this finger. Is non-verbal at baseline but appears awake, alert, and in no obvious distress.     Patient was recently discharged from the hospital after having a seizures at that time patient received Keppra for the seizures patient had paracentesis done during the last admission also endoscopy done and had banding of the varices     I&D performed on finger in ED with purulent fluid drained. Dressing in place covering the DIP. 1g vancomycin given in ED, switched to zosyn on admission. Wound cultures sent. XR of the finger shows soft tissue swelling in gas consistent with osteomyelitis, septic joint, or gouty arthropathy.     Labs: Hgb 9.6 (baseline). Creatinine 2.51. CRP 3.15. ESR 35. Wound and blood cultures pending. 3/24:  Patient seen resting in bed. Nonverbal at baseline. Rectal temperature 91.1F per nursing staff. Blood pressure soft, patient may be septic. Vanc and Zosyn started per ID consult. Lactate 3.4. Blood and wound cultures still pending. Per orthopedic surgery, patient to have debridement of joint today under local anesthesia. He will likely need IV abx for 4-6 weeks for septic joint. Per nephrology, the patients increased creatinine of 2.5 may be due to amiloride, which is now held. Iron studies also obtained for Hgb 9.6. Labs remarkable for: Hgb 9.6. Creatinine 2.47.    3/25:  Patient seen in ICU.  Denies finger pain. Rectal temp 36.2C. I&D moved to today d/t transfer to ICU yesterday. Per ID, initial wound culture shows coag negative staph and continue on vanc and zosyn pending final culture result. Blood cultures still pending. Labs remarkable for Hgb 9.2, Creatinine 2.59, Albumin 1.8. Lactate decreased from 3.4 to 2.6. Ammonia 39. Pt had Doppler studies done this morning which shows    · No evidence of acute DVT in the right upper extremity. · Thrombus noted within the right cephalic forearm vein(s).    3/28:  Patient seen resting comfortably in bed, remains at baseline mental status. Current temp 36C and patient still on warming blanket. Patient had I&D of right index finger on 3/25 which revealed purulent drainage mixed with gouty tophi. DIP joint thoroughly irrigated and cultures sent. Cultures show few WBC and \"scant probably enterococcus. \" Patient remains on vanc and zosyn. Over the weekend, levothyroxine increased to 50mcg d/t elevated TSH. He was also started on midodrine 5mg TID for hypotension and was given hypotonic fluids for hypernatremia      Labs from 3/27 show Hgb 8.5, Na 149, creatinine 2.43, albumin 2.2    3/29:  Patient seen resting comfortably in bed. Denies any pain. Abdomen very distended, worse than yesterday. Patient receiving NG tube feeds. Lower extremity edema also present. Patient also receiving 1 unit packed RBC for Hgb 6. 6. platelets 53, Na 302, Creatinine 2.31. Patient still hypothermic    3/30:  Patient remains hypothermic at 95.7F. Denies finger pain, chest pain, palpitations, shortness of breath, abdominal pain. Seen by nephrology who notes creatinine gradually decreasing. Baseline is around 1.7. Seen by speech therapy who will continue to advance diet to include some solids today. Culture from DIP joint shows staph epidermidis susceptible to vancomycin, which patient is already on. Hgb 7.8, platelets 57, creatinine 2.09, Na 148. KUB shows abdominal distension consistent with known history of ascites. Last paracentesis 3/24 with 4.3L fluid removed. 3/31:  Patient seen resting in bed. Denies any acute complaints. Remains hypothermic at 35.7. Probe replaced by RN to ensure accuracy. Patient currently on pureed solid diet. He was seen by urology for urinary retention yesterday who plans to leave Gerard in for now and follow outpatient. Patient should stay on Flomax 0.4mg. Patient also had paracentesis yesterday with 8400cc fluid removed with 62.5g albumin given. Abdomen much less distended today. Hgb 7.0, Creatinine 1.88, albumin 2.7. Past Medical History:   Diagnosis Date    Arthritis      Hypertension                 Past Surgical History:   Procedure Laterality Date    IR PARACENTESIS ABD W IMAGE   1/21/2022    IR PARACENTESIS ABD W IMAGE   3/1/2022                 Prior to Admission medications    Medication Sig Start Date End Date Taking? Authorizing Provider   levothyroxine (SYNTHROID) 25 mcg tablet Take 25 mcg by mouth Daily (before breakfast).     Yes Provider, Historical   acetaminophen (TylenoL) 325 mg tablet Take 650 mg by mouth every six (6) hours as needed for Pain.     Yes Provider, Historical   tamsulosin (Flomax) 0.4 mg capsule Take 1 Capsule by mouth daily. 3/4/22     Alin Whyte PA-C   potassium chloride (K-DUR, KLOR-CON M20) 20 mEq tablet Take 1 Tablet by mouth daily. 3/2/22     Marcia Andujar MD   sodium bicarbonate 650 mg tablet Take 1 Tablet by mouth three (3) times daily. 3/2/22     Liz Bermudez MD   chlordiazePOXIDE (LIBRIUM) 5 mg capsule Take 2 Capsules by mouth three (3) times daily as needed for Anxiety. Max Daily Amount: 30 mg. 3/2/22     Liz Bermudez MD   levETIRAcetam (Keppra) 1,000 mg tablet Take 1.5 Tablets by mouth two (2) times a day.  3/2/22     Liz Bermudez MD   ergocalciferol (ERGOCALCIFEROL) 1,250 mcg (50,000 unit) capsule Take 1 Capsule by mouth every seven (7) days. 2/9/22     Provider, Historical   lactulose (CHRONULAC) 10 gram/15 mL solution Take 15 mL by mouth three (3) times daily. 1/23/22     Amanda Ackerman MD   allopurinoL (ZYLOPRIM) 300 mg tablet Take 1 Tablet by mouth daily. 1/18/22     Provider, Historical   thiamine HCL (B-1) 100 mg tablet Take 100 mg by mouth daily.       Provider, Historical   propranoloL (INDERAL) 20 mg tablet Take 20 mg by mouth two (2) times a day.       Provider, Historical   folic acid (FOLVITE) 1 mg tablet Take 1 mg by mouth daily.       Provider, Historical   famotidine (PEPCID) 20 mg tablet Take 20 mg by mouth At bedtime.       Provider, Historical   aMILoride (MIDAMOR) 5 mg tablet Take 5 mg by mouth daily.       Provider, Historical         No Known Allergies      No family history on file. Reason unable to perform ROS: nonverbal at baseline. Objective     Visit Vitals  /81 (BP 1 Location: Left arm, BP Patient Position: At rest)   Pulse 70   Temp 97 °F (36.1 °C)   Resp 10   Ht 6' 2.02\" (1.88 m)   Wt 84.3 kg (185 lb 13.6 oz)   SpO2 94%   BMI 23.85 kg/m²    O2 Flow Rate (L/min): 2 l/min O2 Device: None (Room air)    Physical Exam:   Constitutional:       General: He is not in acute distress. HENT:      Head: Normocephalic and atraumatic. Cardiovascular:      Rate and Rhythm: Normal rate and regular rhythm. Pulses: Normal pulses. Heart sounds: Normal heart sounds. Pulmonary:      Effort: Pulmonary effort is normal.      Breath sounds: Normal breath sounds. Abdominal:      General: There is distension. Tenderness: There is no abdominal tenderness. There is no guarding or rebound. Skin:     Findings: Erythema present. Comments: Swelling to the right 2nd digit   Neurological:      Mental Status: Mental status is at baseline. Comments: Nonverbal     Intake & Output:  Current Shift: No intake/output data recorded.   Last three shifts: 03/29 1901 - 03/31 0700  In: 1424.2 [P.O.:550; I.V.:874.2]  Out: 750 [Urine:750]    Lab/Data Review: All lab data reviewed      24 Hour Results:    Recent Results (from the past 24 hour(s))   VANCOMYCIN, RANDOM    Collection Time: 03/31/22  4:20 AM   Result Value Ref Range    Vancomycin, random 21.5 ug/mL   CBC W/O DIFF    Collection Time: 03/31/22  4:20 AM   Result Value Ref Range    WBC 6.0 4.1 - 11.1 K/uL    RBC 2.46 (L) 4.10 - 5.70 M/uL    HGB 7.0 (L) 12.1 - 17.0 g/dL    HCT 20.6 (L) 36.6 - 50.3 %    MCV 83.7 80.0 - 99.0 FL    MCH 28.5 26.0 - 34.0 PG    MCHC 34.0 30.0 - 36.5 g/dL    RDW 18.4 (H) 11.5 - 14.5 %    PLATELET 53 (L) 053 - 400 K/uL    MPV 12.5 8.9 - 12.9 FL    NRBC 0.0 0.0  WBC    ABSOLUTE NRBC 0.00 0.00 - 5.40 K/uL   METABOLIC PANEL, COMPREHENSIVE    Collection Time: 03/31/22  4:20 AM   Result Value Ref Range    Sodium 148 (H) 136 - 145 mmol/L    Potassium 3.3 (L) 3.5 - 5.1 mmol/L    Chloride 122 (H) 97 - 108 mmol/L    CO2 19 (L) 21 - 32 mmol/L    Anion gap 7 5 - 15 mmol/L    Glucose 93 65 - 100 mg/dL    BUN 23 (H) 6 - 20 mg/dL    Creatinine 1.88 (H) 0.70 - 1.30 mg/dL    BUN/Creatinine ratio 12 12 - 20      GFR est AA 44 (L) >60 ml/min/1.73m2    GFR est non-AA 36 (L) >60 ml/min/1.73m2    Calcium 8.8 8.5 - 10.1 mg/dL    Bilirubin, total 1.1 (H) 0.2 - 1.0 mg/dL    AST (SGOT) 18 15 - 37 U/L    ALT (SGPT) 10 (L) 12 - 78 U/L    Alk. phosphatase 59 45 - 117 U/L    Protein, total 5.4 (L) 6.4 - 8.2 g/dL    Albumin 2.7 (L) 3.5 - 5.0 g/dL    Globulin 2.7 2.0 - 4.0 g/dL    A-G Ratio 1.0 (L) 1.1 - 2.2           Imaging:    IR PARACENTESIS ABD W IMAGE   Final Result   Successful paracentesis. XR ABD (KUB)   Final Result      XR CHEST PORT   Final Result      XR CHEST PORT   Final Result   The cardiomediastinal silhouette is appropriate for age, technique,   and lung expansion. Pulmonary vasculature is not congested. The lungs are   essentially clear. No effusion or pneumothorax is seen.          IR INSERT NON TUNL CVC OVER 5 YRS   Final Result Technically successful insertion of non-tunneled triple-lumen catheter with US   guidance. Plan:       The catheter may be used after catheter placement confirmation by x-ray.   _______________________________________________________________      PROCEDURE SUMMARY:   - Venous access with ultrasound guidance   - Non-tunneled central venous catheter insertion      PROCEDURE DETAILS:      Pre-procedure   Relevant imaging review: None    Informed consent for the procedure was obtained and time-out was performed prior   to the procedure. Prophylactic antibiotic administered: Not administered   Preparation: The site was prepared and draped using all elements of maximal   sterile barrier technique including sterile gloves, sterile gown, cap, mask,   large sterile sheet, sterile ultrasound probe cover, sterile ultrasound gel,   hand hygiene and cutaneous antisepsis with 2% chlorhexidine. Medical reason for site preparation exception: Not applicable      Anesthesia/sedation   Level of anesthesia: No sedation   Anesthesia administered by: Not applicable   Duration of anesthesia/sedation: 0 minutes. Access   The vessel was sonographically evaluated and judged to be patent and appropriate   for access and a permanent image was stored. 2 cc's of 1% lidocaine was   administered to the access site. Real time ultrasound was used to visualize   needle entry into the vessel. Vein accessed: Right internal jugular vein   Access technique: 4F micropuncture set      Catheter placement   The access site was dilated and the catheter was placed into the vein over a   wire and all guidewires were removed in their entirety. Catheter ports were   aspirated and flushed. Catheter tip location was verified by portable X-ray. Catheter size: 7 [place]   Catheter length: 16 cm   Catheter tip position: Central. The tip is overlying the anatomic SVC. Catheter flush: Normal saline      Closure   The catheter was secured.  A sterile dressing was applied. Catheter securement technique: Stat-Lock      Additional Details   Additional description of procedure: None   Additional findings: None   Additional equipment: None   Specimens removed: None   Estimated blood loss:  Less than 10 cc   Standardized report: [email or web address]               IR US GUIDED VASCULAR ACCESS   Final Result      Technically successful insertion of non-tunneled triple-lumen catheter with US   guidance. Plan:       The catheter may be used after catheter placement confirmation by x-ray.   _______________________________________________________________      PROCEDURE SUMMARY:   - Venous access with ultrasound guidance   - Non-tunneled central venous catheter insertion      PROCEDURE DETAILS:      Pre-procedure   Relevant imaging review: None    Informed consent for the procedure was obtained and time-out was performed prior   to the procedure. Prophylactic antibiotic administered: Not administered   Preparation: The site was prepared and draped using all elements of maximal   sterile barrier technique including sterile gloves, sterile gown, cap, mask,   large sterile sheet, sterile ultrasound probe cover, sterile ultrasound gel,   hand hygiene and cutaneous antisepsis with 2% chlorhexidine. Medical reason for site preparation exception: Not applicable      Anesthesia/sedation   Level of anesthesia: No sedation   Anesthesia administered by: Not applicable   Duration of anesthesia/sedation: 0 minutes. Access   The vessel was sonographically evaluated and judged to be patent and appropriate   for access and a permanent image was stored. 2 cc's of 1% lidocaine was   administered to the access site. Real time ultrasound was used to visualize   needle entry into the vessel.  Vein accessed: Right internal jugular vein   Access technique: 4F micropuncture set      Catheter placement   The access site was dilated and the catheter was placed into the vein over a   wire and all guidewires were removed in their entirety. Catheter ports were   aspirated and flushed. Catheter tip location was verified by portable X-ray. Catheter size: 7 [place]   Catheter length: 16 cm   Catheter tip position: Central. The tip is overlying the anatomic SVC. Catheter flush: Normal saline      Closure   The catheter was secured. A sterile dressing was applied. Catheter securement technique: Stat-Lock      Additional Details   Additional description of procedure: None   Additional findings: None   Additional equipment: None   Specimens removed: None   Estimated blood loss:  Less than 10 cc   Standardized report: [email or web address]_NonTunneledCatheter1.0               DUPLEX UPPER EXT VENOUS RIGHT   Final Result      XR 2ND FINGER RT MIN 2 V   Final Result      Progressive findings. The findings are nonspecific and could represent infection with osteomyelitis   and septic joint and gas in the soft tissues related to infection. Gout, or other inflammatory arthropathy is a possibility. Findings should be correlated with clinical parameters. Assessment   Hypothermia  Hypoglycemic  Hypotension  Bradycardia  Septic joint- right 2nd finger DIP  SEPSIS wound culturesgrows  Staphylococcus and Enterococcus  Anemia- transfuse if Hgb < 7.0 status post transfusion  Acute on chronic renal failure- creatinine 2.51 on admission     Hx etoh abuse  Cirrhosis of liver   History of encephalopathy  Seizure disorder  History of gout  Thrombocytopenia    · No evidence of acute DVT in the right upper extremity. · Thrombus noted within the right cephalic forearm vein(s).     Hypernatremia  Hypothyroidism    Plan     Bear hugger for hypothermia  SEPSIS- continue vanc and zosyn  Anemia- transfuse if Hgb < 7.0  Transfuse 1 units packed RBC  Order KUB   I&D of right 2nd finger performed in ED  Consult wound care  Consult pharmacy for vancomycin dosing  Consult nephrology  Consult ID  Speech therapy  PT OT      Allopurinol 300mg daily  amloride 5mg daily-- HOLD  Ergocalciferol 50,000 units weekly  Famotidine 17ZU daily  Folic acid 1mg daily  Lactulose 10g/15mL TID  Keppra 1000mg BID  Levothyroxine 50mcg daily  Propanolol 20mg BID  Sodium bicarbonate 650mg TID  Thiamine 100mg daily     Flomax 0.4 mg to    IR consult for paracentesis      Unable to transfer to the floor due to hypothermia    Seen by infectious disease recommend to continue IV vancomycin     Monitor renal function remained stable    Repeat the lab      Current Facility-Administered Medications:     vancomycin (VANCOCIN) 500 mg in 0.9% sodium chloride (MBP/ADV) 100 mL MBP, 500 mg, IntraVENous, ONCE, Ree Zaragoza MD    [START ON 4/1/2022] Vancomycin random level to be drawn on 4/1/22 at 0600., , Other, ONCE, Ree Zaragoza MD    potassium chloride SR (KLOR-CON 10) tablet 40 mEq, 40 mEq, Oral, NOW, Lamberto Ackerman MD    dextrose 5% infusion, 50 mL/hr, IntraVENous, CONTINUOUS, Janeth Fernandez MD, Last Rate: 50 mL/hr at 03/30/22 1649, 50 mL/hr at 03/30/22 1649    0.9% sodium chloride infusion 250 mL, 250 mL, IntraVENous, PRN, Lamberto Ackerman MD    sodium bicarbonate tablet 1,300 mg, 1,300 mg, Oral, BID, Janeth Fernandez MD, 1,300 mg at 03/31/22 5672    levETIRAcetam (KEPPRA) oral solution 1,000 mg, 1,000 mg, Oral, BID, Lamberto Ackerman MD, 1,000 mg at 03/31/22 9302    Vancomycin - Pharmacy to Dose, , Other, Rx Dosing/Monitoring, Ree Zaragoza MD    levothyroxine (SYNTHROID) tablet 50 mcg, 50 mcg, Oral, 6am, Ree Zaragoza MD, 50 mcg at 03/31/22 0530    tamsulosin (FLOMAX) capsule 0.4 mg, 0.4 mg, Oral, DAILY, Richard Rapp MD, 0.4 mg at 03/31/22 0924    sodium chloride (NS) flush 5-40 mL, 5-40 mL, IntraVENous, Q8H, Ander Cruz MD, 10 mL at 03/31/22 0531    sodium chloride (NS) flush 5-40 mL, 5-40 mL, IntraVENous, PRN, Ramírez Bray MD    acetaminophen (TYLENOL) tablet 650 mg, 650 mg, Oral, Q8H, Maureen Goddard MD, 650 mg at 03/31/22 0530    HYDROcodone-acetaminophen (NORCO) 5-325 mg per tablet 1 Tablet, 1 Tablet, Oral, Q4H PRN, Alek Bray MD    morphine injection 2 mg, 2 mg, IntraVENous, Q4H PRN, Ramírez Bray MD    naloxone (NARCAN) injection 0.4 mg, 0.4 mg, IntraVENous, PRN, Maureen Goddard MD    ondansetron (ZOFRAN) injection 4 mg, 4 mg, IntraVENous, Q4H PRN, Maureen Goddard MD    allopurinoL (ZYLOPRIM) tablet 300 mg, 300 mg, Oral, DAILY, Maureen Goddard MD, 300 mg at 03/31/22 8562    [Held by provider] aMILoride (MIDAMOR) tablet 5 mg, 5 mg, Oral, DAILY, Lamberto Ackerman MD, 5 mg at 03/23/22 0900    ergocalciferol capsule 50,000 Units, 50,000 Units, Oral, Q7D, Ramírez Bray MD, 50,000 Units at 03/31/22 0923    famotidine (PEPCID) tablet 20 mg, 20 mg, Oral, QPM, Maureen Goddard MD, 20 mg at 40/34/55 7539    folic acid (FOLVITE) tablet 1 mg, 1 mg, Oral, DAILY, aMureen Goddard MD, 1 mg at 03/31/22 0923    lactulose (CHRONULAC) 10 gram/15 mL solution 15 mL, 15 mL, Oral, TID, Ramírez Bray MD, 15 mL at 03/31/22 8916    propranoloL (INDERAL) tablet 20 mg, 20 mg, Oral, BID, Maureen Goddard MD, 20 mg at 03/31/22 4779    thiamine mononitrate (B-1) tablet 100 mg, 100 mg, Oral, DAILY, Ramírez Bray MD, 100 mg at 03/31/22 0334    acetaminophen (TYLENOL) tablet 650 mg, 650 mg, Oral, Q6H PRN, Maureen Goddard MD, 650 mg at 03/26/22 1410    Current Outpatient Medications   Medication Instructions    acetaminophen (TYLENOL) 650 mg, Oral, EVERY 6 HOURS AS NEEDED    allopurinoL (ZYLOPRIM) 300 mg tablet 1 Tablet, Oral, DAILY    aMILoride (MIDAMOR) 5 mg, Oral, DAILY    chlordiazePOXIDE (LIBRIUM) 10 mg, Oral, 3 TIMES DAILY AS NEEDED    ergocalciferol (ERGOCALCIFEROL) 1,250 mcg (50,000 unit) capsule 1 Capsule, Oral, EVERY 7 DAYS    famotidine (PEPCID) 20 mg, Oral, AT BEDTIME    folic acid (FOLVITE) 1 mg, Oral, DAILY    lactulose (CHRONULAC) 10 gram/15 mL solution 15 mL, Oral, 3 TIMES DAILY    levETIRAcetam (KEPPRA) 1,500 mg, Oral, 2 TIMES DAILY    levothyroxine (SYNTHROID) 25 mcg, Oral, DAILY BEFORE BREAKFAST    potassium chloride (K-DUR, KLOR-CON M20) 20 mEq tablet 20 mEq, Oral, DAILY    propranoloL (INDERAL) 20 mg, Oral, 2 TIMES DAILY    sodium bicarbonate 650 mg, Oral, 3 TIMES DAILY    tamsulosin (FLOMAX) 0.4 mg, Oral, DAILY    thiamine HCL (B-1) 100 mg, Oral, DAILY         Signed By: Hilary Barr MD     March 31, 2022

## 2022-03-31 NOTE — PROGRESS NOTES
SPEECH LANGUAGE PATHOLOGY DYSPHAGIA TREATMENT  Patient: Destiny Duenas (68 y.o. male)  Date: 3/31/2022  Diagnosis: Finger infection [L08.9] <principal problem not specified>  Procedure(s) (LRB):  INCISION & DRAINAGE of Right Index Finger (Right) 6 Days Post-Op  Precautions:      ASSESSMENT :  Patient is A&Ox2 and follows basic commands. Wife at bedside, reports patient recently went to SNF and plans to take him home, she reports he was never a LTC patient and does not wish for him to be a LTC patient. Wife reports poor PO intake for months w/ weight loss. RN reports did well with breakfast no coughing. PO trials w/ lunch tray puree, mildly thick and thin liquids. Oral phase c/b slow bolus manipulation  w/ delayed A-P transit of all consistencies. Pharyngeal phase c/b mild swallow delay and HLE is wfl upon palpation. Overt s/s of pen/asp w/ thin c/b delayed cough x1. Wife reports she is pleased with his intake. Patient will benefit from skilled intervention to address the above impairments. Patients rehabilitation potential is considered to be Fair      PLAN :  Recommendations and Planned Interventions:  Rec diet upgrade to puree/mildly thick w/ strict asp/GERD precautions and crush meds. Free water protocol b/w meals following oral care. Frequency/Duration: Patient will be followed by speech-language pathology 3 times a week to address goals. Discharge Recommendations: Skilled Nursing Facility     SUBJECTIVE:   robb at bedside, reports patient recently went to SNF and plans to take him home, she reports he was never a LTC patient and does not wish for him to be a LTC patient. Wife reports poor PO intake for months w/ weight loss. She denies dysphagia at home but does report he did better with puree texture than he does at home.    OBJECTIVE:     Past Medical History:   Diagnosis Date    Arthritis     Hypertension        CXR Results  (Last 48 hours)      None            Current Diet:  DIET ADULT  DIET ADULT ORAL NUTRITION SUPPLEMENT     Cognitive and Communication Status:  Neurologic State: Alert,Confused  Orientation Level: Oriented to person,Oriented to place  Cognition: Impaired decision making  Perception: Appears intact  Perseveration: No perseveration noted  Safety/Judgement: Decreased awareness of environment,Decreased insight into deficits      Pain:  Pain Scale 1: FLACC  Pain Intensity 1: 0       After treatment:   Patient left in no apparent distress in bed, Call bell within reach, Nursing notified, Caregiver / family present, and Bed / chair alarm activated    COMMUNICATION/EDUCATION:   Wife receptive of education regarding diet recs, s/s aspiration , aspiration precautions and swallow strategies.      Thank you for this referral.  Krista Peralta M.S., M.Ed., CCC-SLP  Time Calculation: 15 mins

## 2022-03-31 NOTE — PROGRESS NOTES
Comprehensive Nutrition Assessment    Type and Reason for Visit: Reassess (interim)    Nutrition Recommendations/Plan:   Continue current diet, advance texture/consistency per SLP  Ensure Enlive TID (1050kcals, 60g pro)    D5 at 100ml/hr providing 408kcals/d    Nutrition Assessment:  Admitted for finger infection, (3/25) I/D. RD familiar with pt from last admit where pt required intubation and banding of esophageal varices. Intakes <50% at this time. Remains in ICU for hypothermia and low HR. (3/30) Paracentesis with 8.4L removed. (3/28) Pt inappropriate for PO per SLP d/t decreased alertness, TF started and continued for <24hrs before NGT removed and diet advanced by SLP. Intakes <50% of meals, per family at bedside, pt typically eats very poorly but drinks 3-4 Ensure daily. RD to add ONS, educated to drinks in-between meals to promote intakes of solid foods. Labs: H/H 7.0/20.6, Na 148, K+ 3.3, BUN 23, cR 1.88, alt 10, TBili1.1, Alb 2. 7. Meds: Allopurinol, D5 at 100ml/hr, Vit D3 weekly, Pepcid, Folic Acid, Lactulose TID, Keppra, levothyroxine, B1, Vancomycin. Malnutrition Assessment:  Malnutrition Status:  Severe malnutrition    Context:  Chronic illness     Findings of the 6 clinical characteristics of malnutrition:   Energy Intake:  7 - 50% or less of est energy requirements for 5 or more days (NPO/poor PO)  Weight Loss:  Unable to assess     Body Fat Loss:  1 - Mild body fat loss, Buccal region,Triceps   Muscle Mass Loss:  7 - Moderate muscle mass loss, Calf,Clavicles (pectoralis & deltoids),Scapula (trapezius),Temples (temporalis)  Fluid Accumulation:  No significant fluid accumulation,        Estimated Daily Nutrient Needs:  Energy (kcal): 1998-2331kcals (MSJ x1.2-1.4); Weight Used for Energy Requirements: Other (specify) (EDW 80kg)  Protein (g): 96-120g (1.2-1.5g/kg); Weight Used for Protein Requirements: Other (specify) (80kg EDW)  Fluid (ml/day): 1600-2000ml (20-25ml/kg EDW);  Method Used for Fluid Requirements: ml/kg      Nutrition Related Findings:  NFPE finding mild-mod muscle/fat wasting. NGT now removed. SLP following, rec'd puree/mildly thick liquids. No N/V/D/C, last BM 3/31. No edema       Wounds:    Surgical incision,Stage II (Blisters d/t edema; Stage 2 Gluteal cleft; R-Hand incision)       Current Nutrition Therapies:  ADULT DIET Dysphagia - Pureed; Mildly Thick (Nectar)  ADULT ORAL NUTRITION SUPPLEMENT Breakfast, Lunch, Dinner; Standard High Calorie/High Protein    Anthropometric Measures:  · Height:  6' 2.02\" (188 cm)  · Current Body Wt:  84.3 kg (185 lb 13.6 oz)   · Admission Body Wt:       · Usual Body Wt:        · Ideal Body Wt:  190 lbs:  97.8 %   · Adjusted Body Weight:   ; Weight Adjustment for:  (EDW 80kg)   · Adjusted BMI:       · BMI Category:  Normal weight (BMI 18.5-24. 9)       Nutrition Diagnosis:   · In context of chronic illness,Moderate malnutrition related to inadequate protein-energy intake (2/2 EtOH abuse) as evidenced by mild loss of subcutaneous fat,moderate muscle loss,intake 26-50%,poor intake prior to admission      Nutrition Interventions:   Food and/or Nutrient Delivery: Continue current diet,Start oral nutrition supplement  Nutrition Education and Counseling: No recommendations at this time  Coordination of Nutrition Care: Continue to monitor while inpatient,Speech therapy    Goals:  Meet >/=50% of EENs in >5 days, Wt maintenance within +/-0.5kg in >5 days, wound healing       Nutrition Monitoring and Evaluation:   Behavioral-Environmental Outcomes: None identified  Food/Nutrient Intake Outcomes: Diet advancement/tolerance,Food and nutrient intake,Supplement intake  Physical Signs/Symptoms Outcomes: Weight,Skin,Chewing or swallowing,Fluid status or edema,Biochemical data,Meal time behavior    Discharge Planning:    Continue oral nutrition supplement     Electronically signed by Willie Graves on 3/31/2022 at 5:59 PM    Contact: Ext [phone], or via [employer]

## 2022-03-31 NOTE — WOUND CARE
IP WOUND CONSULT    [address] [place] [address] RECORD NUMBER:  [id number]  AGE: 60 y.o. GENDER: male  : 1960  TODAY'S DATE:  3/31/2022    GENERAL     [x] Follow-up   [] New Consult    Florence Rodriguez is a 60 y.o. male referred by:   [] Physician  [] Nursing  [] Other:         PAST MEDICAL HISTORY    Past Medical History:   Diagnosis Date    Arthritis     Hypertension         PAST SURGICAL HISTORY    Past Surgical History:   Procedure Laterality Date    IR INSERT NON TUNL CVC OVER 5 YRS  3/25/2022    IR PARACENTESIS ABD W IMAGE  2022    IR PARACENTESIS ABD W IMAGE  3/1/2022    IR PARACENTESIS ABD W IMAGE  3/30/2022       FAMILY HISTORY    No family history on file. ALLERGIES    No Known Allergies    MEDICATIONS    No current facility-administered medications on file prior to encounter. Current Outpatient Medications on File Prior to Encounter   Medication Sig Dispense Refill    levothyroxine (SYNTHROID) 25 mcg tablet Take 25 mcg by mouth Daily (before breakfast).  acetaminophen (TylenoL) 325 mg tablet Take 650 mg by mouth every six (6) hours as needed for Pain.  tamsulosin (Flomax) 0.4 mg capsule Take 1 Capsule by mouth daily. 30 Capsule 1    potassium chloride (K-DUR, KLOR-CON M20) 20 mEq tablet Take 1 Tablet by mouth daily. 30 Tablet 0    sodium bicarbonate 650 mg tablet Take 1 Tablet by mouth three (3) times daily. 90 Tablet 0    chlordiazePOXIDE (LIBRIUM) 5 mg capsule Take 2 Capsules by mouth three (3) times daily as needed for Anxiety. Max Daily Amount: 30 mg. 9 Capsule 0    levETIRAcetam (Keppra) 1,000 mg tablet Take 1.5 Tablets by mouth two (2) times a day. 60 Tablet 0    ergocalciferol (ERGOCALCIFEROL) 1,250 mcg (50,000 unit) capsule Take 1 Capsule by mouth every seven (7) days.  lactulose (CHRONULAC) 10 gram/15 mL solution Take 15 mL by mouth three (3) times daily. 200 mL 0    allopurinoL (ZYLOPRIM) 300 mg tablet Take 1 Tablet by mouth daily.       thiamine HCL (B-1) 100 mg tablet Take 100 mg by mouth daily.  propranoloL (INDERAL) 20 mg tablet Take 20 mg by mouth two (2) times a day.  folic acid (FOLVITE) 1 mg tablet Take 1 mg by mouth daily.  famotidine (PEPCID) 20 mg tablet Take 20 mg by mouth At bedtime.  aMILoride (MIDAMOR) 5 mg tablet Take 5 mg by mouth daily. [unfilled]  Visit Vitals  /81 (BP 1 Location: Left arm, BP Patient Position: At rest)   Pulse 70   Temp (!) 96.7 °F (35.9 °C)   Resp 10   Ht 6' 2.02\" (1.88 m)   Wt 84.3 kg (185 lb 13.6 oz)   SpO2 94%   BMI 23.85 kg/m²       ASSESSMENT     Wound Identification & Type: Unstagable PI to sacrum, slough covering wound bed  Dressing change: Yes, Opticel AG and foam  Verbal consent for picture: Non-verbal    Contributing Factors: chronic pressure, decreased mobility, shear force, incontinence of stool, incontinence of urine and Hx of Alcohol abuse    Wound Gluteal fold/cleft Posterior blisters noted 02/25/22 (Active)   Wound Image    03/31/22 1234   Wound Etiology Pressure Unstageable 03/31/22 1234   Dressing Status New dressing applied 03/31/22 1234   Cleansed Cleansed with saline 03/31/22 1234   Dressing/Treatment Alginate with Ag; Foam 03/31/22 1234   Dressing Change Due 04/01/22 03/31/22 1234   Wound Length (cm) 9.7 cm 03/31/22 1234   Wound Width (cm) 2.7 cm 03/31/22 1234   Wound Depth (cm) 0.2 cm 03/31/22 1234   Wound Surface Area (cm^2) 26.19 cm^2 03/31/22 1234   Change in Wound Size % 3 03/31/22 1234   Wound Volume (cm^3) 5.238 cm^3 03/31/22 1234   Wound Healing % -94 03/31/22 1234   Wound Assessment Slough 03/31/22 1234   Drainage Amount Small 03/31/22 1234   Drainage Description Yellow 03/31/22 1234   Wound Odor None 03/31/22 1234   Peyton-Wound/Incision Assessment Intact 03/31/22 1234   Edges Defined edges 03/31/22 1234   Number of days: 34       Wound Arm upper Anterior;Right open blister from edema 03/25/22 (Active)   Wound Etiology Other (Comment) 03/31/22 1227 Dressing Status Clean;Dry; Intact 03/31/22 1227   Dressing/Treatment Foam 03/31/22 1227   Wound Assessment Pink/red 03/31/22 1227   Drainage Amount None 03/31/22 1227   Wound Odor None 03/31/22 1227   Peyton-Wound/Incision Assessment Intact 03/31/22 1227   Number of days: 6       Wound Arm upper Anterior;Distal;Right blister open from edema 03/25/22 (Active)   Wound Etiology Other (Comment) 03/31/22 1228   Dressing Status Clean;Dry; Intact 03/31/22 1228   Dressing/Treatment Foam 03/31/22 1228   Wound Assessment Pink/red 03/31/22 1228   Drainage Amount None 03/31/22 1228   Wound Odor None 03/31/22 1228   Peyton-Wound/Incision Assessment Intact 03/31/22 1228   Number of days: 6       Incision 03/25/22 Hand Right (Active)   Dressing Status New dressing applied 03/30/22 1527   Dressing Change Due 03/31/22 03/30/22 1527   Cleansed Irrigated with saline;Cleansed with saline 03/29/22 1500   Dressing/Treatment Gauze dressing/dressing sponge 03/30/22 1527   Closure Sutures 03/30/22 1527   Drainage Amount Scant 03/30/22 1527   Drainage Description Pink 03/30/22 1527   Number of days: 6          PLAN     Skin Care & Pressure Relief Recommendations  Speciality bed [place]  Minimize layers of linen  Pads under patient to optimize support surface  Turn/reposition approximately every 2 hours  Pillow wedges  Manage incontinence   Promote continence; Skin Protective lotion/cream to buttocks and sacrum daily and as needed with incontinence care  Offload heels pillows    Curtis 14  Blood Glucose:  111                            Albumin:  2.7  WBCs:   6.0    Support Surface: Keny bed    Physician/Provider notified:   Recommendations: Patient PI is unstageable due to wound bed covered with yellow/green slough. MASD area noted to buttocks is well healing. Patient is incontinent of bowel and bladder, has a fish in place to aide with urine incontinence. Wound dressing orders changed to Opticel AG and foam to sacrum.  Wound was cleansed with NS, covered with Opticel Ag and Optifoam Sacral dressing was applied to area. Blisters noted to right upper arm; thin layer of therahoney to be applied to wound bed and covered with Optifoam gentle lite. Dressing to sacrum and right upper arm are to be changed daily and PRN for soiling. Patient is able to lay on side but needs wedge to assist in keeping patient turned on side. Use foam body alignment wedge to turn patient q2h at 30 degree angle or more to offload sacrum and buttocks to prevent pressure related skin injury. Heels should be floated on 2-3 pillows at all times while in bed, so that they are not touching any surface, to allow for offloading and to assist with pressure injury prevention. HOB to be maintained at 30 degrees or less, if not contraindicated, to aide in reducing pressure on sacrum. Elevate FOB to reduce friction and shear from patient sliding down in bed. [facility] will continue to follow, please re-consult if skin condition changes.      Discharge Wound Care Needs: TBD    Teaching completed with:   [] Patient           [] Family member       [] Caregiver          [] Nursing  [] Other    Patient/Caregiver Teaching:  Level of patient/caregiver understanding able to:   [] Indicates understanding       [] Needs reinforcement  [] Unsuccessful      [] Verbal Understanding  [] Demonstrated understanding       [] No evidence of learning  [] Refused teaching         [] N/A       Electronically signed by Les White on 3/31/2022 at 12:36 PM

## 2022-03-31 NOTE — PROGRESS NOTES
PROGRESS NOTE    Patient: Deanna Castillo MRN: [id number]  SSN: [id number]    YOB: 1960  Age: 60 y.o. Sex: male      Admit Date: 3/22/2022    LOS: 9 days       Subjective     Chief Complaint   Patient presents with    Finger Swelling       HPI: Deanna Castillo is a(n) 60 y.o. male with PMH significant for etoh abuse, encephalopathy, cirrhosis, seizure disorder, gout presents with c/o swelling to right 2nd finger. Patient is nonverbal at baseline and dependent on all ADL at [address] [place] [address]. According to nursing home staff, has future orthopedic appointment for this finger. Is non-verbal at baseline but appears awake, alert, and in no obvious distress.     Patient was recently discharged from the hospital after having a seizures at that time patient received Keppra for the seizures patient had paracentesis done during the last admission also endoscopy done and had banding of the varices     I&D performed on finger in ED with purulent fluid drained. Dressing in place covering the DIP. 1g vancomycin given in ED, switched to zosyn on admission. Wound cultures sent. XR of the finger shows soft tissue swelling in gas consistent with osteomyelitis, septic joint, or gouty arthropathy.     Labs: Hgb 9.6 (baseline). Creatinine 2.51. CRP 3.15. ESR 35. Wound and blood cultures pending. 3/24:  Patient seen resting in bed. Nonverbal at baseline. Rectal temperature 91.1F per nursing staff. Blood pressure soft, patient may be septic. Vanc and Zosyn started per ID consult. Lactate 3.4. Blood and wound cultures still pending. Per orthopedic surgery, patient to have debridement of joint today under local anesthesia. He will likely need IV abx for 4-6 weeks for septic joint. Per nephrology, the patients increased creatinine of 2.5 may be due to amiloride, which is now held. Iron studies also obtained for Hgb 9.6. Labs remarkable for: Hgb 9.6. Creatinine 2.47.    3/25:  Patient seen in ICU.  Denies finger pain. Rectal temp 36.2C. I&D moved to today d/t transfer to ICU yesterday. Per ID, initial wound culture shows coag negative staph and continue on vanc and zosyn pending final culture result. Blood cultures still pending. Labs remarkable for Hgb 9.2, Creatinine 2.59, Albumin 1.8. Lactate decreased from 3.4 to 2.6. Ammonia 39. Pt had Doppler studies done this morning which shows    · No evidence of acute DVT in the right upper extremity. · Thrombus noted within the right cephalic forearm vein(s).    3/28:  Patient seen resting comfortably in bed, remains at baseline mental status. Current temp 36C and patient still on warming blanket. Patient had I&D of right index finger on 3/25 which revealed purulent drainage mixed with gouty tophi. DIP joint thoroughly irrigated and cultures sent. Cultures show few WBC and \"scant probably enterococcus. \" Patient remains on vanc and zosyn. Over the weekend, levothyroxine increased to 50mcg d/t elevated TSH. He was also started on midodrine 5mg TID for hypotension and was given hypotonic fluids for hypernatremia      Labs from 3/27 show Hgb 8.5, Na 149, creatinine 2.43, albumin 2.2    3/29:  Patient seen resting comfortably in bed. Denies any pain. Abdomen very distended, worse than yesterday. Patient receiving NG tube feeds. Lower extremity edema also present. Patient also receiving 1 unit packed RBC for Hgb 6. 6. platelets 53, Na 296, Creatinine 2.31. Patient still hypothermic    3/30:  Patient remains hypothermic at 95.7F. Denies finger pain, chest pain, palpitations, shortness of breath, abdominal pain. Seen by nephrology who notes creatinine gradually decreasing. Baseline is around 1.7. Seen by speech therapy who will continue to advance diet to include some solids today. Culture from DIP joint shows staph epidermidis susceptible to vancomycin, which patient is already on. Hgb 7.8, platelets 57, creatinine 2.09, Na 148. KUB shows abdominal distension consistent with known history of ascites. Last paracentesis 3/24 with 4.3L fluid removed. 3/31:  Patient seen resting in bed. Denies any acute complaints. Remains hypothermic at 35.7. Probe replaced by RN to ensure accuracy. Patient currently on pureed solid diet. He was seen by urology for urinary retention yesterday who plans to leave Gerard in for now and follow outpatient. Patient should stay on Flomax 0.4mg. Patient also had paracentesis yesterday with 8400cc fluid removed with 62.5g albumin given. Abdomen much less distended today. Hgb 7.0, Creatinine 1.88, albumin 2.7. Past Medical History:   Diagnosis Date    Arthritis      Hypertension                 Past Surgical History:   Procedure Laterality Date    IR PARACENTESIS ABD W IMAGE   1/21/2022    IR PARACENTESIS ABD W IMAGE   3/1/2022                 Prior to Admission medications    Medication Sig Start Date End Date Taking? Authorizing Provider   levothyroxine (SYNTHROID) 25 mcg tablet Take 25 mcg by mouth Daily (before breakfast).     Yes Provider, Historical   acetaminophen (TylenoL) 325 mg tablet Take 650 mg by mouth every six (6) hours as needed for Pain.     Yes Provider, Historical   tamsulosin (Flomax) 0.4 mg capsule Take 1 Capsule by mouth daily. 3/4/22     Morgan Whyte PA-C   potassium chloride (K-DUR, KLOR-CON M20) 20 mEq tablet Take 1 Tablet by mouth daily. 3/2/22     Zev Andujar MD   sodium bicarbonate 650 mg tablet Take 1 Tablet by mouth three (3) times daily. 3/2/22     Claudetta Reins, MD   chlordiazePOXIDE (LIBRIUM) 5 mg capsule Take 2 Capsules by mouth three (3) times daily as needed for Anxiety. Max Daily Amount: 30 mg. 3/2/22     Claudetta Reins, MD   levETIRAcetam (Keppra) 1,000 mg tablet Take 1.5 Tablets by mouth two (2) times a day.  3/2/22     Claudetta Reins, MD   ergocalciferol (ERGOCALCIFEROL) 1,250 mcg (50,000 unit) capsule Take 1 Capsule by mouth every seven (7) days. 2/9/22     Provider, Historical   lactulose (CHRONULAC) 10 gram/15 mL solution Take 15 mL by mouth three (3) times daily. 1/23/22     Gloria Ackerman MD   allopurinoL (ZYLOPRIM) 300 mg tablet Take 1 Tablet by mouth daily. 1/18/22     Provider, Historical   thiamine HCL (B-1) 100 mg tablet Take 100 mg by mouth daily.       Provider, Historical   propranoloL (INDERAL) 20 mg tablet Take 20 mg by mouth two (2) times a day.       Provider, Historical   folic acid (FOLVITE) 1 mg tablet Take 1 mg by mouth daily.       Provider, Historical   famotidine (PEPCID) 20 mg tablet Take 20 mg by mouth At bedtime.       Provider, Historical   aMILoride (MIDAMOR) 5 mg tablet Take 5 mg by mouth daily.       Provider, Historical         No Known Allergies      No family history on file. Reason unable to perform ROS: nonverbal at baseline. Objective     Visit Vitals  /76   Pulse 72   Temp 97 °F (36.1 °C)   Resp 11   Ht 6' 2.02\" (1.88 m)   Wt 185 lb 13.6 oz (84.3 kg)   SpO2 100%   BMI 23.85 kg/m²    O2 Flow Rate (L/min): 2 l/min O2 Device: None (Room air)    Physical Exam:   Constitutional:       General: He is not in acute distress. HENT:      Head: Normocephalic and atraumatic. Cardiovascular:      Rate and Rhythm: Normal rate and regular rhythm. Pulses: Normal pulses. Heart sounds: Normal heart sounds. Pulmonary:      Effort: Pulmonary effort is normal.      Breath sounds: Normal breath sounds. Abdominal:      General: There is distension. Tenderness: There is no abdominal tenderness. There is no guarding or rebound. Skin:     Findings: Erythema present. Comments: Swelling to the right 2nd digit   Neurological:      Mental Status: Mental status is at baseline. Comments: Nonverbal     Intake & Output:  Current Shift: No intake/output data recorded. Last three shifts: 03/29 1901 - 03/31 0700  In: 1424.2 [P.O.:550; I.V.:874.2]  Out: 750 [Urine:750]    Lab/Data Review:   All lab data reviewed      24 Hour Results:    Recent Results (from the past 24 hour(s))   VANCOMYCIN, RANDOM    Collection Time: 03/31/22  4:20 AM   Result Value Ref Range    Vancomycin, random 21.5 ug/mL   CBC W/O DIFF    Collection Time: 03/31/22  4:20 AM   Result Value Ref Range    WBC 6.0 4.1 - 11.1 K/uL    RBC 2.46 (L) 4.10 - 5.70 M/uL    HGB 7.0 (L) 12.1 - 17.0 g/dL    HCT 20.6 (L) 36.6 - 50.3 %    MCV 83.7 80.0 - 99.0 FL    MCH 28.5 26.0 - 34.0 PG    MCHC 34.0 30.0 - 36.5 g/dL    RDW 18.4 (H) 11.5 - 14.5 %    PLATELET 53 (L) 848 - 400 K/uL    MPV 12.5 8.9 - 12.9 FL    NRBC 0.0 0.0  WBC    ABSOLUTE NRBC 0.00 0.00 - 2.21 K/uL   METABOLIC PANEL, COMPREHENSIVE    Collection Time: 03/31/22  4:20 AM   Result Value Ref Range    Sodium 148 (H) 136 - 145 mmol/L    Potassium 3.3 (L) 3.5 - 5.1 mmol/L    Chloride 122 (H) 97 - 108 mmol/L    CO2 19 (L) 21 - 32 mmol/L    Anion gap 7 5 - 15 mmol/L    Glucose 93 65 - 100 mg/dL    BUN 23 (H) 6 - 20 mg/dL    Creatinine 1.88 (H) 0.70 - 1.30 mg/dL    BUN/Creatinine ratio 12 12 - 20      GFR est AA 44 (L) >60 ml/min/1.73m2    GFR est non-AA 36 (L) >60 ml/min/1.73m2    Calcium 8.8 8.5 - 10.1 mg/dL    Bilirubin, total 1.1 (H) 0.2 - 1.0 mg/dL    AST (SGOT) 18 15 - 37 U/L    ALT (SGPT) 10 (L) 12 - 78 U/L    Alk. phosphatase 59 45 - 117 U/L    Protein, total 5.4 (L) 6.4 - 8.2 g/dL    Albumin 2.7 (L) 3.5 - 5.0 g/dL    Globulin 2.7 2.0 - 4.0 g/dL    A-G Ratio 1.0 (L) 1.1 - 2.2           Imaging:    IR PARACENTESIS ABD W IMAGE   Final Result   Successful paracentesis. XR ABD (KUB)   Final Result      XR CHEST PORT   Final Result      XR CHEST PORT   Final Result   The cardiomediastinal silhouette is appropriate for age, technique,   and lung expansion. Pulmonary vasculature is not congested. The lungs are   essentially clear. No effusion or pneumothorax is seen.          IR INSERT NON TUNL CVC OVER 5 YRS   Final Result      Technically successful insertion of non-tunneled triple-lumen catheter with US   guidance. Plan:       The catheter may be used after catheter placement confirmation by x-ray.   _______________________________________________________________      PROCEDURE SUMMARY:   - Venous access with ultrasound guidance   - Non-tunneled central venous catheter insertion      PROCEDURE DETAILS:      Pre-procedure   Relevant imaging review: None    Informed consent for the procedure was obtained and time-out was performed prior   to the procedure. Prophylactic antibiotic administered: Not administered   Preparation: The site was prepared and draped using all elements of maximal   sterile barrier technique including sterile gloves, sterile gown, cap, mask,   large sterile sheet, sterile ultrasound probe cover, sterile ultrasound gel,   hand hygiene and cutaneous antisepsis with 2% chlorhexidine. Medical reason for site preparation exception: Not applicable      Anesthesia/sedation   Level of anesthesia: No sedation   Anesthesia administered by: Not applicable   Duration of anesthesia/sedation: 0 minutes. Access   The vessel was sonographically evaluated and judged to be patent and appropriate   for access and a permanent image was stored. 2 cc's of 1% lidocaine was   administered to the access site. Real time ultrasound was used to visualize   needle entry into the vessel. Vein accessed: Right internal jugular vein   Access technique: 4F micropuncture set      Catheter placement   The access site was dilated and the catheter was placed into the vein over a   wire and all guidewires were removed in their entirety. Catheter ports were   aspirated and flushed. Catheter tip location was verified by portable X-ray. Catheter size: 7 Marshallese   Catheter length: 16 cm   Catheter tip position: Central. The tip is overlying the anatomic SVC. Catheter flush: Normal saline      Closure   The catheter was secured. A sterile dressing was applied.    Catheter securement technique: Stat-Lock      Additional Details   Additional description of procedure: None   Additional findings: None   Additional equipment: None   Specimens removed: None   Estimated blood loss:  Less than 10 cc   Standardized report: [email or web address]_CVA_NonTunneledCatheter1.0               IR US GUIDED VASCULAR ACCESS   Final Result      Technically successful insertion of non-tunneled triple-lumen catheter with US   guidance. Plan:       The catheter may be used after catheter placement confirmation by x-ray.   _______________________________________________________________      PROCEDURE SUMMARY:   - Venous access with ultrasound guidance   - Non-tunneled central venous catheter insertion      PROCEDURE DETAILS:      Pre-procedure   Relevant imaging review: None    Informed consent for the procedure was obtained and time-out was performed prior   to the procedure. Prophylactic antibiotic administered: Not administered   Preparation: The site was prepared and draped using all elements of maximal   sterile barrier technique including sterile gloves, sterile gown, cap, mask,   large sterile sheet, sterile ultrasound probe cover, sterile ultrasound gel,   hand hygiene and cutaneous antisepsis with 2% chlorhexidine. Medical reason for site preparation exception: Not applicable      Anesthesia/sedation   Level of anesthesia: No sedation   Anesthesia administered by: Not applicable   Duration of anesthesia/sedation: 0 minutes. Access   The vessel was sonographically evaluated and judged to be patent and appropriate   for access and a permanent image was stored. 2 cc's of 1% lidocaine was   administered to the access site. Real time ultrasound was used to visualize   needle entry into the vessel.  Vein accessed: Right internal jugular vein   Access technique: 4F micropuncture set      Catheter placement   The access site was dilated and the catheter was placed into the vein over a   wire and all guidewires were removed in their entirety. Catheter ports were   aspirated and flushed. Catheter tip location was verified by portable X-ray. Catheter size: 7 [place]   Catheter length: 16 cm   Catheter tip position: Central. The tip is overlying the anatomic SVC. Catheter flush: Normal saline      Closure   The catheter was secured. A sterile dressing was applied. Catheter securement technique: Stat-Lock      Additional Details   Additional description of procedure: None   Additional findings: None   Additional equipment: None   Specimens removed: None   Estimated blood loss:  Less than 10 cc   Standardized report: SIR_CVA_NonTunneledCatheter1.0               DUPLEX UPPER EXT VENOUS RIGHT   Final Result      XR 2ND FINGER RT MIN 2 V   Final Result      Progressive findings. The findings are nonspecific and could represent infection with osteomyelitis   and septic joint and gas in the soft tissues related to infection. Gout, or other inflammatory arthropathy is a possibility. Findings should be correlated with clinical parameters. Assessment   Hypothermia  Hypoglycemic  Hypotension  Bradycardia  Septic joint- right 2nd finger DIP  SEPSIS wound culturesgrows  Staphylococcus and Enterococcus  Anemia- transfuse if Hgb < 7.0 status post transfusion  Acute on chronic renal failure- creatinine 2.51 on admission     Hx etoh abuse  Cirrhosis of liver   History of encephalopathy  Seizure disorder  History of gout  Thrombocytopenia    · No evidence of acute DVT in the right upper extremity. · Thrombus noted within the right cephalic forearm vein(s).     Hypernatremia  Hypothyroidism    Plan     Bear hugger for hypothermia  SEPSIS- continue vanc and zosyn  Anemia- transfuse if Hgb < 7.0  Transfuse 1 units packed RBC  Order KUB   I&D of right 2nd finger performed in ED  Consult wound care  Consult pharmacy for vancomycin dosing  Consult nephrology  Consult ID  Speech therapy  PT OT      Allopurinol 300mg daily  amloride 5mg daily-- HOLD  Ergocalciferol 50,000 units weekly  Famotidine 24JX daily  Folic acid 1mg daily  Lactulose 10g/15mL TID  Keppra 1000mg BID  Levothyroxine 50mcg daily  Propanolol 20mg BID  Sodium bicarbonate 650mg TID  Thiamine 100mg daily     Flomax 0.4 mg to    IR consult for paracentesis      Unable to transfer to the floor due to hypothermia    Seen by infectious disease recommend to continue IV vancomycin     Monitor renal function remained stable    Repeat the lab      Current Facility-Administered Medications:     vancomycin (VANCOCIN) 500 mg in 0.9% sodium chloride (MBP/ADV) 100 mL MBP, 500 mg, IntraVENous, ONCE, Ree Zaragoza MD    [START ON 4/1/2022] Vancomycin random level to be drawn on 4/1/22 at 0600., , Other, ONCE, Ree Zaragoza MD    dextrose 5% infusion, 50 mL/hr, IntraVENous, CONTINUOUS, Anitra Gamboa MD, Last Rate: 50 mL/hr at 03/30/22 1649, 50 mL/hr at 03/30/22 1649    0.9% sodium chloride infusion 250 mL, 250 mL, IntraVENous, PRN, Lamberto Ackerman MD    sodium bicarbonate tablet 1,300 mg, 1,300 mg, Oral, BID, Antira Gamboa MD, 1,300 mg at 03/31/22 1468    levETIRAcetam (KEPPRA) oral solution 1,000 mg, 1,000 mg, Oral, BID, Lamberto Ackerman MD, 1,000 mg at 03/31/22 7408    Vancomycin - Pharmacy to Dose, , Other, Rx Dosing/Monitoring, Ree Zaragoza MD    levothyroxine (SYNTHROID) tablet 50 mcg, 50 mcg, Oral, 6am, Ree Zaragoza MD, 50 mcg at 03/31/22 0530    tamsulosin (FLOMAX) capsule 0.4 mg, 0.4 mg, Oral, DAILY, Richard Rapp MD, 0.4 mg at 03/31/22 0924    sodium chloride (NS) flush 5-40 mL, 5-40 mL, IntraVENous, Q8H, Ander Cruz MD, 10 mL at 03/31/22 0531    sodium chloride (NS) flush 5-40 mL, 5-40 mL, IntraVENous, PRN, Ramírez Bray MD    acetaminophen (TYLENOL) tablet 650 mg, 650 mg, Oral, Q8H, Ramírez Bray MD, 650 mg at 03/31/22 0530    HYDROcodone-acetaminophen (NORCO) 5-325 mg per tablet 1 Tablet, 1 Tablet, Oral, Q4H PRN, Tc Santo MD    morphine injection 2 mg, 2 mg, IntraVENous, Q4H PRN, Ramírez Bray MD    naloxone (NARCAN) injection 0.4 mg, 0.4 mg, IntraVENous, PRN, Tc Santo MD    ondansetron (ZOFRAN) injection 4 mg, 4 mg, IntraVENous, Q4H PRN, Tc Santo MD    allopurinoL (ZYLOPRIM) tablet 300 mg, 300 mg, Oral, DAILY, Tc Santo MD, 300 mg at 03/31/22 3385    [Held by provider] aMILoride (MIDAMOR) tablet 5 mg, 5 mg, Oral, DAILY, Lamberto Ackerman MD, 5 mg at 03/23/22 0900    ergocalciferol capsule 50,000 Units, 50,000 Units, Oral, Q7D, Ramírez Bray MD, 50,000 Units at 03/31/22 9302    famotidine (PEPCID) tablet 20 mg, 20 mg, Oral, QPM, Tc Santo MD, 20 mg at 86/81/38 2039    folic acid (FOLVITE) tablet 1 mg, 1 mg, Oral, DAILY, Tc aSnto MD, 1 mg at 03/31/22 3351    lactulose (CHRONULAC) 10 gram/15 mL solution 15 mL, 15 mL, Oral, TID, Ramírez Bray MD, 15 mL at 03/31/22 0591    propranoloL (INDERAL) tablet 20 mg, 20 mg, Oral, BID, Tc Santo MD, 20 mg at 03/31/22 6611    thiamine mononitrate (B-1) tablet 100 mg, 100 mg, Oral, DAILY, Tc Santo MD, 100 mg at 03/31/22 8267    acetaminophen (TYLENOL) tablet 650 mg, 650 mg, Oral, Q6H PRN, Tc Santo MD, 650 mg at 03/26/22 1410    Current Outpatient Medications   Medication Instructions    acetaminophen (TYLENOL) 650 mg, Oral, EVERY 6 HOURS AS NEEDED    allopurinoL (ZYLOPRIM) 300 mg tablet 1 Tablet, Oral, DAILY    aMILoride (MIDAMOR) 5 mg, Oral, DAILY    chlordiazePOXIDE (LIBRIUM) 10 mg, Oral, 3 TIMES DAILY AS NEEDED    ergocalciferol (ERGOCALCIFEROL) 1,250 mcg (50,000 unit) capsule 1 Capsule, Oral, EVERY 7 DAYS    famotidine (PEPCID) 20 mg, Oral, AT BEDTIME    folic acid (FOLVITE) 1 mg, Oral, DAILY    lactulose (CHRONULAC) 10 gram/15 mL solution 15 mL, Oral, 3 TIMES DAILY    levETIRAcetam (KEPPRA) 1,500 mg, Oral, 2 TIMES DAILY    levothyroxine (SYNTHROID) 25 mcg, Oral, DAILY BEFORE BREAKFAST    potassium chloride (K-DUR, KLOR-CON M20) 20 mEq tablet 20 mEq, Oral, DAILY    propranoloL (INDERAL) 20 mg, Oral, 2 TIMES DAILY    sodium bicarbonate 650 mg, Oral, 3 TIMES DAILY    tamsulosin (FLOMAX) 0.4 mg, Oral, DAILY    thiamine HCL (B-1) 100 mg, Oral, DAILY         Signed By: [facility]     March 31, 2022

## 2022-03-31 NOTE — PROGRESS NOTES
Orthopedic progress note    Date:3/31/2022       Room:[facility]  Patient Name:Waldo Rangel     YOB: 1960     Age:62 y.o. Subjective    Status post I&D right index finger. Postop day #6. Patient lethargic today. No apparent distress. Remains in ICU. Objective           Vitals Last 24 Hours:  TEMPERATURE:  Temp  Av.1 °F (36.2 °C)  Min: 96.4 °F (35.8 °C)  Max: 98.2 °F (36.8 °C)  RESPIRATIONS RANGE: Resp  Avg: 15.1  Min: 10  Max: 19  PULSE OXIMETRY RANGE: SpO2  Av.7 %  Min: 94 %  Max: 100 %  PULSE RANGE: Pulse  Av.2  Min: 70  Max: 90  BLOOD PRESSURE RANGE: Systolic (80VRY), LGJ:181 , Min:100 , SFH:067   ; Diastolic (29KUX), IYE:08, Min:62, Max:89    Current Facility-Administered Medications   Medication Dose Route Frequency    [START ON 2022] Vancomycin random level to be drawn on 22 at 0600.    Other ONCE    potassium chloride (KLOR-CON) packet for solution 40 mEq  40 mEq Oral NOW    dextrose 5% infusion  50 mL/hr IntraVENous CONTINUOUS    0.9% sodium chloride infusion 250 mL  250 mL IntraVENous PRN    sodium bicarbonate tablet 1,300 mg  1,300 mg Oral BID    levETIRAcetam (KEPPRA) oral solution 1,000 mg  1,000 mg Oral BID    Vancomycin - Pharmacy to Dose   Other Rx Dosing/Monitoring    levothyroxine (SYNTHROID) tablet 50 mcg  50 mcg Oral 6am    tamsulosin (FLOMAX) capsule 0.4 mg  0.4 mg Oral DAILY    sodium chloride (NS) flush 5-40 mL  5-40 mL IntraVENous Q8H    sodium chloride (NS) flush 5-40 mL  5-40 mL IntraVENous PRN    acetaminophen (TYLENOL) tablet 650 mg  650 mg Oral Q8H    HYDROcodone-acetaminophen (NORCO) 5-325 mg per tablet 1 Tablet  1 Tablet Oral Q4H PRN    morphine injection 2 mg  2 mg IntraVENous Q4H PRN    naloxone (NARCAN) injection 0.4 mg  0.4 mg IntraVENous PRN    ondansetron (ZOFRAN) injection 4 mg  4 mg IntraVENous Q4H PRN    allopurinoL (ZYLOPRIM) tablet 300 mg  300 mg Oral DAILY    [Held by provider] aMILoride (MIDAMOR) tablet 5 mg 5 mg Oral DAILY    ergocalciferol capsule 50,000 Units  50,000 Units Oral Q7D    famotidine (PEPCID) tablet 20 mg  20 mg Oral QPM    folic acid (FOLVITE) tablet 1 mg  1 mg Oral DAILY    lactulose (CHRONULAC) 10 gram/15 mL solution 15 mL  15 mL Oral TID    propranoloL (INDERAL) tablet 20 mg  20 mg Oral BID    thiamine mononitrate (B-1) tablet 100 mg  100 mg Oral DAILY    acetaminophen (TYLENOL) tablet 650 mg  650 mg Oral Q6H PRN          I/O (24Hr): Intake/Output Summary (Last 24 hours) at 3/31/2022 1248  Last data filed at 3/31/2022 0534  Gross per 24 hour   Intake 1024.17 ml   Output 600 ml   Net 424.17 ml     Objective  Labs/Imaging/Diagnostics    Labs:  CBC:  Recent Labs     03/31/22 0420 03/30/22 0350 03/29/22  0330   WBC 6.0 6.4 6.6   RBC 2.46* 2.77* 2.29*   HGB 7.0* 7.8* 6.6*   HCT 20.6* 23.1* 19.4*   MCV 83.7 83.4 84.7   RDW 18.4* 18.4* 18.4*   PLT 53* 57* 53*     CHEMISTRIES:  Recent Labs     03/31/22 0420 03/30/22 0350 03/29/22  0330   * 148* 147*   K 3.3* 3.6 3.6   * 122* 121*   CO2 19* 19* 19*   BUN 23* 24* 23*   CREA 1.88* 2.09* 2.31*   CA 8.8 8.9 8.7   PT/INR:No results for input(s): INR, INREXT in the last 72 hours. No lab exists for component: PROTIME  APTT:No results for input(s): APTT in the last 72 hours. LIVER PROFILE:  Recent Labs     03/31/22 0420 03/30/22 0350 03/29/22  0330   AST 18 20 20   ALT 10* 11* 11*     Lab Results   Component Value Date/Time    ALT (SGPT) 10 (L) 03/31/2022 04:20 AM    AST (SGOT) 18 03/31/2022 04:20 AM    Alk. phosphatase 59 03/31/2022 04:20 AM    Bilirubin, direct 1.2 (H) 03/01/2022 08:52 AM    Bilirubin, total 1.1 (H) 03/31/2022 04:20 AM       Physical Exam:  Right index finger: Dressing was changed today by nursing. Dressing remains clean and dry. No streaking no swelling seen throughout rest of the hand.     Assessment//Plan           Patient Active Problem List    Diagnosis Date Noted    Finger infection 03/22/2022    Status epilepticus ([facility] 02/24/2022    Acute renal failure (ARF) ([facility] 01/20/2022    Cirrhosis of liver ([facility] 01/20/2022    GRACIA (acute kidney injury) ([facility] 01/20/2022     Status post I&D right index finger. Postop day #6. Continue with dressing changes. Ortho will follow as needed.       Electronically signed by Braulio Mireles PA-C on 3/31/2022 at 12:48 PM

## 2022-03-31 NOTE — PROGRESS NOTES
Infectious Disease Progress Note           Subjective:   Stable, remains in the ICU for close monitoring, no acute events since last seen. Ongoing episodes of hypothermia   Objective:   Physical Exam:     Visit Vitals  /81 (BP 1 Location: Left arm, BP Patient Position: At rest)   Pulse 70   Temp (!) 96.7 °F (35.9 °C)   Resp 10   Ht 6' 2.02\" (1.88 m)   Wt 185 lb 13.6 oz (84.3 kg)   SpO2 94%   BMI 23.85 kg/m²    O2 Flow Rate (L/min): 2 l/min O2 Device: None (Room air)    Temp (24hrs), Av.1 °F (36.2 °C), Min:96.4 °F (35.8 °C), Max:98.2 °F (36.8 °C)    No intake/output data recorded.  1901 -  0700  In: 1424.2 [P.O.:550;  I.V.:874.2]  Out: 750 [Urine:750]    General: NAD, confused, slow to respond   HEENT: HEIDY, dry mucosa   Lungs: CTA b/l\, decreased at the bases, no wheeze/rhonchi   Heart: S1S2+, RRR, no murmur  Abdo: Soft, NT, ND, +BS   : No fish   Exts:Right index finger dressing in place   Skin: No pressure ulcers       Data Review:       Recent Days:  Recent Labs     22  0420 22  0350 22  0330   WBC 6.0 6.4 6.6   HGB 7.0* 7.8* 6.6*   HCT 20.6* 23.1* 19.4*   PLT 53* 57* 53*     Recent Labs     22  0420 22  0350 22  0330   BUN 23* 24* 23*   CREA 1.88* 2.09* 2.31*       Lab Results   Component Value Date/Time    C-Reactive protein 1.59 (H) 2022 12:45 PM        Microbiology     Results     Procedure Component Value Units Date/Time    CULTURE, Alpheus May STAIN [[id number]]  (Susceptibility) Collected: 22 7666    Order Status: Completed Specimen: Wound from Right Updated: 22 1024     Special Requests: No Special Requests        GRAM STAIN Few WBCs seen         No organisms seen        Culture result:       Scant Staphylococcus epidermidis (Oxacillin resistant)          Susceptibility      Staphylococcus epidermidis     YESSENIA     Ciprofloxacin ($) Resistant     Clindamycin ($) Susceptible     Daptomycin ($$$$$) Susceptible Erythromycin ($$$$) Susceptible     Gentamicin ($) Susceptible     Levofloxacin ($) Resistant     Linezolid ($$$$$) Susceptible     Moxifloxacin ($$$$) Resistant     Oxacillin Resistant     Rifampin ($$$$) Susceptible  [1]      Tetracycline Susceptible     Trimeth/Sulfa Susceptible     Vancomycin ($) Susceptible                 [1]  Rifampin is not to be used for mono-therapy. Linear View                   CULTURE, TISSUE Head Mari STAIN [[id number]] Collected: 03/25/22 1715    Order Status: Completed Specimen: Right Updated: 03/28/22 1544     Special Requests: No Special Requests        GRAM STAIN Rare WBCs seen         No organisms seen        Culture result:       Scant Staphylococcus epidermidis PLEASE REFER TO [id number] FOR SENSITIVITIES          MRSA SCREEN - PCR (NASAL) [[id number]] Collected: 03/24/22 1256    Order Status: Completed Specimen: Swab Updated: 03/24/22 1456     MRSA by PCR, Nasal Not Detected       COVID-19 RAPID TEST [[id number]] Collected: 03/24/22 0918    Order Status: Completed Specimen: Nasopharyngeal Updated: 03/24/22 0955     COVID-19 rapid test Not Detected        Comment: Rapid Abbott ID Now   Rapid NAAT:  The specimen is NEGATIVE for SARS-CoV-2, the novel coronavirus associated with COVID-19. Negative results should be treated as presumptive and, if inconsistent with clinical signs and symptoms or necessary for patient management, should be tested with an alternative molecular assay. Negative results do not preclude SARS-CoV-2 infection and should not be used as the sole basis for patient management decisions. This test has been authorized by the FDA under   an Emergency Use Authorization (EUA) for use by authorized laboratories.  Fact sheet for Healthcare Providers: ConventionUpdate.co.nz Fact sheet for Patients: ConventionUpdate.co.nz   Methodology: Isothermal Nucleic Acid Amplification         CULTURE, BLOOD, PAIRED [[id number]] Collected: 03/23/22 0000    Order Status: Completed Specimen: Blood Updated: 03/23/22 0021    CULTURE, Aminta Crutch STAIN [[id number]] Collected: 03/22/22 2219    Order Status: Completed Specimen: Wound Updated: 03/25/22 0850     Special Requests: No Special Requests        GRAM STAIN Few WBCs seen         No organisms seen        Culture result:       Few Staphylococcus species, coagulase negative               Diagnostics   CXR Results  (Last 48 hours)    None         Assessment/Plan     1. Right index finger swelling/tenderness, soft tissue swelling/septic arthritis and osteomyelitis on X-ray       Coag neg staph isolated from superficial wound Cx and intra-op Cx may not be a contaminant       S/p wound debridement w excision of bone on 03/25      WBC remains WNLs, CRP 1.59 and ESR 36 (03/26)       Healing right index finger incisional wound. Continue on vanc for CoNS coverage x 3 wks from 03/25      Continue routine wound care per ortho     2. Hypothermia: Reason unclear, does not appear septic          3. Acute renal failure: Stable Cr on routine labs       4.   Thrombocytopenia: May be from chronic liver disease     Handy Garcia MD    3/31/2022

## 2022-04-01 NOTE — PROGRESS NOTES
TRANSFER - IN REPORT:    Verbal report received from [facility] AT [facility]) on Carrie Worthington  being received from ICU(unit) for routine progression of care      Report consisted of patients Situation, Background, Assessment and   Recommendations(SBAR). Information from the following report(s) SBAR, Kardex, OR Summary, Procedure Summary, Intake/Output, MAR, Recent Results and Med Rec Status was reviewed with the receiving nurse. Opportunity for questions and clarification was provided. Assessment completed upon patients arrival to unit and care assumed.

## 2022-04-01 NOTE — PROGRESS NOTES
Orthopedic progress note    Date:2022       Room:[facility]  Patient Name:Waldo Rangel     YOB: 1960     Age:62 y.o. Subjective    Status post I&D right index finger. Postop day #7. Patient more alert this morning. He is responsive to some commands. No other complaints at this time.   Objective           Vitals Last 24 Hours:  TEMPERATURE:  Temp  Av.6 °F (36.4 °C)  Min: 95.5 °F (35.3 °C)  Max: 98.6 °F (37 °C)  RESPIRATIONS RANGE: Resp  Av.8  Min: 10  Max: 16  PULSE OXIMETRY RANGE: SpO2  Av.6 %  Min: 96 %  Max: 100 %  PULSE RANGE: Pulse  Av.2  Min: 60  Max: 80  BLOOD PRESSURE RANGE: Systolic (80ELB), LVU:657 , Min:94 , FKN:883   ; Diastolic (79UYY), BN, Min:62, Max:97    Current Facility-Administered Medications   Medication Dose Route Frequency    hydrOXYzine pamoate (VISTARIL) capsule 25 mg  25 mg Oral QID PRN    dextrose 5% infusion  100 mL/hr IntraVENous CONTINUOUS    0.9% sodium chloride infusion 250 mL  250 mL IntraVENous PRN    sodium bicarbonate tablet 1,300 mg  1,300 mg Oral BID    levETIRAcetam (KEPPRA) oral solution 1,000 mg  1,000 mg Oral BID    Vancomycin - Pharmacy to Dose   Other Rx Dosing/Monitoring    levothyroxine (SYNTHROID) tablet 50 mcg  50 mcg Oral 6am    tamsulosin (FLOMAX) capsule 0.4 mg  0.4 mg Oral DAILY    sodium chloride (NS) flush 5-40 mL  5-40 mL IntraVENous Q8H    sodium chloride (NS) flush 5-40 mL  5-40 mL IntraVENous PRN    acetaminophen (TYLENOL) tablet 650 mg  650 mg Oral Q8H    HYDROcodone-acetaminophen (NORCO) 5-325 mg per tablet 1 Tablet  1 Tablet Oral Q4H PRN    morphine injection 2 mg  2 mg IntraVENous Q4H PRN    naloxone (NARCAN) injection 0.4 mg  0.4 mg IntraVENous PRN    ondansetron (ZOFRAN) injection 4 mg  4 mg IntraVENous Q4H PRN    allopurinoL (ZYLOPRIM) tablet 300 mg  300 mg Oral DAILY    [Held by provider] aMILoride (MIDAMOR) tablet 5 mg  5 mg Oral DAILY    ergocalciferol capsule 50,000 Units  50,000 Units Oral Q7D    famotidine (PEPCID) tablet 20 mg  20 mg Oral QPM    folic acid (FOLVITE) tablet 1 mg  1 mg Oral DAILY    lactulose (CHRONULAC) 10 gram/15 mL solution 15 mL  15 mL Oral TID    propranoloL (INDERAL) tablet 20 mg  20 mg Oral BID    thiamine mononitrate (B-1) tablet 100 mg  100 mg Oral DAILY    acetaminophen (TYLENOL) tablet 650 mg  650 mg Oral Q6H PRN          I/O (24Hr): Intake/Output Summary (Last 24 hours) at 4/1/2022 1200  Last data filed at 4/1/2022 0800  Gross per 24 hour   Intake 1950 ml   Output 490 ml   Net 1460 ml     Objective  Labs/Imaging/Diagnostics    Labs:  CBC:  Recent Labs     04/01/22 0345 03/31/22 0420 03/30/22  0350   WBC 6.8 6.0 6.4   RBC 2.72* 2.46* 2.77*   HGB 7.7* 7.0* 7.8*   HCT 22.8* 20.6* 23.1*   MCV 83.8 83.7 83.4   RDW 18.6* 18.4* 18.4*   PLT 64* 53* 57*     CHEMISTRIES:  Recent Labs     04/01/22 0345 03/31/22 0420 03/30/22  0350   * 148* 148*   K 3.6 3.3* 3.6   * 122* 122*   CO2 21 19* 19*   BUN 20 23* 24*   CREA 1.77* 1.88* 2.09*   CA 8.4* 8.8 8.9   PT/INR:No results for input(s): INR, INREXT in the last 72 hours. No lab exists for component: PROTIME  APTT:No results for input(s): APTT in the last 72 hours. LIVER PROFILE:  Recent Labs     04/01/22 0345 03/31/22 0420 03/30/22  0350   AST 21 18 20   ALT 11* 10* 11*     Lab Results   Component Value Date/Time    ALT (SGPT) 11 (L) 04/01/2022 03:45 AM    AST (SGOT) 21 04/01/2022 03:45 AM    Alk. phosphatase 66 04/01/2022 03:45 AM    Bilirubin, direct 1.2 (H) 03/01/2022 08:52 AM    Bilirubin, total 0.9 04/01/2022 03:45 AM       Physical Exam:  Right hand: Dressing was removed by me. Wound site is improving. Less dead space in the paronychia post surgery. No drainage. The patient did have increased sensation to this region.     Assessment//Plan           Patient Active Problem List    Diagnosis Date Noted    Finger infection 03/22/2022    Status epilepticus ([facility] 02/24/2022    Acute renal failure (ARF) ([facility] 01/20/2022    Cirrhosis of liver ([facility] 01/20/2022    [facility]ACIA [facility] 01/20/2022     Status post I&D right index finger. Postop day #7. New dressing applied today. Continue with every other day dressing changes. Orthopedics will follow as needed.       Electronically signed by Yogesh Artis PA-C on 4/1/2022 at 12:00 PM

## 2022-04-01 NOTE — PROGRESS NOTES
OCCUPATIONAL THERAPY EVALUATION  Patient: Faith Mike (45 y.o. male)  Date: 4/1/2022  Primary Diagnosis: Finger infection [L08.9]  Procedure(s) (LRB):  INCISION & DRAINAGE of Right Index Finger (Right) 7 Days Post-Op   Precautions: fall risk       ASSESSMENT  Pt is a 61 y/o M with PMH of arthritis, etoh abuse, cirrhosis of liver, encephalopathy, seizure disorder, gout, and HTN presenting to [facility] with c/o finger swelling, admitted 03/22/22 and being treated for a septic R 2nd finger, anemia, thrombocytopenia, and acute on chronic renal failure. Pt currently s/p I&D of R 2nd digit completed on 03/22/22 by Dr. Alexei Friedman. Pt received semi-supine in bed upon arrival, AXO to person, and agreeable to OT evaluation at this time. Unclear of pt's PLOF information as pt is a poor historian and information in pt's chart is contradictory. Per CM note, pt is a LTC resident at [facility], was max-total A for ADL's and mobility at [facility]. Per chart, pt's wife reports she will be taking pt home after SNF stay. Pt recently was admitted to [facility] in January and was evaluated by Caitlin Starr. Per previous OT evaluation by Caitlin Starr, pt resided with his children and grandchildren in a one story home with 0 CATHER[address], was IND with ADLs and used a rollator to ambulate PTA. At this time, Caitlin Starr is unclear of pt's PLOF prior to this admission and will defer to CM for further discharge planning. Based on current observations, pt presents with deficits in generalized strength/AROM, sitting balance, functional activity tolerance, coordination, and cognition impacting overall performance of ADLs and functional transfers/mobility. Pt demonstrates generally decreased strength and AROM of BUE at this time. Pt demonstrates decreased coordination of RUE at this time due pt's wound s/p I&D of R 2nd digit. Pt able to roll side to side in bed with SBA and additional time. Pt required total A to adjust socks while semi-supine in bed.  Pt currently requires min-mod A with additional time for sup>sit EOB. Once seated EOB pt demonstrated poor sitting balance requiring max A to maintain posture and balance. Pt then returned supine due to decreased activity tolerance with mod-max A and additional time. Pt requires mod A to scoot toward EOB and HOB. Once returned semi-supine in bed pt required total A to bring drink to mouth. Overall, pt tolerates session fair. Pt would benefit from continued skilled OT services to address current impairments and improve IND and safety with self cares and functional transfers/mobility. Recommend discharge to SNF once medically appropriate. Other factors to consider for discharge: severity of deficits, home support     Patient will benefit from skilled therapy intervention to address the above noted impairments. PLAN :  Recommendations and Planned Interventions: self care training, functional mobility training, therapeutic exercise, balance training, therapeutic activities, endurance activities and patient education    Frequency/Duration: Patient will be followed by occupational therapy:  2-3x/week to address goals. Recommendation for discharge: (in order for the patient to meet his/her long term goals)  Jeremy Glover    This discharge recommendation:  Has been made in collaboration with the attending provider and/or case management    IF patient discharges home will need the following DME: TBD       SUBJECTIVE:   Patient stated I want water.     OBJECTIVE DATA SUMMARY:   HISTORY:   Past Medical History:   Diagnosis Date    Arthritis     Hypertension      Past Surgical History:   Procedure Laterality Date    IR INSERT NON TUNL CVC OVER 5 YRS  3/25/2022    IR PARACENTESIS ABD W IMAGE  1/21/2022    IR PARACENTESIS ABD W IMAGE  3/1/2022    IR PARACENTESIS ABD W IMAGE  3/30/2022       Expanded or extensive additional review of patient history:     Home Situation  Home Environment: Skilled nursing facility  [address] [facility]  One/Two Story Residence: One story  Living Alone: No  Support Systems: Spouse/Significant [address]  Patient Expects to be Discharged to[de-identified] Long Term Care  Current DME Used/Available at Home: None    Hand dominance: Unknown    EXAMINATION OF PERFORMANCE DEFICITS:  Cognitive/Behavioral Status:  Neurologic State: Alert;Drowsy  Orientation Level: Oriented to person  Cognition: Follows commands;Decreased attention/concentration;Poor safety awareness (Follows simple commands)      Hearing: Auditory  Auditory Impairment: None      Range of Motion:  AROM: Generally decreased, functional                         Strength:  Strength: Generally decreased, functional                Coordination:  Coordination: Generally decreased, functional  Fine Motor Skills-Upper: Right Impaired;Left Intact    Gross Motor Skills-Upper: Left Intact; Right Intact                            Balance:  Sitting: Impaired; With support  Sitting - Static: Poor (constant support)  Sitting - Dynamic: Poor (constant support)    Functional Mobility and Transfers for ADLs:  Bed Mobility:  Rolling: Stand-by assistance; Additional time  Supine to Sit: Minimum assistance; Moderate assistance; Additional time  Sit to Supine: Moderate assistance;Maximum assistance; Additional time  Scooting: Moderate assistance      ADL Intervention and task modifications:  Feeding  Drink to Mouth: Total assistance (dependent)    Lower Body Dressing Assistance  Socks: Total assistance (dependent)  Position Performed: Supine              Therapeutic Exercise:  Pt would benefit from UE HEP initiated at next session. Functional Measure:    [address] [address] AM-PACTM \"6 Clicks\"                                                       Daily Activity Inpatient Short Form  How much help from another person does the patient currently need. .. Total; A Lot A Little None   1.   Putting on and taking off regular lower body clothing? []  1 [x]  2 []  3 []  4   2. Bathing (including washing, rinsing, drying)? []  1 [x]  2 []  3 []  4   3. Toileting, which includes using toilet, bedpan or urinal? [] 1 [x]  2 []  3 []  4   4. Putting on and taking off regular upper body clothing? []  1 [x]  2 []  3 []  4   5. Taking care of personal grooming such as brushing teeth? []  1 [x]  2 []  3 []  4   6. Eating meals? []  1 [x]  2 []  3 []  4   © , [employer], under license to [employer]. All rights reserved     Score:      Interpretation of Tool:  Represents clinically-significant functional categories (i.e. Activities of daily living). Percentage of Impairment CH    0%   CI    1-19% CJ    20-39% CK    40-59% CL    60-79% CM    80-99% CN     100%   [facility]  Score 6-24 24 23 20-22 15-19 10-14 7-9 6         Occupational Therapy Evaluation Charge Determination   History Examination Decision-Making   LOW Complexity : Brief history review  LOW Complexity : 1-3 performance deficits relating to physical, cognitive , or psychosocial skils that result in activity limitations and / or participation restrictions  LOW Complexity : No comorbidities that affect functional and no verbal or physical assistance needed to complete eval tasks       Based on the above components, the patient evaluation is determined to be of the following complexity level: LOW   Pain Ratin/10 reported    Activity Tolerance:   Fair  Please refer to the flowsheet for vital signs taken during this treatment. After treatment patient left in no apparent distress:    Supine in bed, Call bell within reach, Restraints and side rails x4    COMMUNICATION/EDUCATION:   The patients plan of care was discussed with: Registered nurse. Patient/family agree to work toward stated goals and plan of care. This patients plan of care is appropriate for delegation to [facility].     Thank you for this referral.  Jose R Álvarez OT  Time Calculation: 28 mins    Problem: Self Care Deficits Care Plan (Adult)  Goal: *Acute Goals and Plan of Care (Insert Text)  Description: 1. Pt will be SBA sup <> sit in prep for EOB ADLs  2. Pt will be SBA grooming sitting EOB  3. Pt will be SBA LE dressing sitting EOB/long sit  4. Pt will be SBA sit <>  prep for toileting LRAD  5. Pt will be SBA toileting/toilet transfer/cloth mgmt LRAD  6.  Pt will be IND following UE HEP in prep for self care tasks      Outcome: Not Met

## 2022-04-01 NOTE — PROGRESS NOTES
Recent clinicals forwarded to SNF NATALIE ARELLANO [facility]). Patient is a LTC resident.             Douglas Najjar, MSW

## 2022-04-01 NOTE — PROGRESS NOTES
PROGRESS NOTE    Patient: Bhavik Covarrubias MRN: [id number]  SSN: [id number]    YOB: 1960  Age: 60 y.o. Sex: male      Admit Date: 3/22/2022    LOS: 10 days       Subjective     Chief Complaint   Patient presents with    Finger Swelling       HPI: Bhavik Covarrubias is a(n) 60 y.o. male with PMH significant for etoh abuse, encephalopathy, cirrhosis, seizure disorder, gout presents with c/o swelling to right 2nd finger. Patient is nonverbal at baseline and dependent on all ADL at [address] [place] [address]. According to nursing home staff, has future orthopedic appointment for this finger. Is non-verbal at baseline but appears awake, alert, and in no obvious distress.     Patient was recently discharged from the hospital after having a seizures at that time patient received Keppra for the seizures patient had paracentesis done during the last admission also endoscopy done and had banding of the varices     I&D performed on finger in ED with purulent fluid drained. Dressing in place covering the DIP. 1g vancomycin given in ED, switched to zosyn on admission. Wound cultures sent. XR of the finger shows soft tissue swelling in gas consistent with osteomyelitis, septic joint, or gouty arthropathy.     Labs: Hgb 9.6 (baseline). Creatinine 2.51. CRP 3.15. ESR 35. Wound and blood cultures pending. 3/24:  Patient seen resting in bed. Nonverbal at baseline. Rectal temperature 91.1F per nursing staff. Blood pressure soft, patient may be septic. Vanc and Zosyn started per ID consult. Lactate 3.4. Blood and wound cultures still pending. Per orthopedic surgery, patient to have debridement of joint today under local anesthesia. He will likely need IV abx for 4-6 weeks for septic joint. Per nephrology, the patients increased creatinine of 2.5 may be due to amiloride, which is now held. Iron studies also obtained for Hgb 9.6. Labs remarkable for: Hgb 9.6. Creatinine 2.47.    3/25:  Patient seen in ICU.  Denies finger pain. Rectal temp 36.2C. I&D moved to today d/t transfer to ICU yesterday. Per ID, initial wound culture shows coag negative staph and continue on vanc and zosyn pending final culture result. Blood cultures still pending. Labs remarkable for Hgb 9.2, Creatinine 2.59, Albumin 1.8. Lactate decreased from 3.4 to 2.6. Ammonia 39. Pt had Doppler studies done this morning which shows    · No evidence of acute DVT in the right upper extremity. · Thrombus noted within the right cephalic forearm vein(s).    3/28:  Patient seen resting comfortably in bed, remains at baseline mental status. Current temp 36C and patient still on warming blanket. Patient had I&D of right index finger on 3/25 which revealed purulent drainage mixed with gouty tophi. DIP joint thoroughly irrigated and cultures sent. Cultures show few WBC and \"scant probably enterococcus. \" Patient remains on vanc and zosyn. Over the weekend, levothyroxine increased to 50mcg d/t elevated TSH. He was also started on midodrine 5mg TID for hypotension and was given hypotonic fluids for hypernatremia      Labs from 3/27 show Hgb 8.5, Na 149, creatinine 2.43, albumin 2.2    3/29:  Patient seen resting comfortably in bed. Denies any pain. Abdomen very distended, worse than yesterday. Patient receiving NG tube feeds. Lower extremity edema also present. Patient also receiving 1 unit packed RBC for Hgb 6. 6. platelets 53, Na 519, Creatinine 2.31. Patient still hypothermic    3/30:  Patient remains hypothermic at 95.7F. Denies finger pain, chest pain, palpitations, shortness of breath, abdominal pain. Seen by nephrology who notes creatinine gradually decreasing. Baseline is around 1.7. Seen by speech therapy who will continue to advance diet to include some solids today. Culture from DIP joint shows staph epidermidis susceptible to vancomycin, which patient is already on. Hgb 7.8, platelets 57, creatinine 2.09, Na 148. KUB shows abdominal distension consistent with known history of ascites. Last paracentesis 3/24 with 4.3L fluid removed. 3/31:  Patient seen resting in bed. Denies any acute complaints. Remains hypothermic at 35.7. Probe replaced by RN to ensure accuracy. Patient currently on pureed solid diet. He was seen by urology for urinary retention yesterday who plans to leave Gerard in for now and follow outpatient. Patient should stay on Flomax 0.4mg. Patient also had paracentesis yesterday with 8400cc fluid removed with 62.5g albumin given. Abdomen much less distended today. Hgb 7.0, Creatinine 1.88, albumin 2.7.      4/1: Patient seen in ICU, no acute complaints. Temperature 36C. Patient seen by nephrology yesterday who restarted D5W at 50cc/hr for hypernatremia. Na today is 146. Patient will also start on weekly procrit. Hgb today Is 7.7. Nephrology also increased sodium bicarb to 1300 BID for significant bicarb deficit. Past Medical History:   Diagnosis Date    Arthritis      Hypertension                 Past Surgical History:   Procedure Laterality Date    IR PARACENTESIS ABD W IMAGE   1/21/2022    IR PARACENTESIS ABD W IMAGE   3/1/2022                 Prior to Admission medications    Medication Sig Start Date End Date Taking? Authorizing Provider   levothyroxine (SYNTHROID) 25 mcg tablet Take 25 mcg by mouth Daily (before breakfast).     Yes Provider, Historical   acetaminophen (TylenoL) 325 mg tablet Take 650 mg by mouth every six (6) hours as needed for Pain.     Yes Provider, Historical   tamsulosin (Flomax) 0.4 mg capsule Take 1 Capsule by mouth daily. 3/4/22     Deep Whyte PA-C   potassium chloride (K-DUR, KLOR-CON M20) 20 mEq tablet Take 1 Tablet by mouth daily. 3/2/22     Porsche Andujar MD   sodium bicarbonate 650 mg tablet Take 1 Tablet by mouth three (3) times daily.  3/2/22     Sera Jean MD   chlordiazePOXIDE (LIBRIUM) 5 mg capsule Take 2 Capsules by mouth three (3) times daily as needed for Anxiety. Max Daily Amount: 30 mg. 3/2/22     Melanie Torres MD   levETIRAcetam (Keppra) 1,000 mg tablet Take 1.5 Tablets by mouth two (2) times a day. 3/2/22     Melanie Torres MD   ergocalciferol (ERGOCALCIFEROL) 1,250 mcg (50,000 unit) capsule Take 1 Capsule by mouth every seven (7) days. 2/9/22     Provider, Historical   lactulose (CHRONULAC) 10 gram/15 mL solution Take 15 mL by mouth three (3) times daily. 1/23/22     Valorie Ackerman MD   allopurinoL (ZYLOPRIM) 300 mg tablet Take 1 Tablet by mouth daily. 1/18/22     Provider, Historical   thiamine HCL (B-1) 100 mg tablet Take 100 mg by mouth daily.       Provider, Historical   propranoloL (INDERAL) 20 mg tablet Take 20 mg by mouth two (2) times a day.       Provider, Historical   folic acid (FOLVITE) 1 mg tablet Take 1 mg by mouth daily.       Provider, Historical   famotidine (PEPCID) 20 mg tablet Take 20 mg by mouth At bedtime.       Provider, Historical   aMILoride (MIDAMOR) 5 mg tablet Take 5 mg by mouth daily.       Provider, Historical         No Known Allergies      No family history on file. Reason unable to perform ROS: nonverbal at baseline. Objective     Visit Vitals  /74 (BP 1 Location: Left upper arm, BP Patient Position: At rest)   Pulse 64   Temp 97 °F (36.1 °C)   Resp 12   Ht 6' 2.02\" (1.88 m)   Wt 84.3 kg (185 lb 13.6 oz)   SpO2 100%   BMI 23.85 kg/m²    O2 Flow Rate (L/min): 2 l/min O2 Device: None (Room air)    Physical Exam:   Constitutional:       General: He is not in acute distress. HENT:      Head: Normocephalic and atraumatic. Cardiovascular:      Rate and Rhythm: Normal rate and regular rhythm. Pulses: Normal pulses. Heart sounds: Normal heart sounds. Pulmonary:      Effort: Pulmonary effort is normal.      Breath sounds: Normal breath sounds. Abdominal:      General: There is distension. Tenderness: There is no abdominal tenderness.  There is no guarding or rebound. Skin:     Findings: Erythema present. Comments: Swelling to the right 2nd digit   Neurological:      Mental Status: Mental status is at baseline. Comments: Nonverbal     Intake & Output:  Current Shift: 04/01 0701 - 04/01 1900  In: -   Out: 40 [Urine:40]  Last three shifts: 03/30 1901 - 04/01 0700  In: 3114.2 [P.O.:540; I.V.:2574.2]  Out: 700 [Urine:700]    Lab/Data Review: All lab data reviewed      24 Hour Results:    Recent Results (from the past 24 hour(s))   GLUCOSE, POC    Collection Time: 03/31/22 11:35 AM   Result Value Ref Range    Glucose (POC) 111 65 - 117 mg/dL    Performed by Rosey Talley RANDOM    Collection Time: 04/01/22  3:45 AM   Result Value Ref Range    Vancomycin, random 20.6 ug/mL   CBC W/O DIFF    Collection Time: 04/01/22  3:45 AM   Result Value Ref Range    WBC 6.8 4.1 - 11.1 K/uL    RBC 2.72 (L) 4.10 - 5.70 M/uL    HGB 7.7 (L) 12.1 - 17.0 g/dL    HCT 22.8 (L) 36.6 - 50.3 %    MCV 83.8 80.0 - 99.0 FL    MCH 28.3 26.0 - 34.0 PG    MCHC 33.8 30.0 - 36.5 g/dL    RDW 18.6 (H) 11.5 - 14.5 %    PLATELET 64 (L) 634 - 400 K/uL    NRBC 0.0 0.0  WBC    ABSOLUTE NRBC 0.00 0.00 - 7.44 K/uL   METABOLIC PANEL, COMPREHENSIVE    Collection Time: 04/01/22  3:45 AM   Result Value Ref Range    Sodium 146 (H) 136 - 145 mmol/L    Potassium 3.6 3.5 - 5.1 mmol/L    Chloride 118 (H) 97 - 108 mmol/L    CO2 21 21 - 32 mmol/L    Anion gap 7 5 - 15 mmol/L    Glucose 88 65 - 100 mg/dL    BUN 20 6 - 20 mg/dL    Creatinine 1.77 (H) 0.70 - 1.30 mg/dL    BUN/Creatinine ratio 11 (L) 12 - 20      GFR est AA 47 (L) >60 ml/min/1.73m2    GFR est non-AA 39 (L) >60 ml/min/1.73m2    Calcium 8.4 (L) 8.5 - 10.1 mg/dL    Bilirubin, total 0.9 0.2 - 1.0 mg/dL    AST (SGOT) 21 15 - 37 U/L    ALT (SGPT) 11 (L) 12 - 78 U/L    Alk.  phosphatase 66 45 - 117 U/L    Protein, total 5.6 (L) 6.4 - 8.2 g/dL    Albumin 2.5 (L) 3.5 - 5.0 g/dL    Globulin 3.1 2.0 - 4.0 g/dL    A-G Ratio 0.8 (L) 1.1 - 2.2 Imaging:    IR PARACENTESIS ABD W IMAGE   Final Result   Successful paracentesis. XR ABD (KUB)   Final Result      XR CHEST PORT   Final Result      XR CHEST PORT   Final Result   The cardiomediastinal silhouette is appropriate for age, technique,   and lung expansion. Pulmonary vasculature is not congested. The lungs are   essentially clear. No effusion or pneumothorax is seen. IR INSERT NON TUNL CVC OVER 5 YRS   Final Result      Technically successful insertion of non-tunneled triple-lumen catheter with US   guidance. Plan:       The catheter may be used after catheter placement confirmation by x-ray.   _______________________________________________________________      PROCEDURE SUMMARY:   - Venous access with ultrasound guidance   - Non-tunneled central venous catheter insertion      PROCEDURE DETAILS:      Pre-procedure   Relevant imaging review: None    Informed consent for the procedure was obtained and time-out was performed prior   to the procedure. Prophylactic antibiotic administered: Not administered   Preparation: The site was prepared and draped using all elements of maximal   sterile barrier technique including sterile gloves, sterile gown, cap, mask,   large sterile sheet, sterile ultrasound probe cover, sterile ultrasound gel,   hand hygiene and cutaneous antisepsis with 2% chlorhexidine. Medical reason for site preparation exception: Not applicable      Anesthesia/sedation   Level of anesthesia: No sedation   Anesthesia administered by: Not applicable   Duration of anesthesia/sedation: 0 minutes. Access   The vessel was sonographically evaluated and judged to be patent and appropriate   for access and a permanent image was stored. 2 cc's of 1% lidocaine was   administered to the access site. Real time ultrasound was used to visualize   needle entry into the vessel.  Vein accessed: Right internal jugular vein   Access technique: 4F micropuncture set Catheter placement   The access site was dilated and the catheter was placed into the vein over a   wire and all guidewires were removed in their entirety. Catheter ports were   aspirated and flushed. Catheter tip location was verified by portable X-ray. Catheter size: 7 Guamanian   Catheter length: 16 cm   Catheter tip position: Central. The tip is overlying the anatomic SVC. Catheter flush: Normal saline      Closure   The catheter was secured. A sterile dressing was applied. Catheter securement technique: Stat-Lock      Additional Details   Additional description of procedure: None   Additional findings: None   Additional equipment: None   Specimens removed: None   Estimated blood loss:  Less than 10 cc   Standardized report: [email or web address]_NonTunneledCatheter1.0               IR US GUIDED VASCULAR ACCESS   Final Result      Technically successful insertion of non-tunneled triple-lumen catheter with US   guidance. Plan:       The catheter may be used after catheter placement confirmation by x-ray.   _______________________________________________________________      PROCEDURE SUMMARY:   - Venous access with ultrasound guidance   - Non-tunneled central venous catheter insertion      PROCEDURE DETAILS:      Pre-procedure   Relevant imaging review: None    Informed consent for the procedure was obtained and time-out was performed prior   to the procedure. Prophylactic antibiotic administered: Not administered   Preparation: The site was prepared and draped using all elements of maximal   sterile barrier technique including sterile gloves, sterile gown, cap, mask,   large sterile sheet, sterile ultrasound probe cover, sterile ultrasound gel,   hand hygiene and cutaneous antisepsis with 2% chlorhexidine.     Medical reason for site preparation exception: Not applicable      Anesthesia/sedation   Level of anesthesia: No sedation   Anesthesia administered by: Not applicable   Duration of anesthesia/sedation: 0 minutes. Access   The vessel was sonographically evaluated and judged to be patent and appropriate   for access and a permanent image was stored. 2 cc's of 1% lidocaine was   administered to the access site. Real time ultrasound was used to visualize   needle entry into the vessel. Vein accessed: Right internal jugular vein   Access technique: 4F micropuncture set      Catheter placement   The access site was dilated and the catheter was placed into the vein over a   wire and all guidewires were removed in their entirety. Catheter ports were   aspirated and flushed. Catheter tip location was verified by portable X-ray. Catheter size: 7 Persian   Catheter length: 16 cm   Catheter tip position: Central. The tip is overlying the anatomic SVC. Catheter flush: Normal saline      Closure   The catheter was secured. A sterile dressing was applied. Catheter securement technique: Stat-Lock      Additional Details   Additional description of procedure: None   Additional findings: None   Additional equipment: None   Specimens removed: None   Estimated blood loss:  Less than 10 cc   Standardized report: [email or web address]_CVA_NonTunneledCatheter1.0               DUPLEX UPPER EXT VENOUS RIGHT   Final Result      XR 2ND FINGER RT MIN 2 V   Final Result      Progressive findings. The findings are nonspecific and could represent infection with osteomyelitis   and septic joint and gas in the soft tissues related to infection. Gout, or other inflammatory arthropathy is a possibility. Findings should be correlated with clinical parameters.                 Assessment   Hypothermia  Hypoglycemic  Hypotension  Bradycardia  Septic joint- right 2nd finger DIP  SEPSIS wound culturesgrows  Staphylococcus and Enterococcus  Anemia- transfuse if Hgb < 7.0 status post transfusion  Acute on chronic renal failure- creatinine 2.51 on admission     Hx etoh abuse  Cirrhosis of liver   History of encephalopathy  Seizure disorder  History of gout  Thrombocytopenia    · No evidence of acute DVT in the right upper extremity. · Thrombus noted within the right cephalic forearm vein(s).     Hypernatremia  Hypothyroidism    Plan     Bear hugger for hypothermia  SEPSIS- continue vanc and zosyn  Anemia- transfuse if Hgb < 7.0  Transfuse 1 units packed RBC  Order KUB   I&D of right 2nd finger performed in ED  Consult wound care  Consult pharmacy for vancomycin dosing  Consult nephrology  Consult ID  Speech therapy  PT OT      Allopurinol 300mg daily  amloride 5mg daily-- HOLD  Ergocalciferol 50,000 units weekly  Famotidine 14TE daily  Folic acid 1mg daily  Lactulose 10g/15mL TID  Keppra 1000mg BID  Levothyroxine 50mcg daily  Propanolol 20mg BID  Sodium bicarbonate 1300mg TID  Thiamine 100mg daily  Vancomycin pharmacy protocol  D5 100 cc/h  Flomax 0.4 mg to    IR consult for paracentesis      Transfer to medical telemetry floor    Seen by infectious disease recommend to continue IV vancomycin     Monitor renal function remained stable        Current Facility-Administered Medications:     hydrOXYzine pamoate (VISTARIL) capsule 25 mg, 25 mg, Oral, QID PRN, Lamberto Ackerman MD, 25 mg at 03/31/22 2212    dextrose 5% infusion, 100 mL/hr, IntraVENous, CONTINUOUS, Scotty Ames MD, Last Rate: 100 mL/hr at 04/01/22 0200, 100 mL/hr at 04/01/22 0200    0.9% sodium chloride infusion 250 mL, 250 mL, IntraVENous, PRN, Lamberto Ackerman MD    sodium bicarbonate tablet 1,300 mg, 1,300 mg, Oral, BID, Scotty Ames MD, 1,300 mg at 04/01/22 0855    levETIRAcetam (KEPPRA) oral solution 1,000 mg, 1,000 mg, Oral, BID, Lamberto Ackerman MD, 1,000 mg at 04/01/22 0855    Vancomycin - Pharmacy to Dose, , Other, Rx Dosing/Monitoring, Ree Zaragoza MD    levothyroxine (SYNTHROID) tablet 50 mcg, 50 mcg, Oral, 6am, Ree Zaragoza MD, 50 mcg at 04/01/22 0612    tamsulosin (FLOMAX) capsule 0.4 mg, 0.4 mg, Oral, DAILY, Richard Rapp MD, 0.4 mg at 04/01/22 0812   sodium chloride (NS) flush 5-40 mL, 5-40 mL, IntraVENous, Q8H, Ander Cruz MD, 10 mL at 04/01/22 8762    sodium chloride (NS) flush 5-40 mL, 5-40 mL, IntraVENous, PRN, Alison DANIELS MD, 10 mL at 03/31/22 2212    acetaminophen (TYLENOL) tablet 650 mg, 650 mg, Oral, Q8H, Ramírez Bray MD, 650 mg at 03/31/22 2211    HYDROcodone-acetaminophen (NORCO) 5-325 mg per tablet 1 Tablet, 1 Tablet, Oral, Q4H PRN, Alison DANIELS MD    morphine injection 2 mg, 2 mg, IntraVENous, Q4H PRN, Ramírez Bray MD    naloxone (NARCAN) injection 0.4 mg, 0.4 mg, IntraVENous, PRN, Elana Mejia MD    ondansetron (ZOFRAN) injection 4 mg, 4 mg, IntraVENous, Q4H PRN, Elana Mejia MD    allopurinoL (ZYLOPRIM) tablet 300 mg, 300 mg, Oral, DAILY, Ramírez Bray MD, 300 mg at 04/01/22 0855    [Held by provider] aMILoride (MIDAMOR) tablet 5 mg, 5 mg, Oral, DAILY, Kavin Szymanski MD, 5 mg at 03/23/22 0900    ergocalciferol capsule 50,000 Units, 50,000 Units, Oral, Q7D, Elana Mejia MD, 50,000 Units at 03/31/22 0923    famotidine (PEPCID) tablet 20 mg, 20 mg, Oral, QPM, Elana Mejia MD, 20 mg at 60/89/47 8375    folic acid (FOLVITE) tablet 1 mg, 1 mg, Oral, DAILY, Ramírez Bray MD, 1 mg at 04/01/22 0855    lactulose (CHRONULAC) 10 gram/15 mL solution 15 mL, 15 mL, Oral, TID, Elana Mejia MD, 15 mL at 04/01/22 0856    propranoloL (INDERAL) tablet 20 mg, 20 mg, Oral, BID, Elana Mejia MD, 20 mg at 04/01/22 0855    thiamine mononitrate (B-1) tablet 100 mg, 100 mg, Oral, DAILY, Ramírez Bray MD, 100 mg at 04/01/22 0856    acetaminophen (TYLENOL) tablet 650 mg, 650 mg, Oral, Q6H PRN, Elana Mejia MD, 650 mg at 04/01/22 0612    Current Outpatient Medications   Medication Instructions    acetaminophen (TYLENOL) 650 mg, Oral, EVERY 6 HOURS AS NEEDED    allopurinoL (ZYLOPRIM) 300 mg tablet 1 Tablet, Oral, DAILY    aMILoride (MIDAMOR) 5 mg, Oral, DAILY    chlordiazePOXIDE (LIBRIUM) 10 mg, Oral, 3 TIMES DAILY AS NEEDED    ergocalciferol (ERGOCALCIFEROL) 1,250 mcg (50,000 unit) capsule 1 Capsule, Oral, EVERY 7 DAYS    famotidine (PEPCID) 20 mg, Oral, AT BEDTIME    folic acid (FOLVITE) 1 mg, Oral, DAILY    lactulose (CHRONULAC) 10 gram/15 mL solution 15 mL, Oral, 3 TIMES DAILY    levETIRAcetam (KEPPRA) 1,500 mg, Oral, 2 TIMES DAILY    levothyroxine (SYNTHROID) 25 mcg, Oral, DAILY BEFORE BREAKFAST    potassium chloride (K-DUR, KLOR-CON M20) 20 mEq tablet 20 mEq, Oral, DAILY    propranoloL (INDERAL) 20 mg, Oral, 2 TIMES DAILY    sodium bicarbonate 650 mg, Oral, 3 TIMES DAILY    tamsulosin (FLOMAX) 0.4 mg, Oral, DAILY    thiamine HCL (B-1) 100 mg, Oral, DAILY         Signed By: Ronnell Moseley MD     April 1, 2022

## 2022-04-01 NOTE — PROGRESS NOTES
Problem: Falls - Risk of  Goal: *Absence of Falls  Description: Document Rayo Schreiber Fall Risk and appropriate interventions in the flowsheet. 4/1/2022 0955 by Raul Shepard  Outcome: Progressing Towards Goal  Note: Fall Risk Interventions:  Mobility Interventions: Bed/chair exit alarm,Patient to call before getting OOB,Strengthening exercises (ROM-active/passive)    Mentation Interventions: Bed/chair exit alarm,Reorient patient    Medication Interventions: Bed/chair exit alarm    Elimination Interventions: Bed/chair exit alarm,Call light in reach,Patient to call for help with toileting needs           4/1/2022 0955 by Raul Shepard  Outcome: Progressing Towards Goal  Note: Fall Risk Interventions:  Mobility Interventions: Bed/chair exit alarm,Patient to call before getting OOB,Strengthening exercises (ROM-active/passive)    Mentation Interventions: Bed/chair exit alarm,Reorient patient    Medication Interventions: Bed/chair exit alarm    Elimination Interventions: Bed/chair exit alarm,Call light in reach,Patient to call for help with toileting needs              Problem: Patient Education: Go to Patient Education Activity  Goal: Patient/Family Education  4/1/2022 0955 by Raul Shepard  Outcome: Progressing Towards Goal  4/1/2022 0955 by Raul Shepard  Outcome: Progressing Towards Goal     Problem: Pressure Injury - Risk of  Goal: *Prevention of pressure injury  Description: Document Curtis Scale and appropriate interventions in the flowsheet. 4/1/2022 0955 by Raul Shepard  Outcome: Progressing Towards Goal  Note: Pressure Injury Interventions:  Sensory Interventions: Assess changes in LOC,Float heels,Keep linens dry and wrinkle-free,Maintain/enhance activity level,Minimize linen layers,Turn and reposition approx.  every two hours (pillows and wedges if needed)    Moisture Interventions: Absorbent underpads,Apply protective barrier, creams and emollients,Internal/External urinary devices,Limit adult briefs,Maintain skin hydration (lotion/cream),Minimize layers    Activity Interventions: Pressure redistribution bed/mattress(bed type)    Mobility Interventions: Pressure redistribution bed/mattress (bed type),Turn and reposition approx. every two hours(pillow and wedges)    Nutrition Interventions: Document food/fluid/supplement intake    Friction and Shear Interventions: Apply protective barrier, creams and emollients,Minimize layers,Transferring/repositioning devices             4/1/2022 0955 by Raul Shepard  Outcome: Progressing Towards Goal  Note: Pressure Injury Interventions:  Sensory Interventions: Assess changes in LOC,Float heels,Keep linens dry and wrinkle-free,Maintain/enhance activity level,Minimize linen layers,Turn and reposition approx. every two hours (pillows and wedges if needed)    Moisture Interventions: Absorbent underpads,Apply protective barrier, creams and emollients,Internal/External urinary devices,Limit adult briefs,Maintain skin hydration (lotion/cream),Minimize layers    Activity Interventions: Pressure redistribution bed/mattress(bed type)    Mobility Interventions: Pressure redistribution bed/mattress (bed type),Turn and reposition approx.  every two hours(pillow and wedges)    Nutrition Interventions: Document food/fluid/supplement intake    Friction and Shear Interventions: Apply protective barrier, creams and emollients,Minimize layers,Transferring/repositioning devices                Problem: Patient Education: Go to Patient Education Activity  Goal: Patient/Family Education  4/1/2022 0955 by Raul Shepard  Outcome: Progressing Towards Goal  4/1/2022 0955 by Raul Shepard  Outcome: Progressing Towards Goal     Problem: Patient Education: Go to Patient Education Activity  Goal: Patient/Family Education  4/1/2022 0955 by Raul Shepard  Outcome: Progressing Towards Goal  4/1/2022 0955 by Raul Shepard  Outcome: Progressing Towards Goal     Problem: Patient Education: Go to Patient Education Activity  Goal: Patient/Family Education  4/1/2022 0955 by Justina Soares  Outcome: Progressing Towards Goal  4/1/2022 0955 by Justina Fany  Outcome: Progressing Towards Goal     Problem: Impaired Skin Integrity/Pressure Injury Treatment  Goal: *Improvement of Existing Pressure Injury  4/1/2022 0955 by Justina Fany  Outcome: Progressing Towards Goal  4/1/2022 0955 by Justina Fany  Outcome: Progressing Towards Goal  Goal: *Prevention of pressure injury  Description: Document Curtis Scale and appropriate interventions in the flowsheet. Outcome: Progressing Towards Goal  Note: Pressure Injury Interventions:  Sensory Interventions: Assess changes in LOC,Float heels,Keep linens dry and wrinkle-free,Maintain/enhance activity level,Minimize linen layers,Turn and reposition approx. every two hours (pillows and wedges if needed)    Moisture Interventions: Absorbent underpads,Apply protective barrier, creams and emollients,Internal/External urinary devices,Limit adult briefs,Maintain skin hydration (lotion/cream),Minimize layers    Activity Interventions: Pressure redistribution bed/mattress(bed type)    Mobility Interventions: Pressure redistribution bed/mattress (bed type),Turn and reposition approx.  every two hours(pillow and wedges)    Nutrition Interventions: Document food/fluid/supplement intake    Friction and Shear Interventions: Apply protective barrier, creams and emollients,Minimize layers,Transferring/repositioning devices

## 2022-04-01 NOTE — ANESTHESIA POSTPROCEDURE EVALUATION
Procedure(s):  INCISION & DRAINAGE of Right Index Finger.     general    Anesthesia Post Evaluation      Multimodal analgesia: multimodal analgesia not used between 6 hours prior to anesthesia start to PACU discharge  Patient location during evaluation: bedside  Patient participation: complete - patient participated  Level of consciousness: awake and alert  Pain score: 0  Pain management: adequate  Airway patency: patent  Anesthetic complications: no  Cardiovascular status: acceptable  Respiratory status: acceptable  Hydration status: acceptable  Post anesthesia nausea and vomiting:  none  Final Post Anesthesia Temperature Assessment:  Normothermia (36.0-37.5 degrees C)      INITIAL Post-op Vital signs:   Vitals Value Taken Time   BP 96/69 03/25/22 1735   Temp 36.4 °C (97.5 °F) 03/25/22 1732   Pulse 100 03/25/22 1735   Resp 13 03/25/22 1735   SpO2 100 % 03/25/22 1735

## 2022-04-01 NOTE — ROUTINE PROCESS
Patient transferred to  #[id number] and settled in after giving report to LUIS MIGUEL Chambers. Condition stable.

## 2022-04-01 NOTE — PROGRESS NOTES
Nephrology Consult    Patient: Yaya Stokes MRN: [id number]  SSN: [id number]    YOB: 1960  Age: 60 y.o. Sex: male      Subjective:     Patient seen in the ICU   He seems more awake today. Responds to questions appropriately doesn't appear to be in any distress   Creatinine is improved to 1.7 today    Past Medical History:   Diagnosis Date    Arthritis     Hypertension      Past Surgical History:   Procedure Laterality Date    IR INSERT NON TUNL CVC OVER 5 YRS  3/25/2022    IR PARACENTESIS ABD W IMAGE  1/21/2022    IR PARACENTESIS ABD W IMAGE  3/1/2022    IR PARACENTESIS ABD W IMAGE  3/30/2022      No family history on file.   Social History     Tobacco Use    Smoking status: Never Smoker    Smokeless tobacco: Never Used   Substance Use Topics    Alcohol use: Not on file      Current Facility-Administered Medications   Medication Dose Route Frequency Provider Last Rate Last Admin    hydrOXYzine pamoate (VISTARIL) capsule 25 mg  25 mg Oral QID PRN Lamberto Ackerman MD   25 mg at 03/31/22 2212    dextrose 5% infusion  100 mL/hr IntraVENous CONTINUOUS Janeth Fernandez  mL/hr at 04/01/22 0200 100 mL/hr at 04/01/22 0200    0.9% sodium chloride infusion 250 mL  250 mL IntraVENous PRN Tommy Ackerman MD        sodium bicarbonate tablet 1,300 mg  1,300 mg Oral BID Janeth Fernandez MD   1,300 mg at 04/01/22 0855    levETIRAcetam (KEPPRA) oral solution 1,000 mg  1,000 mg Oral BID Lamberto Ackerman MD   1,000 mg at 04/01/22 0855    Vancomycin - Pharmacy to Dose   Other Rx Dosing/Monitoring Alexander Brown MD        levothyroxine (SYNTHROID) tablet 50 mcg  50 mcg Oral Tamie Cuellar MD   50 mcg at 04/01/22 0612    tamsulosin (FLOMAX) capsule 0.4 mg  0.4 mg Oral DAILY Petra DENIS MD   0.4 mg at 04/01/22 0856    sodium chloride (NS) flush 5-40 mL  5-40 mL IntraVENous Q8H Courtney Marshall MD   10 mL at 04/01/22 0613    sodium chloride (NS) flush 5-40 mL  5-40 mL IntraVENous PRN Bret Sandoval MD   10 mL at 03/31/22 2212    acetaminophen (TYLENOL) tablet 650 mg  650 mg Oral Q8H Ramírez Bray MD   650 mg at 03/31/22 2211    HYDROcodone-acetaminophen (NORCO) 5-325 mg per tablet 1 Tablet  1 Tablet Oral Q4H PRN Bret Sandoval MD        morphine injection 2 mg  2 mg IntraVENous Q4H PRN Bret Sandoval MD        naloxone (NARCAN) injection 0.4 mg  0.4 mg IntraVENous PRN Bret Sandoval MD        ondansetron (ZOFRAN) injection 4 mg  4 mg IntraVENous Q4H PRN Bret Sandoval MD        allopurinoL (ZYLOPRIM) tablet 300 mg  300 mg Oral DAILY Bret Sandoval MD   300 mg at 04/01/22 0855    [Held by provider] aMILoride (MIDAMOR) tablet 5 mg  5 mg Oral DAILY Lamberto Ackerman MD   5 mg at 03/23/22 0900    ergocalciferol capsule 50,000 Units  50,000 Units Oral Q7D Bret Sandoval MD   50,000 Units at 03/31/22 0923    famotidine (PEPCID) tablet 20 mg  20 mg Oral QPM Bret Sandoval MD   20 mg at 85/70/29 1904    folic acid (FOLVITE) tablet 1 mg  1 mg Oral DAILY Bret Sandoval MD   1 mg at 04/01/22 0855    lactulose (CHRONULAC) 10 gram/15 mL solution 15 mL  15 mL Oral TID Bret Sandoval MD   15 mL at 04/01/22 0856    propranoloL (INDERAL) tablet 20 mg  20 mg Oral BID Bret Sandoval MD   20 mg at 04/01/22 0855    thiamine mononitrate (B-1) tablet 100 mg  100 mg Oral DAILY Bret Sandoval MD   100 mg at 04/01/22 0856    acetaminophen (TYLENOL) tablet 650 mg  650 mg Oral Q6H PRN Bret Sandoval MD   650 mg at 04/01/22 7531        No Known Allergies    Review of Systems:  A comprehensive review of systems was negative except for that written in the History of Present Illness.     Objective:     Vitals:    04/01/22 0900 04/01/22 1100 04/01/22 1200 04/01/22 1300   BP: 111/74 101/82 (!) 86/71 124/74   Pulse: 64 60 60 78   Resp: 12 16 18 16   Temp:  (!) 96.4 °F (35.8 °C) (!) 96.4 °F (35.8 °C)    SpO2: 100% 100% 100% 97%   Weight: Height:            Physical Exam:  General: NAD , not oriented   Eyes: sclera anicteric  Oral Cavity: No thrush or ulcers  Neck: no JVD  Chest: Fair bilateral air entry  Heart: normal sounds  Abdomen: soft and non tender   : no fish  Lower Extremities: no edema  Skin: no rash          Assessment:     Hospital Problems  Never Reviewed          Codes Class Noted POA    Finger infection ICD-10-CM: L08.9  ICD-9-CM: 686.9  3/22/2022 Unknown              Plan:       1 Acute kidney injury on chronic kidney disease stage IIIA. -Etiology can be prerenal plus was on amiloride.    -On admission creatinine was elevated at 2.51.    -Cr is gradually improving.  2.5--> 2.3-->2.0-->1.7 today. baseline is unknown was 1.7 on 3/4/2022.    -no evidence of lower extremity edema.    -Hypervolemic with abd distension ,h/o repeated paracentesis (3/24  4.3 L)  -Continue to hold amiloride for now 2/2 hypotension  -We will continue hypotonic IV fluids for now for hypernatremia  -He is not on any other potential nephrotoxins. 2.  Hypernatremia  -Sodium is improved to 146 today.    -On D5W at 100 mils per hour  -Will decrease IV fluid rate to 50 mils per hour    3. Septic arthritis.    -He presented with swollen right hand finger diagnosed with septic arthritis status post I&D received IV antibiotics per orthopedic.  -on Vanc and zosyn  -S/p I&D on 3/25. Ortho following    3 Hypotension.    -Blood pressures is labile. Noted few low readings  -I will continue to hold amiloride.    -On no other blood pressure medications. -Give parameters to hold midodrine     4. Liver cirrhosis  -Has history of cirrhosis , portal HTN  -due to etoh   -Abd distension   -On Propranolol   -Albumin for volume expansion     5. Anemia.    -Hemoglobin is improved to 7.0 today  -We will start weekly Procrit    6. Metabolic acidosis. -CO2 is improved to 21  -Lactic acidosis - due to decr Liver clearance and probably RTA as well .  No diarrhea reported -continue sodium bicarbonate 1300 mg twice daily      Signed By: Marisela Brooks MD     April 1, 2022

## 2022-04-01 NOTE — PROGRESS NOTES
Infectious Disease Progress Note           Subjective:   Ongoing episodes of hypothermia, off pressor support, leukocytosis trending down on serial labs, denies new complaints   Objective:   Physical Exam:     Visit Vitals  /74 (BP 1 Location: Left upper arm, BP Patient Position: At rest)   Pulse 78   Temp (!) 96.4 °F (35.8 °C)   Resp 16   Ht 6' 2.02\" (1.88 m)   Wt 185 lb 13.6 oz (84.3 kg)   SpO2 97%   BMI 23.85 kg/m²    O2 Flow Rate (L/min): 2 l/min O2 Device: None (Room air)    Temp (24hrs), Av.5 °F (36.4 °C), Min:95.5 °F (35.3 °C), Max:98.6 °F (37 °C)    701 - 1900  In: 125 [P.O.:125]  Out: 90 [Urine:90]   1901 -  0700  In: 3114.2 [P.O.:540; I.V.:2574.2]  Out: 700 [Urine:700]    General: NAD, confused, not following commands   HEENT: HEIDY, dry mucosa   Lungs: CTA b/l\, decreased at the bases, no wheeze/rhonchi   Heart: S1S2+, RRR, no murmur  Abdo: Soft, NT, ND, +BS   : No fish   Exts:Right index finger dressing in place   Skin: No pressure ulcers       Data Review:       Recent Days:  Recent Labs     22  0420 22  0350   WBC 6.8 6.0 6.4   HGB 7.7* 7.0* 7.8*   HCT 22.8* 20.6* 23.1*   PLT 64* 53* 57*     Recent Labs     22  0420 22  0350   BUN 20 23* 24*   CREA 1.77* 1.88* 2.09*       Lab Results   Component Value Date/Time    C-Reactive protein 1.59 (H) 2022 12:45 PM        Microbiology     Results     Procedure Component Value Units Date/Time    CULTURE, Allyne Ruffing STAIN [[id number]]  (Susceptibility) Collected: 22 2712    Order Status: Completed Specimen: Wound from Right Updated: 22 1024     Special Requests: No Special Requests        GRAM STAIN Few WBCs seen         No organisms seen        Culture result:       Scant Staphylococcus epidermidis (Oxacillin resistant)          Susceptibility      Staphylococcus epidermidis     YESSENIA     Ciprofloxacin ($) Resistant     Clindamycin ($) Susceptible     Daptomycin ($$$$$) Susceptible     Erythromycin ($$$$) Susceptible     Gentamicin ($) Susceptible     Levofloxacin ($) Resistant     Linezolid ($$$$$) Susceptible     Moxifloxacin ($$$$) Resistant     Oxacillin Resistant     Rifampin ($$$$) Susceptible  [1]      Tetracycline Susceptible     Trimeth/Sulfa Susceptible     Vancomycin ($) Susceptible                 [1]  Rifampin is not to be used for mono-therapy. Linear View                   CULTURE, TISSUE Clarnce Fluke STAIN [[id number]] Collected: 03/25/22 1715    Order Status: Completed Specimen: Right Updated: 03/28/22 1544     Special Requests: No Special Requests        GRAM STAIN Rare WBCs seen         No organisms seen        Culture result:       Scant Staphylococcus epidermidis PLEASE REFER TO [id number] FOR SENSITIVITIES          MRSA SCREEN - PCR (NASAL) [[id number]] Collected: 03/24/22 1256    Order Status: Completed Specimen: Swab Updated: 03/24/22 1456     MRSA by PCR, Nasal Not Detected       COVID-19 RAPID TEST [[id number]] Collected: 03/24/22 0918    Order Status: Completed Specimen: Nasopharyngeal Updated: 03/24/22 0955     COVID-19 rapid test Not Detected        Comment: Rapid Abbott ID Now   Rapid NAAT:  The specimen is NEGATIVE for SARS-CoV-2, the novel coronavirus associated with COVID-19. Negative results should be treated as presumptive and, if inconsistent with clinical signs and symptoms or necessary for patient management, should be tested with an alternative molecular assay. Negative results do not preclude SARS-CoV-2 infection and should not be used as the sole basis for patient management decisions. This test has been authorized by the FDA under   an Emergency Use Authorization (EUA) for use by authorized laboratories.  Fact sheet for Healthcare Providers: ConventionUpdate.co.nz Fact sheet for Patients: ConventionUpdate.co.nz   Methodology: Isothermal Nucleic Acid Amplification CULTURE, BLOOD, PAIRED [[id number]] Collected: 03/23/22 0000    Order Status: Completed Specimen: Blood Updated: 03/23/22 0021    CULTURE, Allyne Ruffing STAIN [[id number]] Collected: 03/22/22 2219    Order Status: Completed Specimen: Wound Updated: 03/25/22 0850     Special Requests: No Special Requests        GRAM STAIN Few WBCs seen         No organisms seen        Culture result:       Few Staphylococcus species, coagulase negative               Diagnostics   CXR Results  (Last 48 hours)    None         Assessment/Plan     1. Right index finger swelling/tenderness, soft tissue swelling/septic arthritis and osteomyelitis on X-ray       Coag neg staph isolated from superficial wound Cx and intra-op Cx       S/p wound debridement w excision of bone on 03/25      WBC remains WNLs, CRP 1.59 and ESR 36 (03/26)       Continue on Vanc or 3 wks from 03/25. Routine labs in the morning         2. Hypothermia: Ongoing, reason unclear, WBC WNL, not requiring pressor support          3. Acute renal failure: Stable Cr on routine labs       4. Thrombocytopenia: Plt are trending up on todays labs     5.  AMS: remains confused, but alert     Miguelina Jordan MD    4/1/2022

## 2022-04-01 NOTE — PROGRESS NOTES
PROGRESS NOTE    Patient: Mason Connor MRN: [id number]  SSN: [id number]    YOB: 1960  Age: 60 y.o. Sex: male      Admit Date: 3/22/2022    LOS: 10 days       Subjective     Chief Complaint   Patient presents with    Finger Swelling       HPI: Mason Connor is a(n) 60 y.o. male with PMH significant for etoh abuse, encephalopathy, cirrhosis, seizure disorder, gout presents with c/o swelling to right 2nd finger. Patient is nonverbal at baseline and dependent on all ADL at [address] [place] [address]. According to nursing home staff, has future orthopedic appointment for this finger. Is non-verbal at baseline but appears awake, alert, and in no obvious distress.     Patient was recently discharged from the hospital after having a seizures at that time patient received Keppra for the seizures patient had paracentesis done during the last admission also endoscopy done and had banding of the varices     I&D performed on finger in ED with purulent fluid drained. Dressing in place covering the DIP. 1g vancomycin given in ED, switched to zosyn on admission. Wound cultures sent. XR of the finger shows soft tissue swelling in gas consistent with osteomyelitis, septic joint, or gouty arthropathy.     Labs: Hgb 9.6 (baseline). Creatinine 2.51. CRP 3.15. ESR 35. Wound and blood cultures pending. 3/24:  Patient seen resting in bed. Nonverbal at baseline. Rectal temperature 91.1F per nursing staff. Blood pressure soft, patient may be septic. Vanc and Zosyn started per ID consult. Lactate 3.4. Blood and wound cultures still pending. Per orthopedic surgery, patient to have debridement of joint today under local anesthesia. He will likely need IV abx for 4-6 weeks for septic joint. Per nephrology, the patients increased creatinine of 2.5 may be due to amiloride, which is now held. Iron studies also obtained for Hgb 9.6. Labs remarkable for: Hgb 9.6. Creatinine 2.47.    3/25:  Patient seen in ICU.  Denies finger pain. Rectal temp 36.2C. I&D moved to today d/t transfer to ICU yesterday. Per ID, initial wound culture shows coag negative staph and continue on vanc and zosyn pending final culture result. Blood cultures still pending. Labs remarkable for Hgb 9.2, Creatinine 2.59, Albumin 1.8. Lactate decreased from 3.4 to 2.6. Ammonia 39. Pt had Doppler studies done this morning which shows    · No evidence of acute DVT in the right upper extremity. · Thrombus noted within the right cephalic forearm vein(s).    3/28:  Patient seen resting comfortably in bed, remains at baseline mental status. Current temp 36C and patient still on warming blanket. Patient had I&D of right index finger on 3/25 which revealed purulent drainage mixed with gouty tophi. DIP joint thoroughly irrigated and cultures sent. Cultures show few WBC and \"scant probably enterococcus. \" Patient remains on vanc and zosyn. Over the weekend, levothyroxine increased to 50mcg d/t elevated TSH. He was also started on midodrine 5mg TID for hypotension and was given hypotonic fluids for hypernatremia      Labs from 3/27 show Hgb 8.5, Na 149, creatinine 2.43, albumin 2.2    3/29:  Patient seen resting comfortably in bed. Denies any pain. Abdomen very distended, worse than yesterday. Patient receiving NG tube feeds. Lower extremity edema also present. Patient also receiving 1 unit packed RBC for Hgb 6. 6. platelets 53, Na 085, Creatinine 2.31. Patient still hypothermic    3/30:  Patient remains hypothermic at 95.7F. Denies finger pain, chest pain, palpitations, shortness of breath, abdominal pain. Seen by nephrology who notes creatinine gradually decreasing. Baseline is around 1.7. Seen by speech therapy who will continue to advance diet to include some solids today. Culture from DIP joint shows staph epidermidis susceptible to vancomycin, which patient is already on. Hgb 7.8, platelets 57, creatinine 2.09, Na 148. KUB shows abdominal distension consistent with known history of ascites. Last paracentesis 3/24 with 4.3L fluid removed. 3/31:  Patient seen resting in bed. Denies any acute complaints. Remains hypothermic at 35.7. Probe replaced by RN to ensure accuracy. Patient currently on pureed solid diet. He was seen by urology for urinary retention yesterday who plans to leave Gerard in for now and follow outpatient. Patient should stay on Flomax 0.4mg. Patient also had paracentesis yesterday with 8400cc fluid removed with 62.5g albumin given. Abdomen much less distended today. Hgb 7.0, Creatinine 1.88, albumin 2.7.      4/1: Patient seen in ICU, no acute complaints. Temperature 36C. Patient seen by nephrology yesterday who restarted D5W at 50cc/hr for hypernatremia. Na today is 146. Patient will also start on weekly procrit. Hgb today Is 7.7. Nephrology also increased sodium bicarb to 1300 BID for significant bicarb deficit. Platelets 64, albumin 2.5, creatinine has returned to level before hospital admission 1.77    Past Medical History:   Diagnosis Date    Arthritis      Hypertension                 Past Surgical History:   Procedure Laterality Date    IR PARACENTESIS ABD W IMAGE   1/21/2022    IR PARACENTESIS ABD W IMAGE   3/1/2022                 Prior to Admission medications    Medication Sig Start Date End Date Taking? Authorizing Provider   levothyroxine (SYNTHROID) 25 mcg tablet Take 25 mcg by mouth Daily (before breakfast).     Yes Provider, Historical   acetaminophen (TylenoL) 325 mg tablet Take 650 mg by mouth every six (6) hours as needed for Pain.     Yes Provider, Historical   tamsulosin (Flomax) 0.4 mg capsule Take 1 Capsule by mouth daily. 3/4/22     Ryan Whyte PA-C   potassium chloride (K-DUR, KLOR-CON M20) 20 mEq tablet Take 1 Tablet by mouth daily. 3/2/22     Telma Andujar MD   sodium bicarbonate 650 mg tablet Take 1 Tablet by mouth three (3) times daily.  3/2/22     Noa Ash MD chlordiazePOXIDE (LIBRIUM) 5 mg capsule Take 2 Capsules by mouth three (3) times daily as needed for Anxiety. Max Daily Amount: 30 mg. 3/2/22     Gerrit Harada, MD   levETIRAcetam (Keppra) 1,000 mg tablet Take 1.5 Tablets by mouth two (2) times a day. 3/2/22     Gerrit Harada, MD   ergocalciferol (ERGOCALCIFEROL) 1,250 mcg (50,000 unit) capsule Take 1 Capsule by mouth every seven (7) days. 2/9/22     Provider, Historical   lactulose (CHRONULAC) 10 gram/15 mL solution Take 15 mL by mouth three (3) times daily. 1/23/22     Mohiuddin, Laban Mcardle, MD   allopurinoL (ZYLOPRIM) 300 mg tablet Take 1 Tablet by mouth daily. 1/18/22     Provider, Historical   thiamine HCL (B-1) 100 mg tablet Take 100 mg by mouth daily.       Provider, Historical   propranoloL (INDERAL) 20 mg tablet Take 20 mg by mouth two (2) times a day.       Provider, Historical   folic acid (FOLVITE) 1 mg tablet Take 1 mg by mouth daily.       Provider, Historical   famotidine (PEPCID) 20 mg tablet Take 20 mg by mouth At bedtime.       Provider, Historical   aMILoride (MIDAMOR) 5 mg tablet Take 5 mg by mouth daily.       Provider, Historical         No Known Allergies      No family history on file. Reason unable to perform ROS: nonverbal at baseline. Objective     Visit Vitals  BP 95/75 (BP 1 Location: Left upper arm, BP Patient Position: At rest)   Pulse 67   Temp 97.3 °F (36.3 °C)   Resp 12   Ht 6' 2.02\" (1.88 m)   Wt 185 lb 13.6 oz (84.3 kg)   SpO2 100%   BMI 23.85 kg/m²    O2 Flow Rate (L/min): 2 l/min O2 Device: None (Room air)    Physical Exam:   Constitutional:       General: He is not in acute distress. HENT:      Head: Normocephalic and atraumatic. Cardiovascular:      Rate and Rhythm: Normal rate and regular rhythm. Pulses: Normal pulses. Heart sounds: Normal heart sounds. Pulmonary:      Effort: Pulmonary effort is normal.      Breath sounds: Normal breath sounds.    Abdominal:      General: There is distension. Tenderness: There is no abdominal tenderness. There is no guarding or rebound. Skin:     Findings: Erythema present. Comments: Swelling to the right 2nd digit   Neurological:      Mental Status: Mental status is at baseline. Comments: Nonverbal     Intake & Output:  Current Shift: No intake/output data recorded. Last three shifts: 03/30 1901 - 04/01 0700  In: 3114.2 [P.O.:540; I.V.:2574.2]  Out: 700 [Urine:700]    Lab/Data Review: All lab data reviewed      24 Hour Results:    Recent Results (from the past 24 hour(s))   GLUCOSE, POC    Collection Time: 03/31/22 11:35 AM   Result Value Ref Range    Glucose (POC) 111 65 - 117 mg/dL    Performed by ARMIN Horton    Collection Time: 04/01/22  3:45 AM   Result Value Ref Range    Vancomycin, random 20.6 ug/mL   CBC W/O DIFF    Collection Time: 04/01/22  3:45 AM   Result Value Ref Range    WBC 6.8 4.1 - 11.1 K/uL    RBC 2.72 (L) 4.10 - 5.70 M/uL    HGB 7.7 (L) 12.1 - 17.0 g/dL    HCT 22.8 (L) 36.6 - 50.3 %    MCV 83.8 80.0 - 99.0 FL    MCH 28.3 26.0 - 34.0 PG    MCHC 33.8 30.0 - 36.5 g/dL    RDW 18.6 (H) 11.5 - 14.5 %    PLATELET 64 (L) 291 - 400 K/uL    NRBC 0.0 0.0  WBC    ABSOLUTE NRBC 0.00 0.00 - 4.03 K/uL   METABOLIC PANEL, COMPREHENSIVE    Collection Time: 04/01/22  3:45 AM   Result Value Ref Range    Sodium 146 (H) 136 - 145 mmol/L    Potassium 3.6 3.5 - 5.1 mmol/L    Chloride 118 (H) 97 - 108 mmol/L    CO2 21 21 - 32 mmol/L    Anion gap 7 5 - 15 mmol/L    Glucose 88 65 - 100 mg/dL    BUN 20 6 - 20 mg/dL    Creatinine 1.77 (H) 0.70 - 1.30 mg/dL    BUN/Creatinine ratio 11 (L) 12 - 20      GFR est AA 47 (L) >60 ml/min/1.73m2    GFR est non-AA 39 (L) >60 ml/min/1.73m2    Calcium 8.4 (L) 8.5 - 10.1 mg/dL    Bilirubin, total 0.9 0.2 - 1.0 mg/dL    AST (SGOT) 21 15 - 37 U/L    ALT (SGPT) 11 (L) 12 - 78 U/L    Alk.  phosphatase 66 45 - 117 U/L    Protein, total 5.6 (L) 6.4 - 8.2 g/dL    Albumin 2.5 (L) 3.5 - 5.0 g/dL    Globulin 3.1 2.0 - 4.0 g/dL    A-G Ratio 0.8 (L) 1.1 - 2.2           Imaging:    IR PARACENTESIS ABD W IMAGE   Final Result   Successful paracentesis. XR ABD (KUB)   Final Result      XR CHEST PORT   Final Result      XR CHEST PORT   Final Result   The cardiomediastinal silhouette is appropriate for age, technique,   and lung expansion. Pulmonary vasculature is not congested. The lungs are   essentially clear. No effusion or pneumothorax is seen. IR INSERT NON TUNL CVC OVER 5 YRS   Final Result      Technically successful insertion of non-tunneled triple-lumen catheter with US   guidance. Plan:       The catheter may be used after catheter placement confirmation by x-ray.   _______________________________________________________________      PROCEDURE SUMMARY:   - Venous access with ultrasound guidance   - Non-tunneled central venous catheter insertion      PROCEDURE DETAILS:      Pre-procedure   Relevant imaging review: None    Informed consent for the procedure was obtained and time-out was performed prior   to the procedure. Prophylactic antibiotic administered: Not administered   Preparation: The site was prepared and draped using all elements of maximal   sterile barrier technique including sterile gloves, sterile gown, cap, mask,   large sterile sheet, sterile ultrasound probe cover, sterile ultrasound gel,   hand hygiene and cutaneous antisepsis with 2% chlorhexidine. Medical reason for site preparation exception: Not applicable      Anesthesia/sedation   Level of anesthesia: No sedation   Anesthesia administered by: Not applicable   Duration of anesthesia/sedation: 0 minutes. Access   The vessel was sonographically evaluated and judged to be patent and appropriate   for access and a permanent image was stored. 2 cc's of 1% lidocaine was   administered to the access site. Real time ultrasound was used to visualize   needle entry into the vessel.  Vein accessed: Right internal jugular vein   Access technique: 4F micropuncture set      Catheter placement   The access site was dilated and the catheter was placed into the vein over a   wire and all guidewires were removed in their entirety. Catheter ports were   aspirated and flushed. Catheter tip location was verified by portable X-ray. Catheter size: 7 Armenian   Catheter length: 16 cm   Catheter tip position: Central. The tip is overlying the anatomic SVC. Catheter flush: Normal saline      Closure   The catheter was secured. A sterile dressing was applied. Catheter securement technique: Stat-Lock      Additional Details   Additional description of procedure: None   Additional findings: None   Additional equipment: None   Specimens removed: None   Estimated blood loss:  Less than 10 cc   Standardized report: [id number]_CVA_NonTunneledCatheter1.0               IR US GUIDED VASCULAR ACCESS   Final Result      Technically successful insertion of non-tunneled triple-lumen catheter with US   guidance. Plan:       The catheter may be used after catheter placement confirmation by x-ray.   _______________________________________________________________      PROCEDURE SUMMARY:   - Venous access with ultrasound guidance   - Non-tunneled central venous catheter insertion      PROCEDURE DETAILS:      Pre-procedure   Relevant imaging review: None    Informed consent for the procedure was obtained and time-out was performed prior   to the procedure. Prophylactic antibiotic administered: Not administered   Preparation: The site was prepared and draped using all elements of maximal   sterile barrier technique including sterile gloves, sterile gown, cap, mask,   large sterile sheet, sterile ultrasound probe cover, sterile ultrasound gel,   hand hygiene and cutaneous antisepsis with 2% chlorhexidine.     Medical reason for site preparation exception: Not applicable      Anesthesia/sedation   Level of anesthesia: No sedation   Anesthesia administered by: Not applicable   Duration of anesthesia/sedation: 0 minutes. Access   The vessel was sonographically evaluated and judged to be patent and appropriate   for access and a permanent image was stored. 2 cc's of 1% lidocaine was   administered to the access site. Real time ultrasound was used to visualize   needle entry into the vessel. Vein accessed: Right internal jugular vein   Access technique: 4F micropuncture set      Catheter placement   The access site was dilated and the catheter was placed into the vein over a   wire and all guidewires were removed in their entirety. Catheter ports were   aspirated and flushed. Catheter tip location was verified by portable X-ray. Catheter size: 7 K[place]z   Catheter length: 16 cm   Catheter tip position: Central. The tip is overlying the anatomic SVC. Catheter flush: Normal saline      Closure   The catheter was secured. A sterile dressing was applied. Catheter securement technique: Stat-Lock      Additional Details   Additional description of procedure: None   Additional findings: None   Additional equipment: None   Specimens removed: None   Estimated blood loss:  Less than 10 cc   Standardized report: SIR_CVA_NonTunneledCatheter1.0               DUPLEX UPPER EXT VENOUS RIGHT   Final Result      XR 2ND FINGER RT MIN 2 V   Final Result      Progressive findings. The findings are nonspecific and could represent infection with osteomyelitis   and septic joint and gas in the soft tissues related to infection. Gout, or other inflammatory arthropathy is a possibility. Findings should be correlated with clinical parameters.                 Assessment   Hypothermia  Hypoglycemic  Hypotension  Bradycardia  Septic joint- right 2nd finger DIP  SEPSIS wound culturesgrows  Staphylococcus and Enterococcus  Anemia- transfuse if Hgb < 7.0 status post transfusion  Acute on chronic renal failure- creatinine 2.51 on admission     Hx etoh abuse  Cirrhosis of liver   History of encephalopathy  Seizure disorder  History of gout  Thrombocytopenia    · No evidence of acute DVT in the right upper extremity. · Thrombus noted within the right cephalic forearm vein(s).     Hypernatremia  Hypothyroidism    Plan     Bear hugger for hypothermia  SEPSIS- continue vanc and zosyn  Anemia- transfuse if Hgb < 7.0  Transfuse 1 units packed RBC  Order KUB   I&D of right 2nd finger performed in ED  Consult wound care  Consult pharmacy for vancomycin dosing  Consult nephrology  Consult ID  Speech therapy  PT OT      Allopurinol 300mg daily  amloride 5mg daily-- HOLD  Ergocalciferol 50,000 units weekly  Famotidine 91VQ daily  Folic acid 1mg daily  Lactulose 10g/15mL TID  Keppra 1000mg BID  Levothyroxine 50mcg daily  Propanolol 20mg BID  Sodium bicarbonate 650mg TID  Thiamine 100mg daily     Flomax 0.4 mg to    IR consult for paracentesis      Unable to transfer to the floor due to hypothermia    Seen by infectious disease recommend to continue IV vancomycin     Monitor renal function remained stable    Repeat the ABG     Current Facility-Administered Medications:     hydrOXYzine pamoate (VISTARIL) capsule 25 mg, 25 mg, Oral, QID PRN, Lamberto Ackerman MD, 25 mg at 03/31/22 2212    dextrose 5% infusion, 100 mL/hr, IntraVENous, CONTINUOUS, Kendrick Thompson MD, Last Rate: 100 mL/hr at 04/01/22 0200, 100 mL/hr at 04/01/22 0200    0.9% sodium chloride infusion 250 mL, 250 mL, IntraVENous, PRN, Lamberto Ackerman MD    sodium bicarbonate tablet 1,300 mg, 1,300 mg, Oral, BID, Kendrick Thompson MD, 1,300 mg at 03/31/22 2028    levETIRAcetam (KEPPRA) oral solution 1,000 mg, 1,000 mg, Oral, BID, Lamberto Ackerman MD, 1,000 mg at 03/31/22 2028    Vancomycin - Pharmacy to Dose, , Other, Rx Dosing/Monitoring, Ree Zaragoza MD    levothyroxine (SYNTHROID) tablet 50 mcg, 50 mcg, Oral, 6am, Ree Zaragoza MD, 50 mcg at 04/01/22 0612    tamsulosin (FLOMAX) capsule 0.4 mg, 0.4 mg, Oral, DAILY, Queenie Morton MD, 0.4 mg at 03/31/22 2526    sodium chloride (NS) flush 5-40 mL, 5-40 mL, IntraVENous, Q8H, Cruz Mckeon MD, 10 mL at 04/01/22 7204    sodium chloride (NS) flush 5-40 mL, 5-40 mL, IntraVENous, PRN, Mia Waterman MD, 10 mL at 03/31/22 2212    acetaminophen (TYLENOL) tablet 650 mg, 650 mg, Oral, Q8H, Ramírez Bray MD, 650 mg at 03/31/22 2211    HYDROcodone-acetaminophen (NORCO) 5-325 mg per tablet 1 Tablet, 1 Tablet, Oral, Q4H PRN, Mia Waterman MD    morphine injection 2 mg, 2 mg, IntraVENous, Q4H PRN, Mia Waterman MD    naloxone (NARCAN) injection 0.4 mg, 0.4 mg, IntraVENous, PRN, Mia Waterman MD    ondansetron (ZOFRAN) injection 4 mg, 4 mg, IntraVENous, Q4H PRN, Mia Waterman MD    allopurinoL (ZYLOPRIM) tablet 300 mg, 300 mg, Oral, DAILY, Mia Waterman MD, 300 mg at 03/31/22 6274    [Held by provider] aMILoride (MIDAMOR) tablet 5 mg, 5 mg, Oral, DAILY, Angel Abbott MD, 5 mg at 03/23/22 0900    ergocalciferol capsule 50,000 Units, 50,000 Units, Oral, Q7D, Mia Waterman MD, 50,000 Units at 03/31/22 0923    famotidine (PEPCID) tablet 20 mg, 20 mg, Oral, QPM, Mia Waterman MD, 20 mg at 18/70/01 8516    folic acid (FOLVITE) tablet 1 mg, 1 mg, Oral, DAILY, Mia Waterman MD, 1 mg at 03/31/22 0923    lactulose (CHRONULAC) 10 gram/15 mL solution 15 mL, 15 mL, Oral, TID, Mia Waterman MD, 15 mL at 03/31/22 2211    propranoloL (INDERAL) tablet 20 mg, 20 mg, Oral, BID, Mia Waterman MD, 20 mg at 03/31/22 2028    thiamine mononitrate (B-1) tablet 100 mg, 100 mg, Oral, DAILY, Ramírez Bray MD, 100 mg at 03/31/22 8389    acetaminophen (TYLENOL) tablet 650 mg, 650 mg, Oral, Q6H PRN, Mia Waterman MD, 650 mg at 04/01/22 0612    Current Outpatient Medications   Medication Instructions    acetaminophen (TYLENOL) 650 mg, Oral, EVERY 6 HOURS AS NEEDED    allopurinoL (ZYLOPRIM) 300 mg tablet 1 Tablet, Oral, DAILY    aMILoride (MIDAMOR) 5 mg, Oral, DAILY    chlordiazePOXIDE (LIBRIUM) 10 mg, Oral, 3 TIMES DAILY AS NEEDED    ergocalciferol (ERGOCALCIFEROL) 1,250 mcg (50,000 unit) capsule 1 Capsule, Oral, EVERY 7 DAYS    famotidine (PEPCID) 20 mg, Oral, AT BEDTIME    folic acid (FOLVITE) 1 mg, Oral, DAILY    lactulose (CHRONULAC) 10 gram/15 mL solution 15 mL, Oral, 3 TIMES DAILY    levETIRAcetam (KEPPRA) 1,500 mg, Oral, 2 TIMES DAILY    levothyroxine (SYNTHROID) 25 mcg, Oral, DAILY BEFORE BREAKFAST    potassium chloride (K-DUR, KLOR-CON M20) 20 mEq tablet 20 mEq, Oral, DAILY    propranoloL (INDERAL) 20 mg, Oral, 2 TIMES DAILY    sodium bicarbonate 650 mg, Oral, 3 TIMES DAILY    tamsulosin (FLOMAX) 0.4 mg, Oral, DAILY    thiamine HCL (B-1) 100 mg, Oral, DAILY         Signed By: Becky Merritt     April 1, 2022

## 2022-04-02 NOTE — PROGRESS NOTES
Problem: Falls - Risk of  Goal: *Absence of Falls  Description: Document Nery Watson Fall Risk and appropriate interventions in the flowsheet. Outcome: Progressing Towards Goal  Note: Fall Risk Interventions:  Mobility Interventions: Bed/chair exit alarm    Mentation Interventions: Bed/chair exit alarm,Door open when patient unattended,More frequent rounding,Update white board    Medication Interventions: Bed/chair exit alarm    Elimination Interventions: Bed/chair exit alarm,Call light in reach,Toileting schedule/hourly rounds              Problem: Pressure Injury - Risk of  Goal: *Prevention of pressure injury  Description: Document Curtis Scale and appropriate interventions in the flowsheet. Outcome: Progressing Towards Goal  Note: Pressure Injury Interventions:  Sensory Interventions: Assess changes in LOC,Float heels,Keep linens dry and wrinkle-free,Turn and reposition approx. every two hours (pillows and wedges if needed),Minimize linen layers    Moisture Interventions: Absorbent underpads,Apply protective barrier, creams and emollients,Check for incontinence Q2 hours and as needed,Minimize layers,Moisture barrier    Activity Interventions: PT/OT evaluation,Assess need for specialty bed    Mobility Interventions: Turn and reposition approx.  every two hours(pillow and wedges),Float heels,HOB 30 degrees or less    Nutrition Interventions: Document food/fluid/supplement intake    Friction and Shear Interventions: Apply protective barrier, creams and emollients,HOB 30 degrees or less,Minimize layers                Problem: Impaired Skin Integrity/Pressure Injury Treatment  Goal: *Improvement of Existing Pressure Injury  Outcome: Progressing Towards Goal  Note: Turning/repositioning, foam dressing on coccyx, heels floated

## 2022-04-02 NOTE — PROGRESS NOTES
PROGRESS NOTE    Patient: Deanna Castillo MRN: [id number]  SSN: [id number]    YOB: 1960  Age: 60 y.o. Sex: male      Admit Date: 3/22/2022    LOS: 11 days       Subjective     Chief Complaint   Patient presents with    Finger Swelling       HPI: Deanna Castillo is a(n) 60 y.o. male with PMH significant for etoh abuse, encephalopathy, cirrhosis, seizure disorder, gout presents with c/o swelling to right 2nd finger. Patient is nonverbal at baseline and dependent on all ADL at [address] [place] [address]. According to nursing home staff, has future orthopedic appointment for this finger. Is non-verbal at baseline but appears awake, alert, and in no obvious distress.     Patient was recently discharged from the hospital after having a seizures at that time patient received Keppra for the seizures patient had paracentesis done during the last admission also endoscopy done and had banding of the varices     I&D performed on finger in ED with purulent fluid drained. Dressing in place covering the DIP. 1g vancomycin given in ED, switched to zosyn on admission. Wound cultures sent. XR of the finger shows soft tissue swelling in gas consistent with osteomyelitis, septic joint, or gouty arthropathy.     Labs: Hgb 9.6 (baseline). Creatinine 2.51. CRP 3.15. ESR 35. Wound and blood cultures pending. 3/24:  Patient seen resting in bed. Nonverbal at baseline. Rectal temperature 91.1F per nursing staff. Blood pressure soft, patient may be septic. Vanc and Zosyn started per ID consult. Lactate 3.4. Blood and wound cultures still pending. Per orthopedic surgery, patient to have debridement of joint today under local anesthesia. He will likely need IV abx for 4-6 weeks for septic joint. Per nephrology, the patients increased creatinine of 2.5 may be due to amiloride, which is now held. Iron studies also obtained for Hgb 9.6. Labs remarkable for: Hgb 9.6. Creatinine 2.47.    3/25:  Patient seen in ICU.  Denies finger pain. Rectal temp 36.2C. I&D moved to today d/t transfer to ICU yesterday. Per ID, initial wound culture shows coag negative staph and continue on vanc and zosyn pending final culture result. Blood cultures still pending. Labs remarkable for Hgb 9.2, Creatinine 2.59, Albumin 1.8. Lactate decreased from 3.4 to 2.6. Ammonia 39. Pt had Doppler studies done this morning which shows    · No evidence of acute DVT in the right upper extremity. · Thrombus noted within the right cephalic forearm vein(s).    3/28:  Patient seen resting comfortably in bed, remains at baseline mental status. Current temp 36C and patient still on warming blanket. Patient had I&D of right index finger on 3/25 which revealed purulent drainage mixed with gouty tophi. DIP joint thoroughly irrigated and cultures sent. Cultures show few WBC and \"scant probably enterococcus. \" Patient remains on vanc and zosyn. Over the weekend, levothyroxine increased to 50mcg d/t elevated TSH. He was also started on midodrine 5mg TID for hypotension and was given hypotonic fluids for hypernatremia      Labs from 3/27 show Hgb 8.5, Na 149, creatinine 2.43, albumin 2.2    3/29:  Patient seen resting comfortably in bed. Denies any pain. Abdomen very distended, worse than yesterday. Patient receiving NG tube feeds. Lower extremity edema also present. Patient also receiving 1 unit packed RBC for Hgb 6. 6. platelets 53, Na 239, Creatinine 2.31. Patient still hypothermic    3/30:  Patient remains hypothermic at 95.7F. Denies finger pain, chest pain, palpitations, shortness of breath, abdominal pain. Seen by nephrology who notes creatinine gradually decreasing. Baseline is around 1.7. Seen by speech therapy who will continue to advance diet to include some solids today. Culture from DIP joint shows staph epidermidis susceptible to vancomycin, which patient is already on. Hgb 7.8, platelets 57, creatinine 2.09, Na 148. KUB shows abdominal distension consistent with known history of ascites. Last paracentesis 3/24 with 4.3L fluid removed. 3/31:  Patient seen resting in bed. Denies any acute complaints. Remains hypothermic at 35.7. Probe replaced by RN to ensure accuracy. Patient currently on pureed solid diet. He was seen by urology for urinary retention yesterday who plans to leave Gerard in for now and follow outpatient. Patient should stay on Flomax 0.4mg. Patient also had paracentesis yesterday with 8400cc fluid removed with 62.5g albumin given. Abdomen much less distended today. Hgb 7.0, Creatinine 1.88, albumin 2.7.      4/1: Patient seen in ICU, no acute complaints. Temperature 36C. Patient seen by nephrology yesterday who restarted D5W at 50cc/hr for hypernatremia. Na today is 146. Patient will also start on weekly procrit. Hgb today Is 7.7. Nephrology also increased sodium bicarb to 1300 BID for significant bicarb deficit. 4/2     Patient transferred to the medical floor temperature stable patient not eating drinking well more alert awake      Past Medical History:   Diagnosis Date    Arthritis      Hypertension                 Past Surgical History:   Procedure Laterality Date    IR PARACENTESIS ABD W IMAGE   1/21/2022    IR PARACENTESIS ABD W IMAGE   3/1/2022                 Prior to Admission medications    Medication Sig Start Date End Date Taking? Authorizing Provider   levothyroxine (SYNTHROID) 25 mcg tablet Take 25 mcg by mouth Daily (before breakfast).     Yes Provider, Historical   acetaminophen (TylenoL) 325 mg tablet Take 650 mg by mouth every six (6) hours as needed for Pain.     Yes Provider, Historical   tamsulosin (Flomax) 0.4 mg capsule Take 1 Capsule by mouth daily. 3/4/22     Davy Whyte PA-C   potassium chloride (K-DUR, KLOR-CON M20) 20 mEq tablet Take 1 Tablet by mouth daily. 3/2/22     Rupali Andujar MD   sodium bicarbonate 650 mg tablet Take 1 Tablet by mouth three (3) times daily.  3/2/22     Irina Joseph MD   chlordiazePOXIDE (LIBRIUM) 5 mg capsule Take 2 Capsules by mouth three (3) times daily as needed for Anxiety. Max Daily Amount: 30 mg. 3/2/22     Irina Joseph MD   levETIRAcetam (Keppra) 1,000 mg tablet Take 1.5 Tablets by mouth two (2) times a day. 3/2/22     Irina Joseph MD   ergocalciferol (ERGOCALCIFEROL) 1,250 mcg (50,000 unit) capsule Take 1 Capsule by mouth every seven (7) days. 2/9/22     Provider, Historical   lactulose (CHRONULAC) 10 gram/15 mL solution Take 15 mL by mouth three (3) times daily. 1/23/22     Anaya Ackerman MD   allopurinoL (ZYLOPRIM) 300 mg tablet Take 1 Tablet by mouth daily. 1/18/22     Provider, Historical   thiamine HCL (B-1) 100 mg tablet Take 100 mg by mouth daily.       Provider, Historical   propranoloL (INDERAL) 20 mg tablet Take 20 mg by mouth two (2) times a day.       Provider, Historical   folic acid (FOLVITE) 1 mg tablet Take 1 mg by mouth daily.       Provider, Historical   famotidine (PEPCID) 20 mg tablet Take 20 mg by mouth At bedtime.       Provider, Historical   aMILoride (MIDAMOR) 5 mg tablet Take 5 mg by mouth daily.       Provider, Historical         No Known Allergies      No family history on file. Reason unable to perform ROS: nonverbal at baseline. Objective     Visit Vitals  /75 (BP 1 Location: Left upper arm, BP Patient Position: At rest)   Pulse 80   Temp 97.8 °F (36.6 °C)   Resp 19   Ht 6' 2.02\" (1.88 m)   Wt 84.3 kg (185 lb 13.6 oz)   SpO2 100%   BMI 23.85 kg/m²    O2 Flow Rate (L/min): 2 l/min O2 Device: None (Room air)    Physical Exam:   Constitutional:       General: He is not in acute distress. HENT:      Head: Normocephalic and atraumatic. Cardiovascular:      Rate and Rhythm: Normal rate and regular rhythm. Pulses: Normal pulses. Heart sounds: Normal heart sounds. Pulmonary:      Effort: Pulmonary effort is normal.      Breath sounds: Normal breath sounds. Abdominal:      General: There is distension. Tenderness: There is no abdominal tenderness. There is no guarding or rebound. Skin:     Findings: Erythema present. Comments: Swelling to the right 2nd digit   Neurological:      Mental Status: Mental status is at baseline. Comments: Nonverbal     Intake & Output:  Current Shift: No intake/output data recorded. Last three shifts: 03/31 1901 - 04/02 0700  In: 3028 [P.O.:400; I.V.:2628]  Out: 680 [Urine:680]    Lab/Data Review: All lab data reviewed      24 Hour Results:    Recent Results (from the past 24 hour(s))   CBC W/O DIFF    Collection Time: 04/02/22  3:34 AM   Result Value Ref Range    WBC 5.6 4.1 - 11.1 K/uL    RBC 2.92 (L) 4.10 - 5.70 M/uL    HGB 8.3 (L) 12.1 - 17.0 g/dL    HCT 24.5 (L) 36.6 - 50.3 %    MCV 83.9 80.0 - 99.0 FL    MCH 28.4 26.0 - 34.0 PG    MCHC 33.9 30.0 - 36.5 g/dL    RDW 18.8 (H) 11.5 - 14.5 %    PLATELET 70 (L) 285 - 400 K/uL    NRBC 0.0 0.0  WBC    ABSOLUTE NRBC 0.00 0.00 - 9.83 K/uL   METABOLIC PANEL, COMPREHENSIVE    Collection Time: 04/02/22  3:34 AM   Result Value Ref Range    Sodium 146 (H) 136 - 145 mmol/L    Potassium 3.8 3.5 - 5.1 mmol/L    Chloride 120 (H) 97 - 108 mmol/L    CO2 19 (L) 21 - 32 mmol/L    Anion gap 7 5 - 15 mmol/L    Glucose 88 65 - 100 mg/dL    BUN 18 6 - 20 mg/dL    Creatinine 1.47 (H) 0.70 - 1.30 mg/dL    BUN/Creatinine ratio 12 12 - 20      GFR est AA 59 (L) >60 ml/min/1.73m2    GFR est non-AA 48 (L) >60 ml/min/1.73m2    Calcium 8.2 (L) 8.5 - 10.1 mg/dL    Bilirubin, total 0.9 0.2 - 1.0 mg/dL    AST (SGOT) 27 15 - 37 U/L    ALT (SGPT) 14 12 - 78 U/L    Alk.  phosphatase 72 45 - 117 U/L    Protein, total 5.7 (L) 6.4 - 8.2 g/dL    Albumin 2.5 (L) 3.5 - 5.0 g/dL    Globulin 3.2 2.0 - 4.0 g/dL    A-G Ratio 0.8 (L) 1.1 - 2.2     MAGNESIUM    Collection Time: 04/02/22  3:34 AM   Result Value Ref Range    Magnesium 1.6 1.6 - 2.4 mg/dL   VANCOMYCIN, RANDOM    Collection Time: 04/02/22  3:34 AM Result Value Ref Range    Vancomycin, random 16.7 ug/mL         Imaging:    IR PARACENTESIS ABD W IMAGE   Final Result   Successful paracentesis. XR ABD (KUB)   Final Result      XR CHEST PORT   Final Result      XR CHEST PORT   Final Result   The cardiomediastinal silhouette is appropriate for age, technique,   and lung expansion. Pulmonary vasculature is not congested. The lungs are   essentially clear. No effusion or pneumothorax is seen. IR INSERT NON TUNL CVC OVER 5 YRS   Final Result      Technically successful insertion of non-tunneled triple-lumen catheter with US   guidance. Plan:       The catheter may be used after catheter placement confirmation by x-ray.   _______________________________________________________________      PROCEDURE SUMMARY:   - Venous access with ultrasound guidance   - Non-tunneled central venous catheter insertion      PROCEDURE DETAILS:      Pre-procedure   Relevant imaging review: None    Informed consent for the procedure was obtained and time-out was performed prior   to the procedure. Prophylactic antibiotic administered: Not administered   Preparation: The site was prepared and draped using all elements of maximal   sterile barrier technique including sterile gloves, sterile gown, cap, mask,   large sterile sheet, sterile ultrasound probe cover, sterile ultrasound gel,   hand hygiene and cutaneous antisepsis with 2% chlorhexidine. Medical reason for site preparation exception: Not applicable      Anesthesia/sedation   Level of anesthesia: No sedation   Anesthesia administered by: Not applicable   Duration of anesthesia/sedation: 0 minutes. Access   The vessel was sonographically evaluated and judged to be patent and appropriate   for access and a permanent image was stored. 2 cc's of 1% lidocaine was   administered to the access site. Real time ultrasound was used to visualize   needle entry into the vessel.  Vein accessed: Right internal jugular vein   Access technique: 4F micropuncture set      Catheter placement   The access site was dilated and the catheter was placed into the vein over a   wire and all guidewires were removed in their entirety. Catheter ports were   aspirated and flushed. Catheter tip location was verified by portable X-ray. Catheter size: 7 [place]   Catheter length: 16 cm   Catheter tip position: Central. The tip is overlying the anatomic SVC. Catheter flush: Normal saline      Closure   The catheter was secured. A sterile dressing was applied. Catheter securement technique: Stat-Lock      Additional Details   Additional description of procedure: None   Additional findings: None   Additional equipment: None   Specimens removed: None   Estimated blood loss:  Less than 10 cc   Standardized report: [email or web address]_NonTunneledCatheter1.0               IR US GUIDED VASCULAR ACCESS   Final Result      Technically successful insertion of non-tunneled triple-lumen catheter with US   guidance. Plan:       The catheter may be used after catheter placement confirmation by x-ray.   _______________________________________________________________      PROCEDURE SUMMARY:   - Venous access with ultrasound guidance   - Non-tunneled central venous catheter insertion      PROCEDURE DETAILS:      Pre-procedure   Relevant imaging review: None    Informed consent for the procedure was obtained and time-out was performed prior   to the procedure. Prophylactic antibiotic administered: Not administered   Preparation: The site was prepared and draped using all elements of maximal   sterile barrier technique including sterile gloves, sterile gown, cap, mask,   large sterile sheet, sterile ultrasound probe cover, sterile ultrasound gel,   hand hygiene and cutaneous antisepsis with 2% chlorhexidine.     Medical reason for site preparation exception: Not applicable      Anesthesia/sedation   Level of anesthesia: No sedation   Anesthesia administered by: Not applicable   Duration of anesthesia/sedation: 0 minutes. Access   The vessel was sonographically evaluated and judged to be patent and appropriate   for access and a permanent image was stored. 2 cc's of 1% lidocaine was   administered to the access site. Real time ultrasound was used to visualize   needle entry into the vessel. Vein accessed: Right internal jugular vein   Access technique: 4F micropuncture set      Catheter placement   The access site was dilated and the catheter was placed into the vein over a   wire and all guidewires were removed in their entirety. Catheter ports were   aspirated and flushed. Catheter tip location was verified by portable X-ray. Catheter size: 7 Serbian   Catheter length: 16 cm   Catheter tip position: Central. The tip is overlying the anatomic SVC. Catheter flush: Normal saline      Closure   The catheter was secured. A sterile dressing was applied. Catheter securement technique: Stat-Lock      Additional Details   Additional description of procedure: None   Additional findings: None   Additional equipment: None   Specimens removed: None   Estimated blood loss:  Less than 10 cc   Standardized report: [email or web address]_CVA_NonTunneledCatheter1.0               DUPLEX UPPER EXT VENOUS RIGHT   Final Result      XR 2ND FINGER RT MIN 2 V   Final Result      Progressive findings. The findings are nonspecific and could represent infection with osteomyelitis   and septic joint and gas in the soft tissues related to infection. Gout, or other inflammatory arthropathy is a possibility. Findings should be correlated with clinical parameters.                 Assessment   Hypothermia  Hypoglycemic  Hypotension  Bradycardia  Septic joint- right 2nd finger DIP  Status post I&D right index finger  SEPSIS wound culturesgrows  Staphylococcus and Enterococcus  Anemia- transfuse if Hgb < 7.0 status post transfusion  Acute on chronic renal failure- creatinine 2.51 on admission     Hx etoh abuse  Cirrhosis of liver   History of encephalopathy  Seizure disorder  History of gout  Thrombocytopenia    · No evidence of acute DVT in the right upper extremity. · Thrombus noted within the right cephalic forearm vein(s). Hypernatremia  Hypothyroidism    Plan     Bear hugger for hypothermia  SEPSIS- continue vanc and zosyn  Anemia- transfuse if Hgb < 7.0  Transfuse 1 units packed RBC  Order KUB   I&D of right 2nd finger performed in ED  Consult wound care  Consult pharmacy for vancomycin dosing  Consult nephrology  Consult ID  Speech therapy  PT OT      Allopurinol 300mg daily  amloride 5mg daily-- HOLD  Ergocalciferol 50,000 units weekly  Famotidine 88TW daily  Folic acid 1mg daily  Lactulose 10g/15mL TID  Keppra 1000mg BID  Levothyroxine 50mcg daily  Propanolol 20mg BID  Sodium bicarbonate 1300mg TID  Thiamine 100mg daily  Vancomycin pharmacy protocol  D5 100 cc/h  Flomax 0.4 mg to    IR consult for paracentesis      Seen by infectious disease recommend to continue IV vancomycin for 3 more week    Monitor renal function remained stable    PT OT consult and discharge plan        Current Facility-Administered Medications:     [START ON 4/3/2022] Vancomycin random level - please draw on 4/3 @ 1200.  Thanks!, , Other, ONCE, Debra Dsouza MD    levETIRAcetam in saline (iso-os) (KEPPRA) infusion 1,000 mg, 1,000 mg, IntraVENous, Q12H, Lamberto Ackerman MD, Last Rate: 400 mL/hr at 04/02/22 0021, 1,000 mg at 04/02/22 0021    hydrOXYzine pamoate (VISTARIL) capsule 25 mg, 25 mg, Oral, QID PRN, Lamberto Ackerman MD, 25 mg at 03/31/22 2212    dextrose 5% infusion, 50 mL/hr, IntraVENous, CONTINUOUS, Melanie Lawrence MD, Last Rate: 50 mL/hr at 04/01/22 1646, 50 mL/hr at 04/01/22 1646    0.9% sodium chloride infusion 250 mL, 250 mL, IntraVENous, PRN, Lamberto Ackerman MD    sodium bicarbonate tablet 1,300 mg, 1,300 mg, Oral, BID, Melanie Lawrence MD, 1,300 mg at 04/02/22 0915    Vancomycin - Pharmacy to Dose, , Other, Rx Dosing/Monitoring, Ree Zaragoza MD    levothyroxine (SYNTHROID) tablet 50 mcg, 50 mcg, Oral, 6am, Ree Zaragoza MD, 50 mcg at 04/02/22 0507    tamsulosin (FLOMAX) capsule 0.4 mg, 0.4 mg, Oral, DAILY, Richard Rapp MD, 0.4 mg at 04/02/22 0915    sodium chloride (NS) flush 5-40 mL, 5-40 mL, IntraVENous, PRN, Bary Lennox, MD, 10 mL at 03/31/22 2212    acetaminophen (TYLENOL) tablet 650 mg, 650 mg, Oral, Q8H, Ramírez Bray MD, 650 mg at 04/02/22 0507    HYDROcodone-acetaminophen (NORCO) 5-325 mg per tablet 1 Tablet, 1 Tablet, Oral, Q4H PRN, Bary Lennox, MD    morphine injection 2 mg, 2 mg, IntraVENous, Q4H PRN, Ramírez Bray MD    naloxone (NARCAN) injection 0.4 mg, 0.4 mg, IntraVENous, PRN, Bary Lennox, MD    ondansetron (ZOFRAN) injection 4 mg, 4 mg, IntraVENous, Q4H PRN, Bary Lennox, MD    allopurinoL (ZYLOPRIM) tablet 300 mg, 300 mg, Oral, DAILY, Bary Lennox, MD, 300 mg at 04/02/22 0915    [Held by provider] aMILoride (MIDAMOR) tablet 5 mg, 5 mg, Oral, DAILY, Yordan Darnell MD, 5 mg at 03/23/22 0900    ergocalciferol capsule 50,000 Units, 50,000 Units, Oral, Q7D, Bary Lennox, MD, 50,000 Units at 03/31/22 0923    famotidine (PEPCID) tablet 20 mg, 20 mg, Oral, QPM, Bary Lennox, MD, 20 mg at 98/98/71 8604    folic acid (FOLVITE) tablet 1 mg, 1 mg, Oral, DAILY, Bary Lennox, MD, 1 mg at 04/02/22 0915    lactulose (CHRONULAC) 10 gram/15 mL solution 15 mL, 15 mL, Oral, TID, Bary Lennox, MD, 15 mL at 04/02/22 0914    propranoloL (INDERAL) tablet 20 mg, 20 mg, Oral, BID, Bary Lennox, MD, 20 mg at 04/01/22 0855    thiamine mononitrate (B-1) tablet 100 mg, 100 mg, Oral, DAILY, Ramírez Bray MD, 100 mg at 04/02/22 0915    acetaminophen (TYLENOL) tablet 650 mg, 650 mg, Oral, Q6H PRN, Bary Lennox, MD, 650 mg at 04/01/22 4412    Current Outpatient Medications   Medication Instructions    acetaminophen (TYLENOL) 650 mg, Oral, EVERY 6 HOURS AS NEEDED    allopurinoL (ZYLOPRIM) 300 mg tablet 1 Tablet, Oral, DAILY    aMILoride (MIDAMOR) 5 mg, Oral, DAILY    chlordiazePOXIDE (LIBRIUM) 10 mg, Oral, 3 TIMES DAILY AS NEEDED    ergocalciferol (ERGOCALCIFEROL) 1,250 mcg (50,000 unit) capsule 1 Capsule, Oral, EVERY 7 DAYS    famotidine (PEPCID) 20 mg, Oral, AT BEDTIME    folic acid (FOLVITE) 1 mg, Oral, DAILY    lactulose (CHRONULAC) 10 gram/15 mL solution 15 mL, Oral, 3 TIMES DAILY    levETIRAcetam (KEPPRA) 1,500 mg, Oral, 2 TIMES DAILY    levothyroxine (SYNTHROID) 25 mcg, Oral, DAILY BEFORE BREAKFAST    potassium chloride (K-DUR, KLOR-CON M20) 20 mEq tablet 20 mEq, Oral, DAILY    propranoloL (INDERAL) 20 mg, Oral, 2 TIMES DAILY    sodium bicarbonate 650 mg, Oral, 3 TIMES DAILY    tamsulosin (FLOMAX) 0.4 mg, Oral, DAILY    thiamine HCL (B-1) 100 mg, Oral, DAILY         Signed By: Deloris Pascual MD     April 2, 2022

## 2022-04-02 NOTE — PROGRESS NOTES
Vancomycin Dosing Consult    Consult ordered by Dr. Bacilio Hartley for this 60 y.o. male for indication of Bone and Joint Infection.   Antibiotic regimen: Vancomycin monotherapy    Temp (24hrs), Av.6 °F (35.9 °C), Min:94.9 °F (34.9 °C), Max:97.8 °F (36.6 °C)    Recent Labs     22  0334 22  0345 22  0420   WBC 5.6 6.8 6.0   CREA 1.47* 1.77* 1.88*   BUN 18 20 23*     Est CrCl: 60 ml/min  Concomitant nephrotoxic drugs: None    Cultures:   3/22 wound: coag staph neg - Final  3/23 blood: pending  3/25 wound; right distal interphalangeal joiny wnd: scan staph epidermidis (oxacillin resistant) - Final  3/25 tissue, right distal interphalangeal joint tissue: scant staph epidermidis - Final       MRSA Swab: Not detected -Final    Target range: AUC/YESSENIA 400-600      Assessment/Plan:   Renal function improving  Baseline: CKD Stage IIIA  Will give vancomcyin 500 mg IV x 1 today  Will get random level tomorrow  --hopefully tomorrow we can start a consistent maintenance regimen  Antimicrobial stop date 4/15

## 2022-04-02 NOTE — PROGRESS NOTES
Nephrology Consult    Patient: Bhavik Covarrubias MRN: [id number]  SSN: [id number]    YOB: 1960  Age: 60 y.o. Sex: male      Subjective:     Patient seen in the room  Creatinine is improved to 1.4 today    Past Medical History:   Diagnosis Date    Arthritis     Hypertension      Past Surgical History:   Procedure Laterality Date    IR INSERT NON TUNL CVC OVER 5 YRS  3/25/2022    IR PARACENTESIS ABD W IMAGE  1/21/2022    IR PARACENTESIS ABD W IMAGE  3/1/2022    IR PARACENTESIS ABD W IMAGE  3/30/2022      No family history on file. Social History     Tobacco Use    Smoking status: Never Smoker    Smokeless tobacco: Never Used   Substance Use Topics    Alcohol use: Not on file      Current Facility-Administered Medications   Medication Dose Route Frequency Provider Last Rate Last Admin    [START ON 4/3/2022] Vancomycin random level - please draw on 4/3 @ 1200. Thanks!    Other ONCE Cheyenne Lepe MD        levETIRAcetam in saline (iso-os) (KEPPRA) infusion 1,000 mg  1,000 mg IntraVENous Q12H Lamberto Ackerman  mL/hr at 04/02/22 1306 1,000 mg at 04/02/22 1306    hydrOXYzine pamoate (VISTARIL) capsule 25 mg  25 mg Oral QID PRN Lamberto Ackerman MD   25 mg at 03/31/22 2212    dextrose 5% infusion  50 mL/hr IntraVENous CONTINUOUS Oswaldo Baker MD 50 mL/hr at 04/01/22 1646 50 mL/hr at 04/01/22 1646    0.9% sodium chloride infusion 250 mL  250 mL IntraVENous PRN Jyothi Ackerman MD        sodium bicarbonate tablet 1,300 mg  1,300 mg Oral BID Oswaldo Baker MD   1,300 mg at 04/02/22 0915    Vancomycin - Pharmacy to Dose   Other Rx Dosing/Monitoring Cheyenne Lepe MD        levothyroxine (SYNTHROID) tablet 50 mcg  50 mcg Oral Nathan Plummer MD   50 mcg at 04/02/22 0507    tamsulosin (FLOMAX) capsule 0.4 mg  0.4 mg Oral DAILY Marquita DENIS MD   0.4 mg at 04/02/22 0915    sodium chloride (NS) flush 5-40 mL  5-40 mL IntraVENous PRN Angella Horn MD   10 mL at 03/31/22 2212    acetaminophen (TYLENOL) tablet 650 mg  650 mg Oral Q8H Ramírez Bray MD   650 mg at 04/02/22 1306    HYDROcodone-acetaminophen (NORCO) 5-325 mg per tablet 1 Tablet  1 Tablet Oral Q4H PRN Cathie Bray MD        morphine injection 2 mg  2 mg IntraVENous Q4H PRN Tayler Key MD        naloxone (NARCAN) injection 0.4 mg  0.4 mg IntraVENous PRN Tayler Key MD        ondansetron (ZOFRAN) injection 4 mg  4 mg IntraVENous Q4H PRN Tayler Key MD        allopurinoL (ZYLOPRIM) tablet 300 mg  300 mg Oral DAILY Saida Bray MD   300 mg at 04/02/22 0915    [Held by provider] aMILoride (MIDAMOR) tablet 5 mg  5 mg Oral DAILY Lamberto Ackerman MD   5 mg at 03/23/22 0900    ergocalciferol capsule 50,000 Units  50,000 Units Oral Q7D Tayler Key MD   50,000 Units at 03/31/22 6445    famotidine (PEPCID) tablet 20 mg  20 mg Oral QPM Tayler Key MD   20 mg at 25/10/92 9782    folic acid (FOLVITE) tablet 1 mg  1 mg Oral DAILY Ramírez Bray MD   1 mg at 04/02/22 0915    lactulose (CHRONULAC) 10 gram/15 mL solution 15 mL  15 mL Oral TID Tayler Key MD   15 mL at 04/02/22 0914    propranoloL (INDERAL) tablet 20 mg  20 mg Oral BID Tayler Key MD   20 mg at 04/01/22 0855    thiamine mononitrate (B-1) tablet 100 mg  100 mg Oral DAILY Ramírez Bray MD   100 mg at 04/02/22 0915    acetaminophen (TYLENOL) tablet 650 mg  650 mg Oral Q6H PRN Tayler Key MD   650 mg at 04/01/22 0647        No Known Allergies    Review of Systems:  A comprehensive review of systems was negative except for that written in the History of Present Illness.     Objective:     Vitals:    04/02/22 0746 04/02/22 0750 04/02/22 1109 04/02/22 1200   BP: 105/75      Pulse: 76 80  78   Resp: 19      Temp: 97.8 °F (36.6 °C)  98 °F (36.7 °C)    SpO2: 100%      Weight:       Height:            Physical Exam:  General: NAD , not oriented   Eyes: sclera anicteric  Oral Cavity: No thrush or ulcers  Neck: no JVD  Chest: Fair bilateral air entry  Heart: normal sounds  Abdomen: soft and non tender   : no fish  Lower Extremities: no edema  Skin: no rash          Assessment:     Hospital Problems  Never Reviewed          Codes Class Noted POA    Finger infection ICD-10-CM: L08.9  ICD-9-CM: 686.9  3/22/2022 Unknown              Plan:       1 Acute kidney injury on chronic kidney disease stage IIIA. -Etiology can be prerenal plus was on amiloride.    -On admission creatinine was elevated at 2.51.    -Cr is gradually improving.  2.5--> 2.3-->2.0-->1.7->1.4 today.   -baseline is unknown was 1.7 on 3/4/2022.    -no evidence of lower extremity edema.    -Hypervolemic with abd distension ,h/o repeated paracentesis (3/24  4.3 L)  -Continue to hold amiloride for now 2/2 hypotension  -will continue hypotonic IV fluids for now for hypernatremia  -He is not on any other potential nephrotoxins. 2.  Hypernatremia  -Sodium is improved to 146 today.    -On D5W at 50 mils per hour  -Will decrease IV fluid rate to 50 mils per hour    3. Septic arthritis.    -He presented with swollen right hand finger diagnosed with septic arthritis status post I&D received IV antibiotics per orthopedic.  -on Vanc and zosyn  -S/p I&D on 3/25. Ortho following    3 Hypotension.    -Blood pressures is labile. Noted few low readings  -I will continue to hold amiloride.    -On no other blood pressure medications. -Give parameters to hold midodrine     4. Liver cirrhosis  -Has history of cirrhosis , portal HTN  -due to etoh   -Abd distension   -On Propranolol   -Albumin for volume expansion     5. Anemia.    -Hemoglobin is improved to 7.0 today  -We will start weekly Procrit    6. Metabolic acidosis. -CO2 is improved to 21  -Lactic acidosis - due to decr Liver clearance and probably RTA as well .  No diarrhea reported   -continue sodium bicarbonate 1300 mg twice daily      Signed By: Claudia Salvador MD     April 2, 2022

## 2022-04-03 NOTE — PROGRESS NOTES
Problem: Falls - Risk of  Goal: *Absence of Falls  Description: Document Melanie Locket Fall Risk and appropriate interventions in the flowsheet. Outcome: Progressing Towards Goal  Note: Fall Risk Interventions:  Mobility Interventions: Bed/chair exit alarm,Strengthening exercises (ROM-active/passive)    Mentation Interventions: Bed/chair exit alarm    Medication Interventions: Bed/chair exit alarm    Elimination Interventions: Bed/chair exit alarm              Problem: Patient Education: Go to Patient Education Activity  Goal: Patient/Family Education  Outcome: Progressing Towards Goal     Problem: Pressure Injury - Risk of  Goal: *Prevention of pressure injury  Description: Document Curtis Scale and appropriate interventions in the flowsheet. Outcome: Progressing Towards Goal  Note: Pressure Injury Interventions:  Sensory Interventions: Minimize linen layers,Turn and reposition approx. every two hours (pillows and wedges if needed)    Moisture Interventions: Absorbent underpads,Internal/External urinary devices    Activity Interventions: Pressure redistribution bed/mattress(bed type),PT/OT evaluation    Mobility Interventions: HOB 30 degrees or less,Turn and reposition approx.  every two hours(pillow and wedges)    Nutrition Interventions: Document food/fluid/supplement intake    Friction and Shear Interventions: HOB 30 degrees or less,Minimize layers                Problem: Patient Education: Go to Patient Education Activity  Goal: Patient/Family Education  Outcome: Progressing Towards Goal     Problem: Patient Education: Go to Patient Education Activity  Goal: Patient/Family Education  Outcome: Progressing Towards Goal     Problem: Patient Education: Go to Patient Education Activity  Goal: Patient/Family Education  Outcome: Progressing Towards Goal     Problem: Impaired Skin Integrity/Pressure Injury Treatment  Goal: *Improvement of Existing Pressure Injury  Outcome: Progressing Towards Goal  Goal: *Prevention of pressure injury  Description: Document Curtis Scale and appropriate interventions in the flowsheet. Outcome: Progressing Towards Goal  Note: Pressure Injury Interventions:  Sensory Interventions: Minimize linen layers,Turn and reposition approx. every two hours (pillows and wedges if needed)    Moisture Interventions: Absorbent underpads,Internal/External urinary devices    Activity Interventions: Pressure redistribution bed/mattress(bed type),PT/OT evaluation    Mobility Interventions: HOB 30 degrees or less,Turn and reposition approx.  every two hours(pillow and wedges)    Nutrition Interventions: Document food/fluid/supplement intake    Friction and Shear Interventions: HOB 30 degrees or less,Minimize layers                Problem: Patient Education: Go to Patient Education Activity  Goal: Patient/Family Education  Outcome: Progressing Towards Goal     Problem: Patient Education: Go to Patient Education Activity  Goal: Patient/Family Education  Outcome: Progressing Towards Goal

## 2022-04-03 NOTE — PROGRESS NOTES
Vancomycin Dosing Consult    Consult ordered by Dr. Vic Andrews for this 60 y.o. male for indication of Bone and Joint Infection.   Antibiotic regimen: Vancomycin monotherapy    Temp (24hrs), Av.9 °F (36.6 °C), Min:96.7 °F (35.9 °C), Max:98.6 °F (37 °C)    Recent Labs     22  0606 22  0334 22  0345   WBC 5.1 5.6 6.8   CREA 1.43* 1.47* 1.77*   BUN 17 18 20     Est CrCl: 61 ml/min  Concomitant nephrotoxic drugs: None    Cultures:   3/22 wound: coag staph neg - Final  3/23 blood: pending  3/25 wound; right distal interphalangeal joiny wnd: scan staph epidermidis (oxacillin resistant) - Final  3/25 tissue, right distal interphalangeal joint tissue: scant staph epidermidis - Final       MRSA Swab: Not detected -Final    Target range: AUC/YESSENIA 400-600      Assessment/Plan:   Renal function improving  Baseline: CKD Stage IIIA  Still need another few days with pulse dosing + subsequent levels prior to establishing a good maintenance regimen  Will order vancomycin 500 mg IV x 1  Will order random level ~24 hours post dose  Antimicrobial stop date 4/15

## 2022-04-03 NOTE — PROGRESS NOTES
DC order noted. Chart reviewed. Spoke [email or web address]. The pt will be accepted back to that facility today as a LTC pt.  Jenelle Cottrell will return call with room assignment. No special time request for transport. Report call [phone]    CM will update as available  1300:  Unable to contact Linda@ [phone]. VM left  [email or web address]. Denied information regarding Room assignment. Stated they would research and return call. Brenden Galan with [facility] RN on 2E. Stated the pt is not leaving today. He needs a PICC. DC Delay entered    [employer] notified.

## 2022-04-03 NOTE — PROGRESS NOTES
Nephrology Consult    Patient: Tiffany Tran MRN: [id number]  SSN: [id number]    YOB: 1960  Age: 60 y.o. Sex: male      Subjective:     Patient seen in the room  Creatinine is improved to 1.4 today  Confused  On po diet  Will dc ivf    Past Medical History:   Diagnosis Date    Arthritis     Hypertension      Past Surgical History:   Procedure Laterality Date    IR INSERT NON TUNL CVC OVER 5 YRS  3/25/2022    IR PARACENTESIS ABD W IMAGE  1/21/2022    IR PARACENTESIS ABD W IMAGE  3/1/2022    IR PARACENTESIS ABD W IMAGE  3/30/2022      No family history on file. Social History     Tobacco Use    Smoking status: Never Smoker    Smokeless tobacco: Never Used   Substance Use Topics    Alcohol use: Not on file      Current Facility-Administered Medications   Medication Dose Route Frequency Provider Last Rate Last Admin    Vancomycin random level - please draw on 4/3 @ 1200. Thanks!    Other ONCE Parker Cruz MD        levETIRAcetam in saline (iso-os) (KEPPRA) infusion 1,000 mg  1,000 mg IntraVENous Q12H Lamberto Ackerman  mL/hr at 04/03/22 0004 1,000 mg at 04/03/22 0004    hydrOXYzine pamoate (VISTARIL) capsule 25 mg  25 mg Oral QID PRN Lamberto Ackerman MD   25 mg at 03/31/22 2212    dextrose 5% infusion  50 mL/hr IntraVENous CONTINUOUS Anitra Gamboa MD 50 mL/hr at 04/01/22 1646 50 mL/hr at 04/01/22 1646    0.9% sodium chloride infusion 250 mL  250 mL IntraVENous PRN Yong Ackerman MD        sodium bicarbonate tablet 1,300 mg  1,300 mg Oral BID Anitra Gamboa MD   1,300 mg at 04/03/22 1008    Vancomycin - Pharmacy to Dose   Other Rx Dosing/Monitoring Parker Cruz MD        levothyroxine (SYNTHROID) tablet 50 mcg  50 mcg Oral Brant Avila MD   50 mcg at 04/03/22 0543    tamsulosin (FLOMAX) capsule 0.4 mg  0.4 mg Oral DAILY Karime DENIS MD   0.4 mg at 04/03/22 1010    sodium chloride (NS) flush 5-40 mL  5-40 mL IntraVENous PRN Bray, Essie Ontiveros MD   10 mL at 03/31/22 2212    acetaminophen (TYLENOL) tablet 650 mg  650 mg Oral Q8H Ramírez Bray MD   650 mg at 04/03/22 0555    HYDROcodone-acetaminophen (NORCO) 5-325 mg per tablet 1 Tablet  1 Tablet Oral Q4H PRN Celso Miles MD        morphine injection 2 mg  2 mg IntraVENous Q4H PRN Celso Miles MD        naloxone (NARCAN) injection 0.4 mg  0.4 mg IntraVENous PRN Celso Miles MD        ondansetron (ZOFRAN) injection 4 mg  4 mg IntraVENous Q4H PRN Celso Miles MD        allopurinoL (ZYLOPRIM) tablet 300 mg  300 mg Oral DAILY Vera DANIELS MD   300 mg at 04/03/22 1008    [Held by provider] aMILoride (MIDAMOR) tablet 5 mg  5 mg Oral DAILY Lamberto Ackerman MD   5 mg at 03/23/22 0900    ergocalciferol capsule 50,000 Units  50,000 Units Oral Q7D Celso Miles MD   50,000 Units at 03/31/22 7404    famotidine (PEPCID) tablet 20 mg  20 mg Oral QPM Celso Miles MD   20 mg at 81/90/58 2937    folic acid (FOLVITE) tablet 1 mg  1 mg Oral DAILY Celso Miles MD   1 mg at 04/03/22 1009    lactulose (CHRONULAC) 10 gram/15 mL solution 15 mL  15 mL Oral TID Celso Miles MD   15 mL at 04/03/22 1011    propranoloL (INDERAL) tablet 20 mg  20 mg Oral BID Celso Miles MD   20 mg at 04/01/22 0855    thiamine mononitrate (B-1) tablet 100 mg  100 mg Oral DAILY Celso Miles MD   100 mg at 04/03/22 1010    acetaminophen (TYLENOL) tablet 650 mg  650 mg Oral Q6H PRN Celso Miles MD   650 mg at 04/03/22 0543        No Known Allergies    Review of Systems:  A comprehensive review of systems was negative except for that written in the History of Present Illness.     Objective:     Vitals:    04/03/22 0204 04/03/22 0300 04/03/22 0542 04/03/22 0729   BP: 102/72   108/70   Pulse: 73   71   Resp: 18   18   Temp: 98.6 °F (37 °C) 98 °F (36.7 °C) 98 °F (36.7 °C) 98.2 °F (36.8 °C)   SpO2: 99%   99%   Weight:       Height:            Physical Exam:  General: NAD , not oriented   Eyes: sclera anicteric  Oral Cavity: No thrush or ulcers  Neck: no JVD  Chest: Fair bilateral air entry  Heart: normal sounds  Abdomen: soft and non tender   : no fish  Lower Extremities: no edema  Skin: no rash          Assessment:     Hospital Problems  Never Reviewed          Codes Class Noted POA    Finger infection ICD-10-CM: L08.9  ICD-9-CM: 686.9  3/22/2022 Unknown              Plan:       1 Acute kidney injury on chronic kidney disease stage IIIA. -Etiology can be prerenal plus was on amiloride.    -On admission creatinine was elevated at 2.51.    -Cr is gradually improving.  2.5--> 2.3-->2.0-->1.7->1.4 today.   -baseline is unknown was 1.7 on 3/4/2022.    -no evidence of lower extremity edema.    -Hypervolemic with abd distension ,h/o repeated paracentesis (3/24  4.3 L)  -Continue to hold amiloride for now 2/2 hypotension  -will dc ivf.   -He is not on any other potential nephrotoxins. 2.  Hypernatremia  -Sodium is improved to 143 today.    -On D5W at 50 mils per hour (will dc)  -Will decrease IV fluid rate to 50 mils per hour    3. Septic arthritis.    -He presented with swollen right hand finger diagnosed with septic arthritis status post I&D received IV antibiotics per orthopedic.  -on Vanc and zosyn  -S/p I&D on 3/25. Ortho following    3 Hypotension.    -Blood pressures is labile. Noted few low readings  -I will continue to hold amiloride.    -On no other blood pressure medications. -Give parameters to hold midodrine     4. Liver cirrhosis  -Has history of cirrhosis , portal HTN  -due to etoh   -Abd distension   -On Propranolol   -Albumin for volume expansion     5. Anemia.    -Hemoglobin is improved to 7.0 today  -We will start weekly Procrit    6. Metabolic acidosis. -CO2 is improved to 21  -Lactic acidosis - due to decr Liver clearance and probably RTA as well .  No diarrhea reported   -continue sodium bicarbonate 1300 mg twice daily      Signed By: Burt Dandy, MD     April 3, 2022

## 2022-04-03 NOTE — DISCHARGE SUMMARY
Discharge Summary       PATIENT ID: Deanna Castillo  MRN: [id number]   YOB: 1960    DATE OF ADMISSION: 3/22/2022  8:58 PM    DATE OF DISCHARGE:   PRIMARY CARE PROVIDER: Jaron Aleman NP     ATTENDING PHYSICIAN: Shilpi Benjamin  DISCHARGING PROVIDER: Debo Ackerman      CONSULTATIONS: IP CONSULT TO ORTHOPEDIC SURGERY  IP CONSULT TO INFECTIOUS DISEASES  IP CONSULT TO NEPHROLOGY  IP CONSULT TO INTERVENTIONAL RADIOLOGY  IP CONSULT TO UROLOGY    PROCEDURES/SURGERIES: Procedure(s):  INCISION & DRAINAGE of Right Index Finger    ADMITTING DIAGNOSES:    Patient Active Problem List    Diagnosis Date Noted    Finger infection 03/22/2022    Status epilepticus ([facility] [address] 02/24/2022    Acute renal failure (ARF) ([facility] [address] 01/20/2022    Cirrhosis of liver ([facility] [address] 01/20/2022    GRACIA (acute kidney injury) ([facility] [address] 01/20/2022       DISCHARGE DIAGNOSES / PLAN:      Hypothermia  Hypoglycemic  Hypotension  Bradycardia  Septic joint- right 2nd finger DIP  Status post I&D right index finger  SEPSIS wound culturesgrows  Staphylococcus and Enterococcus  Anemia- transfuse if Hgb < 7.0 status post transfusion  Acute on chronic renal failure- creatinine 2.51 on admission     Hx etoh abuse  Cirrhosis of liver   History of encephalopathy  Seizure disorder  History of gout  Thrombocytopenia     · No evidence of acute DVT in the right upper extremity. · Thrombus noted within the right cephalic forearm vein(s).     Hypernatremia  Hypothyroidism         DISCHARGE MEDICATIONS:  Current Discharge Medication List      START taking these medications    Details   HYDROcodone-acetaminophen (NORCO) 5-325 mg per tablet Take 1 Tablet by mouth every four (4) hours as needed for Pain for up to 3 days. Max Daily Amount: 6 Tablets. Qty: 10 Tablet, Refills: 0  Start date: 4/3/2022, End date: 4/6/2022    Associated Diagnoses: Finger joint swelling, right      thiamine mononitrate (B-1) 100 mg tablet Take 1 Tablet by mouth daily.   Qty: 30 Tablet, Refills: 0  Start date: 4/4/2022         CONTINUE these medications which have NOT CHANGED    Details   levothyroxine (SYNTHROID) 25 mcg tablet Take 25 mcg by mouth Daily (before breakfast). tamsulosin (Flomax) 0.4 mg capsule Take 1 Capsule by mouth daily. Qty: 30 Capsule, Refills: 1      potassium chloride (K-DUR, KLOR-CON M20) 20 mEq tablet Take 1 Tablet by mouth daily. Qty: 30 Tablet, Refills: 0      sodium bicarbonate 650 mg tablet Take 1 Tablet by mouth three (3) times daily. Qty: 90 Tablet, Refills: 0      levETIRAcetam (Keppra) 1,000 mg tablet Take 1.5 Tablets by mouth two (2) times a day. Qty: 60 Tablet, Refills: 0      ergocalciferol (ERGOCALCIFEROL) 1,250 mcg (50,000 unit) capsule Take 1 Capsule by mouth every seven (7) days. lactulose (CHRONULAC) 10 gram/15 mL solution Take 15 mL by mouth three (3) times daily. Qty: 200 mL, Refills: 0      allopurinoL (ZYLOPRIM) 300 mg tablet Take 1 Tablet by mouth daily. thiamine HCL (B-1) 100 mg tablet Take 100 mg by mouth daily. propranoloL (INDERAL) 20 mg tablet Take 20 mg by mouth two (2) times a day. folic acid (FOLVITE) 1 mg tablet Take 1 mg by mouth daily. famotidine (PEPCID) 20 mg tablet Take 20 mg by mouth At bedtime. aMILoride (MIDAMOR) 5 mg tablet Take 5 mg by mouth daily. STOP taking these medications       acetaminophen (TylenoL) 325 mg tablet Comments:   Reason for Stopping:         chlordiazePOXIDE (LIBRIUM) 5 mg capsule Comments:   Reason for Stopping:                 NOTIFY YOUR PHYSICIAN FOR ANY OF THE FOLLOWING:   Fever over 101 degrees for 24 hours. Chest pain, shortness of breath, fever, chills, nausea, vomiting, diarrhea, change in mentation, falling, weakness, bleeding. Severe pain or pain not relieved by medications. Or, any other signs or symptoms that you may have questions about.     DISPOSITION:  x  Home With:   OT  PT  HH  RN       Long term SNF/Inpatient Rehab    Independent/assisted living Hospice    Other:       PATIENT CONDITION AT DISCHARGE: Stable      PHYSICAL EXAMINATION AT DISCHARGE:  General:          Alert, cooperative, no distress, appears stated age. HEENT:           Atraumatic, anicteric sclerae, pink conjunctivae                          No oral ulcers, mucosa moist, throat clear, dentition fair  Neck:               Supple, symmetrical  Lungs:             Clear to auscultation bilaterally. No Wheezing or Rhonchi. No rales. Chest wall:      No tenderness  No Accessory muscle use. Heart:              Regular  rhythm,  No  murmur   No edema  Abdomen:        Soft, non-tender. Not distended. Bowel sounds normal  Extremities:     No cyanosis. No clubbing,                            Skin turgor normal, Capillary refill normal  Skin:                Not pale. Not Jaundiced  No rashes   Psych:             Not anxious or agitated. Neurologic:      Alert, moves all extremities, answers questions appropriately and responds to commands     IR PARACENTESIS ABD W IMAGE   Final Result   Successful paracentesis. XR ABD (KUB)   Final Result      XR CHEST PORT   Final Result      XR CHEST PORT   Final Result   The cardiomediastinal silhouette is appropriate for age, technique,   and lung expansion. Pulmonary vasculature is not congested. The lungs are   essentially clear. No effusion or pneumothorax is seen. IR INSERT NON TUNL CVC OVER 5 YRS   Final Result      Technically successful insertion of non-tunneled triple-lumen catheter with US   guidance.       Plan:       The catheter may be used after catheter placement confirmation by x-ray.   _______________________________________________________________      PROCEDURE SUMMARY:   - Venous access with ultrasound guidance   - Non-tunneled central venous catheter insertion      PROCEDURE DETAILS:      Pre-procedure   Relevant imaging review: None    Informed consent for the procedure was obtained and time-out was performed prior to the procedure. Prophylactic antibiotic administered: Not administered   Preparation: The site was prepared and draped using all elements of maximal   sterile barrier technique including sterile gloves, sterile gown, cap, mask,   large sterile sheet, sterile ultrasound probe cover, sterile ultrasound gel,   hand hygiene and cutaneous antisepsis with 2% chlorhexidine. Medical reason for site preparation exception: Not applicable      Anesthesia/sedation   Level of anesthesia: No sedation   Anesthesia administered by: Not applicable   Duration of anesthesia/sedation: 0 minutes. Access   The vessel was sonographically evaluated and judged to be patent and appropriate   for access and a permanent image was stored. 2 cc's of 1% lidocaine was   administered to the access site. Real time ultrasound was used to visualize   needle entry into the vessel. Vein accessed: Right internal jugular vein   Access technique: 4F micropuncture set      Catheter placement   The access site was dilated and the catheter was placed into the vein over a   wire and all guidewires were removed in their entirety. Catheter ports were   aspirated and flushed. Catheter tip location was verified by portable X-ray. Catheter size: 7 Bhutanese   Catheter length: 16 cm   Catheter tip position: Central. The tip is overlying the anatomic SVC. Catheter flush: Normal saline      Closure   The catheter was secured. A sterile dressing was applied. Catheter securement technique: Stat-Lock      Additional Details   Additional description of procedure: None   Additional findings: None   Additional equipment: None   Specimens removed: None   Estimated blood loss:  Less than 10 cc   Standardized report: [email or web address]_NonTunneledCatheter1.0               IR US GUIDED VASCULAR ACCESS   Final Result      Technically successful insertion of non-tunneled triple-lumen catheter with US   guidance.       Plan:       The catheter may be used after catheter placement confirmation by x-ray.   _______________________________________________________________      PROCEDURE SUMMARY:   - Venous access with ultrasound guidance   - Non-tunneled central venous catheter insertion      PROCEDURE DETAILS:      Pre-procedure   Relevant imaging review: None    Informed consent for the procedure was obtained and time-out was performed prior   to the procedure. Prophylactic antibiotic administered: Not administered   Preparation: The site was prepared and draped using all elements of maximal   sterile barrier technique including sterile gloves, sterile gown, cap, mask,   large sterile sheet, sterile ultrasound probe cover, sterile ultrasound gel,   hand hygiene and cutaneous antisepsis with 2% chlorhexidine. Medical reason for site preparation exception: Not applicable      Anesthesia/sedation   Level of anesthesia: No sedation   Anesthesia administered by: Not applicable   Duration of anesthesia/sedation: 0 minutes. Access   The vessel was sonographically evaluated and judged to be patent and appropriate   for access and a permanent image was stored. 2 cc's of 1% lidocaine was   administered to the access site. Real time ultrasound was used to visualize   needle entry into the vessel. Vein accessed: Right internal jugular vein   Access technique: 4F micropuncture set      Catheter placement   The access site was dilated and the catheter was placed into the vein over a   wire and all guidewires were removed in their entirety. Catheter ports were   aspirated and flushed. Catheter tip location was verified by portable X-ray. Catheter size: 7 Telugu   Catheter length: 16 cm   Catheter tip position: Central. The tip is overlying the anatomic SVC. Catheter flush: Normal saline      Closure   The catheter was secured. A sterile dressing was applied.    Catheter securement technique: Stat-Lock      Additional Details   Additional description of procedure: None   Additional findings: None   Additional equipment: None   Specimens removed: None   Estimated blood loss:  Less than 10 cc   Standardized report: SIR_CVA_NonTunneledCatheter1.0               DUPLEX UPPER EXT VENOUS RIGHT   Final Result      XR 2ND FINGER RT MIN 2 V   Final Result      Progressive findings. The findings are nonspecific and could represent infection with osteomyelitis   and septic joint and gas in the soft tissues related to infection. Gout, or other inflammatory arthropathy is a possibility. Findings should be correlated with clinical parameters. Recent Results (from the past 24 hour(s))   CBC W/O DIFF    Collection Time: 04/03/22  6:06 AM   Result Value Ref Range    WBC 5.1 4.1 - 11.1 K/uL    RBC 2.70 (L) 4.10 - 5.70 M/uL    HGB 7.7 (L) 12.1 - 17.0 g/dL    HCT 22.7 (L) 36.6 - 50.3 %    MCV 84.1 80.0 - 99.0 FL    MCH 28.5 26.0 - 34.0 PG    MCHC 33.9 30.0 - 36.5 g/dL    RDW 18.7 (H) 11.5 - 14.5 %    PLATELET 73 (L) 163 - 400 K/uL    MPV 12.6 8.9 - 12.9 FL    NRBC 0.0 0.0  WBC    ABSOLUTE NRBC 0.00 0.00 - 0.01 K/uL   RENAL FUNCTION PANEL    Collection Time: 04/03/22  6:06 AM   Result Value Ref Range    Sodium 143 136 - 145 mmol/L    Potassium 3.8 3.5 - 5.1 mmol/L    Chloride 117 (H) 97 - 108 mmol/L    CO2 19 (L) 21 - 32 mmol/L    Anion gap 7 5 - 15 mmol/L    Glucose 74 65 - 100 mg/dL    BUN 17 6 - 20 mg/dL    Creatinine 1.43 (H) 0.70 - 1.30 mg/dL    BUN/Creatinine ratio 12 12 - 20      GFR est AA >60 >60 ml/min/1.73m2    GFR est non-AA 50 (L) >60 ml/min/1.73m2    Calcium 8.6 8.5 - 10.1 mg/dL    Phosphorus 3.0 2.6 - 4.7 mg/dL    Albumin 2.3 (L) 3.5 - 5.0 g/dL          HOSPITAL COURSE:  Felicita Rangel is a(n) 60 y. o. male with PMH significant for etoh abuse, encephalopathy, cirrhosis, seizure disorder, gout presents with c/o swelling to right 2nd finger. Patient is nonverbal at baseline and dependent on all ADL at [address] [place] [address].  According to nursing home staff, has future orthopedic appointment for this finger. Is non-verbal at baseline but appears awake, alert, and in no obvious distress.     Patient was recently discharged from the hospital after having a seizures at that time patient received Keppra for the seizures patient had paracentesis done during the last admission also endoscopy done and had banding of the varices     I&D performed on finger in ED with purulent fluid drained. Dressing in place covering the DIP. 1g vancomycin given in ED, switched to zosyn on admission. Wound cultures sent. XR of the finger shows soft tissue swelling in gas consistent with osteomyelitis, septic joint, or gouty arthropathy.     Labs: Hgb 9.6 (baseline). Creatinine 2.51. CRP 3.15. ESR 35. Wound and blood cultures pending.     3/24:  Patient seen resting in bed. Nonverbal at baseline. Rectal temperature 91.1F per nursing staff. Blood pressure soft, patient may be septic. Vanc and Zosyn started per ID consult. Lactate 3.4. Blood and wound cultures still pending.     Per orthopedic surgery, patient to have debridement of joint today under local anesthesia. He will likely need IV abx for 4-6 weeks for septic joint.     Per nephrology, the patients increased creatinine of 2.5 may be due to amiloride, which is now held. Iron studies also obtained for Hgb 9.6.     Labs remarkable for: Hgb 9.6. Creatinine 2.47.     3/25:  Patient seen in ICU. Denies finger pain. Rectal temp 36.2C.     I&D moved to today d/t transfer to ICU yesterday. Per ID, initial wound culture shows coag negative staph and continue on vanc and zosyn pending final culture result. Blood cultures still pending.     Labs remarkable for Hgb 9.2, Creatinine 2.59, Albumin 1.8. Lactate decreased from 3.4 to 2.6. Ammonia 39.     Pt had Doppler studies done this morning which shows     · No evidence of acute DVT in the right upper extremity.   · Thrombus noted within the right cephalic forearm vein(s).     3/28:  Patient seen resting comfortably in bed, remains at baseline mental status. Current temp 36C and patient still on warming blanket.     Patient had I&D of right index finger on 3/25 which revealed purulent drainage mixed with gouty tophi. DIP joint thoroughly irrigated and cultures sent. Cultures show few WBC and \"scant probably enterococcus. \" Patient remains on vanc and zosyn. Over the weekend, levothyroxine increased to 50mcg d/t elevated TSH. He was also started on midodrine 5mg TID for hypotension and was given hypotonic fluids for hypernatremia        Labs from 3/27 show Hgb 8.5, Na 149, creatinine 2.43, albumin 2.2     3/29:  Patient seen resting comfortably in bed. Denies any pain. Abdomen very distended, worse than yesterday. Patient receiving NG tube feeds. Lower extremity edema also present. Patient also receiving 1 unit packed RBC for Hgb 6. 6. platelets 53, Na 378, Creatinine 2.31.     Patient still hypothermic     3/30:  Patient remains hypothermic at 95.7F. Denies finger pain, chest pain, palpitations, shortness of breath, abdominal pain. Seen by nephrology who notes creatinine gradually decreasing. Baseline is around 1.7. Seen by speech therapy who will continue to advance diet to include some solids today.     Culture from DIP joint shows staph epidermidis susceptible to vancomycin, which patient is already on. Hgb 7.8, platelets 57, creatinine 2.09, Na 148. KUB shows abdominal distension consistent with known history of ascites. Last paracentesis 3/24 with 4.3L fluid removed.        3/31:  Patient seen resting in bed. Denies any acute complaints. Remains hypothermic at 35.7. Probe replaced by RN to ensure accuracy. Patient currently on pureed solid diet. He was seen by urology for urinary retention yesterday who plans to leave Gerard in for now and follow outpatient. Patient should stay on Flomax 0.4mg. Patient also had paracentesis yesterday with 8400cc fluid removed with 62.5g albumin given.  Abdomen much less distended today.     Hgb 7.0, Creatinine 1.88, albumin 2.7.        4/1: Patient seen in ICU, no acute complaints. Temperature 36C. Patient seen by nephrology yesterday who restarted D5W at 50cc/hr for hypernatremia. Na today is 146. Patient will also start on weekly procrit. Hgb today Is 7.7.  Nephrology also increased sodium bicarb to 1300 BID for significant bicarb deficit.     4/2      Patient transferred to the medical floor temperature stable patient not eating drinking well more alert awake      Patient was seen by infectious disease nephrologist and orthopedics during this admission    Seen by the nephrology and adjust her medication and IV fluids much improved  Seen the orthopedic and patient have I&D done of the swollen right hand fingers due to septic joint seen by infectious disease recommend continue vancomycin  For 3 weeks    Patient was in ICU because of hypothermia which improved patient still have poor  Appetite    Patient hemoglobin is around 7.7 remained stable  Renal function improved from creatinine 2.31-1.43 and sodium is 143    Patient denies any chest pain or shortness of breath nausea vomiting diarrhea constipation    Patient discharged back to nursing home with IV antibiotics for 3 weeks      Medication reconciliation done times outpatient 35 minutes 50% time spent counseling and coordination of care    Follow-up with orthopedics in 2 wks      Signed:   Jyoti Ye MD  4/3/2022  10:31 AM

## 2022-04-03 NOTE — DISCHARGE INSTRUCTIONS
Discharge Instructions       PATIENT ID: Milagros South  MRN: [id number]   YOB: 1960    DATE OF ADMISSION: 3/22/2022  8:58 PM    DATE OF DISCHARGE: 4/3/2022    PRIMARY CARE PROVIDER: Helen Burgos NP     ATTENDING PHYSICIAN: Duane Fletcher MD  DISCHARGING PROVIDER: Tony Rose MD    To contact this individual call [phone] and ask the  to page. If unavailable ask to be transferred the Adult Hospitalist Department. DISCHARGE DIAGNOSES acute finger infection    CONSULTATIONS: IP CONSULT TO ORTHOPEDIC SURGERY  IP CONSULT TO INFECTIOUS DISEASES  IP CONSULT TO NEPHROLOGY  IP CONSULT TO INTERVENTIONAL RADIOLOGY  IP CONSULT TO UROLOGY    PROCEDURES/SURGERIES: Procedure(s):  INCISION & DRAINAGE of Right Index Finger    PENDING TEST RESULTS:   At the time of discharge the following test results are still pending: None    FOLLOW UP APPOINTMENTS:   Follow-up Information     Follow up With Specialties Details Why Jose Ronquillo MD Family Medicine In 1 week As needed [address]   [phone]             ADDITIONAL CARE RECOMMENDATIONS: Wound care    DIET: Cardiac Diet      ACTIVITY: Activity as tolerated    Wound care: Wound Care Order: submitted to [facility] Please view [email or web address]    EQUIPMENT needed: None      DISCHARGE MEDICATIONS:   See Medication Reconciliation Form    · It is important that you take the medication exactly as they are prescribed. · Keep your medication in the bottles provided by the pharmacist and keep a list of the medication names, dosages, and times to be taken in your wallet. · Do not take other medications without consulting your doctor. NOTIFY YOUR PHYSICIAN FOR ANY OF THE FOLLOWING:   Fever over 101 degrees for 24 hours. Chest pain, shortness of breath, fever, chills, nausea, vomiting, diarrhea, change in mentation, falling, weakness, bleeding.  Severe pain or pain not relieved by medications. Or, any other signs or symptoms that you may have questions about. DISPOSITION:    Home With:   OT  PT  HH  RN       SNF/Inpatient Rehab/LTAC    Independent/assisted living    Hospice    Other:         PROBLEM LIST Updated:  Yes ***       Signed:   Marta Valdez MD  4/3/2022  10:30 AM     Discharge Instructions       PATIENT ID: Destiny Duenas  MRN: [id number]   YOB: 1960    DATE OF ADMISSION: 3/22/2022  8:58 PM    DATE OF DISCHARGE: 4/3/2022    PRIMARY CARE PROVIDER: Mckinley Becerra NP     ATTENDING PHYSICIAN: Dexter Martinez MD  DISCHARGING PROVIDER: Marta Valdez MD    To contact this individual call [phone] and ask the  to page. If unavailable ask to be transferred the Adult Hospitalist Department. DISCHARGE DIAGNOSES ***    CONSULTATIONS: IP CONSULT TO ORTHOPEDIC SURGERY  IP CONSULT TO INFECTIOUS DISEASES  IP CONSULT TO NEPHROLOGY  IP CONSULT TO INTERVENTIONAL RADIOLOGY  IP CONSULT TO UROLOGY    PROCEDURES/SURGERIES: Procedure(s):  INCISION & DRAINAGE of Right Index Finger    PENDING TEST RESULTS:   At the time of discharge the following test results are still pending: ***    FOLLOW UP APPOINTMENTS:   Follow-up Information     Follow up With Specialties Details Why Contact Info    Dexter Martinez MD Family Medicine In 1 week As needed [address]  [phone]             ADDITIONAL CARE RECOMMENDATIONS: ***    DIET: {diet:63612}      ACTIVITY: {discharge activity:84747}    Wound care: {[facility] Wound Care Instructions:53189}    EQUIPMENT needed: ***      DISCHARGE MEDICATIONS:   See Medication Reconciliation Form    · It is important that you take the medication exactly as they are prescribed. · Keep your medication in the bottles provided by the pharmacist and keep a list of the medication names, dosages, and times to be taken in your wallet.    · Do not take other medications without consulting your doctor. NOTIFY YOUR PHYSICIAN FOR ANY OF THE FOLLOWING:   Fever over 101 degrees for 24 hours. Chest pain, shortness of breath, fever, chills, nausea, vomiting, diarrhea, change in mentation, falling, weakness, bleeding. Severe pain or pain not relieved by medications. Or, any other signs or symptoms that you may have questions about.       DISPOSITION:    Home With:   OT  PT  HH  RN       SNF/Inpatient Rehab/LTAC    Independent/assisted living    Hospice    Other:         PROBLEM LIST Updated:  Yes ***       Signed:   Lisa Goldberg MD  4/3/2022  10:30 AM

## 2022-04-04 NOTE — PROGRESS NOTES
Patient discharging back to [facility] today into room 301 B. Nurse can call report at [phone]. Transport to be set up for discharge. All clinicals have been sent over. Discharge plan of care/case management plan validated with provider discharge order.

## 2022-04-04 NOTE — PROGRESS NOTES
Vancomycin Dosing Consult  Day #9 of vancomycin therapy  Consult ordered by Dr. Moses Ge for this 60 y.o. male for indication of right finger septic arthritis/osteomyelitis s/p debridement w/ excision of bone 3/25.   Antibiotic regimen: Vancomycin monotherapy    Temp (24hrs), Av.4 °F (36.3 °C), Min:96.2 °F (35.7 °C), Max:98.1 °F (36.7 °C)    Recent Labs     22  0744 22  0606 22  0334   WBC  --  5.1 5.6   CREA 1.38* 1.43* 1.47*   BUN 17 17 18     Est CrCl: 60-65 ml/min; UO: 0.2 mL/kg/hr  Concomitant nephrotoxic drugs: None    Cultures:   3/22 Wound: Few CoNS, final  3/23 Blood: Pending  3/25 Wound: Scant ORSE (susceptible to vancomycin and linezolid)    MRSA Swab: Not detected 3/24    Target range: Trough 10-15 mcg/mL    Recent level history:  Date/Time Dose & Interval Measured Level (mcg/mL)    @ 1254 500 mg x 1 13.7        Assessment/Plan:   · Hypothermic, WBC WNL, no recent CRP or ESR  · SCr continues to trend down, poor UO; continue pulse dosing for now  · Trough within therapeutic range, will order 500 mg x 1  · Next level  @ 1400  · Antimicrobial stop date TBD

## 2022-04-04 NOTE — DISCHARGE SUMMARY
Discharge Summary       PATIENT ID: Sharan Lacy  MRN: [id number]   YOB: 1960    DATE OF ADMISSION: 3/22/2022  8:58 PM    DATE OF DISCHARGE:   PRIMARY CARE PROVIDER: Mitzi Ramírez NP     ATTENDING PHYSICIAN: Ry Womack  DISCHARGING PROVIDER: Valorie Ackerman      CONSULTATIONS: IP CONSULT TO ORTHOPEDIC SURGERY  IP CONSULT TO INFECTIOUS DISEASES  IP CONSULT TO NEPHROLOGY  IP CONSULT TO INTERVENTIONAL RADIOLOGY  IP CONSULT TO UROLOGY    PROCEDURES/SURGERIES: Procedure(s):  INCISION & DRAINAGE of Right Index Finger    ADMITTING DIAGNOSES:    Patient Active Problem List    Diagnosis Date Noted    Finger infection 03/22/2022    Status epilepticus ([facility] [address] 02/24/2022    Acute renal failure (ARF) ([facility] [address] 01/20/2022    Cirrhosis of liver ([facility] [address] 01/20/2022    GRACIA (acute kidney injury) ([facility] [address] 01/20/2022       DISCHARGE DIAGNOSES / PLAN:      Hypothermia  Hypoglycemic  Hypotension  Bradycardia  Septic joint- right 2nd finger DIP  Status post I&D right index finger  SEPSIS wound culturesgrows  Staphylococcus and Enterococcus  Anemia- transfuse if Hgb < 7.0 status post transfusion  Acute on chronic renal failure- creatinine 2.51 on admission     Hx etoh abuse  Cirrhosis of liver   History of encephalopathy  Seizure disorder  History of gout  Thrombocytopenia     · No evidence of acute DVT in the right upper extremity. · Thrombus noted within the right cephalic forearm vein(s).     Hypernatremia  Hypothyroidism         DISCHARGE MEDICATIONS:  Current Discharge Medication List      START taking these medications    Details   HYDROcodone-acetaminophen (NORCO) 5-325 mg per tablet Take 1 Tablet by mouth every four (4) hours as needed for Pain for up to 3 days. Max Daily Amount: 6 Tablets. Qty: 10 Tablet, Refills: 0  Start date: 4/3/2022, End date: 4/6/2022    Associated Diagnoses: Finger joint swelling, right      thiamine mononitrate (B-1) 100 mg tablet Take 1 Tablet by mouth daily.   Qty: 30 Tablet, Refills: 0  Start date: 4/4/2022         CONTINUE these medications which have NOT CHANGED    Details   levothyroxine (SYNTHROID) 25 mcg tablet Take 25 mcg by mouth Daily (before breakfast). tamsulosin (Flomax) 0.4 mg capsule Take 1 Capsule by mouth daily. Qty: 30 Capsule, Refills: 1      potassium chloride (K-DUR, KLOR-CON M20) 20 mEq tablet Take 1 Tablet by mouth daily. Qty: 30 Tablet, Refills: 0      sodium bicarbonate 650 mg tablet Take 1 Tablet by mouth three (3) times daily. Qty: 90 Tablet, Refills: 0      levETIRAcetam (Keppra) 1,000 mg tablet Take 1.5 Tablets by mouth two (2) times a day. Qty: 60 Tablet, Refills: 0      ergocalciferol (ERGOCALCIFEROL) 1,250 mcg (50,000 unit) capsule Take 1 Capsule by mouth every seven (7) days. lactulose (CHRONULAC) 10 gram/15 mL solution Take 15 mL by mouth three (3) times daily. Qty: 200 mL, Refills: 0      allopurinoL (ZYLOPRIM) 300 mg tablet Take 1 Tablet by mouth daily. thiamine HCL (B-1) 100 mg tablet Take 100 mg by mouth daily. propranoloL (INDERAL) 20 mg tablet Take 20 mg by mouth two (2) times a day. folic acid (FOLVITE) 1 mg tablet Take 1 mg by mouth daily. famotidine (PEPCID) 20 mg tablet Take 20 mg by mouth At bedtime. aMILoride (MIDAMOR) 5 mg tablet Take 5 mg by mouth daily. STOP taking these medications       acetaminophen (TylenoL) 325 mg tablet Comments:   Reason for Stopping:         chlordiazePOXIDE (LIBRIUM) 5 mg capsule Comments:   Reason for Stopping:                 NOTIFY YOUR PHYSICIAN FOR ANY OF THE FOLLOWING:   Fever over 101 degrees for 24 hours. Chest pain, shortness of breath, fever, chills, nausea, vomiting, diarrhea, change in mentation, falling, weakness, bleeding. Severe pain or pain not relieved by medications. Or, any other signs or symptoms that you may have questions about.     DISPOSITION:  x  Home With:   OT  PT  HH  RN       Long term SNF/Inpatient Rehab    Independent/assisted living Hospice    Other:       PATIENT CONDITION AT DISCHARGE: Stable      PHYSICAL EXAMINATION AT DISCHARGE:  General:          Alert, cooperative, no distress, appears stated age. HEENT:           Atraumatic, anicteric sclerae, pink conjunctivae                          No oral ulcers, mucosa moist, throat clear, dentition fair  Neck:               Supple, symmetrical  Lungs:             Clear to auscultation bilaterally. No Wheezing or Rhonchi. No rales. Chest wall:      No tenderness  No Accessory muscle use. Heart:              Regular  rhythm,  No  murmur   No edema  Abdomen:        Soft, non-tender. Not distended. Bowel sounds normal  Extremities:     No cyanosis. No clubbing,                            Skin turgor normal, Capillary refill normal  Skin:                Not pale. Not Jaundiced  No rashes   Psych:             Not anxious or agitated. Neurologic:      Alert, moves all extremities, answers questions appropriately and responds to commands     IR PARACENTESIS ABD W IMAGE   Final Result   Successful paracentesis. XR ABD (KUB)   Final Result      XR CHEST PORT   Final Result      XR CHEST PORT   Final Result   The cardiomediastinal silhouette is appropriate for age, technique,   and lung expansion. Pulmonary vasculature is not congested. The lungs are   essentially clear. No effusion or pneumothorax is seen. IR INSERT NON TUNL CVC OVER 5 YRS   Final Result      Technically successful insertion of non-tunneled triple-lumen catheter with US   guidance.       Plan:       The catheter may be used after catheter placement confirmation by x-ray.   _______________________________________________________________      PROCEDURE SUMMARY:   - Venous access with ultrasound guidance   - Non-tunneled central venous catheter insertion      PROCEDURE DETAILS:      Pre-procedure   Relevant imaging review: None    Informed consent for the procedure was obtained and time-out was performed prior to the procedure. Prophylactic antibiotic administered: Not administered   Preparation: The site was prepared and draped using all elements of maximal   sterile barrier technique including sterile gloves, sterile gown, cap, mask,   large sterile sheet, sterile ultrasound probe cover, sterile ultrasound gel,   hand hygiene and cutaneous antisepsis with 2% chlorhexidine. Medical reason for site preparation exception: Not applicable      Anesthesia/sedation   Level of anesthesia: No sedation   Anesthesia administered by: Not applicable   Duration of anesthesia/sedation: 0 minutes. Access   The vessel was sonographically evaluated and judged to be patent and appropriate   for access and a permanent image was stored. 2 cc's of 1% lidocaine was   administered to the access site. Real time ultrasound was used to visualize   needle entry into the vessel. Vein accessed: Right internal jugular vein   Access technique: 4F micropuncture set      Catheter placement   The access site was dilated and the catheter was placed into the vein over a   wire and all guidewires were removed in their entirety. Catheter ports were   aspirated and flushed. Catheter tip location was verified by portable X-ray. Catheter size: 7 Mexican   Catheter length: 16 cm   Catheter tip position: Central. The tip is overlying the anatomic SVC. Catheter flush: Normal saline      Closure   The catheter was secured. A sterile dressing was applied. Catheter securement technique: Stat-Lock      Additional Details   Additional description of procedure: None   Additional findings: None   Additional equipment: None   Specimens removed: None   Estimated blood loss:  Less than 10 cc   Standardized report: [email or web address]_NonTunneledCatheter1.0               IR US GUIDED VASCULAR ACCESS   Final Result      Technically successful insertion of non-tunneled triple-lumen catheter with US   guidance.       Plan:       The catheter may be used after catheter placement confirmation by x-ray.   _______________________________________________________________      PROCEDURE SUMMARY:   - Venous access with ultrasound guidance   - Non-tunneled central venous catheter insertion      PROCEDURE DETAILS:      Pre-procedure   Relevant imaging review: None    Informed consent for the procedure was obtained and time-out was performed prior   to the procedure. Prophylactic antibiotic administered: Not administered   Preparation: The site was prepared and draped using all elements of maximal   sterile barrier technique including sterile gloves, sterile gown, cap, mask,   large sterile sheet, sterile ultrasound probe cover, sterile ultrasound gel,   hand hygiene and cutaneous antisepsis with 2% chlorhexidine. Medical reason for site preparation exception: Not applicable      Anesthesia/sedation   Level of anesthesia: No sedation   Anesthesia administered by: Not applicable   Duration of anesthesia/sedation: 0 minutes. Access   The vessel was sonographically evaluated and judged to be patent and appropriate   for access and a permanent image was stored. 2 cc's of 1% lidocaine was   administered to the access site. Real time ultrasound was used to visualize   needle entry into the vessel. Vein accessed: Right internal jugular vein   Access technique: 4F micropuncture set      Catheter placement   The access site was dilated and the catheter was placed into the vein over a   wire and all guidewires were removed in their entirety. Catheter ports were   aspirated and flushed. Catheter tip location was verified by portable X-ray. Catheter size: 7 Spanish   Catheter length: 16 cm   Catheter tip position: Central. The tip is overlying the anatomic SVC. Catheter flush: Normal saline      Closure   The catheter was secured. A sterile dressing was applied.    Catheter securement technique: Stat-Lock      Additional Details   Additional description of procedure: None   Additional findings: None   Additional equipment: None   Specimens removed: None   Estimated blood loss:  Less than 10 cc   Standardized report: [email or web address]_CVA_NonTunneledCatheter1.0               DUPLEX UPPER EXT VENOUS RIGHT   Final Result      XR 2ND FINGER RT MIN 2 V   Final Result      Progressive findings. The findings are nonspecific and could represent infection with osteomyelitis   and septic joint and gas in the soft tissues related to infection. Gout, or other inflammatory arthropathy is a possibility. Findings should be correlated with clinical parameters.               Recent Results (from the past 24 hour(s))   GLUCOSE, POC    Collection Time: 04/03/22 11:12 AM   Result Value Ref Range    Glucose (POC) 107 65 - 117 mg/dL    Performed by Mirella Danielle, RANDOM    Collection Time: 04/03/22 11:53 AM   Result Value Ref Range    Vancomycin, random 13.9 ug/mL    Reported dose date PENDING     Reported dose: PENDING Units   BLOOD GAS, ARTERIAL    Collection Time: 04/03/22 10:30 PM   Result Value Ref Range    pH 7.40 7.35 - 7.45      PCO2 31 (L) 35 - 45 mmHg    PO2 81 75 - 100 mmHg    O2 SAT 98 >95 %    BICARBONATE 20 (L) 22 - 26 mmol/L    BASE DEFICIT 5.4 (H) 0 - 2 mmol/L    O2 METHOD Room air      FIO2 21.0 %    SITE Right Brachial      JAM'S TEST PASS     AMMONIA    Collection Time: 04/03/22 11:41 PM   Result Value Ref Range    Ammonia, plasma 28 <32 umol/L   RENAL FUNCTION PANEL    Collection Time: 04/04/22  7:44 AM   Result Value Ref Range    Sodium 145 136 - 145 mmol/L    Potassium 4.0 3.5 - 5.1 mmol/L    Chloride 118 (H) 97 - 108 mmol/L    CO2 20 (L) 21 - 32 mmol/L    Anion gap 7 5 - 15 mmol/L    Glucose 74 65 - 100 mg/dL    BUN 17 6 - 20 mg/dL    Creatinine 1.38 (H) 0.70 - 1.30 mg/dL    BUN/Creatinine ratio 12 12 - 20      GFR est AA >60 >60 ml/min/1.73m2    GFR est non-AA 52 (L) >60 ml/min/1.73m2    Calcium 8.5 8.5 - 10.1 mg/dL    Phosphorus 3.2 2.6 - 4.7 mg/dL    Albumin 2.4 (L) 3.5 - 5.0 g/dL HOSPITAL COURSE:  Mane Rangel is a(n) 60 y. o. male with PMH significant for etoh abuse, encephalopathy, cirrhosis, seizure disorder, gout presents with c/o swelling to right 2nd finger. Patient is nonverbal at baseline and dependent on all ADL at [address] [place] [address]. According to nursing home staff, has future orthopedic appointment for this finger. Is non-verbal at baseline but appears awake, alert, and in no obvious distress.     Patient was recently discharged from the hospital after having a seizures at that time patient received Keppra for the seizures patient had paracentesis done during the last admission also endoscopy done and had banding of the varices     I&D performed on finger in ED with purulent fluid drained. Dressing in place covering the DIP. 1g vancomycin given in ED, switched to zosyn on admission. Wound cultures sent. XR of the finger shows soft tissue swelling in gas consistent with osteomyelitis, septic joint, or gouty arthropathy.     Labs: Hgb 9.6 (baseline). Creatinine 2.51. CRP 3.15. ESR 35. Wound and blood cultures pending.     3/24:  Patient seen resting in bed. Nonverbal at baseline. Rectal temperature 91.1F per nursing staff. Blood pressure soft, patient may be septic. Vanc and Zosyn started per ID consult. Lactate 3.4. Blood and wound cultures still pending.     Per orthopedic surgery, patient to have debridement of joint today under local anesthesia. He will likely need IV abx for 4-6 weeks for septic joint.     Per nephrology, the patients increased creatinine of 2.5 may be due to amiloride, which is now held. Iron studies also obtained for Hgb 9.6.     Labs remarkable for: Hgb 9.6. Creatinine 2.47.     3/25:  Patient seen in ICU. Denies finger pain. Rectal temp 36.2C.     I&D moved to today d/t transfer to ICU yesterday. Per ID, initial wound culture shows coag negative staph and continue on vanc and zosyn pending final culture result.  Blood cultures still pending.     Labs remarkable for Hgb 9.2, Creatinine 2.59, Albumin 1.8. Lactate decreased from 3.4 to 2.6. Ammonia 39.     Pt had Doppler studies done this morning which shows     · No evidence of acute DVT in the right upper extremity. · Thrombus noted within the right cephalic forearm vein(s).     3/28:  Patient seen resting comfortably in bed, remains at baseline mental status. Current temp 36C and patient still on warming blanket.     Patient had I&D of right index finger on 3/25 which revealed purulent drainage mixed with gouty tophi. DIP joint thoroughly irrigated and cultures sent. Cultures show few WBC and \"scant probably enterococcus. \" Patient remains on vanc and zosyn. Over the weekend, levothyroxine increased to 50mcg d/t elevated TSH. He was also started on midodrine 5mg TID for hypotension and was given hypotonic fluids for hypernatremia        Labs from 3/27 show Hgb 8.5, Na 149, creatinine 2.43, albumin 2.2     3/29:  Patient seen resting comfortably in bed. Denies any pain. Abdomen very distended, worse than yesterday. Patient receiving NG tube feeds. Lower extremity edema also present. Patient also receiving 1 unit packed RBC for Hgb 6. 6. platelets 53, Na 337, Creatinine 2.31.     Patient still hypothermic     3/30:  Patient remains hypothermic at 95.7F. Denies finger pain, chest pain, palpitations, shortness of breath, abdominal pain. Seen by nephrology who notes creatinine gradually decreasing. Baseline is around 1.7. Seen by speech therapy who will continue to advance diet to include some solids today.     Culture from DIP joint shows staph epidermidis susceptible to vancomycin, which patient is already on. Hgb 7.8, platelets 57, creatinine 2.09, Na 148. KUB shows abdominal distension consistent with known history of ascites. Last paracentesis 3/24 with 4.3L fluid removed.        3/31:  Patient seen resting in bed. Denies any acute complaints. Remains hypothermic at 35.7.  Probe replaced by RN to ensure accuracy. Patient currently on pureed solid diet. He was seen by urology for urinary retention yesterday who plans to leave Gerard in for now and follow outpatient. Patient should stay on Flomax 0.4mg. Patient also had paracentesis yesterday with 8400cc fluid removed with 62.5g albumin given. Abdomen much less distended today.     Hgb 7.0, Creatinine 1.88, albumin 2.7.        4/1: Patient seen in ICU, no acute complaints. Temperature 36C. Patient seen by nephrology yesterday who restarted D5W at 50cc/hr for hypernatremia. Na today is 146. Patient will also start on weekly procrit. Hgb today Is 7.7.  Nephrology also increased sodium bicarb to 1300 BID for significant bicarb deficit.     4/2      Patient transferred to the medical floor temperature stable patient not eating drinking well more alert awake      Patient was seen by infectious disease nephrologist and orthopedics during this admission    Seen by the nephrology and adjust her medication and IV fluids much improved  Seen the orthopedic and patient have I&D done of the swollen right hand fingers due to septic joint seen by infectious disease recommend continue vancomycin  For 3 weeks    Patient was in ICU because of hypothermia which improved patient still have poor  Appetite    Patient hemoglobin is around 7.7 remained stable  Renal function improved from creatinine 2.31-1.43 and sodium is 143    Patient denies any chest pain or shortness of breath nausea vomiting diarrhea constipation    Patient discharged back to nursing home with IV antibiotics for 3 weeks      Medication reconciliation done times outpatient 35 minutes 50% time spent counseling and coordination of care    Follow-up with orthopedics in 2 wks    Patient was a little restless agitated last night this morning stable patient on eating drinking well because he do not like the food    Plan for PICC line placement today and discharge to nursing home with IV antibiotics for 3 more weeks on IV vancomycin      Signed:   Lin Bamberger, MD  4/4/2022  10:31 AM

## 2022-04-04 NOTE — PROGRESS NOTES
Patient has become more restless and confused throughout the evening. He continues to climb out of bed. Bilateral soft mitts on patient for multiple attempts to pull out fish and other lines. Multiple attempts to reorient the patient. PRN medication given this shift for anxiety. Fall mat placed on floor bedside the patients bed. Staff in room at this time. Bed alarm on and all fall precautions in place. MD notified. Orders received.  Awaiting labs

## 2022-04-04 NOTE — PROGRESS NOTES
Called and spoke with Dr. Jena Wisdom regarding pt temp. He stated to hold pt unitl 1900 to allow the pt tempeture to rise to 98 degrees. Called [facility] and put the pt on will call. Will continue to monitor pt temp and notify physician of any changes.

## 2022-04-04 NOTE — PROGRESS NOTES
Infectious Disease Progress Note           Subjective:   Stable, denies new complaints, no acute events since last seen, denies new complaints, confused   Objective:   Physical Exam:     Visit Vitals  /85 (BP 1 Location: Left upper arm, BP Patient Position: At rest;Supine)   Pulse 65   Temp 97.4 °F (36.3 °C)   Resp 18   Ht 6' 2.02\" (1.88 m)   Wt 185 lb 13.6 oz (84.3 kg)   SpO2 100%   BMI 23.85 kg/m²    O2 Flow Rate (L/min): 2 l/min O2 Device: None (Room air)    Temp (24hrs), Av.7 °F (36.5 °C), Min:97.4 °F (36.3 °C), Max:98.1 °F (36.7 °C)    No intake/output data recorded.     1901 -  0700  In: 1848.4 [P.O.:536; I.V.:1312.4]  Out: 575 [Urine:575]    General: NAD, confused, not following commands   HEENT: HEIDY, dry mucosa   Lungs: CTA b/l\, decreased at the bases, no wheeze/rhonchi   Heart: S1S2+, RRR, no murmur  Abdo: Soft, NT, ND, +BS   : No fish   Exts:Healing right index finger, decreased swelling and tenderness   Skin: No pressure ulcers       Data Review:       Recent Days:  Recent Labs     22  0606 22  0334   WBC 5.1 5.6   HGB 7.7* 8.3*   HCT 22.7* 24.5*   PLT 73* 70*     Recent Labs     22  0744 22  0606 22  0334   BUN 17 17 18   CREA 1.38* 1.43* 1.47*       Lab Results   Component Value Date/Time    C-Reactive protein 1.59 (H) 2022 12:45 PM        Microbiology     Results     Procedure Component Value Units Date/Time    CULTURE, Jolanta Gonzalez STAIN [[id number]]  (Susceptibility) Collected: 22 8475    Order Status: Completed Specimen: Wound from Right Updated: 22 1024     Special Requests: No Special Requests        GRAM STAIN Few WBCs seen         No organisms seen        Culture result:       Scant Staphylococcus epidermidis (Oxacillin resistant)          Susceptibility      Staphylococcus epidermidis     YESSENIA     Ciprofloxacin ($) Resistant     Clindamycin ($) Susceptible     Daptomycin ($$$$$) Susceptible Erythromycin ($$$$) Susceptible     Gentamicin ($) Susceptible     Levofloxacin ($) Resistant     Linezolid ($$$$$) Susceptible     Moxifloxacin ($$$$) Resistant     Oxacillin Resistant     Rifampin ($$$$) Susceptible  [1]      Tetracycline Susceptible     Trimeth/Sulfa Susceptible     Vancomycin ($) Susceptible                 [1]  Rifampin is not to be used for mono-therapy. Linear View                   CULTURE, TISSUE Lashawn Mayly STAIN [[id number]] Collected: 03/25/22 1715    Order Status: Completed Specimen: Right Updated: 03/28/22 1544     Special Requests: No Special Requests        GRAM STAIN Rare WBCs seen         No organisms seen        Culture result:       Scant Staphylococcus epidermidis PLEASE REFER TO [id number] FOR SENSITIVITIES          MRSA SCREEN - PCR (NASAL) [[id number]] Collected: 03/24/22 1256    Order Status: Completed Specimen: Swab Updated: 03/24/22 1456     MRSA by PCR, Nasal Not Detected       COVID-19 RAPID TEST [[id number]] Collected: 03/24/22 0918    Order Status: Completed Specimen: Nasopharyngeal Updated: 03/24/22 0955     COVID-19 rapid test Not Detected        Comment: Rapid Abbott ID Now   Rapid NAAT:  The specimen is NEGATIVE for SARS-CoV-2, the novel coronavirus associated with COVID-19. Negative results should be treated as presumptive and, if inconsistent with clinical signs and symptoms or necessary for patient management, should be tested with an alternative molecular assay. Negative results do not preclude SARS-CoV-2 infection and should not be used as the sole basis for patient management decisions. This test has been authorized by the FDA under   an Emergency Use Authorization (EUA) for use by authorized laboratories.  Fact sheet for Healthcare Providers: ConventionUpdate.co.nz Fact sheet for Patients: ConventionUpdate.co.nz   Methodology: Isothermal Nucleic Acid Amplification         CULTURE, BLOOD, PAIRED [[id number]] Collected: 03/23/22 0000    Order Status: Completed Specimen: Blood Updated: 03/23/22 0021    CULTURE, Allyne Ruffing STAIN [[id number]] Collected: 03/22/22 2219    Order Status: Completed Specimen: Wound Updated: 03/25/22 0850     Special Requests: No Special Requests        GRAM STAIN Few WBCs seen         No organisms seen        Culture result:       Few Staphylococcus species, coagulase negative               Diagnostics   CXR Results  (Last 48 hours)    None         Assessment/Plan     1. Right index finger swelling/tenderness, soft tissue swelling/septic arthritis and osteomyelitis on X-ray       Coag neg staph isolated from superficial wound Cx and intra-op Cx       S/p wound debridement w excision of bone on 03/25      CRP 1.59 and ESR 36 (03/26), f/u added to todays labs       Healing right index, decreased swelling, no exposed bone of tendon       D/p 10 day of Vanc post-op, d/c on linezolid x 2 more wks, monitor for cytopenia while on linezolid         2. Hypothermia: Resolved, normothermic, hemodynamically stable          3. Acute renal failure: Stable Cr on routine labs       4. Thrombocytopenia: Plts are trending up on todays labs     5.  AMS: confused and combative     Miguelina Jordan MD    4/4/2022

## 2022-04-04 NOTE — PROGRESS NOTES
Spoke with Dr. Mario Medina. She stated the pt was going to be discharged on PO antibiotics and does not need to have a PICC line to discharge. Spoke with Dr. Danny Galicia. He stated for the pt to discharge with the fish.

## 2022-04-04 NOTE — PROGRESS NOTES
Problem: Falls - Risk of  Goal: *Absence of Falls  Description: Document Master Sites Fall Risk and appropriate interventions in the flowsheet. Outcome: Progressing Towards Goal  Note: Fall Risk Interventions:  Mobility Interventions: Bed/chair exit alarm    Mentation Interventions: Bed/chair exit alarm    Medication Interventions: Bed/chair exit alarm    Elimination Interventions: Call light in reach,Toileting schedule/hourly rounds              Problem: Patient Education: Go to Patient Education Activity  Goal: Patient/Family Education  Outcome: Progressing Towards Goal     Problem: Pressure Injury - Risk of  Goal: *Prevention of pressure injury  Description: Document Curtis Scale and appropriate interventions in the flowsheet.   Outcome: Progressing Towards Goal  Note: Pressure Injury Interventions:  Sensory Interventions: Minimize linen layers,Float heels,Keep linens dry and wrinkle-free    Moisture Interventions: Minimize layers,Internal/External urinary devices,Check for incontinence Q2 hours and as needed    Activity Interventions: Pressure redistribution bed/mattress(bed type)    Mobility Interventions: HOB 30 degrees or less    Nutrition Interventions: Document food/fluid/supplement intake    Friction and Shear Interventions: Minimize layers                Problem: Patient Education: Go to Patient Education Activity  Goal: Patient/Family Education  Outcome: Progressing Towards Goal     Problem: Patient Education: Go to Patient Education Activity  Goal: Patient/Family Education  Outcome: Progressing Towards Goal     Problem: Patient Education: Go to Patient Education Activity  Goal: Patient/Family Education  Outcome: Progressing Towards Goal     Problem: Impaired Skin Integrity/Pressure Injury Treatment  Goal: *Improvement of Existing Pressure Injury  Outcome: Progressing Towards Goal  Goal: *Prevention of pressure injury  Description: Document Curtis Scale and appropriate interventions in the flowsheet.   Outcome: Progressing Towards Goal  Note: Pressure Injury Interventions:  Sensory Interventions: Minimize linen layers,Float heels,Keep linens dry and wrinkle-free    Moisture Interventions: Minimize layers,Internal/External urinary devices,Check for incontinence Q2 hours and as needed    Activity Interventions: Pressure redistribution bed/mattress(bed type)    Mobility Interventions: HOB 30 degrees or less    Nutrition Interventions: Document food/fluid/supplement intake    Friction and Shear Interventions: Minimize layers                Problem: Patient Education: Go to Patient Education Activity  Goal: Patient/Family Education  Outcome: Progressing Towards Goal     Problem: Patient Education: Go to Patient Education Activity  Goal: Patient/Family Education  Outcome: Progressing Towards Goal

## 2022-04-04 NOTE — PROGRESS NOTES
Nutrition Assessment     Type and Reason for Visit: Reassess (Goal)    Nutrition Recommendations/Plan:   Continue diet; provide feeding assistance w/ intake. Ensure Enlive x3/d (1050 kcal, 60 gm PRO). Monitor while IP. Nutrition Assessment:   Admitted for finger infection, (3/25) I/D. RD familiar with pt from last admit where pt required intubation and banding of esophageal varices. Intakes <50% at this time. Remains in ICU for hypothermia and low HR. (3/30) Paracentesis with 8.4L removed. (3/28) Pt inappropriate for PO per SLP d/t decreased alertness, TF started and continued for <24hrs before NGT removed and diet advanced by SLP. Intakes <50% of meals, per family at bedside, pt typically eats very poorly but drinks 3-4 Ensure daily. RD to add ONS, educated to drinks in-between meals to promote intakes of solid foods. (4/4) Pt in room w/ sitter at bed. Sitter reported Pt ate few bites from tray for Breakfast today-noted 26-50% intake yesterday per I/Os; no ONS on tray today. Pt to return to SNF today- rec'd add ONS and encourage intake for caloric needs to be met. Labs: Cl 118, CO2 20, Cr 1.38, Alb 2.4. Meds: B1, B9, Vit D3, synthroid, lactulose, pepcid, allopurinol, flomax, Na bicarbonate, tylenol. Malnutrition Assessment:  Malnutrition Status: Severe malnutrition     Estimated Daily Nutrient Needs:  Energy (kcal):  1998-2331kcals (MSJ x1.2-1.4)  Protein (g):  96-120g (1.2-1.5g/kg)       Fluid (ml/day):  1600-2000ml (20-25ml/kg EDW)    Nutrition Related Findings:   No N/V/D/C nor chewing/swallowing issues reported. Continues on SLP tx- Pureed/Mildly Thick Liquids. No edema noted. Last BM on 4/4.      Current Nutrition Therapies:  ADULT DIET Dysphagia - Pureed; Mildly Thick (Nectar)  ADULT ORAL NUTRITION SUPPLEMENT Breakfast, Lunch, Dinner; Standard High Calorie/High Protein    Anthropometric Measures:  · Height:  6' 2.02\" (188 cm)  · Current Body Wt:  84.3 kg (185 lb 13.6 oz)  · BMI: 23.9    Nutrition Diagnosis:   · In context of chronic illness,Moderate malnutrition related to inadequate protein-energy intake (2/2 EtOH abuse) as evidenced by mild loss of subcutaneous fat,moderate muscle loss,intake 26-50%,poor intake prior to admission    Nutrition Intervention:  Food and/or Nutrient Delivery: Continue current diet,Continue oral nutrition supplement  Nutrition Education and Counseling: No recommendations at this time  Coordination of Nutrition Care: Continue to monitor while inpatient,Speech therapy,Feeding assistance/environmental change    Goals:  Meet >/=50% of EENs in >5 days, Wt maintenance within +/-0.5kg in >5 days, wound healing       Nutrition Monitoring and Evaluation:   Behavioral-Environmental Outcomes: None identified  Food/Nutrient Intake Outcomes: Diet advancement/tolerance,Food and nutrient intake,Supplement intake  Physical Signs/Symptoms Outcomes: Biochemical data,Meal time behavior,Weight,Chewing or swallowing,Fluid status or edema    Discharge Planning:    Continue oral nutrition supplement,Continue current diet     Electronically signed by Elizabeth Blanco RD on 4/4/2022 at 12:12 PM    Contact Number: ext. [phone] or [employer].

## 2022-04-04 NOTE — TELEPHONE ENCOUNTER
Sheryle Hammersmith from [facility] needed to speak to Dr. Alexander Coley on patient needing to know if patient needed to be discharged with a fish cath

## 2022-04-04 NOTE — PROGRESS NOTES
Pt has discharge orders back to his facility. Pt is stable, mentation is at baseline and does not show any signs of distress. Pt has been cleared by all physicians to be discharged today. Pt is discharging without an IV or tele. Discharge plan of care/case management plan validated with provider discharge order.

## 2022-04-04 NOTE — PROGRESS NOTES
Orthopedic progress note    Date:2022       Room:[facility]  Patient Name:Waldo Rangel     YOB: 1960     Age:62 y.o. Subjective    Status post I&D right index finger. Postop day #7. Patient more alert this morning. He is responsive to some commands. No other complaints at this time.   Objective           Vitals Last 24 Hours:  TEMPERATURE:  Temp  Av.2 °F (36.2 °C)  Min: 96.2 °F (35.7 °C)  Max: 98.1 °F (36.7 °C)  RESPIRATIONS RANGE: Resp  Av.8  Min: 18  Max: 20  PULSE OXIMETRY RANGE: SpO2  Av.2 %  Min: 97 %  Max: 100 %  PULSE RANGE: Pulse  Av.6  Min: 64  Max: 90  BLOOD PRESSURE RANGE: Systolic (50XNQ), IZE:394 , Min:100 , HKO:118   ; Diastolic (96CTU), MMR:41, Min:69, Max:85    Current Facility-Administered Medications   Medication Dose Route Frequency    vancomycin (VANCOCIN) 500 mg in 0.9% sodium chloride (MBP/ADV) 100 mL MBP  500 mg IntraVENous ONCE    [START ON 2022] Vancomycin - Random level to be drawn  @ 1400   Other ONCE    levETIRAcetam in saline (iso-os) (KEPPRA) infusion 1,000 mg  1,000 mg IntraVENous Q12H    hydrOXYzine pamoate (VISTARIL) capsule 25 mg  25 mg Oral QID PRN    0.9% sodium chloride infusion 250 mL  250 mL IntraVENous PRN    sodium bicarbonate tablet 1,300 mg  1,300 mg Oral BID    Vancomycin - Pharmacy to Dose   Other Rx Dosing/Monitoring    levothyroxine (SYNTHROID) tablet 50 mcg  50 mcg Oral 6am    tamsulosin (FLOMAX) capsule 0.4 mg  0.4 mg Oral DAILY    sodium chloride (NS) flush 5-40 mL  5-40 mL IntraVENous PRN    acetaminophen (TYLENOL) tablet 650 mg  650 mg Oral Q8H    HYDROcodone-acetaminophen (NORCO) 5-325 mg per tablet 1 Tablet  1 Tablet Oral Q4H PRN    morphine injection 2 mg  2 mg IntraVENous Q4H PRN    naloxone (NARCAN) injection 0.4 mg  0.4 mg IntraVENous PRN    ondansetron (ZOFRAN) injection 4 mg  4 mg IntraVENous Q4H PRN    allopurinoL (ZYLOPRIM) tablet 300 mg  300 mg Oral DAILY    [Held by provider] aMILoride (MIDAMOR) tablet 5 mg  5 mg Oral DAILY    ergocalciferol capsule 50,000 Units  50,000 Units Oral Q7D    famotidine (PEPCID) tablet 20 mg  20 mg Oral QPM    folic acid (FOLVITE) tablet 1 mg  1 mg Oral DAILY    lactulose (CHRONULAC) 10 gram/15 mL solution 15 mL  15 mL Oral TID    propranoloL (INDERAL) tablet 20 mg  20 mg Oral BID    thiamine mononitrate (B-1) tablet 100 mg  100 mg Oral DAILY    acetaminophen (TYLENOL) tablet 650 mg  650 mg Oral Q6H PRN          I/O (24Hr): Intake/Output Summary (Last 24 hours) at 4/4/2022 1631  Last data filed at 4/4/2022 1524  Gross per 24 hour   Intake    Output 400 ml   Net -400 ml     Objective:  General Appearance:  Comfortable. Vital signs: (most recent): Blood pressure 100/69, pulse 65, temperature (!) 96.5 °F (35.8 °C), resp. rate 20, height 6' 2.02\" (1.88 m), weight 84.3 kg (185 lb 13.6 oz), SpO2 99 %. Vital signs are normal.    Extremities: (In restrains. Dressing clean dry and intact, change 1/2 hour ago with ID MD)    Labs/Imaging/Diagnostics    Labs:  CBC:  Recent Labs     04/03/22  0606 04/02/22  0334   WBC 5.1 5.6   RBC 2.70* 2.92*   HGB 7.7* 8.3*   HCT 22.7* 24.5*   MCV 84.1 83.9   RDW 18.7* 18.8*   PLT 73* 70*     CHEMISTRIES:  Recent Labs     04/04/22  0744 04/03/22  0606 04/02/22  0334    143 146*   K 4.0 3.8 3.8   * 117* 120*   CO2 20* 19* 19*   BUN 17 17 18   CREA 1.38* 1.43* 1.47*   CA 8.5 8.6 8.2*   PHOS 3.2 3.0  --    MG  --   --  1.6   PT/INR:No results for input(s): INR, INREXT, INREXT in the last 72 hours. No lab exists for component: PROTIME  APTT:No results for input(s): APTT in the last 72 hours. LIVER PROFILE:  Recent Labs     04/02/22  0334   AST 27   ALT 14     Lab Results   Component Value Date/Time    ALT (SGPT) 14 04/02/2022 03:34 AM    AST (SGOT) 27 04/02/2022 03:34 AM    Alk.  phosphatase 72 04/02/2022 03:34 AM    Bilirubin, direct 1.2 (H) 03/01/2022 08:52 AM    Bilirubin, total 0.9 04/02/2022 03:34 AM Physical Exam:  Right hand: swelling decreased and no drainage. Assessment//Plan           Patient Active Problem List    Diagnosis Date Noted    Finger infection 03/22/2022    Status epilepticus ([facility] [address] 02/24/2022    Acute renal failure (ARF) ([facility] [address] 01/20/2022    Cirrhosis of liver ([facility] [address] 01/20/2022    GRACIA (acute kidney injury) ([facility] [address] 01/20/2022     Status post I&D right index finger. Postop day #10  New dressing applied today. Continue with every other day dressing changes. Orthopedics will follow as needed.       Electronically signed by Rooney Holstein, MD on 4/4/2022 at 12:00 PM

## 2022-04-04 NOTE — PROGRESS NOTES
Called Dr. Bradford Shea and informed him of the pt mentation change. Dr. Bradford Shea ordered, stat labs, ABG and to discontinue discharge order. Pt vitals are stable and blood glucose is 84.

## 2022-04-05 NOTE — PROGRESS NOTES
Patient discharging back to [facility] today into room 301 B. Nurse can call report at [phone]. Transport to be set up for discharge. All clinicals have been sent over. Discharge plan of care/case management plan validated with provider discharge order.     CM called patient's spouse, Brianne Shrestha [phone], and notified of d/c this date, agreeable to dc.

## 2022-04-05 NOTE — PROGRESS NOTES
SPEECH LANGUAGE PATHOLOGY DYSPHAGIA TREATMENT  Patient: Deniz Stallworth (64 y.o. male)  Date: 4/5/2022  Diagnosis: Finger infection [L08.9] <principal problem not specified>  Procedure(s) (LRB):  INCISION & DRAINAGE of Right Index Finger (Right) 11 Days Post-Op  Precautions:      ASSESSMENT :  Patient is alert, confused, oriented x1 and is agitated. 1:1 present reports tolerating mildly thick w/ minimal solid intake. PO trials limited to thin via straw only as patient refused solids. Overall, oropharyngeal phase appears wfl for thin liquids. He is impulsive. Overt s/s of pen/asp x1 on initial cup sip of thin. PLAN :  Recommendations and Planned Interventions:  Rec diet upgrade to puree/thin w/ strict asp/GERD precautions and crushed meds w/ supervision. Frequency/Duration: Patient will be followed by speech-language pathology 3 times a week to address goals. Discharge Recommendations: Skilled Nursing Facility     SUBJECTIVE:   Patient confused and agitated during assessment. OBJECTIVE:     Past Medical History:   Diagnosis Date    Arthritis     Hypertension        CXR Results  (Last 48 hours)      None            Current Diet:  DIET ADULT ORAL NUTRITION SUPPLEMENT  DIET ADULT     Cognitive and Communication Status:  Neurologic State: Confused,Irritable  Orientation Level: Oriented to person  Cognition: Impaired decision making,Impulsive  Perception: Appears intact  Perseveration: No perseveration noted  Safety/Judgement: Decreased awareness of environment,Decreased insight into deficits    Pain:  Pain Scale 1: Visual  Pain Intensity 1: 0       After treatment:   Patient left in no apparent distress in bed, Call bell within reach, Nursing notified, and Bed / chair alarm activated    COMMUNICATION/EDUCATION:   Patient's agitation and cognitive deficits negatively impact comprehension and  carryover of education.        Thank you for this referral.  Naren Chapni M.S., MDuran., CCC-SLP  Time Calculation: 14 mins

## 2022-04-05 NOTE — PROGRESS NOTES
Problem: Falls - Risk of  Goal: *Absence of Falls  Description: Document Narda Qiu Fall Risk and appropriate interventions in the flowsheet. Outcome: Progressing Towards Goal  Note: Fall Risk Interventions:  Mobility Interventions: Bed/chair exit alarm    Mentation Interventions: Bed/chair exit alarm    Medication Interventions: Bed/chair exit alarm    Elimination Interventions: Call light in reach              Problem: Patient Education: Go to Patient Education Activity  Goal: Patient/Family Education  Outcome: Progressing Towards Goal     Problem: Pressure Injury - Risk of  Goal: *Prevention of pressure injury  Description: Document Curtis Scale and appropriate interventions in the flowsheet. Outcome: Progressing Towards Goal  Note: Pressure Injury Interventions:  Sensory Interventions: Minimize linen layers,Float heels,Turn and reposition approx. every two hours (pillows and wedges if needed)    Moisture Interventions: Internal/External urinary devices,Minimize layers    Activity Interventions: Pressure redistribution bed/mattress(bed type)    Mobility Interventions: HOB 30 degrees or less,Turn and reposition approx.  every two hours(pillow and wedges)    Nutrition Interventions: Document food/fluid/supplement intake    Friction and Shear Interventions: Minimize layers                Problem: Patient Education: Go to Patient Education Activity  Goal: Patient/Family Education  Outcome: Progressing Towards Goal     Problem: Patient Education: Go to Patient Education Activity  Goal: Patient/Family Education  Outcome: Progressing Towards Goal     Problem: Patient Education: Go to Patient Education Activity  Goal: Patient/Family Education  Outcome: Progressing Towards Goal     Problem: Impaired Skin Integrity/Pressure Injury Treatment  Goal: *Improvement of Existing Pressure Injury  Outcome: Progressing Towards Goal  Goal: *Prevention of pressure injury  Description: Document Curtis Scale and appropriate interventions in the flowsheet. Outcome: Progressing Towards Goal  Note: Pressure Injury Interventions:  Sensory Interventions: Minimize linen layers,Float heels,Turn and reposition approx. every two hours (pillows and wedges if needed)    Moisture Interventions: Internal/External urinary devices,Minimize layers    Activity Interventions: Pressure redistribution bed/mattress(bed type)    Mobility Interventions: HOB 30 degrees or less,Turn and reposition approx.  every two hours(pillow and wedges)    Nutrition Interventions: Document food/fluid/supplement intake    Friction and Shear Interventions: Minimize layers                Problem: Patient Education: Go to Patient Education Activity  Goal: Patient/Family Education  Outcome: Progressing Towards Goal     Problem: Patient Education: Go to Patient Education Activity  Goal: Patient/Family Education  Outcome: Progressing Towards Goal

## 2022-04-05 NOTE — PROGRESS NOTES
Attempted to call report to [facility]. Spoke to the  Waldo,communicated will have receiving nurse give me a call back here at [facility].  Awaiting g return call. @ [phone] report communicated to receiving nurse alfreda OAKLEY at facility. All questions answered. Personal belongings packed and sent with patient via medical transport.

## 2022-04-05 NOTE — DISCHARGE SUMMARY
Discharge Summary       PATIENT ID: Houston Newton  MRN: [id number]   YOB: 1960    DATE OF ADMISSION: 3/22/2022  8:58 PM    DATE OF DISCHARGE:   PRIMARY CARE PROVIDER: Mariana Alcazar NP     ATTENDING PHYSICIAN: Janene Adame  DISCHARGING PROVIDER: Anaya Ackerman      CONSULTATIONS: IP CONSULT TO ORTHOPEDIC SURGERY  IP CONSULT TO INFECTIOUS DISEASES  IP CONSULT TO NEPHROLOGY  IP CONSULT TO INTERVENTIONAL RADIOLOGY  IP CONSULT TO UROLOGY    PROCEDURES/SURGERIES: Procedure(s):  INCISION & DRAINAGE of Right Index Finger    ADMITTING DIAGNOSES:    Patient Active Problem List    Diagnosis Date Noted    Finger infection 03/22/2022    Status epilepticus ([facility] 02/24/2022    Acute renal failure (ARF) ([facility] 01/20/2022    Cirrhosis of liver ([facility] 01/20/2022    GRACIA (acute kidney injury) ([facility] 01/20/2022       DISCHARGE DIAGNOSES / PLAN:      Hypothermia  Hypoglycemic  Hypotension  Bradycardia  Septic joint- right 2nd finger DIP  Status post I&D right index finger  SEPSIS wound culturesgrows  Staphylococcus and Enterococcus  Anemia- transfuse if Hgb < 7.0 status post transfusion  Acute on chronic renal failure- creatinine 2.51 on admission     Hx etoh abuse  Cirrhosis of liver   History of encephalopathy  Seizure disorder  History of gout  Thrombocytopenia     · No evidence of acute DVT in the right upper extremity. · Thrombus noted within the right cephalic forearm vein(s).     Hypernatremia  Hypothyroidism         DISCHARGE MEDICATIONS:  Current Discharge Medication List      START taking these medications    Details   linezolid (ZYVOX) 600 mg tablet Take 1 Tablet by mouth two (2) times a day for 10 days. Check CBC wkly while on linezolid  Qty: 20 Tablet, Refills: 0  Start date: 4/4/2022, End date: 4/14/2022      HYDROcodone-acetaminophen (NORCO) 5-325 mg per tablet Take 1 Tablet by mouth every four (4) hours as needed for Pain for up to 3 days. Max Daily Amount: 6 Tablets.   Qty: 10 Tablet, Refills: 0  Start date: 4/3/2022, End date: 4/6/2022    Associated Diagnoses: Finger joint swelling, right      thiamine mononitrate (B-1) 100 mg tablet Take 1 Tablet by mouth daily. Qty: 30 Tablet, Refills: 0  Start date: 4/4/2022         CONTINUE these medications which have NOT CHANGED    Details   levothyroxine (SYNTHROID) 25 mcg tablet Take 25 mcg by mouth Daily (before breakfast). tamsulosin (Flomax) 0.4 mg capsule Take 1 Capsule by mouth daily. Qty: 30 Capsule, Refills: 1      potassium chloride (K-DUR, KLOR-CON M20) 20 mEq tablet Take 1 Tablet by mouth daily. Qty: 30 Tablet, Refills: 0      sodium bicarbonate 650 mg tablet Take 1 Tablet by mouth three (3) times daily. Qty: 90 Tablet, Refills: 0      levETIRAcetam (Keppra) 1,000 mg tablet Take 1.5 Tablets by mouth two (2) times a day. Qty: 60 Tablet, Refills: 0      ergocalciferol (ERGOCALCIFEROL) 1,250 mcg (50,000 unit) capsule Take 1 Capsule by mouth every seven (7) days. lactulose (CHRONULAC) 10 gram/15 mL solution Take 15 mL by mouth three (3) times daily. Qty: 200 mL, Refills: 0      allopurinoL (ZYLOPRIM) 300 mg tablet Take 1 Tablet by mouth daily. thiamine HCL (B-1) 100 mg tablet Take 100 mg by mouth daily. propranoloL (INDERAL) 20 mg tablet Take 20 mg by mouth two (2) times a day. folic acid (FOLVITE) 1 mg tablet Take 1 mg by mouth daily. famotidine (PEPCID) 20 mg tablet Take 20 mg by mouth At bedtime. aMILoride (MIDAMOR) 5 mg tablet Take 5 mg by mouth daily. STOP taking these medications       acetaminophen (TylenoL) 325 mg tablet Comments:   Reason for Stopping:         chlordiazePOXIDE (LIBRIUM) 5 mg capsule Comments:   Reason for Stopping:                 NOTIFY YOUR PHYSICIAN FOR ANY OF THE FOLLOWING:   Fever over 101 degrees for 24 hours. Chest pain, shortness of breath, fever, chills, nausea, vomiting, diarrhea, change in mentation, falling, weakness, bleeding.  Severe pain or pain not relieved by medications. Or, any other signs or symptoms that you may have questions about. DISPOSITION:  x  Home With:   OT  PT  HH  RN       Long term SNF/Inpatient Rehab    Independent/assisted living    Hospice    Other:       PATIENT CONDITION AT DISCHARGE: Stable      PHYSICAL EXAMINATION AT DISCHARGE:  General:          Alert, cooperative, no distress, appears stated age. HEENT:           Atraumatic, anicteric sclerae, pink conjunctivae                          No oral ulcers, mucosa moist, throat clear, dentition fair  Neck:               Supple, symmetrical  Lungs:             Clear to auscultation bilaterally. No Wheezing or Rhonchi. No rales. Chest wall:      No tenderness  No Accessory muscle use. Heart:              Regular  rhythm,  No  murmur   No edema  Abdomen:        Soft, non-tender. Not distended. Bowel sounds normal  Extremities:     No cyanosis. No clubbing,                            Skin turgor normal, Capillary refill normal  Skin:                Not pale. Not Jaundiced  No rashes   Psych:             Not anxious or agitated. Neurologic:      Alert, moves all extremities, answers questions appropriately and responds to commands     IR PARACENTESIS ABD W IMAGE   Final Result   Successful paracentesis. XR ABD (KUB)   Final Result      XR CHEST PORT   Final Result      XR CHEST PORT   Final Result   The cardiomediastinal silhouette is appropriate for age, technique,   and lung expansion. Pulmonary vasculature is not congested. The lungs are   essentially clear. No effusion or pneumothorax is seen. IR INSERT NON TUNL CVC OVER 5 YRS   Final Result      Technically successful insertion of non-tunneled triple-lumen catheter with US   guidance.       Plan:       The catheter may be used after catheter placement confirmation by x-ray.   _______________________________________________________________      PROCEDURE SUMMARY:   - Venous access with ultrasound guidance   - Non-tunneled central venous catheter insertion      PROCEDURE DETAILS:      Pre-procedure   Relevant imaging review: None    Informed consent for the procedure was obtained and time-out was performed prior   to the procedure. Prophylactic antibiotic administered: Not administered   Preparation: The site was prepared and draped using all elements of maximal   sterile barrier technique including sterile gloves, sterile gown, cap, mask,   large sterile sheet, sterile ultrasound probe cover, sterile ultrasound gel,   hand hygiene and cutaneous antisepsis with 2% chlorhexidine. Medical reason for site preparation exception: Not applicable      Anesthesia/sedation   Level of anesthesia: No sedation   Anesthesia administered by: Not applicable   Duration of anesthesia/sedation: 0 minutes. Access   The vessel was sonographically evaluated and judged to be patent and appropriate   for access and a permanent image was stored. 2 cc's of 1% lidocaine was   administered to the access site. Real time ultrasound was used to visualize   needle entry into the vessel. Vein accessed: Right internal jugular vein   Access technique: 4F micropuncture set      Catheter placement   The access site was dilated and the catheter was placed into the vein over a   wire and all guidewires were removed in their entirety. Catheter ports were   aspirated and flushed. Catheter tip location was verified by portable X-ray. Catheter size: 7 [place]   Catheter length: 16 cm   Catheter tip position: Central. The tip is overlying the anatomic SVC. Catheter flush: Normal saline      Closure   The catheter was secured. A sterile dressing was applied.    Catheter securement technique: Stat-Lock      Additional Details   Additional description of procedure: None   Additional findings: None   Additional equipment: None   Specimens removed: None   Estimated blood loss:  Less than 10 cc   Standardized report: [email or web address]               [facility] GUIDED VASCULAR ACCESS   Final Result      Technically successful insertion of non-tunneled triple-lumen catheter with US   guidance. Plan:       The catheter may be used after catheter placement confirmation by x-ray.   _______________________________________________________________      PROCEDURE SUMMARY:   - Venous access with ultrasound guidance   - Non-tunneled central venous catheter insertion      PROCEDURE DETAILS:      Pre-procedure   Relevant imaging review: None    Informed consent for the procedure was obtained and time-out was performed prior   to the procedure. Prophylactic antibiotic administered: Not administered   Preparation: The site was prepared and draped using all elements of maximal   sterile barrier technique including sterile gloves, sterile gown, cap, mask,   large sterile sheet, sterile ultrasound probe cover, sterile ultrasound gel,   hand hygiene and cutaneous antisepsis with 2% chlorhexidine. Medical reason for site preparation exception: Not applicable      Anesthesia/sedation   Level of anesthesia: No sedation   Anesthesia administered by: Not applicable   Duration of anesthesia/sedation: 0 minutes. Access   The vessel was sonographically evaluated and judged to be patent and appropriate   for access and a permanent image was stored. 2 cc's of 1% lidocaine was   administered to the access site. Real time ultrasound was used to visualize   needle entry into the vessel. Vein accessed: Right internal jugular vein   Access technique: 4F micropuncture set      Catheter placement   The access site was dilated and the catheter was placed into the vein over a   wire and all guidewires were removed in their entirety. Catheter ports were   aspirated and flushed. Catheter tip location was verified by portable X-ray. Catheter size: 7 Citizen of [place]   Catheter length: 16 cm   Catheter tip position: Central. The tip is overlying the anatomic SVC.     Catheter flush: Normal saline Closure   The catheter was secured. A sterile dressing was applied. Catheter securement technique: Stat-Lock      Additional Details   Additional description of procedure: None   Additional findings: None   Additional equipment: None   Specimens removed: None   Estimated blood loss:  Less than 10 cc   Standardized report: [email or web address]_NonTunneledCatheter1.0               DUPLEX UPPER EXT VENOUS RIGHT   Final Result      XR 2ND FINGER RT MIN 2 V   Final Result      Progressive findings. The findings are nonspecific and could represent infection with osteomyelitis   and septic joint and gas in the soft tissues related to infection. Gout, or other inflammatory arthropathy is a possibility. Findings should be correlated with clinical parameters.               Recent Results (from the past 24 hour(s))   VANCOMYCIN, RANDOM    Collection Time: 04/04/22 12:54 PM   Result Value Ref Range    Vancomycin, random 13.7 ug/mL   GLUCOSE, POC    Collection Time: 04/04/22  5:40 PM   Result Value Ref Range    Glucose (POC) 84 65 - 117 mg/dL    Performed by [employer]    BLOOD GAS, ARTERIAL    Collection Time: 04/04/22  6:24 PM   Result Value Ref Range    pH 7.38 7.35 - 7.45      PCO2 34 (L) 35 - 45 mmHg    PO2 68 (L) 75 - 100 mmHg    O2 SAT 95 (L) >95 %    BICARBONATE 20 (L) 22 - 26 mmol/L    BASE DEFICIT 4.8 (H) 0 - 2 mmol/L    O2 METHOD Room air      FIO2 21.0 %    SITE Left Radial      JAM'S TEST PASS     AMMONIA    Collection Time: 04/04/22  6:29 PM   Result Value Ref Range    Ammonia, plasma 36 (H) <32 umol/L   NT-PRO BNP    Collection Time: 04/04/22  6:29 PM   Result Value Ref Range    NT pro-BNP 1,142 (H) <125 pg/mL   CBC WITH AUTOMATED DIFF    Collection Time: 04/04/22 10:01 PM   Result Value Ref Range    WBC 5.2 4.1 - 11.1 K/uL    RBC 2.91 (L) 4.10 - 5.70 M/uL    HGB 8.2 (L) 12.1 - 17.0 g/dL    HCT 24.4 (L) 36.6 - 50.3 %    MCV 83.8 80.0 - 99.0 FL    MCH 28.2 26.0 - 34.0 PG    MCHC 33.6 30.0 - 36.5 g/dL    RDW 19.1 (H) 11.5 - 14.5 %    PLATELET 77 (L) 888 - 400 K/uL    MPV 11.1 8.9 - 12.9 FL    NRBC 0.0 0.0  WBC    ABSOLUTE NRBC 0.00 0.00 - 0.01 K/uL    NEUTROPHILS 57 32 - 75 %    LYMPHOCYTES 26 12 - 49 %    MONOCYTES 10 5 - 13 %    EOSINOPHILS 6 0 - 7 %    BASOPHILS 1 0 - 1 %    IMMATURE GRANULOCYTES 0 0 - 0.5 %    ABS. NEUTROPHILS 3.0 1.8 - 8.0 K/UL    ABS. LYMPHOCYTES 1.3 0.8 - 3.5 K/UL    ABS. MONOCYTES 0.5 0.0 - 1.0 K/UL    ABS. EOSINOPHILS 0.3 0.0 - 0.4 K/UL    ABS. BASOPHILS 0.1 0.0 - 0.1 K/UL    ABS. IMM. GRANS. 0.0 0.00 - 0.04 K/UL    DF AUTOMATED            HOSPITAL COURSE:  Js Rangel is a(n) 60 y. o. male with PMH significant for etoh abuse, encephalopathy, cirrhosis, seizure disorder, gout presents with c/o swelling to right 2nd finger. Patient is nonverbal at baseline and dependent on all ADL at [address] [place] [address]. According to nursing home staff, has future orthopedic appointment for this finger. Is non-verbal at baseline but appears awake, alert, and in no obvious distress.     Patient was recently discharged from the hospital after having a seizures at that time patient received Keppra for the seizures patient had paracentesis done during the last admission also endoscopy done and had banding of the varices     I&D performed on finger in ED with purulent fluid drained. Dressing in place covering the DIP. 1g vancomycin given in ED, switched to zosyn on admission. Wound cultures sent. XR of the finger shows soft tissue swelling in gas consistent with osteomyelitis, septic joint, or gouty arthropathy.     Labs: Hgb 9.6 (baseline). Creatinine 2.51. CRP 3.15. ESR 35. Wound and blood cultures pending.     3/24:  Patient seen resting in bed. Nonverbal at baseline. Rectal temperature 91.1F per nursing staff. Blood pressure soft, patient may be septic. Vanc and Zosyn started per ID consult. Lactate 3.4.  Blood and wound cultures still pending.     Per orthopedic surgery, patient to have debridement of joint today under local anesthesia. He will likely need IV abx for 4-6 weeks for septic joint.     Per nephrology, the patients increased creatinine of 2.5 may be due to amiloride, which is now held. Iron studies also obtained for Hgb 9.6.     Labs remarkable for: Hgb 9.6. Creatinine 2.47.     3/25:  Patient seen in ICU. Denies finger pain. Rectal temp 36.2C.     I&D moved to today d/t transfer to ICU yesterday. Per ID, initial wound culture shows coag negative staph and continue on vanc and zosyn pending final culture result. Blood cultures still pending.     Labs remarkable for Hgb 9.2, Creatinine 2.59, Albumin 1.8. Lactate decreased from 3.4 to 2.6. Ammonia 39.     Pt had Doppler studies done this morning which shows     · No evidence of acute DVT in the right upper extremity. · Thrombus noted within the right cephalic forearm vein(s).     3/28:  Patient seen resting comfortably in bed, remains at baseline mental status. Current temp 36C and patient still on warming blanket.     Patient had I&D of right index finger on 3/25 which revealed purulent drainage mixed with gouty tophi. DIP joint thoroughly irrigated and cultures sent. Cultures show few WBC and \"scant probably enterococcus. \" Patient remains on vanc and zosyn. Over the weekend, levothyroxine increased to 50mcg d/t elevated TSH. He was also started on midodrine 5mg TID for hypotension and was given hypotonic fluids for hypernatremia        Labs from 3/27 show Hgb 8.5, Na 149, creatinine 2.43, albumin 2.2     3/29:  Patient seen resting comfortably in bed. Denies any pain. Abdomen very distended, worse than yesterday. Patient receiving NG tube feeds. Lower extremity edema also present. Patient also receiving 1 unit packed RBC for Hgb 6. 6. platelets 53, Na 334, Creatinine 2.31.     Patient still hypothermic     3/30:  Patient remains hypothermic at 95.7F. Denies finger pain, chest pain, palpitations, shortness of breath, abdominal pain.  Seen by nephrology who notes creatinine gradually decreasing. Baseline is around 1.7. Seen by speech therapy who will continue to advance diet to include some solids today.     Culture from DIP joint shows staph epidermidis susceptible to vancomycin, which patient is already on. Hgb 7.8, platelets 57, creatinine 2.09, Na 148. KUB shows abdominal distension consistent with known history of ascites. Last paracentesis 3/24 with 4.3L fluid removed.        3/31:  Patient seen resting in bed. Denies any acute complaints. Remains hypothermic at 35.7. Probe replaced by RN to ensure accuracy. Patient currently on pureed solid diet. He was seen by urology for urinary retention yesterday who plans to leave Gerard in for now and follow outpatient. Patient should stay on Flomax 0.4mg. Patient also had paracentesis yesterday with 8400cc fluid removed with 62.5g albumin given. Abdomen much less distended today.     Hgb 7.0, Creatinine 1.88, albumin 2.7.        4/1: Patient seen in ICU, no acute complaints. Temperature 36C. Patient seen by nephrology yesterday who restarted D5W at 50cc/hr for hypernatremia. Na today is 146. Patient will also start on weekly procrit. Hgb today Is 7.7.  Nephrology also increased sodium bicarb to 1300 BID for significant bicarb deficit.     4/2      Patient transferred to the medical floor temperature stable patient not eating drinking well more alert awake      Patient was seen by infectious disease nephrologist and orthopedics during this admission    Seen by the nephrology and adjust her medication and IV fluids much improved  Seen the orthopedic and patient have I&D done of the swollen right hand fingers due to septic joint seen by infectious disease recommend continue vancomycin  For 3 weeks    Patient was in ICU because of hypothermia which improved patient still have poor  Appetite    Patient hemoglobin is around 7.7 remained stable  Renal function improved from creatinine 2.31-1.43 and sodium is 143    Patient denies any chest pain or shortness of breath nausea vomiting diarrhea constipation    Patient discharged back to nursing home with IV antibiotics for 3 weeks      Medication reconciliation done times outpatient 35 minutes 50% time spent counseling and coordination of care    Follow-up with orthopedics in 2 wks    Patient was a little restless agitated last night this morning stable patient on eating drinking well because he do not like the food      ID change antibiotic from vancomycin IV to p.o.'s Zyvox for 2 more weeks  Yesterday patient was hypothermic now back to normal alert awake not in distress discharged back to nursing home      Signed:   Lakeisha Henriquez MD  4/5/2022  10:31 AM

## 2022-04-06 NOTE — PROGRESS NOTES
4/6/22 Per nsg staff facility [facility] ) sent pt back to ED after d/c on yesterday - stating pt was combative & they could not care for him. CM spoke with facility liaison [facility] @ [phone]) informed pt has been non-combative, no medical reason to admit,  & ready for discharge. Per liaison she will inform facility that report will be called in soon for pts d/c to facility.

## 2022-04-06 NOTE — ED NOTES
Rounded on pt, pt appeared to have ripped IV out and removed cardiac leads and pulse ox. Pt cleaned and linens changed. MD made aware of IV. Verbal orders received. Pt bed in lowest position, call bell within reach.

## 2022-04-06 NOTE — ED NOTES
Patient is discharged. Multiple attempts have been made to call report to facility.  Not able to give report to a nursing staff member

## 2022-04-06 NOTE — PROGRESS NOTES
4/6/222. Spoke with facility liaison ( [facility] [phone]) stated she spoke with facility [facility] stated facility [facility]) will accept pt upon discharge.

## 2022-04-06 NOTE — ED PROVIDER NOTES
EMERGENCY DEPARTMENT HISTORY AND PHYSICAL EXAM      Date: 4/5/2022  Patient Name: Wojciech Cardozo      History of Presenting Illness     Chief Complaint   Patient presents with    Altered mental status       History Provided By: Patient and EMS    HPI: Wojciech Cardozo, 60 y.o. male with PMH of alcohol abuse disorder, liver cirrhosis, encephalopathy, seizure disorder, gout, and hypothyroidism who was discharged from the hospital yesterday to [facility] care rehab. Reportedly, patient at baseline is altered due to his encephalopathy. He was noted to be having aggressive and bizarre behavior at the nursing facility and due to safety issue patient was sent to our ED for further evaluation. Since discharge there is no reported several new symptoms. Currently, patient is not complaining of any think. He seems little confused however when answering questions. There are no other complaints, changes, or physical findings at this time. PCP: Eliseo Godfrey NP    Current Outpatient Medications   Medication Sig Dispense Refill    linezolid (ZYVOX) 600 mg tablet Take 1 Tablet by mouth two (2) times a day for 10 days. Check CBC wkly while on linezolid 20 Tablet 0    thiamine mononitrate (B-1) 100 mg tablet Take 1 Tablet by mouth daily. 30 Tablet 0    levothyroxine (SYNTHROID) 25 mcg tablet Take 25 mcg by mouth Daily (before breakfast).  tamsulosin (Flomax) 0.4 mg capsule Take 1 Capsule by mouth daily. 30 Capsule 1    potassium chloride (K-DUR, KLOR-CON M20) 20 mEq tablet Take 1 Tablet by mouth daily. 30 Tablet 0    sodium bicarbonate 650 mg tablet Take 1 Tablet by mouth three (3) times daily. 90 Tablet 0    levETIRAcetam (Keppra) 1,000 mg tablet Take 1.5 Tablets by mouth two (2) times a day. 60 Tablet 0    ergocalciferol (ERGOCALCIFEROL) 1,250 mcg (50,000 unit) capsule Take 1 Capsule by mouth every seven (7) days.       lactulose (CHRONULAC) 10 gram/15 mL solution Take 15 mL by mouth three (3) times daily. 200 mL 0    allopurinoL (ZYLOPRIM) 300 mg tablet Take 1 Tablet by mouth daily.  thiamine HCL (B-1) 100 mg tablet Take 100 mg by mouth daily.  propranoloL (INDERAL) 20 mg tablet Take 20 mg by mouth two (2) times a day.  folic acid (FOLVITE) 1 mg tablet Take 1 mg by mouth daily.  famotidine (PEPCID) 20 mg tablet Take 20 mg by mouth At bedtime.  aMILoride (MIDAMOR) 5 mg tablet Take 5 mg by mouth daily. Past History     Past Medical History:  Past Medical History:   Diagnosis Date    Arthritis     Hypertension        Past Surgical History:  Past Surgical History:   Procedure Laterality Date    IR INSERT NON TUNL CVC OVER 5 YRS  3/25/2022    IR PARACENTESIS ABD W IMAGE  1/21/2022    IR PARACENTESIS ABD W IMAGE  3/1/2022    IR PARACENTESIS ABD W IMAGE  3/30/2022       Family History:  No family history on file. Social History:  Social History     Tobacco Use    Smoking status: Never Smoker    Smokeless tobacco: Never Used   Substance Use Topics    Alcohol use: Not on file    Drug use: Not on file       Allergies:  No Known Allergies      Review of Systems     Review of Systems   Unable to perform ROS: Mental status change       Physical Exam     Physical Exam  Vitals and nursing note reviewed. Constitutional:       General: He is not in acute distress. Appearance: He is not ill-appearing, toxic-appearing or diaphoretic. HENT:      Head: Normocephalic and atraumatic. Cardiovascular:      Rate and Rhythm: Normal rate and regular rhythm. Heart sounds: Normal heart sounds. Pulmonary:      Effort: Pulmonary effort is normal.      Breath sounds: Normal breath sounds. Abdominal:      Palpations: Abdomen is soft. Tenderness: There is no abdominal tenderness. Musculoskeletal:      Cervical back: Normal range of motion and neck supple. Right lower leg: No tenderness. No edema. Left lower leg: No tenderness. No edema.    Skin:     General: Skin is warm and dry. Neurological:      Mental Status: He is alert and oriented to person, place, and time. Lab and Diagnostic Study Results     Labs -   No results found for this or any previous visit (from the past 12 hour(s)). Radiologic Studies -   [unfilled]  CT Results  (Last 48 hours)    None        CXR Results  (Last 48 hours)    None          Medical Decision Making and ED Course   - I am the first and primary provider for this patient AND AM THE PRIMARY PROVIDER OF RECORD. - I reviewed the vital signs, available nursing notes, past medical history, past surgical history, family history and social history. - Initial assessment performed. The patients presenting problems have been discussed, and the staff are in agreement with the care plan formulated and outlined with them. I have encouraged them to ask questions as they arise throughout their visit. Vital Signs-Reviewed the patient's vital signs. Patient Vitals for the past 24 hrs:   Temp Pulse Resp BP SpO2   04/06/22 0935 97.5 °F (36.4 °C) 82 25 (!) 140/110 100 %   04/06/22 0217 -- 89 18 114/87 98 %   04/06/22 0002 -- 78 17 98/68 97 %       Records Reviewed: Nursing Notes and Old Medical Records    Provider Notes (Medical Decision Making):       ED Course as of 04/07/22 0001   Tue Apr 05, 2022   2320 Patient presents from nursing facility after being discharged there yesterday with concerns for aggressive behavior. Otherwise, patient appears clinically well. He does have a history of encephalopathy and liver cirrhosis. Examination is nonfocal.  Patient is otherwise well-appearing for his chronic medical issues. We will get behavioral health labs and will get behavioral health to assist and this patient disposition. I suspect from a medical perspective that he does not have any new issues. However, I have labs show any significant derangement we will address them.  [AA]   Wed Apr 06, 2022   0030 Creatinine(!): 1.77  Mild worsening of his kidney functions from baseline. I will give him 1 L of normal saline. [AA]   0100 Patient was seen by behavioral health. He is cleared from this side. Patient will be a case management consult in the morning for placement. [AA]   0720 Patient was signed out to the morning attending to resume patient care. Disposition as per case management. Naima Andrea   5386 Patient does have a mild UTI. This is already being treated. He is already on IV medications at his nursing home. He was seen by behavioral health. I discussed this case with Dr. Linda Barnes as well. There is no obvious reason for admission so I feel patient can be discharged home. He does admit that he had emotional outburst and is aware of what he did. He does not appear to have any signs of significant delirium or altered mental status. [SW]      ED Course User Index  [AA] Amara Holcomb MD  [SW] Ivan Lee DO         Disposition     Disposition: Condition stable  DC- Adult Discharges: All of the diagnostic tests were reviewed and questions answered. Diagnosis, care plan and treatment options were discussed. The patient understands the instructions and will follow up as directed. The patients results have been reviewed with them. They have been counseled regarding their diagnosis. The patient verbally convey understanding and agreement of the signs, symptoms, diagnosis, treatment and prognosis and additionally agrees to follow up as recommended with their PCP in 24 - 48 hours. They also agree with the care-plan and convey that all of their questions have been answered. I have also put together some discharge instructions for them that include: 1) educational information regarding their diagnosis, 2) how to care for their diagnosis at home, as well a 3) list of reasons why they would want to return to the ED prior to their follow-up appointment, should their condition change.     Discharged        Follow-up Information     Follow up With Specialties Details Why [address] EMERGENCY DEPT Emergency Medicine Go to  As soon as possible if symptoms worsen [address] [address]  [phone]    Primary care doctor  Schedule an appointment as soon as possible for a visit in 3 days            Diagnosis     Clinical Impression:   1. Urinary tract infection without hematuria, site unspecified    2. Abnormal behavior        Attestations: Arron Lofton MD    Please note that this dictation was completed with [employer], the computer voice recognition software. Quite often unanticipated grammatical, syntax, homophones, and other interpretive errors are inadvertently transcribed by the computer software. Please disregard these errors. Please excuse any errors that have escaped final proofreading. Thank you.

## 2022-04-06 NOTE — ED NOTES
Bedside and Verbal shift change report given to Yuly Green RN and Peterson Espinal RN (oncoming nurse) by Sapphire Avery RN (offgoing nurse). Report included the following information SBAR, ED Summary and MAR.

## 2022-04-06 NOTE — ROUTINE PROCESS
Patient discharged . Multiple attempts were made to contact [employer] with no answer.  [facility] received report and transported patient back to the [facility]
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
## 2022-04-06 NOTE — ED TRIAGE NOTES
Per EMS, pt is from [facility] and the facility states that the pt is AMS. Pt is AMS at baseline and was discharged from this facility earlier today. Facility states they cannot handle pt bc he is combative.

## 2022-04-07 PROBLEM — R65.20 SEVERE SEPSIS (HCC): Status: ACTIVE | Noted: 2022-01-01

## 2022-04-07 PROBLEM — A41.9 SEPSIS (HCC): Status: ACTIVE | Noted: 2022-01-01

## 2022-04-07 PROBLEM — T68.XXXA HYPOTHERMIA: Status: ACTIVE | Noted: 2022-01-01

## 2022-04-07 PROBLEM — A41.9 SEVERE SEPSIS (HCC): Status: ACTIVE | Noted: 2022-01-01

## 2022-04-07 PROBLEM — R33.9 URINARY RETENTION: Status: ACTIVE | Noted: 2022-01-01

## 2022-04-07 PROBLEM — E87.20 LACTIC ACIDOSIS: Status: ACTIVE | Noted: 2022-01-01

## 2022-04-07 NOTE — PROGRESS NOTES
TRANSFER - IN REPORT:    Verbal report received from Alethea RN, rn on Wojciech Cardozo  being received from ED for change in patient condition(hypothermia)      Report consisted of patients Situation, Background, Assessment and   Recommendations(SBAR). Information from the following report(s) ED Summary, Intake/Output and MAR was reviewed with the receiving nurse. Opportunity for questions and clarification was provided. Assessment completed upon patients arrival to unit and care assumed.

## 2022-04-07 NOTE — PROGRESS NOTES
Reason for Readmission:   UTI; Lactic Acidosis; Severe Sepsis; Hypothermia; and Sepsis. Readmit. Last inpatient admission March 22nd-April 5th, 2022. Admitting diagnosis for last hospital admission was finger infection. Dis[place] [facility] where's he a LTC resident. RUR Score/Risk Level:     24% HIGH d/t READMISSION  PCP: First and Last name:  Rose Alvarado MD   Name of Practice:    Are you a current patient: Yes/No: Yes   Approximate date of last visit: March 2022   Can you participate in a virtual visit with your PCP:     Is a Care Conference indicated:  Yes, IDR      Did you attend your follow up appointment (s): If not, why not: No.  Follow up appointment dates hadn't arrived yet. Resources/supports as identified by patient/family: Spouse and staff at [address] [place] [address] facing patient (as identified by patient/family and CM): Finances/Medication cost?     Medicaid prescription coverage  Transportation      [facility] transportation at discharge  Support system or lack thereof? Spouse, family, and nursing staff. Living arrangements? LTC resident at [facility]  Self-care/ADLs/Cognition? Patient is total care       Current Advanced Directive/Advance Care Plan:  Full Code. Jie Wade @ [phone] is the primary decision maker. Plan for utilizing home health:   N/A             Transition of Care Plan:    Based on readmission, the patient's previous Plan of Care   has been evaluated and/or modified. The current Transition of Care Plan is:             Readmit. Admitting Dx: UTI; Lactic Acidosis; Severe Sepsis; Hypothermia; and Sepsis. Last inpatient admission March 22nd-April 5th, 2022. Admitting diagnosis for last hospital admission was finger infection. Discharged to [facility], where's he a LTC resident.      When medically clear patient will return to [address]. Care Management Interventions  PCP Verified by CM: Yes (March 2022)  Mode of Transport at Discharge:  Other (see comment) (Bulmaro Prieto )  Transition of Care Consult (CM Consult): Discharge Planning  Discharge Durable Medical Equipment: No  [facility]  Confirm Follow Up Transport: Other (see comment) (Latosha Rosas)  Discharge Location  Patient Expects to be Discharged to[de-identified] [address], [place]

## 2022-04-07 NOTE — Clinical Note
Status[de-identified] INPATIENT [101]   Type of Bed: Remote Telemetry [29]   Cardiac Monitoring Required?: No   Inpatient Hospitalization Certified Necessary for the Following Reasons: 3.  Patient receiving treatment that can only be provided in an inpatient setting (further clarification in H&P documentation)   Admitting Diagnosis: Urinary retention [149226]   Admitting Diagnosis: Lactic acidosis [727435]   Admitting Diagnosis: Severe sepsis [facility] [9905715]   Admitting Physician: Presley Pritchett [[id number]]   Attending Physician: Presley Pritchett [[id number]]   Estimated Length of Stay: 3-4 Midnights   Discharge Plan[de-identified] Extended Care Facility (e.g. Adult Home, Nursing Home, etc.)

## 2022-04-07 NOTE — CONSULTS
Consult Date: 4/7/2022    IP CONSULT TO ORTHOPEDIC SURGERY  Consult performed by: Mireya Villalobos MD  Consult ordered by: Stacey Murray MD  Reason for consult: right index finger I&D follow up   Assessment/Recommendations: 69-year-old male known for other reasons and had some stools which are tending to contaminate the hand. Nursing team was asked to do the appropriate hygiene care and prevent contamination. 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 Patient subsequently was seen by ID physician placed on IV antibiotics and local wound care. Patient has now been readmitted and orthopedic evaluation initiated. Patient's right index finger appears to be healing well there is no new interventions needed. Patient just needs a dry dressing on the finger and we will ask occupational therapy to make a finger gutter splint to support DIP joint. Patient does need a daily dry dressing. Patient is seen in the ICU, patient is admitted for other reasons. Patient had some stools in the bed and was contaminated in the hand. Have asked nursing team to do appropriate hygiene care and prevent this from happening. Orthopedic intervention needed          Subjective    Elder Abraham is a(n) 60 y.o. male with PMH significant for HTN, cirrhosis, ascites presents via EMS for removing fish catheter. Patient recently d/c after lengthy stay in hospital for septic DIP joint of right index finger. He was d/c on 10 days of PO Zyvox, which he completed. He presents today after pulling out fish catheter. Several blood clots noted. Patient septic upon arrival with hypothermia and hypotension. Temp 93.6 rectal, BP 84/68. Patient is retaining 887cc of urine and nursing staff will replace fish.     Patient started on fluids and IV Levaquin in the ED. Patient is altered at baseline. Head CT w/o contrast shows no acute abnormality.  Blood cultures pending. CXR and KUB both normal. XR of hand ordered.     WBC normal. Hgb 8.0. Creatinine 2.01. Lactic acid 4.0. Ammonia 24    Past Medical History:   Diagnosis Date    Arthritis     Hypertension       Past Surgical History:   Procedure Laterality Date    IR INSERT NON TUNL CVC OVER 5 YRS  3/25/2022    IR PARACENTESIS ABD W IMAGE  1/21/2022    IR PARACENTESIS ABD W IMAGE  3/1/2022    IR PARACENTESIS ABD W IMAGE  3/30/2022     No family history on file.    Social History     Tobacco Use    Smoking status: Never Smoker    Smokeless tobacco: Never Used   Substance Use Topics    Alcohol use: Not on file       Current Facility-Administered Medications   Medication Dose Route Frequency Provider Last Rate Last Admin    sodium chloride (NS) flush 5-10 mL  5-10 mL IntraVENous PRN Joseph Mensah MD        piperacillin-tazobactam (ZOSYN) 3.375 g in 0.9% sodium chloride (MBP/ADV) 100 mL MBP  3.375 g IntraVENous Q8H Lamberto Ackerman MD 25 mL/hr at 04/07/22 1628 3.375 g at 04/07/22 1628    sodium chloride 0.9 % bolus infusion 448 mL  448 mL IntraVENous ONCE Marichuy Ackerman MD        lactulose (CHRONULAC) 10 gram/15 mL solution 15 mL  10 g Oral TID Marichuy Ackerman MD        levETIRAcetam (KEPPRA) tablet 1,500 mg  1,500 mg Oral BID Marichuy Ackerman MD        sodium bicarbonate tablet 650 mg  650 mg Oral TID Marichuy Ackerman MD   650 mg at 04/07/22 1609    [START ON 4/8/2022] tamsulosin (FLOMAX) capsule 0.4 mg  0.4 mg Oral DAILY Marichuy Ackerman MD Judeen Marc [START ON 4/8/2022] thiamine mononitrate (B-1) tablet 100 mg  100 mg Oral DAILY Marichuy Ackerman MD Judeen Marc [START ON 4/8/2022] allopurinoL (ZYLOPRIM) tablet 300 mg  300 mg Oral DAILY Marichuy Ackerman MD        ergocalciferol capsule 50,000 Units  50,000 Units Oral Q7D Marichuy Ackerman MD        famotidine (PEPCID) tablet 20 mg  20 mg Oral BID Lamberto Ackerman MD        [START ON 7/7/6764] folic acid (FOLVITE) tablet 1 mg  1 mg Oral DAILY MD Gideno Adamssatish Lazaro ON 4/8/2022] levothyroxine (SYNTHROID) tablet 25 mcg  25 mcg Oral ACB Dianne Ackerman MD        propranoloL (INDERAL) tablet 20 mg  20 mg Oral BID Dominique Jc MD        0.9% sodium chloride infusion  75 mL/hr IntraVENous CONTINUOUS Lamberto Ackerman MD 75 mL/hr at 04/07/22 1359 75 mL/hr at 04/07/22 1359    acetaminophen (TYLENOL) tablet 650 mg  650 mg Oral Q6H PRN Dianne Ackerman MD        Or   Abdi acetaminophen (TYLENOL) suppository 650 mg  650 mg Rectal Q6H PRN Dianne Ackerman MD        polyethylene glycol (MIRALAX) packet 17 g  17 g Oral DAILY PRN Dianne Ackerman MD        ondansetron (ZOFRAN ODT) tablet 4 mg  4 mg Oral Q8H PRN Dianne Ackerman MD        Or    ondansetron [facility]) injection 4 mg  4 mg IntraVENous Q6H PRN Dominique Jc MD        heparin (porcine) injection 5,000 Units  5,000 Units SubCUTAneous Nael Garcia MD   5,000 Units at 04/07/22 1609    VANCOMYCIN INFORMATION NOTE   Other Rx Dosing/Monitoring Charla Tijerina MD            Review of Systems   Unable to perform ROS: Mental status change       Objective     Vital signs for last 24 hours:  Visit Vitals  BP 92/70 (BP 1 Location: Left upper arm, BP Patient Position: At rest)   Pulse 74   Temp (!) 95.4 °F (35.2 °C)   Resp 14   Ht 5' 11\" (1.803 m)   Wt 81.6 kg (180 lb)   SpO2 96%   BMI 25.10 kg/m²       Intake/Output this shift:  Current Shift: 04/07 0701 - 04/07 1900  In: 1326.3 [I.V.:1326.3]  Out: -   Last 3 Shifts: No intake/output data recorded.     Data Review:   Recent Results (from the past 24 hour(s))   EKG, 12 LEAD, INITIAL    Collection Time: 04/07/22  9:45 AM   Result Value Ref Range    Ventricular Rate 77 BPM    Atrial Rate 277 BPM    QRS Duration 86 ms    Q-T Interval 404 ms    QTC Calculation (Bezet) 457 ms    Calculated R Axis 42 degrees    Calculated T Axis 99 degrees    Diagnosis       Atrial fibrillation with premature ventricular or aberrantly conducted complexes  Low voltage QRS  Lateral infarct , age undetermined  Abnormal ECG  When compared with ECG of 24-FEB-2022 16:24,  Atrial fibrillation has replaced Sinus rhythm  Nonspecific T wave abnormality now evident in Inferior leads  Confirmed by TRAV THOMAS, Marge Mcguire ([id number]) on 4/7/2022 10:50:57 AM     CBC WITH AUTOMATED DIFF    Collection Time: 04/07/22 10:01 AM   Result Value Ref Range    WBC 5.7 4.1 - 11.1 K/uL    RBC 2.83 (L) 4.10 - 5.70 M/uL    HGB 8.0 (L) 12.1 - 17.0 g/dL    HCT 23.8 (L) 36.6 - 50.3 %    MCV 84.1 80.0 - 99.0 FL    MCH 28.3 26.0 - 34.0 PG    MCHC 33.6 30.0 - 36.5 g/dL    RDW 20.4 (H) 11.5 - 14.5 %    PLATELET 386 (L) 468 - 400 K/uL    NRBC 0.0 0.0  WBC    ABSOLUTE NRBC 0.00 0.00 - 0.01 K/uL    NEUTROPHILS 67 32 - 75 %    LYMPHOCYTES 23 12 - 49 %    MONOCYTES 8 5 - 13 %    EOSINOPHILS 1 0 - 7 %    BASOPHILS 1 0 - 1 %    IMMATURE GRANULOCYTES 0 0 - 0.5 %    ABS. NEUTROPHILS 3.9 1.8 - 8.0 K/UL    ABS. LYMPHOCYTES 1.3 0.8 - 3.5 K/UL    ABS. MONOCYTES 0.5 0.0 - 1.0 K/UL    ABS. EOSINOPHILS 0.0 0.0 - 0.4 K/UL    ABS. BASOPHILS 0.0 0.0 - 0.1 K/UL    ABS. IMM. GRANS. 0.0 0.00 - 0.04 K/UL    DF AUTOMATED     METABOLIC PANEL, COMPREHENSIVE    Collection Time: 04/07/22 10:01 AM   Result Value Ref Range    Sodium 138 136 - 145 mmol/L    Potassium Hemolyzed, recollect requested 3.5 - 5.1 mmol/L    Chloride 112 (H) 97 - 108 mmol/L    CO2 17 (L) 21 - 32 mmol/L    Anion gap 9 5 - 15 mmol/L    Glucose 103 (H) 65 - 100 mg/dL    BUN 21 (H) 6 - 20 mg/dL    Creatinine 2.01 (H) 0.70 - 1.30 mg/dL    BUN/Creatinine ratio 10 (L) 12 - 20      GFR est AA 41 (L) >60 ml/min/1.73m2    GFR est non-AA 34 (L) >60 ml/min/1.73m2    Calcium 8.1 (L) 8.5 - 10.1 mg/dL    Bilirubin, total 1.4 (H) 0.2 - 1.0 mg/dL    AST (SGOT) Hemolyzed, recollect requested 15 - 37 U/L    ALT (SGPT) 20 12 - 78 U/L    Alk.  phosphatase 77 45 - 117 U/L    Protein, total 6.8 6.4 - 8.2 g/dL    Albumin 2.5 (L) 3.5 - 5.0 g/dL    Globulin 4.3 (H) 2.0 - 4.0 g/dL    A-G Ratio 0.6 (L) 1.1 - 2.2     LACTIC ACID    Collection Time: 04/07/22 10:01 AM   Result Value Ref Range    Lactic acid 4.0 (HH) 0.4 - 2.0 mmol/L   AMMONIA    Collection Time: 04/07/22 11:06 AM   Result Value Ref Range    Ammonia, plasma 24 <32 umol/L   LACTIC ACID    Collection Time: 04/07/22 12:06 PM   Result Value Ref Range    Lactic acid 3.6 (HH) 0.4 - 2.0 mmol/L   TROPONIN-HIGH SENSITIVITY    Collection Time: 04/07/22 12:06 PM   Result Value Ref Range    Troponin-High Sensitivity 11 0 - 76 ng/L   MRSA SCREEN - PCR (NASAL)    Collection Time: 04/07/22  1:45 PM   Result Value Ref Range    MRSA by PCR, Nasal Not Detected Not Detected     URINALYSIS W/ REFLEX CULTURE    Collection Time: 04/07/22  3:02 PM    Specimen: Urine   Result Value Ref Range    Color Shantelle      Appearance Turbid (A) Clear      Specific gravity 1.021 1.003 - 1.030      pH (UA) 6.0 5.0 - 8.0      Protein 100 (A) Negative mg/dL    Glucose Negative Negative mg/dL    Ketone Negative Negative mg/dL    Bilirubin Negative Negative      Blood Large (A) Negative      Urobilinogen 0.1 0.1 - 1.0 EU/dL    Nitrites Negative Negative      Leukocyte Esterase Trace (A) Negative      WBC >100 (H) 0 - 4 /hpf    RBC >100 (H) 0 - 5 /hpf    Bacteria Negative Negative /hpf    UA:UC IF INDICATED Urine Culture Ordered (A) Culture not indicated by UA result      Hyaline cast 2-5 0 - 5 /lpf       Physical Exam  Vitals reviewed. Constitutional:       Appearance: Normal appearance. Cardiovascular:      Rate and Rhythm: Normal rate and regular rhythm. Pulmonary:      Effort: Pulmonary effort is normal.   Musculoskeletal:         General: Swelling and tenderness present. Comments: Right index finger DIP joint has mild swelling. Sutures in place. There is no drainage no redness no streaking. DIP range of motion is decreased. Neurological:      Mental Status: He is alert.

## 2022-04-07 NOTE — ROUTINE PROCESS
TRANSFER - OUT REPORT:    Verbal report given to Lajuan Galeazzi, RN (name) on [place] Newton  being transferred to ICU (unit) for routine progression of care       Report consisted of patients Situation, Background, Assessment and   Recommendations(SBAR). Information from the following report(s) SBAR, Kardex, ED Summary, [facility] and Recent Results was reviewed with the receiving nurse. Lines:   Peripheral IV 04/07/22 Right Antecubital (Active)        Opportunity for questions and clarification was provided.       Patient transported with:   Monitor  Registered Nurse  Tech, pt chart, personal belongings

## 2022-04-07 NOTE — H&P
HISTORY & PHYSICAL      Patient: Citlali Grande MRN: [id number]  SSN: [id number]    YOB: 1960  Age: 60 y.o. Sex: male      Primary Care Provider: Fredrick Mittal NP  Source of Information: Patient and Documentation      Subjective     CC:   Chief Complaint   Patient presents with    Urinary Catheter Problem       HPI: Citlali Grande is a(n) 60 y.o. male with PMH significant for HTN, cirrhosis, ascites presents via EMS for removing fish catheter. Patient recently d/c after lengthy stay in hospital for septic DIP joint of right index finger. He was d/c on 10 days of PO Zyvox, which he completed. He presents today after pulling out fish catheter. Several blood clots noted. Patient septic upon arrival with hypothermia and hypotension. Temp 93.6 rectal, BP 84/68. Patient is retaining 887cc of urine and nursing staff will replace fish. Patient started on fluids and IV Levaquin in the ED. Patient is altered at baseline. Head CT w/o contrast shows no acute abnormality. Blood cultures pending. CXR and KUB both normal. XR of hand ordered. WBC normal. Hgb 8.0. Creatinine 2.01. Lactic acid 4.0. Ammonia 24      Past Medical History:   Diagnosis Date    Arthritis     Hypertension         Past Surgical History:   Procedure Laterality Date    IR INSERT NON TUNL CVC OVER 5 YRS  3/25/2022    IR PARACENTESIS ABD W IMAGE  1/21/2022    IR PARACENTESIS ABD W IMAGE  3/1/2022    IR PARACENTESIS ABD W IMAGE  3/30/2022       Prior to Admission medications    Medication Sig Start Date End Date Taking? Authorizing Provider   linezolid (ZYVOX) 600 mg tablet Take 1 Tablet by mouth two (2) times a day for 10 days. Check CBC wkly while on linezolid 4/4/22 4/14/22  Guerda Gu MD   HYDROcodone-acetaminophen (NORCO) 5-325 mg per tablet Take 1 Tablet by mouth every four (4) hours as needed for Pain for up to 3 days. Max Daily Amount: 6 Tablets.  4/3/22 4/6/22  Lino Ackerman MD   thiamine mononitrate (B-1) 100 mg tablet Take 1 Tablet by mouth daily. 4/4/22   Lino Ackerman MD   levothyroxine (SYNTHROID) 25 mcg tablet Take 25 mcg by mouth Daily (before breakfast). Provider, Historical   tamsulosin (Flomax) 0.4 mg capsule Take 1 Capsule by mouth daily. 3/4/22   Ryan Whyte PA-C   potassium chloride (K-DUR, KLOR-CON M20) 20 mEq tablet Take 1 Tablet by mouth daily. 3/2/22   Noa Ash MD   sodium bicarbonate 650 mg tablet Take 1 Tablet by mouth three (3) times daily. 3/2/22   Noa Ash MD   levETIRAcetam (Keppra) 1,000 mg tablet Take 1.5 Tablets by mouth two (2) times a day. 3/2/22   Noa Ash MD   ergocalciferol (ERGOCALCIFEROL) 1,250 mcg (50,000 unit) capsule Take 1 Capsule by mouth every seven (7) days. 2/9/22   Provider, Historical   lactulose (CHRONULAC) 10 gram/15 mL solution Take 15 mL by mouth three (3) times daily. 1/23/22   Lino Ackerman MD   allopurinoL (ZYLOPRIM) 300 mg tablet Take 1 Tablet by mouth daily. 1/18/22   Provider, Historical   thiamine HCL (B-1) 100 mg tablet Take 100 mg by mouth daily. Provider, Historical   propranoloL (INDERAL) 20 mg tablet Take 20 mg by mouth two (2) times a day. Provider, Historical   folic acid (FOLVITE) 1 mg tablet Take 1 mg by mouth daily. Provider, Historical   famotidine (PEPCID) 20 mg tablet Take 20 mg by mouth At bedtime. Provider, Historical   aMILoride (MIDAMOR) 5 mg tablet Take 5 mg by mouth daily. Provider, Historical       No Known Allergies     No family history on file. Social History     Socioeconomic History    Marital status:    Tobacco Use    Smoking status: Never Smoker    Smokeless tobacco: Never Used        Review of Systems   Reason unable to perform ROS: chronic condition.          Objective     Visit Vitals  BP (!) 85/68 (BP 1 Location: Left upper arm, BP Patient Position: At rest)   Pulse 65   Temp (!) 93.6 °F (34.2 °C)   Resp 14   Ht 5' 11\" (1.809 m)   Wt 81.6 kg (180 lb)   SpO2 98%   BMI 25.10 kg/m²      O2 Device: None (Room air)    Physical Exam:   Physical Exam  Constitutional:       General: He is not in acute distress. HENT:      Head: Normocephalic and atraumatic. Cardiovascular:      Rate and Rhythm: Normal rate and regular rhythm. Pulses: Normal pulses. Heart sounds: Normal heart sounds. Pulmonary:      Effort: Pulmonary effort is normal.      Breath sounds: Normal breath sounds. Abdominal:      General: There is distension. Musculoskeletal:      Comments: Swelling to right 2nd finger, healing s/p I&D, one suture in place in nail bed   Skin:     General: Skin is warm and dry. Neurological:      Mental Status: He is alert. Mental status is at baseline. Intake & Output:  Current Shift: 04/07 0701 - 04/07 1900  In: 1150 [I.V.:1150]  Out: -   Last three shifts: No intake/output data recorded. Lab/Data Review: All lab results for the last 24 hours reviewed.        24 Hour Results:    Recent Results (from the past 24 hour(s))   EKG, 12 LEAD, INITIAL    Collection Time: 04/07/22  9:45 AM   Result Value Ref Range    Ventricular Rate 77 BPM    Atrial Rate 277 BPM    QRS Duration 86 ms    Q-T Interval 404 ms    QTC Calculation (Bezet) 457 ms    Calculated R Axis 42 degrees    Calculated T Axis 99 degrees    Diagnosis       Atrial fibrillation with premature ventricular or aberrantly conducted   complexes  Low voltage QRS  Lateral infarct , age undetermined  Abnormal ECG  When compared with ECG of 24-FEB-2022 16:24,  Atrial fibrillation has replaced Sinus rhythm  Nonspecific T wave abnormality now evident in Inferior leads  Confirmed by TRAV THOMAS, [place] ([id number]) on 4/7/2022 10:50:57 AM     CBC WITH AUTOMATED DIFF    Collection Time: 04/07/22 10:01 AM   Result Value Ref Range    WBC 5.7 4.1 - 11.1 K/uL    RBC 2.83 (L) 4.10 - 5.70 M/uL    HGB 8.0 (L) 12.1 - 17.0 g/dL    HCT 23.8 (L) 36.6 - 50.3 %    MCV 84.1 80.0 - 99.0 FL    MCH 28.3 26.0 - 34.0 PG    MCHC 33.6 30.0 - 36.5 g/dL    RDW 20.4 (H) 11.5 - 14.5 %    PLATELET 114 (L) 287 - 400 K/uL    NRBC 0.0 0.0  WBC    ABSOLUTE NRBC 0.00 0.00 - 0.01 K/uL    NEUTROPHILS 67 32 - 75 %    LYMPHOCYTES 23 12 - 49 %    MONOCYTES 8 5 - 13 %    EOSINOPHILS 1 0 - 7 %    BASOPHILS 1 0 - 1 %    IMMATURE GRANULOCYTES 0 0 - 0.5 %    ABS. NEUTROPHILS 3.9 1.8 - 8.0 K/UL    ABS. LYMPHOCYTES 1.3 0.8 - 3.5 K/UL    ABS. MONOCYTES 0.5 0.0 - 1.0 K/UL    ABS. EOSINOPHILS 0.0 0.0 - 0.4 K/UL    ABS. BASOPHILS 0.0 0.0 - 0.1 K/UL    ABS. IMM. GRANS. 0.0 0.00 - 0.04 K/UL    DF AUTOMATED     METABOLIC PANEL, COMPREHENSIVE    Collection Time: 04/07/22 10:01 AM   Result Value Ref Range    Sodium 138 136 - 145 mmol/L    Potassium Hemolyzed, recollect requested 3.5 - 5.1 mmol/L    Chloride 112 (H) 97 - 108 mmol/L    CO2 17 (L) 21 - 32 mmol/L    Anion gap 9 5 - 15 mmol/L    Glucose 103 (H) 65 - 100 mg/dL    BUN 21 (H) 6 - 20 mg/dL    Creatinine 2.01 (H) 0.70 - 1.30 mg/dL    BUN/Creatinine ratio 10 (L) 12 - 20      GFR est AA 41 (L) >60 ml/min/1.73m2    GFR est non-AA 34 (L) >60 ml/min/1.73m2    Calcium 8.1 (L) 8.5 - 10.1 mg/dL    Bilirubin, total 1.4 (H) 0.2 - 1.0 mg/dL    AST (SGOT) Hemolyzed, recollect requested 15 - 37 U/L    ALT (SGPT) 20 12 - 78 U/L    Alk.  phosphatase 77 45 - 117 U/L    Protein, total 6.8 6.4 - 8.2 g/dL    Albumin 2.5 (L) 3.5 - 5.0 g/dL    Globulin 4.3 (H) 2.0 - 4.0 g/dL    A-G Ratio 0.6 (L) 1.1 - 2.2     LACTIC ACID    Collection Time: 04/07/22 10:01 AM   Result Value Ref Range    Lactic acid 4.0 (HH) 0.4 - 2.0 mmol/L   AMMONIA    Collection Time: 04/07/22 11:06 AM   Result Value Ref Range    Ammonia, plasma 24 <32 umol/L   LACTIC ACID    Collection Time: 04/07/22 12:06 PM   Result Value Ref Range    Lactic acid 3.6 (HH) 0.4 - 2.0 mmol/L   TROPONIN-HIGH SENSITIVITY    Collection Time: 04/07/22 12:06 PM   Result Value Ref Range    Troponin-High Sensitivity 11 0 - 76 ng/L         Imaging:    XR HAND RT MIN 3 V   Final Result      CT HEAD WO CONT   Final Result   Age-appropriate atrophy. No acute findings. XR ABD (KUB)   Final Result   Within normal      XR CHEST PORT   Final Result   Findings/impression:   1. Low lung volumes. Bibasilar hypoventilatory change/atelectasis. Left   retrocardiac lung incompletely evaluated, cannot exclude underlying airspace   disease. 2.  Cardiac and mediastinal contours are obscured by low lung volumes. Ectatic/possible aneurysmal appearance transverse aorta with calcific   atherosclerotic plaque. Appears stable from prior radiograph. 3.  No pleural fluid. No pneumothorax. 4.  Prominent gas-filled bowel partially visualized in the left upper quadrant.                   Assessment     Sepsis  Lactic acidosis  Acute on chronic renal failure- creat 2.01  Cirrhosis with ascites    History of septic DIP joint, right index finger  Hypothyroidism  Seizure disorder  Gout  GERD  History of etoh abuse  UDS positive for THC and benzodiazepines    Plan     Admit to ICU  Warming blanket  Consult neuro for AMS  CT head w/o contrast    Consult ID  Consult orthopedics for healing finger wound s/p I&D  Blood cultures pending  UA with urine culture  Replace fish catheter  Post-void bladder scan  XR right hand      Current Facility-Administered Medications:     sodium chloride (NS) flush 5-10 mL, 5-10 mL, IntraVENous, PRN, Lamberto Ackerman MD    piperacillin-tazobactam (ZOSYN) 3.375 g in 0.9% sodium chloride (MBP/ADV) 100 mL MBP, 3.375 g, IntraVENous, Q8H, Lamberto Ackerman MD, Last Rate: 25 mL/hr at 04/07/22 1010, 3.375 g at 04/07/22 1010    levoFLOXacin (LEVAQUIN) 750 mg in D5W IVPB, 750 mg, IntraVENous, Q24H, Lamberto Ackerman MD, IV Completed at 04/07/22 1246    [COMPLETED] sodium chloride 0.9 % bolus infusion 1,000 mL, 1,000 mL, IntraVENous, ONCE, IV Completed at 04/07/22 1230 **FOLLOWED BY** [COMPLETED] sodium chloride 0.9 % bolus infusion 1,000 mL, 1,000 mL, IntraVENous, ONCE, 1,000 mL at 04/07/22 1240 **FOLLOWED BY** sodium chloride 0.9 % bolus infusion 448 mL, 448 mL, IntraVENous, ONCE, Lamberto Ackerman MD    lactulose (CHRONULAC) 10 gram/15 mL solution 15 mL, 10 g, Oral, TID, Amanda Ackerman MD    levETIRAcetam (KEPPRA) tablet 1,500 mg, 1,500 mg, Oral, BID, Amanda Ackerman MD    sodium bicarbonate tablet 650 mg, 650 mg, Oral, TID, Amanda Ackerman MD [facility]  [START ON 4/8/2022] tamsulosin (FLOMAX) capsule 0.4 mg, 0.4 mg, Oral, DAILY, Amanda Ackerman MD [facility]  [START ON 4/8/2022] thiamine mononitrate (B-1) tablet 100 mg, 100 mg, Oral, DAILY, Amanda Ackerman MD [facility]  [START ON 4/8/2022] allopurinoL (ZYLOPRIM) tablet 300 mg, 300 mg, Oral, DAILY, Amanda Ackerman MD    ergocalciferol capsule 50,000 Units, 50,000 Units, Oral, Q7D, Amanda Ackerman MD    famotidine (PEPCID) tablet 20 mg, 20 mg, Oral, BID, Amanda Ackerman MD [facility]  [START ON 1/7/8987] folic acid (FOLVITE) tablet 1 mg, 1 mg, Oral, DAILY, Amanda Ackerman MD [facility]  [START ON 4/8/2022] levothyroxine (SYNTHROID) tablet 25 mcg, 25 mcg, Oral, ACB, Amanda Ackerman MD    propranoloL (INDERAL) tablet 20 mg, 20 mg, Oral, BID, Amanda Ackerman MD [facility]  [START ON 4/8/2022] thiamine mononitrate (B-1) tablet 100 mg, 100 mg, Oral, DAILY, Amanda Ackerman MD    0.9% sodium chloride infusion, 75 mL/hr, IntraVENous, CONTINUOUS, Amanda Ackerman MD    acetaminophen (TYLENOL) tablet 650 mg, 650 mg, Oral, Q6H PRN **OR** acetaminophen (TYLENOL) suppository 650 mg, 650 mg, Rectal, Q6H PRN, Amanda Ackerman MD    polyethylene glycol (MIRALAX) packet 17 g, 17 g, Oral, DAILY PRN, Amanda Ackerman MD    ondansetron (ZOFRAN ODT) tablet 4 mg, 4 mg, Oral, Q8H PRN **OR** ondansetron (ZOFRAN) injection 4 mg, 4 mg, IntraVENous, Q6H PRN, Lamberto Ackerman MD    heparin (porcine) injection 5,000 Units, 5,000 Units, SubCUTAneous, Q8H, Lamberto Ackerman MD    Consult nephrology- acute on chronic renal failure    Levaquin 750mg IV  Zosyn 3.375g Q8H        Current Facility-Administered Medications:     sodium chloride (NS) flush 5-10 mL, 5-10 mL, IntraVENous, PRN, Gloria Ackerman MD    piperacillin-tazobactam (ZOSYN) 3.375 g in 0.9% sodium chloride (MBP/ADV) 100 mL MBP, 3.375 g, IntraVENous, Q8H, Lamberto Ackerman MD, Last Rate: 25 mL/hr at 04/07/22 1010, 3.375 g at 04/07/22 1010    levoFLOXacin (LEVAQUIN) 750 mg in D5W IVPB, 750 mg, IntraVENous, Q24H, Lamberto Ackerman MD, IV Completed at 04/07/22 1246    [COMPLETED] sodium chloride 0.9 % bolus infusion 1,000 mL, 1,000 mL, IntraVENous, ONCE, IV Completed at 04/07/22 1230 **FOLLOWED BY** [COMPLETED] sodium chloride 0.9 % bolus infusion 1,000 mL, 1,000 mL, IntraVENous, ONCE, 1,000 mL at 04/07/22 1240 **FOLLOWED BY** sodium chloride 0.9 % bolus infusion 448 mL, 448 mL, IntraVENous, ONCE, Lamberto Ackerman MD    lactulose (CHRONULAC) 10 gram/15 mL solution 15 mL, 10 g, Oral, TID, Gloria Ackerman MD    levETIRAcetam (KEPPRA) tablet 1,500 mg, 1,500 mg, Oral, BID, Gloria Ackerman MD    sodium bicarbonate tablet 650 mg, 650 mg, Oral, TID, Gloria Ackerman MD [facility]  [START ON 4/8/2022] tamsulosin (FLOMAX) capsule 0.4 mg, 0.4 mg, Oral, DAILY, Gloria Ackerman MD [facility]  [START ON 4/8/2022] thiamine mononitrate (B-1) tablet 100 mg, 100 mg, Oral, DAILY, Gloria Ackerman MD [facility]  [START ON 4/8/2022] allopurinoL (ZYLOPRIM) tablet 300 mg, 300 mg, Oral, DAILY, Gloria Ackerman MD    ergocalciferol capsule 50,000 Units, 50,000 Units, Oral, Q7D, Gloria Ackerman MD    famotidine (PEPCID) tablet 20 mg, 20 mg, Oral, BID, Gloria Ackerman MD [facility]  [START ON 6/0/3458] folic acid (FOLVITE) tablet 1 mg, 1 mg, Oral, DAILY, Gloria Ackerman MD [facility]  [START ON 4/8/2022] levothyroxine (SYNTHROID) tablet 25 mcg, 25 mcg, Oral, ACB, Lamberto Ackerman MD    propranoloL (INDERAL) tablet 20 mg, 20 mg, Oral, BID, Lamberto Ackerman MD [facility]  [START ON 4/8/2022] thiamine mononitrate (B-1) tablet 100 mg, 100 mg, Oral, Everett Kothari MD    0.9% sodium chloride infusion, 75 mL/hr, IntraVENous, CONTINUOUS, Reji Ackerman MD    acetaminophen (TYLENOL) tablet 650 mg, 650 mg, Oral, Q6H PRN **OR** acetaminophen (TYLENOL) suppository 650 mg, 650 mg, Rectal, Q6H PRN, Reji Ackerman MD    polyethylene glycol (MIRALAX) packet 17 g, 17 g, Oral, DAILY PRN, Reji Ackerman MD    ondansetron (ZOFRAN ODT) tablet 4 mg, 4 mg, Oral, Q8H PRN **OR** ondansetron (ZOFRAN) injection 4 mg, 4 mg, IntraVENous, Q6H PRN, Lamberto Ackerman MD    heparin (porcine) injection 5,000 Units, 5,000 Units, SubCUTAneous, Q8H, Lamberto Ackerman MD      Signed By: Chance Rm MD     April 7, 2022

## 2022-04-07 NOTE — H&P
HISTORY & PHYSICAL      Patient: Katherine Starks MRN: [id number]  SSN: [id number]    YOB: 1960  Age: 60 y.o. Sex: male      Primary Care Provider: Kaity Hemphill NP  Source of Information: Patient and Documentation      Subjective     CC:   Chief Complaint   Patient presents with    Urinary Catheter Problem       HPI: Katherine Patient is a(n) 60 y.o. male with PMH significant for HTN, cirrhosis, ascites presents via EMS for removing fish catheter. Patient recently d/c after lengthy stay in hospital for septic DIP joint of right index finger. He was d/c on 10 days of PO Zyvox, which he completed. He presents today after pulling out fish catheter. Several blood clots noted. Patient septic upon arrival with hypothermia and hypotension. Temp 93.6 rectal, BP 84/68. Patient is retaining 887cc of urine and nursing staff will replace fish. Patient started on fluids and IV Levaquin in the ED. Patient is altered at baseline. Head CT w/o contrast shows no acute abnormality. Blood cultures pending. CXR and KUB both normal. XR of hand ordered. WBC normal. Hgb 8.0. Creatinine 2.01. Lactic acid 4.0. Ammonia 24      Past Medical History:   Diagnosis Date    Arthritis     Hypertension         Past Surgical History:   Procedure Laterality Date    IR INSERT NON TUNL CVC OVER 5 YRS  3/25/2022    IR PARACENTESIS ABD W IMAGE  1/21/2022    IR PARACENTESIS ABD W IMAGE  3/1/2022    IR PARACENTESIS ABD W IMAGE  3/30/2022       Prior to Admission medications    Medication Sig Start Date End Date Taking? Authorizing Provider   linezolid (ZYVOX) 600 mg tablet Take 1 Tablet by mouth two (2) times a day for 10 days. Check CBC wkly while on linezolid 4/4/22 4/14/22  Elex Krabbe, MD   HYDROcodone-acetaminophen (NORCO) 5-325 mg per tablet Take 1 Tablet by mouth every four (4) hours as needed for Pain for up to 3 days. Max Daily Amount: 6 Tablets.  4/3/22 4/6/22  Alex Ackerman MD   thiamine mononitrate (B-1) 100 mg tablet Take 1 Tablet by mouth daily. 4/4/22   Valorie Ackerman MD   levothyroxine (SYNTHROID) 25 mcg tablet Take 25 mcg by mouth Daily (before breakfast). Provider, Historical   tamsulosin (Flomax) 0.4 mg capsule Take 1 Capsule by mouth daily. 3/4/22   Joseph Whyte PA-C   potassium chloride (K-DUR, KLOR-CON M20) 20 mEq tablet Take 1 Tablet by mouth daily. 3/2/22   Melanie Torres MD   sodium bicarbonate 650 mg tablet Take 1 Tablet by mouth three (3) times daily. 3/2/22   Melanie Torres MD   levETIRAcetam (Keppra) 1,000 mg tablet Take 1.5 Tablets by mouth two (2) times a day. 3/2/22   Melanie Torres MD   ergocalciferol (ERGOCALCIFEROL) 1,250 mcg (50,000 unit) capsule Take 1 Capsule by mouth every seven (7) days. 2/9/22   Provider, Historical   lactulose (CHRONULAC) 10 gram/15 mL solution Take 15 mL by mouth three (3) times daily. 1/23/22   Valorie Ackerman MD   allopurinoL (ZYLOPRIM) 300 mg tablet Take 1 Tablet by mouth daily. 1/18/22   Provider, Historical   thiamine HCL (B-1) 100 mg tablet Take 100 mg by mouth daily. Provider, Historical   propranoloL (INDERAL) 20 mg tablet Take 20 mg by mouth two (2) times a day. Provider, Historical   folic acid (FOLVITE) 1 mg tablet Take 1 mg by mouth daily. Provider, Historical   famotidine (PEPCID) 20 mg tablet Take 20 mg by mouth At bedtime. Provider, Historical   aMILoride (MIDAMOR) 5 mg tablet Take 5 mg by mouth daily. Provider, Historical       No Known Allergies     No family history on file. Social History     Socioeconomic History    Marital status:    Tobacco Use    Smoking status: Never Smoker    Smokeless tobacco: Never Used        Review of Systems   Reason unable to perform ROS: chronic condition.          Objective     Visit Vitals  BP 97/73   Pulse 61   Temp (!) 93.6 °F (34.2 °C)   Resp 18   Ht 5' 11\" (1.803 m)   Wt 180 lb (81.6 kg)   SpO2 99%   BMI 25.10 kg/m²      O2 Device: None (Room air)    Physical Exam:   Physical Exam  Constitutional:       General: He is not in acute distress. HENT:      Head: Normocephalic and atraumatic. Cardiovascular:      Rate and Rhythm: Normal rate and regular rhythm. Pulses: Normal pulses. Heart sounds: Normal heart sounds. Pulmonary:      Effort: Pulmonary effort is normal.      Breath sounds: Normal breath sounds. Abdominal:      General: There is distension. Musculoskeletal:      Comments: Swelling to right 2nd finger, healing s/p I&D, one suture in place in nail bed   Skin:     General: Skin is warm and dry. Neurological:      Mental Status: He is alert. Mental status is at baseline. Intake & Output:  Current Shift: No intake/output data recorded. Last three shifts: No intake/output data recorded. Lab/Data Review: All lab results for the last 24 hours reviewed.        24 Hour Results:    Recent Results (from the past 24 hour(s))   EKG, 12 LEAD, INITIAL    Collection Time: 04/07/22  9:45 AM   Result Value Ref Range    Ventricular Rate 77 BPM    Atrial Rate 277 BPM    QRS Duration 86 ms    Q-T Interval 404 ms    QTC Calculation (Bezet) 457 ms    Calculated R Axis 42 degrees    Calculated T Axis 99 degrees    Diagnosis       Atrial fibrillation with premature ventricular or aberrantly conducted   complexes  Low voltage QRS  Lateral infarct , age undetermined  Abnormal ECG  When compared with ECG of 24-FEB-2022 16:24,  Atrial fibrillation has replaced Sinus rhythm  Nonspecific T wave abnormality now evident in Inferior leads  Confirmed by TRAV THOMAS [place] ([id number]) on 4/7/2022 10:50:57 AM     CBC WITH AUTOMATED DIFF    Collection Time: 04/07/22 10:01 AM   Result Value Ref Range    WBC 5.7 4.1 - 11.1 K/uL    RBC 2.83 (L) 4.10 - 5.70 M/uL    HGB 8.0 (L) 12.1 - 17.0 g/dL    HCT 23.8 (L) 36.6 - 50.3 %    MCV 84.1 80.0 - 99.0 FL    MCH 28.3 26.0 - 34.0 PG    MCHC 33.6 30.0 - 36.5 g/dL    RDW 20.4 (H) 11.5 - 14.5 % PLATELET 130 (L) 744 - 400 K/uL    NRBC 0.0 0.0  WBC    ABSOLUTE NRBC 0.00 0.00 - 0.01 K/uL    NEUTROPHILS 67 32 - 75 %    LYMPHOCYTES 23 12 - 49 %    MONOCYTES 8 5 - 13 %    EOSINOPHILS 1 0 - 7 %    BASOPHILS 1 0 - 1 %    IMMATURE GRANULOCYTES 0 0 - 0.5 %    ABS. NEUTROPHILS 3.9 1.8 - 8.0 K/UL    ABS. LYMPHOCYTES 1.3 0.8 - 3.5 K/UL    ABS. MONOCYTES 0.5 0.0 - 1.0 K/UL    ABS. EOSINOPHILS 0.0 0.0 - 0.4 K/UL    ABS. BASOPHILS 0.0 0.0 - 0.1 K/UL    ABS. IMM. GRANS. 0.0 0.00 - 0.04 K/UL    DF AUTOMATED     METABOLIC PANEL, COMPREHENSIVE    Collection Time: 04/07/22 10:01 AM   Result Value Ref Range    Sodium 138 136 - 145 mmol/L    Potassium Hemolyzed, recollect requested 3.5 - 5.1 mmol/L    Chloride 112 (H) 97 - 108 mmol/L    CO2 17 (L) 21 - 32 mmol/L    Anion gap 9 5 - 15 mmol/L    Glucose 103 (H) 65 - 100 mg/dL    BUN 21 (H) 6 - 20 mg/dL    Creatinine 2.01 (H) 0.70 - 1.30 mg/dL    BUN/Creatinine ratio 10 (L) 12 - 20      GFR est AA 41 (L) >60 ml/min/1.73m2    GFR est non-AA 34 (L) >60 ml/min/1.73m2    Calcium 8.1 (L) 8.5 - 10.1 mg/dL    Bilirubin, total 1.4 (H) 0.2 - 1.0 mg/dL    AST (SGOT) Hemolyzed, recollect requested 15 - 37 U/L    ALT (SGPT) 20 12 - 78 U/L    Alk. phosphatase 77 45 - 117 U/L    Protein, total 6.8 6.4 - 8.2 g/dL    Albumin 2.5 (L) 3.5 - 5.0 g/dL    Globulin 4.3 (H) 2.0 - 4.0 g/dL    A-G Ratio 0.6 (L) 1.1 - 2.2     LACTIC ACID    Collection Time: 04/07/22 10:01 AM   Result Value Ref Range    Lactic acid 4.0 (HH) 0.4 - 2.0 mmol/L         Imaging:    CT HEAD WO CONT   Final Result   Age-appropriate atrophy. No acute findings. XR ABD (KUB)   Final Result   Within normal      XR CHEST PORT   Final Result   Findings/impression:   1. Low lung volumes. Bibasilar hypoventilatory change/atelectasis. Left   retrocardiac lung incompletely evaluated, cannot exclude underlying airspace   disease. 2.  Cardiac and mediastinal contours are obscured by low lung volumes.    Ectatic/possible aneurysmal appearance transverse aorta with calcific   atherosclerotic plaque. Appears stable from prior radiograph. 3.  No pleural fluid. No pneumothorax. 4.  Prominent gas-filled bowel partially visualized in the left upper quadrant. XR HAND RT MIN 3 V    (Results Pending)            Assessment     Sepsis  Lactic acidosis  Acute on chronic renal failure- creat 2.01  Cirrhosis with ascites    History of septic DIP joint, right index finger  Hypothyroidism  Seizure disorder  Gout  GERD  History of etoh abuse  UDS positive for THC and benzodiazepines    Plan     Admit to ICU  Warming blanket  Consult neuro for AMS  CT head w/o contrast    Consult ID  Consult orthopedics for healing finger wound s/p I&D  Blood cultures pending  UA with urine culture  Replace fish catheter  Post-void bladder scan  XR right hand    Consult nephrology- acute on chronic renal failure    Levaquin 750mg IV  Zosyn 3.375g Q8H        Current Facility-Administered Medications:     sodium chloride (NS) flush 5-10 mL, 5-10 mL, IntraVENous, PRN, Satinder Durham, NP    piperacillin-tazobactam (ZOSYN) 3.375 g in 0.9% sodium chloride (MBP/ADV) 100 mL MBP, 3.375 g, IntraVENous, Q8H, Satinder Durham, NP, Last Rate: 25 mL/hr at 04/07/22 1010, 3.375 g at 04/07/22 1010    levoFLOXacin (LEVAQUIN) 750 mg in D5W IVPB, 750 mg, IntraVENous, Q24H, Satinder Durham, NP, Last Rate: 100 mL/hr at 04/07/22 1116, 750 mg at 04/07/22 1116    [COMPLETED] sodium chloride 0.9 % bolus infusion 1,000 mL, 1,000 mL, IntraVENous, ONCE, 1,000 mL at 04/07/22 1010 **FOLLOWED BY** sodium chloride 0.9 % bolus infusion 1,000 mL, 1,000 mL, IntraVENous, ONCE **FOLLOWED BY** sodium chloride 0.9 % bolus infusion 448 mL, 448 mL, IntraVENous, ONCE, Satinder Durham, NP    Current Outpatient Medications:     linezolid (ZYVOX) 600 mg tablet, Take 1 Tablet by mouth two (2) times a day for 10 days.  Check CBC wkly while on linezolid, Disp: 20 Tablet, Rfl: 0   thiamine mononitrate (B-1) 100 mg tablet, Take 1 Tablet by mouth daily. , Disp: 30 Tablet, Rfl: 0    levothyroxine (SYNTHROID) 25 mcg tablet, Take 25 mcg by mouth Daily (before breakfast). , Disp: , Rfl:     tamsulosin (Flomax) 0.4 mg capsule, Take 1 Capsule by mouth daily. , Disp: 30 Capsule, Rfl: 1    potassium chloride (K-DUR, KLOR-CON M20) 20 mEq tablet, Take 1 Tablet by mouth daily. , Disp: 30 Tablet, Rfl: 0    sodium bicarbonate 650 mg tablet, Take 1 Tablet by mouth three (3) times daily. , Disp: 90 Tablet, Rfl: 0    levETIRAcetam (Keppra) 1,000 mg tablet, Take 1.5 Tablets by mouth two (2) times a day., Disp: 60 Tablet, Rfl: 0    ergocalciferol (ERGOCALCIFEROL) 1,250 mcg (50,000 unit) capsule, Take 1 Capsule by mouth every seven (7) days. , Disp: , Rfl:     lactulose (CHRONULAC) 10 gram/15 mL solution, Take 15 mL by mouth three (3) times daily. , Disp: 200 mL, Rfl: 0    allopurinoL (ZYLOPRIM) 300 mg tablet, Take 1 Tablet by mouth daily. , Disp: , Rfl:     thiamine HCL (B-1) 100 mg tablet, Take 100 mg by mouth daily. , Disp: , Rfl:     propranoloL (INDERAL) 20 mg tablet, Take 20 mg by mouth two (2) times a day., Disp: , Rfl:     folic acid (FOLVITE) 1 mg tablet, Take 1 mg by mouth daily. , Disp: , Rfl:     famotidine (PEPCID) 20 mg tablet, Take 20 mg by mouth At bedtime. , Disp: , Rfl:     aMILoride (MIDAMOR) 5 mg tablet, Take 5 mg by mouth daily. , Disp: , Rfl:       Signed ByEric Taylor     April 7, 2022

## 2022-04-07 NOTE — CONSULTS
Infectious Disease Consult Note    Reason for Consult: Right index finger osteomyelitis   Date of Consultation: April 7, 2022  Date of Admission: 4/7/2022  Referring Physician: Dr Ivet Hardy       HPI: 62-y.o BM intermittent wheezing admission to [facility] during which i was asked to see for right index finger/deformity/osteomyelitis. His medical history significant for alcohol associated liver cirrhosis, hypothyroidism, seizure d/o, gouty arthritis, baseline confusion, and HTN. He underwent debridement of his right index finger on 3/25 by orthopedics. Epidermidis was isolated from superficial and intraoperative cultures. He received 12 days of IV vancomycin while hospitalized and was discharged on a 2-week course of linezolid. He was seen in the ED last night w c/o urinary retention, bladder scan revealed 900 cc of urine, Gerard was inserted and he was sent back to nursing home. He returned to the ED today after pulling out Gerard catheter. Assessment the ED showed he was hypothermic, hypotensive, maintain O2 sats on RA. Lab work shows a normal WBC of 5.7, lactic acid 4.0, creatinine 2.01, and ammonia 24. [place] Pih of his right hand today revealed Pronounced bone destruction centered at and beyond the second DIP joint. Admission CXR is neg for focal infiltrates. He is on Zosyn and levaquin. Review of Systems: Unobtainable.  Confused       Past Medical History:  Past Medical History:   Diagnosis Date    Arthritis     Hypertension        Past surgical history   Past Surgical History:   Procedure Laterality Date    IR INSERT NON TUNL CVC OVER 5 YRS  3/25/2022    IR PARACENTESIS ABD W IMAGE  1/21/2022    IR PARACENTESIS ABD W IMAGE  3/1/2022    IR PARACENTESIS ABD W IMAGE  3/30/2022        Social History   Social History     Tobacco Use    Smoking status: Never Smoker    Smokeless tobacco: Never Used   Substance Use Topics    Alcohol use: Not on file    Drug use: Not on file        Family history   No family history on file. Allergies:  No Known Allergies      Medications:  No current facility-administered medications on file prior to encounter. Current Outpatient Medications on File Prior to Encounter   Medication Sig Dispense Refill    linezolid (ZYVOX) 600 mg tablet Take 1 Tablet by mouth two (2) times a day for 10 days. Check CBC wkly while on linezolid 20 Tablet 0    [] HYDROcodone-acetaminophen (NORCO) 5-325 mg per tablet Take 1 Tablet by mouth every four (4) hours as needed for Pain for up to 3 days. Max Daily Amount: 6 Tablets. 10 Tablet 0    thiamine mononitrate (B-1) 100 mg tablet Take 1 Tablet by mouth daily. 30 Tablet 0    levothyroxine (SYNTHROID) 25 mcg tablet Take 25 mcg by mouth Daily (before breakfast).  tamsulosin (Flomax) 0.4 mg capsule Take 1 Capsule by mouth daily. 30 Capsule 1    potassium chloride (K-DUR, KLOR-CON M20) 20 mEq tablet Take 1 Tablet by mouth daily. 30 Tablet 0    sodium bicarbonate 650 mg tablet Take 1 Tablet by mouth three (3) times daily. 90 Tablet 0    levETIRAcetam (Keppra) 1,000 mg tablet Take 1.5 Tablets by mouth two (2) times a day. 60 Tablet 0    ergocalciferol (ERGOCALCIFEROL) 1,250 mcg (50,000 unit) capsule Take 1 Capsule by mouth every seven (7) days.  lactulose (CHRONULAC) 10 gram/15 mL solution Take 15 mL by mouth three (3) times daily. 200 mL 0    allopurinoL (ZYLOPRIM) 300 mg tablet Take 1 Tablet by mouth daily.  thiamine HCL (B-1) 100 mg tablet Take 100 mg by mouth daily.  propranoloL (INDERAL) 20 mg tablet Take 20 mg by mouth two (2) times a day.  folic acid (FOLVITE) 1 mg tablet Take 1 mg by mouth daily.  famotidine (PEPCID) 20 mg tablet Take 20 mg by mouth At bedtime.  aMILoride (MIDAMOR) 5 mg tablet Take 5 mg by mouth daily.              Physical Exam:    Vitals:   Patient Vitals for the past 24 hrs:   Temp Pulse Resp BP SpO2   22 1500 (!) 94.5 °F (34.7 °C) 64 13 99/80 98 %   22 1401 (!) 94.3 °F (34.6 °C) (!) 59 12 107/87 99 %   04/07/22 1346 (!) 94.5 °F (34.7 °C) 67 12 107/87 100 %   04/07/22 1303 -- 65 14 (!) 85/68 98 %   04/07/22 1241 -- 64 -- (!) 79/61 --   04/07/22 1125 (!) 93.6 °F (34.2 °C) -- -- 97/73 --   04/07/22 1118 -- 61 18 100/85 99 %   04/07/22 1020 -- -- -- -- 100 %   04/07/22 0912 (!) 94.3 °F (34.6 °C) 71 18 (!) 84/68 100 %   ·   · GEN: NAD, confused, but answers questions   · HEENT: NCAT, PERRLA  · HEART: S1, S2+, RRR, No murmur   · Lungs: CTA B/l, no wheeze/rhonchi   · Abdomen: soft, ND, NT, +BS   · Genitourinary: no genital discharge, no fish  · Extremities: Right index finger swelling, hyperpigmented skin changes   · Skin: Skin break down on sacrum       Labs:   Recent Labs     04/07/22  1001 04/05/22  2315 04/04/22  2201   WBC 5.7 5.7 5.2   HGB 8.0* 9.1* 8.2*   HCT 23.8* 27.4* 24.4*   * 99* 77*     Recent Labs     04/07/22  1001 04/06/22  0916 04/05/22  2315   BUN 21*  --  18   CREA 2.01* 1.74* 1.77*       Lab Results   Component Value Date/Time    C-Reactive protein 1.59 (H) 03/26/2022 12:45 PM         Microbiology Data:  - Blood Cx 04/07: Pending     Imaging:   CXR 04/07: Low lung volumes. Bibasilar hypoventilatory change/atelectasis. Left  retrocardiac lung incompletely evaluated, cannot exclude underlying airspace  Disease. Right hand X-ray 04/07:   Pronounced bone destruction centered at and beyond the second DIP joint, new  since debridement of 3 weeks ago. No gas formation in the adjacent soft tissues    Assessment / Plan:     1. Right index finger osteomyelitis, underlying gouty arthritis       S/p debridement w isolation of Staph epidermidis from Cxs      S/p 2 wks of antibiotics for coag neg staph coverage, Vanc and linezolid       Will resume Vanc while hospitalized      Routine labs in the morning     2.  SIRS/suspected sepsis, suspected UTI, Abnormal UA on 04/05      Hypothermic, hypotensive, normal WBC on routine labs, lactic acid 4.0      Blood Cx done in the ED is pending       Continue on Zosyn, vanc added. Routine labs in the morning     3. Urinary retention: + indwelling fish cath, pulled out fish prior to admit     4. AMS, h/o metabolic/hepatic encephalopathy, confused during my assessment     5.  Alcohol associated liver cirrhosis       Teresa Rico MD     4/7/2022

## 2022-04-07 NOTE — PROGRESS NOTES
CM went into meet with Pt F/F, Pt was not able to stay awake to answer CM questions. CM attempted to call Pt wife, [facility], not able to leave a VM because her mail box was full.

## 2022-04-07 NOTE — ED PROVIDER NOTES
EMERGENCY DEPARTMENT HISTORY AND PHYSICAL EXAM      Date: 2022  Patient Name: China Mims    History of Presenting Illness     Chief Complaint   Patient presents with    Urinary Catheter Problem       History Provided By: EMS and Nursing Home/SNF/Rehab Center    HPI: China Mims, 60 y.o. male with a past medical history significant hypertension, osteoarthritis and Neuropathy, hepatic encephalopathy, hypothermia, recent sepsis, recent procedure on right forefinger. presents to the ED with cc of patient pulled out his own Gerard catheter at his skilled nursing facility with no other information given and no ability to obtain information from the patient as he is altered at his baseline. PCP: Stephanie Krishnan NP    No current facility-administered medications on file prior to encounter. Current Outpatient Medications on File Prior to Encounter   Medication Sig Dispense Refill    linezolid (ZYVOX) 600 mg tablet Take 1 Tablet by mouth two (2) times a day for 10 days. Check CBC wkly while on linezolid 20 Tablet 0    [] HYDROcodone-acetaminophen (NORCO) 5-325 mg per tablet Take 1 Tablet by mouth every four (4) hours as needed for Pain for up to 3 days. Max Daily Amount: 6 Tablets. 10 Tablet 0    thiamine mononitrate (B-1) 100 mg tablet Take 1 Tablet by mouth daily. 30 Tablet 0    levothyroxine (SYNTHROID) 25 mcg tablet Take 25 mcg by mouth Daily (before breakfast).  tamsulosin (Flomax) 0.4 mg capsule Take 1 Capsule by mouth daily. 30 Capsule 1    potassium chloride (K-DUR, KLOR-CON M20) 20 mEq tablet Take 1 Tablet by mouth daily. 30 Tablet 0    sodium bicarbonate 650 mg tablet Take 1 Tablet by mouth three (3) times daily. 90 Tablet 0    levETIRAcetam (Keppra) 1,000 mg tablet Take 1.5 Tablets by mouth two (2) times a day. 60 Tablet 0    ergocalciferol (ERGOCALCIFEROL) 1,250 mcg (50,000 unit) capsule Take 1 Capsule by mouth every seven (7) days.       lactulose (CHRONULAC) 10 gram/15 mL solution Take 15 mL by mouth three (3) times daily. 200 mL 0    allopurinoL (ZYLOPRIM) 300 mg tablet Take 1 Tablet by mouth daily.  thiamine HCL (B-1) 100 mg tablet Take 100 mg by mouth daily.  propranoloL (INDERAL) 20 mg tablet Take 20 mg by mouth two (2) times a day.  folic acid (FOLVITE) 1 mg tablet Take 1 mg by mouth daily.  famotidine (PEPCID) 20 mg tablet Take 20 mg by mouth At bedtime.  aMILoride (MIDAMOR) 5 mg tablet Take 5 mg by mouth daily. Past History     Past Medical History:  Past Medical History:   Diagnosis Date    Arthritis     Hypertension        Past Surgical History:  Past Surgical History:   Procedure Laterality Date    IR INSERT NON TUNL CVC OVER 5 YRS  3/25/2022    IR PARACENTESIS ABD W IMAGE  1/21/2022    IR PARACENTESIS ABD W IMAGE  3/1/2022    IR PARACENTESIS ABD W IMAGE  3/30/2022       Family History:  No family history on file. Social History:  Social History     Tobacco Use    Smoking status: Never Smoker    Smokeless tobacco: Never Used   Substance Use Topics    Alcohol use: Not on file    Drug use: Not on file       Allergies:  No Known Allergies      Review of Systems     Review of Systems   Unable to perform ROS: Mental status change       Physical Exam     Physical Exam  Vitals and nursing note reviewed. Constitutional:       General: He is not in acute distress. Appearance: He is normal weight. He is ill-appearing. He is not toxic-appearing. HENT:      Head: Normocephalic. Eyes:      Conjunctiva/sclera: Conjunctivae normal.   Cardiovascular:      Rate and Rhythm: Normal rate and regular rhythm. Pulses: Normal pulses. Pulmonary:      Effort: Pulmonary effort is normal. No accessory muscle usage or respiratory distress. Breath sounds: Normal air entry. Examination of the right-middle field reveals decreased breath sounds. Examination of the left-middle field reveals decreased breath sounds.  Examination of the right-lower field reveals decreased breath sounds. Examination of the left-lower field reveals decreased breath sounds. Decreased breath sounds present. Abdominal:      General: Bowel sounds are normal.      Palpations: Abdomen is soft. Tenderness: There is no abdominal tenderness. Musculoskeletal:      Right hand: Swelling and tenderness present. Left hand: Normal.        Hands:       Cervical back: Normal range of motion. Right lower leg: No edema. Left lower leg: No edema. Comments: Edema and one intact suture to right first finger from previous orthopedic procedure   Skin:     General: Skin is dry. Capillary Refill: Capillary refill takes 2 to 3 seconds. Neurological:      Mental Status: He is alert. Mental status is at baseline. He is disoriented. Lab and Diagnostic Study Results     Labs -     Recent Results (from the past 12 hour(s))   CBC WITH AUTOMATED DIFF    Collection Time: 04/07/22 10:01 AM   Result Value Ref Range    WBC 5.7 4.1 - 11.1 K/uL    RBC 2.83 (L) 4.10 - 5.70 M/uL    HGB 8.0 (L) 12.1 - 17.0 g/dL    HCT 23.8 (L) 36.6 - 50.3 %    MCV 84.1 80.0 - 99.0 FL    MCH 28.3 26.0 - 34.0 PG    MCHC 33.6 30.0 - 36.5 g/dL    RDW 20.4 (H) 11.5 - 14.5 %    PLATELET 189 (L) 254 - 400 K/uL    NRBC 0.0 0.0  WBC    ABSOLUTE NRBC 0.00 0.00 - 0.01 K/uL    NEUTROPHILS 67 32 - 75 %    LYMPHOCYTES 23 12 - 49 %    MONOCYTES 8 5 - 13 %    EOSINOPHILS 1 0 - 7 %    BASOPHILS 1 0 - 1 %    IMMATURE GRANULOCYTES 0 0 - 0.5 %    ABS. NEUTROPHILS 3.9 1.8 - 8.0 K/UL    ABS. LYMPHOCYTES 1.3 0.8 - 3.5 K/UL    ABS. MONOCYTES 0.5 0.0 - 1.0 K/UL    ABS. EOSINOPHILS 0.0 0.0 - 0.4 K/UL    ABS. BASOPHILS 0.0 0.0 - 0.1 K/UL    ABS. IMM.  GRANS. 0.0 0.00 - 0.04 K/UL    DF AUTOMATED     METABOLIC PANEL, COMPREHENSIVE    Collection Time: 04/07/22 10:01 AM   Result Value Ref Range    Sodium 138 136 - 145 mmol/L    Potassium Hemolyzed, recollect requested 3.5 - 5.1 mmol/L    Chloride 112 (H) 97 - 108 mmol/L    CO2 17 (L) 21 - 32 mmol/L    Anion gap 9 5 - 15 mmol/L    Glucose 103 (H) 65 - 100 mg/dL    BUN 21 (H) 6 - 20 mg/dL    Creatinine 2.01 (H) 0.70 - 1.30 mg/dL    BUN/Creatinine ratio 10 (L) 12 - 20      GFR est AA 41 (L) >60 ml/min/1.73m2    GFR est non-AA 34 (L) >60 ml/min/1.73m2    Calcium 8.1 (L) 8.5 - 10.1 mg/dL    Bilirubin, total 1.4 (H) 0.2 - 1.0 mg/dL    AST (SGOT) Hemolyzed, recollect requested 15 - 37 U/L    ALT (SGPT) 20 12 - 78 U/L    Alk. phosphatase 77 45 - 117 U/L    Protein, total 6.8 6.4 - 8.2 g/dL    Albumin 2.5 (L) 3.5 - 5.0 g/dL    Globulin 4.3 (H) 2.0 - 4.0 g/dL    A-G Ratio 0.6 (L) 1.1 - 2.2     LACTIC ACID    Collection Time: 04/07/22 10:01 AM   Result Value Ref Range    Lactic acid 4.0 (HH) 0.4 - 2.0 mmol/L       Radiologic Studies -   @lastxrresult@  CT Results  (Last 48 hours)    None        CXR Results  (Last 48 hours)    None            Medical Decision Making   - I am the first provider for this patient. - I reviewed the vital signs, available nursing notes, past medical history, past surgical history, family history and social history. - Initial assessment performed. The patients presenting problems have been discussed, and they are in agreement with the care plan formulated and outlined with them. I have encouraged them to ask questions as they arise throughout their visit. Vital Signs-Reviewed the patient's vital signs.   Patient Vitals for the past 12 hrs:   Temp Pulse Resp BP SpO2   04/07/22 1020 -- -- -- -- 100 %   04/07/22 0912 (!) 94.3 °F (34.6 °C) 71 18 (!) 84/68 100 %       Records Reviewed: Nursing Notes, Old Medical Records, Previous electrocardiograms, Ambulance Run Sheet, Previous Radiology Studies and Previous Laboratory Studies    The patient presents with pulled out fish, hypothermia with a differential diagnosis of sepsis, cellulitis, urinary tract infection, metabolic disturbance, electrolyte derangement    ED Course:   59-year-old male with known encephalopathy and confusion presented from his skilled nursing facility for reported pulling out his Gerard catheter. Upon arrival he was found to be hypothermic and hypotensive and was made a sepsis alert with initiation of sepsis protocol. He was given broad-spectrum antibiotics with unknown but suspected infection sources of his right first finger from previous orthopedic procedure for infection as well as urinary tract infection. Lactic acidosis was treated with IV fluids and began to decrease. He was referred to hospitalist for admission and accepted. Infectious disease physician that treated patient previously was consulted and also saw him in the emergency department with antibiotic adjustment as necessary. He was put on the bear hugger with improvement of his hypothermia minimally, and it was noted that he was very hypothermic during his previous admission and has a history of the same without a known cause as his infections have been treated but continued to be hypothermic. He remained hemodynamically stable with increase in his blood pressure. Initial EKG of atrial fibrillation was repeated and found to be sinus as that was more in line with his physical examination with a regular heart rhythm.        Provider Notes (Medical Decision Making):     MDM  Number of Diagnoses or Management Options  GRACIA (acute kidney injury) ([facility] [address]): new, needed workup  Lactic acidosis: new, needed workup  Sepsis, due to unspecified organism, unspecified whether acute organ dysfunction present [facility]): new, needed workup  Urinary retention: new, needed workup     Amount and/or Complexity of Data Reviewed  Clinical lab tests: reviewed and ordered  Tests in the radiology section of CPT®: reviewed and ordered  Review and summarize past medical records: yes  Discuss the patient with other providers: yes  Independent visualization of images, tracings, or specimens: yes    Risk of Complications, Morbidity, and/or Mortality  Presenting problems: high             Disposition   Disposition: Condition stable  Admitted to Floor Medical Floor the case was discussed with the admitting physician Dr. Michell Card    Admitted        Diagnosis     Clinical Impression:   1. Sepsis, due to unspecified organism, unspecified whether acute organ dysfunction present ([facility] [address]    2. Urinary retention    3. Lactic acidosis        Attestations:    Sharon Malone NP    Please note that this dictation was completed with [employer], the computer voice recognition software. Quite often unanticipated grammatical, syntax, homophones, and other interpretive errors are inadvertently transcribed by the computer software. Please disregard these errors. Please excuse any errors that have escaped final proofreading. Thank you.

## 2022-04-07 NOTE — ED TRIAGE NOTES
Pt coming from [facility]. Pulled fish catheter out this morning. Several clots noticed on EMS arrival. Pt confused at baseline. In no apparent distress in triage.

## 2022-04-08 PROBLEM — E43 SEVERE PROTEIN-CALORIE MALNUTRITION (HCC): Status: ACTIVE | Noted: 2022-01-01

## 2022-04-08 NOTE — PROGRESS NOTES
General Daily Progress Note          Patient Name:   Katherine Patient       YOB: 1960       Age:  60 y.o. Admit Date: 4/7/2022      Subjective:     Katherine Patient is a(n) 60 y.o. male with PMH significant for HTN, cirrhosis, ascites presents via EMS for removing fish catheter. Patient recently d/c after lengthy stay in hospital for septic DIP joint of right index finger. He was d/c on 10 days of PO Zyvox, which he completed. He presents today after pulling out fish catheter. Several blood clots noted. Patient septic upon arrival with hypothermia and hypotension. Temp 93.6 rectal, BP 84/68. Patient is retaining 887cc of urine and nursing staff will replace fish.     Patient started on fluids and IV Levaquin in the ED. Patient is altered at baseline. Head CT w/o contrast shows no acute abnormality. Blood cultures pending.  CXR and KUB both normal       Patient is alert awake confused  Hypotensive on Levophed  Hemoglobin dropped to 6.9      Objective:     Visit Vitals  BP (!) 81/61 (BP 1 Location: Left upper arm, BP Patient Position: At rest)   Pulse 69   Temp 98.6 °F (37 °C)   Resp 16   Ht 5' 10.98\" (1.803 m)   Wt 81.6 kg (180 lb)   SpO2 98%   BMI 25.12 kg/m²        Recent Results (from the past 24 hour(s))   LACTIC ACID    Collection Time: 04/07/22 12:06 PM   Result Value Ref Range    Lactic acid 3.6 (HH) 0.4 - 2.0 mmol/L   TROPONIN-HIGH SENSITIVITY    Collection Time: 04/07/22 12:06 PM   Result Value Ref Range    Troponin-High Sensitivity 11 0 - 76 ng/L   MRSA SCREEN - PCR (NASAL)    Collection Time: 04/07/22  1:45 PM   Result Value Ref Range    MRSA by PCR, Nasal Not Detected Not Detected     URINALYSIS W/ REFLEX CULTURE    Collection Time: 04/07/22  3:02 PM    Specimen: Urine   Result Value Ref Range    Color Shantelle      Appearance Turbid (A) Clear      Specific gravity 1.021 1.003 - 1.030      pH (UA) 6.0 5.0 - 8.0      Protein 100 (A) Negative mg/dL    Glucose Negative Negative mg/dL Ketone Negative Negative mg/dL    Bilirubin Negative Negative      Blood Large (A) Negative      Urobilinogen 0.1 0.1 - 1.0 EU/dL    Nitrites Negative Negative      Leukocyte Esterase Trace (A) Negative      WBC >100 (H) 0 - 4 /hpf    RBC >100 (H) 0 - 5 /hpf    Bacteria Negative Negative /hpf    UA:UC IF INDICATED Urine Culture Ordered (A) Culture not indicated by UA result      Hyaline cast 2-5 0 - 5 /lpf   VANCOMYCIN, RANDOM    Collection Time: 04/07/22  4:26 PM   Result Value Ref Range    Vancomycin, random 7.2 ug/mL    Reported dose date Not provided      Reported dose: Not provided Units   METABOLIC PANEL, COMPREHENSIVE    Collection Time: 04/08/22  5:00 AM   Result Value Ref Range    Sodium 141 136 - 145 mmol/L    Potassium 3.8 3.5 - 5.1 mmol/L    Chloride 116 (H) 97 - 108 mmol/L    CO2 16 (L) 21 - 32 mmol/L    Anion gap 9 5 - 15 mmol/L    Glucose 79 65 - 100 mg/dL    BUN 19 6 - 20 mg/dL    Creatinine 1.91 (H) 0.70 - 1.30 mg/dL    BUN/Creatinine ratio 10 (L) 12 - 20      GFR est AA 43 (L) >60 ml/min/1.73m2    GFR est non-AA 36 (L) >60 ml/min/1.73m2    Calcium 7.9 (L) 8.5 - 10.1 mg/dL    Bilirubin, total 1.4 (H) 0.2 - 1.0 mg/dL    AST (SGOT) 24 15 - 37 U/L    ALT (SGPT) 13 12 - 78 U/L    Alk.  phosphatase 67 45 - 117 U/L    Protein, total 5.3 (L) 6.4 - 8.2 g/dL    Albumin 2.2 (L) 3.5 - 5.0 g/dL    Globulin 3.1 2.0 - 4.0 g/dL    A-G Ratio 0.7 (L) 1.1 - 2.2     CBC WITH AUTOMATED DIFF    Collection Time: 04/08/22  5:00 AM   Result Value Ref Range    WBC 6.1 4.1 - 11.1 K/uL    RBC 2.45 (L) 4.10 - 5.70 M/uL    HGB 6.9 (L) 12.1 - 17.0 g/dL    HCT 20.2 (L) 36.6 - 50.3 %    MCV 82.4 80.0 - 99.0 FL    MCH 28.2 26.0 - 34.0 PG    MCHC 34.2 30.0 - 36.5 g/dL    RDW 19.0 (H) 11.5 - 14.5 %    PLATELET 80 (L) 586 - 400 K/uL    MPV 12.3 8.9 - 12.9 FL    NRBC 0.0 0.0  WBC    ABSOLUTE NRBC 0.00 0.00 - 0.01 K/uL    NEUTROPHILS 65 32 - 75 %    LYMPHOCYTES 22 12 - 49 %    MONOCYTES 10 5 - 13 %    EOSINOPHILS 2 0 - 7 % BASOPHILS 1 0 - 1 %    IMMATURE GRANULOCYTES 0 0 - 0.5 %    ABS. NEUTROPHILS 4.0 1.8 - 8.0 K/UL    ABS. LYMPHOCYTES 1.3 0.8 - 3.5 K/UL    ABS. MONOCYTES 0.6 0.0 - 1.0 K/UL    ABS. EOSINOPHILS 0.1 0.0 - 0.4 K/UL    ABS. BASOPHILS 0.0 0.0 - 0.1 K/UL    ABS. IMM. GRANS. 0.0 0.00 - 0.04 K/UL    DF AUTOMATED     C REACTIVE PROTEIN, QT    Collection Time: 04/08/22  5:00 AM   Result Value Ref Range    C-Reactive protein 1.38 (H) 0.00 - 0.60 mg/dL   SED RATE (ESR)    Collection Time: 04/08/22  5:00 AM   Result Value Ref Range    Sed rate, automated 28 (H) 0 - 20 mm/hr   VANCOMYCIN, RANDOM    Collection Time: 04/08/22  5:00 AM   Result Value Ref Range    Vancomycin, random 16.0 ug/mL   GLUCOSE, POC    Collection Time: 04/08/22 10:03 AM   Result Value Ref Range    Glucose (POC) 80 65 - 117 mg/dL    Performed by Steven Domingo      [unfilled]      Review of Systems    Constitutional: Negative for chills and fever. HENT: Negative. Eyes: Negative. Respiratory: Negative. Cardiovascular: Negative. Gastrointestinal: Negative for abdominal pain and nausea. Skin: Negative. Neurological: Negative. Physical Exam:      Constitutional: pt is oriented to person, place, and time. HENT:   Head: Normocephalic and atraumatic. Eyes: Pupils are equal, round, and reactive to light. EOM are normal.   Cardiovascular: Normal rate, regular rhythm and normal heart sounds. Pulmonary/Chest: Breath sounds normal. No wheezes. No rales. Exhibits no tenderness. Abdominal: Soft. Bowel sounds are normal. There is no abdominal tenderness. There is no rebound and no guarding. Musculoskeletal: Normal range of motion. Neurological: pt is alert and oriented to person, place, and time. XR HAND RT MIN 3 V   Final Result      CT HEAD WO CONT   Final Result   Age-appropriate atrophy. No acute findings. XR ABD (KUB)   Final Result   Within normal      XR CHEST PORT   Final Result   Findings/impression:   1.   Low lung volumes. Bibasilar hypoventilatory change/atelectasis. Left   retrocardiac lung incompletely evaluated, cannot exclude underlying airspace   disease. 2.  Cardiac and mediastinal contours are obscured by low lung volumes. Ectatic/possible aneurysmal appearance transverse aorta with calcific   atherosclerotic plaque. Appears stable from prior radiograph. 3.  No pleural fluid. No pneumothorax. 4.  Prominent gas-filled bowel partially visualized in the left upper quadrant.          IR INSERT NON TUNL CVC OVER 5 YRS    (Results Pending)   IR PARACENTESIS ABD W IMAGE    (Results Pending)   IR US GUIDED VASCULAR ACCESS    (Results Pending)        Recent Results (from the past 24 hour(s))   LACTIC ACID    Collection Time: 04/07/22 12:06 PM   Result Value Ref Range    Lactic acid 3.6 (HH) 0.4 - 2.0 mmol/L   TROPONIN-HIGH SENSITIVITY    Collection Time: 04/07/22 12:06 PM   Result Value Ref Range    Troponin-High Sensitivity 11 0 - 76 ng/L   MRSA SCREEN - PCR (NASAL)    Collection Time: 04/07/22  1:45 PM   Result Value Ref Range    MRSA by PCR, Nasal Not Detected Not Detected     URINALYSIS W/ REFLEX CULTURE    Collection Time: 04/07/22  3:02 PM    Specimen: Urine   Result Value Ref Range    Color Shantelle      Appearance Turbid (A) Clear      Specific gravity 1.021 1.003 - 1.030      pH (UA) 6.0 5.0 - 8.0      Protein 100 (A) Negative mg/dL    Glucose Negative Negative mg/dL    Ketone Negative Negative mg/dL    Bilirubin Negative Negative      Blood Large (A) Negative      Urobilinogen 0.1 0.1 - 1.0 EU/dL    Nitrites Negative Negative      Leukocyte Esterase Trace (A) Negative      WBC >100 (H) 0 - 4 /hpf    RBC >100 (H) 0 - 5 /hpf    Bacteria Negative Negative /hpf    UA:UC IF INDICATED Urine Culture Ordered (A) Culture not indicated by UA result      Hyaline cast 2-5 0 - 5 /lpf   VANCOMYCIN, RANDOM    Collection Time: 04/07/22  4:26 PM   Result Value Ref Range    Vancomycin, random 7.2 ug/mL    Reported dose date Not provided      Reported dose: Not provided Units   METABOLIC PANEL, COMPREHENSIVE    Collection Time: 04/08/22  5:00 AM   Result Value Ref Range    Sodium 141 136 - 145 mmol/L    Potassium 3.8 3.5 - 5.1 mmol/L    Chloride 116 (H) 97 - 108 mmol/L    CO2 16 (L) 21 - 32 mmol/L    Anion gap 9 5 - 15 mmol/L    Glucose 79 65 - 100 mg/dL    BUN 19 6 - 20 mg/dL    Creatinine 1.91 (H) 0.70 - 1.30 mg/dL    BUN/Creatinine ratio 10 (L) 12 - 20      GFR est AA 43 (L) >60 ml/min/1.73m2    GFR est non-AA 36 (L) >60 ml/min/1.73m2    Calcium 7.9 (L) 8.5 - 10.1 mg/dL    Bilirubin, total 1.4 (H) 0.2 - 1.0 mg/dL    AST (SGOT) 24 15 - 37 U/L    ALT (SGPT) 13 12 - 78 U/L    Alk. phosphatase 67 45 - 117 U/L    Protein, total 5.3 (L) 6.4 - 8.2 g/dL    Albumin 2.2 (L) 3.5 - 5.0 g/dL    Globulin 3.1 2.0 - 4.0 g/dL    A-G Ratio 0.7 (L) 1.1 - 2.2     CBC WITH AUTOMATED DIFF    Collection Time: 04/08/22  5:00 AM   Result Value Ref Range    WBC 6.1 4.1 - 11.1 K/uL    RBC 2.45 (L) 4.10 - 5.70 M/uL    HGB 6.9 (L) 12.1 - 17.0 g/dL    HCT 20.2 (L) 36.6 - 50.3 %    MCV 82.4 80.0 - 99.0 FL    MCH 28.2 26.0 - 34.0 PG    MCHC 34.2 30.0 - 36.5 g/dL    RDW 19.0 (H) 11.5 - 14.5 %    PLATELET 80 (L) 281 - 400 K/uL    MPV 12.3 8.9 - 12.9 FL    NRBC 0.0 0.0  WBC    ABSOLUTE NRBC 0.00 0.00 - 0.01 K/uL    NEUTROPHILS 65 32 - 75 %    LYMPHOCYTES 22 12 - 49 %    MONOCYTES 10 5 - 13 %    EOSINOPHILS 2 0 - 7 %    BASOPHILS 1 0 - 1 %    IMMATURE GRANULOCYTES 0 0 - 0.5 %    ABS. NEUTROPHILS 4.0 1.8 - 8.0 K/UL    ABS. LYMPHOCYTES 1.3 0.8 - 3.5 K/UL    ABS. MONOCYTES 0.6 0.0 - 1.0 K/UL    ABS. EOSINOPHILS 0.1 0.0 - 0.4 K/UL    ABS. BASOPHILS 0.0 0.0 - 0.1 K/UL    ABS. IMM.  GRANS. 0.0 0.00 - 0.04 K/UL    DF AUTOMATED     C REACTIVE PROTEIN, QT    Collection Time: 04/08/22  5:00 AM   Result Value Ref Range    C-Reactive protein 1.38 (H) 0.00 - 0.60 mg/dL   SED RATE (ESR)    Collection Time: 04/08/22  5:00 AM   Result Value Ref Range    Sed rate, automated 28 (H) 0 - 20 mm/hr   VANCOMYCIN, RANDOM    Collection Time: 04/08/22  5:00 AM   Result Value Ref Range    Vancomycin, random 16.0 ug/mL   GLUCOSE, POC    Collection Time: 04/08/22 10:03 AM   Result Value Ref Range    Glucose (POC) 80 65 - 117 mg/dL    Performed by Tomasz Carrera        Results     Procedure Component Value Units Date/Time    CULTURE, URINE [[id number]] Collected: 04/07/22 1502    Order Status: Completed Specimen: Urine Updated: 04/07/22 1638    MRSA SCREEN - PCR (NASAL) [[id number]] Collected: 04/07/22 1345    Order Status: Completed Specimen: Swab Updated: 04/07/22 1516     MRSA by PCR, Nasal Not Detected       CULTURE, BLOOD, PAIRED [[id number]] Collected: 04/07/22 1001    Order Status: Completed Specimen: Blood Updated: 04/07/22 1017    C. DIFFICILE (DNA) [[id number]] Collected: 04/06/22 0919    Order Status: Completed Specimen: Stool Updated: 04/06/22 1056     C. difficile (DNA) Negative       COVID-19 WITH INFLUENZA A/B [[id number]] Collected: 04/05/22 2315    Order Status: Completed Specimen: Nasopharyngeal from Nasopharynx Updated: 04/06/22 0027     SARS-CoV-2 by PCR Not Detected        Comment: Not Detected results do not preclude SARS-CoV-2 infection and should not be used as the sole basis for patient management decisions. Results must be combined with clinical observations, patient history, and epidemiological information        Influenza A by PCR Not Detected        Influenza B by PCR Not Detected        Comment: Testing was performed using leah Concepcion SARS-CoV-2 and Influenza A/B nucleic acid assay. This test is a multiplex Real-Time Reverse Transcriptase Polymerase Chain Reaction (RT-PCR) based in vitro diagnostic test intended for the qualitative detection of nucleic acids from SARS-CoV-2, Influenza A, and Influenza B in nasopharyngeal and nasal swab specimens for use under the FDA's Emergency Use Authorization (EUA) only.    Fact sheet for Patients: FindDrives.pl Fact sheet   for Healthcare Providers: FindDrives[employer]         CULTURE, Nneka Cooper STAIN [[id number]]  (Susceptibility) Collected: 03/25/22 1715    Order Status: Completed Specimen: Wound from Right Updated: 03/28/22 1024     Special Requests: No Special Requests        GRAM STAIN Few WBCs seen         No organisms seen        Culture result:       Scant Staphylococcus epidermidis (Oxacillin resistant)          Susceptibility      Staphylococcus epidermidis     YESSENIA     Ciprofloxacin ($) Resistant     Clindamycin ($) Susceptible     Daptomycin ($$$$$) Susceptible     Erythromycin ($$$$) Susceptible     Gentamicin ($) Susceptible     Levofloxacin ($) Resistant     Linezolid ($$$$$) Susceptible     Moxifloxacin ($$$$) Resistant     Oxacillin Resistant     Rifampin ($$$$) Susceptible  [1]      Tetracycline Susceptible     Trimeth/Sulfa Susceptible     Vancomycin ($) Susceptible                 [1]  Rifampin is not to be used for mono-therapy. Linear View                   CULTURE, TISSUE Nolvia Mile STAIN [[id number]] Collected: 03/25/22 1715    Order Status: Completed Specimen: Right Updated: 03/28/22 1544     Special Requests: No Special Requests        GRAM STAIN Rare WBCs seen         No organisms seen        Culture result:       Scant Staphylococcus epidermidis PLEASE REFER TO [id number] FOR SENSITIVITIES                 Labs:     Recent Labs     04/08/22  0500 04/07/22  1001   WBC 6.1 5.7   HGB 6.9* 8.0*   HCT 20.2* 23.8*   PLT 80* 105*     Recent Labs     04/08/22  0500 04/07/22  1001 04/06/22  0916 04/05/22  2315 04/05/22  2315    138  --   --  143   K 3.8 Hemolyzed, recollect requested  --   --  3.8   * 112*  --   --  115*   CO2 16* 17*  --   --  16*   BUN 19 21*  --   --  18   CREA 1.91* 2.01* 1.74*   < > 1.77*   GLU 79 103*  --   --  104*   CA 7.9* 8.1*  --   --  8.8    < > = values in this interval not displayed.      Recent Labs     04/08/22  0500 04/07/22  1001   ALT 13 20   AP 67 77   TBILI 1.4* 1.4*   TP 5.3* 6.8   ALB 2.2* 2.5*   GLOB 3.1 4.3*     No results for input(s): INR, PTP, APTT, INREXT in the last 72 hours. No results for input(s): FE, TIBC, PSAT, FERR in the last 72 hours. No results found for: FOL, RBCF   No results for input(s): PH, PCO2, PO2 in the last 72 hours. No results for input(s): CPK, CKNDX, TROIQ in the last 72 hours.     No lab exists for component: CPKMB  No results found for: CHOL, CHOLX, CHLST, CHOLV, HDL, HDLP, LDL, LDLC, DLDLP, TGLX, TRIGL, TRIGP, CHHD, CHHDX  Lab Results   Component Value Date/Time    Glucose (POC) 80 04/08/2022 10:03 AM    Glucose (POC) 84 04/04/2022 05:40 PM    Glucose (POC) 107 04/03/2022 11:12 AM    Glucose (POC) 111 03/31/2022 11:35 AM    Glucose (POC) 91 03/29/2022 11:10 AM     Lab Results   Component Value Date/Time    Color Shantelle 04/07/2022 03:02 PM    Appearance Turbid (A) 04/07/2022 03:02 PM    Specific gravity 1.021 04/07/2022 03:02 PM    pH (UA) 6.0 04/07/2022 03:02 PM    Protein 100 (A) 04/07/2022 03:02 PM    Glucose Negative 04/07/2022 03:02 PM    Ketone Negative 04/07/2022 03:02 PM    Bilirubin Negative 04/07/2022 03:02 PM    Urobilinogen 0.1 04/07/2022 03:02 PM    Nitrites Negative 04/07/2022 03:02 PM    Leukocyte Esterase Trace (A) 04/07/2022 03:02 PM    Bacteria Negative 04/07/2022 03:02 PM    WBC >100 (H) 04/07/2022 03:02 PM    RBC >100 (H) 04/07/2022 03:02 PM         Assessment:     Sepsis  Lactic acidosis  Acute on chronic renal failure- creat 2.01  Cirrhosis with ascites  Anemia  History of septic DIP joint, right index finger  Hypothyroidism  Seizure disorder  Gout  GERD  History of etoh abuse  Hypertension  Metabolic acidosis      Plan:       Continue IV vancomycin and IV Zosyn follow-up with infectious disease also seen by the orthopedic surgeon    Patient on Levophed  Admit routine 10 mg 3 times a day and try to wean off Levophed    Monitor H&H  Continue IV fluids      Current Facility-Administered Medications:     [START ON 4/9/2022] Vancomycin trough level - please draw prior to dose on 4/9 @ 1900.  Thanks!, , Other, ONCE, Parker Cruz MD    sodium chloride (NS) flush 5-10 mL, 5-10 mL, IntraVENous, PRN, Yong Ackerman MD    piperacillin-tazobactam (ZOSYN) 3.375 g in 0.9% sodium chloride (MBP/ADV) 100 mL MBP, 3.375 g, IntraVENous, Q8H, Lamberto Ackerman MD, Last Rate: 25 mL/hr at 04/08/22 0828, 3.375 g at 04/08/22 0828    lactulose (CHRONULAC) 10 gram/15 mL solution 15 mL, 10 g, Oral, TID, Lamberto Ackerman MD, 15 mL at 04/08/22 0813    levETIRAcetam (KEPPRA) tablet 1,500 mg, 1,500 mg, Oral, BID, Lamberto Ackerman MD, 1,500 mg at 04/08/22 0813    sodium bicarbonate tablet 650 mg, 650 mg, Oral, TID, Lamberto Ackerman MD, 650 mg at 04/08/22 0813    tamsulosin (FLOMAX) capsule 0.4 mg, 0.4 mg, Oral, DAILY, Fili Morgan MD, 0.4 mg at 04/08/22 0813    thiamine mononitrate (B-1) tablet 100 mg, 100 mg, Oral, DAILY, Fili Morgan MD, 100 mg at 04/08/22 0813    allopurinoL (ZYLOPRIM) tablet 300 mg, 300 mg, Oral, DAILY, Lamberto Ackerman MD, 300 mg at 04/08/22 0813    ergocalciferol capsule 50,000 Units, 50,000 Units, Oral, Q7D, Lamberto Ackerman MD    famotidine (PEPCID) tablet 20 mg, 20 mg, Oral, BID, Fili Morgan MD, 20 mg at 49/07/11 9657    folic acid (FOLVITE) tablet 1 mg, 1 mg, Oral, DAILY, Fili Morgan MD, 1 mg at 04/08/22 0813    levothyroxine (SYNTHROID) tablet 25 mcg, 25 mcg, Oral, ACB, Lamberto Ackerman MD, 25 mcg at 04/08/22 0813    propranoloL (INDERAL) tablet 20 mg, 20 mg, Oral, BID, Fili Morgan MD, 20 mg at 04/07/22 2213    0.9% sodium chloride infusion, 75 mL/hr, IntraVENous, CONTINUOUS, Lamberto Ackerman MD, Last Rate: 75 mL/hr at 04/08/22 0403, 75 mL/hr at 04/08/22 0403    acetaminophen (TYLENOL) tablet 650 mg, 650 mg, Oral, Q6H PRN **OR** acetaminophen (TYLENOL) suppository 650 mg, 650 mg, Rectal, Q6H PRN, Tres Quevedo MD    polyethylene glycol [facility]) packet 17 g, 17 g, Oral, DAILY PRN, Elliot Ackerman MD    ondansetron (ZOFRAN ODT) tablet 4 mg, 4 mg, Oral, Q8H PRN **OR** ondansetron (ZOFRAN) injection 4 mg, 4 mg, IntraVENous, Q6H PRN, Lamberto Ackerman MD    heparin (porcine) injection 5,000 Units, 5,000 Units, SubCUTAneous, Q8H, Lamberto Ackerman MD, 5,000 Units at 04/08/22 0558    VANCOMYCIN INFORMATION NOTE, , Other, Rx Dosing/Monitoring, Josefina Denise MD    vancomycin (VANCOCIN) 750 mg in 0.9% sodium chloride 250 mL (VIAL-MATE), 750 mg, IntraVENous, Q24H, Ree Zaragoza MD, Last Rate: 250 mL/hr at 04/07/22 1900, 750 mg at 04/07/22 1900    NOREPINephrine (LEVOPHED) 8 mg in 0.9% NS 250ml infusion, 0.5-120 mcg/min, IntraVENous, TITRATE, Lamberto Ackerman MD, Last Rate: 9.4 mL/hr at 04/08/22 0815, 5 mcg/min at 04/08/22 0815

## 2022-04-08 NOTE — PROGRESS NOTES
Comprehensive Nutrition Assessment    Type and Reason for Visit: Positive nutrition screen (MST 4)    Nutrition Recommendations/Plan:   Continue puree/thins diet; provide feeding assistance w/ intake as needed  Advance diet texture/consistency per SLP   Ensure Enlive x3/d (1050 kcal, 60 g PRO). Nutrition Assessment:  Admitted for hematuria s/p pulling out fish catheter, +hypothermia and hypotension. RD familiar with pt from previous admits. Pt inappropriate for interview at this time per RN, no family in room. Pt with minimal PO intakes, per RN pt up all night ans mostly sleeping today. RD added Ensure 3x/d this AM per pt previously stated preferences. Will f/u for tolerance and need for changes. Labs: H/H 6.9/20.2, Cr 1.91, Tbili 1.4, Alb 2. 2. Meds: IVF, allopurinol, Vit D3 weekly, pepcid, B9, lactulose, keppra, levothyroxine, levophed at 5 mcg/kg/min, zosyn, Na Bicarb, B1, vancomycin. Malnutrition Assessment:  Malnutrition Status: Moderate malnutrition    Context:  Chronic illness     Findings of the 6 clinical characteristics of malnutrition:   Energy Intake:  7 - 75% or less est energy requirements for 1 month or longer (per family report and recent admits)  Weight Loss:  No significant weight loss     Body Fat Loss:  1 - Mild body fat loss, Triceps,Fat overlying ribs   Muscle Mass Loss:  1 - Mild muscle mass loss, Calf (gastrocnemius),Clavicles (pectoralis &deltoids),Scapula (trapezius),Temples (temporalis)  Fluid Accumulation:  No significant fluid accumulation (d/t liver disease), Ascites      Estimated Daily Nutrient Needs:  Energy (kcal): 2119- 2856kcals (30-35kcals/kg for cirrhosis); Weight Used for Energy Requirements: Current  Protein (g): 98-122g (1.2-1.5g/kg); Weight Used for Protein Requirements: Current  Fluid (ml/day): 1632-2040ml (20-25ml/kg); Method Used for Fluid Requirements: 1 ml/kcal      Nutrition Related Findings:  NFPE finding moderate muscle and fat wasting.  SLP following for dysphagia, continues to rec puree/Thins. Pt endorses N/V, last BM 4/8 x2. No edema, however abd distended and tight, likely needs paracentesis. Wounds:    Pressure injury,Surgical incision (Incision- R index finger; PI- buttocks)       Current Nutrition Therapies:  ADULT DIET Dysphagia - Pureed; Low Fat/Low Chol/High Fiber/2 gm Na  ADULT ORAL NUTRITION SUPPLEMENT Breakfast, Lunch, Dinner; Standard High Calorie/High Protein    Anthropometric Measures:  · Height:  5' 10.98\" (180.3 cm)  · Current Body Wt:  81.6 kg (179 lb 14.3 oz) (4/7)   · Admission Body Wt:  179 lb 14.3 oz (4/7)    · Usual Body Wt:   (PAYTON)     · Ideal Body Wt:  172 lbs:  104.6 %   · BMI Category: Overweight (BMI 25.0-29. 9)     Wt Readings from Last 10 Encounters:   04/07/22 81.6 kg (180 lb)   04/05/22 83.9 kg (185 lb)   03/26/22 84.3 kg (185 lb 13.6 oz)   03/02/22 84 kg (185 lb 3 oz)   01/20/22 86.2 kg (190 lb)   Wt loss of 4.6kg x2-3 months (5.3%, not significant)      Nutrition Diagnosis:   · Moderate malnutrition,In context of chronic illness related to inadequate protein-energy intake,catabolic illness (Liver cirrhosis 2/2 EtOH abuse) as evidenced by poor intake prior to admission,mild loss of subcutaneous fat,mild muscle loss      Nutrition Interventions:   Food and/or Nutrient Delivery: Continue current diet,Start oral nutrition supplement  Nutrition Education and Counseling: No recommendations at this time,Education not appropriate  Coordination of Nutrition Care: Continue to monitor while inpatient,Swallow evaluation,Speech therapy    Goals:  Meet >/=50% of EENs in >5 days, Wt maintenance within +/-0.5kg in >5 days, lytes WNL       Nutrition Monitoring and Evaluation:   Behavioral-Environmental Outcomes: None identified  Food/Nutrient Intake Outcomes: Diet advancement/tolerance,Food and nutrient intake,Supplement intake  Physical Signs/Symptoms Outcomes: Weight,GI status,Biochemical data,Chewing or swallowing,Fluid status or edema,Hemodynamic status,Meal time behavior    Discharge Planning:    Continue oral nutrition supplement,Too soon to determine     Electronically signed by Queta Navarro on 4/8/2022 at 10:10 AM    Contact: Ext [phone], or via [employer]

## 2022-04-08 NOTE — PROGRESS NOTES
4250mL clear serous fluid drained during IR paracentesis. Vitals remained stable throughout procedure. Patient did experience two witnessed seizures, each lasting approximately 45 seconds or less. One during procedure, one post.   Vitals stable, airway maintained, no injuries to self. Post seizures, patient was alert.   Report given to primary nurse, Misa Corral RN

## 2022-04-08 NOTE — PROGRESS NOTES
Problem: Dysphagia (Adult)  Goal: *Acute Goals and Plan of Care (Insert Text)  Description: Speech Therapy Goals  Initiated 4/8/2022  -Patient will tolerate puree diet with thin liquids without signs/symptoms of aspiration given no cues within 7 day(s). [ ] Not met  [ ]  MET   [ ] Progressing  [ ] Farhana Mendez  -Patient will demonstrate understanding of swallow safety precautions and aspiration precautions, diet recs with no cues within 7 day(s). [ ] Not met  [ ]  MET   [ ] Esperanza Abad  [ ] Discontinue    Outcome: Not Met   SPEECH [address] EVALUATION  Patient: Javier Beach (47 y.o. male)  Date: 4/8/2022  Primary Diagnosis: Urinary retention [R33.9]  Lactic acidosis [E87.2]  Severe sepsis ([address] [A41.9, R65.20]  Hypothermia [T68. XXXA]  Sepsis ([address] [A41.9]        Precautions: aspiration       ASSESSMENT :  Based on the objective data described below, the patient presents with mild-mod oral dysphagia, pharyngeal phase WNL. Pt seen in ICU. Paracentesis is pending this date. Pt seen previously by SLP during recent acute admission with recs for puree/thin diet at that time. Pt positioned upright in bed. Pt is alert, but confused, does not follow many commands and is oriented to self only. Oral motor assessment limited due to reduced participation but appears [facility] and symmetrical.  Vocal quality WNL. Pt is edentulous. Pt given trials of thin via straw and puree. Oral phase with prolonged manipulation and delayed clearance with purees. WFL for liquid trials. Pharyngeal phase WNL. Pt tolerates al trials without overt s/s aspiration. Limited intake is present. Suspect increased abdominal pressure is contributing to reduced intake. Patient will benefit from skilled intervention to address the above impairments. Patients rehabilitation potential is considered to be Fair     PLAN :  Recommendations and Planned Interventions:   At this time, recommend cont puree/thin diet with aspiration and GERD precautions, meds if able to be crushed in applesauce. Cont SLP follow-up to ensure diet tolerance and sw safety. Frequency/Duration: Patient will be followed by speech-language pathology 3 times a week to address goals. Discharge Recommendations: cont SLP tx at this time. SUBJECTIVE:   Patient alert, agreeable, confused. OBJECTIVE:     CXR Results  (Last 48 hours)                 04/07/22 0949  XR CHEST PORT Final result    Impression:  Findings/impression:   1. Low lung volumes. Bibasilar hypoventilatory change/atelectasis. Left   retrocardiac lung incompletely evaluated, cannot exclude underlying airspace   disease. 2.  Cardiac and mediastinal contours are obscured by low lung volumes. Ectatic/possible aneurysmal appearance transverse aorta with calcific   atherosclerotic plaque. Appears stable from prior radiograph. 3.  No pleural fluid. No pneumothorax. 4.  Prominent gas-filled bowel partially visualized in the left upper quadrant. Narrative:  EXAM: Chest radiograph. HISTORY: Evaluate for infiltrate. .       Comparison chest: Chest radiograph March 25, 2022. CT Results  (Last 48 hours)                 04/07/22 1105  CT HEAD WO CONT Final result    Impression:  Age-appropriate atrophy. No acute findings. Narrative:  CT dose reduction was achieved through use of a standardized protocol tailored   for this examination and automatic exposure control for dose modulation. CT Head       History:        The sulci are prominent but symmetric. Periventricular and deep white matter   hypodensity is greater than expected for age. Small chronic left basal ganglia   infarct, as seen in December 24. No acute abnormality seen gray or white matter. Ventricles are symmetric and appropriate for atrophy. There is no midline shift   or mass effect, or evidence of hemorrhage. Bone windows show no fracture. Past Medical History:   Diagnosis Date    Arthritis     Hypertension      Past Surgical History:   Procedure Laterality Date    IR INSERT NON TUNL CVC OVER 5 YRS  3/25/2022    IR PARACENTESIS ABD W IMAGE  1/21/2022    IR PARACENTESIS ABD W IMAGE  3/1/2022    IR PARACENTESIS ABD W IMAGE  3/30/2022     Prior Level of Function/Home Situation:   Home Situation  Home Environment: Long term care  One/Two Story Residence: Other (Comment) (unknown)  Living Alone: No  Support Systems: Skilled Nursing Facility,Spouse/Significant Other  Patient Expects to be Discharged to[de-identified] Long Term Care  Current DME Used/Available at Home: Other (comment) (unknown)  Diet prior to admission: puree/thin  Current Diet:  Puree/thin   Cognitive and Communication Status:  Neurologic State: Alert,Drowsy  Orientation Level: Oriented to person  Cognition: Impaired decision making    Pain:  Pain Scale 1: Numeric (0 - 10)  Pain Intensity 1: 0       After treatment:   Patient left in no apparent distress in bed, Call bell within reach and Nursing notified    COMMUNICATION/EDUCATION:   Patient was educated regarding purpose of SLP assessment, POC, diet recs and sw safety precautions. Patient demonstrated Guarded understanding as evidenced by AMS. The patient's plan of care including recommendations, planned interventions, and recommended diet changes were discussed with: Registered nurse. Patient/family have participated as able in goal setting and plan of care. Patient/family agree to work toward stated goals and plan of care.     Thank you for this referral.  Debbie Rizvi M.S. CCC-SLP  Time Calculation: 9 mins

## 2022-04-08 NOTE — CONSULTS
Renal Consultation Note    NAME:  Citlali Grande   :   1960   MRN:   [id number]     ATTENDING: Sonja Armando MD  PCP:  Fredrick Mittal NP    Date/Time:  2022 12:46 PM      Subjective:   REQUESTING PHYSICIAN:  REASON FOR CONSULT:    GRACIA on CKD  Patient is a 25-year-old Afro-American gentleman who was discharged from the hospital just 2 days ago who was brought back because of patient pulling out his Gerard catheter. On admission to the ED patient was found to be hypothermic hypotensive and abnormal labs including a creatinine of 2.0 and a lactic acid of 4.0. Renal consultation has been called because of his acute kidney injury. Patient had acute kidney injury during his previous admission when creatinine had peaked at 2.6. Creatinine was 1.7 at the time of discharge on . Patient seen in the ICU. He is lethargic does not respond to questions appropriately. He is hypotensive with a systolic blood pressure in the 80s range. Currently on 1 pressor at 7 mics. Also on IV fluids at 75 mils per hour. Still on warming blanket. He has been evaluated by ID and has been started on empiric antibiotics. He has a Gerard catheter in place    Past Medical History:   Diagnosis Date    Arthritis     Hypertension       Past Surgical History:   Procedure Laterality Date    IR INSERT NON TUNL CVC OVER 5 YRS  3/25/2022    IR PARACENTESIS ABD W IMAGE  2022    IR PARACENTESIS ABD W IMAGE  3/1/2022    IR PARACENTESIS ABD W IMAGE  3/30/2022     Social History     Tobacco Use    Smoking status: Never Smoker    Smokeless tobacco: Never Used   Substance Use Topics    Alcohol use: Not on file      No family history on file. No Known Allergies   Prior to Admission medications    Medication Sig Start Date End Date Taking? Authorizing Provider   linezolid (ZYVOX) 600 mg tablet Take 1 Tablet by mouth two (2) times a day for 10 days.  Check CBC wkly while on linezolid 22  Nik MD Ree   thiamine mononitrate (B-1) 100 mg tablet Take 1 Tablet by mouth daily. 4/4/22   Srini Ackerman MD   levothyroxine (SYNTHROID) 25 mcg tablet Take 25 mcg by mouth Daily (before breakfast). Provider, Historical   tamsulosin (Flomax) 0.4 mg capsule Take 1 Capsule by mouth daily. 3/4/22   Ana Laura Whyte PA-C   potassium chloride (K-DUR, KLOR-CON M20) 20 mEq tablet Take 1 Tablet by mouth daily. 3/2/22   Pavel Sanabria MD   sodium bicarbonate 650 mg tablet Take 1 Tablet by mouth three (3) times daily. 3/2/22   Pavel Sanabria MD   levETIRAcetam (Keppra) 1,000 mg tablet Take 1.5 Tablets by mouth two (2) times a day. 3/2/22   Pavel Sanabria MD   ergocalciferol (ERGOCALCIFEROL) 1,250 mcg (50,000 unit) capsule Take 1 Capsule by mouth every seven (7) days. 2/9/22   Provider, Historical   lactulose (CHRONULAC) 10 gram/15 mL solution Take 15 mL by mouth three (3) times daily. 1/23/22   Srini Ackerman MD   allopurinoL (ZYLOPRIM) 300 mg tablet Take 1 Tablet by mouth daily. 1/18/22   Provider, Historical   thiamine HCL (B-1) 100 mg tablet Take 100 mg by mouth daily. Provider, Historical   propranoloL (INDERAL) 20 mg tablet Take 20 mg by mouth two (2) times a day. Provider, Historical   folic acid (FOLVITE) 1 mg tablet Take 1 mg by mouth daily. Provider, Historical   famotidine (PEPCID) 20 mg tablet Take 20 mg by mouth At bedtime. Provider, Historical   aMILoride (MIDAMOR) 5 mg tablet Take 5 mg by mouth daily. Provider, Historical       REVIEW OF SYSTEMS:       Constitutional: Negative for chills and fever. HENT: Negative. Eyes: Negative. Respiratory: Negative. Cardiovascular: Negative. Gastrointestinal: Negative for abdominal pain and nausea. Skin: Negative. Neurological: Negative.       Objective:   VITALS:    Visit Vitals  BP (!) 89/61 (BP 1 Location: Left upper arm, BP Patient Position: At rest)   Pulse 80   Temp 98.8 °F (37.1 °C) Resp 20   Ht 5' 10.98\" (1.803 m)   Wt 81.6 kg (180 lb)   SpO2 99%   BMI 25.12 kg/m²     Temp (24hrs), Av.5 °F (35.8 °C), Min:94.3 °F (34.6 °C), Max:98.8 °F (37.1 °C)      PHYSICAL EXAM:     General: NAD, alert, cooperative  Eyes: sclera anicteric  Oral Cavity: No thrush or ulcers  Neck: no JVD  Chest: Fair bilateral air entry. No Wheezing or Rhonchi. No rales.   Heart: normal sounds  Abdomen: soft and non tender   : no fish  Lower Extremities: no edema  Skin: no rash  Neuro: intact, no focals  Psychiatric: non-depressed          LAB DATA REVIEWED:    Recent Results (from the past 24 hour(s))   MRSA SCREEN - PCR (NASAL)    Collection Time: 22  1:45 PM   Result Value Ref Range    MRSA by PCR, Nasal Not Detected Not Detected     URINALYSIS W/ REFLEX CULTURE    Collection Time: 22  3:02 PM    Specimen: Urine   Result Value Ref Range    Color Shantelle      Appearance Turbid (A) Clear      Specific gravity 1.021 1.003 - 1.030      pH (UA) 6.0 5.0 - 8.0      Protein 100 (A) Negative mg/dL    Glucose Negative Negative mg/dL    Ketone Negative Negative mg/dL    Bilirubin Negative Negative      Blood Large (A) Negative      Urobilinogen 0.1 0.1 - 1.0 EU/dL    Nitrites Negative Negative      Leukocyte Esterase Trace (A) Negative      WBC >100 (H) 0 - 4 /hpf    RBC >100 (H) 0 - 5 /hpf    Bacteria Negative Negative /hpf    UA:UC IF INDICATED Urine Culture Ordered (A) Culture not indicated by UA result      Hyaline cast 2-5 0 - 5 /lpf   VANCOMYCIN, RANDOM    Collection Time: 22  4:26 PM   Result Value Ref Range    Vancomycin, random 7.2 ug/mL    Reported dose date Not provided      Reported dose: Not provided Units   METABOLIC PANEL, COMPREHENSIVE    Collection Time: 22  5:00 AM   Result Value Ref Range    Sodium 141 136 - 145 mmol/L    Potassium 3.8 3.5 - 5.1 mmol/L    Chloride 116 (H) 97 - 108 mmol/L    CO2 16 (L) 21 - 32 mmol/L    Anion gap 9 5 - 15 mmol/L    Glucose 79 65 - 100 mg/dL    BUN 19 6 - 20 mg/dL    Creatinine 1.91 (H) 0.70 - 1.30 mg/dL    BUN/Creatinine ratio 10 (L) 12 - 20      GFR est AA 43 (L) >60 ml/min/1.73m2    GFR est non-AA 36 (L) >60 ml/min/1.73m2    Calcium 7.9 (L) 8.5 - 10.1 mg/dL    Bilirubin, total 1.4 (H) 0.2 - 1.0 mg/dL    AST (SGOT) 24 15 - 37 U/L    ALT (SGPT) 13 12 - 78 U/L    Alk. phosphatase 67 45 - 117 U/L    Protein, total 5.3 (L) 6.4 - 8.2 g/dL    Albumin 2.2 (L) 3.5 - 5.0 g/dL    Globulin 3.1 2.0 - 4.0 g/dL    A-G Ratio 0.7 (L) 1.1 - 2.2     CBC WITH AUTOMATED DIFF    Collection Time: 04/08/22  5:00 AM   Result Value Ref Range    WBC 6.1 4.1 - 11.1 K/uL    RBC 2.45 (L) 4.10 - 5.70 M/uL    HGB 6.9 (L) 12.1 - 17.0 g/dL    HCT 20.2 (L) 36.6 - 50.3 %    MCV 82.4 80.0 - 99.0 FL    MCH 28.2 26.0 - 34.0 PG    MCHC 34.2 30.0 - 36.5 g/dL    RDW 19.0 (H) 11.5 - 14.5 %    PLATELET 80 (L) 541 - 400 K/uL    MPV 12.3 8.9 - 12.9 FL    NRBC 0.0 0.0  WBC    ABSOLUTE NRBC 0.00 0.00 - 0.01 K/uL    NEUTROPHILS 65 32 - 75 %    LYMPHOCYTES 22 12 - 49 %    MONOCYTES 10 5 - 13 %    EOSINOPHILS 2 0 - 7 %    BASOPHILS 1 0 - 1 %    IMMATURE GRANULOCYTES 0 0 - 0.5 %    ABS. NEUTROPHILS 4.0 1.8 - 8.0 K/UL    ABS. LYMPHOCYTES 1.3 0.8 - 3.5 K/UL    ABS. MONOCYTES 0.6 0.0 - 1.0 K/UL    ABS. EOSINOPHILS 0.1 0.0 - 0.4 K/UL    ABS. BASOPHILS 0.0 0.0 - 0.1 K/UL    ABS. IMM.  GRANS. 0.0 0.00 - 0.04 K/UL    DF AUTOMATED     C REACTIVE PROTEIN, QT    Collection Time: 04/08/22  5:00 AM   Result Value Ref Range    C-Reactive protein 1.38 (H) 0.00 - 0.60 mg/dL   SED RATE (ESR)    Collection Time: 04/08/22  5:00 AM   Result Value Ref Range    Sed rate, automated 28 (H) 0 - 20 mm/hr   VANCOMYCIN, RANDOM    Collection Time: 04/08/22  5:00 AM   Result Value Ref Range    Vancomycin, random 16.0 ug/mL   GLUCOSE, POC    Collection Time: 04/08/22 10:03 AM   Result Value Ref Range    Glucose (POC) 80 65 - 117 mg/dL    Performed by Allison Castillo        Recommendations/Plan:     #1 acute kidney injury on chronic kidney disease 3  -Creatinine was 2.0 on admission which is slightly improved to 1.9 today. Baseline creatinine 1.3-1.7 based on labs during previous admission  -Has history of recent GRACIA with creatinine peaking to 2.6 few weeks ago  -GRACIA likely prerenal secondary to hypotension/sepsis  -Continue IV fluids at 75 mils per hour. He is also on pressor support for hypotension  -Urine is suggestive of UTI  -No need for renal ultrasound  -Continue Gerard catheter. Urine output is improving  -Clinically no volume overload  -We will follow up on renal function in a.m. #2 metabolic acidosis  -CO2 is only 16. Likely because of acute kidney injury and lactic acidosis  -We will start oral bicarbonate 1300 twice daily  -If not better, will start bicarbonate drip    #3 severe anemia  -Hemoglobin 6.8. Will transfuse 2 units of blood. Expected to further decrease with IV fluids  -Start weekly Procrit    #4 SIRS/suspected sepsis  -Likely secondary to UTI. Recent UA was abnormal-currently hypothermic hypotensive though no leukocytosis seen. Lactic acid is elevated  -Started on Zosyn and vancomycin pending blood cultures  -On chronic midodrine which has been restarted at 10 mg 3 times daily    #5 right index finger osteomyelitis  -s/p recent antibiotics for 2 weeks. Vancomycin has been resumed during this hospitalization  -ID is on the case    #6 history of liver cirrhosis  -Has history of alcoholic liver cirrhosis. Portal hypertension  -Noted to have abdominal distention.   IR has been consulted  -Had paracentesis during his previous admission also    Thank you for the consultation  ________________________________________________________________________  Signed: Juan Cleveland MD

## 2022-04-08 NOTE — PROGRESS NOTES
Infectious Disease Progress Note           Subjective:   Pt seen and examined at bedside, stable, denies new complaints, no acute events since last seen, remains hypothermic on warming blanket and pressor support   Objective:   Physical Exam:     Visit Vitals  BP 90/60 (BP 1 Location: Left upper arm, BP Patient Position: At rest)   Pulse 74   Temp 98.8 °F (37.1 °C)   Resp 17   Ht 5' 10.98\" (1.803 m)   Wt 180 lb (81.6 kg)   SpO2 100%   BMI 25.12 kg/m²      O2 Device: None (Room air)    Temp (24hrs), Av.7 °F (36.5 °C), Min:95.9 °F (35.5 °C), Max:99.1 °F (37.3 °C)    701 - 1900  In: -   Out: 300 [Urine:300]   1901 -  07  In: 2933.8 [I.V.:2933.8]  Out: 50 [Urine:50]    General: NAD, alert, confused, not following commands   HEENT: HEIDY, Moist mucosa   Lungs: CTA b/l   Heart: S1S2+, RRR, no murmur  Abdo: Soft, NT, ND, +BS   : + fish cath   Exts: No edema, + pulses b/l   Skin: No wounds, No rashes or lesions      Data Review:       Recent Days:  Recent Labs     22  0500 22  1001 22  2315   WBC 6.1 5.7 5.7   HGB 6.9* 8.0* 9.1*   HCT 20.2* 23.8* 27.4*   PLT 80* 105* 99*     Recent Labs     22  0500 22  1001 22  0916 22  2315 22  2315   BUN 19 21*  --   --  18   CREA 1.91* 2.01* 1.74*   < > 1.77*    < > = values in this interval not displayed.        Lab Results   Component Value Date/Time    C-Reactive protein 1.38 (H) 2022 05:00 AM          Microbiology     Results     Procedure Component Value Units Date/Time    CULTURE, URINE [[id number]] Collected: 22 1502    Order Status: Completed Specimen: Urine Updated: 22 1638    MRSA SCREEN - PCR (NASAL) [[id number]] Collected: 22 1345    Order Status: Completed Specimen: Swab Updated: 22 1516     MRSA by PCR, Nasal Not Detected       CULTURE, BLOOD, PAIRED [[id number]] Collected: 22 1001    Order Status: Completed Specimen: Blood Updated: 22 1017    C. DIFFICILE (DNA) [[id number]] Collected: 04/06/22 0919    Order Status: Completed Specimen: Stool Updated: 04/06/22 1056     C. difficile (DNA) Negative       COVID-19 WITH INFLUENZA A/B [[id number]] Collected: 04/05/22 2315    Order Status: Completed Specimen: Nasopharyngeal from Nasopharynx Updated: 04/06/22 0027     SARS-CoV-2 by PCR Not Detected        Comment: Not Detected results do not preclude SARS-CoV-2 infection and should not be used as the sole basis for patient management decisions. Results must be combined with clinical observations, patient history, and epidemiological information        Influenza A by PCR Not Detected        Influenza B by PCR Not Detected        Comment: Testing was performed using leah Concepcion SARS-CoV-2 and Influenza A/B nucleic acid assay. This test is a multiplex Real-Time Reverse Transcriptase Polymerase Chain Reaction (RT-PCR) based in vitro diagnostic test intended for the qualitative detection of nucleic acids from SARS-CoV-2, Influenza A, and Influenza B in nasopharyngeal and nasal swab specimens for use under the FDA's Emergency Use Authorization (EUA) only. Fact sheet for Patients: FindDrives.pl Fact sheet   for Healthcare Providers: FindDrives.pl                  Diagnostics   CXR Results  (Last 48 hours)               04/07/22 0949  XR CHEST PORT Final result    Impression:  Findings/impression:   1. Low lung volumes. Bibasilar hypoventilatory change/atelectasis. Left   retrocardiac lung incompletely evaluated, cannot exclude underlying airspace   disease. 2.  Cardiac and mediastinal contours are obscured by low lung volumes. Ectatic/possible aneurysmal appearance transverse aorta with calcific   atherosclerotic plaque. Appears stable from prior radiograph. 3.  No pleural fluid. No pneumothorax. 4.  Prominent gas-filled bowel partially visualized in the left upper quadrant. Narrative:  EXAM: Chest radiograph. HISTORY: Evaluate for infiltrate. .       Comparison chest: Chest radiograph March 25, 2022. Assessment/Plan     1. Right index finger osteomyelitis, underlying gouty arthritis       S/p debridement w isolation of Staph epidermidis from Cxs      Normal WBC on routine labs, CRP 1.38, and ESR 28      Continue on vanc and Zosyn for now       Routine labs in the morning      2. Sepsis, due to UTI, blood and urine Cxs are pending       On levophed and shemar hugger       WBC WNLs. Continue on Vanc and Zosyn      3. UTI, abnormal UA,  Urinary retention: + indwelling fish cath      Urine Cx from 04/07 is pending, will follow      4.  AMS, h/o metabolic/hepatic encephalopathy, remains confused, not following commands      Griffin Franco MD    4/8/2022

## 2022-04-08 NOTE — DISCHARGE INSTR - DIET

## 2022-04-08 NOTE — PROGRESS NOTES
Vancomycin Dosing Consult    Consult ordered by Dr. Maki Ashraf for this 60 y.o. male for indication of Bone and Joint Infection. Antibiotic regimen: Vancomycin + zosyn    Temp (24hrs), Av.4 °F (35.2 °C), Min:93.6 °F (34.2 °C), Max:97.3 °F (36.3 °C)    Recent Labs     22  0500 22  1001 22  0916 22  2315 22  2315   WBC 6.1 5.7  --   --  5.7   CREA 1.91* 2.01* 1.74*   < > 1.77*   BUN 19 21*  --   --  18    < > = values in this interval not displayed.      Est CrCl: 42 ml/min  Concomitant nephrotoxic drugs: None    Cultures:    blood: pending   urine: pending    MRSA Swab: Not detected - Final    Target range: AUC/YESSENIA 400-600       Assessment/Plan:   Renal function improving but still not at baseline  Patient was previously on vancomycin regimen  Patient not clearing vancomycin as well as expected  Will continue current regimen for now  --projected AUC ~525 with trough 16.8  Will get trough level prior to dose on   Antimicrobial stop date TBD

## 2022-04-09 NOTE — WOUND CARE
IP WOUND CONSULT    [address] [place] [address] RECORD NUMBER:  [id number]  AGE: 60 y.o. GENDER: male  : 1960  TODAY'S DATE:  2022    GENERAL     [] Follow-up   [x] New Consult    Javier Beach is a 60 y.o. male referred by:   [] Physician  [x] Nursing  [] Other:         PAST MEDICAL HISTORY    Past Medical History:   Diagnosis Date    Arthritis     Hypertension         PAST SURGICAL HISTORY    Past Surgical History:   Procedure Laterality Date    IR INSERT NON TUNL CVC OVER 5 YRS  3/25/2022    IR PARACENTESIS ABD W IMAGE  2022    IR PARACENTESIS ABD W IMAGE  3/1/2022    IR PARACENTESIS ABD W IMAGE  3/30/2022       FAMILY HISTORY    No family history on file. ALLERGIES    No Known Allergies    MEDICATIONS    No current facility-administered medications on file prior to encounter. Current Outpatient Medications on File Prior to Encounter   Medication Sig Dispense Refill    linezolid (ZYVOX) 600 mg tablet Take 1 Tablet by mouth two (2) times a day for 10 days. Check CBC wkly while on linezolid 20 Tablet 0    thiamine mononitrate (B-1) 100 mg tablet Take 1 Tablet by mouth daily. 30 Tablet 0    levothyroxine (SYNTHROID) 25 mcg tablet Take 25 mcg by mouth Daily (before breakfast).  tamsulosin (Flomax) 0.4 mg capsule Take 1 Capsule by mouth daily. 30 Capsule 1    potassium chloride (K-DUR, KLOR-CON M20) 20 mEq tablet Take 1 Tablet by mouth daily. 30 Tablet 0    sodium bicarbonate 650 mg tablet Take 1 Tablet by mouth three (3) times daily. 90 Tablet 0    levETIRAcetam (Keppra) 1,000 mg tablet Take 1.5 Tablets by mouth two (2) times a day. 60 Tablet 0    ergocalciferol (ERGOCALCIFEROL) 1,250 mcg (50,000 unit) capsule Take 1 Capsule by mouth every seven (7) days.  lactulose (CHRONULAC) 10 gram/15 mL solution Take 15 mL by mouth three (3) times daily. 200 mL 0    allopurinoL (ZYLOPRIM) 300 mg tablet Take 1 Tablet by mouth daily.       thiamine HCL (B-1) 100 mg tablet Take 100 mg by mouth daily.  propranoloL (INDERAL) 20 mg tablet Take 20 mg by mouth two (2) times a day.  folic acid (FOLVITE) 1 mg tablet Take 1 mg by mouth daily.  famotidine (PEPCID) 20 mg tablet Take 20 mg by mouth At bedtime.  aMILoride (MIDAMOR) 5 mg tablet Take 5 mg by mouth daily. [unfilled]  Visit Vitals  BP (!) 122/94 (BP 1 Location: Left upper arm, BP Patient Position: At rest)   Pulse 67   Temp 97.7 °F (36.5 °C)   Resp 20   Ht 5' 10.98\" (1.803 m)   Wt 85.8 kg (189 lb 2.5 oz)   SpO2 100%   BMI 26.39 kg/m²       ASSESSMENT     Wound Identification & Type: Stage 3 PI noted to left buttock/sacrum  Dressing change: Therahoney and foam  Verbal consent for picture: Non-verbal    Contributing Factors: decreased mobility, shear force, incontinence of stool and incontinence of urine    Wound Buttocks Medial (Active)   Wound Image   04/09/22 1558   Wound Etiology Pressure Stage 3 04/09/22 1558   Dressing Status New dressing applied 04/09/22 1558   Cleansed Cleansed with saline 04/09/22 1558   Dressing/Treatment Honey gel/honey paste; Foam 04/09/22 1558   Dressing Change Due 04/10/22 04/09/22 1558   Wound Length (cm) 8 cm 04/09/22 1558   Wound Width (cm) 1.8 cm 04/09/22 1558   Wound Depth (cm) 0.3 cm 04/09/22 1558   Wound Surface Area (cm^2) 14.4 cm^2 04/09/22 1558   Wound Volume (cm^3) 4.32 cm^3 04/09/22 1558   Wound Assessment Stanley/red;Slough 04/09/22 1558   Drainage Amount Small 04/09/22 1558   Drainage Description Serosanguinous 04/09/22 1558   Wound Odor None 04/09/22 1558   Peyton-Wound/Incision Assessment Intact; Blanchable erythema 04/09/22 1558   Edges Defined edges 04/09/22 1558   Wound Thickness Description Full thickness 04/09/22 1558   Number of days: 4       Incision 04/07/22 Finger (Comment which one) Anterior;Right (Active)   Dressing Status Clean; Intact;Dry 04/09/22 1501   Cleansed Cleansed with saline 04/07/22 6853   Dressing/Treatment Roll gauze;Tape/Soft cloth adhesive tape 04/09/22 1501   Number of days: 2       [REMOVED] Incision Right (Removed)   Number of days:           PLAN     Skin Care & Pressure Relief Recommendations  Minimize layers of linen  Pads under patient to optimize support surface  Turn/reposition approximately every 2 hours  Pillow wedges  Manage incontinence   Promote continence; Skin Protective lotion/cream to buttocks and sacrum daily and as needed with incontinence care  Offload heels pillows    Curtis 12  Blood Glucose: 80                             Albumin: 2.1  WBCs: 6.3    Support Surface: [place]    Physician/Provider notified:   Recommendations: Patient has stage 3 pressure injury noted to right buttock/sacral area. Wound bed is pink/red with some green slough. Area was cleansed with NS and a thin layer of Therahoney was applied to wound bed and covered with Optifoam sacral dressing. Dressing to be changed daily and PRN for soiling. Patient is bed bound and total care. He is incontinent of bowel and bladder. Patient had a small soft stool during assessment. Incontinent care provided and underpad changed. Patient has a fish in place for urine incontinence. Patient is to be turned approximately every 2 hours with wedge/pillows at an angle of 30 degrees or more if can be tolerated and not contraindicated. FOB should be elevated to prevent friction/shear from patient sliding down in bed. HOB should be elevated to 30 degrees or less to assist with offloading and pressure reduction to sacrum/coccyx. Heels should be floated on 2-3 pillows so that they are not touching any surface to allow for offloading and to assist with pressure injury prevention. HOB to be maintained at 30 degrees or less, if not contraindicated, to aide in reducing pressure on sacrum. Elevate FOB to reduce friction and shear from patient sliding down in bed.       Discharge Wound Care Needs:    Teaching completed with:   [] Patient           [] Family member       [] Caregiver          [] Nursing  [] Other    Patient/Caregiver Teaching:  Level of patient/caregiver understanding able to:   [] Indicates understanding       [] Needs reinforcement  [] Unsuccessful      [] Verbal Understanding  [] Demonstrated understanding       [] No evidence of learning  [] Refused teaching         [] N/A       Electronically signed by Hakeem Avina on 4/9/2022 at 4:00 PM

## 2022-04-09 NOTE — PROGRESS NOTES
Infectious Disease Progress Note           Subjective:   Pt seen and examined at bedside. Remains in the ICU on pressor support, intermittent episodes of hyothermia   Objective:   Physical Exam:     Visit Vitals  BP (!) 115/91 (BP 1 Location: Left upper arm, BP Patient Position: At rest)   Pulse 65   Temp 97.5 °F (36.4 °C)   Resp 15   Ht 5' 10.98\" (1.803 m)   Wt 189 lb 2.5 oz (85.8 kg)   SpO2 98%   BMI 26.39 kg/m²      O2 Device: None (Room air)    Temp (24hrs), Av.2 °F (36.2 °C), Min:96.3 °F (35.7 °C), Max:98.4 °F (36.9 °C)    701 - 1900  In: 1308.4 [I.V.:878.4]  Out: -    1901 -  07  In: 3909.1 [P.O.:100;  I.V.:3809.1]  Out: 530 [Urine:530]    General: NAD, alert, confused, not following commands   HEENT: HEIDY, Moist mucosa   Lungs: CTA b/l   Heart: S1S2+, RRR, no murmur  Abdo: Soft, NT, ND, +BS   : + fish cath   Exts: No edema, + pulses b/l   Skin: No wounds, No rashes or lesions      Data Review:       Recent Days:  Recent Labs     22  0400 22  0500 22  1001   WBC 6.3 6.1 5.7   HGB 6.5* 6.9* 8.0*   HCT 18.6* 20.2* 23.8*   PLT 83* 80* 105*     Recent Labs     22  0400 22  0500 22  1001   BUN 19 19 21*   CREA 1.97* 1.91* 2.01*       Lab Results   Component Value Date/Time    C-Reactive protein 1.38 (H) 2022 05:00 AM          Microbiology     Results     Procedure Component Value Units Date/Time    CULTURE, URINE [[id number]] Collected: 22 1502    Order Status: Completed Specimen: Urine Updated: 22 1414     Special Requests: --        No Special Requests  Reflexed from [id number]       Culture result: No Growth (<1000 cfu/mL)       MRSA SCREEN - PCR (NASAL) [[id number]] Collected: 22 1345    Order Status: Completed Specimen: Swab Updated: 22 1516     MRSA by PCR, Nasal Not Detected       CULTURE, BLOOD, PAIRED [[id number]] Collected: 22 1001    Order Status: Completed Specimen: Blood Updated: 04/07/22 1017    C. DIFFICILE (DNA) [[id number]] Collected: 04/06/22 0919    Order Status: Completed Specimen: Stool Updated: 04/06/22 1056     C. difficile (DNA) Negative       COVID-19 WITH INFLUENZA A/B [[id number]] Collected: 04/05/22 2315    Order Status: Completed Specimen: Nasopharyngeal from Nasopharynx Updated: 04/06/22 0027     SARS-CoV-2 by PCR Not Detected        Comment: Not Detected results do not preclude SARS-CoV-2 infection and should not be used as the sole basis for patient management decisions. Results must be combined with clinical observations, patient history, and epidemiological information        Influenza A by PCR Not Detected        Influenza B by PCR Not Detected        Comment: Testing was performed using leah Concepcion SARS-CoV-2 and Influenza A/B nucleic acid assay. This test is a multiplex Real-Time Reverse Transcriptase Polymerase Chain Reaction (RT-PCR) based in vitro diagnostic test intended for the qualitative detection of nucleic acids from SARS-CoV-2, Influenza A, and Influenza B in nasopharyngeal and nasal swab specimens for use under the FDA's Emergency Use Authorization (EUA) only. Fact sheet for Patients: FindDrives.pl Fact sheet   for Healthcare Providers: FindDrives.pl                  Diagnostics   CXR Results  (Last 48 hours)    None             Assessment/Plan     1. Right index finger osteomyelitis, underlying gouty arthritis       S/p debridement w isolation of Staph epidermidis from Cxs      Ongoing episodes or hypothermia, WBC WNLs       On day # 18/28 of Vanc. Routine labs in the morning      2. Sepsis, due to # 1 and suspected UTI, but urine Cx is neg       Blood Cx from 04/07 is pending.  Remains on pressor support      Will leave on vanc and Zosyn for now       Check random cortisol in the morning, consider low dose steroid therapy per results        3. UTI, abnormal UA,  Urinary retention: + indwelling fish cath      Urine Cx from 04/07 is negative      4.  AMS, h/o metabolic/hepatic encephalopathy, waxing and waning mentation      Gisella Crawford MD    4/9/2022

## 2022-04-09 NOTE — PROGRESS NOTES
General Daily Progress Note          Patient Name:   Todd Rosario       YOB: 1960       Age:  60 y.o. Admit Date: 4/7/2022      Subjective:     Todd Rosario is a(n) 60 y.o. male with PMH significant for HTN, cirrhosis, ascites presents via EMS for removing fish catheter. Patient recently d/c after lengthy stay in hospital for septic DIP joint of right index finger. He was d/c on 10 days of PO Zyvox, which he completed. He presents today after pulling out fish catheter. Several blood clots noted. Patient septic upon arrival with hypothermia and hypotension. Temp 93.6 rectal, BP 84/68. Patient is retaining 887cc of urine and nursing staff will replace fish.     Patient started on fluids and IV Levaquin in the ED. Patient is altered at baseline. Head CT w/o contrast shows no acute abnormality. Blood cultures pending.  CXR and KUB both normal       Patient is alert awake confused  Hypotensive on Levophed          Objective:     Visit Vitals  /82 (BP 1 Location: Left upper arm, BP Patient Position: At rest)   Pulse 67   Temp 97.7 °F (36.5 °C)   Resp 18   Ht 5' 10.98\" (1.803 m)   Wt 85.8 kg (189 lb 2.5 oz)   SpO2 96%   BMI 26.39 kg/m²        Recent Results (from the past 24 hour(s))   CBC W/O DIFF    Collection Time: 04/09/22  4:00 AM   Result Value Ref Range    WBC 6.3 4.1 - 11.1 K/uL    RBC 2.26 (L) 4.10 - 5.70 M/uL    HGB 6.5 (L) 12.1 - 17.0 g/dL    HCT 18.6 (L) 36.6 - 50.3 %    MCV 82.3 80.0 - 99.0 FL    MCH 28.8 26.0 - 34.0 PG    MCHC 34.9 30.0 - 36.5 g/dL    RDW 19.4 (H) 11.5 - 14.5 %    PLATELET 83 (L) 853 - 400 K/uL    MPV 10.9 8.9 - 12.9 FL    NRBC 0.0 0.0  WBC    ABSOLUTE NRBC 0.00 0.00 - 1.84 K/uL   METABOLIC PANEL, COMPREHENSIVE    Collection Time: 04/09/22  4:00 AM   Result Value Ref Range    Sodium 144 136 - 145 mmol/L    Potassium 3.6 3.5 - 5.1 mmol/L    Chloride 119 (H) 97 - 108 mmol/L    CO2 18 (L) 21 - 32 mmol/L    Anion gap 7 5 - 15 mmol/L    Glucose 93 65 - 100 mg/dL    BUN 19 6 - 20 mg/dL    Creatinine 1.97 (H) 0.70 - 1.30 mg/dL    BUN/Creatinine ratio 10 (L) 12 - 20      GFR est AA 42 (L) >60 ml/min/1.73m2    GFR est non-AA 35 (L) >60 ml/min/1.73m2    Calcium 7.4 (L) 8.5 - 10.1 mg/dL    Bilirubin, total 1.3 (H) 0.2 - 1.0 mg/dL    AST (SGOT) 20 15 - 37 U/L    ALT (SGPT) 10 (L) 12 - 78 U/L    Alk. phosphatase 61 45 - 117 U/L    Protein, total 5.3 (L) 6.4 - 8.2 g/dL    Albumin 2.1 (L) 3.5 - 5.0 g/dL    Globulin 3.2 2.0 - 4.0 g/dL    A-G Ratio 0.7 (L) 1.1 - 2.2     TYPE & SCREEN    Collection Time: 04/09/22  4:00 AM   Result Value Ref Range    Crossmatch Expiration 04/12/2022,2359     ABO/Rh(D) O Positive     Antibody screen Negative     Unit number [id number]     Blood component type RC LR     Unit division 00     Status of unit Issued     [address] to transfuse     Crossmatch result Compatible      [unfilled]      Review of Systems    Constitutional: Negative for chills and fever. HENT: Negative. Eyes: Negative. Respiratory: Negative. Cardiovascular: Negative. Gastrointestinal: Negative for abdominal pain and nausea. Skin: Negative. Neurological: Negative. Physical Exam:      Constitutional: pt is oriented to person, place, and time. HENT:   Head: Normocephalic and atraumatic. Eyes: Pupils are equal, round, and reactive to light. EOM are normal.   Cardiovascular: Normal rate, regular rhythm and normal heart sounds. Pulmonary/Chest: Breath sounds normal. No wheezes. No rales. Exhibits no tenderness. Abdominal: Soft. Bowel sounds are normal. There is no abdominal tenderness. There is no rebound and no guarding. Musculoskeletal: Normal range of motion. Neurological: pt is alert and oriented to person, place, and time. XR HAND RT MIN 3 V   Final Result      CT HEAD WO CONT   Final Result   Age-appropriate atrophy. No acute findings.           XR ABD (KUB)   Final Result   Within normal      XR CHEST PORT   Final Result   Findings/impression:   1. Low lung volumes. Bibasilar hypoventilatory change/atelectasis. Left   retrocardiac lung incompletely evaluated, cannot exclude underlying airspace   disease. 2.  Cardiac and mediastinal contours are obscured by low lung volumes. Ectatic/possible aneurysmal appearance transverse aorta with calcific   atherosclerotic plaque. Appears stable from prior radiograph. 3.  No pleural fluid. No pneumothorax. 4.  Prominent gas-filled bowel partially visualized in the left upper quadrant. IR INSERT NON TUNL CVC OVER 5 YRS    (Results Pending)   IR PARACENTESIS ABD W IMAGE    (Results Pending)   IR US GUIDED VASCULAR ACCESS    (Results Pending)   IR FLUORO GUIDE PLC CVAD    (Results Pending)        Recent Results (from the past 24 hour(s))   CBC W/O DIFF    Collection Time: 04/09/22  4:00 AM   Result Value Ref Range    WBC 6.3 4.1 - 11.1 K/uL    RBC 2.26 (L) 4.10 - 5.70 M/uL    HGB 6.5 (L) 12.1 - 17.0 g/dL    HCT 18.6 (L) 36.6 - 50.3 %    MCV 82.3 80.0 - 99.0 FL    MCH 28.8 26.0 - 34.0 PG    MCHC 34.9 30.0 - 36.5 g/dL    RDW 19.4 (H) 11.5 - 14.5 %    PLATELET 83 (L) 438 - 400 K/uL    MPV 10.9 8.9 - 12.9 FL    NRBC 0.0 0.0  WBC    ABSOLUTE NRBC 0.00 0.00 - 1.88 K/uL   METABOLIC PANEL, COMPREHENSIVE    Collection Time: 04/09/22  4:00 AM   Result Value Ref Range    Sodium 144 136 - 145 mmol/L    Potassium 3.6 3.5 - 5.1 mmol/L    Chloride 119 (H) 97 - 108 mmol/L    CO2 18 (L) 21 - 32 mmol/L    Anion gap 7 5 - 15 mmol/L    Glucose 93 65 - 100 mg/dL    BUN 19 6 - 20 mg/dL    Creatinine 1.97 (H) 0.70 - 1.30 mg/dL    BUN/Creatinine ratio 10 (L) 12 - 20      GFR est AA 42 (L) >60 ml/min/1.73m2    GFR est non-AA 35 (L) >60 ml/min/1.73m2    Calcium 7.4 (L) 8.5 - 10.1 mg/dL    Bilirubin, total 1.3 (H) 0.2 - 1.0 mg/dL    AST (SGOT) 20 15 - 37 U/L    ALT (SGPT) 10 (L) 12 - 78 U/L    Alk.  phosphatase 61 45 - 117 U/L    Protein, total 5.3 (L) 6.4 - 8.2 g/dL    Albumin 2.1 (L) 3.5 - 5.0 g/dL    Globulin 3.2 2.0 - 4.0 g/dL    A-G Ratio 0.7 (L) 1.1 - 2.2     TYPE & SCREEN    Collection Time: 04/09/22  4:00 AM   Result Value Ref Range    Crossmatch Expiration 04/12/2022,2359     ABO/Rh(D) O Positive     Antibody screen Negative     Unit number [id number]     Blood component type RC LR     Unit division 00     Status of unit [address] to transfuse     Crossmatch result Compatible        Results     Procedure Component Value Units Date/Time    CULTURE, URINE [[id number]] Collected: 04/07/22 1502    Order Status: Completed Specimen: Urine Updated: 04/09/22 1414     Special Requests: --        No Special Requests  Reflexed from [id number]       Culture result: No Growth (<1000 cfu/mL)       MRSA SCREEN - PCR (NASAL) [[id number]] Collected: 04/07/22 1345    Order Status: Completed Specimen: Swab Updated: 04/07/22 1516     MRSA by PCR, Nasal Not Detected       CULTURE, BLOOD, PAIRED [[id number]] Collected: 04/07/22 1001    Order Status: Completed Specimen: Blood Updated: 04/07/22 1017    C. DIFFICILE (DNA) [[id number]] Collected: 04/06/22 0919    Order Status: Completed Specimen: Stool Updated: 04/06/22 1056     C. difficile (DNA) Negative       COVID-19 WITH INFLUENZA A/B [[id number]] Collected: 04/05/22 2315    Order Status: Completed Specimen: Nasopharyngeal from Nasopharynx Updated: 04/06/22 0027     SARS-CoV-2 by PCR Not Detected        Comment: Not Detected results do not preclude SARS-CoV-2 infection and should not be used as the sole basis for patient management decisions. Results must be combined with clinical observations, patient history, and epidemiological information        Influenza A by PCR Not Detected        Influenza B by PCR Not Detected        Comment: Testing was performed using leah Concepcion SARS-CoV-2 and Influenza A/B nucleic acid assay.  This test is a multiplex Real-Time Reverse Transcriptase Polymerase Chain Reaction (RT-PCR) based in vitro diagnostic test intended for the qualitative detection of nucleic acids from SARS-CoV-2, Influenza A, and Influenza B in nasopharyngeal and nasal swab specimens for use under the FDA's Emergency Use Authorization (EUA) only. Fact sheet for Patients: FindDrives.pl Fact sheet   for Healthcare Providers: FindDrives.pl                Labs:     Recent Labs     04/09/22  0400 04/08/22  0500   WBC 6.3 6.1   HGB 6.5* 6.9*   HCT 18.6* 20.2*   PLT 83* 80*     Recent Labs     04/09/22  0400 04/08/22  0500 04/07/22  1001    141 138   K 3.6 3.8 Hemolyzed, recollect requested   * 116* 112*   CO2 18* 16* 17*   BUN 19 19 21*   CREA 1.97* 1.91* 2.01*   GLU 93 79 103*   CA 7.4* 7.9* 8.1*     Recent Labs     04/09/22  0400 04/08/22  0500 04/07/22  1001   ALT 10* 13 20   AP 61 67 77   TBILI 1.3* 1.4* 1.4*   TP 5.3* 5.3* 6.8   ALB 2.1* 2.2* 2.5*   GLOB 3.2 3.1 4.3*     No results for input(s): INR, PTP, APTT, INREXT, INREXT in the last 72 hours. No results for input(s): FE, TIBC, PSAT, FERR in the last 72 hours. No results found for: FOL, RBCF   No results for input(s): PH, PCO2, PO2 in the last 72 hours. No results for input(s): CPK, CKNDX, TROIQ in the last 72 hours.     No lab exists for component: CPKMB  No results found for: CHOL, CHOLX, CHLST, CHOLV, HDL, HDLP, LDL, LDLC, DLDLP, TGLX, TRIGL, TRIGP, CHHD, CHHDX  Lab Results   Component Value Date/Time    Glucose (POC) 80 04/08/2022 10:03 AM    Glucose (POC) 84 04/04/2022 05:40 PM    Glucose (POC) 107 04/03/2022 11:12 AM    Glucose (POC) 111 03/31/2022 11:35 AM    Glucose (POC) 91 03/29/2022 11:10 AM     Lab Results   Component Value Date/Time    Color [place] 04/07/2022 03:02 PM    Appearance Turbid (A) 04/07/2022 03:02 PM    Specific gravity 1.021 04/07/2022 03:02 PM    pH (UA) 6.0 04/07/2022 03:02 PM    Protein 100 (A) 04/07/2022 03:02 PM    Glucose Negative 04/07/2022 03:02 PM    Ketone Negative 04/07/2022 03:02 PM Bilirubin Negative 04/07/2022 03:02 PM    Urobilinogen 0.1 04/07/2022 03:02 PM    Nitrites Negative 04/07/2022 03:02 PM    Leukocyte Esterase Trace (A) 04/07/2022 03:02 PM    Bacteria Negative 04/07/2022 03:02 PM    WBC >100 (H) 04/07/2022 03:02 PM    RBC >100 (H) 04/07/2022 03:02 PM         Assessment:     Sepsis  Lactic acidosis  Acute on chronic renal failure- creat 2.01  Cirrhosis with ascites  Anemia  History of septic DIP joint, right index finger  Hypothyroidism  Seizure disorder  Gout  GERD  History of etoh abuse  Hypotension  Metabolic acidosis  Hypothermia    Plan: On allopurinol 300 mg daily  Vitamin D 50,000 weekly  Pepcid 20 twice daily  Folic acid 1 mg daily  Heparin 5000 every 8 hours  Lactulose 10 g 3 times a day  Keppra 1500 twice a day  Synthroid 25 mcg daily  Midodrine 10 mg 3 times a day  IV Zosyn every 8 hours  Propranolol 20 mg twice a day  Sodium bicarb 1303 times a day  Flomax 0.4 mg daily  Vancomycin with pharmacy      Current Facility-Administered Medications:     0.9% sodium chloride infusion 250 mL, 250 mL, IntraVENous, PRN, Lamberto Ackerman MD    Vancomycin trough level - please draw prior to dose on 4/9 @ 1900.  Thanks!, , Other, ONCE, Stevie Acevedo MD    midodrine (PROAMATINE) tablet 10 mg, 10 mg, Oral, TID WITH MEALS, Lamberto Ackerman MD, 10 mg at 04/09/22 1127    sodium bicarbonate tablet 1,300 mg, 1,300 mg, Oral, TID, Scotty Ames MD, 1,300 mg at 04/09/22 0840    LORazepam (ATIVAN) injection 1 mg, 1 mg, IntraVENous, Q4H PRN, Lamberto Ackerman MD, 1 mg at 04/08/22 1733    sodium chloride (NS) flush 5-10 mL, 5-10 mL, IntraVENous, PRN, Roly Ackerman MD    piperacillin-tazobactam (ZOSYN) 3.375 g in 0.9% sodium chloride (MBP/ADV) 100 mL MBP, 3.375 g, IntraVENous, Q8H, Lamberto Ackerman MD, Last Rate: 25 mL/hr at 04/09/22 0840, 3.375 g at 04/09/22 0840    lactulose (CHRONULAC) 10 gram/15 mL solution 15 mL, 10 g, Oral, TID, Roly Ackerman MD, 15 mL at 04/08/22 0813    levETIRAcetam (KEPPRA) tablet 1,500 mg, 1,500 mg, Oral, BID, Lamberto Ackerman MD, 1,500 mg at 04/09/22 0840    tamsulosin (FLOMAX) capsule 0.4 mg, 0.4 mg, Oral, DAILY, Lamberto Ackerman MD, 0.4 mg at 04/09/22 0840    thiamine mononitrate (B-1) tablet 100 mg, 100 mg, Oral, DAILY, Lamberto Ackerman MD, 100 mg at 04/09/22 0840    allopurinoL (ZYLOPRIM) tablet 300 mg, 300 mg, Oral, DAILY, Lamberto Ackerman MD, 300 mg at 04/09/22 0840    ergocalciferol capsule 50,000 Units, 50,000 Units, Oral, Q7D, Lamberto Ackerman MD    famotidine (PEPCID) tablet 20 mg, 20 mg, Oral, BID, Lamberto Ackerman MD, 20 mg at 60/91/16 1679    folic acid (FOLVITE) tablet 1 mg, 1 mg, Oral, DAILY, Duane Fletcher MD, 1 mg at 04/09/22 0840    levothyroxine (SYNTHROID) tablet 25 mcg, 25 mcg, Oral, ACB, Duane Fletcher MD, 25 mcg at 04/09/22 0840    propranoloL (INDERAL) tablet 20 mg, 20 mg, Oral, BID, Duane Fletcher MD, 20 mg at 04/07/22 2213    0.9% sodium chloride infusion, 75 mL/hr, IntraVENous, CONTINUOUS, Lamberto Ackerman MD, Last Rate: 75 mL/hr at 04/09/22 0625, 75 mL/hr at 04/09/22 0231    acetaminophen (TYLENOL) tablet 650 mg, 650 mg, Oral, Q6H PRN **OR** acetaminophen (TYLENOL) suppository 650 mg, 650 mg, Rectal, Q6H PRN, Lamberto Ackerman MD    polyethylene glycol (MIRALAX) packet 17 g, 17 g, Oral, DAILY PRN, Diego Ackerman MD    ondansetron (ZOFRAN ODT) tablet 4 mg, 4 mg, Oral, Q8H PRN **OR** ondansetron (ZOFRAN) injection 4 mg, 4 mg, IntraVENous, Q6H PRN, Lamberto Ackerman MD    heparin (porcine) injection 5,000 Units, 5,000 Units, SubCUTAneous, Q8H, Lamberto Ackerman MD, 5,000 Units at 04/09/22 9196    VANCOMYCIN INFORMATION NOTE, , Other, Rx Dosing/Monitoring, Deidre Madsen MD    vancomycin (VANCOCIN) 750 mg in 0.9% sodium chloride 250 mL (VIAL-MATE), 750 mg, IntraVENous, Q24H, Ree Zaragoza MD, Last Rate: 250 mL/hr at 04/08/22 1800, 750 mg at 04/08/22 1800    NOREPINephrine (LEVOPHED) 8 mg in 0.9% NS 250ml infusion, 0.5-120 mcg/min, IntraVENous, TITRATE, Lamberto Ackerman MD, Last Rate: 20.6 mL/hr at 04/09/22 1319, 11 mcg/min at 04/09/22 1319

## 2022-04-09 NOTE — PROGRESS NOTES
Renal Note    NAME:  Katherine Patient   :   1960   MRN:   [id number]     ATTENDING: Laura Kennedy MD  PCP:  Kaity Hemphill NP    Date/Time:  2022 12:46 PM      Subjective:     Patient seen in the ICU. He is lethargic. Still on warming blanket. Creatinine has increased to 1.9 today. Still on single pressor support. Still having episodes of hypothermia intermittently  Empiric antibiotics    Past Medical History:   Diagnosis Date    Arthritis     Hypertension       Past Surgical History:   Procedure Laterality Date    IR INSERT NON TUNL CVC OVER 5 YRS  3/25/2022    IR PARACENTESIS ABD W IMAGE  2022    IR PARACENTESIS ABD W IMAGE  3/1/2022    IR PARACENTESIS ABD W IMAGE  3/30/2022     Social History     Tobacco Use    Smoking status: Never Smoker    Smokeless tobacco: Never Used   Substance Use Topics    Alcohol use: Not on file      No family history on file. No Known Allergies   Prior to Admission medications    Medication Sig Start Date End Date Taking? Authorizing Provider   linezolid (ZYVOX) 600 mg tablet Take 1 Tablet by mouth two (2) times a day for 10 days. Check CBC wkly while on linezolid 22  Elex Krabbe, MD   thiamine mononitrate (B-1) 100 mg tablet Take 1 Tablet by mouth daily. 22   Alex Ackerman MD   levothyroxine (SYNTHROID) 25 mcg tablet Take 25 mcg by mouth Daily (before breakfast). Provider, Historical   tamsulosin (Flomax) 0.4 mg capsule Take 1 Capsule by mouth daily. 3/4/22   Carmen Whyte PA-C   potassium chloride (K-DUR, KLOR-CON M20) 20 mEq tablet Take 1 Tablet by mouth daily. 3/2/22   Braulio Garcia MD   sodium bicarbonate 650 mg tablet Take 1 Tablet by mouth three (3) times daily. 3/2/22   Braulio Garcia MD   levETIRAcetam (Keppra) 1,000 mg tablet Take 1.5 Tablets by mouth two (2) times a day.  3/2/22   Braulio Garcia MD   ergocalciferol (ERGOCALCIFEROL) 1,250 mcg (50,000 unit) capsule Take 1 Capsule by mouth every seven (7) days. 22   Provider, Historical   lactulose (CHRONULAC) 10 gram/15 mL solution Take 15 mL by mouth three (3) times daily. 22   Kobe Ackerman MD   allopurinoL (ZYLOPRIM) 300 mg tablet Take 1 Tablet by mouth daily. 22   Provider, Historical   thiamine HCL (B-1) 100 mg tablet Take 100 mg by mouth daily. Provider, Historical   propranoloL (INDERAL) 20 mg tablet Take 20 mg by mouth two (2) times a day. Provider, Historical   folic acid (FOLVITE) 1 mg tablet Take 1 mg by mouth daily. Provider, Historical   famotidine (PEPCID) 20 mg tablet Take 20 mg by mouth At bedtime. Provider, Historical   aMILoride (MIDAMOR) 5 mg tablet Take 5 mg by mouth daily. Provider, Historical       REVIEW OF SYSTEMS:       She is lethargic. To obtain    Objective:   VITALS:    Visit Vitals  BP (!) 115/91 (BP 1 Location: Left upper arm, BP Patient Position: At rest)   Pulse 65   Temp 97.5 °F (36.4 °C)   Resp 15   Ht 5' 10.98\" (1.803 m)   Wt 85.8 kg (189 lb 2.5 oz)   SpO2 98%   BMI 26.39 kg/m²     Temp (24hrs), Av.2 °F (36.2 °C), Min:96.3 °F (35.7 °C), Max:98.4 °F (36.9 °C)      PHYSICAL EXAM:     General: NAD, lethargic eyes: sclera anicteric  Oral Cavity: No thrush or ulcers  Neck: no JVD  Chest: Fair bilateral air entry. No Wheezing or Rhonchi. No rales.   Heart: normal sounds  Abdomen: soft and non tender   :  Gerard+  Lower Extremities: no edema  Skin: no rash  Neuro: Lethargic psychiatric: Able to assess      LAB DATA REVIEWED:    Recent Results (from the past 24 hour(s))   CBC W/O DIFF    Collection Time: 22  4:00 AM   Result Value Ref Range    WBC 6.3 4.1 - 11.1 K/uL    RBC 2.26 (L) 4.10 - 5.70 M/uL    HGB 6.5 (L) 12.1 - 17.0 g/dL    HCT 18.6 (L) 36.6 - 50.3 %    MCV 82.3 80.0 - 99.0 FL    MCH 28.8 26.0 - 34.0 PG    MCHC 34.9 30.0 - 36.5 g/dL    RDW 19.4 (H) 11.5 - 14.5 %    PLATELET 83 (L) 239 - 400 K/uL    MPV 10.9 8.9 - 12.9 FL    NRBC 0.0 0. 0  WBC    ABSOLUTE NRBC 0.00 0.00 - 2.23 K/uL   METABOLIC PANEL, COMPREHENSIVE    Collection Time: 04/09/22  4:00 AM   Result Value Ref Range    Sodium 144 136 - 145 mmol/L    Potassium 3.6 3.5 - 5.1 mmol/L    Chloride 119 (H) 97 - 108 mmol/L    CO2 18 (L) 21 - 32 mmol/L    Anion gap 7 5 - 15 mmol/L    Glucose 93 65 - 100 mg/dL    BUN 19 6 - 20 mg/dL    Creatinine 1.97 (H) 0.70 - 1.30 mg/dL    BUN/Creatinine ratio 10 (L) 12 - 20      GFR est AA 42 (L) >60 ml/min/1.73m2    GFR est non-AA 35 (L) >60 ml/min/1.73m2    Calcium 7.4 (L) 8.5 - 10.1 mg/dL    Bilirubin, total 1.3 (H) 0.2 - 1.0 mg/dL    AST (SGOT) 20 15 - 37 U/L    ALT (SGPT) 10 (L) 12 - 78 U/L    Alk. phosphatase 61 45 - 117 U/L    Protein, total 5.3 (L) 6.4 - 8.2 g/dL    Albumin 2.1 (L) 3.5 - 5.0 g/dL    Globulin 3.2 2.0 - 4.0 g/dL    A-G Ratio 0.7 (L) 1.1 - 2.2     TYPE & SCREEN    Collection Time: 04/09/22  4:00 AM   Result Value Ref Range    Crossmatch Expiration 04/12/2022,2359     ABO/Rh(D) O Positive     Antibody screen Negative     Unit number [id number]     Blood component type RC LR     Unit division 00     Status of unit Issued     [address] to transfuse     Crossmatch result Compatible    VANCOMYCIN, TROUGH    Collection Time: 04/09/22  4:07 PM   Result Value Ref Range    Vancomycin,trough 16.2 (H) 5.0 - 10.0 ug/mL    Reported dose date Dose Dependent      Reported dose: Dose Dependent Units       Recommendations/Plan:     #1 acute kidney injury on chronic kidney disease 3  -Creatinine was 2.0 on admission   -Creatinine has increased to 2.1 today. Baseline creatinine 1.3-1.7 based on labs during previous admission  -Has history of recent GRACIA with creatinine peaking to 2.6 few weeks ago  -GRACIA likely prerenal secondary to hypotension/sepsis  -Continue IV fluids at 75 mils per hour. He is also on pressor support for hypotension  -Urine is suggestive of UTI  -No need for renal ultrasound  -Continue Gerard catheter.   Urine output is improving  -Clinically no volume overload  -We will follow up on renal function in a.m. #2 metabolic acidosis  -CO2 improved to 18 likely because of acute kidney injury and lactic acidosis  -New oral bicarbonate 1300 TID  -If any worse will start IV bicarbonate drip    #3 severe anemia  -Hemoglobin 6.8. Will transfuse 2 units of blood. Expected to further decrease with IV fluids  -Start weekly Procrit    #4 SIRS/suspected sepsis  -Likely secondary to UTI. Recent UA was abnormal-currently hypothermic hypotensive though no leukocytosis seen. Lactic acid is elevated  -Started on Zosyn and vancomycin pending blood cultures  -Continue midodrine 10 mg 3 times daily for chronic hypotension     #5 right index finger osteomyelitis  -s/p recent antibiotics for 2 weeks. Vancomycin has been resumed during this hospitalization  -ID is on the case    #6 history of liver cirrhosis  -Has history of alcoholic liver cirrhosis. Portal hypertension  -Noted to have abdominal distention.   IR has been consulted  -Had paracentesis during his previous admission also      ________________________________________________________________________  Signed: Inna Ozuna MD

## 2022-04-09 NOTE — PROGRESS NOTES
OT eval order received and acknowledged and attempted at 1228 however per chart review pt with HGB of 6.5 thus will hold OT eval att his time. Will continue to follow pt and attempt OT eval at a later time. Thank you.

## 2022-04-10 NOTE — PROGRESS NOTES
Problem: Pressure Injury - Risk of  Goal: *Prevention of pressure injury  Description: Document Curtis Scale and appropriate interventions in the flowsheet. Outcome: Progressing Towards Goal  Note: Pressure Injury Interventions:  Sensory Interventions: Assess changes in LOC,Assess need for specialty bed,Avoid rigorous massage over bony prominences,Keep linens dry and wrinkle-free,Minimize linen layers,Monitor skin under medical devices,Pad between skin to skin,Pressure redistribution bed/mattress (bed type),Turn and reposition approx. every two hours (pillows and wedges if needed)    Moisture Interventions: Absorbent underpads,Apply protective barrier, creams and emollients,Assess need for specialty bed,Check for incontinence Q2 hours and as needed,Internal/External urinary devices,Minimize layers    Activity Interventions: Pressure redistribution bed/mattress(bed type)    Mobility Interventions: Pressure redistribution bed/mattress (bed type)    Nutrition Interventions: Document food/fluid/supplement intake    Friction and Shear Interventions: Apply protective barrier, creams and emollients,HOB 30 degrees or less                Problem: Patient Education: Go to Patient Education Activity  Goal: Patient/Family Education  Outcome: Progressing Towards Goal     Problem: Falls - Risk of  Goal: *Absence of Falls  Description: Document [place] Fall Risk and appropriate interventions in the flowsheet.   Outcome: Progressing Towards Goal  Note: Fall Risk Interventions:       Mentation Interventions: Adequate sleep, hydration, pain control,Bed/chair exit alarm    Medication Interventions: Bed/chair exit alarm    Elimination Interventions: Bed/chair exit alarm,Call light in reach              Problem: Patient Education: Go to Patient Education Activity  Goal: Patient/Family Education  Outcome: Progressing Towards Goal     Problem: Aspiration - Risk of  Goal: *Absence of aspiration  Outcome: Progressing Towards Goal Problem: Patient Education: Go to Patient Education Activity  Goal: Patient/Family Education  Outcome: Progressing Towards Goal     Problem: Patient Education: Go to Patient Education Activity  Goal: Patient/Family Education  Outcome: Progressing Towards Goal     Problem: Impaired Skin Integrity/Pressure Injury Treatment  Goal: *Improvement of Existing Pressure Injury  Outcome: Progressing Towards Goal  Goal: *Prevention of pressure injury  Description: Document Curtis Scale and appropriate interventions in the flowsheet. Outcome: Progressing Towards Goal  Note: Pressure Injury Interventions:  Sensory Interventions: Assess changes in LOC,Assess need for specialty bed,Avoid rigorous massage over bony prominences,Keep linens dry and wrinkle-free,Minimize linen layers,Monitor skin under medical devices,Pad between skin to skin,Pressure redistribution bed/mattress (bed type),Turn and reposition approx.  every two hours (pillows and wedges if needed)    Moisture Interventions: Absorbent underpads,Apply protective barrier, creams and emollients,Assess need for specialty bed,Check for incontinence Q2 hours and as needed,Internal/External urinary devices,Minimize layers    Activity Interventions: Pressure redistribution bed/mattress(bed type)    Mobility Interventions: Pressure redistribution bed/mattress (bed type)    Nutrition Interventions: Document food/fluid/supplement intake    Friction and Shear Interventions: Apply protective barrier, creams and emollients,HOB 30 degrees or less                Problem: Patient Education: Go to Patient Education Activity  Goal: Patient/Family Education  Outcome: Progressing Towards Goal

## 2022-04-10 NOTE — PROGRESS NOTES
General Daily Progress Note          Patient Name:   Cassandra Prieto       YOB: 1960       Age:  60 y.o. Admit Date: 4/7/2022      Subjective:     Cassandra Prieto is a(n) 60 y.o. male with PMH significant for HTN, cirrhosis, ascites presents via EMS for removing fish catheter. Patient recently d/c after lengthy stay in hospital for septic DIP joint of right index finger. He was d/c on 10 days of PO Zyvox, which he completed. He presents today after pulling out fish catheter. Several blood clots noted. Patient septic upon arrival with hypothermia and hypotension. Temp 93.6 rectal, BP 84/68. Patient is retaining 887cc of urine and nursing staff will replace fish.     Patient started on fluids and IV Levaquin in the ED. Patient is altered at baseline. Head CT w/o contrast shows no acute abnormality. Blood cultures pending.  CXR and KUB both normal       Patient is alert awake confused  Hypotensive on Levophed  Hypothermic / bear hugger          Objective:     Visit Vitals  BP 93/78 (BP 1 Location: Left upper arm, BP Patient Position: At rest)   Pulse 65   Temp (!) 96.4 °F (35.8 °C)   Resp 15   Ht 5' 10.98\" (1.803 m)   Wt 91 kg (200 lb 9.9 oz)   SpO2 98%   BMI 27.99 kg/m²        Recent Results (from the past 24 hour(s))   VANCOMYCIN, TROUGH    Collection Time: 04/09/22  4:07 PM   Result Value Ref Range    Vancomycin,trough 16.2 (H) 5.0 - 10.0 ug/mL    Reported dose date Dose Dependent      Reported dose: Dose Dependent Units   CBC W/O DIFF    Collection Time: 04/10/22  3:50 AM   Result Value Ref Range    WBC 5.1 4.1 - 11.1 K/uL    RBC 2.60 (L) 4.10 - 5.70 M/uL    HGB 7.4 (L) 12.1 - 17.0 g/dL    HCT 21.6 (L) 36.6 - 50.3 %    MCV 83.1 80.0 - 99.0 FL    MCH 28.5 26.0 - 34.0 PG    MCHC 34.3 30.0 - 36.5 g/dL    RDW 18.3 (H) 11.5 - 14.5 %    PLATELET 77 (L) 004 - 400 K/uL    MPV 10.6 8.9 - 12.9 FL    NRBC 0.0 0.0  WBC    ABSOLUTE NRBC 0.00 0.00 - 1.40 K/uL   METABOLIC PANEL, COMPREHENSIVE Collection Time: 04/10/22  3:50 AM   Result Value Ref Range    Sodium 143 136 - 145 mmol/L    Potassium 3.4 (L) 3.5 - 5.1 mmol/L    Chloride 118 (H) 97 - 108 mmol/L    CO2 16 (L) 21 - 32 mmol/L    Anion gap 9 5 - 15 mmol/L    Glucose 94 65 - 100 mg/dL    BUN 16 6 - 20 mg/dL    Creatinine 1.95 (H) 0.70 - 1.30 mg/dL    BUN/Creatinine ratio 8 (L) 12 - 20      GFR est AA 42 (L) >60 ml/min/1.73m2    GFR est non-AA 35 (L) >60 ml/min/1.73m2    Calcium 7.7 (L) 8.5 - 10.1 mg/dL    Bilirubin, total 1.3 (H) 0.2 - 1.0 mg/dL    AST (SGOT) 21 15 - 37 U/L    ALT (SGPT) 10 (L) 12 - 78 U/L    Alk. phosphatase 66 45 - 117 U/L    Protein, total 5.4 (L) 6.4 - 8.2 g/dL    Albumin 2.1 (L) 3.5 - 5.0 g/dL    Globulin 3.3 2.0 - 4.0 g/dL    A-G Ratio 0.6 (L) 1.1 - 2.2       [unfilled]      Review of Systems    Constitutional: Negative for chills and fever. HENT: Negative. Eyes: Negative. Respiratory: Negative. Cardiovascular: Negative. Gastrointestinal: Negative for abdominal pain and nausea. Skin: Negative. Neurological: Negative. Physical Exam:      Constitutional: pt is oriented to person, place, and time. HENT:   Head: Normocephalic and atraumatic. Eyes: Pupils are equal, round, and reactive to light. EOM are normal.   Cardiovascular: Normal rate, regular rhythm and normal heart sounds. Pulmonary/Chest: Breath sounds normal. No wheezes. No rales. Exhibits no tenderness. Abdominal: Soft. Bowel sounds are normal. There is no abdominal tenderness. There is no rebound and no guarding. Musculoskeletal: Normal range of motion. Neurological: pt is alert and oriented to person, place, and time. XR HAND RT MIN 3 V   Final Result      CT HEAD WO CONT   Final Result   Age-appropriate atrophy. No acute findings. XR ABD (KUB)   Final Result   Within normal      XR CHEST PORT   Final Result   Findings/impression:   1. Low lung volumes. Bibasilar hypoventilatory change/atelectasis.  Left retrocardiac lung incompletely evaluated, cannot exclude underlying airspace   disease. 2.  Cardiac and mediastinal contours are obscured by low lung volumes. Ectatic/possible aneurysmal appearance transverse aorta with calcific   atherosclerotic plaque. Appears stable from prior radiograph. 3.  No pleural fluid. No pneumothorax. 4.  Prominent gas-filled bowel partially visualized in the left upper quadrant.          IR INSERT NON TUNL CVC OVER 5 YRS    (Results Pending)   IR PARACENTESIS ABD W IMAGE    (Results Pending)   IR US GUIDED VASCULAR ACCESS    (Results Pending)   IR FLUORO GUIDE PLC CVAD    (Results Pending)        Recent Results (from the past 24 hour(s))   VANCOMYCIN, TROUGH    Collection Time: 04/09/22  4:07 PM   Result Value Ref Range    Vancomycin,trough 16.2 (H) 5.0 - 10.0 ug/mL    Reported dose date Dose Dependent      Reported dose: Dose Dependent Units   CBC W/O DIFF    Collection Time: 04/10/22  3:50 AM   Result Value Ref Range    WBC 5.1 4.1 - 11.1 K/uL    RBC 2.60 (L) 4.10 - 5.70 M/uL    HGB 7.4 (L) 12.1 - 17.0 g/dL    HCT 21.6 (L) 36.6 - 50.3 %    MCV 83.1 80.0 - 99.0 FL    MCH 28.5 26.0 - 34.0 PG    MCHC 34.3 30.0 - 36.5 g/dL    RDW 18.3 (H) 11.5 - 14.5 %    PLATELET 77 (L) 103 - 400 K/uL    MPV 10.6 8.9 - 12.9 FL    NRBC 0.0 0.0  WBC    ABSOLUTE NRBC 0.00 0.00 - 8.64 K/uL   METABOLIC PANEL, COMPREHENSIVE    Collection Time: 04/10/22  3:50 AM   Result Value Ref Range    Sodium 143 136 - 145 mmol/L    Potassium 3.4 (L) 3.5 - 5.1 mmol/L    Chloride 118 (H) 97 - 108 mmol/L    CO2 16 (L) 21 - 32 mmol/L    Anion gap 9 5 - 15 mmol/L    Glucose 94 65 - 100 mg/dL    BUN 16 6 - 20 mg/dL    Creatinine 1.95 (H) 0.70 - 1.30 mg/dL    BUN/Creatinine ratio 8 (L) 12 - 20      GFR est AA 42 (L) >60 ml/min/1.73m2    GFR est non-AA 35 (L) >60 ml/min/1.73m2    Calcium 7.7 (L) 8.5 - 10.1 mg/dL    Bilirubin, total 1.3 (H) 0.2 - 1.0 mg/dL    AST (SGOT) 21 15 - 37 U/L    ALT (SGPT) 10 (L) 12 - 78 U/L    Alk. phosphatase 66 45 - 117 U/L    Protein, total 5.4 (L) 6.4 - 8.2 g/dL    Albumin 2.1 (L) 3.5 - 5.0 g/dL    Globulin 3.3 2.0 - 4.0 g/dL    A-G Ratio 0.6 (L) 1.1 - 2.2         Results     Procedure Component Value Units Date/Time    CULTURE, URINE [[id number]] Collected: 04/07/22 1502    Order Status: Completed Specimen: Urine Updated: 04/09/22 1414     Special Requests: --        No Special Requests  Reflexed from [id number]       Culture result: No Growth (<1000 cfu/mL)       MRSA SCREEN - PCR (NASAL) [[id number]] Collected: 04/07/22 1345    Order Status: Completed Specimen: Swab Updated: 04/07/22 1516     MRSA by PCR, Nasal Not Detected       CULTURE, BLOOD, PAIRED [[id number]] Collected: 04/07/22 1001    Order Status: Completed Specimen: Blood Updated: 04/07/22 1017    C. DIFFICILE (DNA) [[id number]] Collected: 04/06/22 0919    Order Status: Completed Specimen: Stool Updated: 04/06/22 1056     C. difficile (DNA) Negative       COVID-19 WITH INFLUENZA A/B [[id number]] Collected: 04/05/22 2315    Order Status: Completed Specimen: Nasopharyngeal from Nasopharynx Updated: 04/06/22 0027     SARS-CoV-2 by PCR Not Detected        Comment: Not Detected results do not preclude SARS-CoV-2 infection and should not be used as the sole basis for patient management decisions. Results must be combined with clinical observations, patient history, and epidemiological information        Influenza A by PCR Not Detected        Influenza B by PCR Not Detected        Comment: Testing was performed using leah Concepcion SARS-CoV-2 and Influenza A/B nucleic acid assay. This test is a multiplex Real-Time Reverse Transcriptase Polymerase Chain Reaction (RT-PCR) based in vitro diagnostic test intended for the qualitative detection of nucleic acids from SARS-CoV-2, Influenza A, and Influenza B in nasopharyngeal and nasal swab specimens for use under the FDA's Emergency Use Authorization (EUA) only.    Fact sheet for Patients: FindDrives.pl Fact sheet   for Healthcare Providers: FindDrives.pl                Labs:     Recent Labs     04/10/22  0350 04/09/22  0400   WBC 5.1 6.3   HGB 7.4* 6.5*   HCT 21.6* 18.6*   PLT 77* 83*     Recent Labs     04/10/22  0350 04/09/22  0400 04/08/22  0500    144 141   K 3.4* 3.6 3.8   * 119* 116*   CO2 16* 18* 16*   BUN 16 19 19   CREA 1.95* 1.97* 1.91*   GLU 94 93 79   CA 7.7* 7.4* 7.9*     Recent Labs     04/10/22  0350 04/09/22  0400 04/08/22  0500   ALT 10* 10* 13   AP 66 61 67   TBILI 1.3* 1.3* 1.4*   TP 5.4* 5.3* 5.3*   ALB 2.1* 2.1* 2.2*   GLOB 3.3 3.2 3.1     No results for input(s): INR, PTP, APTT, INREXT, INREXT in the last 72 hours. No results for input(s): FE, TIBC, PSAT, FERR in the last 72 hours. No results found for: FOL, RBCF   No results for input(s): PH, PCO2, PO2 in the last 72 hours. No results for input(s): CPK, CKNDX, TROIQ in the last 72 hours.     No lab exists for component: CPKMB  No results found for: CHOL, CHOLX, CHLST, CHOLV, HDL, HDLP, LDL, LDLC, DLDLP, TGLX, TRIGL, TRIGP, CHHD, CHHDX  Lab Results   Component Value Date/Time    Glucose (POC) 80 04/08/2022 10:03 AM    Glucose (POC) 84 04/04/2022 05:40 PM    Glucose (POC) 107 04/03/2022 11:12 AM    Glucose (POC) 111 03/31/2022 11:35 AM    Glucose (POC) 91 03/29/2022 11:10 AM     Lab Results   Component Value Date/Time    Color Shantelle 04/07/2022 03:02 PM    Appearance Turbid (A) 04/07/2022 03:02 PM    Specific gravity 1.021 04/07/2022 03:02 PM    pH (UA) 6.0 04/07/2022 03:02 PM    Protein 100 (A) 04/07/2022 03:02 PM    Glucose Negative 04/07/2022 03:02 PM    Ketone Negative 04/07/2022 03:02 PM    Bilirubin Negative 04/07/2022 03:02 PM    Urobilinogen 0.1 04/07/2022 03:02 PM    Nitrites Negative 04/07/2022 03:02 PM    Leukocyte Esterase Trace (A) 04/07/2022 03:02 PM    Bacteria Negative 04/07/2022 03:02 PM    WBC >100 (H) 04/07/2022 03:02 PM    RBC >100 (H) 04/07/2022 03:02 PM         Assessment:     Sepsis  Lactic acidosis  Acute on chronic renal failure- creat 2.01  Cirrhosis with ascites  Anemia  History of septic DIP joint, right index finger  Hypothyroidism  Seizure disorder  Gout  GERD  History of etoh abuse  Hypotension  Metabolic acidosis  Hypothermia  Hypokalemia    Plan:          On allopurinol 300 mg daily  Vitamin D 50,000 weekly  Pepcid 20 twice daily  Folic acid 1 mg daily  Heparin 5000 every 8 hours  Lactulose 10 g 3 times a day  Keppra 1500 twice a day  Synthroid 25 mcg daily  Midodrine 10 mg 3 times a day  IV Zosyn every 8 hours  Propranolol 20 mg twice a day  Sodium bicarb 1303 times a day  Flomax 0.4 mg daily  Vancomycin with pharmacy  Replace potassium      Current Facility-Administered Medications:     0.9% sodium chloride infusion 250 mL, 250 mL, IntraVENous, PRN, Tommy Ackerman MD    epoetin luis-epbx (RETACRIT) injection 10,000 Units, 10,000 Units, SubCUTAneous, Q7D, Janeth Fernandez MD, 10,000 Units at 04/09/22 1805    VANCOMYCIN RANDOM LAB REMINDER, , Other, ONCE, Ree Zaragoza MD    midodrine (PROAMATINE) tablet 10 mg, 10 mg, Oral, TID WITH MEALS, Lamberto Ackerman MD, 10 mg at 04/10/22 0757    sodium bicarbonate tablet 1,300 mg, 1,300 mg, Oral, TID, Janeth Fernandez MD, 1,300 mg at 04/10/22 0800    LORazepam (ATIVAN) injection 1 mg, 1 mg, IntraVENous, Q4H PRN, Lamberto Ackerman MD, 1 mg at 04/08/22 1733    sodium chloride (NS) flush 5-10 mL, 5-10 mL, IntraVENous, PRN, Tommy Ackerman MD    piperacillin-tazobactam (ZOSYN) 3.375 g in 0.9% sodium chloride (MBP/ADV) 100 mL MBP, 3.375 g, IntraVENous, Q8H, Lamberto Ackerman MD, Last Rate: 25 mL/hr at 04/10/22 0858, 3.375 g at 04/10/22 0858    lactulose (CHRONULAC) 10 gram/15 mL solution 15 mL, 10 g, Oral, TID, Lamberto Ackerman MD, 15 mL at 04/10/22 0800    levETIRAcetam (KEPPRA) tablet 1,500 mg, 1,500 mg, Oral, BID, Lamberto Ackerman MD, 1,500 mg at 04/10/22 0800    tamsulosin [facility]) capsule 0.4 mg, 0.4 mg, Oral, DAILY, Lamberto Ackerman MD, 0.4 mg at 04/10/22 0800    thiamine mononitrate (B-1) tablet 100 mg, 100 mg, Oral, DAILY, Lamberto Ackerman MD, 100 mg at 04/10/22 0800    allopurinoL (ZYLOPRIM) tablet 300 mg, 300 mg, Oral, DAILY, Lamberto Ackerman MD, 300 mg at 04/10/22 0800    ergocalciferol capsule 50,000 Units, 50,000 Units, Oral, Q7D, Roly Ackerman MD    famotidine (PEPCID) tablet 20 mg, 20 mg, Oral, BID, Lamberto Ackerman MD, 20 mg at 94/49/66 6684    folic acid (FOLVITE) tablet 1 mg, 1 mg, Oral, DAILY, Lamberto Ackerman MD, 1 mg at 04/10/22 0800    levothyroxine (SYNTHROID) tablet 25 mcg, 25 mcg, Oral, ACB, Lamberto Ackerman MD, 25 mcg at 04/10/22 0757    propranoloL (INDERAL) tablet 20 mg, 20 mg, Oral, BID, Lamberto Ackerman MD, 20 mg at 04/09/22 2105    0.9% sodium chloride infusion, 75 mL/hr, IntraVENous, CONTINUOUS, Lamberto Ackerman MD, Last Rate: 75 mL/hr at 04/10/22 0556, 75 mL/hr at 04/10/22 0556    acetaminophen (TYLENOL) tablet 650 mg, 650 mg, Oral, Q6H PRN **OR** acetaminophen (TYLENOL) suppository 650 mg, 650 mg, Rectal, Q6H PRN, Lamberto cAkerman MD    polyethylene glycol (MIRALAX) packet 17 g, 17 g, Oral, DAILY PRN, Roly Ackerman MD    ondansetron (ZOFRAN ODT) tablet 4 mg, 4 mg, Oral, Q8H PRN **OR** ondansetron (ZOFRAN) injection 4 mg, 4 mg, IntraVENous, Q6H PRN, Lamberto Ackerman MD    heparin (porcine) injection 5,000 Units, 5,000 Units, SubCUTAneous, Q8H, Lamberto Ackerman MD, 5,000 Units at 04/10/22 0538    VANCOMYCIN INFORMATION NOTE, , Other, Rx Dosing/Monitoring, Stevie Acevedo MD    NOREPINephrine (LEVOPHED) 8 mg in 0.9% NS 250ml infusion, 0.5-120 mcg/min, IntraVENous, TITRATE, Lamberto Ackerman MD, Last Rate: 9.4 mL/hr at 04/10/22 0901, 5 mcg/min at 04/10/22 0901

## 2022-04-10 NOTE — PROGRESS NOTES
Renal Note    NAME:  Houston Newton   :   1960   MRN:   [id number]     ATTENDING: Janene Adame MD  PCP:  Mariana Alcazar NP    Date/Time:  4/10/2022 12:46 PM      Subjective:     Patient seen in the ICU. He is lethargic. Still on warming blanket. Creatinine 1.9 today. Still on single pressor support. Still having episodes of hypothermia intermittently      Past Medical History:   Diagnosis Date    Arthritis     Hypertension       Past Surgical History:   Procedure Laterality Date    IR INSERT NON TUNL CVC OVER 5 YRS  3/25/2022    IR PARACENTESIS ABD W IMAGE  2022    IR PARACENTESIS ABD W IMAGE  3/1/2022    IR PARACENTESIS ABD W IMAGE  3/30/2022     Social History     Tobacco Use    Smoking status: Never Smoker    Smokeless tobacco: Never Used   Substance Use Topics    Alcohol use: Not on file      No family history on file. No Known Allergies   Prior to Admission medications    Medication Sig Start Date End Date Taking? Authorizing Provider   linezolid (ZYVOX) 600 mg tablet Take 1 Tablet by mouth two (2) times a day for 10 days. Check CBC wkly while on linezolid 22  Jamie Heredia MD   thiamine mononitrate (B-1) 100 mg tablet Take 1 Tablet by mouth daily. 22   Anaya Ackerman MD   levothyroxine (SYNTHROID) 25 mcg tablet Take 25 mcg by mouth Daily (before breakfast). Provider, Historical   tamsulosin (Flomax) 0.4 mg capsule Take 1 Capsule by mouth daily. 3/4/22   Seamus Whyte PA-C   potassium chloride (K-DUR, KLOR-CON M20) 20 mEq tablet Take 1 Tablet by mouth daily. 3/2/22   Irina Joseph MD   sodium bicarbonate 650 mg tablet Take 1 Tablet by mouth three (3) times daily. 3/2/22   Irina Joseph MD   levETIRAcetam (Keppra) 1,000 mg tablet Take 1.5 Tablets by mouth two (2) times a day.  3/2/22   Irina Joseph MD   ergocalciferol (ERGOCALCIFEROL) 1,250 mcg (50,000 unit) capsule Take 1 Capsule by mouth every seven (7) days. 22   Provider, Historical   lactulose (CHRONULAC) 10 gram/15 mL solution Take 15 mL by mouth three (3) times daily. 22   Rashawn Ackerman MD   allopurinoL (ZYLOPRIM) 300 mg tablet Take 1 Tablet by mouth daily. 22   Provider, Historical   thiamine HCL (B-1) 100 mg tablet Take 100 mg by mouth daily. Provider, Historical   propranoloL (INDERAL) 20 mg tablet Take 20 mg by mouth two (2) times a day. Provider, Historical   folic acid (FOLVITE) 1 mg tablet Take 1 mg by mouth daily. Provider, Historical   famotidine (PEPCID) 20 mg tablet Take 20 mg by mouth At bedtime. Provider, Historical   aMILoride (MIDAMOR) 5 mg tablet Take 5 mg by mouth daily. Provider, Historical       REVIEW OF SYSTEMS:       She is lethargic. To obtain    Objective:   VITALS:    Visit Vitals  BP 92/75 (BP 1 Location: Left upper arm, BP Patient Position: At rest)   Pulse 66   Temp 96.8 °F (36 °C) Comment: warming blanket on    Resp 14   Ht 5' 10.98\" (1.803 m)   Wt 91 kg (200 lb 9.9 oz)   SpO2 100%   BMI 27.99 kg/m²     Temp (24hrs), Av.3 °F (36.3 °C), Min:96.4 °F (35.8 °C), Max:98.8 °F (37.1 °C)      PHYSICAL EXAM:     General: NAD, lethargic eyes: sclera anicteric  Oral Cavity: No thrush or ulcers  Neck: no JVD  Chest: Fair bilateral air entry. No Wheezing or Rhonchi. No rales.   Heart: normal sounds  Abdomen: soft and non tender   :  Gerard+  Lower Extremities: no edema  Skin: no rash  Neuro: Lethargic psychiatric: Able to assess      LAB DATA REVIEWED:    Recent Results (from the past 24 hour(s))   VANCOMYCIN, TROUGH    Collection Time: 22  4:07 PM   Result Value Ref Range    Vancomycin,trough 16.2 (H) 5.0 - 10.0 ug/mL    Reported dose date Dose Dependent      Reported dose: Dose Dependent Units   CBC W/O DIFF    Collection Time: 04/10/22  3:50 AM   Result Value Ref Range    WBC 5.1 4.1 - 11.1 K/uL    RBC 2.60 (L) 4.10 - 5.70 M/uL    HGB 7.4 (L) 12.1 - 17.0 g/dL    HCT 21.6 (L) 36.6 - 50.3 %    MCV 83.1 80.0 - 99.0 FL    MCH 28.5 26.0 - 34.0 PG    MCHC 34.3 30.0 - 36.5 g/dL    RDW 18.3 (H) 11.5 - 14.5 %    PLATELET 77 (L) 300 - 400 K/uL    MPV 10.6 8.9 - 12.9 FL    NRBC 0.0 0.0  WBC    ABSOLUTE NRBC 0.00 0.00 - 2.33 K/uL   METABOLIC PANEL, COMPREHENSIVE    Collection Time: 04/10/22  3:50 AM   Result Value Ref Range    Sodium 143 136 - 145 mmol/L    Potassium 3.4 (L) 3.5 - 5.1 mmol/L    Chloride 118 (H) 97 - 108 mmol/L    CO2 16 (L) 21 - 32 mmol/L    Anion gap 9 5 - 15 mmol/L    Glucose 94 65 - 100 mg/dL    BUN 16 6 - 20 mg/dL    Creatinine 1.95 (H) 0.70 - 1.30 mg/dL    BUN/Creatinine ratio 8 (L) 12 - 20      GFR est AA 42 (L) >60 ml/min/1.73m2    GFR est non-AA 35 (L) >60 ml/min/1.73m2    Calcium 7.7 (L) 8.5 - 10.1 mg/dL    Bilirubin, total 1.3 (H) 0.2 - 1.0 mg/dL    AST (SGOT) 21 15 - 37 U/L    ALT (SGPT) 10 (L) 12 - 78 U/L    Alk. phosphatase 66 45 - 117 U/L    Protein, total 5.4 (L) 6.4 - 8.2 g/dL    Albumin 2.1 (L) 3.5 - 5.0 g/dL    Globulin 3.3 2.0 - 4.0 g/dL    A-G Ratio 0.6 (L) 1.1 - 2.2     CORTISOL    Collection Time: 04/10/22  3:50 AM   Result Value Ref Range    Cortisol, random 9.0 ug/dL       Recommendations/Plan:     #1 acute kidney injury on chronic kidney disease 3  -Creatinine was 2.0 on admission   -Creatinine is stable at 1.9 today. Baseline creatinine 1.3-1.7 based on labs during previous admission  -Has history of recent GRACIA with creatinine peaking to 2.6 few weeks ago  -GRACIA likely prerenal secondary to hypotension/sepsis  -Currently on isotonic saline which I will change to bicarbonate drip because of persistent metabolic acidosis  -Urine is suggestive of UTI  -No need for renal ultrasound  -Continue Gerard catheter. Urine output is improving  -Clinically no volume overload  -We will follow up on renal function in a.m.     #2 metabolic acidosis  -CO2 improved to 16 , despite oral bicarbonate 1300 TID  -likely because of acute kidney injury and lactic acidosis  -I will start IV bicarbonate drip with 100 mEq of bicarb    #3 severe anemia  -Hemoglobin 7.4.  -Continue weekly Procrit    #4  Hypokalemia  -Potassium is 3.4. Will replace with potassium chloride 40 mEq p.o. x1    #5 SIRS/suspected sepsis  -Likely secondary to UTI. Recent UA was abnormal-currently hypothermic hypotensive though no leukocytosis seen. Lactic acid is elevated  -Started on Zosyn and vancomycin pending blood cultures  -Continue midodrine 10 mg 3 times daily for chronic hypotension     #5 right index finger osteomyelitis  -s/p recent antibiotics for 2 weeks. Vancomycin has been resumed during this hospitalization  -ID is on the case    #6 history of liver cirrhosis  -Has history of alcoholic liver cirrhosis. Portal hypertension  -Noted to have abdominal distention.   IR has been consulted  -Had paracentesis during his previous admission also      ________________________________________________________________________  Signed: Didi Shultz MD

## 2022-04-10 NOTE — PROGRESS NOTES
Vancomycin Dosing Consult  Day #3 of vancomycin therapy  Consult ordered by Dr. Moses Ge for this 60 y.o. male for indication of osteomyelitis. Antibiotic regimen: Vancomycin + Zosyn    Temp (24hrs), Av.2 °F (36.2 °C), Min:96.3 °F (35.7 °C), Max:98.4 °F (36.9 °C)    Recent Labs     22  0400 22  0500 22  1001   WBC 6.3 6.1 5.7   CREA 1.97* 1.91* 2.01*   BUN 19 19 21*     Recent Labs     22  0500   CRP 1.38*       Estimated Creatinine Clearance: 40.9 mL/min (A) (based on SCr of 1.97 mg/dL (H)). ml/min  Concomitant nephrotoxic drugs: Norepi, Zosyn    Cultures:    blood: ngtd   urine: ngtd      MRSA Swab: N/A    Target range: AUC/YESSENIA 400-500, Trough <15 mcg/mL    Recent level history:  Date/Time Dose & Interval Measured Level (mcg/mL) Associated AUC/YESSENIA   2022 04:07 PM    750mg IV q24h 16.2 664        Assessment/Plan:   Patient has GRACIA on CKD. Give Vancomycin 750mg IV x1. Pharmacy will order a random level tomorrow to redose.   Antimicrobial stop date TBD

## 2022-04-10 NOTE — PROGRESS NOTES
Infectious Disease Progress Note           Subjective:   Remains hypothermic, shemar hugger in place, on levophed and midodrine. No acute events since last seen   Objective:   Physical Exam:     Visit Vitals  /75 (BP 1 Location: Left upper arm, BP Patient Position: At rest)   Pulse 82   Temp 97.3 °F (36.3 °C) Comment: warming blanket on   Simultaneous filing. User may not have seen previous data. Resp 17   Ht 5' 10.98\" (1.803 m)   Wt 200 lb 9.9 oz (91 kg)   SpO2 100%   BMI 27.99 kg/m²      O2 Device: None (Room air)    Temp (24hrs), Av.3 °F (36.3 °C), Min:96.4 °F (35.8 °C), Max:98.8 °F (37.1 °C)    04/10 0701 - 04/10 1900  In: 802.6 [I.V.:802.6]  Out: -    1901 - 04/10 0700  In: 3960 [P.O.:100;  I.V.:3750]  Out: 1100 [Urine:1100]    General: NAD, alert, confused, not following commands   HEENT: HEIDY, Moist mucosa   Lungs: CTA b/l   Heart: S1S2+, RRR, no murmur  Abdo: Soft, NT, ND, +BS   : + fish cath   Exts: No edema, + pulses b/l   Skin: No wounds, No rashes or lesions      Data Review:       Recent Days:  Recent Labs     04/10/22  0350 22  0400 22  0500   WBC 5.1 6.3 6.1   HGB 7.4* 6.5* 6.9*   HCT 21.6* 18.6* 20.2*   PLT 77* 83* 80*     Recent Labs     04/10/22  0350 22  0400 22  0500   BUN 16 19 19   CREA 1.95* 1.97* 1.91*       Lab Results   Component Value Date/Time    C-Reactive protein 1.38 (H) 2022 05:00 AM          Microbiology     Results     Procedure Component Value Units Date/Time    CULTURE, URINE [[id number]] Collected: 22 1502    Order Status: Completed Specimen: Urine Updated: 22 1414     Special Requests: --        No Special Requests  Reflexed from [id number]       Culture result: No Growth (<1000 cfu/mL)       MRSA SCREEN - PCR (NASAL) [[id number]] Collected: 22 1345    Order Status: Completed Specimen: Swab Updated: 22 1516     MRSA by PCR, Nasal Not Detected       CULTURE, BLOOD, PAIRED [[id number]] Collected: 04/07/22 1001    Order Status: Completed Specimen: Blood Updated: 04/07/22 1017    C. DIFFICILE (DNA) [[id number]] Collected: 04/06/22 0919    Order Status: Completed Specimen: Stool Updated: 04/06/22 1056     C. difficile (DNA) Negative       COVID-19 WITH INFLUENZA A/B [[id number]] Collected: 04/05/22 2315    Order Status: Completed Specimen: Nasopharyngeal from Nasopharynx Updated: 04/06/22 0027     SARS-CoV-2 by PCR Not Detected        Comment: Not Detected results do not preclude SARS-CoV-2 infection and should not be used as the sole basis for patient management decisions. Results must be combined with clinical observations, patient history, and epidemiological information        Influenza A by PCR Not Detected        Influenza B by PCR Not Detected        Comment: Testing was performed using leah Concepcion SARS-CoV-2 and Influenza A/B nucleic acid assay. This test is a multiplex Real-Time Reverse Transcriptase Polymerase Chain Reaction (RT-PCR) based in vitro diagnostic test intended for the qualitative detection of nucleic acids from SARS-CoV-2, Influenza A, and Influenza B in nasopharyngeal and nasal swab specimens for use under the FDA's Emergency Use Authorization (EUA) only. Fact sheet for Patients: FindDrives.pl Fact sheet   for Healthcare Providers: FindDrives.pl                  Diagnostics   CXR Results  (Last 48 hours)    None             Assessment/Plan     1. Right index finger osteomyelitis, underlying gouty arthritis       S/p debridement w isolation of Staph epidermidis from Cxs      Ongoing episodes or hypothermia, WBC WNLs       On day # 19/28 of Vanc. Routine labs in the morning      2. Sepsis, due to # 1 and suspected UTI, but urine Cx is neg       Blood Cx from 04/07 is pending. Remains on pressor support      Continue Vanc for # 1, will d/c Zosyn.  Routine labs in the morning     3. UTI, abnormal UA,  Urinary retention: + indwelling fish cath      Urine Cx from 04/07 is negative, s/p 5 days of empiric Zosyn      4. AMS, h/o metabolic/hepatic encephalopathy, waxing and waning mentation     5.  Ascites, S/p paracentesis on 04/08 w removal of 4,250 ml of clear fluid, not sent for analysis/Cx      Nette Ann MD    4/10/2022

## 2022-04-10 NOTE — PROGRESS NOTES
Problem: Dysphagia (Adult)  Goal: *Acute Goals and Plan of Care (Insert Text)  Description: Speech Therapy Goals  Initiated 4/8/2022  -Patient will tolerate minced & moist diet with thin liquids without signs/symptoms of aspiration given no cues within 7 day(s). [ ] Not met  [ ]  MET   [ ] Progressing  [ ] Elizabethenda Muster  -Patient will demonstrate understanding of swallow safety precautions and aspiration precautions, diet recs with no cues within 7 day(s). [ ] Not met  [ ]  MET   [ ] Progressing  [ ] Discontinue    Outcome: [facility] TREAT[facility]  Patient: Adelfo Roach (59 y.o. male)  Date: 4/10/2022  Diagnosis: Urinary retention [R33.9]  Lactic acidosis [E87.2]  Severe sepsis ([address] [A41.9, R65.20]  Hypothermia [T68. XXXA]  Sepsis ([address] [A41.9] <principal problem not specified>       Precautions: Aspiration      ASSESSMENT:  Nurse reports the patient is doing better cognitively, but is refusing pureed foods. Pt positioned upright in bed. Pt is alert, but pleasantly confused, and able to follow simple commands. Vocal quality WNL. Pt is edentulous. Pt given trials of saltine cracker. Oral phase with increased mastication and manipulation of bolus with mild oral residue that was cleared with double swallow and x2 sips of water via straw. Pharyngeal phase WNL. Pt tolerates al trials without overt s/s aspiration. Limited intake is a concern, but will upgrade diet to determine if PO intake improves. PLAN:  Recommendations and Planned Interventions:  Minced & Moist/thin; staff to provide assistance; aspiration/GERD precautions  Patient continues to benefit from skilled intervention to address the above impairments. Continue treatment per established plan of care. Discharge Recommendations: To Be Determined     SUBJECTIVE:   Patient stated I don't want anymore.     OBJECTIVE:   Cognitive and Communication Status:  Neurologic State: Alert  Orientation Level: Oriented to person,Oriented to place,Disoriented to situation,Disoriented to time  Cognition: Follows commands            Pain:  Pain Scale 1: Numeric (0 - 10)  Pain Intensity 1: 0       After treatment:   Patient left in no apparent distress in bed, Call bell within reach, and Nursing notified    COMMUNICATION/EDUCATION:   Patient was educated regarding his deficit(s) of dysphagia, diet recs, and compensatory strategies. He demonstrated Fair understanding as evidenced by verbal responses and confusion. The patient's plan of care including recommendations, planned interventions, and recommended diet changes were discussed with: Registered nurse and Physician.      Arabella Riggs  Time Calculation: 9 mins

## 2022-04-11 NOTE — PROGRESS NOTES
Attempted to see pt for follow-up. Pt's wife Elizabeth Gunn at bedside. Pt with altered mentation and not agreeable to participation in SLP intervention at this time. Discussed with pt's wife re: purpose and role of SLP, diet recs and recs for SLP follow-up to ensure diet tolerance and sw safety.

## 2022-04-11 NOTE — PROGRESS NOTES
Renal Note    NAME:  Sharan Lacy   :   1960   MRN:   [id number]     ATTENDING: Ry Womack MD  PCP:  Mitzi Ramírez NP    Date/Time:  2022 12:46 PM      Subjective:     Patient seen in the ICU. He is getting blood transfusion. Noticed to have legs edema. On IVF. He is lethargic. Creatinine 1.69 today. On single pressor. Past Medical History:   Diagnosis Date    Arthritis     Hypertension       Past Surgical History:   Procedure Laterality Date    IR INSERT NON TUNL CVC OVER 5 YRS  3/25/2022    IR PARACENTESIS ABD W IMAGE  2022    IR PARACENTESIS ABD W IMAGE  3/1/2022    IR PARACENTESIS ABD W IMAGE  3/30/2022     Social History     Tobacco Use    Smoking status: Never Smoker    Smokeless tobacco: Never Used   Substance Use Topics    Alcohol use: Not on file      No family history on file. No Known Allergies   Prior to Admission medications    Medication Sig Start Date End Date Taking? Authorizing Provider   linezolid (ZYVOX) 600 mg tablet Take 1 Tablet by mouth two (2) times a day for 10 days. Check CBC wkly while on linezolid 22  Omer Courtney MD   thiamine mononitrate (B-1) 100 mg tablet Take 1 Tablet by mouth daily. 22   Valorie Ackerman MD   levothyroxine (SYNTHROID) 25 mcg tablet Take 25 mcg by mouth Daily (before breakfast). Provider, Historical   tamsulosin (Flomax) 0.4 mg capsule Take 1 Capsule by mouth daily. 3/4/22   Joseph Whyte PA-C   potassium chloride (K-DUR, KLOR-CON M20) 20 mEq tablet Take 1 Tablet by mouth daily. 3/2/22   Melanie Torres MD   sodium bicarbonate 650 mg tablet Take 1 Tablet by mouth three (3) times daily. 3/2/22   Melanie Torres MD   levETIRAcetam (Keppra) 1,000 mg tablet Take 1.5 Tablets by mouth two (2) times a day.  3/2/22   Melanie Torres MD   ergocalciferol (ERGOCALCIFEROL) 1,250 mcg (50,000 unit) capsule Take 1 Capsule by mouth every seven (7) days. 22   Provider, Historical   lactulose (CHRONULAC) 10 gram/15 mL solution Take 15 mL by mouth three (3) times daily. 22   Yeimy Ackerman MD   allopurinoL (ZYLOPRIM) 300 mg tablet Take 1 Tablet by mouth daily. 22   Provider, Historical   thiamine HCL (B-1) 100 mg tablet Take 100 mg by mouth daily. Provider, Historical   propranoloL (INDERAL) 20 mg tablet Take 20 mg by mouth two (2) times a day. Provider, Historical   folic acid (FOLVITE) 1 mg tablet Take 1 mg by mouth daily. Provider, Historical   famotidine (PEPCID) 20 mg tablet Take 20 mg by mouth At bedtime. Provider, Historical   aMILoride (MIDAMOR) 5 mg tablet Take 5 mg by mouth daily. Provider, Historical       REVIEW OF SYSTEMS:       She is lethargic. To obtain    Objective:   VITALS:    Visit Vitals  BP 99/74   Pulse 73   Temp 98.1 °F (36.7 °C)   Resp 15   Ht 5' 10.98\" (1.803 m)   Wt 92 kg (202 lb 13.2 oz)   SpO2 99%   BMI 28.30 kg/m²     Temp (24hrs), Av °F (36.7 °C), Min:97.2 °F (36.2 °C), Max:98.6 °F (37 °C)      PHYSICAL EXAM:     General: NAD, lethargic eyes: sclera anicteric  Oral Cavity: No thrush or ulcers  Neck: no JVD  Chest: Fair bilateral air entry. No Wheezing or Rhonchi. No rales.   Heart: normal sounds  Abdomen: soft and non tender   :  Gerard+  Lower Extremities: no edema  Skin: no rash  Neuro: Lethargic psychiatric: Able to assess      LAB DATA REVIEWED:    Recent Results (from the past 24 hour(s))   VANCOMYCIN, RANDOM    Collection Time: 04/10/22  7:30 PM   Result Value Ref Range    Vancomycin, random 17.6 ug/mL   CBC W/O DIFF    Collection Time: 22  4:00 AM   Result Value Ref Range    WBC 4.1 4.1 - 11.1 K/uL    RBC 2.40 (L) 4.10 - 5.70 M/uL    HGB 6.8 (L) 12.1 - 17.0 g/dL    HCT 19.8 (L) 36.6 - 50.3 %    MCV 82.5 80.0 - 99.0 FL    MCH 28.3 26.0 - 34.0 PG    MCHC 34.3 30.0 - 36.5 g/dL    RDW 18.5 (H) 11.5 - 14.5 %    PLATELET 57 (L) 789 - 400 K/uL    MPV 10.4 8.9 - 12.9 FL    NRBC 0.0 0.0 PER 100 WBC    ABSOLUTE NRBC 0.00 0.00 - 0.03 K/uL   METABOLIC PANEL, COMPREHENSIVE    Collection Time: 04/11/22  4:00 AM   Result Value Ref Range    Sodium 142 136 - 145 mmol/L    Potassium 3.5 3.5 - 5.1 mmol/L    Chloride 115 (H) 97 - 108 mmol/L    CO2 19 (L) 21 - 32 mmol/L    Anion gap 8 5 - 15 mmol/L    Glucose 72 65 - 100 mg/dL    BUN 14 6 - 20 mg/dL    Creatinine 1.69 (H) 0.70 - 1.30 mg/dL    BUN/Creatinine ratio 8 (L) 12 - 20      GFR est AA 50 (L) >60 ml/min/1.73m2    GFR est non-AA 41 (L) >60 ml/min/1.73m2    Calcium 7.6 (L) 8.5 - 10.1 mg/dL    Bilirubin, total 1.3 (H) 0.2 - 1.0 mg/dL    AST (SGOT) 20 15 - 37 U/L    ALT (SGPT) 10 (L) 12 - 78 U/L    Alk. phosphatase 55 45 - 117 U/L    Protein, total 5.0 (L) 6.4 - 8.2 g/dL    Albumin 2.0 (L) 3.5 - 5.0 g/dL    Globulin 3.0 2.0 - 4.0 g/dL    A-G Ratio 0.7 (L) 1.1 - 2.2     VANCOMYCIN, RANDOM    Collection Time: 04/11/22  4:00 AM   Result Value Ref Range    Vancomycin, random 15.7 ug/mL       Recommendations/Plan:     1 acute kidney injury on chronic kidney disease 3:  -Creatinine was 2.0 on admission   -Creatinine has improved to 1.6  today.    -Baseline creatinine 1.3-1.7 based on labs during previous admission  -Has history of recent GRACIA with creatinine peaking to 2.6 few weeks ago  -GRACIA likely prerenal secondary to hypotension/sepsis  -Urine is suggestive of UTI  -No need for renal ultrasound  -Continue Gerard catheter. Urine output is improving  -Clinically noticed legs edema.  -will dc IVF. 2 metabolic acidosis:  -CO2 improved to 19 , despite oral bicarbonate 1300 TID  -likely because of hyperchloremia  -will dc HCO3 drip     3 severe anemia  -Hemoglobin 6.8.  -getting one unit blood Tx  -Continue weekly Procrit    4  Hypokalemia  -Potassium is 3.4->3.5.    -po kcl 40 meq x 1    5 SIRS/suspected sepsis  -Likely secondary to UTI. Recent UA was abnormal-currently hypothermic hypotensive though no leukocytosis seen.   Lactic acid is elevated  -Started on Zosyn and vancomycin pending blood cultures  -Continue midodrine 10 mg 3 times daily for chronic hypotension     5 right index finger osteomyelitis  -s/p recent antibiotics for 2 weeks. Vancomycin has been resumed during this hospitalization  -ID is on the case    6 history of liver cirrhosis  -Has history of alcoholic liver cirrhosis. Portal hypertension  -Noted to have abdominal distention.   IR has been consulted  -Had paracentesis during his previous admission also      ________________________________________________________________________  Signed: Josiah Mittal MD

## 2022-04-11 NOTE — PROGRESS NOTES
Problem: Pressure Injury - Risk of  Goal: *Prevention of pressure injury  Description: Document Curtis Scale and appropriate interventions in the flowsheet. Outcome: Progressing Towards Goal  Note: Pressure Injury Interventions:  Sensory Interventions: Assess changes in LOC,Assess need for specialty bed,Avoid rigorous massage over bony prominences,Check visual cues for pain,Float heels,Keep linens dry and wrinkle-free,Minimize linen layers,Monitor skin under medical devices,Pad between skin to skin,Pressure redistribution bed/mattress (bed type),Turn and reposition approx. every two hours (pillows and wedges if needed),Use 30-degree side-lying position    Moisture Interventions: Absorbent underpads,Apply protective barrier, creams and emollients,Assess need for specialty bed,Check for incontinence Q2 hours and as needed,Internal/External urinary devices,Minimize layers,Moisture barrier,Maintain skin hydration (lotion/cream)    Activity Interventions: Pressure redistribution bed/mattress(bed type)    Mobility Interventions: Pressure redistribution bed/mattress (bed type),Turn and reposition approx. every two hours(pillow and wedges),Float heels    Nutrition Interventions: Document food/fluid/supplement intake    Friction and Shear Interventions: Apply protective barrier, creams and emollients,Feet elevated on foot rest,Foam dressings/transparent film/skin sealants,Minimize layers                Problem: Patient Education: Go to Patient Education Activity  Goal: Patient/Family Education  Outcome: Progressing Towards Goal     Problem: Falls - Risk of  Goal: *Absence of Falls  Description: Document Elba Fall Risk and appropriate interventions in the flowsheet.   Outcome: Progressing Towards Goal  Note: Fall Risk Interventions:       Mentation Interventions: Adequate sleep, hydration, pain control,Bed/chair exit alarm,Door open when patient unattended,Reorient patient,Room close to nurse's station    Medication Interventions: Bed/chair exit alarm    Elimination Interventions: Bed/chair exit alarm              Problem: Patient Education: Go to Patient Education Activity  Goal: Patient/Family Education  Outcome: Progressing Towards Goal     Problem: Aspiration - Risk of  Goal: *Absence of aspiration  Outcome: Progressing Towards Goal     Problem: Patient Education: Go to Patient Education Activity  Goal: Patient/Family Education  Outcome: Progressing Towards Goal     Problem: Patient Education: Go to Patient Education Activity  Goal: Patient/Family Education  Outcome: Progressing Towards Goal     Problem: Impaired Skin Integrity/Pressure Injury Treatment  Goal: *Improvement of Existing Pressure Injury  Outcome: Progressing Towards Goal  Goal: *Prevention of pressure injury  Description: Document Curtis Scale and appropriate interventions in the flowsheet. Outcome: Progressing Towards Goal  Note: Pressure Injury Interventions:  Sensory Interventions: Assess changes in LOC,Assess need for specialty bed,Avoid rigorous massage over bony prominences,Check visual cues for pain,Float heels,Keep linens dry and wrinkle-free,Minimize linen layers,Monitor skin under medical devices,Pad between skin to skin,Pressure redistribution bed/mattress (bed type),Turn and reposition approx. every two hours (pillows and wedges if needed),Use 30-degree side-lying position    Moisture Interventions: Absorbent underpads,Apply protective barrier, creams and emollients,Assess need for specialty bed,Check for incontinence Q2 hours and as needed,Internal/External urinary devices,Minimize layers,Moisture barrier,Maintain skin hydration (lotion/cream)    Activity Interventions: Pressure redistribution bed/mattress(bed type)    Mobility Interventions: Pressure redistribution bed/mattress (bed type),Turn and reposition approx.  every two hours(pillow and wedges),Float heels    Nutrition Interventions: Document food/fluid/supplement intake    Friction and Shear Interventions: Apply protective barrier, creams and emollients,Feet elevated on foot rest,Foam dressings/transparent film/skin sealants,Minimize layers                Problem: Patient Education: Go to Patient Education Activity  Goal: Patient/Family Education  Outcome: Progressing Towards Goal

## 2022-04-11 NOTE — PROGRESS NOTES
Vancomycin Dosing Consult  Day #4 of vancomycin therapy  Consult ordered by Dr. Hermenia Seip for this 60 y.o. male for indication of Osteo of Right index finger/Sepsis    Antibiotic regimen: Vancomycin monotherapy    Temp (24hrs), Av.4 °F (36.3 °C), Min:96.4 °F (35.8 °C), Max:98.8 °F (37.1 °C)    Recent Labs     04/10/22  0350 22  0400 22  0500   WBC 5.1 6.3 6.1     Recent Labs     04/10/22  0350 22  0400 22  0500   CREA 1.95* 1.97* 1.91*   BUN 16 19 19     Recent Labs     22  0500   CRP 1.38*     No results for input(s): PCT in the last 72 hours. Estimated Creatinine Clearance: 44.8 mL/min (A) (based on SCr of 1.95 mg/dL (H)). ml/min  Concomitant nephrotoxic drugs: Vasopressors    Cultures:   22 - Urine - No growth (<1000 cfu/ml)    MRSA Swab: Not detected Collected on 22    Target range: Trough 10-15 mcg/mL    Recent level history (3):  Date/Time Dose & Interval Measured Level (mcg/mL) Associated AUC/YESSENIA   04/10/2022 07:30 PM         750 mg x 1   17.6      Ht Readings from Last 1 Encounters:   22 180.3 cm (70.98\")        Wt Readings from Last 1 Encounters:   04/10/22 91 kg (200 lb 9.9 oz)     Ideal body weight: 75.3 kg (165 lb 14.8 oz)  Adjusted ideal body weight: 81.6 kg (179 lb 12.8 oz)        Assessment/Plan:   Pt has GRACIA. Vancomycin random level that resulted this evening was supratherapeutic at 17.6. Will hold Vancomycin therapy tonight and order another level tomorrow () morning with AM labs.   Antimicrobial stop date 2022

## 2022-04-11 NOTE — PROGRESS NOTES
Vancomycin Dosing Consult  Day # 5 of vancomycin therapy  Consult ordered by Dr. Maki Ashraf for this 60 y.o. male for indication of osteomyelitis. Antibiotic regimen: Vancomycin + Zosyn    Temp (24hrs), Av.8 °F (36.6 °C), Min:96.6 °F (35.9 °C), Max:98.6 °F (37 °C)    Recent Labs     22  0400 04/10/22  0350 22  0400   WBC 4.1 5.1 6.3   CREA 1.69* 1.95* 1.97*   BUN 14 16 19     No results for input(s): CRP in the last 72 hours. Estimated Creatinine Clearance: 51.9 mL/min (A) (based on SCr of 1.69 mg/dL (H)). ml/min  Concomitant nephrotoxic drugs: Norepi, Zosyn    Cultures:    blood: ngtd   urine: ngtd      MRSA Swab: N/A    Target range: AUC/YESSENIA 400-500, Trough <15 mcg/mL    Recent level history:  Date/Time Dose & Interval Measured Level (mcg/mL) Associated AUC/YESSENIA   2022 04:07 PM    750mg IV q24h 16.2 664        Assessment/Plan:   Patient has GRACIA on CKD. The Scr today is at 1.69 which has came gina. Vancomycin random level today is at  15.7. Give Vancomycin 500 mg IV x1 today. [facility] will order a random level on 22 at 0400 am tomorrow to redose.   Antimicrobial stop date TBD

## 2022-04-11 NOTE — PROGRESS NOTES
Infectious Disease Progress Note           Subjective:   Pt seen and examined at bedside. Alert and following, remains on levophed and midodrine.   No acute events since last seen   Objective:   Physical Exam:     Visit Vitals  BP 96/72 (BP 1 Location: Left upper arm, BP Patient Position: At rest)   Pulse 76   Temp 97.2 °F (36.2 °C)   Resp 13   Ht 5' 10.98\" (1.803 m)   Wt 202 lb 13.2 oz (92 kg)   SpO2 100%   BMI 28.30 kg/m²      O2 Device: None (Room air)    Temp (24hrs), Av.1 °F (36.7 °C), Min:97.2 °F (36.2 °C), Max:98.6 °F (37 °C)    701 - 1900  In: 938.3 [P.O.:600]  Out: 450 [Urine:450]   1901 -  0700  In: 3220.1 [I.V.:3220.1]  Out: 1700 [Urine:1700]    General: NAD, alert, confused, not following commands   HEENT: HEIDY, Moist mucosa   Lungs: CTA b/l   Heart: S1S2+, RRR, no murmur  Abdo: Soft, NT, ND, +BS   : + fish cath   Exts: No edema, + pulses b/l   Skin: No wounds, No rashes or lesions      Data Review:       Recent Days:  Recent Labs     22  0400 04/10/22  0350 22  0400   WBC 4.1 5.1 6.3   HGB 6.8* 7.4* 6.5*   HCT 19.8* 21.6* 18.6*   PLT 57* 77* 83*     Recent Labs     22  0400 04/10/22  0350 22  0400   BUN 14 16 19   CREA 1.69* 1.95* 1.97*       Lab Results   Component Value Date/Time    C-Reactive protein 1.38 (H) 2022 05:00 AM          Microbiology     Results     Procedure Component Value Units Date/Time    CULTURE, URINE [[id number]] Collected: 22 1502    Order Status: Completed Specimen: Urine Updated: 22 1414     Special Requests: --        No Special Requests  Reflexed from [id number]       Culture result: No Growth (<1000 cfu/mL)       MRSA SCREEN - PCR (NASAL) [[id number]] Collected: 22 1345    Order Status: Completed Specimen: Swab Updated: 22 1516     MRSA by PCR, Nasal Not Detected       CULTURE, BLOOD, PAIRED [[id number]] Collected: 22 1001    Order Status: Completed Specimen: Blood Updated: 04/07/22 1017    C. DIFFICILE (DNA) [[id number]] Collected: 04/06/22 0919    Order Status: Completed Specimen: Stool Updated: 04/06/22 1056     C. difficile (DNA) Negative       COVID-19 WITH INFLUENZA A/B [[id number]] Collected: 04/05/22 2315    Order Status: Completed Specimen: Nasopharyngeal from Nasopharynx Updated: 04/06/22 0027     SARS-CoV-2 by PCR Not Detected        Comment: Not Detected results do not preclude SARS-CoV-2 infection and should not be used as the sole basis for patient management decisions. Results must be combined with clinical observations, patient history, and epidemiological information        Influenza A by PCR Not Detected        Influenza B by PCR Not Detected        Comment: Testing was performed using leah Concepcion SARS-CoV-2 and Influenza A/B nucleic acid assay. This test is a multiplex Real-Time Reverse Transcriptase Polymerase Chain Reaction (RT-PCR) based in vitro diagnostic test intended for the qualitative detection of nucleic acids from SARS-CoV-2, Influenza A, and Influenza B in nasopharyngeal and nasal swab specimens for use under the FDA's Emergency Use Authorization (EUA) only. Fact sheet for Patients: FindDrives.pl Fact sheet   for Healthcare Providers: FindDrives.pl                  Diagnostics   CXR Results  (Last 48 hours)    None             Assessment/Plan     1. Right index finger osteomyelitis, underlying gouty arthritis       S/p debridement w isolation of Staph epidermidis from Cxs      Ongoing episodes or hypothermia, WBC WNLs       On day # 20/28 of Vanc. Routine labs in the morning      2. Sepsis, due to # 1 and suspected UTI, but urine Cx is neg       Remains on Pressor support, WBC WNLs       Continue on Vanc only, s/p Zosyn     3. UTI, abnormal UA,  Urinary retention: + indwelling fish cath      Urine Cx from 04/07 is negative, s/p 5 days of empiric Zosyn      4.  AMS, h/o metabolic/hepatic encephalopathy, waxing and waning mentation     5.  Ascites, S/p paracentesis on 04/08 w removal of 4,250 ml of clear fluid, not sent for analysis/Cx       Abdomen remains distended but non tender      Anastasia Sweeney MD    4/11/2022

## 2022-04-11 NOTE — PROGRESS NOTES
General Daily Progress Note          Patient Name:   Ethan Patel       YOB: 1960       Age:  60 y.o. Admit Date: 4/7/2022      Subjective:     Ethan Patel is a(n) 60 y.o. male with PMH significant for HTN, cirrhosis, ascites presents via EMS for removing fish catheter. Patient recently d/c after lengthy stay in hospital for septic DIP joint of right index finger. He was d/c on 10 days of PO Zyvox, which he completed. He presents today after pulling out fish catheter. Several blood clots noted. Patient septic upon arrival with hypothermia and hypotension. Temp 93.6 rectal, BP 84/68. Patient is retaining 887cc of urine and nursing staff will replace fish.     Patient started on fluids and IV Levaquin in the ED. Patient is altered at baseline. Head CT w/o contrast shows no acute abnormality. Blood cultures pending.  CXR and KUB both normal       Patient is alert awake confused  Hypotensive off  Levophed    Normal temperature          Objective:     Visit Vitals  BP 99/74   Pulse 73   Temp 98.2 °F (36.8 °C)   Resp 15   Ht 5' 10.98\" (1.803 m)   Wt 92 kg (202 lb 13.2 oz)   SpO2 99%   BMI 28.30 kg/m²        Recent Results (from the past 24 hour(s))   VANCOMYCIN, RANDOM    Collection Time: 04/10/22  7:30 PM   Result Value Ref Range    Vancomycin, random 17.6 ug/mL   CBC W/O DIFF    Collection Time: 04/11/22  4:00 AM   Result Value Ref Range    WBC 4.1 4.1 - 11.1 K/uL    RBC 2.40 (L) 4.10 - 5.70 M/uL    HGB 6.8 (L) 12.1 - 17.0 g/dL    HCT 19.8 (L) 36.6 - 50.3 %    MCV 82.5 80.0 - 99.0 FL    MCH 28.3 26.0 - 34.0 PG    MCHC 34.3 30.0 - 36.5 g/dL    RDW 18.5 (H) 11.5 - 14.5 %    PLATELET 57 (L) 136 - 400 K/uL    MPV 10.4 8.9 - 12.9 FL    NRBC 0.0 0.0  WBC    ABSOLUTE NRBC 0.00 0.00 - 4.33 K/uL   METABOLIC PANEL, COMPREHENSIVE    Collection Time: 04/11/22  4:00 AM   Result Value Ref Range    Sodium 142 136 - 145 mmol/L    Potassium 3.5 3.5 - 5.1 mmol/L    Chloride 115 (H) 97 - 108 mmol/L CO2 19 (L) 21 - 32 mmol/L    Anion gap 8 5 - 15 mmol/L    Glucose 72 65 - 100 mg/dL    BUN 14 6 - 20 mg/dL    Creatinine 1.69 (H) 0.70 - 1.30 mg/dL    BUN/Creatinine ratio 8 (L) 12 - 20      GFR est AA 50 (L) >60 ml/min/1.73m2    GFR est non-AA 41 (L) >60 ml/min/1.73m2    Calcium 7.6 (L) 8.5 - 10.1 mg/dL    Bilirubin, total 1.3 (H) 0.2 - 1.0 mg/dL    AST (SGOT) 20 15 - 37 U/L    ALT (SGPT) 10 (L) 12 - 78 U/L    Alk. phosphatase 55 45 - 117 U/L    Protein, total 5.0 (L) 6.4 - 8.2 g/dL    Albumin 2.0 (L) 3.5 - 5.0 g/dL    Globulin 3.0 2.0 - 4.0 g/dL    A-G Ratio 0.7 (L) 1.1 - 2.2     VANCOMYCIN, RANDOM    Collection Time: 04/11/22  4:00 AM   Result Value Ref Range    Vancomycin, random 15.7 ug/mL     [unfilled]      Review of Systems    Constitutional: Negative for chills and fever. HENT: Negative. Eyes: Negative. Respiratory: Negative. Cardiovascular: Negative. Gastrointestinal: Negative for abdominal pain and nausea. Skin: Negative. Neurological: Negative. Physical Exam:      Constitutional: pt is oriented to person, place, and time. HENT:   Head: Normocephalic and atraumatic. Eyes: Pupils are equal, round, and reactive to light. EOM are normal.   Cardiovascular: Normal rate, regular rhythm and normal heart sounds. Pulmonary/Chest: Breath sounds normal. No wheezes. No rales. Exhibits no tenderness. Abdominal: Soft. Bowel sounds are normal. There is no abdominal tenderness. There is no rebound and no guarding. Musculoskeletal: Normal range of motion. Neurological: pt is alert and oriented to person, place, and time. Extremities 2+ edema    XR HAND RT MIN 3 V   Final Result      CT HEAD WO CONT   Final Result   Age-appropriate atrophy. No acute findings. XR ABD (KUB)   Final Result   Within normal      XR CHEST PORT   Final Result   Findings/impression:   1. Low lung volumes. Bibasilar hypoventilatory change/atelectasis.  Left   retrocardiac lung incompletely evaluated, cannot exclude underlying airspace   disease. 2.  Cardiac and mediastinal contours are obscured by low lung volumes. Ectatic/possible aneurysmal appearance transverse aorta with calcific   atherosclerotic plaque. Appears stable from prior radiograph. 3.  No pleural fluid. No pneumothorax. 4.  Prominent gas-filled bowel partially visualized in the left upper quadrant. IR INSERT NON TUNL CVC OVER 5 YRS    (Results Pending)   IR PARACENTESIS ABD W IMAGE    (Results Pending)   IR US GUIDED VASCULAR ACCESS    (Results Pending)   IR FLUORO GUIDE PLC CVAD    (Results Pending)        Recent Results (from the past 24 hour(s))   VANCOMYCIN, RANDOM    Collection Time: 04/10/22  7:30 PM   Result Value Ref Range    Vancomycin, random 17.6 ug/mL   CBC W/O DIFF    Collection Time: 04/11/22  4:00 AM   Result Value Ref Range    WBC 4.1 4.1 - 11.1 K/uL    RBC 2.40 (L) 4.10 - 5.70 M/uL    HGB 6.8 (L) 12.1 - 17.0 g/dL    HCT 19.8 (L) 36.6 - 50.3 %    MCV 82.5 80.0 - 99.0 FL    MCH 28.3 26.0 - 34.0 PG    MCHC 34.3 30.0 - 36.5 g/dL    RDW 18.5 (H) 11.5 - 14.5 %    PLATELET 57 (L) 831 - 400 K/uL    MPV 10.4 8.9 - 12.9 FL    NRBC 0.0 0.0  WBC    ABSOLUTE NRBC 0.00 0.00 - 6.38 K/uL   METABOLIC PANEL, COMPREHENSIVE    Collection Time: 04/11/22  4:00 AM   Result Value Ref Range    Sodium 142 136 - 145 mmol/L    Potassium 3.5 3.5 - 5.1 mmol/L    Chloride 115 (H) 97 - 108 mmol/L    CO2 19 (L) 21 - 32 mmol/L    Anion gap 8 5 - 15 mmol/L    Glucose 72 65 - 100 mg/dL    BUN 14 6 - 20 mg/dL    Creatinine 1.69 (H) 0.70 - 1.30 mg/dL    BUN/Creatinine ratio 8 (L) 12 - 20      GFR est AA 50 (L) >60 ml/min/1.73m2    GFR est non-AA 41 (L) >60 ml/min/1.73m2    Calcium 7.6 (L) 8.5 - 10.1 mg/dL    Bilirubin, total 1.3 (H) 0.2 - 1.0 mg/dL    AST (SGOT) 20 15 - 37 U/L    ALT (SGPT) 10 (L) 12 - 78 U/L    Alk.  phosphatase 55 45 - 117 U/L    Protein, total 5.0 (L) 6.4 - 8.2 g/dL    Albumin 2.0 (L) 3.5 - 5.0 g/dL Globulin 3.0 2.0 - 4.0 g/dL    A-G Ratio 0.7 (L) 1.1 - 2.2     VANCOMYCIN, RANDOM    Collection Time: 04/11/22  4:00 AM   Result Value Ref Range    Vancomycin, random 15.7 ug/mL       Results     Procedure Component Value Units Date/Time    CULTURE, URINE [[id number]] Collected: 04/07/22 1502    Order Status: Completed Specimen: Urine Updated: 04/09/22 1414     Special Requests: --        No Special Requests  Reflexed from [id number]       Culture result: No Growth (<1000 cfu/mL)       MRSA SCREEN - PCR (NASAL) [[id number]] Collected: 04/07/22 1345    Order Status: Completed Specimen: Swab Updated: 04/07/22 1516     MRSA by PCR, Nasal Not Detected       CULTURE, BLOOD, PAIRED [[id number]] Collected: 04/07/22 1001    Order Status: Completed Specimen: Blood Updated: 04/07/22 1017    C. DIFFICILE (DNA) [[id number]] Collected: 04/06/22 0919    Order Status: Completed Specimen: Stool Updated: 04/06/22 1056     C. difficile (DNA) Negative       COVID-19 WITH INFLUENZA A/B [[id number]] Collected: 04/05/22 2315    Order Status: Completed Specimen: Nasopharyngeal from Nasopharynx Updated: 04/06/22 0027     SARS-CoV-2 by PCR Not Detected        Comment: Not Detected results do not preclude SARS-CoV-2 infection and should not be used as the sole basis for patient management decisions. Results must be combined with clinical observations, patient history, and epidemiological information        Influenza A by PCR Not Detected        Influenza B by PCR Not Detected        Comment: Testing was performed using leah Concepcion SARS-CoV-2 and Influenza A/B nucleic acid assay. This test is a multiplex Real-Time Reverse Transcriptase Polymerase Chain Reaction (RT-PCR) based in vitro diagnostic test intended for the qualitative detection of nucleic acids from SARS-CoV-2, Influenza A, and Influenza B in nasopharyngeal and nasal swab specimens for use under the FDA's Emergency Use Authorization (EUA) only.    Fact sheet for Patients: FindDrives.pl Fact sheet   for Healthcare Providers: FindDrives.pl                Labs:     Recent Labs     04/11/22  0400 04/10/22  0350   WBC 4.1 5.1   HGB 6.8* 7.4*   HCT 19.8* 21.6*   PLT 57* 77*     Recent Labs     04/11/22  0400 04/10/22  0350 04/09/22  0400    143 144   K 3.5 3.4* 3.6   * 118* 119*   CO2 19* 16* 18*   BUN 14 16 19   CREA 1.69* 1.95* 1.97*   GLU 72 94 93   CA 7.6* 7.7* 7.4*     Recent Labs     04/11/22  0400 04/10/22  0350 04/09/22  0400   ALT 10* 10* 10*   AP 55 66 61   TBILI 1.3* 1.3* 1.3*   TP 5.0* 5.4* 5.3*   ALB 2.0* 2.1* 2.1*   GLOB 3.0 3.3 3.2     No results for input(s): INR, PTP, APTT, INREXT, INREXT in the last 72 hours. No results for input(s): FE, TIBC, PSAT, FERR in the last 72 hours. No results found for: FOL, RBCF   No results for input(s): PH, PCO2, PO2 in the last 72 hours. No results for input(s): CPK, CKNDX, TROIQ in the last 72 hours.     No lab exists for component: CPKMB  No results found for: CHOL, CHOLX, CHLST, CHOLV, HDL, HDLP, LDL, LDLC, DLDLP, TGLX, TRIGL, TRIGP, CHHD, CHHDX  Lab Results   Component Value Date/Time    Glucose (POC) 80 04/08/2022 10:03 AM    Glucose (POC) 84 04/04/2022 05:40 PM    Glucose (POC) 107 04/03/2022 11:12 AM    Glucose (POC) 111 03/31/2022 11:35 AM    Glucose (POC) 91 03/29/2022 11:10 AM     Lab Results   Component Value Date/Time    Color Shantelle 04/07/2022 03:02 PM    Appearance Turbid (A) 04/07/2022 03:02 PM    Specific gravity 1.021 04/07/2022 03:02 PM    pH (UA) 6.0 04/07/2022 03:02 PM    Protein 100 (A) 04/07/2022 03:02 PM    Glucose Negative 04/07/2022 03:02 PM    Ketone Negative 04/07/2022 03:02 PM    Bilirubin Negative 04/07/2022 03:02 PM    Urobilinogen 0.1 04/07/2022 03:02 PM    Nitrites Negative 04/07/2022 03:02 PM    Leukocyte Esterase Trace (A) 04/07/2022 03:02 PM    Bacteria Negative 04/07/2022 03:02 PM    WBC >100 (H) 04/07/2022 03:02 PM    RBC >100 (H) 04/07/2022 03:02 PM         Assessment:     Sepsis  Lactic acidosis  Acute on chronic renal failure- creat 2.01  Cirrhosis with ascites  Anemia  History of septic DIP joint, right index finger  Hypothyroidism  Seizure disorder  Gout  GERD  History of etoh abuse  Hypotension  Metabolic acidosis  Hypothermia  Hypokalemia  Anemia monitor H&H    Plan:          On allopurinol 300 mg daily  Vitamin D 50,000 weekly  Pepcid 20 twice daily  Folic acid 1 mg daily  Heparin 5000 every 8 hours  Lactulose 10 g 3 times a day  Keppra 1500 twice a day  Synthroid 25 mcg daily  Midodrine 10 mg 3 times a day  Propranolol 20 mg twice a day  Sodium bicarb 1303 times a day  Flomax 0.4 mg daily  Vancomycin with pharmacy  Replace potassium  Discontinue IV Zosyn    Sodium bicarb drip      Current Facility-Administered Medications:     0.9% sodium chloride infusion 250 mL, 250 mL, IntraVENous, PRN, Marichuy Ackerman MD    vancomycin (VANCOCIN) 500 mg in 0.9% sodium chloride (MBP/ADV) 100 mL MBP, 500 mg, IntraVENous, ONCE, Ree Zaragoza MD    [START ON 4/12/2022] Vancomycin random level to be drawn on 4/12/22 at  0400 am., , Other, ONCE, Ree Zaragoza MD    sodium bicarbonate (8.4%) 100 mEq in 0.45% sodium chloride 1,000 mL infusion, , IntraVENous, CONTINUOUS, Mark Quevedo MD, Last Rate: 84 mL/hr at 04/11/22 0220, New Bag at 04/11/22 0220    0.9% sodium chloride infusion 250 mL, 250 mL, IntraVENous, PRN, Lamberto Ackerman MD    epoetin luis-epbx (RETACRIT) injection 10,000 Units, 10,000 Units, SubCUTAneous, Q7D, Mark Quevedo MD, 10,000 Units at 04/09/22 1805    midodrine (PROAMATINE) tablet 10 mg, 10 mg, Oral, TID WITH MEALS, Lamberto Ackerman MD, 10 mg at 04/11/22 0912    sodium bicarbonate tablet 1,300 mg, 1,300 mg, Oral, TID, Mark Quevedo MD, 1,300 mg at 04/11/22 0910    LORazepam (ATIVAN) injection 1 mg, 1 mg, IntraVENous, Q4H PRN, Lamberto Ackerman MD, 1 mg at 04/08/22 1733    sodium chloride (NS) flush 5-10 mL, 5-10 mL, IntraVENous, PRN, Diana Ackerman MD    lactulose (CHRONULAC) 10 gram/15 mL solution 15 mL, 10 g, Oral, TID, Diana Ackerman MD, 15 mL at 04/11/22 0910    levETIRAcetam (KEPPRA) tablet 1,500 mg, 1,500 mg, Oral, BID, Diana Ackerman MD, 1,500 mg at 04/11/22 0910    tamsulosin (FLOMAX) capsule 0.4 mg, 0.4 mg, Oral, DAILY, Diana Ackerman MD, 0.4 mg at 04/11/22 5108    thiamine mononitrate (B-1) tablet 100 mg, 100 mg, Oral, DAILY, Lamberto Ackerman MD, 100 mg at 04/11/22 0910    allopurinoL (ZYLOPRIM) tablet 300 mg, 300 mg, Oral, DAILY, Lamberto Ackerman MD, 300 mg at 04/11/22 0910    ergocalciferol capsule 50,000 Units, 50,000 Units, Oral, Q7D, Lamberto Ackerman MD    famotidine (PEPCID) tablet 20 mg, 20 mg, Oral, BID, Lamberto Ackerman MD, 20 mg at 14/52/87 7490    folic acid (FOLVITE) tablet 1 mg, 1 mg, Oral, DAILY, Adrian Carmona MD, 1 mg at 04/11/22 0911    levothyroxine (SYNTHROID) tablet 25 mcg, 25 mcg, Oral, ACB, Lamberto Ackerman MD, 25 mcg at 04/11/22 0910    propranoloL (INDERAL) tablet 20 mg, 20 mg, Oral, BID, Lamberto Ackerman MD, 20 mg at 04/10/22 2120    acetaminophen (TYLENOL) tablet 650 mg, 650 mg, Oral, Q6H PRN **OR** acetaminophen (TYLENOL) suppository 650 mg, 650 mg, Rectal, Q6H PRN, Diana Ackerman MD    polyethylene glycol (MIRALAX) packet 17 g, 17 g, Oral, DAILY PRN, Diana Ackerman MD    ondansetron (ZOFRAN ODT) tablet 4 mg, 4 mg, Oral, Q8H PRN **OR** ondansetron (ZOFRAN) injection 4 mg, 4 mg, IntraVENous, Q6H PRN, Lamberto Ackerman MD    heparin (porcine) injection 5,000 Units, 5,000 Units, SubCUTAneous, Q8H, Lamberto Ackerman MD, 5,000 Units at 04/11/22 0523    VANCOMYCIN INFORMATION NOTE, , Other, Rx Dosing/Monitoring, Yaron Lackey MD    NOREPINephrine (LEVOPHED) 8 mg in 0.9% NS 250ml infusion, 0.5-120 mcg/min, IntraVENous, TITRATE, Diana Ackerman MD, Stopped at 04/11/22 0106

## 2022-04-11 NOTE — PROGRESS NOTES
Recent clinicals sent to [facility], as he's a long term care resident.           Arabella Meyers, MSW

## 2022-04-11 NOTE — PROGRESS NOTES
OT eval order received and acknowledged. Per chart review, OT evaluation order for finger ulnar gutter splint which therapy direction Brittney Broderick) and currently in possession of nurse as ortho PA unable to place on pt due to swinging per nursing at this time. Will D/C OT eval at this time. Please re-consult when/if pt is medically appropriate for participation with therapy. Thank you.

## 2022-04-12 NOTE — PROGRESS NOTES
Vancomycin Dosing Consult  Day # 6 of vancomycin therapy    Consult ordered by Dr. Colin Vargas for this 60 y.o. male for indication of Osteo of Right index Finger/Sepsis    Antibiotic regimen: Vancomycin monotherapy    Temp (24hrs), Av.4 °F (36.3 °C), Min:96.3 °F (35.7 °C), Max:98.6 °F (37 °C)    Recent Labs     22  0330 22  0400 04/10/22  0350   WBC 3.8* 4.1 5.1     Recent Labs     22  0330 22  0400 04/10/22  0350   CREA 1.68*  1.61* 1.69* 1.95*   BUN 15  14 14 16     No results for input(s): CRP in the last 72 hours. No results for input(s): PCT in the last 72 hours. Estimated Creatinine Clearance: 52.2 mL/min (A) (based on SCr of 1.68 mg/dL (H)). ml/min  Concomitant nephrotoxic drugs: Vasopressors    Cultures:   22 - Urine - No growth (< 1000 cfu/ml)    MRSA Swab: Not detected collected on 22    Target range: Trough 10-15 mcg/mL      Recent level history (3):  Date/Time Dose & Interval Measured Level (mcg/mL) Associated AUC/YESSENIA     [email or web address]           500 mg x 1       16.3        Assessment/Plan:   SCR trending up again. Vancomycin level that resulted this morning remains supratherapeutic. Will hold dose today and order another random level for tomorrow () with AM labs.  Redose if level < 15  Antimicrobial stop date 22

## 2022-04-12 NOTE — PROGRESS NOTES
Renal Note    NAME:  Citlali Grande   :   1960   MRN:   [id number]     ATTENDING: Sonja Armando MD  PCP:  Fredrick Mittal NP    Date/Time:  2022 12:46 PM      Subjective:     Patient seen in the ICU. Off IVF. He is on warming blanket. Creatinine 1.69 today. On single pressor. Past Medical History:   Diagnosis Date    Arthritis     Hypertension       Past Surgical History:   Procedure Laterality Date    IR INSERT NON TUNL CVC OVER 5 YRS  3/25/2022    IR PARACENTESIS ABD W IMAGE  2022    IR PARACENTESIS ABD W IMAGE  3/1/2022    IR PARACENTESIS ABD W IMAGE  3/30/2022     Social History     Tobacco Use    Smoking status: Never Smoker    Smokeless tobacco: Never Used   Substance Use Topics    Alcohol use: Not on file      No family history on file. No Known Allergies   Prior to Admission medications    Medication Sig Start Date End Date Taking? Authorizing Provider   linezolid (ZYVOX) 600 mg tablet Take 1 Tablet by mouth two (2) times a day for 10 days. Check CBC wkly while on linezolid 22  Guerda Gu MD   thiamine mononitrate (B-1) 100 mg tablet Take 1 Tablet by mouth daily. 22   Lino Ackerman MD   levothyroxine (SYNTHROID) 25 mcg tablet Take 25 mcg by mouth Daily (before breakfast). Provider, Historical   tamsulosin (Flomax) 0.4 mg capsule Take 1 Capsule by mouth daily. 3/4/22   Ryan Whyte PA-C   potassium chloride (K-DUR, KLOR-CON M20) 20 mEq tablet Take 1 Tablet by mouth daily. 3/2/22   Noa Ash MD   sodium bicarbonate 650 mg tablet Take 1 Tablet by mouth three (3) times daily. 3/2/22   Noa Ash MD   levETIRAcetam (Keppra) 1,000 mg tablet Take 1.5 Tablets by mouth two (2) times a day. 3/2/22   Noa Ash MD   ergocalciferol (ERGOCALCIFEROL) 1,250 mcg (50,000 unit) capsule Take 1 Capsule by mouth every seven (7) days.  22   Provider, Historical   lactulose (CHRONULAC) 10 gram/15 mL solution Take 15 mL by mouth three (3) times daily. 22   Damien Ackerman MD   allopurinoL (ZYLOPRIM) 300 mg tablet Take 1 Tablet by mouth daily. 22   Provider, Historical   thiamine HCL (B-1) 100 mg tablet Take 100 mg by mouth daily. Provider, Historical   propranoloL (INDERAL) 20 mg tablet Take 20 mg by mouth two (2) times a day. Provider, Historical   folic acid (FOLVITE) 1 mg tablet Take 1 mg by mouth daily. Provider, Historical   famotidine (PEPCID) 20 mg tablet Take 20 mg by mouth At bedtime. Provider, Historical   aMILoride (MIDAMOR) 5 mg tablet Take 5 mg by mouth daily. Provider, Historical       REVIEW OF SYSTEMS:       She is lethargic. To obtain    Objective:   VITALS:    Visit Vitals  /88 (BP 1 Location: Left upper arm, BP Patient Position: At rest)   Pulse 80   Temp (!) 96.1 °F (35.6 °C)   Resp 19   Ht 5' 10.98\" (1.803 m)   Wt 92.3 kg (203 lb 7.8 oz)   SpO2 99%   BMI 28.39 kg/m²     Temp (24hrs), Av.9 °F (36.1 °C), Min:96.1 °F (35.6 °C), Max:97.5 °F (36.4 °C)      PHYSICAL EXAM:     General: NAD, lethargic eyes: sclera anicteric  Oral Cavity: No thrush or ulcers  Neck: no JVD  Chest: Fair bilateral air entry. No Wheezing or Rhonchi. No rales.   Heart: normal sounds  Abdomen: soft and non tender   :  Gerard+  Lower Extremities: no edema  Skin: no rash  Neuro: Lethargic psychiatric: Able to assess      LAB DATA REVIEWED:    Recent Results (from the past 24 hour(s))   VANCOMYCIN, RANDOM    Collection Time: 22  3:30 AM   Result Value Ref Range    Vancomycin, random 16.3 ug/mL   CBC W/O DIFF    Collection Time: 22  3:30 AM   Result Value Ref Range    WBC 3.8 (L) 4.1 - 11.1 K/uL    RBC 2.71 (L) 4.10 - 5.70 M/uL    HGB 7.4 (L) 12.1 - 17.0 g/dL    HCT 21.2 (L) 36.6 - 50.3 %    MCV 78.2 (L) 80.0 - 99.0 FL    MCH 27.3 26.0 - 34.0 PG    MCHC 34.9 30.0 - 36.5 g/dL    RDW 22.8 (H) 11.5 - 14.5 %    PLATELET 61 (L) 758 - 400 K/uL    MPV 9.7 8.9 - 12.9 FL    NRBC 0.0 0.0  WBC    ABSOLUTE NRBC 0.00 0.00 - 8.48 K/uL   METABOLIC PANEL, COMPREHENSIVE    Collection Time: 04/12/22  3:30 AM   Result Value Ref Range    Sodium 141 136 - 145 mmol/L    Potassium 3.6 3.5 - 5.1 mmol/L    Chloride 116 (H) 97 - 108 mmol/L    CO2 21 21 - 32 mmol/L    Anion gap 4 (L) 5 - 15 mmol/L    Glucose 75 65 - 100 mg/dL    BUN 15 6 - 20 mg/dL    Creatinine 1.68 (H) 0.70 - 1.30 mg/dL    BUN/Creatinine ratio 9 (L) 12 - 20      GFR est AA 50 (L) >60 ml/min/1.73m2    GFR est non-AA 41 (L) >60 ml/min/1.73m2    Calcium 7.8 (L) 8.5 - 10.1 mg/dL    Bilirubin, total 1.2 (H) 0.2 - 1.0 mg/dL    AST (SGOT) 21 15 - 37 U/L    ALT (SGPT) 11 (L) 12 - 78 U/L    Alk. phosphatase 57 45 - 117 U/L    Protein, total 5.1 (L) 6.4 - 8.2 g/dL    Albumin 1.9 (L) 3.5 - 5.0 g/dL    Globulin 3.2 2.0 - 4.0 g/dL    A-G Ratio 0.6 (L) 1.1 - 2.2     RENAL FUNCTION PANEL    Collection Time: 04/12/22  3:30 AM   Result Value Ref Range    Sodium 144 136 - 145 mmol/L    Potassium 3.6 3.5 - 5.1 mmol/L    Chloride 118 (H) 97 - 108 mmol/L    CO2 21 21 - 32 mmol/L    Anion gap 5 5 - 15 mmol/L    Glucose 74 65 - 100 mg/dL    BUN 14 6 - 20 mg/dL    Creatinine 1.61 (H) 0.70 - 1.30 mg/dL    BUN/Creatinine ratio 9 (L) 12 - 20      GFR est AA 53 (L) >60 ml/min/1.73m2    GFR est non-AA 44 (L) >60 ml/min/1.73m2    Calcium 7.6 (L) 8.5 - 10.1 mg/dL    Phosphorus 2.7 2.6 - 4.7 mg/dL    Albumin 1.8 (L) 3.5 - 5.0 g/dL       Recommendations/Plan:     1 acute kidney injury on chronic kidney disease 3:  -Creatinine was 2.0 on admission   -Creatinine has improved to 1.6  today.    -Baseline creatinine 1.3-1.7 based on labs during previous admission  -Has history of recent GRACIA with creatinine peaking to 2.6 few weeks ago  -GRACIA likely prerenal secondary to hypotension/sepsis  -Urine is suggestive of UTI  -No need for renal ultrasound  -Continue Gerard catheter. Urine output is improving  -Clinically noticed legs edema. -off  IVF.      2 metabolic acidosis:  -CO2 improved to 21   -on oral bicarbonate 1300 TID  -off HCO3 drip     3 severe anemia  -Hemoglobin 6.8->7.4.  -getting one unit blood Tx  -Continue weekly Procrit    4  Hypokalemia  -Potassium is 3.4->3.6.    -po kcl 40 meq x 1    5 SIRS/suspected sepsis  -Likely secondary to UTI. Recent UA was abnormal-currently hypothermic hypotensive though no leukocytosis seen. Lactic acid is elevated  -Started on Zosyn and vancomycin pending blood cultures  -Continue midodrine 10 mg 3 times daily for chronic hypotension     5 right index finger osteomyelitis  -s/p recent antibiotics for 2 weeks. Vancomycin has been resumed during this hospitalization  -ID is on the case    6 history of liver cirrhosis  -Has history of alcoholic liver cirrhosis. Portal hypertension  -Noted to have abdominal distention.   IR has been consulted  -Had paracentesis during his previous admission also      ________________________________________________________________________  Signed: Anette Green MD

## 2022-04-12 NOTE — CONSULTS
[address] [place] [address]    Name:  Radha Guillermo  MR#:  [id number]  :  1960  ACCOUNT #:  [de-identified]  DATE OF SERVICE:  2022      This is a psych consult ordered by Dr. Thaddeus White. This is a 78-year-old [facility] American male patient admitted to ICU for urinary catheter problem. I was asked to see because of confusion, agitation, combativeness. Saw the patient. Chart reviewed. Spoke with the nursing staff that is taking care. I believe this is his third admission. He came from nursing home. Reportedly confusion, combative, agitated . Apparently around the noon time, he becomes agitated and combative and the patient I saw on 2022 around 2:30 p.m. The patient is alert, verbal.  Initially rambled but a settled down, and he could not tell me why he was agitated, he was not happy, something he said about me. The patient was unsure when I talked to him where he is at. He could not tell me. He will not tell me. He says he is from [place] and that he lives with his wife and that he was a . Could not tell me who is his doctor or where he is at, why is he here, but he thanked me for coming and talking. He openly endorsed that coming and talking has helped him. Insight limited. Judgment is poor. PAST HISTORY:  Apparently, this is his third admission. The patient did not endorse any prior psychiatric treatment recent treatment. I am not sure he has comprehended enough or not. The patient is a poor historian. Chart reviewed. Medications reviewed. The patient's ammonia level is okay. History of cirrhosis of liver. Apparently he was just discharged, 10 days, and given Zyvox. At Nursing, he was pulling the catheters out and  several blood clots noted. I suppose he was septic upon arrival here, hyperthermia, and hypotension, temperature 93.6  mL of urine and nursing staff . The patient is altered at baseline.   Head CT without contrast shows no acute abnormality. Blood culture pending. Chest x-ray and KUB both normal.  X-ray of hand . He did not know that he was living at a nursing home. He talked about as if he was at home. MENTAL STATUS EXAM:  Average height, heavy-set gentleman. Alert. Initially restless, but once he started talking, calmed down. Polite but could not tell me what exactly was bothering him. Did thank me for coming and seeing and felt that it was good to talk to and able to talk calmly. No more agitation while I was there. Nursing staff tells me that he was here before. Vistaril was given, has kind of marginally helped. OTHER MEDICAL HISTORY:  Arthritis, hypertension. PROCEDURES:  He had paracentesis in the past including 2022. MEDICATIONS:  Takin. Zyvox. 2.  Norco.  3.  Thiamine. 4.  Synthroid. 5.  Keppra. 6.  Ergocalciferol.  7.  .  8. Folic acid. 9.  Famotidine. 10.  Amiodarone. 11.  Inderal.  12.  Propranolol. 13.  Amiloride. ALLERGIES:  NO KNOWN ALLERGIES. FAMILY HISTORY:  He is not a smoker. Drank alcohol in the past but not now. cirrhosis of the liver. PHYSICAL EXAMINATION:  VITAL SIGNS:  Blood pressure 85/68, pulse 65, temperature 93.6, respirations 14. Height 5 feet 11 inches. Weight 81.6 kg. SpO2 of 98 on room air. DIAGNOSES:  Altered mental status, metabolic encephalopathy, and maybe this is his baseline cognitive impairment with agitation and confusion, urinary retention, and urinary tract infection. DISPOSITION:  From psych and behavioral point of view, confused, agitated, restless, would not verbalize much what is bothering him. We will try Vistaril 25 mg p.r.n. to see if that helps. If not, we can also consider using ziprasidone 20 mg IM p.r.n. I will further follow.         MD JOSETTE [email or web address]  D:  2022 1:10  T:  2022 9:13  JOB #:  [id number]

## 2022-04-12 NOTE — PROGRESS NOTES
Infectious Disease Progress Note           Subjective:   Remains in the ICU, on pressor support, episodes of hypothermia, no acute events since last seen . Objective:   Physical Exam:     Visit Vitals  /75 (BP 1 Location: Left upper arm, BP Patient Position: At rest)   Pulse 75   Temp 97.9 °F (36.6 °C)   Resp 16   Ht 5' 10.98\" (1.803 m)   Wt 203 lb 7.8 oz (92.3 kg)   SpO2 97%   BMI 28.39 kg/m²      O2 Device: None (Room air)    Temp (24hrs), Av.9 °F (36.1 °C), Min:96.1 °F (35.6 °C), Max:97.9 °F (36.6 °C)    701 - 1900  In: -   Out: 200 [Urine:200]   04/10 1901 - 700  In: 1902 [P. O.:600;  I.V.:963.7]  Out: 2400 [Urine:2400]    General: NAD, alert, confused, not following commands   HEENT: HEIDY, Moist mucosa   Lungs: CTA b/l   Heart: S1S2+, RRR, no murmur  Abdo: Soft, NT, ND, +BS   : + fish cath   Exts: Decreased right index finger swelling, no drainage   Skin: No wounds, No rashes or lesions    Data Review:       Recent Days:  Recent Labs     22  0330 22  0400 04/10/22  0350   WBC 3.8* 4.1 5.1   HGB 7.4* 6.8* 7.4*   HCT 21.2* 19.8* 21.6*   PLT 61* 57* 77*     Recent Labs     22  0330 22  0400 04/10/22  0350   BUN 15  14 14 16   CREA 1.68*  1.61* 1.69* 1.95*       Lab Results   Component Value Date/Time    C-Reactive protein 1.38 (H) 2022 05:00 AM          Microbiology     Results     Procedure Component Value Units Date/Time    CULTURE, URINE [[id number]] Collected: 22 1502    Order Status: Completed Specimen: Urine Updated: 22 1414     Special Requests: --        No Special Requests  Reflexed from [id number]       Culture result: No Growth (<1000 cfu/mL)       MRSA SCREEN - PCR (NASAL) [[id number]] Collected: 22 1345    Order Status: Completed Specimen: Swab Updated: 22 1516     MRSA by PCR, Nasal Not Detected       CULTURE, BLOOD, PAIRED [[id number]] Collected: 22 1001    Order Status: Completed Specimen: Blood Updated: 04/07/22 1017    C. DIFFICILE (DNA) [[id number]] Collected: 04/06/22 0919    Order Status: Completed Specimen: Stool Updated: 04/06/22 1056     C. difficile (DNA) Negative       COVID-19 WITH INFLUENZA A/B [[id number]] Collected: 04/05/22 2315    Order Status: Completed Specimen: Nasopharyngeal from Nasopharynx Updated: 04/06/22 0027     SARS-CoV-2 by PCR Not Detected        Comment: Not Detected results do not preclude SARS-CoV-2 infection and should not be used as the sole basis for patient management decisions. Results must be combined with clinical observations, patient history, and epidemiological information        Influenza A by PCR Not Detected        Influenza B by PCR Not Detected        Comment: Testing was performed using leah Concepcion SARS-CoV-2 and Influenza A/B nucleic acid assay. This test is a multiplex Real-Time Reverse Transcriptase Polymerase Chain Reaction (RT-PCR) based in vitro diagnostic test intended for the qualitative detection of nucleic acids from SARS-CoV-2, Influenza A, and Influenza B in nasopharyngeal and nasal swab specimens for use under the FDA's Emergency Use Authorization (EUA) only. Fact sheet for Patients: FindDrives.pl Fact sheet   for Healthcare Providers: FindDrives.pl                  Diagnostics   CXR Results  (Last 48 hours)    None             Assessment/Plan     1. Right index finger osteomyelitis, underlying gouty arthritis       S/p debridement w isolation of Staph epidermidis from Cx      On day # 21/28 of Vanc, continue routine wound care      2. Suspected sepsis, Vol depletion for 3rd spacing      Hypoalbuminemic from chronic liver disease       Remains on pressor support, Afebrile, all Cxs neg on current admit       S/p Zosyn, currently on Vancomycin         3. UTI, abnormal UA,  Urinary retention: + indwelling fish cath      Urine Cx from 04/07 is negative, s/p 5 days of empiric Zosyn      4. AMS, h/o metabolic/hepatic encephalopathy, waxing and waning mentation     5. Ascites, S/p paracentesis on 04/08 w removal of 4,250 ml of clear fluid      Abdomen is again distended and firm, w/o evidence or guarding or rebound     6.  Anemia/thrombocytopenia, due to chronic liver disease      Lewis Peterson MD    4/12/2022

## 2022-04-12 NOTE — PROGRESS NOTES
Comprehensive Nutrition Assessment    Type and Reason for Visit: Reassess (Interim)    Nutrition Recommendations/Plan:   Continue diet, advance as rec'd by SLP. Continue to provide feeding assistance w/ intake as needed. Continue Ensure Enlive x3/d (1050 kcal, 60 gm PRO). Prosource 1 packet x2/d (120 kcal, 30 gm PRO) in beverages. Nutrition Assessment:  Admitted for hematuria s/p pulling out fish catheter, +hypothermia and hypotension. RD familiar with pt from previous admits. Pt inappropriate for interview at this time per RN, no family in room. Pt with minimal PO intakes, per RN pt up all night ans mostly sleeping today. RD added Ensure 3x/d this AM per pt previously stated preferences. Will f/u for tolerance and need for changes. (4/12)Pressor held today. Pt seen in bed asleep s/p Breakfast. Per Nsg, Pt ate ~25% from tray, drank >80% of ONS w/ observation as liking Ensure shakes. PO intake likely r/t dislike for most appropriate diet consistency of MM5/Thin Liq. intake considered as fair w/ ONS at this time. RD to continue regimen; add ONS as appropriate. Wts observed as varied- likely r/t fluid accumulation. Labs: H/H 7.4/21.2, MCV 78.2, Cl 116, Cr 1.68, GFR 50, Ca 7.8, T.Bili 1.2, ALT 11, TP 5.1, Alb 1.9. Meds: B1, B9, Na bicarbonate, midodrine, synthroid, lactulose, pepcid, allopurinol. Malnutrition Assessment:  Malnutrition Status: Moderate malnutrition    Context:  Chronic illness       Estimated Daily Nutrient Needs:  Energy (kcal): 0483- 2856kcals (30-35kcals/kg for cirrhosis); Weight Used for Energy Requirements: Current  Protein (g): 98-122g (1.2-1.5g/kg); Weight Used for Protein Requirements: Current  Fluid (ml/day): 1632-2040ml (20-25ml/kg); Method Used for Fluid Requirements: 1 ml/kcal    Nutrition Related Findings:   No N/V/C per Nsg; loose BMs likely r/t on lactulose. Last BM on 4/12. Abd distention continues- likely r/t ascites. +3 BLE edema noted in I/Os.  SLP tx continues- advanced to MM5/Thin rec'd. Wounds:    Pressure injury,Surgical incision (Incision- R index finger; PI- buttocks)       Current Nutrition Therapies:  ADULT ORAL NUTRITION SUPPLEMENT Breakfast, Lunch, Dinner; Standard High Calorie/High Protein  ADULT DIET Dysphagia - Minced & Moist; Low Fat/Low Chol/High Fiber/2 gm Na    Anthropometric Measures:  · Height:  5' 10.98\" (180.3 cm)  · Current Body Wt:  81.6 kg (179 lb 14.3 oz) (4/7)   · Admission Body Wt:  179 lb 14.3 oz (4/7)    · Usual Body Wt:   (PAYTON)     · Ideal Body Wt:  172 lbs:  104.6 %   · Adjusted Body Weight:   ; Weight Adjustment for: No adjustment   · Adjusted BMI:       · BMI Category: Overweight (BMI 25.0-29. 9)       Nutrition Diagnosis:   · Moderate malnutrition,In context of chronic illness related to inadequate protein-energy intake,catabolic illness (Liver cirrhosis 2/2 EtOH abuse) as evidenced by poor intake prior to admission,mild loss of subcutaneous fat,mild muscle loss    Nutrition Interventions:   Food and/or Nutrient Delivery: Continue current diet,Continue oral nutrition supplement  Nutrition Education and Counseling: No recommendations at this time  Coordination of Nutrition Care: Continue to monitor while inpatient,Speech therapy,Feeding assistance/environmental change    Goals:  Meet >/=50% of EENs in >5 days, Wt maintenance within +/-0.5kg in >5 days, lytes WNL       Nutrition Monitoring and Evaluation:   Behavioral-Environmental Outcomes: None identified  Food/Nutrient Intake Outcomes: Food and nutrient intake,Supplement intake,Diet advancement/tolerance  Physical Signs/Symptoms Outcomes: Fluid status or edema,Meal time behavior,Chewing or swallowing,Weight,Nutrition focused physical findings,Diarrhea    Discharge Planning: Too soon to determine     Electronically signed by Kelsie Morales RD on 4/12/2022 at 1:15 PM    Contact: ext. Dilcia Lopez or Nathalie.

## 2022-04-12 NOTE — PROGRESS NOTES
General Daily Progress Note          Patient Name:   Katherine Patient       YOB: 1960       Age:  60 y.o. Admit Date: 4/7/2022      Subjective:     Katherine Patient is a(n) 60 y.o. male with PMH significant for HTN, cirrhosis, ascites presents via EMS for removing fish catheter. Patient recently d/c after lengthy stay in hospital for septic DIP joint of right index finger. He was d/c on 10 days of PO Zyvox, which he completed. He presents today after pulling out fish catheter. Several blood clots noted. Patient septic upon arrival with hypothermia and hypotension. Temp 93.6 rectal, BP 84/68. Patient is retaining 887cc of urine and nursing staff will replace fish.     Patient started on fluids and IV Levaquin in the ED. Patient is altered at baseline. Head CT w/o contrast shows no acute abnormality. Blood cultures pending. CXR and KUB both normal       Patient is alert awake confused  Hypotensive off  Levophed    Normal temperature    4/12  Patient is off levophed but still receiving midodrine and IVF. He remain hypotensive. He is lethargic and confused this morning. Patient has mild bilateral LE edema. Seen by ID yesterday. Recommends continuing vancomycin   Seen by nephrology yesterday. Recommends discontinuing IVF in lieu of leg edema, discontinue bicarb drip, continuing weekly procrit, continuing PO KCl, midodrine for chronic hypotension.     Today patient hypothermic on bear hugger  Yesterday evening patient was restless agitated        Objective:     Visit Vitals  /88 (BP 1 Location: Left upper arm, BP Patient Position: At rest)   Pulse 80   Temp (!) 96.1 °F (35.6 °C)   Resp 19   Ht 5' 10.98\" (1.803 m)   Wt 92.3 kg (203 lb 7.8 oz)   SpO2 99%   BMI 28.39 kg/m²        Recent Results (from the past 24 hour(s))   VANCOMYCIN, RANDOM    Collection Time: 04/12/22  3:30 AM   Result Value Ref Range    Vancomycin, random 16.3 ug/mL   CBC W/O DIFF    Collection Time: 04/12/22  3:30 AM Result Value Ref Range    WBC 3.8 (L) 4.1 - 11.1 K/uL    RBC 2.71 (L) 4.10 - 5.70 M/uL    HGB 7.4 (L) 12.1 - 17.0 g/dL    HCT 21.2 (L) 36.6 - 50.3 %    MCV 78.2 (L) 80.0 - 99.0 FL    MCH 27.3 26.0 - 34.0 PG    MCHC 34.9 30.0 - 36.5 g/dL    RDW 22.8 (H) 11.5 - 14.5 %    PLATELET 61 (L) 660 - 400 K/uL    MPV 9.7 8.9 - 12.9 FL    NRBC 0.0 0.0  WBC    ABSOLUTE NRBC 0.00 0.00 - 9.32 K/uL   METABOLIC PANEL, COMPREHENSIVE    Collection Time: 04/12/22  3:30 AM   Result Value Ref Range    Sodium 141 136 - 145 mmol/L    Potassium 3.6 3.5 - 5.1 mmol/L    Chloride 116 (H) 97 - 108 mmol/L    CO2 21 21 - 32 mmol/L    Anion gap 4 (L) 5 - 15 mmol/L    Glucose 75 65 - 100 mg/dL    BUN 15 6 - 20 mg/dL    Creatinine 1.68 (H) 0.70 - 1.30 mg/dL    BUN/Creatinine ratio 9 (L) 12 - 20      GFR est AA 50 (L) >60 ml/min/1.73m2    GFR est non-AA 41 (L) >60 ml/min/1.73m2    Calcium 7.8 (L) 8.5 - 10.1 mg/dL    Bilirubin, total 1.2 (H) 0.2 - 1.0 mg/dL    AST (SGOT) 21 15 - 37 U/L    ALT (SGPT) 11 (L) 12 - 78 U/L    Alk. phosphatase 57 45 - 117 U/L    Protein, total 5.1 (L) 6.4 - 8.2 g/dL    Albumin 1.9 (L) 3.5 - 5.0 g/dL    Globulin 3.2 2.0 - 4.0 g/dL    A-G Ratio 0.6 (L) 1.1 - 2.2     RENAL FUNCTION PANEL    Collection Time: 04/12/22  3:30 AM   Result Value Ref Range    Sodium 144 136 - 145 mmol/L    Potassium 3.6 3.5 - 5.1 mmol/L    Chloride 118 (H) 97 - 108 mmol/L    CO2 21 21 - 32 mmol/L    Anion gap 5 5 - 15 mmol/L    Glucose 74 65 - 100 mg/dL    BUN 14 6 - 20 mg/dL    Creatinine 1.61 (H) 0.70 - 1.30 mg/dL    BUN/Creatinine ratio 9 (L) 12 - 20      GFR est AA 53 (L) >60 ml/min/1.73m2    GFR est non-AA 44 (L) >60 ml/min/1.73m2    Calcium 7.6 (L) 8.5 - 10.1 mg/dL    Phosphorus 2.7 2.6 - 4.7 mg/dL    Albumin 1.8 (L) 3.5 - 5.0 g/dL     [unfilled]      Review of Systems    Constitutional: Negative for chills and fever. HENT: Negative. Eyes: Negative. Respiratory: Negative. Cardiovascular: Negative.     Gastrointestinal: Negative for abdominal pain and nausea. Skin: Negative. Neurological: Negative. Physical Exam:      Constitutional: pt is oriented to person, place, and time. HENT:   Head: Normocephalic and atraumatic. Eyes: Pupils are equal, round, and reactive to light. EOM are normal.   Cardiovascular: Normal rate, regular rhythm and normal heart sounds. Pulmonary/Chest: Breath sounds normal. No wheezes. No rales. Exhibits no tenderness. Abdominal: Soft. Bowel sounds are normal. There is no abdominal tenderness. There is no rebound and no guarding. Musculoskeletal: Normal range of motion. Neurological: pt is alert and oriented to person, place, and time. Extremities 2+ edema    XR HAND RT MIN 3 V   Final Result      CT HEAD WO CONT   Final Result   Age-appropriate atrophy. No acute findings. XR ABD (KUB)   Final Result   Within normal      XR CHEST PORT   Final Result   Findings/impression:   1. Low lung volumes. Bibasilar hypoventilatory change/atelectasis. Left   retrocardiac lung incompletely evaluated, cannot exclude underlying airspace   disease. 2.  Cardiac and mediastinal contours are obscured by low lung volumes. Ectatic/possible aneurysmal appearance transverse aorta with calcific   atherosclerotic plaque. Appears stable from prior radiograph. 3.  No pleural fluid. No pneumothorax. 4.  Prominent gas-filled bowel partially visualized in the left upper quadrant.          IR INSERT NON TUNL CVC OVER 5 YRS    (Results Pending)   IR PARACENTESIS ABD W IMAGE    (Results Pending)   IR US GUIDED VASCULAR ACCESS    (Results Pending)   IR FLUORO GUIDE PLC CVAD    (Results Pending)        Recent Results (from the past 24 hour(s))   VANCOMYCIN, RANDOM    Collection Time: 04/12/22  3:30 AM   Result Value Ref Range    Vancomycin, random 16.3 ug/mL   CBC W/O DIFF    Collection Time: 04/12/22  3:30 AM   Result Value Ref Range    WBC 3.8 (L) 4.1 - 11.1 K/uL    RBC 2.71 (L) 4.10 - 5.70 M/uL    HGB 7.4 (L) 12.1 - 17.0 g/dL    HCT 21.2 (L) 36.6 - 50.3 %    MCV 78.2 (L) 80.0 - 99.0 FL    MCH 27.3 26.0 - 34.0 PG    MCHC 34.9 30.0 - 36.5 g/dL    RDW 22.8 (H) 11.5 - 14.5 %    PLATELET 61 (L) 816 - 400 K/uL    MPV 9.7 8.9 - 12.9 FL    NRBC 0.0 0.0  WBC    ABSOLUTE NRBC 0.00 0.00 - 9.51 K/uL   METABOLIC PANEL, COMPREHENSIVE    Collection Time: 04/12/22  3:30 AM   Result Value Ref Range    Sodium 141 136 - 145 mmol/L    Potassium 3.6 3.5 - 5.1 mmol/L    Chloride 116 (H) 97 - 108 mmol/L    CO2 21 21 - 32 mmol/L    Anion gap 4 (L) 5 - 15 mmol/L    Glucose 75 65 - 100 mg/dL    BUN 15 6 - 20 mg/dL    Creatinine 1.68 (H) 0.70 - 1.30 mg/dL    BUN/Creatinine ratio 9 (L) 12 - 20      GFR est AA 50 (L) >60 ml/min/1.73m2    GFR est non-AA 41 (L) >60 ml/min/1.73m2    Calcium 7.8 (L) 8.5 - 10.1 mg/dL    Bilirubin, total 1.2 (H) 0.2 - 1.0 mg/dL    AST (SGOT) 21 15 - 37 U/L    ALT (SGPT) 11 (L) 12 - 78 U/L    Alk.  phosphatase 57 45 - 117 U/L    Protein, total 5.1 (L) 6.4 - 8.2 g/dL    Albumin 1.9 (L) 3.5 - 5.0 g/dL    Globulin 3.2 2.0 - 4.0 g/dL    A-G Ratio 0.6 (L) 1.1 - 2.2     RENAL FUNCTION PANEL    Collection Time: 04/12/22  3:30 AM   Result Value Ref Range    Sodium 144 136 - 145 mmol/L    Potassium 3.6 3.5 - 5.1 mmol/L    Chloride 118 (H) 97 - 108 mmol/L    CO2 21 21 - 32 mmol/L    Anion gap 5 5 - 15 mmol/L    Glucose 74 65 - 100 mg/dL    BUN 14 6 - 20 mg/dL    Creatinine 1.61 (H) 0.70 - 1.30 mg/dL    BUN/Creatinine ratio 9 (L) 12 - 20      GFR est AA 53 (L) >60 ml/min/1.73m2    GFR est non-AA 44 (L) >60 ml/min/1.73m2    Calcium 7.6 (L) 8.5 - 10.1 mg/dL    Phosphorus 2.7 2.6 - 4.7 mg/dL    Albumin 1.8 (L) 3.5 - 5.0 g/dL       Results     Procedure Component Value Units Date/Time    CULTURE, URINE [[id number]] Collected: 04/07/22 1502    Order Status: Completed Specimen: Urine Updated: 04/09/22 1414     Special Requests: --        No Special Requests  Reflexed from [id number]       Culture result: No Growth (<1000 cfu/mL)       MRSA SCREEN - PCR (NASAL) [[id number]] Collected: 04/07/22 1345    Order Status: Completed Specimen: Swab Updated: 04/07/22 1516     MRSA by PCR, Nasal Not Detected       CULTURE, BLOOD, PAIRED [[id number]] Collected: 04/07/22 1001    Order Status: Completed Specimen: Blood Updated: 04/07/22 1017    C. DIFFICILE (DNA) [[id number]] Collected: 04/06/22 0919    Order Status: Completed Specimen: Stool Updated: 04/06/22 1056     C. difficile (DNA) Negative       COVID-19 WITH INFLUENZA A/B [[id number]] Collected: 04/05/22 2315    Order Status: Completed Specimen: Nasopharyngeal from Nasopharynx Updated: 04/06/22 0027     SARS-CoV-2 by PCR Not Detected        Comment: Not Detected results do not preclude SARS-CoV-2 infection and should not be used as the sole basis for patient management decisions. Results must be combined with clinical observations, patient history, and epidemiological information        Influenza A by PCR Not Detected        Influenza B by PCR Not Detected        Comment: Testing was performed using leah Concepcion SARS-CoV-2 and Influenza A/B nucleic acid assay. This test is a multiplex Real-Time Reverse Transcriptase Polymerase Chain Reaction (RT-PCR) based in vitro diagnostic test intended for the qualitative detection of nucleic acids from SARS-CoV-2, Influenza A, and Influenza B in nasopharyngeal and nasal swab specimens for use under the FDA's Emergency Use Authorization (EUA) only.    Fact sheet for Patients: FindDrives.pl Fact sheet   for Healthcare Providers: FindDrives[employer]                Labs:     Recent Labs     04/12/22  0330 04/11/22  0400   WBC 3.8* 4.1   HGB 7.4* 6.8*   HCT 21.2* 19.8*   PLT 61* 57*     Recent Labs     04/12/22  0330 04/11/22  0400 04/10/22  0350     144 142 143   K 3.6  3.6 3.5 3.4*   *  118* 115* 118*   CO2 21  21 19* 16*   BUN 15  14 14 16   CREA 1.68*  1.61* 1.69* 1.95*   GLU 75 74 72 94   CA 7.8*  7.6* 7.6* 7.7*   PHOS 2.7  --   --      Recent Labs     04/12/22  0330 04/11/22  0400 04/10/22  0350   ALT 11* 10* 10*   AP 57 55 66   TBILI 1.2* 1.3* 1.3*   TP 5.1* 5.0* 5.4*   ALB 1.9*  1.8* 2.0* 2.1*   GLOB 3.2 3.0 3.3     No results for input(s): INR, PTP, APTT, INREXT, INREXT in the last 72 hours. No results for input(s): FE, TIBC, PSAT, FERR in the last 72 hours. No results found for: FOL, RBCF   No results for input(s): PH, PCO2, PO2 in the last 72 hours. No results for input(s): CPK, CKNDX, TROIQ in the last 72 hours. No lab exists for component: CPKMB  No results found for: CHOL, CHOLX, CHLST, CHOLV, HDL, HDLP, LDL, LDLC, DLDLP, TGLX, TRIGL, TRIGP, CHHD, CHHDX  Lab Results   Component Value Date/Time    Glucose (POC) 80 04/08/2022 10:03 AM    Glucose (POC) 84 04/04/2022 05:40 PM    Glucose (POC) 107 04/03/2022 11:12 AM    Glucose (POC) 111 03/31/2022 11:35 AM    Glucose (POC) 91 03/29/2022 11:10 AM     Lab Results   Component Value Date/Time    Color Shantelle 04/07/2022 03:02 PM    Appearance Turbid (A) 04/07/2022 03:02 PM    Specific gravity 1.021 04/07/2022 03:02 PM    pH (UA) 6.0 04/07/2022 03:02 PM    Protein 100 (A) 04/07/2022 03:02 PM    Glucose Negative 04/07/2022 03:02 PM    Ketone Negative 04/07/2022 03:02 PM    Bilirubin Negative 04/07/2022 03:02 PM    Urobilinogen 0.1 04/07/2022 03:02 PM    Nitrites Negative 04/07/2022 03:02 PM    Leukocyte Esterase Trace (A) 04/07/2022 03:02 PM    Bacteria Negative 04/07/2022 03:02 PM    WBC >100 (H) 04/07/2022 03:02 PM    RBC >100 (H) 04/07/2022 03:02 PM         Assessment:     Sepsis  Lactic acidosis  Acute on chronic renal failure- creat 2.01  Cirrhosis with ascites  Anemia  History of septic DIP joint, right index finger  Hypothyroidism  Seizure disorder  Gout  GERD  History of etoh abuse  Hypotension  Metabolic acidosis  Hypothermia  Hypokalemia  Anemia monitor H&H    Plan:      On bear hugger    On allopurinol 300 mg daily  Vitamin D 50,000 weekly  Pepcid 20 twice daily  Folic acid 1 mg daily  Lactulose 10 g 3 times a day  Keppra 1500 twice a day  Synthroid 25 mcg daily  Midodrine 10 mg 3 times a day  Propranolol 20 mg twice a day  Sodium bicarb 1303 times a day  Flomax 0.4 mg daily  Thiamine 100 mg daily  Vancomycin with pharmacy  Replace potassium  Discontinue IV Zosyn    Discontinue Sodium bicarb drip    We will do psych consult for psychosis      Current Facility-Administered Medications:     [START ON 4/13/2022] Vancomycin Random level to be drawn on WED (4/13) @ 0400, , Other, ONCE, Ree Zaragoza MD    0.9% sodium chloride infusion 250 mL, 250 mL, IntraVENous, PRN, Debo Ackerman MD    hydrOXYzine (VISTARIL) injection 25 mg, 25 mg, IntraMUSCular, Q8H PRN, Kendrick Tracy MD, 25 mg at 04/11/22 1811    ziprasidone (GEODON) 10 mg in sterile water (preservative free) 0.5 mL injection, 10 mg, IntraMUSCular, Q12H PRN, Kendrick Tracy MD, 10 mg at 04/12/22 0851    levETIRAcetam (KEPPRA) 1500 mg in saline (iso-osm) 100 ml IVPB, 1,500 mg, IntraVENous, Q12H, Lamberto Ackerman MD, Last Rate: 400 mL/hr at 04/12/22 0036, 1,500 mg at 04/12/22 0036    0.9% sodium chloride infusion 250 mL, 250 mL, IntraVENous, PRN, Lamberto Ackerman MD    epoetin luis-epbx (RETACRIT) injection 10,000 Units, 10,000 Units, SubCUTAneous, Q7D, Ken Gabriel MD, 10,000 Units at 04/09/22 1805    midodrine (PROAMATINE) tablet 10 mg, 10 mg, Oral, TID WITH MEALS, Lamberto Ackerman MD, 10 mg at 04/12/22 1403    sodium bicarbonate tablet 1,300 mg, 1,300 mg, Oral, TID, Ken Gabriel MD, 1,300 mg at 04/12/22 0806    LORazepam (ATIVAN) injection 1 mg, 1 mg, IntraVENous, Q4H PRN, Lamberto Ackerman MD, 1 mg at 04/08/22 1733    sodium chloride (NS) flush 5-10 mL, 5-10 mL, IntraVENous, PRN, Lamberto Ackerman MD    lactulose (CHRONULAC) 10 gram/15 mL solution 15 mL, 10 g, Oral, TID, Lamberto Ackerman MD, 15 mL at 04/12/22 0805    tamsulosin [facility]) capsule 0.4 mg, 0.4 mg, Oral, DAILY, Lamberto Ackerman MD, 0.4 mg at 04/12/22 0806    thiamine mononitrate (B-1) tablet 100 mg, 100 mg, Oral, DAILY, Lamberto Ackerman MD, 100 mg at 04/12/22 8966    allopurinoL (ZYLOPRIM) tablet 300 mg, 300 mg, Oral, DAILY, Mohiuddin, Laban Mcardle, MD, 300 mg at 04/12/22 5086    ergocalciferol capsule 50,000 Units, 50,000 Units, Oral, Q7D, Mohiuddin, Laban Mcardle, MD    famotidine (PEPCID) tablet 20 mg, 20 mg, Oral, BID, Lamberto Ackerman MD, 20 mg at 92/11/30 0958    folic acid (FOLVITE) tablet 1 mg, 1 mg, Oral, DAILY, Clinton Newman MD, 1 mg at 04/12/22 1169    levothyroxine (SYNTHROID) tablet 25 mcg, 25 mcg, Oral, ACB, Lamberto Ackerman MD, 25 mcg at 04/12/22 0806    propranoloL (INDERAL) tablet 20 mg, 20 mg, Oral, BID, Lamberto Ackerman MD, 20 mg at 04/10/22 2120    acetaminophen (TYLENOL) tablet 650 mg, 650 mg, Oral, Q6H PRN **OR** acetaminophen (TYLENOL) suppository 650 mg, 650 mg, Rectal, Q6H PRN, Lamberto Ackerman MD    polyethylene glycol (MIRALAX) packet 17 g, 17 g, Oral, DAILY PRN, Lamberto Ackerman MD    ondansetron (ZOFRAN ODT) tablet 4 mg, 4 mg, Oral, Q8H PRN **OR** ondansetron (ZOFRAN) injection 4 mg, 4 mg, IntraVENous, Q6H PRN, Mohiuddin, Laban Mcardle, MD    VANCOMYCIN INFORMATION NOTE, , Other, Rx Dosing/Monitoring, Deloris Chacon MD    NOREPINephrine (LEVOPHED) 8 mg in 0.9% NS 250ml infusion, 0.5-120 mcg/min, IntraVENous, TITRATE, Mohiuddin, Laban Mcardle, MD, Stopped at 04/11/22 0105

## 2022-04-12 NOTE — PROGRESS NOTES
Attempted to see pt for follow-up. Pt not arousable to participate in tx at this time. Nsg reports pt tolerated breakfast this date without overt s/s aspiration but does report not liking current diet consistency. Will cont to follow.

## 2022-04-12 NOTE — PROGRESS NOTES
General Daily Progress Note          Patient Name:   Mason Connor       YOB: 1960       Age:  60 y.o. Admit Date: 4/7/2022      Subjective:     Mason Connor is a(n) 60 y.o. male with PMH significant for HTN, cirrhosis, ascites presents via EMS for removing fish catheter. Patient recently d/c after lengthy stay in hospital for septic DIP joint of right index finger. He was d/c on 10 days of PO Zyvox, which he completed. He presents today after pulling out fish catheter. Several blood clots noted. Patient septic upon arrival with hypothermia and hypotension. Temp 93.6 rectal, BP 84/68. Patient is retaining 887cc of urine and nursing staff will replace fish.     Patient started on fluids and IV Levaquin in the ED. Patient is altered at baseline. Head CT w/o contrast shows no acute abnormality. Blood cultures pending. CXR and KUB both normal       Patient is alert awake confused  Hypotensive off  Levophed    Normal temperature    4/12  Patient is off levophed but still receiving midodrine and IVF. He remain hypotensive. He is lethargic and confused this morning. Patient has mild bilateral LE edema. Seen by ID yesterday. Recommends continuing vancomycin and zosyn. Seen by nephrology yesterday. Recommends discontinuing IVF in lieu of leg edema, discontinue bicarb drip, continuing weekly procrit, continuing PO KCl, midodrine for chronic hypotension.       Objective:     Visit Vitals  /83 (BP 1 Location: Left upper arm, BP Patient Position: At rest)   Pulse 72   Temp 96.8 °F (36 °C)   Resp 18   Ht 5' 10.98\" (1.803 m)   Wt 203 lb 7.8 oz (92.3 kg)   SpO2 99%   BMI 28.39 kg/m²        Recent Results (from the past 24 hour(s))   VANCOMYCIN, RANDOM    Collection Time: 04/12/22  3:30 AM   Result Value Ref Range    Vancomycin, random 16.3 ug/mL   CBC W/O DIFF    Collection Time: 04/12/22  3:30 AM   Result Value Ref Range    WBC 3.8 (L) 4.1 - 11.1 K/uL    RBC 2.71 (L) 4.10 - 5.70 M/uL HGB 7.4 (L) 12.1 - 17.0 g/dL    HCT 21.2 (L) 36.6 - 50.3 %    MCV 78.2 (L) 80.0 - 99.0 FL    MCH 27.3 26.0 - 34.0 PG    MCHC 34.9 30.0 - 36.5 g/dL    RDW 22.8 (H) 11.5 - 14.5 %    PLATELET 61 (L) 958 - 400 K/uL    MPV 9.7 8.9 - 12.9 FL    NRBC 0.0 0.0  WBC    ABSOLUTE NRBC 0.00 0.00 - 5.88 K/uL   METABOLIC PANEL, COMPREHENSIVE    Collection Time: 04/12/22  3:30 AM   Result Value Ref Range    Sodium 141 136 - 145 mmol/L    Potassium 3.6 3.5 - 5.1 mmol/L    Chloride 116 (H) 97 - 108 mmol/L    CO2 21 21 - 32 mmol/L    Anion gap 4 (L) 5 - 15 mmol/L    Glucose 75 65 - 100 mg/dL    BUN 15 6 - 20 mg/dL    Creatinine 1.68 (H) 0.70 - 1.30 mg/dL    BUN/Creatinine ratio 9 (L) 12 - 20      GFR est AA 50 (L) >60 ml/min/1.73m2    GFR est non-AA 41 (L) >60 ml/min/1.73m2    Calcium 7.8 (L) 8.5 - 10.1 mg/dL    Bilirubin, total 1.2 (H) 0.2 - 1.0 mg/dL    AST (SGOT) 21 15 - 37 U/L    ALT (SGPT) 11 (L) 12 - 78 U/L    Alk. phosphatase 57 45 - 117 U/L    Protein, total 5.1 (L) 6.4 - 8.2 g/dL    Albumin 1.9 (L) 3.5 - 5.0 g/dL    Globulin 3.2 2.0 - 4.0 g/dL    A-G Ratio 0.6 (L) 1.1 - 2.2     RENAL FUNCTION PANEL    Collection Time: 04/12/22  3:30 AM   Result Value Ref Range    Sodium 144 136 - 145 mmol/L    Potassium 3.6 3.5 - 5.1 mmol/L    Chloride 118 (H) 97 - 108 mmol/L    CO2 21 21 - 32 mmol/L    Anion gap 5 5 - 15 mmol/L    Glucose 74 65 - 100 mg/dL    BUN 14 6 - 20 mg/dL    Creatinine 1.61 (H) 0.70 - 1.30 mg/dL    BUN/Creatinine ratio 9 (L) 12 - 20      GFR est AA 53 (L) >60 ml/min/1.73m2    GFR est non-AA 44 (L) >60 ml/min/1.73m2    Calcium 7.6 (L) 8.5 - 10.1 mg/dL    Phosphorus 2.7 2.6 - 4.7 mg/dL    Albumin 1.8 (L) 3.5 - 5.0 g/dL     [unfilled]      Review of Systems    Constitutional: Negative for chills and fever. HENT: Negative. Eyes: Negative. Respiratory: Negative. Cardiovascular: Negative. Gastrointestinal: Negative for abdominal pain and nausea. Skin: Negative. Neurological: Negative. Physical Exam:      Constitutional: pt is oriented to person, place, and time. HENT:   Head: Normocephalic and atraumatic. Eyes: Pupils are equal, round, and reactive to light. EOM are normal.   Cardiovascular: Normal rate, regular rhythm and normal heart sounds. Pulmonary/Chest: Breath sounds normal. No wheezes. No rales. Exhibits no tenderness. Abdominal: Soft. Bowel sounds are normal. There is no abdominal tenderness. There is no rebound and no guarding. Musculoskeletal: Normal range of motion. Neurological: pt is alert and oriented to person, place, and time. Extremities 2+ edema    XR HAND RT MIN 3 V   Final Result      CT HEAD WO CONT   Final Result   Age-appropriate atrophy. No acute findings. XR ABD (KUB)   Final Result   Within normal      XR CHEST PORT   Final Result   Findings/impression:   1. Low lung volumes. Bibasilar hypoventilatory change/atelectasis. Left   retrocardiac lung incompletely evaluated, cannot exclude underlying airspace   disease. 2.  Cardiac and mediastinal contours are obscured by low lung volumes. Ectatic/possible aneurysmal appearance transverse aorta with calcific   atherosclerotic plaque. Appears stable from prior radiograph. 3.  No pleural fluid. No pneumothorax. 4.  Prominent gas-filled bowel partially visualized in the left upper quadrant.          IR INSERT NON TUNL CVC OVER 5 YRS    (Results Pending)   IR PARACENTESIS ABD W IMAGE    (Results Pending)   IR US GUIDED VASCULAR ACCESS    (Results Pending)   IR FLUORO GUIDE PLC CVAD    (Results Pending)        Recent Results (from the past 24 hour(s))   VANCOMYCIN, RANDOM    Collection Time: 04/12/22  3:30 AM   Result Value Ref Range    Vancomycin, random 16.3 ug/mL   CBC W/O DIFF    Collection Time: 04/12/22  3:30 AM   Result Value Ref Range    WBC 3.8 (L) 4.1 - 11.1 K/uL    RBC 2.71 (L) 4.10 - 5.70 M/uL    HGB 7.4 (L) 12.1 - 17.0 g/dL    HCT 21.2 (L) 36.6 - 50.3 %    MCV 78.2 (L) 80.0 - 99.0 FL    MCH 27.3 26.0 - 34.0 PG    MCHC 34.9 30.0 - 36.5 g/dL    RDW 22.8 (H) 11.5 - 14.5 %    PLATELET 61 (L) 524 - 400 K/uL    MPV 9.7 8.9 - 12.9 FL    NRBC 0.0 0.0  WBC    ABSOLUTE NRBC 0.00 0.00 - 5.47 K/uL   METABOLIC PANEL, COMPREHENSIVE    Collection Time: 04/12/22  3:30 AM   Result Value Ref Range    Sodium 141 136 - 145 mmol/L    Potassium 3.6 3.5 - 5.1 mmol/L    Chloride 116 (H) 97 - 108 mmol/L    CO2 21 21 - 32 mmol/L    Anion gap 4 (L) 5 - 15 mmol/L    Glucose 75 65 - 100 mg/dL    BUN 15 6 - 20 mg/dL    Creatinine 1.68 (H) 0.70 - 1.30 mg/dL    BUN/Creatinine ratio 9 (L) 12 - 20      GFR est AA 50 (L) >60 ml/min/1.73m2    GFR est non-AA 41 (L) >60 ml/min/1.73m2    Calcium 7.8 (L) 8.5 - 10.1 mg/dL    Bilirubin, total 1.2 (H) 0.2 - 1.0 mg/dL    AST (SGOT) 21 15 - 37 U/L    ALT (SGPT) 11 (L) 12 - 78 U/L    Alk.  phosphatase 57 45 - 117 U/L    Protein, total 5.1 (L) 6.4 - 8.2 g/dL    Albumin 1.9 (L) 3.5 - 5.0 g/dL    Globulin 3.2 2.0 - 4.0 g/dL    A-G Ratio 0.6 (L) 1.1 - 2.2     RENAL FUNCTION PANEL    Collection Time: 04/12/22  3:30 AM   Result Value Ref Range    Sodium 144 136 - 145 mmol/L    Potassium 3.6 3.5 - 5.1 mmol/L    Chloride 118 (H) 97 - 108 mmol/L    CO2 21 21 - 32 mmol/L    Anion gap 5 5 - 15 mmol/L    Glucose 74 65 - 100 mg/dL    BUN 14 6 - 20 mg/dL    Creatinine 1.61 (H) 0.70 - 1.30 mg/dL    BUN/Creatinine ratio 9 (L) 12 - 20      GFR est AA 53 (L) >60 ml/min/1.73m2    GFR est non-AA 44 (L) >60 ml/min/1.73m2    Calcium 7.6 (L) 8.5 - 10.1 mg/dL    Phosphorus 2.7 2.6 - 4.7 mg/dL    Albumin 1.8 (L) 3.5 - 5.0 g/dL       Results     Procedure Component Value Units Date/Time    CULTURE, URINE [[id number]] Collected: 04/07/22 1502    Order Status: Completed Specimen: Urine Updated: 04/09/22 1414     Special Requests: --        No Special Requests  Reflexed from [id number]       Culture result: No Growth (<1000 cfu/mL)       MRSA SCREEN - PCR (NASAL) [[id number]] Collected: 04/07/22 2323 Order Status: Completed Specimen: Swab Updated: 04/07/22 1516     MRSA by PCR, Nasal Not Detected       CULTURE, BLOOD, PAIRED [[id number]] Collected: 04/07/22 1001    Order Status: Completed Specimen: Blood Updated: 04/07/22 1017    C. DIFFICILE (DNA) [[id number]] Collected: 04/06/22 0919    Order Status: Completed Specimen: Stool Updated: 04/06/22 1056     C. difficile (DNA) Negative       COVID-19 WITH INFLUENZA A/B [[id number]] Collected: 04/05/22 2315    Order Status: Completed Specimen: Nasopharyngeal from Nasopharynx Updated: 04/06/22 0027     SARS-CoV-2 by PCR Not Detected        Comment: Not Detected results do not preclude SARS-CoV-2 infection and should not be used as the sole basis for patient management decisions. Results must be combined with clinical observations, patient history, and epidemiological information        Influenza A by PCR Not Detected        Influenza B by PCR Not Detected        Comment: Testing was performed using leah Concepcion SARS-CoV-2 and Influenza A/B nucleic acid assay. This test is a multiplex Real-Time Reverse Transcriptase Polymerase Chain Reaction (RT-PCR) based in vitro diagnostic test intended for the qualitative detection of nucleic acids from SARS-CoV-2, Influenza A, and Influenza B in nasopharyngeal and nasal swab specimens for use under the FDA's Emergency Use Authorization (EUA) only.    Fact sheet for Patients: FindDrives.pl Fact sheet   for Healthcare Providers: FindDrives.pl                Labs:     Recent Labs     04/12/22  0330 04/11/22  0400   WBC 3.8* 4.1   HGB 7.4* 6.8*   HCT 21.2* 19.8*   PLT 61* 57*     Recent Labs     04/12/22  0330 04/11/22  0400 04/10/22  0350     144 142 143   K 3.6  3.6 3.5 3.4*   *  118* 115* 118*   CO2 21  21 19* 16*   BUN 15  14 14 16   CREA 1.68*  1.61* 1.69* 1.95*   GLU 75  74 72 94   CA 7.8*  7.6* 7.6* 7.7*   PHOS 2.7  --   --      Recent Labs     04/12/22  0330 04/11/22  0400 04/10/22  0350   ALT 11* 10* 10*   AP 57 55 66   TBILI 1.2* 1.3* 1.3*   TP 5.1* 5.0* 5.4*   ALB 1.9*  1.8* 2.0* 2.1*   GLOB 3.2 3.0 3.3     No results for input(s): INR, PTP, APTT, INREXT, INREXT in the last 72 hours. No results for input(s): FE, TIBC, PSAT, FERR in the last 72 hours. No results found for: FOL, RBCF   No results for input(s): PH, PCO2, PO2 in the last 72 hours. No results for input(s): CPK, CKNDX, TROIQ in the last 72 hours. No lab exists for component: CPKMB  No results found for: CHOL, CHOLX, CHLST, CHOLV, HDL, HDLP, LDL, LDLC, DLDLP, TGLX, TRIGL, TRIGP, CHHD, CHHDX  Lab Results   Component Value Date/Time    Glucose (POC) 80 04/08/2022 10:03 AM    Glucose (POC) 84 04/04/2022 05:40 PM    Glucose (POC) 107 04/03/2022 11:12 AM    Glucose (POC) 111 03/31/2022 11:35 AM    Glucose (POC) 91 03/29/2022 11:10 AM     Lab Results   Component Value Date/Time    Color Shantelle 04/07/2022 03:02 PM    Appearance Turbid (A) 04/07/2022 03:02 PM    Specific gravity 1.021 04/07/2022 03:02 PM    pH (UA) 6.0 04/07/2022 03:02 PM    Protein 100 (A) 04/07/2022 03:02 PM    Glucose Negative 04/07/2022 03:02 PM    Ketone Negative 04/07/2022 03:02 PM    Bilirubin Negative 04/07/2022 03:02 PM    Urobilinogen 0.1 04/07/2022 03:02 PM    Nitrites Negative 04/07/2022 03:02 PM    Leukocyte Esterase Trace (A) 04/07/2022 03:02 PM    Bacteria Negative 04/07/2022 03:02 PM    WBC >100 (H) 04/07/2022 03:02 PM    RBC >100 (H) 04/07/2022 03:02 PM         Assessment:     Sepsis  Lactic acidosis  Acute on chronic renal failure- creat 2.01  Cirrhosis with ascites  Anemia  History of septic DIP joint, right index finger  Hypothyroidism  Seizure disorder  Gout  GERD  History of etoh abuse  Hypotension  Metabolic acidosis  Hypothermia  Hypokalemia  Anemia monitor H&H    Plan:          On allopurinol 300 mg daily  Vitamin D 50,000 weekly  Pepcid 20 twice daily  Folic acid 1 mg daily  Heparin 5000 every 8 hours  Lactulose 10 g 3 times a day  Keppra 1500 twice a day  Synthroid 25 mcg daily  Midodrine 10 mg 3 times a day  Propranolol 20 mg twice a day  Sodium bicarb 1303 times a day  Flomax 0.4 mg daily  Vancomycin with pharmacy  Replace potassium  Discontinue IV Zosyn    Discontinue Sodium bicarb drip      Current Facility-Administered Medications:     [START ON 4/13/2022] Vancomycin Random level to be drawn on WED (4/13) @ 0400, , Other, ONCE, Ree Zaragoza MD    0.9% sodium chloride infusion 250 mL, 250 mL, IntraVENous, PRN, Leslee Ackerman MD    hydrOXYzine (VISTARIL) injection 25 mg, 25 mg, IntraMUSCular, Q8H PRN, Kassi James MD, 25 mg at 04/11/22 1811    ziprasidone (GEODON) 10 mg in sterile water (preservative free) 0.5 mL injection, 10 mg, IntraMUSCular, Q12H PRN, Kassi James MD, 10 mg at 04/11/22 2142    levETIRAcetam (KEPPRA) 1500 mg in saline (iso-osm) 100 ml IVPB, 1,500 mg, IntraVENous, Q12H, Lamberto Ackerman MD, Last Rate: 400 mL/hr at 04/12/22 0036, 1,500 mg at 04/12/22 0036    0.9% sodium chloride infusion 250 mL, 250 mL, IntraVENous, PRN, Lamberto Ackerman MD    epoetin luis-epbx (RETACRIT) injection 10,000 Units, 10,000 Units, SubCUTAneous, Q7D, KristenJimmy green MD, 10,000 Units at 04/09/22 1805    midodrine (PROAMATINE) tablet 10 mg, 10 mg, Oral, TID WITH MEALS, Lamberto Ackerman MD, 10 mg at 04/12/22 1888    sodium bicarbonate tablet 1,300 mg, 1,300 mg, Oral, TID, Gamaliel Chen MD, 1,300 mg at 04/12/22 0806    LORazepam (ATIVAN) injection 1 mg, 1 mg, IntraVENous, Q4H PRN, Lamberto Ackerman MD, 1 mg at 04/08/22 1733    sodium chloride (NS) flush 5-10 mL, 5-10 mL, IntraVENous, PRN, Lamberto Ackerman MD    lactulose (CHRONULAC) 10 gram/15 mL solution 15 mL, 10 g, Oral, TID, Lamberto Ackerman MD, 15 mL at 04/12/22 0805    tamsulosin (FLOMAX) capsule 0.4 mg, 0.4 mg, Oral, DAILY, Lamberto Ackerman MD, 0.4 mg at 04/12/22 0806    thiamine mononitrate (B-1) tablet 100 mg, 100 mg, Oral, DAILY, Lise Newell MD, 100 mg at 04/12/22 2553    allopurinoL (ZYLOPRIM) tablet 300 mg, 300 mg, Oral, DAILY, Srini Ackerman MD, 300 mg at 04/12/22 2191    ergocalciferol capsule 50,000 Units, 50,000 Units, Oral, Q7D, Srini Ackerman MD    famotidine (PEPCID) tablet 20 mg, 20 mg, Oral, BID, Srini Ackerman MD, 20 mg at 71/56/17 0477    folic acid (FOLVITE) tablet 1 mg, 1 mg, Oral, DAILY, Lise Newell MD, 1 mg at 04/12/22 1947    levothyroxine (SYNTHROID) tablet 25 mcg, 25 mcg, Oral, ACB, Lamberto Ackerman MD, 25 mcg at 04/12/22 0806    propranoloL (INDERAL) tablet 20 mg, 20 mg, Oral, BID, Lamberto Ackerman MD, 20 mg at 04/10/22 2120    acetaminophen (TYLENOL) tablet 650 mg, 650 mg, Oral, Q6H PRN **OR** acetaminophen (TYLENOL) suppository 650 mg, 650 mg, Rectal, Q6H PRN, Lamberto Ackerman MD    polyethylene glycol (MIRALAX) packet 17 g, 17 g, Oral, DAILY PRN, Lamberto Ackerman MD    ondansetron (ZOFRAN ODT) tablet 4 mg, 4 mg, Oral, Q8H PRN **OR** ondansetron (ZOFRAN) injection 4 mg, 4 mg, IntraVENous, Q6H PRN, Srini Ackerman MD    VANCOMYCIN INFORMATION NOTE, , Other, Rx Dosing/Monitoring, Yocasta Carpio MD    NOREPINephrine (LEVOPHED) 8 mg in 0.9% NS 250ml infusion, 0.5-120 mcg/min, IntraVENous, TITRATE, Srini Ackerman MD, Stopped at 04/11/22 0106

## 2022-04-13 NOTE — PROGRESS NOTES
General Daily Progress Note          Patient Name:   Mason Connor       YOB: 1960       Age:  60 y.o. Admit Date: 4/7/2022      Subjective:     Mason Connor is a(n) 60 y.o. male with PMH significant for HTN, cirrhosis, ascites presents via EMS for removing fish catheter. Patient recently d/c after lengthy stay in hospital for septic DIP joint of right index finger. He was d/c on 10 days of PO Zyvox, which he completed. He presents today after pulling out fish catheter. Several blood clots noted. Patient septic upon arrival with hypothermia and hypotension. Temp 93.6 rectal, BP 84/68. Patient is retaining 887cc of urine and nursing staff will replace fish.     Patient started on fluids and IV Levaquin in the ED. Patient is altered at baseline. Head CT w/o contrast shows no acute abnormality. Blood cultures pending. CXR and KUB both normal       Patient is alert awake confused  Hypotensive off  Levophed    Normal temperature    4/12  Patient is off levophed but still receiving midodrine and IVF. He remain hypotensive. He is lethargic and confused this morning. Patient has mild bilateral LE edema. Seen by ID yesterday. Recommends continuing vancomycin   Seen by nephrology yesterday. Recommends discontinuing IVF in lieu of leg edema, discontinue bicarb drip, continuing weekly procrit, continuing PO KCl, midodrine for chronic hypotension.     Today patient hypothermic on bear hugger  Yesterday evening patient was restless agitated    4/13    Patient is alert awake still hypothermic on bear hugger        Objective:     Visit Vitals  BP 98/80 (BP 1 Location: Left upper arm, BP Patient Position: At rest)   Pulse 68   Temp 98.1 °F (36.7 °C)   Resp 14   Ht 5' 10.98\" (1.803 m)   Wt 94.6 kg (208 lb 8.9 oz)   SpO2 98%   BMI 29.10 kg/m²        Recent Results (from the past 24 hour(s))   URINALYSIS W/ REFLEX CULTURE    Collection Time: 04/12/22 11:00 PM    Specimen: Urine   Result Value Ref Range    Color Yellow/Straw      Appearance Turbid (A) Clear      Specific gravity 1.011 1.003 - 1.030      pH (UA) 5.0 5.0 - 8.0      Protein Negative Negative mg/dL    Glucose Negative Negative mg/dL    Ketone Negative Negative mg/dL    Bilirubin Negative Negative      Blood Large (A) Negative      Urobilinogen 0.1 0.1 - 1.0 EU/dL    Nitrites Negative Negative      Leukocyte Esterase Large (A) Negative      UA:UC IF INDICATED Urine Culture Ordered (A) Culture not indicated by UA result      WBC >100 (H) 0 - 4 /hpf    RBC  0 - 5 /hpf    Bacteria 1+ (A) Negative /hpf    Mucus Trace /lpf    PRESUMPTIVE YEAST Present     VANCOMYCIN, RANDOM    Collection Time: 04/13/22  5:15 AM   Result Value Ref Range    Vancomycin, random 14.6 ug/mL   CBC W/O DIFF    Collection Time: 04/13/22  5:15 AM   Result Value Ref Range    WBC 4.5 4.1 - 11.1 K/uL    RBC 2.99 (L) 4.10 - 5.70 M/uL    HGB 8.2 (L) 12.1 - 17.0 g/dL    HCT 23.6 (L) 36.6 - 50.3 %    MCV 78.9 (L) 80.0 - 99.0 FL    MCH 27.4 26.0 - 34.0 PG    MCHC 34.7 30.0 - 36.5 g/dL    RDW 22.4 (H) 11.5 - 14.5 %    PLATELET 69 (L) 549 - 400 K/uL    MPV 10.2 8.9 - 12.9 FL    NRBC 0.0 0.0  WBC    ABSOLUTE NRBC 0.00 0.00 - 6.66 K/uL   METABOLIC PANEL, COMPREHENSIVE    Collection Time: 04/13/22  5:15 AM   Result Value Ref Range    Sodium 139 136 - 145 mmol/L    Potassium 3.7 3.5 - 5.1 mmol/L    Chloride 113 (H) 97 - 108 mmol/L    CO2 19 (L) 21 - 32 mmol/L    Anion gap 7 5 - 15 mmol/L    Glucose 87 65 - 100 mg/dL    BUN 14 6 - 20 mg/dL    Creatinine 1.62 (H) 0.70 - 1.30 mg/dL    BUN/Creatinine ratio 9 (L) 12 - 20      GFR est AA 52 (L) >60 ml/min/1.73m2    GFR est non-AA 43 (L) >60 ml/min/1.73m2    Calcium 8.3 (L) 8.5 - 10.1 mg/dL    Bilirubin, total 1.0 0.2 - 1.0 mg/dL    AST (SGOT) 22 15 - 37 U/L    ALT (SGPT) 11 (L) 12 - 78 U/L    Alk.  phosphatase 66 45 - 117 U/L    Protein, total 5.5 (L) 6.4 - 8.2 g/dL    Albumin 2.0 (L) 3.5 - 5.0 g/dL    Globulin 3.5 2.0 - 4.0 g/dL    A-G Ratio 0.6 (L) 1.1 - 2.2     RENAL FUNCTION PANEL    Collection Time: 04/13/22  5:15 AM   Result Value Ref Range    Sodium 139 136 - 145 mmol/L    Potassium 3.6 3.5 - 5.1 mmol/L    Chloride 112 (H) 97 - 108 mmol/L    CO2 20 (L) 21 - 32 mmol/L    Anion gap 7 5 - 15 mmol/L    Glucose 84 65 - 100 mg/dL    BUN 14 6 - 20 mg/dL    Creatinine 1.63 (H) 0.70 - 1.30 mg/dL    BUN/Creatinine ratio 9 (L) 12 - 20      GFR est AA 52 (L) >60 ml/min/1.73m2    GFR est non-AA 43 (L) >60 ml/min/1.73m2    Calcium 8.4 (L) 8.5 - 10.1 mg/dL    Phosphorus 3.1 2.6 - 4.7 mg/dL    Albumin 2.0 (L) 3.5 - 5.0 g/dL     [unfilled]      Review of Systems    Constitutional: Negative for chills and fever. HENT: Negative. Eyes: Negative. Respiratory: Negative. Cardiovascular: Negative. Gastrointestinal: Negative for abdominal pain and nausea. Skin: Negative. Neurological: Negative. Physical Exam:      Constitutional: pt is oriented to person, place, and time. HENT:   Head: Normocephalic and atraumatic. Eyes: Pupils are equal, round, and reactive to light. EOM are normal.   Cardiovascular: Normal rate, regular rhythm and normal heart sounds. Pulmonary/Chest: Breath sounds normal. No wheezes. No rales. Exhibits no tenderness. Abdominal: Soft. Bowel sounds are normal. There is no abdominal tenderness. There is no rebound and no guarding. Musculoskeletal: Normal range of motion. Neurological: pt is alert and oriented to person, place, and time. Extremities 2+ edema    XR HAND RT MIN 3 V   Final Result      CT HEAD WO CONT   Final Result   Age-appropriate atrophy. No acute findings. XR ABD (KUB)   Final Result   Within normal      XR CHEST PORT   Final Result   Findings/impression:   1. Low lung volumes. Bibasilar hypoventilatory change/atelectasis. Left   retrocardiac lung incompletely evaluated, cannot exclude underlying airspace   disease.       2.  Cardiac and mediastinal contours are obscured by low lung volumes. Ectatic/possible aneurysmal appearance transverse aorta with calcific   atherosclerotic plaque. Appears stable from prior radiograph. 3.  No pleural fluid. No pneumothorax. 4.  Prominent gas-filled bowel partially visualized in the left upper quadrant.          IR INSERT NON TUNL CVC OVER 5 YRS    (Results Pending)   IR PARACENTESIS ABD W IMAGE    (Results Pending)   IR US GUIDED VASCULAR ACCESS    (Results Pending)   IR FLUORO GUIDE PLC CVAD    (Results Pending)        Recent Results (from the past 24 hour(s))   URINALYSIS W/ REFLEX CULTURE    Collection Time: 04/12/22 11:00 PM    Specimen: Urine   Result Value Ref Range    Color Yellow/Straw      Appearance Turbid (A) Clear      Specific gravity 1.011 1.003 - 1.030      pH (UA) 5.0 5.0 - 8.0      Protein Negative Negative mg/dL    Glucose Negative Negative mg/dL    Ketone Negative Negative mg/dL    Bilirubin Negative Negative      Blood Large (A) Negative      Urobilinogen 0.1 0.1 - 1.0 EU/dL    Nitrites Negative Negative      Leukocyte Esterase Large (A) Negative      UA:UC IF INDICATED Urine Culture Ordered (A) Culture not indicated by UA result      WBC >100 (H) 0 - 4 /hpf    RBC  0 - 5 /hpf    Bacteria 1+ (A) Negative /hpf    Mucus Trace /lpf    PRESUMPTIVE YEAST Present     VANCOMYCIN, RANDOM    Collection Time: 04/13/22  5:15 AM   Result Value Ref Range    Vancomycin, random 14.6 ug/mL   CBC W/O DIFF    Collection Time: 04/13/22  5:15 AM   Result Value Ref Range    WBC 4.5 4.1 - 11.1 K/uL    RBC 2.99 (L) 4.10 - 5.70 M/uL    HGB 8.2 (L) 12.1 - 17.0 g/dL    HCT 23.6 (L) 36.6 - 50.3 %    MCV 78.9 (L) 80.0 - 99.0 FL    MCH 27.4 26.0 - 34.0 PG    MCHC 34.7 30.0 - 36.5 g/dL    RDW 22.4 (H) 11.5 - 14.5 %    PLATELET 69 (L) 157 - 400 K/uL    MPV 10.2 8.9 - 12.9 FL    NRBC 0.0 0.0  WBC    ABSOLUTE NRBC 0.00 0.00 - 6.29 K/uL   METABOLIC PANEL, COMPREHENSIVE    Collection Time: 04/13/22  5:15 AM   Result Value Ref Range    Sodium 139 136 - 145 mmol/L    Potassium 3.7 3.5 - 5.1 mmol/L    Chloride 113 (H) 97 - 108 mmol/L    CO2 19 (L) 21 - 32 mmol/L    Anion gap 7 5 - 15 mmol/L    Glucose 87 65 - 100 mg/dL    BUN 14 6 - 20 mg/dL    Creatinine 1.62 (H) 0.70 - 1.30 mg/dL    BUN/Creatinine ratio 9 (L) 12 - 20      GFR est AA 52 (L) >60 ml/min/1.73m2    GFR est non-AA 43 (L) >60 ml/min/1.73m2    Calcium 8.3 (L) 8.5 - 10.1 mg/dL    Bilirubin, total 1.0 0.2 - 1.0 mg/dL    AST (SGOT) 22 15 - 37 U/L    ALT (SGPT) 11 (L) 12 - 78 U/L    Alk.  phosphatase 66 45 - 117 U/L    Protein, total 5.5 (L) 6.4 - 8.2 g/dL    Albumin 2.0 (L) 3.5 - 5.0 g/dL    Globulin 3.5 2.0 - 4.0 g/dL    A-G Ratio 0.6 (L) 1.1 - 2.2     RENAL FUNCTION PANEL    Collection Time: 04/13/22  5:15 AM   Result Value Ref Range    Sodium 139 136 - 145 mmol/L    Potassium 3.6 3.5 - 5.1 mmol/L    Chloride 112 (H) 97 - 108 mmol/L    CO2 20 (L) 21 - 32 mmol/L    Anion gap 7 5 - 15 mmol/L    Glucose 84 65 - 100 mg/dL    BUN 14 6 - 20 mg/dL    Creatinine 1.63 (H) 0.70 - 1.30 mg/dL    BUN/Creatinine ratio 9 (L) 12 - 20      GFR est AA 52 (L) >60 ml/min/1.73m2    GFR est non-AA 43 (L) >60 ml/min/1.73m2    Calcium 8.4 (L) 8.5 - 10.1 mg/dL    Phosphorus 3.1 2.6 - 4.7 mg/dL    Albumin 2.0 (L) 3.5 - 5.0 g/dL       Results     Procedure Component Value Units Date/Time    CULTURE, URINE [[id number]] Collected: 04/12/22 2300    Order Status: Completed Specimen: Urine Updated: 04/13/22 0011    CULTURE, URINE [[id number]] Collected: 04/12/22 2300    Order Status: Canceled Specimen: Urine     CULTURE, URINE [[id number]] Collected: 04/07/22 1502    Order Status: Completed Specimen: Urine Updated: 04/09/22 1414     Special Requests: --        No Special Requests  Reflexed from [id number]       Culture result: No Growth (<1000 cfu/mL)       MRSA SCREEN - PCR (NASAL) [[id number]] Collected: 04/07/22 1345    Order Status: Completed Specimen: Swab Updated: 04/07/22 1516     MRSA by PCR, Nasal Not Detected       CULTURE, BLOOD, PAIRED [[id number]] Collected: 04/07/22 1001    Order Status: Completed Specimen: Blood Updated: 04/07/22 1017    C. DIFFICILE (DNA) [[id number]] Collected: 04/06/22 0919    Order Status: Completed Specimen: Stool Updated: 04/06/22 1056     C. difficile (DNA) Negative       COVID-19 WITH INFLUENZA A/B [[id number]] Collected: 04/05/22 2315    Order Status: Completed Specimen: Nasopharyngeal from Nasopharynx Updated: 04/06/22 0027     SARS-CoV-2 by PCR Not Detected        Comment: Not Detected results do not preclude SARS-CoV-2 infection and should not be used as the sole basis for patient management decisions. Results must be combined with clinical observations, patient history, and epidemiological information        Influenza A by PCR Not Detected        Influenza B by PCR Not Detected        Comment: Testing was performed using leah Concepcion SARS-CoV-2 and Influenza A/B nucleic acid assay. This test is a multiplex Real-Time Reverse Transcriptase Polymerase Chain Reaction (RT-PCR) based in vitro diagnostic test intended for the qualitative detection of nucleic acids from SARS-CoV-2, Influenza A, and Influenza B in nasopharyngeal and nasal swab specimens for use under the FDA's Emergency Use Authorization (EUA) only.    Fact sheet for Patients: FindDrives.pl Fact sheet   for Healthcare Providers: FindDrives[employer]pl                Labs:     Recent Labs     04/13/22 0515 04/12/22  0330   WBC 4.5 3.8*   HGB 8.2* 7.4*   HCT 23.6* 21.2*   PLT 69* 61*     Recent Labs     04/13/22 0515 04/12/22  0330 04/11/22  0400     139 141  144 142   K 3.7  3.6 3.6  3.6 3.5   *  112* 116*  118* 115*   CO2 19*  20* 21  21 19*   BUN 14  14 15  14 14   CREA 1.62*  1.63* 1.68*  1.61* 1.69*   GLU 87  84 75  74 72   CA 8.3*  8.4* 7.8*  7.6* 7.6*   PHOS 3.1 2.7  --      Recent Labs     04/13/22 0515 04/12/22  0330 04/11/22  0400   ALT 11* 11* 10*   AP 66 57 55 TBILI 1.0 1.2* 1.3*   TP 5.5* 5.1* 5.0*   ALB 2.0*  2.0* 1.9*  1.8* 2.0*   GLOB 3.5 3.2 3.0     No results for input(s): INR, PTP, APTT, INREXT, INREXT in the last 72 hours. No results for input(s): FE, TIBC, PSAT, FERR in the last 72 hours. No results found for: FOL, RBCF   No results for input(s): PH, PCO2, PO2 in the last 72 hours. No results for input(s): CPK, CKNDX, TROIQ in the last 72 hours. No lab exists for component: CPKMB  No results found for: CHOL, CHOLX, CHLST, CHOLV, HDL, HDLP, LDL, LDLC, DLDLP, TGLX, TRIGL, TRIGP, CHHD, CHHDX  Lab Results   Component Value Date/Time    Glucose (POC) 80 04/08/2022 10:03 AM    Glucose (POC) 84 04/04/2022 05:40 PM    Glucose (POC) 107 04/03/2022 11:12 AM    Glucose (POC) 111 03/31/2022 11:35 AM    Glucose (POC) 91 03/29/2022 11:10 AM     Lab Results   Component Value Date/Time    Color Yellow/Straw 04/12/2022 11:00 PM    Appearance Turbid (A) 04/12/2022 11:00 PM    Specific gravity 1.011 04/12/2022 11:00 PM    pH (UA) 5.0 04/12/2022 11:00 PM    Protein Negative 04/12/2022 11:00 PM    Glucose Negative 04/12/2022 11:00 PM    Ketone Negative 04/12/2022 11:00 PM    Bilirubin Negative 04/12/2022 11:00 PM    Urobilinogen 0.1 04/12/2022 11:00 PM    Nitrites Negative 04/12/2022 11:00 PM    Leukocyte Esterase Large (A) 04/12/2022 11:00 PM    Bacteria 1+ (A) 04/12/2022 11:00 PM    WBC >100 (H) 04/12/2022 11:00 PM    RBC  04/12/2022 11:00 PM         Assessment:     Sepsis  Lactic acidosis  Acute on chronic renal failure- creat 2.01  Cirrhosis with ascites  Anemia  History of septic DIP joint, right index finger  Hypothyroidism  Seizure disorder  Gout  GERD  History of etoh abuse  Hypotension  Metabolic acidosis  Hypothermia  Hypokalemia  Anemia monitor H&H  Abdominal distention    Plan:      On bear hugger    On allopurinol 300 mg daily  Vitamin D 50,000 weekly  Pepcid 20 twice daily  Folic acid 1 mg daily  Lactulose 10 g 3 times a day  Keppra 1500 twice a day  Synthroid 25 mcg daily  Midodrine 10 mg 3 times a day  Propranolol 20 mg twice a day  Sodium bicarb 1303 times a day  Flomax 0.4 mg daily  Thiamine 100 mg daily  Vancomycin with pharmacy  Replace potassium  Discontinue IV Zosyn    Discontinue Sodium bicarb drip    We will do psych consult for psychosis    Ultrasound of the abdomen      Current Facility-Administered Medications:     [START ON 4/14/2022] Vancomycin random level to be drawn on 4/14/22 at  0400 am., , Other, ONCE, Ree Zaragoza MD    0.9% sodium chloride infusion 250 mL, 250 mL, IntraVENous, PRN, Desi Ackerman MD    hydrOXYzine (VISTARIL) injection 25 mg, 25 mg, IntraMUSCular, Q8H PRN, Bert Mae MD, 25 mg at 04/11/22 1811    ziprasidone (GEODON) 10 mg in sterile water (preservative free) 0.5 mL injection, 10 mg, IntraMUSCular, Q12H PRN, Bert Mae MD, 10 mg at 04/12/22 0851    levETIRAcetam (KEPPRA) 1500 mg in saline (iso-osm) 100 ml IVPB, 1,500 mg, IntraVENous, Q12H, Lamberto Ackerman MD, Last Rate: 400 mL/hr at 04/12/22 2357, 1,500 mg at 04/12/22 2357    0.9% sodium chloride infusion 250 mL, 250 mL, IntraVENous, PRN, Lamberto Ackerman MD    epoetin luis-epbx (RETACRIT) injection 10,000 Units, 10,000 Units, SubCUTAneous, Q7D, Darren Remy MD, 10,000 Units at 04/09/22 1805    midodrine (PROAMATINE) tablet 10 mg, 10 mg, Oral, TID WITH MEALS, Lamberto Ackerman MD, 10 mg at 04/13/22 3661    sodium bicarbonate tablet 1,300 mg, 1,300 mg, Oral, TID, Darren Remy MD, 1,300 mg at 04/13/22 0840    LORazepam (ATIVAN) injection 1 mg, 1 mg, IntraVENous, Q4H PRN, Lambetro Ackerman MD, 1 mg at 04/08/22 1733    sodium chloride (NS) flush 5-10 mL, 5-10 mL, IntraVENous, PRN, Desi Ackerman MD    lactulose (CHRONULAC) 10 gram/15 mL solution 15 mL, 10 g, Oral, TID, Lamberto Ackerman MD, 15 mL at 04/13/22 0839    tamsulosin (FLOMAX) capsule 0.4 mg, 0.4 mg, Oral, DAILY, Lamberto Ackerman MD, 0.4 mg at 04/13/22 0840   thiamine mononitrate (B-1) tablet 100 mg, 100 mg, Oral, DAILY, Lamberto Ackerman MD, 100 mg at 04/13/22 0840    allopurinoL (ZYLOPRIM) tablet 300 mg, 300 mg, Oral, DAILY, Malathi Ackerman MD, 300 mg at 04/13/22 3614    ergocalciferol capsule 50,000 Units, 50,000 Units, Oral, Q7D, Malathi Ackerman MD    famotidine (PEPCID) tablet 20 mg, 20 mg, Oral, BID, Malathi Ackerman MD, 20 mg at 19/81/37 7930    folic acid (FOLVITE) tablet 1 mg, 1 mg, Oral, DAILY, Amara Peterson MD, 1 mg at 04/13/22 0840    levothyroxine (SYNTHROID) tablet 25 mcg, 25 mcg, Oral, ACB, Lamberto Ackerman MD, 25 mcg at 04/13/22 0840    propranoloL (INDERAL) tablet 20 mg, 20 mg, Oral, BID, Lamberto Ackerman MD, 20 mg at 04/13/22 9795    acetaminophen (TYLENOL) tablet 650 mg, 650 mg, Oral, Q6H PRN **OR** acetaminophen (TYLENOL) suppository 650 mg, 650 mg, Rectal, Q6H PRN, Lamberto Ackerman MD    polyethylene glycol (MIRALAX) packet 17 g, 17 g, Oral, DAILY PRN, Lamberto Ackerman MD    ondansetron (ZOFRAN ODT) tablet 4 mg, 4 mg, Oral, Q8H PRN **OR** ondansetron (ZOFRAN) injection 4 mg, 4 mg, IntraVENous, Q6H PRN, Malathi Ackerman MD    VANCOMYCIN INFORMATION NOTE, , Other, Rx Dosing/Monitoring, Corrinne Overall, MD    NOREPINephrine (LEVOPHED) 8 mg in 0.9% NS 250ml infusion, 0.5-120 mcg/min, IntraVENous, TITRATE, Malathi Ackerman MD, Stopped at 04/11/22 0106

## 2022-04-13 NOTE — PROGRESS NOTES
Renal Note    NAME:  April English   :   1960   MRN:   [id number]     ATTENDING: Lakeisha Hneriquez MD  PCP:  Afshin Will NP    Date/Time:  2022 12:46 PM      Subjective:     Patient seen in the ICU. Off IVF. He is on warming blanket. Creatinine 1.69 today. On single pressor. Past Medical History:   Diagnosis Date    Arthritis     Hypertension       Past Surgical History:   Procedure Laterality Date    IR INSERT NON TUNL CVC OVER 5 YRS  3/25/2022    IR PARACENTESIS ABD W IMAGE  2022    IR PARACENTESIS ABD W IMAGE  3/1/2022    IR PARACENTESIS ABD W IMAGE  3/30/2022     Social History     Tobacco Use    Smoking status: Never Smoker    Smokeless tobacco: Never Used   Substance Use Topics    Alcohol use: Not on file      No family history on file. No Known Allergies   Prior to Admission medications    Medication Sig Start Date End Date Taking? Authorizing Provider   linezolid (ZYVOX) 600 mg tablet Take 1 Tablet by mouth two (2) times a day for 10 days. Check CBC wkly while on linezolid 22  Yue Cerrato MD   thiamine mononitrate (B-1) 100 mg tablet Take 1 Tablet by mouth daily. 22   Robin Ackerman MD   levothyroxine (SYNTHROID) 25 mcg tablet Take 25 mcg by mouth Daily (before breakfast). Provider, Historical   tamsulosin (Flomax) 0.4 mg capsule Take 1 Capsule by mouth daily. 3/4/22   Donal Whyte PA-C   potassium chloride (K-DUR, KLOR-CON M20) 20 mEq tablet Take 1 Tablet by mouth daily. 3/2/22   Dank Jordan MD   sodium bicarbonate 650 mg tablet Take 1 Tablet by mouth three (3) times daily. 3/2/22   Dank Jordan MD   levETIRAcetam (Keppra) 1,000 mg tablet Take 1.5 Tablets by mouth two (2) times a day. 3/2/22   Dank Jordan MD   ergocalciferol (ERGOCALCIFEROL) 1,250 mcg (50,000 unit) capsule Take 1 Capsule by mouth every seven (7) days.  22   Provider, Historical   lactulose (CHRONULAC) 10 gram/15 mL solution Take 15 mL by mouth three (3) times daily. 22   Zenon Ackerman MD   allopurinoL (ZYLOPRIM) 300 mg tablet Take 1 Tablet by mouth daily. 22   Provider, Historical   thiamine HCL (B-1) 100 mg tablet Take 100 mg by mouth daily. Provider, Historical   propranoloL (INDERAL) 20 mg tablet Take 20 mg by mouth two (2) times a day. Provider, Historical   folic acid (FOLVITE) 1 mg tablet Take 1 mg by mouth daily. Provider, Historical   famotidine (PEPCID) 20 mg tablet Take 20 mg by mouth At bedtime. Provider, Historical   aMILoride (MIDAMOR) 5 mg tablet Take 5 mg by mouth daily. Provider, Historical       REVIEW OF SYSTEMS:       She is lethargic. To obtain    Objective:   VITALS:    Visit Vitals  BP 98/80 (BP 1 Location: Left upper arm, BP Patient Position: At rest)   Pulse 68   Temp 98.1 °F (36.7 °C)   Resp 14   Ht 5' 10.98\" (1.803 m)   Wt 94.6 kg (208 lb 8.9 oz)   SpO2 98%   BMI 29.10 kg/m²     Temp (24hrs), Av.1 °F (36.2 °C), Min:96.1 °F (35.6 °C), Max:98.1 °F (36.7 °C)      PHYSICAL EXAM:     General: NAD, lethargic eyes: sclera anicteric  Oral Cavity: No thrush or ulcers  Neck: no JVD  Chest: Fair bilateral air entry. No Wheezing or Rhonchi. No rales.   Heart: normal sounds  Abdomen: soft and non tender   :  Gerard+  Lower Extremities: no edema  Skin: no rash  Neuro: Lethargic psychiatric: Able to assess      LAB DATA REVIEWED:    Recent Results (from the past 24 hour(s))   URINALYSIS W/ REFLEX CULTURE    Collection Time: 22 11:00 PM    Specimen: Urine   Result Value Ref Range    Color Yellow/Straw      Appearance Turbid (A) Clear      Specific gravity 1.011 1.003 - 1.030      pH (UA) 5.0 5.0 - 8.0      Protein Negative Negative mg/dL    Glucose Negative Negative mg/dL    Ketone Negative Negative mg/dL    Bilirubin Negative Negative      Blood Large (A) Negative      Urobilinogen 0.1 0.1 - 1.0 EU/dL    Nitrites Negative Negative      Leukocyte Esterase Large (A) Negative      UA:UC IF INDICATED Urine Culture Ordered (A) Culture not indicated by UA result      WBC >100 (H) 0 - 4 /hpf    RBC  0 - 5 /hpf    Bacteria 1+ (A) Negative /hpf    Mucus Trace /lpf    PRESUMPTIVE YEAST Present     VANCOMYCIN, RANDOM    Collection Time: 04/13/22  5:15 AM   Result Value Ref Range    Vancomycin, random 14.6 ug/mL   CBC W/O DIFF    Collection Time: 04/13/22  5:15 AM   Result Value Ref Range    WBC 4.5 4.1 - 11.1 K/uL    RBC 2.99 (L) 4.10 - 5.70 M/uL    HGB 8.2 (L) 12.1 - 17.0 g/dL    HCT 23.6 (L) 36.6 - 50.3 %    MCV 78.9 (L) 80.0 - 99.0 FL    MCH 27.4 26.0 - 34.0 PG    MCHC 34.7 30.0 - 36.5 g/dL    RDW 22.4 (H) 11.5 - 14.5 %    PLATELET 69 (L) 927 - 400 K/uL    MPV 10.2 8.9 - 12.9 FL    NRBC 0.0 0.0  WBC    ABSOLUTE NRBC 0.00 0.00 - 4.29 K/uL   METABOLIC PANEL, COMPREHENSIVE    Collection Time: 04/13/22  5:15 AM   Result Value Ref Range    Sodium 139 136 - 145 mmol/L    Potassium 3.7 3.5 - 5.1 mmol/L    Chloride 113 (H) 97 - 108 mmol/L    CO2 19 (L) 21 - 32 mmol/L    Anion gap 7 5 - 15 mmol/L    Glucose 87 65 - 100 mg/dL    BUN 14 6 - 20 mg/dL    Creatinine 1.62 (H) 0.70 - 1.30 mg/dL    BUN/Creatinine ratio 9 (L) 12 - 20      GFR est AA 52 (L) >60 ml/min/1.73m2    GFR est non-AA 43 (L) >60 ml/min/1.73m2    Calcium 8.3 (L) 8.5 - 10.1 mg/dL    Bilirubin, total 1.0 0.2 - 1.0 mg/dL    AST (SGOT) 22 15 - 37 U/L    ALT (SGPT) 11 (L) 12 - 78 U/L    Alk.  phosphatase 66 45 - 117 U/L    Protein, total 5.5 (L) 6.4 - 8.2 g/dL    Albumin 2.0 (L) 3.5 - 5.0 g/dL    Globulin 3.5 2.0 - 4.0 g/dL    A-G Ratio 0.6 (L) 1.1 - 2.2     RENAL FUNCTION PANEL    Collection Time: 04/13/22  5:15 AM   Result Value Ref Range    Sodium 139 136 - 145 mmol/L    Potassium 3.6 3.5 - 5.1 mmol/L    Chloride 112 (H) 97 - 108 mmol/L    CO2 20 (L) 21 - 32 mmol/L    Anion gap 7 5 - 15 mmol/L    Glucose 84 65 - 100 mg/dL    BUN 14 6 - 20 mg/dL    Creatinine 1.63 (H) 0.70 - 1.30 mg/dL BUN/Creatinine ratio 9 (L) 12 - 20      GFR est AA 52 (L) >60 ml/min/1.73m2    GFR est non-AA 43 (L) >60 ml/min/1.73m2    Calcium 8.4 (L) 8.5 - 10.1 mg/dL    Phosphorus 3.1 2.6 - 4.7 mg/dL    Albumin 2.0 (L) 3.5 - 5.0 g/dL       Recommendations/Plan:     1 acute kidney injury on chronic kidney disease 3:  -Creatinine was 2.0 on admission   -Creatinine has improved to 1.6  today.    -Baseline creatinine 1.3-1.7 based on labs during previous admission  -Has history of recent GRACIA with creatinine peaking to 2.6 few weeks ago  -GRACIA likely prerenal secondary to hypotension/sepsis  -Urine is suggestive of UTI  -No need for renal ultrasound  -Continue Gerard catheter. Urine output is improving  -Clinically noticed legs edema. -off  IVF. 2 metabolic acidosis:  -CO2 improved to 21   -on oral bicarbonate 1300 TID  -off HCO3 drip     3 severe anemia  -Hemoglobin 6.8->7.4.  -getting one unit blood Tx  -Continue weekly Procrit    4  Hypokalemia  -Potassium is 3.4->3.6.    -po kcl 40 meq x 1    5 SIRS/suspected sepsis  -Likely secondary to UTI. Recent UA was abnormal-currently hypothermic hypotensive though no leukocytosis seen. Lactic acid is elevated  -Started on Zosyn and vancomycin pending blood cultures  -Continue midodrine 10 mg 3 times daily for chronic hypotension     5 right index finger osteomyelitis  -s/p recent antibiotics for 2 weeks. Vancomycin has been resumed during this hospitalization  -ID is on the case    6 history of liver cirrhosis  -Has history of alcoholic liver cirrhosis. Portal hypertension  -Noted to have abdominal distention.   IR has been consulted  -Had paracentesis during his previous admission also      ________________________________________________________________________  Signed: Ruthie Clements MD

## 2022-04-13 NOTE — PROGRESS NOTES
Updated clinicals sent via [facility] to [employer] d/blessing he's a long term care resident.           Namrata Beebe, JESUS

## 2022-04-13 NOTE — PROGRESS NOTES
SPEECH LANGUAGE PATHOLOGY DYSPHAGIA TREATMENT  Patient: Zaid Gonzalez (48 y.o. male)  Date: 4/13/2022  Diagnosis: Urinary retention [R33.9]  Lactic acidosis [E87.2]  Severe sepsis (HCC) [A41.9, R65.20]  Hypothermia [T68. XXXA]  Sepsis ([facility] [address] [A41.9] <principal problem not specified>       Precautions: aspiration    ASSESSMENT:  Patient seen at bedside in ICU. Patient under warming blanket. Patient agreeable to p.o. trials. Administered thin liquids ( soda and water ) via straw. Patient ingested w/ consecutive swallows. Oral motor control functional. Timely swallow initiation. HLE and protraction adequate to digital palpation. No clinical indicators of penetration or aspiration observed. O2 saturations remained 98%. Administered hard solid cracker. Patient would suck on cracker to soften prior to mastication. Patient is edentulous, therefore prolonged mastication. Pharyngeal response adequate. No overt s/sx of aspiration for thin and solid trials. Patient tolerating current diet of Mm5 and thin liquids at this time. Possible upgrade to Soft and bite size as alertness improves. PLAN:  Recommendations and Planned Interventions:  Mm5, thin liquids. Advance diet as tolerated. Aspiration precautions. Assist w/ intake s/t bilateral mits. Patient continues to benefit from skilled intervention to address the above impairments. Continue treatment per established plan of care. Frequency/Duration: Patient will be followed by speech-language pathology 3 times a week to address goals. Discharge Recommendations: To Be Determined     SUBJECTIVE:   Patient seen at bedside. He is alert and agreeable to trials.      OBJECTIVE:     CXR Results  (Last 48 hours)      None          CT Results  (Last 48 hours)      None           Cognitive and Communication Status:  Neurologic State: Alert  Orientation Level: Oriented to person,Oriented to place  Cognition: Decreased attention/concentration,Impaired decision making       Pain:  Pain Scale 1: Numeric (0 - 10)  Pain Intensity 1: 0       After treatment:   Patient left in no apparent distress in bed, Call bell within reach, and Nursing notified    COMMUNICATION/EDUCATION:   Patient was educated regarding his deficit(s) of dysphagia, swallow safety precautions, diet recs and POC. He demonstrated Good understanding as evidenced by verbal understanding. The patient's plan of care including recommendations, planned interventions, and recommended diet changes were discussed with: Registered nurse. JAI Barnard M.S. CCC-SLP  Time Calculation: 18 mins           Problem: Dysphagia (Adult)  Goal: *Acute Goals and Plan of Care (Insert Text)  Description: Speech Therapy Goals  Initiated 4/8/2022  -Patient will tolerate minced & moist diet with thin liquids without signs/symptoms of aspiration given no cues within 7 day(s). [ ] Not met  [ ]  MET   Mariano ] Progressing  [ ] Discontinue  -Patient will demonstrate understanding of swallow safety precautions and aspiration precautions, diet recs with no cues within 7 day(s).         [ ] Not met  [ ]  MET   [ Manual Salmeron  [ ] Discontinue    Outcome: Progressing Towards Goal

## 2022-04-13 NOTE — PROGRESS NOTES
Physician Progress Note      Janet Becerril  CSN #:                  [id number]  :                       1960  ADMIT DATE:       2022 9:10 AM  DISCH DATE:  RESPONDING  PROVIDER #:        Flaquita Moya MD          QUERY TEXT:    Pt admitted with Sepsis. Pt noted to have Sepsis and acute organ dysfunction of  GRACIA, metabolic encephalopathy. If possible, please document in progress notes and discharge summary the relationship, if any, between Sepsis and GRACIA, metabolic encephalopathy. The medical record reflects the following:  Risk Factors: Sepsis, GRACIA, Metabolic encephalopathy, Hypotensive, hypothermia, urine retention. Clinical Indicators: H&P: \"Patient septic upon arrival with hypothermia and hypotension. Temp 93.6 rectal, BP 84/68. Patient is retaining 887cc of urine and nursing staff will replace fish. \"  Treatment: Sepsis Protocol ordered, CXR, IV Fluids, BC's obtained, IV Abx. Lab monitoring. Thank you,  ORACIO Sung, RN, CDI Specialist  [email or web address] or [phone]  Options provided:  -- Acute organ dysfunction of GRACIA, metabolic encephalopathy is associated with sepsis and has Septic Shock  -- Acute organ dysfunction of GRACIA, metabolic encephalopathy is associated with sepsis without Septic Shock  -- Acute organ dysfunction of. GRACIA, metabolic encephalopathy is unrelated to sepsis  -- Other - I will add my own diagnosis  -- Disagree - Not applicable / Not valid  -- Disagree - Clinically unable to determine / Unknown  -- Refer to Clinical Documentation Reviewer    PROVIDER RESPONSE TEXT:    This patient has acute organ dysfunction of GRACIA, metabolic encephalopathy associated with sepsis and has Septic Shock.     Query created by: Ehsan Gupta on 2022 3:18 PM      Electronically signed by:  Flaquita Moya MD 2022 8:48 AM

## 2022-04-13 NOTE — PROGRESS NOTES
Vancomycin Dosing Consult  Day # 7 of vancomycin therapy    Consult ordered by Dr. Chidi Bhat for this 60 y.o. male for indication of Osteo of Right index Finger/Sepsis    Antibiotic regimen: Vancomycin monotherapy    Temp (24hrs), Av °F (36.1 °C), Min:96.1 °F (35.6 °C), Max:97.9 °F (36.6 °C)    Recent Labs     22  0515 22  0330 22  0400   WBC 4.5 3.8* 4.1     Recent Labs     22  0515 22  0330 22  0400   CREA 1.62*  1.63* 1.68*  1.61* 1.69*   BUN 14  14 15  14 14     No results for input(s): CRP in the last 72 hours. No results for input(s): PCT in the last 72 hours. Estimated Creatinine Clearance: 54.2 mL/min (A) (based on SCr of 1.62 mg/dL (H)). ml/min  Concomitant nephrotoxic drugs: Vasopressors    Cultures:   22 - Urine - No growth (< 1000 cfu/ml)    MRSA Swab: Not detected collected on 22    Target range: Trough 10-15 mcg/mL      Recent level history (3):  Date/Time Dose & Interval Measured Level (mcg/mL) Associated AUC/YESSENIA     [email or web address]           500 mg x 1       16.3    22 500 mg X1 at 0900 14.6        Assessment/Plan:   SCR is 1.62 today. Vancomycin level that resulted this morning  is at 14.6. Gave 500 mg Vancomycin today and order another random level for tomorrow () with AM labs.    Antimicrobial stop date 22

## 2022-04-13 NOTE — PROGRESS NOTES
Infectious Disease Progress Note           Subjective:   Remains stable, denies new complaints, more alert and following commands. Ongoing episodes of hypothermia, off pressor support. No acute events since last seen   Objective:   Physical Exam:     Visit Vitals  /85 (BP 1 Location: Left upper arm, BP Patient Position: At rest)   Pulse 71   Temp 98.2 °F (36.8 °C)   Resp 17   Ht 5' 10.98\" (1.803 m)   Wt 208 lb 8.9 oz (94.6 kg)   SpO2 96%   BMI 29.10 kg/m²      O2 Device: None (Room air)    Temp (24hrs), Av.2 °F (36.2 °C), Min:96.1 °F (35.6 °C), Max:98.2 °F (36.8 °C)    No intake/output data recorded.     1901 -  0700  In: -   Out: 1530 [[id number]]    General: NAD, alert, confused, not following commands   HEENT: HEIDY, Moist mucosa   Lungs: CTA b/l   Heart: S1S2+, RRR, no murmur  Abdo: Soft, NT, ND, +BS   : + fish cath   Exts: Decreased right index finger swelling, no drainage   Skin: No wounds, No rashes or lesions    Data Review:       Recent Days:  Recent Labs     22  0515 22  0330 22  0400   WBC 4.5 3.8* 4.1   HGB 8.2* 7.4* 6.8*   HCT 23.6* 21.2* 19.8*   PLT 69* 61* 57*     Recent Labs     22  0515 22  0330 22  0400   BUN 14  14 15  14 14   CREA 1.62*  1.63* 1.68*  1.61* 1.69*       Lab Results   Component Value Date/Time    C-Reactive protein 1.38 (H) 2022 05:00 AM          Microbiology     Results     Procedure Component Value Units Date/Time    CULTURE, URINE [[id number]] Collected: 22    Order Status: Completed Specimen: Urine Updated: 22    CULTURE, URINE [[id number]] Collected: 22    Order Status: Canceled Specimen: Urine     CULTURE, URINE [[id number]] Collected: 22 1502    Order Status: Completed Specimen: Urine Updated: 22 1414     Special Requests: --        No Special Requests  Reflexed from [id number]       Culture result: No Growth (<1000 cfu/mL)       MRSA SCREEN - PCR (NASAL) [[id number]] Collected: 04/07/22 1345    Order Status: Completed Specimen: Swab Updated: 04/07/22 1516     MRSA by PCR, Nasal Not Detected       CULTURE, BLOOD, PAIRED [[id number]] Collected: 04/07/22 1001    Order Status: Completed Specimen: Blood Updated: 04/07/22 1017    C. DIFFICILE (DNA) [[id number]] Collected: 04/06/22 0919    Order Status: Completed Specimen: Stool Updated: 04/06/22 1056     C. difficile (DNA) Negative       COVID-19 WITH INFLUENZA A/B [[id number]] Collected: 04/05/22 2315    Order Status: Completed Specimen: Nasopharyngeal from Nasopharynx Updated: 04/06/22 0027     SARS-CoV-2 by PCR Not Detected        Comment: Not Detected results do not preclude SARS-CoV-2 infection and should not be used as the sole basis for patient management decisions. Results must be combined with clinical observations, patient history, and epidemiological information        Influenza A by PCR Not Detected        Influenza B by PCR Not Detected        Comment: Testing was performed using leah [employer] SARS-CoV-2 and Influenza A/B nucleic acid assay. This test is a multiplex Real-Time Reverse Transcriptase Polymerase Chain Reaction (RT-PCR) based in vitro diagnostic test intended for the qualitative detection of nucleic acids from SARS-CoV-2, Influenza A, and Influenza B in nasopharyngeal and nasal swab specimens for use under the FDA's Emergency Use Authorization (EUA) only. Fact sheet for Patients: [employer] Fact sheet   for Healthcare Providers: [employer]                  Diagnostics   CXR Results  (Last 48 hours)    None             Assessment/Plan     1. Right index finger osteomyelitis, underlying gouty arthritis       S/p debridement w isolation of Staph epidermidis from Cx      Decreased finger swelling, no evidence of worsening infection       On day # 22/28 of Vanc, WBC remain WNLs       CRP and ESR added to todays labs      2.  Suspected sepsis, On going hypotension, on/off pressor support, Cxs neg on current admission       Hypoalbuminemic from chronic liver disease, w resulting 3rd spacing       Started on Florinef, continue on Midodrine         3. UTI, abnormal UA,  Urinary retention: + indwelling fish cath      Urine Cx from 04/07 is negative, s/p 5 days of empiric Zosyn      4. AMS, h/o metabolic/hepatic encephalopathy, waxing and waning mentation     5. Ascites, S/p paracentesis on 04/08 w removal of 4,250 ml of clear fluid      Abdomen is again distended and firm, w/o evidence or guarding or rebound     6. Anemia/thrombocytopenia, due to chronic liver disease:  On PRN blood transfusions      Ginger Monterroso MD    4/13/2022

## 2022-04-13 NOTE — PROGRESS NOTES
General Daily Progress Note          Patient Name:   Saida Brown       YOB: 1960       Age:  60 y.o. Admit Date: 4/7/2022      Subjective:     Saida Brown is a(n) 60 y.o. male with PMH significant for HTN, cirrhosis, ascites presents via EMS for removing fish catheter. Patient recently d/c after lengthy stay in hospital for septic DIP joint of right index finger. He was d/c on 10 days of PO Zyvox, which he completed. He presents today after pulling out fish catheter. Several blood clots noted. Patient septic upon arrival with hypothermia and hypotension. Temp 93.6 rectal, BP 84/68. Patient is retaining 887cc of urine and nursing staff will replace fish.     Patient started on fluids and IV Levaquin in the ED. Patient is altered at baseline. Head CT w/o contrast shows no acute abnormality. Blood cultures pending. CXR and KUB both normal       Patient is alert awake confused  Hypotensive off  Levophed    Normal temperature    4/12  Patient is off levophed but still receiving midodrine and IVF. He remain hypotensive. He is lethargic and confused this morning. Patient has mild bilateral LE edema. Seen by ID yesterday. Recommends continuing vancomycin   Seen by nephrology yesterday. Recommends discontinuing IVF in lieu of leg edema, discontinue bicarb drip, continuing weekly procrit, continuing PO KCl, midodrine for chronic hypotension.     Today patient hypothermic on bear hugger  Yesterday evening patient was restless agitated    4/13   Attempted to see patient, was told to come back as they were trying to place catheter  Off IVF, patient hypothermic on bear hugger          Objective:     Visit Vitals  BP 98/80 (BP 1 Location: Left upper arm, BP Patient Position: At rest)   Pulse 68   Temp 97.2 °F (36.2 °C)   Resp 14   Ht 5' 10.98\" (1.803 m)   Wt 208 lb 8.9 oz (94.6 kg)   SpO2 98%   BMI 29.10 kg/m²        Recent Results (from the past 24 hour(s))   URINALYSIS W/ REFLEX CULTURE Collection Time: 04/12/22 11:00 PM    Specimen: Urine   Result Value Ref Range    Color Yellow/Straw      Appearance Turbid (A) Clear      Specific gravity 1.011 1.003 - 1.030      pH (UA) 5.0 5.0 - 8.0      Protein Negative Negative mg/dL    Glucose Negative Negative mg/dL    Ketone Negative Negative mg/dL    Bilirubin Negative Negative      Blood Large (A) Negative      Urobilinogen 0.1 0.1 - 1.0 EU/dL    Nitrites Negative Negative      Leukocyte Esterase Large (A) Negative      UA:UC IF INDICATED Urine Culture Ordered (A) Culture not indicated by UA result      WBC >100 (H) 0 - 4 /hpf    RBC  0 - 5 /hpf    Bacteria 1+ (A) Negative /hpf    Mucus Trace /lpf    PRESUMPTIVE YEAST Present     VANCOMYCIN, RANDOM    Collection Time: 04/13/22  5:15 AM   Result Value Ref Range    Vancomycin, random 14.6 ug/mL   CBC W/O DIFF    Collection Time: 04/13/22  5:15 AM   Result Value Ref Range    WBC 4.5 4.1 - 11.1 K/uL    RBC 2.99 (L) 4.10 - 5.70 M/uL    HGB 8.2 (L) 12.1 - 17.0 g/dL    HCT 23.6 (L) 36.6 - 50.3 %    MCV 78.9 (L) 80.0 - 99.0 FL    MCH 27.4 26.0 - 34.0 PG    MCHC 34.7 30.0 - 36.5 g/dL    RDW 22.4 (H) 11.5 - 14.5 %    PLATELET 69 (L) 442 - 400 K/uL    MPV 10.2 8.9 - 12.9 FL    NRBC 0.0 0.0  WBC    ABSOLUTE NRBC 0.00 0.00 - 5.70 K/uL   METABOLIC PANEL, COMPREHENSIVE    Collection Time: 04/13/22  5:15 AM   Result Value Ref Range    Sodium 139 136 - 145 mmol/L    Potassium 3.7 3.5 - 5.1 mmol/L    Chloride 113 (H) 97 - 108 mmol/L    CO2 19 (L) 21 - 32 mmol/L    Anion gap 7 5 - 15 mmol/L    Glucose 87 65 - 100 mg/dL    BUN 14 6 - 20 mg/dL    Creatinine 1.62 (H) 0.70 - 1.30 mg/dL    BUN/Creatinine ratio 9 (L) 12 - 20      GFR est AA 52 (L) >60 ml/min/1.73m2    GFR est non-AA 43 (L) >60 ml/min/1.73m2    Calcium 8.3 (L) 8.5 - 10.1 mg/dL    Bilirubin, total 1.0 0.2 - 1.0 mg/dL    AST (SGOT) 22 15 - 37 U/L    ALT (SGPT) 11 (L) 12 - 78 U/L    Alk.  phosphatase 66 45 - 117 U/L    Protein, total 5.5 (L) 6.4 - 8.2 g/dL    Albumin 2.0 (L) 3.5 - 5.0 g/dL    Globulin 3.5 2.0 - 4.0 g/dL    A-G Ratio 0.6 (L) 1.1 - 2.2     RENAL FUNCTION PANEL    Collection Time: 04/13/22  5:15 AM   Result Value Ref Range    Sodium 139 136 - 145 mmol/L    Potassium 3.6 3.5 - 5.1 mmol/L    Chloride 112 (H) 97 - 108 mmol/L    CO2 20 (L) 21 - 32 mmol/L    Anion gap 7 5 - 15 mmol/L    Glucose 84 65 - 100 mg/dL    BUN 14 6 - 20 mg/dL    Creatinine 1.63 (H) 0.70 - 1.30 mg/dL    BUN/Creatinine ratio 9 (L) 12 - 20      GFR est AA 52 (L) >60 ml/min/1.73m2    GFR est non-AA 43 (L) >60 ml/min/1.73m2    Calcium 8.4 (L) 8.5 - 10.1 mg/dL    Phosphorus 3.1 2.6 - 4.7 mg/dL    Albumin 2.0 (L) 3.5 - 5.0 g/dL         Review of Systems    Constitutional: Negative for chills and fever. HENT: Negative. Eyes: Negative. Respiratory: Negative. Cardiovascular: Negative. Gastrointestinal: Negative for abdominal pain and nausea. Skin: Negative. Neurological: Negative. Physical Exam:      Constitutional: pt is oriented to person, place, and time. HENT:   Head: Normocephalic and atraumatic. Eyes: Pupils are equal, round, and reactive to light. EOM are normal.   Cardiovascular: Normal rate, regular rhythm and normal heart sounds. Pulmonary/Chest: Breath sounds normal. No wheezes. No rales. Exhibits no tenderness. Abdominal: Soft. Bowel sounds are normal. There is no abdominal tenderness. There is no rebound and no guarding. Musculoskeletal: Normal range of motion. Neurological: pt is alert and oriented to person, place, and time. Extremities 2+ edema    XR HAND RT MIN 3 V   Final Result      CT HEAD WO CONT   Final Result   Age-appropriate atrophy. No acute findings. XR ABD (KUB)   Final Result   Within normal      XR CHEST PORT   Final Result   Findings/impression:   1. Low lung volumes. Bibasilar hypoventilatory change/atelectasis. Left   retrocardiac lung incompletely evaluated, cannot exclude underlying airspace   disease. 2.  Cardiac and mediastinal contours are obscured by low lung volumes. Ectatic/possible aneurysmal appearance transverse aorta with calcific   atherosclerotic plaque. Appears stable from prior radiograph. 3.  No pleural fluid. No pneumothorax. 4.  Prominent gas-filled bowel partially visualized in the left upper quadrant.          IR INSERT NON TUNL CVC OVER 5 YRS    (Results Pending)   IR PARACENTESIS ABD W IMAGE    (Results Pending)   IR US GUIDED VASCULAR ACCESS    (Results Pending)   IR FLUORO GUIDE PLC CVAD    (Results Pending)        Recent Results (from the past 24 hour(s))   URINALYSIS W/ REFLEX CULTURE    Collection Time: 04/12/22 11:00 PM    Specimen: Urine   Result Value Ref Range    Color Yellow/Straw      Appearance Turbid (A) Clear      Specific gravity 1.011 1.003 - 1.030      pH (UA) 5.0 5.0 - 8.0      Protein Negative Negative mg/dL    Glucose Negative Negative mg/dL    Ketone Negative Negative mg/dL    Bilirubin Negative Negative      Blood Large (A) Negative      Urobilinogen 0.1 0.1 - 1.0 EU/dL    Nitrites Negative Negative      Leukocyte Esterase Large (A) Negative      UA:UC IF INDICATED Urine Culture Ordered (A) Culture not indicated by UA result      WBC >100 (H) 0 - 4 /hpf    RBC  0 - 5 /hpf    Bacteria 1+ (A) Negative /hpf    Mucus Trace /lpf    PRESUMPTIVE YEAST Present     VANCOMYCIN, RANDOM    Collection Time: 04/13/22  5:15 AM   Result Value Ref Range    Vancomycin, random 14.6 ug/mL   CBC W/O DIFF    Collection Time: 04/13/22  5:15 AM   Result Value Ref Range    WBC 4.5 4.1 - 11.1 K/uL    RBC 2.99 (L) 4.10 - 5.70 M/uL    HGB 8.2 (L) 12.1 - 17.0 g/dL    HCT 23.6 (L) 36.6 - 50.3 %    MCV 78.9 (L) 80.0 - 99.0 FL    MCH 27.4 26.0 - 34.0 PG    MCHC 34.7 30.0 - 36.5 g/dL    RDW 22.4 (H) 11.5 - 14.5 %    PLATELET 69 (L) 145 - 400 K/uL    MPV 10.2 8.9 - 12.9 FL    NRBC 0.0 0.0  WBC    ABSOLUTE NRBC 0.00 0.00 - 1.33 K/uL   METABOLIC PANEL, COMPREHENSIVE Collection Time: 04/13/22  5:15 AM   Result Value Ref Range    Sodium 139 136 - 145 mmol/L    Potassium 3.7 3.5 - 5.1 mmol/L    Chloride 113 (H) 97 - 108 mmol/L    CO2 19 (L) 21 - 32 mmol/L    Anion gap 7 5 - 15 mmol/L    Glucose 87 65 - 100 mg/dL    BUN 14 6 - 20 mg/dL    Creatinine 1.62 (H) 0.70 - 1.30 mg/dL    BUN/Creatinine ratio 9 (L) 12 - 20      GFR est AA 52 (L) >60 ml/min/1.73m2    GFR est non-AA 43 (L) >60 ml/min/1.73m2    Calcium 8.3 (L) 8.5 - 10.1 mg/dL    Bilirubin, total 1.0 0.2 - 1.0 mg/dL    AST (SGOT) 22 15 - 37 U/L    ALT (SGPT) 11 (L) 12 - 78 U/L    Alk.  phosphatase 66 45 - 117 U/L    Protein, total 5.5 (L) 6.4 - 8.2 g/dL    Albumin 2.0 (L) 3.5 - 5.0 g/dL    Globulin 3.5 2.0 - 4.0 g/dL    A-G Ratio 0.6 (L) 1.1 - 2.2     RENAL FUNCTION PANEL    Collection Time: 04/13/22  5:15 AM   Result Value Ref Range    Sodium 139 136 - 145 mmol/L    Potassium 3.6 3.5 - 5.1 mmol/L    Chloride 112 (H) 97 - 108 mmol/L    CO2 20 (L) 21 - 32 mmol/L    Anion gap 7 5 - 15 mmol/L    Glucose 84 65 - 100 mg/dL    BUN 14 6 - 20 mg/dL    Creatinine 1.63 (H) 0.70 - 1.30 mg/dL    BUN/Creatinine ratio 9 (L) 12 - 20      GFR est AA 52 (L) >60 ml/min/1.73m2    GFR est non-AA 43 (L) >60 ml/min/1.73m2    Calcium 8.4 (L) 8.5 - 10.1 mg/dL    Phosphorus 3.1 2.6 - 4.7 mg/dL    Albumin 2.0 (L) 3.5 - 5.0 g/dL       Results     Procedure Component Value Units Date/Time    CULTURE, URINE [[id number]] Collected: 04/12/22 2300    Order Status: Completed Specimen: Urine Updated: 04/13/22 0011    CULTURE, URINE [[id number]] Collected: 04/12/22 2300    Order Status: Canceled Specimen: Urine     CULTURE, URINE [[id number]] Collected: 04/07/22 1502    Order Status: Completed Specimen: Urine Updated: 04/09/22 1414     Special Requests: --        No Special Requests  Reflexed from [id number]       Culture result: No Growth (<1000 cfu/mL)       MRSA SCREEN - PCR (NASAL) [[id number]] Collected: 04/07/22 1345    Order Status: Completed Specimen: Swab Updated: 04/07/22 1516     MRSA by PCR, Nasal Not Detected       CULTURE, BLOOD, PAIRED [[id number]] Collected: 04/07/22 1001    Order Status: Completed Specimen: Blood Updated: 04/07/22 1017    C. DIFFICILE (DNA) [[id number]] Collected: 04/06/22 0919    Order Status: Completed Specimen: Stool Updated: 04/06/22 1056     C. difficile (DNA) Negative       COVID-19 WITH INFLUENZA A/B [[id number]] Collected: 04/05/22 2315    Order Status: Completed Specimen: Nasopharyngeal from Nasopharynx Updated: 04/06/22 0027     SARS-CoV-2 by PCR Not Detected        Comment: Not Detected results do not preclude SARS-CoV-2 infection and should not be used as the sole basis for patient management decisions. Results must be combined with clinical observations, patient history, and epidemiological information        Influenza A by PCR Not Detected        Influenza B by PCR Not Detected        Comment: Testing was performed using leah Concepcion SARS-CoV-2 and Influenza A/B nucleic acid assay. This test is a multiplex Real-Time Reverse Transcriptase Polymerase Chain Reaction (RT-PCR) based in vitro diagnostic test intended for the qualitative detection of nucleic acids from SARS-CoV-2, Influenza A, and Influenza B in nasopharyngeal and nasal swab specimens for use under the FDA's Emergency Use Authorization (EUA) only.    Fact sheet for Patients: FindDrives.pl Fact sheet   for Healthcare Providers: FindDrives.pl                Labs:     Recent Labs     04/13/22  0515 04/12/22  0330   WBC 4.5 3.8*   HGB 8.2* 7.4*   HCT 23.6* 21.2*   PLT 69* 61*     Recent Labs     04/13/22  0515 04/12/22  0330 04/11/22  0400     139 141  144 142   K 3.7  3.6 3.6  3.6 3.5   *  112* 116*  118* 115*   CO2 19*  20* 21  21 19*   BUN 14  14 15  14 14   CREA 1.62*  1.63* 1.68*  1.61* 1.69*   GLU 87  84 75  74 72   CA 8.3*  8.4* 7.8*  7.6* 7.6*   PHOS 3.1 2.7  --      Recent Labs 04/13/22  0515 04/12/22  0330 04/11/22  0400   ALT 11* 11* 10*   AP 66 57 55   TBILI 1.0 1.2* 1.3*   TP 5.5* 5.1* 5.0*   ALB 2.0*  2.0* 1.9*  1.8* 2.0*   GLOB 3.5 3.2 3.0     No results for input(s): INR, PTP, APTT, INREXT, INREXT in the last 72 hours. No results for input(s): FE, TIBC, PSAT, FERR in the last 72 hours. No results found for: FOL, RBCF   No results for input(s): PH, PCO2, PO2 in the last 72 hours. No results for input(s): CPK, CKNDX, TROIQ in the last 72 hours. No lab exists for component: CPKMB  No results found for: CHOL, CHOLX, CHLST, CHOLV, HDL, HDLP, LDL, LDLC, DLDLP, TGLX, TRIGL, TRIGP, CHHD, CHHDX  Lab Results   Component Value Date/Time    Glucose (POC) 80 04/08/2022 10:03 AM    Glucose (POC) 84 04/04/2022 05:40 PM    Glucose (POC) 107 04/03/2022 11:12 AM    Glucose (POC) 111 03/31/2022 11:35 AM    Glucose (POC) 91 03/29/2022 11:10 AM     Lab Results   Component Value Date/Time    Color Yellow/Straw 04/12/2022 11:00 PM    Appearance Turbid (A) 04/12/2022 11:00 PM    Specific gravity 1.011 04/12/2022 11:00 PM    pH (UA) 5.0 04/12/2022 11:00 PM    Protein Negative 04/12/2022 11:00 PM    Glucose Negative 04/12/2022 11:00 PM    Ketone Negative 04/12/2022 11:00 PM    Bilirubin Negative 04/12/2022 11:00 PM    Urobilinogen 0.1 04/12/2022 11:00 PM    Nitrites Negative 04/12/2022 11:00 PM    Leukocyte Esterase Large (A) 04/12/2022 11:00 PM    Bacteria 1+ (A) 04/12/2022 11:00 PM    WBC >100 (H) 04/12/2022 11:00 PM    RBC  04/12/2022 11:00 PM         Assessment:     Sepsis  Lactic acidosis  Metabolic encephalopathy   Acute on chronic renal failure- creat 1.62  Cirrhosis with ascites  Anemia  History of septic DIP joint, right index finger  Hypothyroidism  Seizure disorder  Gout  GERD  History of etoh abuse  Hypotension  Metabolic acidosis  Hypothermia  Hypokalemia  Anemia monitor H&H  UTI     Plan:      On bear hugger    On allopurinol 300 mg daily  Vitamin D 50,000 weekly  Pepcid 20 twice daily  Procrit T5C  Folic acid 1 mg daily  Lactulose 10 g 3 times a day  Keppra 1500 twice a day  Synthroid 25 mcg daily  Midodrine 10 mg 3 times a day  Propranolol 20 mg twice a day  Sodium bicarb 1303 times a day  Flomax 0.4 mg daily  Thiamine 100 mg daily  Vancomycin with pharmacy, zosyn discontinued   Levophed 0.5-120 mcg/min titrate   Discontinue sodium bicarb drip     Replace potassium  IR consulted     SLP rec MM7, thin liquids and advance as tolerated     We will do psych consult for psychosis      Current Facility-Administered Medications:     vancomycin (VANCOCIN) 500 mg in 0.9% sodium chloride (MBP/ADV) 100 mL MBP, 500 mg, IntraVENous, ONCE, Ree Zaragoza MD, Last Rate: 100 mL/hr at 04/13/22 0842, 500 mg at 04/13/22 0842    [START ON 4/14/2022] Vancomycin random level to be drawn on 4/14/22 at  0400 am., , Other, ONCE, Ree Zaragoza MD    0.9% sodium chloride infusion 250 mL, 250 mL, IntraVENous, PRN, Kobe Ackerman MD    hydrOXYzine (VISTARIL) injection 25 mg, 25 mg, IntraMUSCular, Q8H PRN, Kat Tobin MD, 25 mg at 04/11/22 1811    ziprasidone (GEODON) 10 mg in sterile water (preservative free) 0.5 mL injection, 10 mg, IntraMUSCular, Q12H PRN, Kat Tobin MD, 10 mg at 04/12/22 0851    levETIRAcetam (KEPPRA) 1500 mg in saline (iso-osm) 100 ml IVPB, 1,500 mg, IntraVENous, Q12H, Lamberto Ackerman MD, Last Rate: 400 mL/hr at 04/12/22 2357, 1,500 mg at 04/12/22 2357    0.9% sodium chloride infusion 250 mL, 250 mL, IntraVENous, PRN, Lamberto Ackerman MD    epoetin luis-epbx (RETACRIT) injection 10,000 Units, 10,000 Units, SubCUTAneous, Q7D, Kendrick Thompson MD, 10,000 Units at 04/09/22 1805    midodrine (PROAMATINE) tablet 10 mg, 10 mg, Oral, TID WITH MEALS, Lamberto Ackerman MD, 10 mg at 04/13/22 0839    sodium bicarbonate tablet 1,300 mg, 1,300 mg, Oral, TID, Kendrick Thompson MD, 1,300 mg at 04/13/22 0840    LORazepam (ATIVAN) injection 1 mg, 1 mg, IntraVENous, Q4H PRN, Clinton Newman MD, 1 mg at 04/08/22 1733    sodium chloride (NS) flush 5-10 mL, 5-10 mL, IntraVENous, PRN, Mohiuddin, Laban Mcardle, MD    lactulose (CHRONULAC) 10 gram/15 mL solution 15 mL, 10 g, Oral, TID, Mohiuddin, Laban Mcardle, MD, 15 mL at 04/13/22 0839    tamsulosin (FLOMAX) capsule 0.4 mg, 0.4 mg, Oral, DAILY, Mohiuddin, Laban Mcardle, MD, 0.4 mg at 04/13/22 0840    thiamine mononitrate (B-1) tablet 100 mg, 100 mg, Oral, DAILY, Lamberto Ackerman MD, 100 mg at 04/13/22 0840    allopurinoL (ZYLOPRIM) tablet 300 mg, 300 mg, Oral, DAILY, Lamberto Ackerman MD, 300 mg at 04/13/22 3954    ergocalciferol capsule 50,000 Units, 50,000 Units, Oral, Q7D, Lamberto Ackerman MD    famotidine (PEPCID) tablet 20 mg, 20 mg, Oral, BID, Clinton Newman MD, 20 mg at 46/72/68 0766    folic acid (FOLVITE) tablet 1 mg, 1 mg, Oral, DAILY, Clinton Newman MD, 1 mg at 04/13/22 0840    levothyroxine (SYNTHROID) tablet 25 mcg, 25 mcg, Oral, ACB, Lamberto Ackerman MD, 25 mcg at 04/13/22 0840    propranoloL (INDERAL) tablet 20 mg, 20 mg, Oral, BID, Clinton Newman MD, 20 mg at 04/13/22 0475    acetaminophen (TYLENOL) tablet 650 mg, 650 mg, Oral, Q6H PRN **OR** acetaminophen (TYLENOL) suppository 650 mg, 650 mg, Rectal, Q6H PRN, Lamberto Ackerman MD    polyethylene glycol (MIRALAX) packet 17 g, 17 g, Oral, DAILY PRN, Lamberto Ackerman MD    ondansetron (ZOFRAN ODT) tablet 4 mg, 4 mg, Oral, Q8H PRN **OR** ondansetron (ZOFRAN) injection 4 mg, 4 mg, IntraVENous, Q6H PRN, Mohiuddin, Laban Mcardle, MD    VANCOMYCIN INFORMATION NOTE, , Other, Rx Dosing/Monitoring, Deloris Chacon MD    NOREPINephrine (LEVOPHED) 8 mg in 0.9% NS 250ml infusion, 0.5-120 mcg/min, IntraVENous, TITRATE, Mohiuddin, Laban Mcardle, MD, Stopped at 04/11/22 0106

## 2022-04-14 NOTE — PROGRESS NOTES
Patient refused most of his po meds, morning meal, and fluids after several attempts. He appears drowsy and disoriented to time, place, and situation. He asks several times, \"how long am I going to be locked up for,\" and \"why do I have 9 felonies? \"  Attempted to reorient to place, time, and situation, but he is unreceptive and objects to any information given by nurse. When asked about his refusal to take medication, he states he doesn't need any medication and isn't going to take any this morning. Will report to Dr. Michael Diaz to see if alternate route/dose changes are required.

## 2022-04-14 NOTE — PROGRESS NOTES
Infectious Disease Progress Note           Subjective:   No change in clinical status, off pressor support, on shemar huggar.  No acute events since last seen   Objective:   Physical Exam:     Visit Vitals  /86 (BP 1 Location: Left upper arm, BP Patient Position: At rest)   Pulse 67   Temp 96.8 °F (36 °C)   Resp 12   Ht 5' 10.98\" (1.803 m)   Wt 208 lb 8.9 oz (94.6 kg)   SpO2 93%   BMI 29.10 kg/m²      O2 Device: None (Room air)    Temp (24hrs), Av.7 °F (36.5 °C), Min:96.6 °F (35.9 °C), Max:98.6 °F (37 °C)    701 - 1900  In: -   Out: 200 [Urine:200]   1901 - 700  In: 150 [I.V.:150]  Out: 1115 [Urine:1115]    General: NAD, alert, confused   HEENT: HEIDY, Moist mucosa   Lungs: CTA b/l, decreased at the bases, no wheeze/rhonchi   Heart: S1S2+, RRR, no murmur  Abdo: Soft, distended, NT, +BS   : + fish cath   Exts: Decreased right index finger swelling, no drainage   Skin: No wounds, No rashes or lesions    Data Review:       Recent Days:  Recent Labs     22  0515 22  0330   WBC 4.5 3.8*   HGB 8.2* 7.4*   HCT 23.6* 21.2*   PLT 69* 61*     Recent Labs     22  0515 22  0330   BUN 14  14 15  14   CREA 1.62*  1.63* 1.68*  1.61*       Lab Results   Component Value Date/Time    C-Reactive protein 0.60 2022 05:15 AM          Microbiology     Results     Procedure Component Value Units Date/Time    CULTURE, URINE [[id number]] Collected: 22    Order Status: Completed Specimen: Urine Updated: 22 001    CULTURE, URINE [[id number]] Collected: 22    Order Status: Canceled Specimen: Urine     CULTURE, URINE [[id number]] Collected: 22 1502    Order Status: Completed Specimen: Urine Updated: 22 1414     Special Requests: --        No Special Requests  Reflexed from [id number]       Culture result: No Growth (<1000 cfu/mL)       MRSA SCREEN - PCR (NASAL) [[id number]] Collected: 22 1345    Order Status: Completed Specimen: Swab Updated: 04/07/22 1516     MRSA by PCR, Nasal Not Detected       CULTURE, BLOOD, PAIRED [[id number]] Collected: 04/07/22 1001    Order Status: Completed Specimen: Blood Updated: 04/07/22 1017    C. DIFFICILE (DNA) [[id number]] Collected: 04/06/22 0919    Order Status: Completed Specimen: Stool Updated: 04/06/22 1056     C. difficile (DNA) Negative       COVID-19 WITH INFLUENZA A/B [[id number]] Collected: 04/05/22 2315    Order Status: Completed Specimen: Nasopharyngeal from Nasopharynx Updated: 04/06/22 0027     SARS-CoV-2 by PCR Not Detected        Comment: Not Detected results do not preclude SARS-CoV-2 infection and should not be used as the sole basis for patient management decisions. Results must be combined with clinical observations, patient history, and epidemiological information        Influenza A by PCR Not Detected        Influenza B by PCR Not Detected        Comment: Testing was performed using leah Concepcion SARS-CoV-2 and Influenza A/B nucleic acid assay. This test is a multiplex Real-Time Reverse Transcriptase Polymerase Chain Reaction (RT-PCR) based in vitro diagnostic test intended for the qualitative detection of nucleic acids from SARS-CoV-2, Influenza A, and Influenza B in nasopharyngeal and nasal swab specimens for use under the FDA's Emergency Use Authorization (EUA) only. Fact sheet for Patients: FindDrives.pl Fact sheet   for Healthcare Providers: FindDrives[employer]pl                  Diagnostics   CXR Results  (Last 48 hours)    None             Assessment/Plan     1. Right index finger osteomyelitis, underlying gouty arthritis       S/p debridement w isolation of Staph epidermidis from Cx      Decreased finger swelling, no evidence of worsening infection       On day # 23/28 of Vanc, CRP normalized at 0.60 and ESR 28 (04/08)       Continue on Vanc until completion of recommended course      2.  Suspected sepsis, On going hypotension, on/off pressor support, Cxs neg on current admission       Hypoalbuminemic from chronic liver disease, w resulting 3rd spacing       Continue on Florinef, and Midodrine, currently off levophed          3. UTI, abnormal UA,  Urinary retention: + indwelling fish cath      Urine Cx from 04/07 is negative, s/p 5 days of empiric Zosyn      4. AMS, h/o metabolic/hepatic encephalopathy, waxing and waning mentation     5.  Ascites, S/p paracentesis on 04/08 w removal of 4,250 ml of clear fluid      Abdomen is again distended and firm, w/o evidence or guarding or rebound      Handy Garcia MD    4/14/2022

## 2022-04-14 NOTE — PROGRESS NOTES
Nutrition Assessment     Type and Reason for Visit: Reassess (goal)    Nutrition Recommendations/Plan:   Rec downgrade diet to Puree diet   Request a SLP re-eval   Ensure x3/day   Sean x2/day   Ensure pudding x3/day  D/C prosource     Monitor and Record wts, po/supplement intake, BMs in I/Os     Nutrition Assessment:  Admitted for hematuria s/p pulling out fish catheter, +hypothermia and hypotension. RD familiar with pt from previous admits. Pt inappropriate for interview at this time per RN, no family in room. Pt with minimal PO intakes, per RN pt up all night ans mostly sleeping today. RD added Ensure 3x/d this AM per pt previously stated preferences. Will f/u for tolerance and need for changes. (4/12) Pt seen in bed asleep s/p Breakfast. Per Nsg, Pt ate ~25% from tray, drank >80% of ONS w/ observation as liking Ensure shakes. (F/U) patient was asleep at the time of assessment, RD attempted to wake up patiented. off pressors, appetite remains poor >25%, observed one unopen ensure @ bedside w/ multiple unopened ProSource packets. May have better acceptances w/ ensure pudding w/ meds instead of prosource pkt. Will modify ONs. May also need to downgrade diet to puree, will request a SLP consult. Labs:  Cre (1.47), GFR (59), alb (2.1), protein (5.5). Meds: PPI, folvite, Keppra, Synthroid, sodium bicarbonate    Malnutrition Assessment:  Malnutrition Status: Moderate malnutrition     Estimated Daily Nutrient Needs:  Energy (kcal):  2,657kcals (28kcal/kg)  Protein (g):  131g(1.4g/kg)       Fluid (ml/day):  2365(25ml/kg)    Nutrition Related Findings:  No N/V/C per Nsg; loose BMs likely r/t on lactulose. Last BM on 4/12. Abd distention continues- likely r/t ascites. +3 BLE edema noted in I/Os. SLP tx continues- advanced to MM5/Thin rec'd.       Current Nutrition Therapies:  ADULT ORAL NUTRITION SUPPLEMENT Breakfast, Lunch, Dinner; Standard High Calorie/High Protein  ADULT DIET Dysphagia - Minced & Moist; Low Fat/Low Chol/High Fiber/2 gm Na  ADULT ORAL NUTRITION SUPPLEMENT Breakfast, Lunch;  Other Supplement; Prosource    Anthropometric Measures:  · Height:  5' 10\" (177.8 cm)  · Current Body Wt:  94.3 kg (208 lb)  · BMI: 29.8    Nutrition Diagnosis:   · Moderate malnutrition,In context of chronic illness related to inadequate protein-energy intake,catabolic illness (Liver cirrhosis 2/2 EtOH abuse) as evidenced by poor intake prior to admission,mild loss of subcutaneous fat,mild muscle loss      Nutrition Intervention:  Food and/or Nutrient Delivery: Modify current diet,Modify oral nutrition supplement  Nutrition Education and Counseling: Education not indicated  Coordination of Nutrition Care: Speech therapy,Swallow evaluation,Feeding assistance/environmental change    Goals:  Meet >/=50% of EENs in >5 days, Wt maintenance within +/-0.5kg in >5 days, lytes WNL       Nutrition Monitoring and Evaluation:   Behavioral-Environmental Outcomes: None identified  Food/Nutrient Intake Outcomes: Diet advancement/tolerance,Supplement intake,Food and nutrient intake  Physical Signs/Symptoms Outcomes: Fluid status or edema,Meal time behavior,Weight,Skin    Discharge Planning:    No discharge needs at this time     Electronically signed by Rajeev Durant on 4/14/2022 at 2:30 PM    Contact Number: [phone]

## 2022-04-14 NOTE — PROGRESS NOTES
General Daily Progress Note          Patient Name:   Tiffany Tran       YOB: 1960       Age:  60 y.o. Admit Date: 4/7/2022      Subjective:     Tiffany Tran is a(n) 60 y.o. male with PMH significant for HTN, cirrhosis, ascites presents via EMS for removing fish catheter. Patient recently d/c after lengthy stay in hospital for septic DIP joint of right index finger. He was d/c on 10 days of PO Zyvox, which he completed. He presents today after pulling out fish catheter. Several blood clots noted. Patient septic upon arrival with hypothermia and hypotension. Temp 93.6 rectal, BP 84/68. Patient is retaining 887cc of urine and nursing staff will replace fish.     Patient started on fluids and IV Levaquin in the ED. Patient is altered at baseline. Head CT w/o contrast shows no acute abnormality. Blood cultures pending. CXR and KUB both normal       Patient is alert awake confused  Hypotensive off  Levophed    Normal temperature    4/12  Patient is off levophed but still receiving midodrine and IVF. He remain hypotensive. He is lethargic and confused this morning. Patient has mild bilateral LE edema. Seen by ID yesterday. Recommends continuing vancomycin   Seen by nephrology yesterday. Recommends discontinuing IVF in lieu of leg edema, discontinue bicarb drip, continuing weekly procrit, continuing PO KCl, midodrine for chronic hypotension. Today patient hypothermic on bear hugger  Yesterday evening patient was restless agitated    4/13    Patient is alert awake still hypothermic on shemar hugger    4/14  Patient is seen sleeping. On shemar hugger and vancomycin. He is producing 250 ml urine. No new changes seen at this time. Labs pending. Cr trending down 1.62 (on 4/13). Abdominal US on 4/13 shows ascites. UA on 4/12 positive for leukocyte esterase, WBC >100 and bacteruria. Urine culture pending.      Patient refused medication this morning        Objective:     Visit Vitals  /86 (BP 1 Location: Left upper arm, BP Patient Position: At rest)   Pulse 67   Temp 96.8 °F (36 °C)   Resp 12   Ht 5' 10.98\" (1.803 m)   Wt 94.6 kg (208 lb 8.9 oz)   SpO2 93%   BMI 29.10 kg/m²        No results found for this or any previous visit (from the past 24 hour(s)). [unfilled]      Review of Systems    Constitutional: Negative for chills and fever. HENT: Negative. Eyes: Negative. Respiratory: Negative. Cardiovascular: Negative. Gastrointestinal: Negative for abdominal pain and nausea. Skin: Negative. Neurological: Negative. Physical Exam:      Constitutional: pt is oriented to person, place, and time. HENT:   Head: Normocephalic and atraumatic. Eyes: Pupils are equal, round, and reactive to light. EOM are normal.   Cardiovascular: Normal rate, regular rhythm and normal heart sounds. Pulmonary/Chest: Breath sounds normal. No wheezes. No rales. Exhibits no tenderness. Abdominal: Soft. Bowel sounds are normal. There is no abdominal tenderness. There is no rebound and no guarding. Musculoskeletal: Normal range of motion. Neurological: pt is alert and oriented to person, place, and time. Extremities 2+ edema    US ABD LTD   Final Result   Ascites. XR HAND RT MIN 3 V   Final Result      CT HEAD WO CONT   Final Result   Age-appropriate atrophy. No acute findings. XR ABD (KUB)   Final Result   Within normal      XR CHEST PORT   Final Result   Findings/impression:   1. Low lung volumes. Bibasilar hypoventilatory change/atelectasis. Left   retrocardiac lung incompletely evaluated, cannot exclude underlying airspace   disease. 2.  Cardiac and mediastinal contours are obscured by low lung volumes. Ectatic/possible aneurysmal appearance transverse aorta with calcific   atherosclerotic plaque. Appears stable from prior radiograph. 3.  No pleural fluid. No pneumothorax.       4.  Prominent gas-filled bowel partially visualized in the left upper quadrant. IR INSERT NON TUNL CVC OVER 5 YRS    (Results Pending)   IR PARACENTESIS ABD W IMAGE    (Results Pending)   IR US GUIDED VASCULAR ACCESS    (Results Pending)   IR FLUORO GUIDE PLC CVAD    (Results Pending)   IR PARACENTESIS ABD SUBSQ    (Results Pending)        No results found for this or any previous visit (from the past 24 hour(s)). Results     Procedure Component Value Units Date/Time    CULTURE, URINE [[id number]] Collected: 04/12/22 2300    Order Status: Completed Specimen: Urine Updated: 04/13/22 0011    CULTURE, URINE [[id number]] Collected: 04/12/22 2300    Order Status: Canceled Specimen: Urine     CULTURE, URINE [[id number]] Collected: 04/07/22 1502    Order Status: Completed Specimen: Urine Updated: 04/09/22 1414     Special Requests: --        No Special Requests  Reflexed from [id number]       Culture result: No Growth (<1000 cfu/mL)       MRSA SCREEN - PCR (NASAL) [[id number]] Collected: 04/07/22 1345    Order Status: Completed Specimen: Swab Updated: 04/07/22 1516     MRSA by PCR, Nasal Not Detected       CULTURE, BLOOD, PAIRED [[id number]] Collected: 04/07/22 1001    Order Status: Completed Specimen: Blood Updated: 04/07/22 1017    C. DIFFICILE (DNA) [[id number]] Collected: 04/06/22 0919    Order Status: Completed Specimen: Stool Updated: 04/06/22 1056     C. difficile (DNA) Negative       COVID-19 WITH INFLUENZA A/B [[id number]] Collected: 04/05/22 2315    Order Status: Completed Specimen: Nasopharyngeal from Nasopharynx Updated: 04/06/22 0027     SARS-CoV-2 by PCR Not Detected        Comment: Not Detected results do not preclude SARS-CoV-2 infection and should not be used as the sole basis for patient management decisions.  Results must be combined with clinical observations, patient history, and epidemiological information        Influenza A by PCR Not Detected        Influenza B by PCR Not Detected        Comment: Testing was performed using leah Concepcion SARS-CoV-2 and Influenza A/B nucleic acid assay. This test is a multiplex Real-Time Reverse Transcriptase Polymerase Chain Reaction (RT-PCR) based in vitro diagnostic test intended for the qualitative detection of nucleic acids from SARS-CoV-2, Influenza A, and Influenza B in nasopharyngeal and nasal swab specimens for use under the FDA's Emergency Use Authorization (EUA) only. Fact sheet for Patients: FindDrives.pl Fact sheet   for Healthcare Providers: FindDrives.pl                Labs:     Recent Labs     04/13/22  0515 04/12/22  0330   WBC 4.5 3.8*   HGB 8.2* 7.4*   HCT 23.6* 21.2*   PLT 69* 61*     Recent Labs     04/13/22  0515 04/12/22  0330     139 141  144   K 3.7  3.6 3.6  3.6   *  112* 116*  118*   CO2 19*  20* 21  21   BUN 14  14 15  14   CREA 1.62*  1.63* 1.68*  1.61*   GLU 87  84 75  74   CA 8.3*  8.4* 7.8*  7.6*   PHOS 3.1 2.7     Recent Labs     04/13/22 0515 04/12/22  0330   ALT 11* 11*   AP 66 57   TBILI 1.0 1.2*   TP 5.5* 5.1*   ALB 2.0*  2.0* 1.9*  1.8*   GLOB 3.5 3.2     No results for input(s): INR, PTP, APTT, INREXT, INREXT in the last 72 hours. No results for input(s): FE, TIBC, PSAT, FERR in the last 72 hours. No results found for: FOL, RBCF   No results for input(s): PH, PCO2, PO2 in the last 72 hours. No results for input(s): CPK, CKNDX, TROIQ in the last 72 hours.     No lab exists for component: CPKMB  No results found for: CHOL, CHOLX, CHLST, CHOLV, HDL, HDLP, LDL, LDLC, DLDLP, TGLX, TRIGL, TRIGP, CHHD, CHHDX  Lab Results   Component Value Date/Time    Glucose (POC) 80 04/08/2022 10:03 AM    Glucose (POC) 84 04/04/2022 05:40 PM    Glucose (POC) 107 04/03/2022 11:12 AM    Glucose (POC) 111 03/31/2022 11:35 AM    Glucose (POC) 91 03/29/2022 11:10 AM     Lab Results   Component Value Date/Time    Color Yellow/Straw 04/12/2022 11:00 PM    Appearance Turbid (A) 04/12/2022 11:00 PM    Specific gravity 1.011 04/12/2022 11:00 PM    pH (UA) 5.0 04/12/2022 11:00 PM    Protein Negative 04/12/2022 11:00 PM    Glucose Negative 04/12/2022 11:00 PM    Ketone Negative 04/12/2022 11:00 PM    Bilirubin Negative 04/12/2022 11:00 PM    Urobilinogen 0.1 04/12/2022 11:00 PM    Nitrites Negative 04/12/2022 11:00 PM    Leukocyte Esterase Large (A) 04/12/2022 11:00 PM    Bacteria 1+ (A) 04/12/2022 11:00 PM    WBC >100 (H) 04/12/2022 11:00 PM    RBC  04/12/2022 11:00 PM         Assessment:     Sepsis  UTI  Lactic acidosis  Acute on chronic renal failure- creat 2.01  Cirrhosis with ascites  Anemia  History of septic DIP joint, right index finger  Hypothyroidism  Seizure disorder  Gout  GERD  History of etoh abuse  Hypotension  Metabolic acidosis  Hypothermia  Hypokalemia  Anemia monitor H&H  Abdominal distention/ascites    Plan:      On shemar singleton    On allopurinol 300 mg daily  Vitamin D 50,000 weekly  Pepcid 20 twice daily  Folic acid 1 mg daily  Lactulose 10 g 3 times a day  Keppra 1500 twice a day  Synthroid 25 mcg daily  Midodrine 10 mg 3 times a day  Propranolol 20 mg twice a day  Sodium bicarb 1303 times a day  Flomax 0.4 mg daily  Thiamine 100 mg daily  Vancomycin with pharmacy  Replace potassium  Discontinue IV Zosyn    Discontinue Sodium bicarb drip    We will do psych consult for psychosis patient receiving Geodon  Psychotic behavior    Consult IR for paracentesis      Current Facility-Administered Medications:     Vancomycin random level to be drawn on 4/14/22 at  0400 am., , Other, ONCE, Ree Zaragoza MD    fludrocortisone (FLORINEF) tablet 0.1 mg, 0.1 mg, Oral, DAILY, Ree Zaragoza MD    0.9% sodium chloride infusion 250 mL, 250 mL, IntraVENous, PRN, Lamberto Ackerman MD    hydrOXYzine (VISTARIL) injection 25 mg, 25 mg, IntraMUSCular, Q8H PRN, Antonio Mendez MD, 25 mg at 04/14/22 0216    ziprasidone (GEODON) 10 mg in sterile water (preservative free) 0.5 mL injection, 10 mg, IntraMUSCular, Q12H PRN, Km Gayle MD, 10 mg at 04/14/22 0304    levETIRAcetam (KEPPRA) 1500 mg in saline (iso-osm) 100 ml IVPB, 1,500 mg, IntraVENous, Q12H, Lamberto Ackerman MD, Last Rate: 400 mL/hr at 04/13/22 2332, 1,500 mg at 04/13/22 2332    0.9% sodium chloride infusion 250 mL, 250 mL, IntraVENous, PRN, Diana Ackerman MD    epoetin luis-epbx (RETACRIT) injection 10,000 Units, 10,000 Units, SubCUTAneous, Q7D, Fred Gonzalez MD, 10,000 Units at 04/09/22 1805    midodrine (PROAMATINE) tablet 10 mg, 10 mg, Oral, TID WITH MEALS, Lamberto Ackerman MD, 10 mg at 04/14/22 2693    sodium bicarbonate tablet 1,300 mg, 1,300 mg, Oral, TID, Fred Gonzalez MD, 1,300 mg at 04/13/22 2115    LORazepam (ATIVAN) injection 1 mg, 1 mg, IntraVENous, Q4H PRN, Adrian Carmona MD, 1 mg at 04/08/22 1733    sodium chloride (NS) flush 5-10 mL, 5-10 mL, IntraVENous, PRN, Diana Ackerman MD    lactulose (CHRONULAC) 10 gram/15 mL solution 15 mL, 10 g, Oral, TID, Adrian Carmona MD, 15 mL at 04/13/22 2115    tamsulosin (FLOMAX) capsule 0.4 mg, 0.4 mg, Oral, DAILY, Lamberto Ackerman MD, 0.4 mg at 04/13/22 0840    thiamine mononitrate (B-1) tablet 100 mg, 100 mg, Oral, DAILY, Adrian Carmona MD, 100 mg at 04/13/22 0840    allopurinoL (ZYLOPRIM) tablet 300 mg, 300 mg, Oral, DAILY, Lamberto Ackerman MD, 300 mg at 04/13/22 6912    ergocalciferol capsule 50,000 Units, 50,000 Units, Oral, Q7D, Lamberto Ackerman MD    famotidine (PEPCID) tablet 20 mg, 20 mg, Oral, BID, Lamberto Ackerman MD, 20 mg at 34/71/16 2577    folic acid (FOLVITE) tablet 1 mg, 1 mg, Oral, DAILY, Adrian Carmona MD, 1 mg at 04/13/22 0840    levothyroxine (SYNTHROID) tablet 25 mcg, 25 mcg, Oral, ACB, Lamberto Ackerman MD, 25 mcg at 04/13/22 0840    propranoloL (INDERAL) tablet 20 mg, 20 mg, Oral, BID, Lamberto Ackerman MD, 20 mg at 04/14/22 0905    acetaminophen (TYLENOL) tablet 650 mg, 650 mg, Oral, Q6H PRN **OR** acetaminophen (TYLENOL) suppository 650 mg, 650 mg, Rectal, Q6H PRN, Robin Ackerman MD    polyethylene glycol (MIRALAX) packet 17 g, 17 g, Oral, DAILY PRN, Robin Ackerman MD    ondansetron (ZOFRAN ODT) tablet 4 mg, 4 mg, Oral, Q8H PRN **OR** ondansetron (ZOFRAN) injection 4 mg, 4 mg, IntraVENous, Q6H PRN, Robin Ackerman MD    VANCOMYCIN INFORMATION NOTE, , Other, Rx Dosing/Monitoring, Yue Cerrato MD    NOREPINephrine (LEVOPHED) 8 mg in 0.9% NS 250ml infusion, 0.5-120 mcg/min, IntraVENous, TITRATE, Robin Ackerman MD, Stopped at 04/11/22 0109

## 2022-04-14 NOTE — PROGRESS NOTES
General Daily Progress Note          Patient Name:   Wojciech Cardozo       YOB: 1960       Age:  60 y.o. Admit Date: 4/7/2022      Subjective:     Wojciech Cardozo is a(n) 60 y.o. male with PMH significant for HTN, cirrhosis, ascites presents via EMS for removing fish catheter. Patient recently d/c after lengthy stay in hospital for septic DIP joint of right index finger. He was d/c on 10 days of PO Zyvox, which he completed. He presents today after pulling out fish catheter. Several blood clots noted. Patient septic upon arrival with hypothermia and hypotension. Temp 93.6 rectal, BP 84/68. Patient is retaining 887cc of urine and nursing staff will replace fish.     Patient started on fluids and IV Levaquin in the ED. Patient is altered at baseline. Head CT w/o contrast shows no acute abnormality. Blood cultures pending. CXR and KUB both normal       Patient is alert awake confused  Hypotensive off  Levophed    Normal temperature    4/12  Patient is off levophed but still receiving midodrine and IVF. He remain hypotensive. He is lethargic and confused this morning. Patient has mild bilateral LE edema. Seen by ID yesterday. Recommends continuing vancomycin   Seen by nephrology yesterday. Recommends discontinuing IVF in lieu of leg edema, discontinue bicarb drip, continuing weekly procrit, continuing PO KCl, midodrine for chronic hypotension. Today patient hypothermic on bear hugger  Yesterday evening patient was restless agitated    4/13    Patient is alert awake still hypothermic on shemar hugger    4/14  Patient is seen sleeping. On shemar hugger and vancomycin. He is producing 250 ml urine. No new changes seen at this time. Labs pending. Cr trending down 1.62 (on 4/13). Abdominal US on 4/13 shows ascites. UA on 4/12 positive for leukocyte esterase, WBC >100 and bacteruria. Urine culture pending.          Objective:     Visit Vitals  /86 (BP 1 Location: Left upper arm, BP Patient Position: At rest)   Pulse 67   Temp 96.8 °F (36 °C)   Resp 12   Ht 5' 10.98\" (1.803 m)   Wt 208 lb 8.9 oz (94.6 kg)   SpO2 93%   BMI 29.10 kg/m²        No results found for this or any previous visit (from the past 24 hour(s)). [unfilled]      Review of Systems    Constitutional: Negative for chills and fever. HENT: Negative. Eyes: Negative. Respiratory: Negative. Cardiovascular: Negative. Gastrointestinal: Negative for abdominal pain and nausea. Skin: Negative. Neurological: Negative. Physical Exam:      Constitutional: pt is oriented to person, place, and time. HENT:   Head: Normocephalic and atraumatic. Eyes: Pupils are equal, round, and reactive to light. EOM are normal.   Cardiovascular: Normal rate, regular rhythm and normal heart sounds. Pulmonary/Chest: Breath sounds normal. No wheezes. No rales. Exhibits no tenderness. Abdominal: Soft. Bowel sounds are normal. There is no abdominal tenderness. There is no rebound and no guarding. Musculoskeletal: Normal range of motion. Neurological: pt is alert and oriented to person, place, and time. Extremities 2+ edema    US ABD LTD   Final Result   Ascites. XR HAND RT MIN 3 V   Final Result      CT HEAD WO CONT   Final Result   Age-appropriate atrophy. No acute findings. XR ABD (KUB)   Final Result   Within normal      XR CHEST PORT   Final Result   Findings/impression:   1. Low lung volumes. Bibasilar hypoventilatory change/atelectasis. Left   retrocardiac lung incompletely evaluated, cannot exclude underlying airspace   disease. 2.  Cardiac and mediastinal contours are obscured by low lung volumes. Ectatic/possible aneurysmal appearance transverse aorta with calcific   atherosclerotic plaque. Appears stable from prior radiograph. 3.  No pleural fluid. No pneumothorax. 4.  Prominent gas-filled bowel partially visualized in the left upper quadrant.          IR INSERT NON TUNL CVC OVER 5 YRS    (Results Pending)   IR PARACENTESIS ABD W IMAGE    (Results Pending)   IR US GUIDED VASCULAR ACCESS    (Results Pending)   IR FLUORO GUIDE PLC CVAD    (Results Pending)   IR PARACENTESIS ABD SUBSQ    (Results Pending)        No results found for this or any previous visit (from the past 24 hour(s)). Results     Procedure Component Value Units Date/Time    CULTURE, URINE [[id number]] Collected: 04/12/22 2300    Order Status: Completed Specimen: Urine Updated: 04/13/22 0011    CULTURE, URINE [[id number]] Collected: 04/12/22 2300    Order Status: Canceled Specimen: Urine     CULTURE, URINE [[id number]] Collected: 04/07/22 1502    Order Status: Completed Specimen: Urine Updated: 04/09/22 1414     Special Requests: --        No Special Requests  Reflexed from [id number]       Culture result: No Growth (<1000 cfu/mL)       MRSA SCREEN - PCR (NASAL) [[id number]] Collected: 04/07/22 1345    Order Status: Completed Specimen: Swab Updated: 04/07/22 1516     MRSA by PCR, Nasal Not Detected       CULTURE, BLOOD, PAIRED [[id number]] Collected: 04/07/22 1001    Order Status: Completed Specimen: Blood Updated: 04/07/22 1017    C. DIFFICILE (DNA) [[id number]] Collected: 04/06/22 0919    Order Status: Completed Specimen: Stool Updated: 04/06/22 1056     C. difficile (DNA) Negative       COVID-19 WITH INFLUENZA A/B [[id number]] Collected: 04/05/22 2315    Order Status: Completed Specimen: Nasopharyngeal from Nasopharynx Updated: 04/06/22 0027     SARS-CoV-2 by PCR Not Detected        Comment: Not Detected results do not preclude SARS-CoV-2 infection and should not be used as the sole basis for patient management decisions. Results must be combined with clinical observations, patient history, and epidemiological information        Influenza A by PCR Not Detected        Influenza B by PCR Not Detected        Comment: Testing was performed using leah Concepcion SARS-CoV-2 and Influenza A/B nucleic acid assay.  This test is a multiplex Real-Time Reverse Transcriptase Polymerase Chain Reaction (RT-PCR) based in vitro diagnostic test intended for the qualitative detection of nucleic acids from SARS-CoV-2, Influenza A, and Influenza B in nasopharyngeal and nasal swab specimens for use under the FDA's Emergency Use Authorization (EUA) only. Fact sheet for Patients: FindDrives.pl Fact sheet   for Healthcare Providers: FindDrives.pl                Labs:     Recent Labs     04/13/22  0515 04/12/22  0330   WBC 4.5 3.8*   HGB 8.2* 7.4*   HCT 23.6* 21.2*   PLT 69* 61*     Recent Labs     04/13/22  0515 04/12/22  0330     139 141  144   K 3.7  3.6 3.6  3.6   *  112* 116*  118*   CO2 19*  20* 21  21   BUN 14  14 15  14   CREA 1.62*  1.63* 1.68*  1.61*   GLU 87  84 75  74   CA 8.3*  8.4* 7.8*  7.6*   PHOS 3.1 2.7     Recent Labs     04/13/22 0515 04/12/22  0330   ALT 11* 11*   AP 66 57   TBILI 1.0 1.2*   TP 5.5* 5.1*   ALB 2.0*  2.0* 1.9*  1.8*   GLOB 3.5 3.2     No results for input(s): INR, PTP, APTT, INREXT, INREXT in the last 72 hours. No results for input(s): FE, TIBC, PSAT, FERR in the last 72 hours. No results found for: FOL, RBCF   No results for input(s): PH, PCO2, PO2 in the last 72 hours. No results for input(s): CPK, CKNDX, TROIQ in the last 72 hours.     No lab exists for component: CPKMB  No results found for: CHOL, CHOLX, CHLST, CHOLV, HDL, HDLP, LDL, LDLC, DLDLP, TGLX, TRIGL, TRIGP, CHHD, CHHDX  Lab Results   Component Value Date/Time    Glucose (POC) 80 04/08/2022 10:03 AM    Glucose (POC) 84 04/04/2022 05:40 PM    Glucose (POC) 107 04/03/2022 11:12 AM    Glucose (POC) 111 03/31/2022 11:35 AM    Glucose (POC) 91 03/29/2022 11:10 AM     Lab Results   Component Value Date/Time    Color Yellow/Straw 04/12/2022 11:00 PM    Appearance Turbid (A) 04/12/2022 11:00 PM    Specific gravity 1.011 04/12/2022 11:00 PM    pH (UA) 5.0 04/12/2022 11:00 PM    Protein Negative 04/12/2022 11:00 PM    Glucose Negative 04/12/2022 11:00 PM    Ketone Negative 04/12/2022 11:00 PM    Bilirubin Negative 04/12/2022 11:00 PM    Urobilinogen 0.1 04/12/2022 11:00 PM    Nitrites Negative 04/12/2022 11:00 PM    Leukocyte Esterase Large (A) 04/12/2022 11:00 PM    Bacteria 1+ (A) 04/12/2022 11:00 PM    WBC >100 (H) 04/12/2022 11:00 PM    RBC  04/12/2022 11:00 PM         Assessment:     Sepsis  UTI  Lactic acidosis  Acute on chronic renal failure- creat 2.01  Cirrhosis with ascites  Anemia  History of septic DIP joint, right index finger  Hypothyroidism  Seizure disorder  Gout  GERD  History of etoh abuse  Hypotension  Metabolic acidosis  Hypothermia  Hypokalemia  Anemia monitor H&H  Abdominal distention    Plan:      On shemar singleton    On allopurinol 300 mg daily  Vitamin D 50,000 weekly  Pepcid 20 twice daily  Folic acid 1 mg daily  Lactulose 10 g 3 times a day  Keppra 1500 twice a day  Synthroid 25 mcg daily  Midodrine 10 mg 3 times a day  Propranolol 20 mg twice a day  Sodium bicarb 1303 times a day  Flomax 0.4 mg daily  Thiamine 100 mg daily  Vancomycin with pharmacy  Replace potassium  Discontinue IV Zosyn    Discontinue Sodium bicarb drip    We will do psych consult for psychosis    Ultrasound of the abdomen      Current Facility-Administered Medications:     Vancomycin random level to be drawn on 4/14/22 at  0400 am., , Other, ONCE, Ree Zaragoza MD    fludrocortisone (FLORINEF) tablet 0.1 mg, 0.1 mg, Oral, DAILY, Ree Zaragoza MD    0.9% sodium chloride infusion 250 mL, 250 mL, IntraVENous, PRN, Lamberto Ackerman MD    hydrOXYzine (VISTARIL) injection 25 mg, 25 mg, IntraMUSCular, Q8H PRN, Fawn Murillo MD, 25 mg at 04/14/22 0216    ziprasidone (GEODON) 10 mg in sterile water (preservative free) 0.5 mL injection, 10 mg, IntraMUSCular, Q12H PRN, Fawn Murillo MD, 10 mg at 04/14/22 0304    levETIRAcetam (KEPPRA) 1500 mg in saline (iso-osm) 100 ml IVPB, 1,500 mg, IntraVENous, Q12H, Lamberto Ackerman MD, Last Rate: 400 mL/hr at 04/13/22 2332, 1,500 mg at 04/13/22 2332    0.9% sodium chloride infusion 250 mL, 250 mL, IntraVENous, PRN, Jyothi Ackerman MD    epoetin luis-epbx (RETACRIT) injection 10,000 Units, 10,000 Units, SubCUTAneous, Q7D, Oswaldo Baker MD, 10,000 Units at 04/09/22 1805    midodrine (PROAMATINE) tablet 10 mg, 10 mg, Oral, TID WITH MEALS, Lamberto Ackerman MD, 10 mg at 04/14/22 5394    sodium bicarbonate tablet 1,300 mg, 1,300 mg, Oral, TID, Oswaldo Baker MD, 1,300 mg at 04/13/22 2115    LORazepam (ATIVAN) injection 1 mg, 1 mg, IntraVENous, Q4H PRN, Lamberto Ackerman MD, 1 mg at 04/08/22 1733    sodium chloride (NS) flush 5-10 mL, 5-10 mL, IntraVENous, PRN, Jyothi Ackerman MD    lactulose (CHRONULAC) 10 gram/15 mL solution 15 mL, 10 g, Oral, TID, Lamberto Ackerman MD, 15 mL at 04/13/22 2115    tamsulosin (FLOMAX) capsule 0.4 mg, 0.4 mg, Oral, DAILY, Lamberto Ackerman MD, 0.4 mg at 04/13/22 0840    thiamine mononitrate (B-1) tablet 100 mg, 100 mg, Oral, DAILY, Flaca Crawford MD, 100 mg at 04/13/22 0840    allopurinoL (ZYLOPRIM) tablet 300 mg, 300 mg, Oral, DAILY, Flaca Crawford MD, 300 mg at 04/13/22 6834    ergocalciferol capsule 50,000 Units, 50,000 Units, Oral, Q7D, Lamberto Ackerman MD    famotidine (PEPCID) tablet 20 mg, 20 mg, Oral, BID, Flaca Crawford MD, 20 mg at 43/99/69 9671    folic acid (FOLVITE) tablet 1 mg, 1 mg, Oral, DAILY, Flaca Crawford MD, 1 mg at 04/13/22 0840    levothyroxine (SYNTHROID) tablet 25 mcg, 25 mcg, Oral, ACB, Lamberto Ackerman MD, 25 mcg at 04/13/22 0840    propranoloL (INDERAL) tablet 20 mg, 20 mg, Oral, BID, Lamberto Ackerman MD, 20 mg at 04/14/22 0905    acetaminophen (TYLENOL) tablet 650 mg, 650 mg, Oral, Q6H PRN **OR** acetaminophen (TYLENOL) suppository 650 mg, 650 mg, Rectal, Q6H PRN, Lamberto Ackerman MD    polyethylene glycol (MIRALAX) packet 17 g, 17 g, Oral, DAILY PRN, Tyron Alex Song MD    ondansetron [facility] ODT) tablet 4 mg, 4 mg, Oral, Q8H PRN **OR** ondansetron (ZOFRAN) injection 4 mg, 4 mg, IntraVENous, Q6H PRN, Alex Ackerman MD    VANCOMYCIN INFORMATION NOTE, , Other, Rx Dosing/Monitoring, Elex Krabbe, MD    NOREPINephrine (LEVOPHED) 8 mg in 0.9% NS 250ml infusion, 0.5-120 mcg/min, IntraVENous, TITRATE, Alex Ackerman MD, Stopped at 04/11/22 0104

## 2022-04-14 NOTE — PROGRESS NOTES
Renal Note    NAME:  Houston Newton   :   1960   MRN:   [id number]     ATTENDING: Janene Adame MD  PCP:  Mariana Alcazar NP    Date/Time:  2022 12:46 PM      Subjective:     Patient seen in the ICU. Off IVF. He is on warming blanket. Creatinine 1.62 today. On single pressor. Past Medical History:   Diagnosis Date    Arthritis     Hypertension       Past Surgical History:   Procedure Laterality Date    IR INSERT NON TUNL CVC OVER 5 YRS  3/25/2022    IR PARACENTESIS ABD W IMAGE  2022    IR PARACENTESIS ABD W IMAGE  3/1/2022    IR PARACENTESIS ABD W IMAGE  3/30/2022     Social History     Tobacco Use    Smoking status: Never Smoker    Smokeless tobacco: Never Used   Substance Use Topics    Alcohol use: Not on file      No family history on file. No Known Allergies   Prior to Admission medications    Medication Sig Start Date End Date Taking? Authorizing Provider   linezolid (ZYVOX) 600 mg tablet Take 1 Tablet by mouth two (2) times a day for 10 days. Check CBC wkly while on linezolid 22  Jamie Heredia MD   thiamine mononitrate (B-1) 100 mg tablet Take 1 Tablet by mouth daily. 22   Anaya Ackerman MD   levothyroxine (SYNTHROID) 25 mcg tablet Take 25 mcg by mouth Daily (before breakfast). Provider, Historical   tamsulosin (Flomax) 0.4 mg capsule Take 1 Capsule by mouth daily. 3/4/22   Seamus Whyte PA-C   potassium chloride (K-DUR, KLOR-CON M20) 20 mEq tablet Take 1 Tablet by mouth daily. 3/2/22   Irina Joseph MD   sodium bicarbonate 650 mg tablet Take 1 Tablet by mouth three (3) times daily. 3/2/22   Irina Joseph MD   levETIRAcetam (Keppra) 1,000 mg tablet Take 1.5 Tablets by mouth two (2) times a day. 3/2/22   Irina Joseph MD   ergocalciferol (ERGOCALCIFEROL) 1,250 mcg (50,000 unit) capsule Take 1 Capsule by mouth every seven (7) days.  22   Provider, Historical   lactulose (CHRONULAC) 10 gram/15 mL solution Take 15 mL by mouth three (3) times daily. 22   Susana Ackerman MD   allopurinoL (ZYLOPRIM) 300 mg tablet Take 1 Tablet by mouth daily. 22   Provider, Historical   thiamine HCL (B-1) 100 mg tablet Take 100 mg by mouth daily. Provider, Historical   propranoloL (INDERAL) 20 mg tablet Take 20 mg by mouth two (2) times a day. Provider, Historical   folic acid (FOLVITE) 1 mg tablet Take 1 mg by mouth daily. Provider, Historical   famotidine (PEPCID) 20 mg tablet Take 20 mg by mouth At bedtime. Provider, Historical   aMILoride (MIDAMOR) 5 mg tablet Take 5 mg by mouth daily. Provider, Historical       REVIEW OF SYSTEMS:       She is lethargic. To obtain    Objective:   VITALS:    Visit Vitals  /86 (BP 1 Location: Left upper arm, BP Patient Position: At rest)   Pulse 67   Temp 96.8 °F (36 °C)   Resp 12   Ht 5' 10.98\" (1.803 m)   Wt 94.6 kg (208 lb 8.9 oz)   SpO2 93%   BMI 29.10 kg/m²     Temp (24hrs), Av.7 °F (36.5 °C), Min:96.6 °F (35.9 °C), Max:98.6 °F (37 °C)      PHYSICAL EXAM:     General: NAD, lethargic eyes: sclera anicteric  Oral Cavity: No thrush or ulcers  Neck: no JVD  Chest: Fair bilateral air entry. No Wheezing or Rhonchi. No rales. Heart: normal sounds  Abdomen: soft and non tender   :  Gerard+  Lower Extremities: no edema  Skin: no rash  Neuro: Lethargic psychiatric: Able to assess      LAB DATA REVIEWED:    No results found for this or any previous visit (from the past 24 hour(s)). Recommendations/Plan:     1 acute kidney injury on chronic kidney disease 3:  -Creatinine was 2.0 on admission   -Creatinine has improved to 1.6  today.    -Baseline creatinine 1.3-1.7 based on labs during previous admission  -Has history of recent GRACIA with creatinine peaking to 2.6 few weeks ago  -GRACIA likely prerenal secondary to hypotension/sepsis  -Urine is suggestive of UTI  -No need for renal ultrasound  -Continue Gerard catheter.   Urine output is improving  -Clinically noticed legs edema. -off  IVF. 2 metabolic acidosis:  -CO2 improved to 19   -on oral bicarbonate 1300 TID  -off HCO3 drip     3 severe anemia  -Hemoglobin 6.8->7.4->8.2.  -getting one unit blood Tx  -Continue weekly Procrit    4  Hypokalemia  -Potassium is 3.4->3.7.    -po kcl 40 meq x 1    5 SIRS/suspected sepsis  -Likely secondary to UTI. Recent UA was abnormal-currently hypothermic hypotensive though no leukocytosis seen. Lactic acid is elevated  -Started on Zosyn and vancomycin pending blood cultures  -Continue midodrine 10 mg 3 times daily for chronic hypotension     5 right index finger osteomyelitis  -s/p recent antibiotics for 2 weeks. Vancomycin has been resumed during this hospitalization  -ID is on the case    6 history of liver cirrhosis  -Has history of alcoholic liver cirrhosis. Portal hypertension  -Noted to have abdominal distention.   IR has been consulted  -Had paracentesis during his previous admission also      ________________________________________________________________________  Signed: Lakeshia Martinez MD

## 2022-04-14 NOTE — PROGRESS NOTES
Problem: Pressure Injury - Risk of  Goal: *Prevention of pressure injury  Description: Document Curtis Scale and appropriate interventions in the flowsheet. Outcome: Progressing Towards Goal  Note: Pressure Injury Interventions:  Sensory Interventions: Assess changes in LOC,Assess need for specialty bed,Avoid rigorous massage over bony prominences,Check visual cues for pain,Float heels,Keep linens dry and wrinkle-free,Maintain/enhance activity level,Minimize linen layers,Monitor skin under medical devices,Pad between skin to skin,Pressure redistribution bed/mattress (bed type),Turn and reposition approx. every two hours (pillows and wedges if needed)    Moisture Interventions: Absorbent underpads,Apply protective barrier, creams and emollients,Assess need for specialty bed,Check for incontinence Q2 hours and as needed,Internal/External urinary devices,Internal/External fecal devices,Maintain skin hydration (lotion/cream),Minimize layers,Moisture barrier    Activity Interventions: Pressure redistribution bed/mattress(bed type)    Mobility Interventions: Assess need for specialty bed,Float heels,HOB 30 degrees or less,Pressure redistribution bed/mattress (bed type)    Nutrition Interventions: Document food/fluid/supplement intake    Friction and Shear Interventions: Apply protective barrier, creams and emollients,Foam dressings/transparent film/skin sealants,Minimize layers                Problem: Patient Education: Go to Patient Education Activity  Goal: Patient/Family Education  Outcome: Progressing Towards Goal     Problem: Falls - Risk of  Goal: *Absence of Falls  Description: Document Elba Fall Risk and appropriate interventions in the flowsheet.   Outcome: Progressing Towards Goal  Note: Fall Risk Interventions:       Mentation Interventions: Adequate sleep, hydration, pain control,Bed/chair exit alarm,Room close to nurse's station    Medication Interventions: Bed/chair exit alarm    Elimination Interventions: Bed/chair exit alarm,Call light in reach              Problem: Patient Education: Go to Patient Education Activity  Goal: Patient/Family Education  Outcome: Progressing Towards Goal     Problem: Aspiration - Risk of  Goal: *Absence of aspiration  Outcome: Progressing Towards Goal     Problem: Patient Education: Go to Patient Education Activity  Goal: Patient/Family Education  Outcome: Progressing Towards Goal     Problem: Patient Education: Go to Patient Education Activity  Goal: Patient/Family Education  Outcome: Progressing Towards Goal     Problem: Impaired Skin Integrity/Pressure Injury Treatment  Goal: *Improvement of Existing Pressure Injury  Outcome: Progressing Towards Goal  Goal: *Prevention of pressure injury  Description: Document Curtis Scale and appropriate interventions in the flowsheet. Outcome: Progressing Towards Goal  Note: Pressure Injury Interventions:  Sensory Interventions: Assess changes in LOC,Assess need for specialty bed,Avoid rigorous massage over bony prominences,Check visual cues for pain,Float heels,Keep linens dry and wrinkle-free,Maintain/enhance activity level,Minimize linen layers,Monitor skin under medical devices,Pad between skin to skin,Pressure redistribution bed/mattress (bed type),Turn and reposition approx.  every two hours (pillows and wedges if needed)    Moisture Interventions: Absorbent underpads,Apply protective barrier, creams and emollients,Assess need for specialty bed,Check for incontinence Q2 hours and as needed,Internal/External urinary devices,Internal/External fecal devices,Maintain skin hydration (lotion/cream),Minimize layers,Moisture barrier    Activity Interventions: Pressure redistribution bed/mattress(bed type)    Mobility Interventions: Assess need for specialty bed,Float heels,HOB 30 degrees or less,Pressure redistribution bed/mattress (bed type)    Nutrition Interventions: Document food/fluid/supplement intake    Friction and Shear Interventions: Apply protective barrier, creams and emollients,Foam dressings/transparent film/skin sealants,Minimize layers                Problem: Patient Education: Go to Patient Education Activity  Goal: Patient/Family Education  Outcome: Progressing Towards Goal     Problem: Non-Violent Restraints  Goal: Removal from restraints as soon as assessed to be safe  Outcome: Progressing Towards Goal  Goal: No harm/injury to patient while restraints in use  Outcome: Progressing Towards Goal  Goal: Patient's dignity will be maintained  Outcome: Progressing Towards Goal  Goal: Patient Interventions  Outcome: Progressing Towards Goal

## 2022-04-15 NOTE — PROGRESS NOTES
Problem: Dysphagia (Adult)  Goal: *Acute Goals and Plan of Care (Insert Text)  Description: Speech Therapy Goals  Initiated 4/8/2022  -Patient will tolerate minced & moist diet with thin liquids without signs/symptoms of aspiration given no cues within 7 day(s). [ ] Not met  [ ]  MET   [ ] Progressing  [ x] Discontinue  -Patient will tolerate puree with thin liquids without signs/symptoms of aspiration given no cues within 7 day(s). [ ] Not met  [ ]  MET   [ x] Progressing  [ ] Discontinue  -Patient will demonstrate understanding of swallow safety precautions and aspiration precautions, diet recs with no cues within 7 day(s). [ ] Not met  [ ]  MET   [ Moisés Zheng  [ ] Discontinue  Outcome: Gurpreet Kale TREATMENT  Patient: Citlali Grande (44 y.o. male)  Date: 4/15/2022  Diagnosis: Urinary retention [R33.9]  Lactic acidosis [E87.2]  Severe sepsis ([address] [A41.9, R65.20]  Hypothermia [T68. XXXA]  Sepsis ([address] [A41.9] <principal problem not specified>       Precautions: aspiration      ASSESSMENT:  Pt seen for follow-up, alert, confused but pleasant. Pt positioned as upright as tolerated in bed. Pt now with NG tube for supplemental nutrition, PO diet being offered per nsg. Pt initially had left hand out of mitt and holding NG tube. Clinician replaced left mitt (right hand mitt already in place) and NG tube does not appear to have been dislodged. Nsg notified. Pt requesting water. Pt readily accepts thin liquids via straw and puree via tsp. Pt given trial of solid; however, due to AMS, pt did not attempt to manipulate bolus and it was removed from oral cavity by clinician. Pharyngeal phase WNL. Pt tolerates all without overt s/s aspiration. PLAN:  Recommendations and Planned Interventions:  Pt's altered mentation negatively impacts oral phase of swallow and PO intake.  Recommend offer puree/thin diet if pt requests and if pt is alert and awake.  Will cont to follow. Will defer to MD re: need for ongoing supplemental nutrition. Patient continues to benefit from skilled intervention to address the above impairments. Continue treatment per established plan of care. Frequency/Duration: Patient will be followed by speech-language pathology 3 times a week to address goals. Discharge Recommendations:  cont SLP tx with reduced frequency at this time. SUBJECTIVE:   Patient alert, confused, pleasant. OBJECTIVE:     CXR Results  (Last 48 hours)                 04/15/22 1055  XR CHEST PORT Final result    Impression:  Endotracheal tube as above. Underexpanded lungs with bibasilar   atelectasis. Hydrostatic edema. Possible pleural effusions. Narrative:  Chest, 2 frontal views, 4/15/2022       History: NG tube placement. Comparison: Including chest 4/7/2022. Findings: Endotracheal tube side-port is at the proximal stomach; the tip is   excluded from the field-of-view. The cardiac silhouette is enlarged. The lungs   are underexpanded with bibasilar atelectasis. There is pulmonary vascular   congestion and hydrostatic edema. Pleural effusions are possible. No   pneumothorax is identified. The osseous structures are grossly stable. Cognitive and Communication Status:  Neurologic State: Alert,Confused  Orientation Level: Oriented to person  Cognition: Decreased attention/concentration    Pain:  Pain Scale 1: Numeric (0 - 10)  Pain Intensity 1: 0       After treatment:   Patient left in no apparent distress in bed, Call bell within reach, and Nursing notified    COMMUNICATION/EDUCATION:   Patient was educated regarding his deficit(s) of dysphagia, swallow safety precautions, diet recs and POC. He demonstrated poor understanding as evidenced by AMS, significant confusion, poor awareness.     The patient's plan of care including recommendations, planned interventions, and recommended diet changes were discussed with: Registered nurse.      Zonia Weston M.S. CCC-SLP  Time Calculation: 10 mins

## 2022-04-15 NOTE — PROGRESS NOTES
General Daily Progress Note          Patient Name:   Clarita Geiger       YOB: 1960       Age:  60 y.o. Admit Date: 4/7/2022      Subjective:     Clarita Geiger is a(n) 60 y.o. male with PMH significant for HTN, cirrhosis, ascites presents via EMS for removing fish catheter. Patient recently d/c after lengthy stay in hospital for septic DIP joint of right index finger. He was d/c on 10 days of PO Zyvox, which he completed. He presents today after pulling out fish catheter. Several blood clots noted. Patient septic upon arrival with hypothermia and hypotension. Temp 93.6 rectal, BP 84/68. Patient is retaining 887cc of urine and nursing staff will replace fish.     Patient started on fluids and IV Levaquin in the ED. Patient is altered at baseline. Head CT w/o contrast shows no acute abnormality. Blood cultures pending. CXR and KUB both normal       Patient is alert awake confused  Hypotensive off  Levophed    Normal temperature    4/12  Patient is off levophed but still receiving midodrine and IVF. He remain hypotensive. He is lethargic and confused this morning. Patient has mild bilateral LE edema. Seen by ID yesterday. Recommends continuing vancomycin   Seen by nephrology yesterday. Recommends discontinuing IVF in lieu of leg edema, discontinue bicarb drip, continuing weekly procrit, continuing PO KCl, midodrine for chronic hypotension. Today patient hypothermic on bear hugger  Yesterday evening patient was restless agitated    4/13    Patient is alert awake still hypothermic on shemar hugger    4/14  Patient is seen sleeping. On shemar hugger and vancomycin. He is producing 250 ml urine. No new changes seen at this time. Labs pending. Cr trending down 1.62 (on 4/13). Abdominal US on 4/13 shows ascites. UA on 4/12 positive for leukocyte esterase, WBC >100 and bacteruria. Urine culture pending.      Patient refused medication this morning    4/15    Patient sleepy obtunded /hypothermic    Not eating drinking         Objective:     Visit Vitals  /87 (BP 1 Location: Left upper arm, BP Patient Position: At rest)   Pulse 66   Temp (!) 96.1 °F (35.6 °C)   Resp 16   Ht 5' 10\" (1.778 m)   Wt 96.7 kg (213 lb 3 oz)   SpO2 95%   BMI 30.59 kg/m²        Recent Results (from the past 24 hour(s))   AMMONIA    Collection Time: 04/14/22 12:12 PM   Result Value Ref Range    Ammonia, plasma 39 (H) <32 umol/L   CBC WITH AUTOMATED DIFF    Collection Time: 04/14/22 12:12 PM   Result Value Ref Range    WBC 6.2 4.1 - 11.1 K/uL    RBC 3.29 (L) 4.10 - 5.70 M/uL    HGB 9.0 (L) 12.1 - 17.0 g/dL    HCT 26.0 (L) 36.6 - 50.3 %    MCV 79.0 (L) 80.0 - 99.0 FL    MCH 27.4 26.0 - 34.0 PG    MCHC 34.6 30.0 - 36.5 g/dL    RDW 22.6 (H) 11.5 - 14.5 %    PLATELET 70 (L) 950 - 400 K/uL    NRBC 0.0 0.0  WBC    ABSOLUTE NRBC 0.00 0.00 - 0.01 K/uL    NEUTROPHILS 60 32 - 75 %    LYMPHOCYTES 24 12 - 49 %    MONOCYTES 12 5 - 13 %    EOSINOPHILS 3 0 - 7 %    BASOPHILS 1 0 - 1 %    IMMATURE GRANULOCYTES 0 0 - 0.5 %    ABS. NEUTROPHILS 3.8 1.8 - 8.0 K/UL    ABS. LYMPHOCYTES 1.5 0.8 - 3.5 K/UL    ABS. MONOCYTES 0.7 0.0 - 1.0 K/UL    ABS. EOSINOPHILS 0.2 0.0 - 0.4 K/UL    ABS. BASOPHILS 0.1 0.0 - 0.1 K/UL    ABS. IMM.  GRANS. 0.0 0.00 - 0.04 K/UL    DF AUTOMATED     RENAL FUNCTION PANEL    Collection Time: 04/14/22 12:12 PM   Result Value Ref Range    Sodium 140 136 - 145 mmol/L    Potassium 3.6 3.5 - 5.1 mmol/L    Chloride 113 (H) 97 - 108 mmol/L    CO2 22 21 - 32 mmol/L    Anion gap 5 5 - 15 mmol/L    Glucose 76 65 - 100 mg/dL    BUN 15 6 - 20 mg/dL    Creatinine 1.47 (H) 0.70 - 1.30 mg/dL    BUN/Creatinine ratio 10 (L) 12 - 20      GFR est AA 59 (L) >60 ml/min/1.73m2    GFR est non-AA 48 (L) >60 ml/min/1.73m2    Calcium 8.0 (L) 8.5 - 10.1 mg/dL    Phosphorus 3.1 2.6 - 4.7 mg/dL    Albumin 2.1 (L) 3.5 - 5.0 g/dL   CBC W/O DIFF    Collection Time: 04/15/22  4:15 AM   Result Value Ref Range    WBC 8.1 4.1 - 11.1 K/uL RBC 3.56 (L) 4.10 - 5.70 M/uL    HGB 9.9 (L) 12.1 - 17.0 g/dL    HCT 28.1 (L) 36.6 - 50.3 %    MCV 78.9 (L) 80.0 - 99.0 FL    MCH 27.8 26.0 - 34.0 PG    MCHC 35.2 30.0 - 36.5 g/dL    RDW 22.8 (H) 11.5 - 14.5 %    PLATELET 94 (L) 711 - 400 K/uL    NRBC 0.2 (H) 0.0  WBC    ABSOLUTE NRBC 0.02 (H) 0.00 - 1.34 K/uL   METABOLIC PANEL, COMPREHENSIVE    Collection Time: 04/15/22  4:15 AM   Result Value Ref Range    Sodium 138 136 - 145 mmol/L    Potassium 3.6 3.5 - 5.1 mmol/L    Chloride 108 97 - 108 mmol/L    CO2 20 (L) 21 - 32 mmol/L    Anion gap 10 5 - 15 mmol/L    Glucose 68 65 - 100 mg/dL    BUN 15 6 - 20 mg/dL    Creatinine 1.52 (H) 0.70 - 1.30 mg/dL    BUN/Creatinine ratio 10 (L) 12 - 20      GFR est AA 56 (L) >60 ml/min/1.73m2    GFR est non-AA 47 (L) >60 ml/min/1.73m2    Calcium 8.6 8.5 - 10.1 mg/dL    Bilirubin, total 1.4 (H) 0.2 - 1.0 mg/dL    AST (SGOT) 26 15 - 37 U/L    ALT (SGPT) 13 12 - 78 U/L    Alk. phosphatase 69 45 - 117 U/L    Protein, total 6.1 (L) 6.4 - 8.2 g/dL    Albumin 2.2 (L) 3.5 - 5.0 g/dL    Globulin 3.9 2.0 - 4.0 g/dL    A-G Ratio 0.6 (L) 1.1 - 2.2     RENAL FUNCTION PANEL    Collection Time: 04/15/22  4:15 AM   Result Value Ref Range    Sodium 138 136 - 145 mmol/L    Potassium 3.6 3.5 - 5.1 mmol/L    Chloride 109 (H) 97 - 108 mmol/L    CO2 19 (L) 21 - 32 mmol/L    Anion gap 10 5 - 15 mmol/L    Glucose 68 65 - 100 mg/dL    BUN 15 6 - 20 mg/dL    Creatinine 1.49 (H) 0.70 - 1.30 mg/dL    BUN/Creatinine ratio 10 (L) 12 - 20      GFR est AA 58 (L) >60 ml/min/1.73m2    GFR est non-AA 48 (L) >60 ml/min/1.73m2    Calcium 8.5 8.5 - 10.1 mg/dL    Phosphorus 3.4 2.6 - 4.7 mg/dL    Albumin 2.2 (L) 3.5 - 5.0 g/dL     [unfilled]      Review of Systems    Constitutional: Negative for chills and fever. HENT: Negative. Eyes: Negative. Respiratory: Negative. Cardiovascular: Negative. Gastrointestinal: Negative for abdominal pain and nausea. Skin: Negative. Neurological: Negative. Physical Exam:      Constitutional: pt is oriented to person, place, and time. HENT:   Head: Normocephalic and atraumatic. Eyes: Pupils are equal, round, and reactive to light. EOM are normal.   Cardiovascular: Normal rate, regular rhythm and normal heart sounds. Pulmonary/Chest: Breath sounds normal. No wheezes. No rales. Exhibits no tenderness. Abdominal: Soft. Bowel sounds are normal. There is no abdominal tenderness. There is no rebound and no guarding. Musculoskeletal: Normal range of motion. Neurological: pt is alert and oriented to person, place, and time. Extremities 2+ edema    US ABD LTD   Final Result   Ascites. XR HAND RT MIN 3 V   Final Result      CT HEAD WO CONT   Final Result   Age-appropriate atrophy. No acute findings. XR ABD (KUB)   Final Result   Within normal      XR CHEST PORT   Final Result   Findings/impression:   1. Low lung volumes. Bibasilar hypoventilatory change/atelectasis. Left   retrocardiac lung incompletely evaluated, cannot exclude underlying airspace   disease. 2.  Cardiac and mediastinal contours are obscured by low lung volumes. Ectatic/possible aneurysmal appearance transverse aorta with calcific   atherosclerotic plaque. Appears stable from prior radiograph. 3.  No pleural fluid. No pneumothorax. 4.  Prominent gas-filled bowel partially visualized in the left upper quadrant.          IR INSERT NON TUNL CVC OVER 5 YRS    (Results Pending)   IR PARACENTESIS ABD W IMAGE    (Results Pending)   IR US GUIDED VASCULAR ACCESS    (Results Pending)   IR FLUORO GUIDE PLC CVAD    (Results Pending)   IR PARACENTESIS ABD SUBSQ    (Results Pending)        Recent Results (from the past 24 hour(s))   AMMONIA    Collection Time: 04/14/22 12:12 PM   Result Value Ref Range    Ammonia, plasma 39 (H) <32 umol/L   CBC WITH AUTOMATED DIFF    Collection Time: 04/14/22 12:12 PM   Result Value Ref Range    WBC 6.2 4.1 - 11.1 K/uL    RBC 3.29 (L) 4.10 - 5.70 M/uL    HGB 9.0 (L) 12.1 - 17.0 g/dL    HCT 26.0 (L) 36.6 - 50.3 %    MCV 79.0 (L) 80.0 - 99.0 FL    MCH 27.4 26.0 - 34.0 PG    MCHC 34.6 30.0 - 36.5 g/dL    RDW 22.6 (H) 11.5 - 14.5 %    PLATELET 70 (L) 931 - 400 K/uL    NRBC 0.0 0.0  WBC    ABSOLUTE NRBC 0.00 0.00 - 0.01 K/uL    NEUTROPHILS 60 32 - 75 %    LYMPHOCYTES 24 12 - 49 %    MONOCYTES 12 5 - 13 %    EOSINOPHILS 3 0 - 7 %    BASOPHILS 1 0 - 1 %    IMMATURE GRANULOCYTES 0 0 - 0.5 %    ABS. NEUTROPHILS 3.8 1.8 - 8.0 K/UL    ABS. LYMPHOCYTES 1.5 0.8 - 3.5 K/UL    ABS. MONOCYTES 0.7 0.0 - 1.0 K/UL    ABS. EOSINOPHILS 0.2 0.0 - 0.4 K/UL    ABS. BASOPHILS 0.1 0.0 - 0.1 K/UL    ABS. IMM.  GRANS. 0.0 0.00 - 0.04 K/UL    DF AUTOMATED     RENAL FUNCTION PANEL    Collection Time: 04/14/22 12:12 PM   Result Value Ref Range    Sodium 140 136 - 145 mmol/L    Potassium 3.6 3.5 - 5.1 mmol/L    Chloride 113 (H) 97 - 108 mmol/L    CO2 22 21 - 32 mmol/L    Anion gap 5 5 - 15 mmol/L    Glucose 76 65 - 100 mg/dL    BUN 15 6 - 20 mg/dL    Creatinine 1.47 (H) 0.70 - 1.30 mg/dL    BUN/Creatinine ratio 10 (L) 12 - 20      GFR est AA 59 (L) >60 ml/min/1.73m2    GFR est non-AA 48 (L) >60 ml/min/1.73m2    Calcium 8.0 (L) 8.5 - 10.1 mg/dL    Phosphorus 3.1 2.6 - 4.7 mg/dL    Albumin 2.1 (L) 3.5 - 5.0 g/dL   CBC W/O DIFF    Collection Time: 04/15/22  4:15 AM   Result Value Ref Range    WBC 8.1 4.1 - 11.1 K/uL    RBC 3.56 (L) 4.10 - 5.70 M/uL    HGB 9.9 (L) 12.1 - 17.0 g/dL    HCT 28.1 (L) 36.6 - 50.3 %    MCV 78.9 (L) 80.0 - 99.0 FL    MCH 27.8 26.0 - 34.0 PG    MCHC 35.2 30.0 - 36.5 g/dL    RDW 22.8 (H) 11.5 - 14.5 %    PLATELET 94 (L) 568 - 400 K/uL    NRBC 0.2 (H) 0.0  WBC    ABSOLUTE NRBC 0.02 (H) 0.00 - 6.34 K/uL   METABOLIC PANEL, COMPREHENSIVE    Collection Time: 04/15/22  4:15 AM   Result Value Ref Range    Sodium 138 136 - 145 mmol/L    Potassium 3.6 3.5 - 5.1 mmol/L    Chloride 108 97 - 108 mmol/L    CO2 20 (L) 21 - 32 mmol/L    Anion gap 10 5 - 15 mmol/L Glucose 68 65 - 100 mg/dL    BUN 15 6 - 20 mg/dL    Creatinine 1.52 (H) 0.70 - 1.30 mg/dL    BUN/Creatinine ratio 10 (L) 12 - 20      GFR est AA 56 (L) >60 ml/min/1.73m2    GFR est non-AA 47 (L) >60 ml/min/1.73m2    Calcium 8.6 8.5 - 10.1 mg/dL    Bilirubin, total 1.4 (H) 0.2 - 1.0 mg/dL    AST (SGOT) 26 15 - 37 U/L    ALT (SGPT) 13 12 - 78 U/L    Alk.  phosphatase 69 45 - 117 U/L    Protein, total 6.1 (L) 6.4 - 8.2 g/dL    Albumin 2.2 (L) 3.5 - 5.0 g/dL    Globulin 3.9 2.0 - 4.0 g/dL    A-G Ratio 0.6 (L) 1.1 - 2.2     RENAL FUNCTION PANEL    Collection Time: 04/15/22  4:15 AM   Result Value Ref Range    Sodium 138 136 - 145 mmol/L    Potassium 3.6 3.5 - 5.1 mmol/L    Chloride 109 (H) 97 - 108 mmol/L    CO2 19 (L) 21 - 32 mmol/L    Anion gap 10 5 - 15 mmol/L    Glucose 68 65 - 100 mg/dL    BUN 15 6 - 20 mg/dL    Creatinine 1.49 (H) 0.70 - 1.30 mg/dL    BUN/Creatinine ratio 10 (L) 12 - 20      GFR est AA 58 (L) >60 ml/min/1.73m2    GFR est non-AA 48 (L) >60 ml/min/1.73m2    Calcium 8.5 8.5 - 10.1 mg/dL    Phosphorus 3.4 2.6 - 4.7 mg/dL    Albumin 2.2 (L) 3.5 - 5.0 g/dL       Results     Procedure Component Value Units Date/Time    CULTURE, URINE [[id number]] Collected: 04/12/22 2300    Order Status: Completed Specimen: Urine Updated: 04/14/22 5766     Special Requests: No Special Requests        Culture result: Yeast       CULTURE, URINE [[id number]] Collected: 04/12/22 2300    Order Status: Canceled Specimen: Urine     CULTURE, URINE [[id number]] Collected: 04/07/22 1502    Order Status: Completed Specimen: Urine Updated: 04/09/22 1414     Special Requests: --        No Special Requests  Reflexed from [id number]       Culture result: No Growth (<1000 cfu/mL)       MRSA SCREEN - PCR (NASAL) [[id number]] Collected: 04/07/22 1345    Order Status: Completed Specimen: Swab Updated: 04/07/22 1516     MRSA by PCR, Nasal Not Detected       CULTURE, BLOOD, PAIRED [[id number]] Collected: 04/07/22 1001    Order Status: Completed Specimen: Blood Updated: 04/07/22 1017    C. DIFFICILE (DNA) [[id number]] Collected: 04/06/22 0919    Order Status: Completed Specimen: Stool Updated: 04/06/22 1056     C. difficile (DNA) Negative       COVID-19 WITH INFLUENZA A/B [[id number]] Collected: 04/05/22 2315    Order Status: Completed Specimen: Nasopharyngeal from Nasopharynx Updated: 04/06/22 0027     SARS-CoV-2 by PCR Not Detected        Comment: Not Detected results do not preclude SARS-CoV-2 infection and should not be used as the sole basis for patient management decisions. Results must be combined with clinical observations, patient history, and epidemiological information        Influenza A by PCR Not Detected        Influenza B by PCR Not Detected        Comment: Testing was performed using leah Concepcion SARS-CoV-2 and Influenza A/B nucleic acid assay. This test is a multiplex Real-Time Reverse Transcriptase Polymerase Chain Reaction (RT-PCR) based in vitro diagnostic test intended for the qualitative detection of nucleic acids from SARS-CoV-2, Influenza A, and Influenza B in nasopharyngeal and nasal swab specimens for use under the FDA's Emergency Use Authorization (EUA) only.    Fact sheet for Patients: FindDrives.pl Fact sheet   for Healthcare Providers: FindDrives[employer]pl                Labs:     Recent Labs     04/15/22  0415 04/14/22  1212   WBC 8.1 6.2   HGB 9.9* 9.0*   HCT 28.1* 26.0*   PLT 94* 70*     Recent Labs     04/15/22  0415 04/14/22  1212 04/13/22  0515     138 140 139  139   K 3.6  3.6 3.6 3.7  3.6     109* 113* 113*  112*   CO2 20*  19* 22 19*  20*   BUN 15  15 15 14  14   CREA 1.52*  1.49* 1.47* 1.62*  1.63*   GLU 68  68 76 87  84   CA 8.6  8.5 8.0* 8.3*  8.4*   PHOS 3.4 3.1 3.1     Recent Labs     04/15/22  0415 04/14/22  1212 04/13/22  0515   ALT 13  --  11*   AP 69  --  66   TBILI 1.4*  --  1.0   TP 6.1*  --  5.5*   ALB 2.2*  2.2* 2.1* 2.0*  2.0* GLOB 3.9  --  3.5     No results for input(s): INR, PTP, APTT, INREXT, INREXT in the last 72 hours. No results for input(s): FE, TIBC, PSAT, FERR in the last 72 hours. No results found for: FOL, RBCF   No results for input(s): PH, PCO2, PO2 in the last 72 hours. No results for input(s): CPK, CKNDX, TROIQ in the last 72 hours. No lab exists for component: CPKMB  No results found for: CHOL, CHOLX, CHLST, CHOLV, HDL, HDLP, LDL, LDLC, DLDLP, TGLX, TRIGL, TRIGP, CHHD, CHHDX  Lab Results   Component Value Date/Time    Glucose (POC) 80 04/08/2022 10:03 AM    Glucose (POC) 84 04/04/2022 05:40 PM    Glucose (POC) 107 04/03/2022 11:12 AM    Glucose (POC) 111 03/31/2022 11:35 AM    Glucose (POC) 91 03/29/2022 11:10 AM     Lab Results   Component Value Date/Time    Color Yellow/Straw 04/12/2022 11:00 PM    Appearance Turbid (A) 04/12/2022 11:00 PM    Specific gravity 1.011 04/12/2022 11:00 PM    pH (UA) 5.0 04/12/2022 11:00 PM    Protein Negative 04/12/2022 11:00 PM    Glucose Negative 04/12/2022 11:00 PM    Ketone Negative 04/12/2022 11:00 PM    Bilirubin Negative 04/12/2022 11:00 PM    Urobilinogen 0.1 04/12/2022 11:00 PM    Nitrites Negative 04/12/2022 11:00 PM    Leukocyte Esterase Large (A) 04/12/2022 11:00 PM    Bacteria 1+ (A) 04/12/2022 11:00 PM    WBC >100 (H) 04/12/2022 11:00 PM    RBC  04/12/2022 11:00 PM         Assessment:     Sepsis  UTI  Lactic acidosis  Acute on chronic renal failure- creat 2.01  Cirrhosis with ascites  Anemia  History of septic DIP joint, right index finger  Hypothyroidism  Seizure disorder  Gout  GERD  History of etoh abuse  Hypotension  Metabolic acidosis  Hypothermia  Hypokalemia  Anemia monitor H&H  Abdominal distention/ascites    Plan:      On shemar singleton    On allopurinol 300 mg daily  Vitamin D 50,000 weekly  Pepcid 20 twice daily  Folic acid 1 mg daily  Lactulose 10 g 3 times a day  Keppra 1500 twice a day  Synthroid 25 mcg daily  Midodrine 10 mg 3 times a day  Propranolol 20 mg twice a day  Sodium bicarb 1303 times a day  Flomax 0.4 mg daily  Thiamine 100 mg daily  Vancomycin with pharmacy  Replace potassium  Discontinue IV Zosyn    Discontinue Sodium bicarb drip    We will do psych consult for psychosis patient receiving Geodon  Psychotic behavior    NG tube placement for feeding and medication    Consult IR for paracentesis    Discussed with patient nurse      Current Facility-Administered Medications:     Vancomycin random level - please draw on 4/15 @ 0900.  Thanks!, , Other, ONCE, Patrice Randall MD    fludrocortisone (FLORINEF) tablet 0.1 mg, 0.1 mg, Oral, DAILY, Ree Zaragoza MD    0.9% sodium chloride infusion 250 mL, 250 mL, IntraVENous, PRN, Brad Ackerman MD    hydrOXYzine (VISTARIL) injection 25 mg, 25 mg, IntraMUSCular, Q8H PRN, Shannon Naqvi MD, 25 mg at 04/14/22 0216    ziprasidone (GEODON) 10 mg in sterile water (preservative free) 0.5 mL injection, 10 mg, IntraMUSCular, Q12H PRN, Shannon Naqvi MD, 10 mg at 04/14/22 0304    levETIRAcetam (KEPPRA) 1500 mg in saline (iso-osm) 100 ml IVPB, 1,500 mg, IntraVENous, Q12H, Lamberto Ackerman MD, Last Rate: 400 mL/hr at 04/15/22 0025, 1,500 mg at 04/15/22 0025    0.9% sodium chloride infusion 250 mL, 250 mL, IntraVENous, PRN, Lamberto Ackerman MD    epoetin luis-epbx (RETACRIT) injection 10,000 Units, 10,000 Units, SubCUTAneous, Q7D, Kristen, Jerel Ramey MD, 10,000 Units at 04/09/22 1805    midodrine (PROAMATINE) tablet 10 mg, 10 mg, Oral, TID WITH MEALS, aLmberto Ackerman MD, 10 mg at 04/14/22 1606    sodium bicarbonate tablet 1,300 mg, 1,300 mg, Oral, TID, Kemal Gan MD, 1,300 mg at 04/14/22 2134    LORazepam (ATIVAN) injection 1 mg, 1 mg, IntraVENous, Q4H PRN, Lamberto Ackerman MD, 1 mg at 04/08/22 1733    sodium chloride (NS) flush 5-10 mL, 5-10 mL, IntraVENous, PRN, Lamberto Ackerman MD    lactulose (CHRONULAC) 10 gram/15 mL solution 15 mL, 10 g, Oral, TID, Brad Ackerman Sample, MD, 15 mL at 04/14/22 2134    tamsulosin (FLOMAX) capsule 0.4 mg, 0.4 mg, Oral, DAILY, Lamberto Ackerman MD, 0.4 mg at 04/13/22 0840    thiamine mononitrate (B-1) tablet 100 mg, 100 mg, Oral, DAILY, Anabella Capone MD, 100 mg at 04/13/22 0840    allopurinoL (ZYLOPRIM) tablet 300 mg, 300 mg, Oral, DAILY, Lino Ackerman MD, 300 mg at 04/13/22 7867    ergocalciferol capsule 50,000 Units, 50,000 Units, Oral, Q7D, Anabella Capone MD, 50,000 Units at 04/14/22 1606    famotidine (PEPCID) tablet 20 mg, 20 mg, Oral, BID, Anabella Capone MD, 20 mg at 78/61/88 3140    folic acid (FOLVITE) tablet 1 mg, 1 mg, Oral, DAILY, Anabella Capone MD, 1 mg at 04/13/22 0840    levothyroxine (SYNTHROID) tablet 25 mcg, 25 mcg, Oral, ACB, Lamberto Ackerman MD, 25 mcg at 04/13/22 0840    propranoloL (INDERAL) tablet 20 mg, 20 mg, Oral, BID, Anabella Capone MD, 20 mg at 04/14/22 2135    acetaminophen (TYLENOL) tablet 650 mg, 650 mg, Oral, Q6H PRN **OR** acetaminophen (TYLENOL) suppository 650 mg, 650 mg, Rectal, Q6H PRN, Lamberto Ackerman MD    polyethylene glycol (MIRALAX) packet 17 g, 17 g, Oral, DAILY PRN, Lamberto Ackerman MD    ondansetron (ZOFRAN ODT) tablet 4 mg, 4 mg, Oral, Q8H PRN **OR** ondansetron (ZOFRAN) injection 4 mg, 4 mg, IntraVENous, Q6H PRN, Lino Ackerman MD    VANCOMYCIN INFORMATION NOTE, , Other, Rx Dosing/Monitoring, Guerda Gu MD    NOREPINephrine (LEVOPHED) 8 mg in 0.9% NS 250ml infusion, 0.5-120 mcg/min, IntraVENous, TITRATE, Lino Ackerman MD, Stopped at 04/11/22 0106

## 2022-04-15 NOTE — PROGRESS NOTES
Problem: Pressure Injury - Risk of  Goal: *Prevention of pressure injury  Description: Document Curtis Scale and appropriate interventions in the flowsheet. Outcome: Progressing Towards Goal  Note: Pressure Injury Interventions:  Sensory Interventions: Assess changes in LOC,Assess need for specialty bed,Avoid rigorous massage over bony prominences,Check visual cues for pain,Float heels,Keep linens dry and wrinkle-free,Minimize linen layers,Monitor skin under medical devices,Pad between skin to skin,Pressure redistribution bed/mattress (bed type),Turn and reposition approx. every two hours (pillows and wedges if needed)    Moisture Interventions: Absorbent underpads,Apply protective barrier, creams and emollients,Check for incontinence Q2 hours and as needed,Assess need for specialty bed,Contain wound drainage,Internal/External fecal devices,Internal/External urinary devices,Maintain skin hydration (lotion/cream),Minimize layers,Moisture barrier    Activity Interventions: Assess need for specialty bed,Pressure redistribution bed/mattress(bed type)    Mobility Interventions: Assess need for specialty bed,HOB 30 degrees or less,Pressure redistribution bed/mattress (bed type),Turn and reposition approx. every two hours(pillow and wedges)    Nutrition Interventions: Document food/fluid/supplement intake    Friction and Shear Interventions: Apply protective barrier, creams and emollients,Foam dressings/transparent film/skin sealants,HOB 30 degrees or less,Minimize layers                Problem: Patient Education: Go to Patient Education Activity  Goal: Patient/Family Education  Outcome: Progressing Towards Goal     Problem: Falls - Risk of  Goal: *Absence of Falls  Description: Document Elba Fall Risk and appropriate interventions in the flowsheet.   Outcome: Progressing Towards Goal  Note: Fall Risk Interventions:       Mentation Interventions: Adequate sleep, hydration, pain control,Bed/chair exit alarm,Door open when patient unattended,Update white board,Room close to nurse's station    Medication Interventions: Bed/chair exit alarm    Elimination Interventions: Bed/chair exit alarm,Call light in reach              Problem: Patient Education: Go to Patient Education Activity  Goal: Patient/Family Education  Outcome: Progressing Towards Goal     Problem: Aspiration - Risk of  Goal: *Absence of aspiration  Outcome: Progressing Towards Goal     Problem: Patient Education: Go to Patient Education Activity  Goal: Patient/Family Education  Outcome: Progressing Towards Goal     Problem: Patient Education: Go to Patient Education Activity  Goal: Patient/Family Education  Outcome: Progressing Towards Goal     Problem: Impaired Skin Integrity/Pressure Injury Treatment  Goal: *Improvement of Existing Pressure Injury  Outcome: Progressing Towards Goal  Goal: *Prevention of pressure injury  Description: Document Curtis Scale and appropriate interventions in the flowsheet. Outcome: Progressing Towards Goal  Note: Pressure Injury Interventions:  Sensory Interventions: Assess changes in LOC,Assess need for specialty bed,Avoid rigorous massage over bony prominences,Check visual cues for pain,Float heels,Keep linens dry and wrinkle-free,Minimize linen layers,Monitor skin under medical devices,Pad between skin to skin,Pressure redistribution bed/mattress (bed type),Turn and reposition approx.  every two hours (pillows and wedges if needed)    Moisture Interventions: Absorbent underpads,Apply protective barrier, creams and emollients,Check for incontinence Q2 hours and as needed,Assess need for specialty bed,Contain wound drainage,Internal/External fecal devices,Internal/External urinary devices,Maintain skin hydration (lotion/cream),Minimize layers,Moisture barrier    Activity Interventions: Assess need for specialty bed,Pressure redistribution bed/mattress(bed type)    Mobility Interventions: Assess need for specialty bed,HOB 30 degrees or less,Pressure redistribution bed/mattress (bed type),Turn and reposition approx.  every two hours(pillow and wedges)    Nutrition Interventions: Document food/fluid/supplement intake    Friction and Shear Interventions: Apply protective barrier, creams and emollients,Foam dressings/transparent film/skin sealants,HOB 30 degrees or less,Minimize layers                Problem: Patient Education: Go to Patient Education Activity  Goal: Patient/Family Education  Outcome: Progressing Towards Goal     Problem: Non-Violent Restraints  Goal: Removal from restraints as soon as assessed to be safe  Outcome: Progressing Towards Goal  Goal: No harm/injury to patient while restraints in use  Outcome: Progressing Towards Goal  Goal: Patient's dignity will be maintained  Outcome: Progressing Towards Goal  Goal: Patient Interventions  Outcome: Progressing Towards Goal     Problem: Delirium Treatment  Goal: *Level of consciousness restored to baseline  Outcome: Progressing Towards Goal  Goal: *Level of environmental perceptions restored to baseline  Outcome: Progressing Towards Goal  Goal: *Sensory perception restored to baseline  Outcome: Progressing Towards Goal  Goal: *Emotional stability restored to baseline  Outcome: Progressing Towards Goal  Goal: *Functional assessment restored to baseline  Outcome: Progressing Towards Goal  Goal: *Absence of falls  Outcome: Progressing Towards Goal  Goal: *Will remain free of delirium, CAM Score negative  Outcome: Progressing Towards Goal  Goal: *Cognitive status will be restored to baseline  Outcome: Progressing Towards Goal  Goal: Interventions  Outcome: Progressing Towards Goal     Problem: Patient Education: Go to Patient Education Activity  Goal: Patient/Family Education  Outcome: Progressing Towards Goal

## 2022-04-15 NOTE — PROGRESS NOTES
Remains acute in ICU. Recent clinicals sent to [facility]. Patient is a long term care resident.          Jorge Wing, MSW

## 2022-04-15 NOTE — PROGRESS NOTES
Vancomycin Dosing Consult  Day #9 of vancomycin therapy  Consult ordered by Dr. Arden Madrigal for this 60 y.o. male for indication of Osteo of Right index Finger/Sepsis      Antibiotic regimen: Vancomycin monotherapy    Temp (24hrs), Av.8 °F (36 °C), Min:96.1 °F (35.6 °C), Max:97.2 °F (36.2 °C)    Recent Labs     04/15/22  0415 22  1212 22  0515   WBC 8.1 6.2 4.5     Recent Labs     04/15/22  0415 22  1212 22  0515   CREA 1.52*  1.49* 1.47* 1.62*  1.63*   BUN 15  15 15 14  14     Recent Labs     22  0515   CRP 0.60     Estimated Creatinine Clearance: 58 mL/min (A) (based on SCr of 1.52 mg/dL (H)). ml/min  Concomitant nephrotoxic drugs: Norepinephrine    Cultures:    urine: yeast    MRSA Swab: Not detected 22    Target range: Trough 10-15 mcg/mL    Recent level history (3):  Date/Time Dose & Interval Measured Level (mcg/mL) Associated AUC/YESSENIA   [email or web address] 500 mg x 1  16.3     22 500 mg X1 at 0900 14.6     4/15/22 500mg IV x1 ([email or web address]) 13.9      Ht Readings from Last 1 Encounters:   22 177.8 cm (70\")        Wt Readings from Last 1 Encounters:   04/15/22 96.7 kg (213 lb 3 oz)     Ideal body weight: 73 kg (160 lb 15 oz)  Adjusted ideal body weight: 82.5 kg (181 lb 13.4 oz)        Assessment/Plan:   Patient still has GRACIA on CKD 3   Give Vancomycin 750mg IV x1 today  Pharmacy will order a random level tomorrow.   Antimicrobial stop date TBD

## 2022-04-15 NOTE — PROGRESS NOTES
OCCUPATIONAL THERAPY EVALUATION  Patient: Ethan Patel (76 y.o. male)  Date: 4/15/2022  Primary Diagnosis: Urinary retention [R33.9]  Lactic acidosis [E87.2]  Severe sepsis (HCC) [A41.9, R65.20]  Hypothermia [T68. XXXA]  Sepsis ([address] [A41.9]        Precautions: fall risk       ASSESSMENT  Pt is a 61 y/o M with PMH of arthritis and HTN  presenting to [facility] with c/o urinary catheter problem, admitted 04/07/22 and being treated for sepsis, lactic acidosis, acute on chronic renal failure, cirrhosis with ascites, hx of septic DIP joint of the R index finger, hypothyroidism, seizure disorder, gout, and GERD. Pt received semi-supine in bed upon OT/PT/PT student arrival, AXO to person, and agreeable to OT/PT evaluation at this time. Per pt report, pt resides at a SNF, was receiving assist with ADL's and pt reports he was ambulating with staff at the SNF. Unclear of pt's full PLOF at the SNF/LTC facility as pt is a poor historian at this time. Based on current observations, pt presents with deficits in generalized strength, balance, functional activity tolerance, coordination, and cognition impacting overall performance of ADLs and functional transfers/mobility. Pt currently requires mod Ax2 with additional time for all bed mobility, sit EOB>stand with RW, and stand with RW>sit EOB. While seated EOB, pt required total A to don socks. Pt's O2 noted to drop to 85% while standing EOB and pt was educated on PLB technique to improve oxygen. Pt demonstrated understanding and required consistent verbal cuing to continue PLB technique. Pt then returned supine in bed with mod Ax2. Pt left in no apparent distress, all known needs met, call bell within reach, restraint mitts donned, and side rails x4. Overall, pt tolerates session fair. Pt would benefit from continued skilled OT services to address current impairments and improve IND and safety with self cares and functional transfers/mobility.  Recommend discharge to SNF once medically appropriate. Other factors to consider for discharge: severity of deficits     Patient will benefit from skilled therapy intervention to address the above noted impairments. PLAN :  Recommendations and Planned Interventions: self care training, functional mobility training, therapeutic exercise, balance training, therapeutic activities, endurance activities and patient education    Frequency/Duration: Patient will be followed by occupational therapy:  2-3x/week to address goals. Recommendation for discharge: (in order for the patient to meet his/her long term goals)  Jeremy Glover    This discharge recommendation:  Has been made in collaboration with the attending provider and/or case management    IF patient discharges home will need the following DME: TBD       SUBJECTIVE:   Patient stated I walked in the hallway before.     OBJECTIVE DATA SUMMARY:   HISTORY:   Past Medical History:   Diagnosis Date    Arthritis     Hypertension      Past Surgical History:   Procedure Laterality Date    IR INSERT NON TUNL CVC OVER 5 YRS  3/25/2022    IR PARACENTESIS ABD W IMAGE  1/21/2022    IR PARACENTESIS ABD W IMAGE  3/1/2022    IR PARACENTESIS ABD W IMAGE  3/30/2022       Expanded or extensive additional review of patient history:     Home Situation  Home Environment: [address] [place] [address] Name: [employer]  One/Two Story Residence: Other (Comment) (unknown)  Living Alone: No  Support Systems: [place]  Patient Expects to be Discharged to[de-identified] Skilled nursing facility  Current DME Used/Available at Home: Other (comment) (unknown)    Hand dominance: Right    EXAMINATION OF PERFORMANCE DEFICITS:  Cognitive/Behavioral Status:  Neurologic State: Confused; Alert  Orientation Level: Oriented to person      Hearing:   Auditory  Auditory Impairment: None      Range of Motion:  AROM: Generally decreased, functional    Strength:  Strength: Generally decreased, functional                Coordination:  Coordination: Generally decreased, functional  Fine Motor Skills-Upper: Left Intact; Right Intact    Gross Motor Skills-Upper: Left Impaired;Right Impaired (Appears to be functional)      Balance:  Sitting: Impaired  Sitting - Static: Fair (occasional)  Sitting - Dynamic: Fair (occasional)  Standing: Impaired; With support  Standing - Static: Fair;Constant support  Standing - Dynamic : Fair;Constant support        Functional Mobility and Transfers for ADLs:  Bed Mobility:  Rolling: Moderate assistance;Assist x2; Additional time  Supine to Sit: Moderate assistance;Assist x2; Additional time  Sit to Supine: Moderate assistance;Assist x2; Additional time  Scooting: Moderate assistance;Assist x2; Additional time    Transfers:  Sit to Stand: Moderate assistance;Assist x2; Additional time  Stand to Sit: Moderate assistance;Assist x2; Additional time      ADL Intervention and task modifications:  Lower Body Dressing Assistance  Socks: Total assistance (dependent)  Position Performed: Seated edge of bed      Therapeutic Exercise:  Pt would benefit from UE HEP initiated at next session. Functional Measure:    [address] [place] [id number] AM-PACTM \"6 Clicks\"                                                       Daily Activity Inpatient Short Form  How much help from another person does the patient currently need. .. Total; A Lot A Little None   1. Putting on and taking off regular lower body clothing? [x]  1 []  2 []  3 []  4   2. Bathing (including washing, rinsing, drying)? []  1 [x]  2 []  3 []  4   3. Toileting, which includes using toilet, bedpan or urinal? [x] 1 []  2 []  3 []  4   4. Putting on and taking off regular upper body clothing? []  1 [x]  2 []  3 []  4   5. Taking care of personal grooming such as brushing teeth? []  1 []  2 [x]  3 []  4   6. Eating meals? []  1 []  2 [x]  3 []  4   © 2007, Trustees of [address] [place] [address] [address] [address], under license to [employer].  All rights reserved     Score:      Interpretation of Tool:  Represents clinically-significant functional categories (i.e. Activities of daily living). Percentage of Impairment CH    0%   CI    1-19% CJ    20-39% CK    40-59% CL    60-79% CM    80-99% CN     100%   [facility]  Score 6-24 24 23 20-22 15-19 10-14 7-9 6         Occupational Therapy Evaluation Charge Determination   History Examination Decision-Making   LOW Complexity : Brief history review  LOW Complexity : 1-3 performance deficits relating to physical, cognitive , or psychosocial skils that result in activity limitations and / or participation restrictions  LOW Complexity : No comorbidities that affect functional and no verbal or physical assistance needed to complete eval tasks       Based on the above components, the patient evaluation is determined to be of the following complexity level: LOW   Pain Ratin/10    Activity Tolerance:   Fair  Please refer to the flowsheet for vital signs taken during this treatment. After treatment patient left in no apparent distress:    Supine in bed, Call bell within reach, Restraints and side rails x4    COMMUNICATION/EDUCATION:   The patients plan of care was discussed with: Physical therapist, Registered nurse and physical therapy student . Patient/family agree to work toward stated goals and plan of care. This patients plan of care is appropriate for delegation to [facility]. PT/OT sessions occurred together for increased safety of pt and clinician. Thank you for this referral.  Jeff Rolon OT  Time Calculation: 25 mins    Problem: Self Care Deficits Care Plan (Adult)  Goal: *Acute Goals and Plan of Care (Insert Text)  Description: 1. Pt will be SBA sup <> sit in prep for EOB ADLs  2. Pt will be SBA grooming sitting EOB  3. Pt will be SBA LE dressing sitting EOB/long sit  4. Pt will be SBA sit <>  prep for toileting LRAD  5. Pt will be SBA toileting/toilet transfer/cloth mgmt LRAD  6.  Pt will be SBA following UE HEP in prep for self care tasks      Outcome: Not Met

## 2022-04-15 NOTE — PROGRESS NOTES
Renal Note    NAME:  Deniz Stallworth   :   1960   MRN:   [id number]     ATTENDING: Chance Rm MD  PCP:  Flori Bennett NP    Date/Time:  4/15/2022 12:46 PM      Subjective:     Patient seen in the ICU. Off IVF. He is on warming blanket. Creatinine 1.52 today. On single pressor. Past Medical History:   Diagnosis Date    Arthritis     Hypertension       Past Surgical History:   Procedure Laterality Date    IR INSERT NON TUNL CVC OVER 5 YRS  3/25/2022    IR PARACENTESIS ABD W IMAGE  2022    IR PARACENTESIS ABD W IMAGE  3/1/2022    IR PARACENTESIS ABD W IMAGE  3/30/2022     Social History     Tobacco Use    Smoking status: Never Smoker    Smokeless tobacco: Never Used   Substance Use Topics    Alcohol use: Not on file      No family history on file. No Known Allergies   Prior to Admission medications    Medication Sig Start Date End Date Taking? Authorizing Provider   linezolid (ZYVOX) 600 mg tablet Take 1 Tablet by mouth two (2) times a day for 10 days. Check CBC wkly while on linezolid 22  Jessica Deng MD   thiamine mononitrate (B-1) 100 mg tablet Take 1 Tablet by mouth daily. 22   Reji Ackerman MD   levothyroxine (SYNTHROID) 25 mcg tablet Take 25 mcg by mouth Daily (before breakfast). Provider, Historical   tamsulosin (Flomax) 0.4 mg capsule Take 1 Capsule by mouth daily. 3/4/22   Laura Whyte PA-C   potassium chloride (K-DUR, KLOR-CON M20) 20 mEq tablet Take 1 Tablet by mouth daily. 3/2/22   Chantel Camargo MD   sodium bicarbonate 650 mg tablet Take 1 Tablet by mouth three (3) times daily. 3/2/22   Chantel Camargo MD   levETIRAcetam (Keppra) 1,000 mg tablet Take 1.5 Tablets by mouth two (2) times a day. 3/2/22   Chantel Camargo MD   ergocalciferol (ERGOCALCIFEROL) 1,250 mcg (50,000 unit) capsule Take 1 Capsule by mouth every seven (7) days.  22   Provider, Historical   lactulose (CHRONULAC) 10 gram/15 mL solution Take 15 mL by mouth three (3) times daily. 22   Diego Ackerman MD   allopurinoL (ZYLOPRIM) 300 mg tablet Take 1 Tablet by mouth daily. 22   Provider, Historical   thiamine HCL (B-1) 100 mg tablet Take 100 mg by mouth daily. Provider, Historical   propranoloL (INDERAL) 20 mg tablet Take 20 mg by mouth two (2) times a day. Provider, Historical   folic acid (FOLVITE) 1 mg tablet Take 1 mg by mouth daily. Provider, Historical   famotidine (PEPCID) 20 mg tablet Take 20 mg by mouth At bedtime. Provider, Historical   aMILoride (MIDAMOR) 5 mg tablet Take 5 mg by mouth daily. Provider, Historical       REVIEW OF SYSTEMS:       She is lethargic. To obtain    Objective:   VITALS:    Visit Vitals  /86 (BP 1 Location: Left upper arm, BP Patient Position: At rest)   Pulse 60   Temp 96.8 °F (36 °C)   Resp 10   Ht 5' 10\" (1.778 m)   Wt 96.7 kg (213 lb 3 oz)   SpO2 99%   BMI 30.59 kg/m²     Temp (24hrs), Av.8 °F (36 °C), Min:96.1 °F (35.6 °C), Max:97.2 °F (36.2 °C)      PHYSICAL EXAM:     General: NAD, lethargic eyes: sclera anicteric  Oral Cavity: No thrush or ulcers  Neck: no JVD  Chest: Fair bilateral air entry. No Wheezing or Rhonchi. No rales.   Heart: normal sounds  Abdomen: soft and non tender   :  Gerard+  Lower Extremities: no edema  Skin: no rash  Neuro: Lethargic psychiatric: Able to assess      LAB DATA REVIEWED:    Recent Results (from the past 24 hour(s))   CBC W/O DIFF    Collection Time: 04/15/22  4:15 AM   Result Value Ref Range    WBC 8.1 4.1 - 11.1 K/uL    RBC 3.56 (L) 4.10 - 5.70 M/uL    HGB 9.9 (L) 12.1 - 17.0 g/dL    HCT 28.1 (L) 36.6 - 50.3 %    MCV 78.9 (L) 80.0 - 99.0 FL    MCH 27.8 26.0 - 34.0 PG    MCHC 35.2 30.0 - 36.5 g/dL    RDW 22.8 (H) 11.5 - 14.5 %    PLATELET 94 (L) 628 - 400 K/uL    NRBC 0.2 (H) 0.0  WBC    ABSOLUTE NRBC 0.02 (H) 0.00 - 2.75 K/uL   METABOLIC PANEL, COMPREHENSIVE    Collection Time: 04/15/22  4:15 AM Result Value Ref Range    Sodium 138 136 - 145 mmol/L    Potassium 3.6 3.5 - 5.1 mmol/L    Chloride 108 97 - 108 mmol/L    CO2 20 (L) 21 - 32 mmol/L    Anion gap 10 5 - 15 mmol/L    Glucose 68 65 - 100 mg/dL    BUN 15 6 - 20 mg/dL    Creatinine 1.52 (H) 0.70 - 1.30 mg/dL    BUN/Creatinine ratio 10 (L) 12 - 20      GFR est AA 56 (L) >60 ml/min/1.73m2    GFR est non-AA 47 (L) >60 ml/min/1.73m2    Calcium 8.6 8.5 - 10.1 mg/dL    Bilirubin, total 1.4 (H) 0.2 - 1.0 mg/dL    AST (SGOT) 26 15 - 37 U/L    ALT (SGPT) 13 12 - 78 U/L    Alk. phosphatase 69 45 - 117 U/L    Protein, total 6.1 (L) 6.4 - 8.2 g/dL    Albumin 2.2 (L) 3.5 - 5.0 g/dL    Globulin 3.9 2.0 - 4.0 g/dL    A-G Ratio 0.6 (L) 1.1 - 2.2     RENAL FUNCTION PANEL    Collection Time: 04/15/22  4:15 AM   Result Value Ref Range    Sodium 138 136 - 145 mmol/L    Potassium 3.6 3.5 - 5.1 mmol/L    Chloride 109 (H) 97 - 108 mmol/L    CO2 19 (L) 21 - 32 mmol/L    Anion gap 10 5 - 15 mmol/L    Glucose 68 65 - 100 mg/dL    BUN 15 6 - 20 mg/dL    Creatinine 1.49 (H) 0.70 - 1.30 mg/dL    BUN/Creatinine ratio 10 (L) 12 - 20      GFR est AA 58 (L) >60 ml/min/1.73m2    GFR est non-AA 48 (L) >60 ml/min/1.73m2    Calcium 8.5 8.5 - 10.1 mg/dL    Phosphorus 3.4 2.6 - 4.7 mg/dL    Albumin 2.2 (L) 3.5 - 5.0 g/dL   VANCOMYCIN, RANDOM    Collection Time: 04/15/22  9:00 AM   Result Value Ref Range    Vancomycin, random 13.9 ug/mL       Recommendations/Plan:     1 acute kidney injury on chronic kidney disease 3:  -Creatinine was 2.0 on admission   -Creatinine has improved to 1.5  today.    -Baseline creatinine 1.3-1.7 based on labs during previous admission  -Has history of recent GRACIA with creatinine peaking to 2.6 few weeks ago  -GRACIA likely prerenal secondary to hypotension/sepsis  -Urine is suggestive of UTI  -No need for renal ultrasound  -Continue Gerard catheter. Urine output is improving  -Clinically noticed legs edema. -off  IVF.      2 metabolic acidosis:  -CO2 improved to 20  -on oral bicarbonate 1300 TID  -off HCO3 drip     3 severe anemia  -Hemoglobin 6.8->7.4->8.2.->9.9  -s/p  unit blood Tx  -Continue weekly Procrit    4  Hypokalemia  -Potassium is 3.4->3.6.    -po kcl 40 meq x 1 received     5 SIRS/suspected sepsis  -Likely secondary to UTI. Recent UA was abnormal-currently hypothermic hypotensive though no leukocytosis seen. Lactic acid is elevated  -on iv vancomycin  -Continue midodrine 10 mg 3 times daily for chronic hypotension     5 right index finger osteomyelitis  -s/p recent antibiotics for 2 weeks. -Vancomycin has been resumed during this hospitalization  -ID is on the case    6 history of liver cirrhosis  -Has history of alcoholic liver cirrhosis. Portal hypertension  -Noted to have abdominal distention.   IR has been consulted  -Had paracentesis during his previous admission also      ________________________________________________________________________  Signed: Vish Lowe MD

## 2022-04-15 NOTE — PROGRESS NOTES
PHYSICAL THERAPY EVALUATION  Patient: Milagros South (5 y.o. male)  Date: 4/15/2022  Primary Diagnosis: Urinary retention [R33.9]  Lactic acidosis [E87.2]  Severe sepsis (HCC) [A41.9, R65.20]  Hypothermia [T68. XXXA]  Sepsis ([address] [A41.9]        Precautions: fall    ASSESSMENT  As per MD notes(Waldo Rangel is a(n) 60 y.o. male with PMH significant for HTN, cirrhosis, ascites presents via EMS for removing fish catheter. Patient recently d/c after lengthy stay in hospital for septic DIP joint of right index finger. He was d/c on 10 days of PO Zyvox, which he completed. He presents today after pulling out fish catheter. Several blood clots noted. Patient septic upon arrival with hypothermia and hypotension. Temp 93.6 rectal, BP 84/68. Patient is retaining 887cc of urine and nursing staff will replace fish. Patient started on fluids and IV Levaquin in the ED. Patient is altered at baseline. Head CT w/o contrast shows no acute abnormality. Blood cultures pending. CXR and KUB both normal)   Patient found in the bed in ICU with mitts and NGT. Patient agreeable to work with therapy  Patient came from SNF. Patient was DC from hosp 4/5 and [facility] 4/7. Patient has not ambulatory. Patient was able to perform bed amb and supine to sit with mod to max  Assist.Able to sit to stand with mod assist and stood with min assist,Gait belt  and RW. Patient also had episode of being aggressive and not cooperative. Today he was pleasantly confused and cooperative. Based on the objective data described below, the patient presents with decreased strength,impaired mobility. Other factors to consider for discharge:SNF  Patient will benefit from skilled therapy intervention to address the above noted impairments.        PLAN :  Recommendations and Planned Interventions: bed mobility training, transfer training, gait training, therapeutic exercises, patient and family training/education and therapeutic activities      Frequency/Duration: Patient will be followed by physical therapy:  2-3x/week to address goals. Recommendation for discharge: (in order for the patient to meet his/her long term goals)  Jeremy Glover    This discharge recommendation:  Has been made in collaboration with the attending provider and/or case management    IF patient discharges home will need the following DME: wheelchair         SUBJECTIVE:   Patient stated Zoe Henderson.     OBJECTIVE DATA SUMMARY:   HISTORY:    Past Medical History:   Diagnosis Date    Arthritis     Hypertension      Past Surgical History:   Procedure Laterality Date    IR INSERT NON TUNL CVC OVER 5 YRS  3/25/2022    IR PARACENTESIS ABD W IMAGE  1/21/2022    IR PARACENTESIS ABD W IMAGE  3/1/2022    IR PARACENTESIS ABD W IMAGE  3/30/2022       Personal factors and/or comorbidities impacting plan of care:    Home Situation  Home Environment: [address] [place] [address] Name: [facility]  One/Two Story Residence: Other (Comment) (unknown)  Living Alone: No  Support Systems: [place]  Patient Expects to be Discharged to[de-identified] Skilled nursing facility  Current DME Used/Available at Home: Other (comment) (unknown)    EXAMINATION/PRESENTATION/DECISION MAKING:   Critical Behavior:  Neurologic State: Alert,Confused  Orientation Level: Oriented to person,Oriented to place  Cognition: Decreased attention/concentration     Hearing: Auditory  Auditory Impairment: None  Range Of Motion:  AROM: Generally decreased, functional                       Strength:    Strength: Generally decreased, functional                                    Coordination:  Coordination: Generally decreased, functional       Functional Mobility:  Bed Mobility:  Rolling: Moderate assistance;Assist x2  Supine to Sit: Moderate assistance;Assist x2  Sit to Supine: Moderate assistance;Assist x2  Scooting: Moderate assistance;Assist x2  Transfers:  Sit to Stand:  Moderate assistance;Assist x2  Stand to Sit: Moderate assistance;Assist x2                       Balance:   Sitting: Impaired  Sitting - Static: Fair (occasional)  Sitting - Dynamic: Fair (occasional)  Standing: Impaired; With support  Standing - Static: Fair;Constant support  Standing - Dynamic : Fair;Constant support    Functional Measure:  [facility] AM-PAC 6 Clicks         Basic Mobility Inpatient Short Form  How much difficulty does the patient currently have. .. Unable A Lot A Little None   1. Turning over in bed (including adjusting bedclothes, sheets and blankets)? [] 1   [x] 2   [] 3   [] 4   2. Sitting down on and standing up from a chair with arms ( e.g., wheelchair, bedside commode, etc.)   [] 1   [x] 2   [] 3   [] 4   3. Moving from lying on back to sitting on the side of the bed? [] 1   [x] 2   [] 3   [] 4          How much help from another person does the patient currently need. .. Total A Lot A Little None   4. Moving to and from a bed to a chair (including a wheelchair)? [x] 1   [] 2   [] 3   [] 4   5. Need to walk in hospital room? [x] 1   [] 2   [] 3   [] 4   6. Climbing 3-5 steps with a railing? [x] 1   [] 2   [] 3   [] 4   © 2007, Trustees of [facility], under license to [employer]. All rights reserved     Score:  Initial: 9/24 Most Recent: X (Date:04/15/2022 )   Interpretation of Tool:  Represents activities that are increasingly more difficult (i.e. Bed mobility, Transfers, Gait).   Score 24 23 22-20 19-15 14-10 9-7 6   Modifier CH CI CJ CK CL CM CN            Physical Therapy Evaluation Charge Determination   History Examination Presentation Decision-Making   LOW Complexity : Zero comorbidities / personal factors that will impact the outcome / POC LOW Complexity : 1-2 Standardized tests and measures addressing body structure, function, activity limitation and / or participation in recreation  LOW Complexity : Stable, uncomplicated  LOW Complexity : FOTO score of       Based on the above components, the patient evaluation is determined to be of the following complexity level: LOW     Pain Ratin/10    Activity Tolerance:   Fair  Please refer to the flowsheet for vital signs taken during this treatment. After treatment patient left in no apparent distress:   Supine in bed and Call bell within reach    Problem: Mobility Impaired (Adult and Pediatric)  Goal: *Acute Goals and Plan of Care (Insert Text)  Description: Patient will move from supine to sit and sit to supine , scoot up and down, and roll side to side in bed with minimal assistance/contact guard assist within 7 day(s). Patient will transfer from bed to chair and chair to bed with moderate assistance  using the least restrictive device within 7 day(s). Patient will improve static standing balance to minimal assistance within 1 week(s). Patient will ambulate 10 feet with moderate assistance with least restrictive device within 1 weeks. Outcome: Progressing Towards Goal     COMMUNICATION/EDUCATION:   The patients plan of care was discussed with: Occupational therapist, Registered nurse and Case management. Fall prevention education was provided and the patient/caregiver indicated understanding., Patient/family have participated as able in goal setting and plan of care. and Patient/family agree to work toward stated goals and plan of care.     Thank you for this referral.  Flori Campos PT   Time Calculation: 25 mins

## 2022-04-15 NOTE — PROGRESS NOTES
Comprehensive Nutrition Assessment      RD verbally consulted by RN for TF recs. Pt refusing PO including ONS and meds x2days, able to place NGT this AM. TF order started by JUAN A THOMAS to adjust to best meet needs. Nutrition Recommendations/Plan:   Continue PO diet for comfort  D/c all ONS, pt refusing  Once pt begins accepting PO again, re-order Ensure Enlive 3x/d    Initiate TF via NGT of TwoCal at 20ml/hr, advance by 15ml q4hrs to goal of 55ml/hr  Flush 200ml q4hrs  Provide Sean 2x/d (180kcals, 5g pro)  Providing 2820kcals (106%), 115g pro (88%), 2124ml (90%)       Estimated Daily Nutrient Needs:  Energy (kcal): 2,657kcals (28kcal/kg); Weight Used for Energy Requirements: Current  Protein (g): 131g(1.4g/kg); Weight Used for Protein Requirements: Current  Fluid (ml/day): 2365(25ml/kg);  Method Used for Fluid Requirements: ml/kg    Electronically signed by Arias Coto on 4/15/2022 at 2:08 PM    Contact: Ext [phone], or via [employer]

## 2022-04-15 NOTE — PROGRESS NOTES
Spiritual Care Assessment/Progress Note  [facility]      NAME: Jessi Ray      MRN: [id number]  AGE: 60 y.o.  SEX: male  Jew Affiliation: No preference   Language: English     4/15/2022     Total Time (in minutes): 30     Spiritual Assessment begun in [facility] [place] ICU through conversation with:         [x]Patient        [] Family    [] Friend(s)        Reason for Consult: Initial/Spiritual assessment, patient floor,Request by patient     Spiritual beliefs: (Please include comment if needed)     [] Identifies with a maribel tradition:         [] Supported by a maribel community:            [] Claims no spiritual orientation:           [] Seeking spiritual identity:                [] Adheres to an individual form of spirituality:           [x] Not able to assess:                           Identified resources for coping:      [] Prayer                               [] Music                  [] Guided Imagery     [x] Family/friends                 [] Pet visits     [] Devotional reading                         [] Unknown     [] Other:                                             Interventions offered during this visit: (See comments for more details)    Patient Interventions: Affirmation of emotions/emotional suffering,Affirmation of maribel,Catharsis/review of pertinent events in supportive environment,Coping skills reviewed/reinforced,Prayer (assurance of)           Plan of Care:     [] Support spiritual and/or cultural needs    [] Support AMD and/or advance care planning process      [] Support grieving process   [] Coordinate Rites and/or Rituals    [] Coordination with community clergy   [] No spiritual needs identified at this time   [] Detailed Plan of Care below (See Comments)  [] Make referral to Music Therapy  [] Make referral to Pet Therapy     [] Make referral to Addiction services  [] Make referral to [facility]  [] Make referral to Spiritual Care Partner  [] No future visits requested        [x] Contact Spiritual Care for further referrals     Comments: The purpose of the visit was in response to the patient request for a visit. The patient shared openly and pleasantly. Though the patient was engaging he appeared to be confused. The  provided the ministry of presence, accompanied the patient towards making meaning of accounts, and listened empathically. [facility] [place].    can be reached by calling the  at [facility]  [phone]

## 2022-04-16 NOTE — PROGRESS NOTES
Problem: Pressure Injury - Risk of  Goal: *Prevention of pressure injury  Description: Document Curtis Scale and appropriate interventions in the flowsheet. Outcome: Progressing Towards Goal  Note: Pressure Injury Interventions:  Sensory Interventions: Keep linens dry and wrinkle-free,Minimize linen layers    Moisture Interventions: Absorbent underpads,Internal/External fecal devices,Internal/External urinary devices    Activity Interventions: Increase time out of bed    Mobility Interventions: HOB 30 degrees or less    Nutrition Interventions: Document food/fluid/supplement intake    Friction and Shear Interventions: Minimize layers                Problem: Patient Education: Go to Patient Education Activity  Goal: Patient/Family Education  Outcome: Progressing Towards Goal     Problem: Falls - Risk of  Goal: *Absence of Falls  Description: Document Elba Fall Risk and appropriate interventions in the flowsheet.   Outcome: Progressing Towards Goal  Note: Fall Risk Interventions:  Mobility Interventions: Bed/chair exit alarm    Mentation Interventions: Bed/chair exit alarm,More frequent rounding,Reorient patient    Medication Interventions: Bed/chair exit alarm    Elimination Interventions: Bed/chair exit alarm,Call light in reach              Problem: Patient Education: Go to Patient Education Activity  Goal: Patient/Family Education  Outcome: Progressing Towards Goal     Problem: Aspiration - Risk of  Goal: *Absence of aspiration  Outcome: Progressing Towards Goal     Problem: Patient Education: Go to Patient Education Activity  Goal: Patient/Family Education  Outcome: Progressing Towards Goal     Problem: Patient Education: Go to Patient Education Activity  Goal: Patient/Family Education  Outcome: Progressing Towards Goal     Problem: Impaired Skin Integrity/Pressure Injury Treatment  Goal: *Improvement of Existing Pressure Injury  Outcome: Progressing Towards Goal  Goal: *Prevention of pressure injury  Description: Document Curtis Scale and appropriate interventions in the flowsheet.   Outcome: Progressing Towards Goal     Problem: Patient Education: Go to Patient Education Activity  Goal: Patient/Family Education  Outcome: Progressing Towards Goal     Problem: Non-Violent Restraints  Goal: Removal from restraints as soon as assessed to be safe  Outcome: Progressing Towards Goal  Goal: No harm/injury to patient while restraints in use  Outcome: Progressing Towards Goal  Goal: Patient's dignity will be maintained  Outcome: Progressing Towards Goal  Goal: Patient Interventions  Outcome: Progressing Towards Goal     Problem: Delirium Treatment  Goal: *Level of consciousness restored to baseline  Outcome: Progressing Towards Goal  Goal: *Level of environmental perceptions restored to baseline  Outcome: Progressing Towards Goal  Goal: *Sensory perception restored to baseline  Outcome: Progressing Towards Goal  Goal: *Emotional stability restored to baseline  Outcome: Progressing Towards Goal  Goal: *Functional assessment restored to baseline  Outcome: Progressing Towards Goal  Goal: *Absence of falls  Outcome: Progressing Towards Goal  Goal: *Will remain free of delirium, CAM Score negative  Outcome: Progressing Towards Goal  Goal: *Cognitive status will be restored to baseline  Outcome: Progressing Towards Goal  Goal: Interventions  Outcome: Progressing Towards Goal     Problem: Patient Education: Go to Patient Education Activity  Goal: Patient/Family Education  Outcome: Progressing Towards Goal     Problem: Patient Education: Go to Patient Education Activity  Goal: Patient/Family Education  Outcome: Progressing Towards Goal     Problem: Patient Education: Go to Patient Education Activity  Goal: Patient/Family Education  Outcome: Progressing Towards Goal

## 2022-04-16 NOTE — PROGRESS NOTES
Progress Note    Patient: Katherine Patient MRN: [id number]  SSN: [id number]    YOB: 1960  Age: 60 y.o. Sex: male      Admit Date: 4/7/2022    LOS: 9 days     Subjective:     60years old with UTI, sepsis, cirrhosis, acute kidney injury with creatinine level of 1.49. Patient is confused with mittens on both hands    Objective:     Vitals:    04/16/22 0400 04/16/22 0800 04/16/22 0804 04/16/22 1200   BP:   122/78    Pulse: 76 79 79 79   Resp:   16    Temp:   (!) 96.2 °F (35.7 °C)    SpO2:   100%    Weight:       Height:            Intake and Output:  Current Shift: 04/16 0701 - 04/16 1900  In: -   Out: 200 [Urine:200]  Last three shifts: 04/14 1901 - 04/16 0700  In: 1090 [I.V.:550]  Out: 1850 [Urine:850; Drains:1000]    Physical Exam:   General:  Alert, confused, no distress, appears stated age. Eyes:  Conjunctivae/corneas clear. PERRL, EOMs intact. Fundi benign   Ears:  Normal TMs and external ear canals both ears. Nose: Nares normal. Septum midline. Mucosa normal. No drainage or sinus tenderness. Mouth/Throat: Lips, mucosa, and tongue normal. Teeth and gums normal.   Neck: Supple, symmetrical, trachea midline, no adenopathy, thyroid: no enlargment/tenderness/nodules, no carotid bruit and no JVD. Back:   Symmetric, no curvature. ROM normal. No CVA tenderness. Lungs:   Clear to auscultation bilaterally. Heart:  Regular rate and rhythm, S1, S2 normal, no murmur, click, rub or gallop. Abdomen:   Soft, non-tender. Bowel sounds normal. No masses,  No organomegaly. Extremities: Extremities normal, atraumatic, no cyanosis or edema. Pulses: 2+ and symmetric all extremities. Skin: Skin color, texture, turgor normal. No rashes or lesions   Lymph nodes: Cervical, supraclavicular, and axillary nodes normal.   Neurologic: CNII-XII intact. Normal strength, sensation and reflexes throughout. No results found for this or any previous visit (from the past 24 hour(s)).     Calvin Wolf Review: All lab results for the last 24 hours reviewed.          Assessment:     Active Problems:    Lactic acidosis (4/7/2022)      Urinary retention (4/7/2022)      Severe sepsis ([address] (4/7/2022)      Hypothermia (4/7/2022)      Sepsis ([address] (4/7/2022)      Severe protein-calorie malnutrition ([address] (4/8/2022)    hx of cirrhosis  Metabolic encephalopathy  Plan:     continue present treatment  Obtain ammonia level    Signed By: Helena Blum MD     April 16, 2022

## 2022-04-16 NOTE — PROGRESS NOTES
Attempted Pt treatment, patient very confused today and refused to work with therapy. Not appropriate for therapy.

## 2022-04-16 NOTE — PROGRESS NOTES
Problem: Pressure Injury - Risk of  Goal: *Prevention of pressure injury  Description: Document Curtis Scale and appropriate interventions in the flowsheet. Outcome: Progressing Towards Goal  Note: Pressure Injury Interventions:  Sensory Interventions: Discuss PT/OT consult with provider,Float heels    Moisture Interventions: Check for incontinence Q2 hours and as needed,Internal/External fecal devices,Internal/External urinary devices    Activity Interventions: Increase time out of bed,PT/OT evaluation    Mobility Interventions: HOB 30 degrees or less,PT/OT evaluation    Nutrition Interventions: Document food/fluid/supplement intake,Discuss nutritional consult with provider    Friction and Shear Interventions: HOB 30 degrees or less,Lift team/patient mobility team,Minimize layers,Sit at 90-degree angle                Problem: Patient Education: Go to Patient Education Activity  Goal: Patient/Family Education  Outcome: Progressing Towards Goal     Problem: Falls - Risk of  Goal: *Absence of Falls  Description: Document [place]leon RitterThompson Fall Risk and appropriate interventions in the flowsheet.   Outcome: Progressing Towards Goal  Note: Fall Risk Interventions:  Mobility Interventions: OT consult for ADLs,PT Consult for mobility concerns    Mentation Interventions: Bed/chair exit alarm    Medication Interventions: Bed/chair exit alarm    Elimination Interventions: Bed/chair exit alarm

## 2022-04-16 NOTE — PROGRESS NOTES
Patient removed NG tube at approximately 0640. Charge and primary nurse attempted to replace and patient refused, became agitated and combative. PRN given for agitation at this time.

## 2022-04-16 NOTE — PROGRESS NOTES
Renal Note    NAME:  Milagros South   :   1960   MRN:   [id number]     ATTENDING: Tony Rose MD  PCP:  Helen Burgos NP    Date/Time:  2022 12:46 PM      Subjective:     Patient seen in the room. Has been transferred out of the ICU  Off IVF. Creatinine 1.4 today. Past Medical History:   Diagnosis Date    Arthritis     Hypertension       Past Surgical History:   Procedure Laterality Date    IR INSERT NON TUNL CVC OVER 5 YRS  3/25/2022    IR PARACENTESIS ABD SUBSQ  4/15/2022    IR PARACENTESIS ABD W IMAGE  2022    IR PARACENTESIS ABD W IMAGE  3/1/2022    IR PARACENTESIS ABD W IMAGE  3/30/2022     Social History     Tobacco Use    Smoking status: Never Smoker    Smokeless tobacco: Never Used   Substance Use Topics    Alcohol use: Not on file      No family history on file. No Known Allergies   Prior to Admission medications    Medication Sig Start Date End Date Taking? Authorizing Provider   thiamine mononitrate (B-1) 100 mg tablet Take 1 Tablet by mouth daily. 22   Diego Ackerman MD   levothyroxine (SYNTHROID) 25 mcg tablet Take 25 mcg by mouth Daily (before breakfast). Provider, Historical   tamsulosin (Flomax) 0.4 mg capsule Take 1 Capsule by mouth daily. 3/4/22   Reasoner, Sharyle Fluke, PA-C   potassium chloride (K-DUR, KLOR-CON M20) 20 mEq tablet Take 1 Tablet by mouth daily. 3/2/22   Kathleen Cohen MD   sodium bicarbonate 650 mg tablet Take 1 Tablet by mouth three (3) times daily. 3/2/22   Kathleen Cohen MD   levETIRAcetam (Keppra) 1,000 mg tablet Take 1.5 Tablets by mouth two (2) times a day. 3/2/22   Kathleen Cohen MD   ergocalciferol (ERGOCALCIFEROL) 1,250 mcg (50,000 unit) capsule Take 1 Capsule by mouth every seven (7) days. 22   Provider, Historical   lactulose (CHRONULAC) 10 gram/15 mL solution Take 15 mL by mouth three (3) times daily.  22   Diego Ackerman MD   allopurinoL (ZYLOPRIM) 300 mg tablet Take 1 Tablet by mouth daily. 22   Provider, Historical   thiamine HCL (B-1) 100 mg tablet Take 100 mg by mouth daily. Provider, Historical   propranoloL (INDERAL) 20 mg tablet Take 20 mg by mouth two (2) times a day. Provider, Historical   folic acid (FOLVITE) 1 mg tablet Take 1 mg by mouth daily. Provider, Historical   famotidine (PEPCID) 20 mg tablet Take 20 mg by mouth At bedtime. Provider, Historical   aMILoride (MIDAMOR) 5 mg tablet Take 5 mg by mouth daily. Provider, Historical       REVIEW OF SYSTEMS:       She is lethargic. To obtain    Objective:   VITALS:    Visit Vitals  /78   Pulse 79   Temp (!) 96.2 °F (35.7 °C)   Resp 16   Ht 5' 10\" (1.778 m)   Wt 90.2 kg (198 lb 13.7 oz)   SpO2 100%   BMI 28.53 kg/m²     Temp (24hrs), Av.6 °F (35.9 °C), Min:96.1 °F (35.6 °C), Max:97.5 °F (36.4 °C)      PHYSICAL EXAM:     General: NAD, lethargic eyes: sclera anicteric  Oral Cavity: No thrush or ulcers  Neck: no JVD  Chest: Fair bilateral air entry. No Wheezing or Rhonchi. No rales. Heart: normal sounds  Abdomen: soft and non tender   :  Gerard+  Lower Extremities: no edema  Skin: no rash  Neuro: Lethargic psychiatric: Able to assess      LAB DATA REVIEWED:    No results found for this or any previous visit (from the past 24 hour(s)). Recommendations/Plan:     1 acute kidney injury on chronic kidney disease 3:  -Creatinine was 2.0 on admission   -Creatinine has improved to 1.4  today.    -Baseline creatinine 1.3-1.7 based on labs during previous admission  -Has history of recent GRACIA with creatinine peaking to 2.6 few weeks ago  -GRACIA likely prerenal secondary to hypotension/sepsis  -Urine is suggestive of UTI  -No need for renal ultrasound  -Continue Gerard catheter. Urine output is improving  -Clinically noticed legs edema. -off  IVF.      2 metabolic acidosis:  -CO2 improved to 20  -on oral bicarbonate 1300 TID  -off HCO3 drip     3 severe anemia  -Hemoglobin 6. 8->7.4->8.2.->9.9  -s/p  unit blood Tx  -Continue weekly Procrit    4  Hypokalemia  -Potassium is 3.4->3.6.    -po kcl 40 meq x 1 received     5 SIRS/suspected sepsis  -Likely secondary to UTI. Recent UA was abnormal  -Lactic acid is elevated  -on iv vancomycin  -Continue midodrine 10 mg 3 times daily for chronic hypotension     5 right index finger osteomyelitis  -s/p recent antibiotics for 2 weeks. -Vancomycin has been resumed during this hospitalization  -ID is on the case    6 history of liver cirrhosis  -Has history of alcoholic liver cirrhosis. Portal hypertension  -Noted to have abdominal distention.   IR has been consulted  -Had paracentesis during his previous admission also      ________________________________________________________________________  Signed: Marshall Brock MD

## 2022-04-17 NOTE — PROGRESS NOTES
This RN has been in patient's room at least 10x due to patient trying to get out of bed. Needed to reapply mitts twice and prn medications for agitation given.

## 2022-04-17 NOTE — PROGRESS NOTES
Renal Note    NAME:  Raymond Negrete   :   1960   MRN:   [id number]     ATTENDING: Huong Pedroza MD  PCP:  Fiona Hopkins NP    Date/Time:  2022 12:46 PM      Subjective:     Patient seen in the room. Has been transferred out of the ICU  Off IVF. Creatinine 1.6 today. Past Medical History:   Diagnosis Date    Arthritis     Hypertension       Past Surgical History:   Procedure Laterality Date    IR INSERT NON TUNL CVC OVER 5 YRS  3/25/2022    IR PARACENTESIS ABD SUBSQ  4/15/2022    IR PARACENTESIS ABD W IMAGE  2022    IR PARACENTESIS ABD W IMAGE  3/1/2022    IR PARACENTESIS ABD W IMAGE  3/30/2022     Social History     Tobacco Use    Smoking status: Never Smoker    Smokeless tobacco: Never Used   Substance Use Topics    Alcohol use: Not on file      No family history on file. No Known Allergies   Prior to Admission medications    Medication Sig Start Date End Date Taking? Authorizing Provider   thiamine mononitrate (B-1) 100 mg tablet Take 1 Tablet by mouth daily. 22   Yeimy Ackerman MD   levothyroxine (SYNTHROID) 25 mcg tablet Take 25 mcg by mouth Daily (before breakfast). Provider, Historical   tamsulosin (Flomax) 0.4 mg capsule Take 1 Capsule by mouth daily. 3/4/22   Reasoner, Eleanore Harada, PA-C   potassium chloride (K-DUR, KLOR-CON M20) 20 mEq tablet Take 1 Tablet by mouth daily. 3/2/22   Adam Menjivar MD   sodium bicarbonate 650 mg tablet Take 1 Tablet by mouth three (3) times daily. 3/2/22   Adam Menjivar MD   levETIRAcetam (Keppra) 1,000 mg tablet Take 1.5 Tablets by mouth two (2) times a day. 3/2/22   Adam Menjivar MD   ergocalciferol (ERGOCALCIFEROL) 1,250 mcg (50,000 unit) capsule Take 1 Capsule by mouth every seven (7) days. 22   Provider, Historical   lactulose (CHRONULAC) 10 gram/15 mL solution Take 15 mL by mouth three (3) times daily.  22   Yeimy Ackerman MD   allopurinoL (ZYLOPRIM) 300 mg tablet Take 1 Tablet by mouth daily. 22   Provider, Historical   thiamine HCL (B-1) 100 mg tablet Take 100 mg by mouth daily. Provider, Historical   propranoloL (INDERAL) 20 mg tablet Take 20 mg by mouth two (2) times a day. Provider, Historical   folic acid (FOLVITE) 1 mg tablet Take 1 mg by mouth daily. Provider, Historical   famotidine (PEPCID) 20 mg tablet Take 20 mg by mouth At bedtime. Provider, Historical   aMILoride (MIDAMOR) 5 mg tablet Take 5 mg by mouth daily. Provider, Historical       REVIEW OF SYSTEMS:       She is lethargic. To obtain    Objective:   VITALS:    Visit Vitals  /77 (BP 1 Location: Left upper arm, BP Patient Position: At rest)   Pulse 64   Temp 97.5 °F (36.4 °C)   Resp 18   Ht 5' 10\" (1.778 m)   Wt 88.4 kg (194 lb 14.2 oz)   SpO2 100%   BMI 27.96 kg/m²     Temp (24hrs), Av.5 °F (36.4 °C), Min:97.5 °F (36.4 °C), Max:97.5 °F (36.4 °C)      PHYSICAL EXAM:     General: NAD, lethargic eyes: sclera anicteric  Oral Cavity: No thrush or ulcers  Neck: no JVD  Chest: Fair bilateral air entry. No Wheezing or Rhonchi. No rales.   Heart: normal sounds  Abdomen: soft and non tender   :  Gerard+  Lower Extremities: no edema  Skin: no rash  Neuro: Lethargic psychiatric: Able to assess      LAB DATA REVIEWED:    Recent Results (from the past 24 hour(s))   CBC W/O DIFF    Collection Time: 22  1:44 AM   Result Value Ref Range    WBC 7.3 4.1 - 11.1 K/uL    RBC 3.66 (L) 4.10 - 5.70 M/uL    HGB 10.1 (L) 12.1 - 17.0 g/dL    HCT 28.9 (L) 36.6 - 50.3 %    MCV 79.0 (L) 80.0 - 99.0 FL    MCH 27.6 26.0 - 34.0 PG    MCHC 34.9 30.0 - 36.5 g/dL    RDW 23.6 (H) 11.5 - 14.5 %    PLATELET 58 (L) 459 - 400 K/uL    NRBC 0.4 (H) 0.0  WBC    ABSOLUTE NRBC 0.03 (H) 0.00 - 0.01 K/uL   RENAL FUNCTION PANEL    Collection Time: 22  1:44 AM   Result Value Ref Range    Sodium 139 136 - 145 mmol/L    Potassium 3.6 3.5 - 5.1 mmol/L    Chloride 111 (H) 97 - 108 mmol/L    CO2 20 (L) 21 - 32 mmol/L    Anion gap 8 5 - 15 mmol/L    Glucose 95 65 - 100 mg/dL    BUN 19 6 - 20 mg/dL    Creatinine 1.68 (H) 0.70 - 1.30 mg/dL    BUN/Creatinine ratio 11 (L) 12 - 20      GFR est AA 50 (L) >60 ml/min/1.73m2    GFR est non-AA 41 (L) >60 ml/min/1.73m2    Calcium 8.6 8.5 - 10.1 mg/dL    Phosphorus 3.6 2.6 - 4.7 mg/dL    Albumin 2.2 (L) 3.5 - 5.0 g/dL   VANCOMYCIN, RANDOM    Collection Time: 04/17/22  1:44 AM   Result Value Ref Range    Vancomycin, random 16.1 ug/mL       Recommendations/Plan:     1 acute kidney injury on chronic kidney disease 3:  -Creatinine was 2.0 on admission   -Creatinine has improved to 1.4  . Again increased to 1.6. Restart IV fluids  -Baseline creatinine 1.3-1.7 based on labs during previous admission  -Has history of recent GRACIA with creatinine peaking to 2.6 few weeks ago  -GRACIA likely prerenal secondary to hypotension/sepsis  -Urine is suggestive of UTI  -No need for renal ultrasound  -Continue Gerard catheter. Urine output is improving  -Clinically noticed legs edema. 2 metabolic acidosis:  -CO2 improved to 20  -on oral bicarbonate 1300 TID  -off HCO3 drip     3 severe anemia  -Hemoglobin 6.8->7.4->8.2.->10.1  -s/p  unit blood Tx  -Continue weekly Procrit    4  Hypokalemia  -Potassium is 3.4->3.6.    -po kcl 40 meq x 1 received     5 SIRS/suspected sepsis  -Likely secondary to UTI. Recent UA was abnormal  -Lactic acid is elevated  -on iv vancomycin  -Continue midodrine 10 mg 3 times daily for chronic hypotension     5 right index finger osteomyelitis  -s/p recent antibiotics for 2 weeks. -Vancomycin has been resumed during this hospitalization  -ID is on the case    6 history of liver cirrhosis  -Has history of alcoholic liver cirrhosis. Portal hypertension  -Noted to have abdominal distention.   IR has been consulted  -Had paracentesis during his previous admission also      ________________________________________________________________________  Signed: Radha Savage MD

## 2022-04-17 NOTE — PROGRESS NOTES
Problem: Pressure Injury - Risk of  Goal: *Prevention of pressure injury  Description: Document Curtis Scale and appropriate interventions in the flowsheet. Outcome: Not Progressing Towards Goal  Note: Pressure Injury Interventions:  Sensory Interventions: Discuss PT/OT consult with provider,Float heels    Moisture Interventions: Check for incontinence Q2 hours and as needed,Internal/External fecal devices,Internal/External urinary devices    Activity Interventions: Increase time out of bed,PT/OT evaluation    Mobility Interventions: HOB 30 degrees or less,PT/OT evaluation    Nutrition Interventions: Document food/fluid/supplement intake,Discuss nutritional consult with provider    Friction and Shear Interventions: HOB 30 degrees or less,Lift team/patient mobility team,Minimize layers,Sit at 90-degree angle                Problem: Patient Education: Go to Patient Education Activity  Goal: Patient/Family Education  Outcome: Not Progressing Towards Goal     Problem: Falls - Risk of  Goal: *Absence of Falls  Description: Document Negrita Sheldon Fall Risk and appropriate interventions in the flowsheet.   Outcome: Not Progressing Towards Goal  Note: Fall Risk Interventions:  Mobility Interventions: OT consult for ADLs,PT Consult for mobility concerns    Mentation Interventions: Bed/chair exit alarm    Medication Interventions: Bed/chair exit alarm    Elimination Interventions: Bed/chair exit alarm              Problem: Patient Education: Go to Patient Education Activity  Goal: Patient/Family Education  Outcome: Not Progressing Towards Goal     Problem: Aspiration - Risk of  Goal: *Absence of aspiration  Outcome: Not Progressing Towards Goal     Problem: Patient Education: Go to Patient Education Activity  Goal: Patient/Family Education  Outcome: Not Progressing Towards Goal     Problem: Patient Education: Go to Patient Education Activity  Goal: Patient/Family Education  Outcome: Not Progressing Towards Goal     Problem: Impaired Skin Integrity/Pressure Injury Treatment  Goal: *Improvement of Existing Pressure Injury  Outcome: Not Progressing Towards Goal  Goal: *Prevention of pressure injury  Description: Document Curtis Scale and appropriate interventions in the flowsheet.   Outcome: Not Progressing Towards Goal  Note: Pressure Injury Interventions:  Sensory Interventions: Discuss PT/OT consult with provider,Float heels    Moisture Interventions: Check for incontinence Q2 hours and as needed,Internal/External fecal devices,Internal/External urinary devices    Activity Interventions: Increase time out of bed,PT/OT evaluation    Mobility Interventions: HOB 30 degrees or less,PT/OT evaluation    Nutrition Interventions: Document food/fluid/supplement intake,Discuss nutritional consult with provider    Friction and Shear Interventions: HOB 30 degrees or less,Lift team/patient mobility team,Minimize layers,Sit at 90-degree angle                Problem: Patient Education: Go to Patient Education Activity  Goal: Patient/Family Education  Outcome: Not Progressing Towards Goal     Problem: Non-Violent Restraints  Goal: Removal from restraints as soon as assessed to be safe  Outcome: Not Progressing Towards Goal  Goal: No harm/injury to patient while restraints in use  Outcome: Not Progressing Towards Goal  Goal: Patient's dignity will be maintained  Outcome: Not Progressing Towards Goal  Goal: Patient Interventions  Outcome: Not Progressing Towards Goal     Problem: Delirium Treatment  Goal: *Level of consciousness restored to baseline  Outcome: Not Progressing Towards Goal  Goal: *Level of environmental perceptions restored to baseline  Outcome: Not Progressing Towards Goal  Goal: *Sensory perception restored to baseline  Outcome: Not Progressing Towards Goal  Goal: *Emotional stability restored to baseline  Outcome: Not Progressing Towards Goal  Goal: *Functional assessment restored to baseline  Outcome: Not Progressing Towards Goal  Goal: *Absence of falls  Outcome: Not Progressing Towards Goal  Goal: *Will remain free of delirium, CAM Score negative  Outcome: Not Progressing Towards Goal  Goal: *Cognitive status will be restored to baseline  Outcome: Not Progressing Towards Goal  Goal: Interventions  Outcome: Not Progressing Towards Goal     Problem: Patient Education: Go to Patient Education Activity  Goal: Patient/Family Education  Outcome: Not Progressing Towards Goal     Problem: Patient Education: Go to Patient Education Activity  Goal: Patient/Family Education  Outcome: Not Progressing Towards Goal     Problem: Patient Education: Go to Patient Education Activity  Goal: Patient/Family Education  Outcome: Not Progressing Towards Goal

## 2022-04-17 NOTE — PROGRESS NOTES
Problem: Mobility Impaired (Adult and Pediatric)  Goal: *Acute Goals and Plan of Care (Insert Text)  Description: Patient will move from supine to sit and sit to supine , scoot up and down, and roll side to side in bed with minimal assistance/contact guard assist within 7 day(s). Patient will transfer from bed to chair and chair to bed with moderate assistance  using the least restrictive device within 7 day(s). Patient will improve static standing balance to minimal assistance within 1 week(s). Patient will ambulate 10 feet with moderate assistance with least restrictive device within 1 weeks. Outcome: Progressing Towards Goal   PHYSICAL THERAPY TREATMENT  Patient: Alba Mosqueda (76 y.o. male)  Date: 4/17/2022  Diagnosis: Urinary retention [R33.9]  Lactic acidosis [E87.2]  Severe sepsis (HCC) [A41.9, R65.20]  Hypothermia [T68. XXXA]  Sepsis ([address] [A41.9] <principal problem not specified>       Precautions:    Chart, physical therapy assessment, plan of care and goals were reviewed. ASSESSMENT  Patient continues with skilled PT services and is progressing towards goals. Decreased assistance required for bed mobility. Increased time and heavy cueing required to stay on task. Limited command following, did not follow any 2 step commands. Confused t/o tx. Participated with seated therex. Became impulsive towards end of tx, attempting to stand to use the bathroom. Educated pt on catheter, pt not receptive, became agitated. Required Ax2 to return to bed to prevent standing. Pt unsafe to attempt transfers this tx, rec Ax2 for all OOB mobility secondary to mentation. Current Level of Function Impacting Discharge (mobility/balance): Fair-poor sitting balance. Other factors to consider for discharge: Confusion         PLAN :  Patient continues to benefit from skilled intervention to address the above impairments. Continue treatment per established plan of care.   to address goals. Recommendation for discharge: (in order for the patient to meet his/her long term goals)  LTC vs SNF         SUBJECTIVE:   Patient stated the bed is spinning to the right.     OBJECTIVE DATA SUMMARY:   Critical Behavior:  Neurologic State: Alert,Confused  Orientation Level: Oriented X4  Cognition: Decreased command following,Decreased attention/concentration,Poor safety awareness     Functional Mobility Training:  Bed Mobility:     Supine to Sit: Assist x1; Moderate assistance  Sit to Supine: Assist x2; Moderate assistance           Transfers:                                   Balance:  Sitting: Intact; High guard  Sitting - Static: Fair (occasional)  Sitting - Dynamic: Poor (constant support)  Ambulation/Gait Training:   Unsafe to attempt this tx. Therapeutic Exercises:   LAQ x10  Hip flex x10    Increased time for all. Tactile and verbal cues to stay on task. Easily distracted. Pain Rating:  Occ yelled out in pain with bed mobility, but would not locate or describe pain. Activity Tolerance:   Fair      After treatment patient left in no apparent distress:   Supine in bed, Call bell within reach, Bed / chair alarm activated, Side rails x 3, and 1:1 in room.          Justo Jenkins   Time Calculation: 24 mins

## 2022-04-17 NOTE — PROGRESS NOTES
Vancomycin Dosing Consult  Day #11 of vancomycin therapy  Consult ordered by Dr. Kerline Lucas for this 60 y.o. male for indication of Osteo of Right index Finger/Sepsis      Antibiotic regimen: Vancomycin monotherapy    Temp (24hrs), Av.1 °F (36.2 °C), Min:96 °F (35.6 °C), Max:97.5 °F (36.4 °C)    Recent Labs     22  0144 04/15/22  0415 22  1212   WBC 7.3 8.1 6.2     Recent Labs     22  0144 04/15/22  0415 22  1212   CREA 1.68* 1.52*  1.49* 1.47*   BUN 19 15  15 15       Estimated Creatinine Clearance: 50.9 mL/min (A) (based on SCr of 1.68 mg/dL (H)). ml/min  Concomitant nephrotoxic drugs: Norepinephrine     Cultures:    urine: Candida     MRSA Swab: Not detected 22     Target range: Trough 10-15 mcg/mL    Recent level history (3):  Date/Time Dose & Interval Measured Level (mcg/mL) Associated AUC/YESSENIA   22 500 mg X1 at 0900 14.6     4/15/22 500mg IV x1 ([email or web address]) 13.9     22 750mg IV x1 ([email or web address]) 16.1        Ht Readings from Last 1 Encounters:   22 177.8 cm (70\")        Wt Readings from Last 1 Encounters:   22 90.2 kg (198 lb 13.7 oz)     Ideal body weight: 73 kg (160 lb 15 oz)  Adjusted ideal body weight: 79.9 kg (176 lb 1.7 oz)        Assessment/Plan:   Src and Vancomycin level are still elevating. Patient has GRACIA. Hold Vancomycin today.   Antimicrobial stop date TBD

## 2022-04-17 NOTE — PROGRESS NOTES
.pr         Progress Note    Patient: Jay Jay Ann MRN: [id number]  SSN: [id number]    YOB: 1960  Age: 60 y.o. Sex: male      Admit Date: 4/7/2022    LOS: 10 days     Subjective:     60years old with UTI, sepsis, cirrhosis, acute kidney injury with creatinine level of 1.49. Patient is confused with mittens on both hands patient is on one-to-one    Objective:     Vitals:    04/17/22 0756 04/17/22 0800 04/17/22 1200 04/17/22 1339   BP: 111/77      Pulse: 64 64 64    Resp: 18      Temp: 97.5 °F (36.4 °C)      SpO2: 100%      Weight:    88.4 kg (194 lb 14.2 oz)   Height:            Intake and Output:  Current Shift: No intake/output data recorded. Last three shifts: 04/15 1901 - 04/17 0700  In: 25 [P.O.:25]  Out: 1200 [Urine:400; Drains:800]    Physical Exam:   General:  Alert, confused, no distress, appears stated age. Eyes:  Conjunctivae/corneas clear. PERRL, EOMs intact. Fundi benign   Ears:  Normal TMs and external ear canals both ears. Nose: Nares normal. Septum midline. Mucosa normal. No drainage or sinus tenderness. Mouth/Throat: Lips, mucosa, and tongue normal. Teeth and gums normal.   Neck: Supple, symmetrical, trachea midline, no adenopathy, thyroid: no enlargment/tenderness/nodules, no carotid bruit and no JVD. Back:   Symmetric, no curvature. ROM normal. No CVA tenderness. Lungs:   Clear to auscultation bilaterally. Heart:  Regular rate and rhythm, S1, S2 normal, no murmur, click, rub or gallop. Abdomen:   Soft, non-tender. Bowel sounds normal. No masses,  No organomegaly. Extremities: Extremities normal, atraumatic, no cyanosis or edema. Pulses: 2+ and symmetric all extremities. Skin: Skin color, texture, turgor normal. No rashes or lesions   Lymph nodes: Cervical, supraclavicular, and axillary nodes normal.   Neurologic: CNII-XII intact. Normal strength, sensation and reflexes throughout.      Recent Results (from the past 24 hour(s))   CBC W/O DIFF    Collection Time: 04/17/22  1:44 AM   Result Value Ref Range    WBC 7.3 4.1 - 11.1 K/uL    RBC 3.66 (L) 4.10 - 5.70 M/uL    HGB 10.1 (L) 12.1 - 17.0 g/dL    HCT 28.9 (L) 36.6 - 50.3 %    MCV 79.0 (L) 80.0 - 99.0 FL    MCH 27.6 26.0 - 34.0 PG    MCHC 34.9 30.0 - 36.5 g/dL    RDW 23.6 (H) 11.5 - 14.5 %    PLATELET 58 (L) 846 - 400 K/uL    NRBC 0.4 (H) 0.0  WBC    ABSOLUTE NRBC 0.03 (H) 0.00 - 0.01 K/uL   RENAL FUNCTION PANEL    Collection Time: 04/17/22  1:44 AM   Result Value Ref Range    Sodium 139 136 - 145 mmol/L    Potassium 3.6 3.5 - 5.1 mmol/L    Chloride 111 (H) 97 - 108 mmol/L    CO2 20 (L) 21 - 32 mmol/L    Anion gap 8 5 - 15 mmol/L    Glucose 95 65 - 100 mg/dL    BUN 19 6 - 20 mg/dL    Creatinine 1.68 (H) 0.70 - 1.30 mg/dL    BUN/Creatinine ratio 11 (L) 12 - 20      GFR est AA 50 (L) >60 ml/min/1.73m2    GFR est non-AA 41 (L) >60 ml/min/1.73m2    Calcium 8.6 8.5 - 10.1 mg/dL    Phosphorus 3.6 2.6 - 4.7 mg/dL    Albumin 2.2 (L) 3.5 - 5.0 g/dL   VANCOMYCIN, RANDOM    Collection Time: 04/17/22  1:44 AM   Result Value Ref Range    Vancomycin, random 16.1 ug/mL       Lab/Data Review: All lab results for the last 24 hours reviewed.          Assessment:     Active Problems:    Lactic acidosis (4/7/2022)      Urinary retention (4/7/2022)      Severe sepsis ([facility] [address] (4/7/2022)      Hypothermia (4/7/2022)      Sepsis ([facility] [address] (4/7/2022)      Severe protein-calorie malnutrition ([facility] [address] (4/8/2022)    hx of cirrhosis  Metabolic encephalopathy  Plan:     continue present treatment      Signed By: Yessy Whitt MD     April 17, 2022

## 2022-04-17 NOTE — PROGRESS NOTES
Problem: Pressure Injury - Risk of  Goal: *Prevention of pressure injury  Description: Document Curtis Scale and appropriate interventions in the flowsheet. Outcome: Progressing Towards Goal  Note: Pressure Injury Interventions:  Sensory Interventions: Discuss PT/OT consult with provider,Float heels,Keep linens dry and wrinkle-free    Moisture Interventions: Contain wound drainage,Internal/External fecal devices,Internal/External urinary devices    Activity Interventions: Increase time out of bed,PT/OT evaluation    Mobility Interventions: Float heels,HOB 30 degrees or less    Nutrition Interventions: Document food/fluid/supplement intake    Friction and Shear Interventions: Minimize layers                Problem: Patient Education: Go to Patient Education Activity  Goal: Patient/Family Education  Outcome: Progressing Towards Goal     Problem: Falls - Risk of  Goal: *Absence of Falls  Description: Document Elba Fall Risk and appropriate interventions in the flowsheet.   Outcome: Progressing Towards Goal  Note: Fall Risk Interventions:  Mobility Interventions: Bed/chair exit alarm,OT consult for ADLs,PT Consult for mobility concerns    Mentation Interventions: Bed/chair exit alarm    Medication Interventions: Bed/chair exit alarm,Patient to call before getting OOB    Elimination Interventions: Call light in reach,Bed/chair exit alarm

## 2022-04-18 NOTE — PROGRESS NOTES
Infectious Disease Progress Note           Subjective:   Pt seen and examined at bedside. Stable, denies new complaints, no acute events since last seen   Objective:   Physical Exam:     Visit Vitals  BP (!) 89/61   Pulse 62   Temp 97.3 °F (36.3 °C)   Resp 16   Ht 5' 10\" (1.778 m)   Wt 204 lb 9.4 oz (92.8 kg)   SpO2 97%   BMI 29.36 kg/m²      O2 Device: None (Room air)    Temp (24hrs), Av.4 °F (36.3 °C), Min:97.3 °F (36.3 °C), Max:97.6 °F (36.4 °C)    No intake/output data recorded.     1901 -  0700  In: 25 [P.O.:25]  Out: 1275 [Urine:475; Drains:800]    General: NAD, alert, confused   HEENT: HEIDY, Moist mucosa   Lungs: CTA b/l, decreased at the bases, no wheeze/rhonchi   Heart: S1S2+, RRR, no murmur  Abdo: Soft, distended, NT, +BS   : + fish cath   Exts: Decreased right index finger swelling, no drainage   Skin: No wounds, No rashes or lesions    Data Review:       Recent Days:  Recent Labs     22  0144   WBC 7.3   HGB 10.1*   HCT 28.9*   PLT 58*     Recent Labs     22  0617 22  0144   BUN  --  19   CREA 1.75* 1.68*       Lab Results   Component Value Date/Time    C-Reactive protein 0.60 2022 05:15 AM          Microbiology     Results     Procedure Component Value Units Date/Time    CULTURE, URINE [[id number]]  (Abnormal) Collected: 22 230    Order Status: Completed Specimen: Urine Updated: 22 1331     Special Requests: No Special Requests        Lynd Count --        >100,000  colonies/ml       Culture result: Candida albicans       CULTURE, URINE [[id number]] Collected: 22 230    Order Status: Canceled Specimen: Urine     CULTURE, URINE [[id number]] Collected: 22 1502    Order Status: Completed Specimen: Urine Updated: 22 1414     Special Requests: --        No Special Requests  Reflexed from [id number]       Culture result: No Growth (<1000 cfu/mL)       MRSA SCREEN - PCR (NASAL) [[id number]] Collected: 22 1345    Order Status: Completed Specimen: Swab Updated: 04/07/22 1516     MRSA by PCR, Nasal Not Detected       CULTURE, BLOOD, PAIRED [[id number]] Collected: 04/07/22 1001    Order Status: Completed Specimen: Blood Updated: 04/07/22 1017    C. DIFFICILE (DNA) [[id number]] Collected: 04/06/22 0919    Order Status: Completed Specimen: Stool Updated: 04/06/22 1056     C. difficile (DNA) Negative       COVID-19 WITH INFLUENZA A/B [[id number]] Collected: 04/05/22 2315    Order Status: Completed Specimen: Nasopharyngeal from Nasopharynx Updated: 04/06/22 0027     SARS-CoV-2 by PCR Not Detected        Comment: Not Detected results do not preclude SARS-CoV-2 infection and should not be used as the sole basis for patient management decisions. Results must be combined with clinical observations, patient history, and epidemiological information        Influenza A by PCR Not Detected        Influenza B by PCR Not Detected        Comment: Testing was performed using leah Concepcion SARS-CoV-2 and Influenza A/B nucleic acid assay. This test is a multiplex Real-Time Reverse Transcriptase Polymerase Chain Reaction (RT-PCR) based in vitro diagnostic test intended for the qualitative detection of nucleic acids from SARS-CoV-2, Influenza A, and Influenza B in nasopharyngeal and nasal swab specimens for use under the FDA's Emergency Use Authorization (EUA) only. Fact sheet for Patients: [employer] Fact sheet   for Healthcare Providers: [employer]                  Diagnostics   CXR Results  (Last 48 hours)    None             Assessment/Plan     1. Right index finger osteomyelitis. S/p debridement w isolation of Staph epidermidis from Cx      Decreased index finger swelling, no drainage or ulceration       Remains afebrile w a normal WBC on routine labs       On day # 27/28 of Vanc, CRP normalized at 0.60 and ESR 28 (04/08)       D/c Vanc prior to discharge. Routine labs in the morning      2. UTI, abnormal UA,  Urinary retention: + indwelling fish cath      Urine Cx from 04/07 is negative, Yeast isolated from urine Cx from 04/12      D/c Anidulafungin, start on Fluconazole for yeast coverage x 7 days      4. AMS, h/o metabolic/hepatic encephalopathy, waxing and waning mentation     5.  Ascites, S/p paracentesis on 04/08 w removal of 4,250 ml of clear fluid      Abdomen remains distended but NT      Cindy Santana MD    4/18/2022

## 2022-04-18 NOTE — PROGRESS NOTES
Vancomycin Dosing Consult  vancomycin therapy  Consult ordered by Dr. Chidi Bhat for this 60 y.o. male for indication of Osteomyelitis, sepsis.   Antibiotic regimen: Vancomycin + anidulafungin    Temp (24hrs), Av.5 °F (36.4 °C), Min:97.3 °F (36.3 °C), Max:97.7 °F (36.5 °C)    Recent Labs     22  0617 22  0144   WBC  --  7.3   CREA 1.75* 1.68*   BUN  --  19     Est CrCl: 49 ml/min  Concomitant nephrotoxic drugs: Vasopressors    Cultures:    blood: pending   urine: ngtd - Final   urine: candida albicans - Final    MRSA Swab: Not detected - Final    Target range: Trough 10-15 mcg/mL         Assessment/Plan:   Scr elevated today as compared to yesterday  --Scr trending up for several days  --GRACIA still present and worsening  Will give vancomycin 500 mg IV x 1  Will get random level with tomorrow's labs  Antimicrobial stop date  (per ID)

## 2022-04-18 NOTE — PROGRESS NOTES
Problem: Pressure Injury - Risk of  Goal: *Prevention of pressure injury  Description: Document Curtis Scale and appropriate interventions in the flowsheet. Outcome: Progressing Towards Goal  Note: Pressure Injury Interventions:  Sensory Interventions: Discuss PT/OT consult with provider,Float heels,Minimize linen layers    Moisture Interventions: Absorbent underpads,Check for incontinence Q2 hours and as needed    Activity Interventions: Increase time out of bed,PT/OT evaluation    Mobility Interventions: PT/OT evaluation    Nutrition Interventions: Document food/fluid/supplement intake    Friction and Shear Interventions: HOB 30 degrees or less,Minimize layers                Problem: Patient Education: Go to Patient Education Activity  Goal: Patient/Family Education  Outcome: Progressing Towards Goal     Problem: Falls - Risk of  Goal: *Absence of Falls  Description: Document Elba Fall Risk and appropriate interventions in the flowsheet.   Outcome: Progressing Towards Goal  Note: Fall Risk Interventions:  Mobility Interventions: Bed/chair exit alarm    Mentation Interventions: Bed/chair exit alarm    Medication Interventions: Bed/chair exit alarm    Elimination Interventions: Patient to call for help with toileting needs,Call light in reach,Bed/chair exit alarm

## 2022-04-18 NOTE — PROGRESS NOTES
Problem: Pressure Injury - Risk of  Goal: *Prevention of pressure injury  Description: Document Curtis Scale and appropriate interventions in the flowsheet. 4/18/2022 1030 by Colleen Scott RN  Outcome: Progressing Towards Goal  Note: Pressure Injury Interventions:  Sensory Interventions: Discuss PT/OT consult with provider,Float heels,Minimize linen layers    Moisture Interventions: Absorbent underpads,Check for incontinence Q2 hours and as needed    Activity Interventions: Increase time out of bed,PT/OT evaluation    Mobility Interventions: PT/OT evaluation    Nutrition Interventions: Document food/fluid/supplement intake    Friction and Shear Interventions: HOB 30 degrees or less,Minimize layers             4/18/2022 1005 by Colleen Scott RN  Outcome: Progressing Towards Goal  Note: Pressure Injury Interventions:  Sensory Interventions: Discuss PT/OT consult with provider,Float heels,Minimize linen layers    Moisture Interventions: Absorbent underpads,Check for incontinence Q2 hours and as needed    Activity Interventions: Increase time out of bed,PT/OT evaluation    Mobility Interventions: PT/OT evaluation    Nutrition Interventions: Document food/fluid/supplement intake    Friction and Shear Interventions: HOB 30 degrees or less,Minimize layers                Problem: Patient Education: Go to Patient Education Activity  Goal: Patient/Family Education  4/18/2022 1030 by Colleen Scott RN  Outcome: Progressing Towards Goal  4/18/2022 1005 by Colleen Scott RN  Outcome: Progressing Towards Goal     Problem: Falls - Risk of  Goal: *Absence of Falls  Description: Document Elba Fall Risk and appropriate interventions in the flowsheet.   4/18/2022 1030 by Colleen Scott RN  Outcome: Progressing Towards Goal  Note: Fall Risk Interventions:  Mobility Interventions: Bed/chair exit alarm    Mentation Interventions: Bed/chair exit alarm    Medication Interventions: Bed/chair exit alarm    Elimination Interventions: Patient to call for help with toileting needs,Call light in reach,Bed/chair exit alarm           4/18/2022 1005 by Mervat Ag RN  Outcome: Progressing Towards Goal  Note: Fall Risk Interventions:  Mobility Interventions: Bed/chair exit alarm    Mentation Interventions: Bed/chair exit alarm    Medication Interventions: Bed/chair exit alarm    Elimination Interventions: Patient to call for help with toileting needs,Call light in reach,Bed/chair exit alarm              Problem: Patient Education: Go to Patient Education Activity  Goal: Patient/Family Education  4/18/2022 1030 by Mervat Ag RN  Outcome: Progressing Towards Goal  4/18/2022 1005 by Mervat Ag RN  Outcome: Progressing Towards Goal

## 2022-04-18 NOTE — PROGRESS NOTES
Problem: Pressure Injury - Risk of  Goal: *Prevention of pressure injury  Description: Document Curtis Scale and appropriate interventions in the flowsheet. Outcome: Progressing Towards Goal  Note: Pressure Injury Interventions:  Sensory Interventions: Discuss PT/OT consult with provider,Float heels,Keep linens dry and wrinkle-free    Moisture Interventions: Contain wound drainage,Internal/External fecal devices,Internal/External urinary devices    Activity Interventions: Increase time out of bed,PT/OT evaluation    Mobility Interventions: Float heels,HOB 30 degrees or less    Nutrition Interventions: Offer support with meals,snacks and hydration    Friction and Shear Interventions: Minimize layers                Problem: Patient Education: Go to Patient Education Activity  Goal: Patient/Family Education  Outcome: Progressing Towards Goal     Problem: Falls - Risk of  Goal: *Absence of Falls  Description: Document Elba Fall Risk and appropriate interventions in the flowsheet.   Outcome: Progressing Towards Goal  Note: Fall Risk Interventions:  Mobility Interventions: Bed/chair exit alarm,OT consult for ADLs,PT Consult for mobility concerns    Mentation Interventions: Bed/chair exit alarm    Medication Interventions: Bed/chair exit alarm,Patient to call before getting OOB    Elimination Interventions: Call light in reach,Bed/chair exit alarm              Problem: Patient Education: Go to Patient Education Activity  Goal: Patient/Family Education  Outcome: Progressing Towards Goal     Problem: Aspiration - Risk of  Goal: *Absence of aspiration  Outcome: Progressing Towards Goal     Problem: Patient Education: Go to Patient Education Activity  Goal: Patient/Family Education  Outcome: Progressing Towards Goal     Problem: Patient Education: Go to Patient Education Activity  Goal: Patient/Family Education  Outcome: Progressing Towards Goal     Problem: Impaired Skin Integrity/Pressure Injury Treatment  Goal: *Improvement of Existing Pressure Injury  Outcome: Progressing Towards Goal  Goal: *Prevention of pressure injury  Description: Document Curtis Scale and appropriate interventions in the flowsheet.   Outcome: Progressing Towards Goal  Note: Pressure Injury Interventions:  Sensory Interventions: Discuss PT/OT consult with provider,Float heels,Keep linens dry and wrinkle-free    Moisture Interventions: Contain wound drainage,Internal/External fecal devices,Internal/External urinary devices    Activity Interventions: Increase time out of bed,PT/OT evaluation    Mobility Interventions: Float heels,HOB 30 degrees or less    Nutrition Interventions: Offer support with meals,snacks and hydration    Friction and Shear Interventions: Minimize layers                Problem: Patient Education: Go to Patient Education Activity  Goal: Patient/Family Education  Outcome: Progressing Towards Goal     Problem: Non-Violent Restraints  Goal: Removal from restraints as soon as assessed to be safe  Outcome: Progressing Towards Goal  Goal: No harm/injury to patient while restraints in use  Outcome: Progressing Towards Goal  Goal: Patient's dignity will be maintained  Outcome: Progressing Towards Goal  Goal: Patient Interventions  Outcome: Progressing Towards Goal     Problem: Delirium Treatment  Goal: *Level of consciousness restored to baseline  Outcome: Progressing Towards Goal  Goal: *Level of environmental perceptions restored to baseline  Outcome: Progressing Towards Goal  Goal: *Sensory perception restored to baseline  Outcome: Progressing Towards Goal  Goal: *Emotional stability restored to baseline  Outcome: Progressing Towards Goal  Goal: *Functional assessment restored to baseline  Outcome: Progressing Towards Goal  Goal: *Absence of falls  Outcome: Progressing Towards Goal  Goal: *Will remain free of delirium, CAM Score negative  Outcome: Progressing Towards Goal  Goal: *Cognitive status will be restored to baseline  Outcome: Progressing Towards Goal  Goal: Interventions  Outcome: Progressing Towards Goal     Problem: Patient Education: Go to Patient Education Activity  Goal: Patient/Family Education  Outcome: Progressing Towards Goal     Problem: Patient Education: Go to Patient Education Activity  Goal: Patient/Family Education  Outcome: Progressing Towards Goal     Problem: Patient Education: Go to Patient Education Activity  Goal: Patient/Family Education  Outcome: Progressing Towards Goal

## 2022-04-18 NOTE — PROGRESS NOTES
General Daily Progress Note          Patient Name:   China Mims       YOB: 1960       Age:  60 y.o. Admit Date: 4/7/2022      Subjective:     China Mims is a(n) 60 y.o. male with PMH significant for HTN, cirrhosis, ascites presents via EMS for removing fish catheter. Patient recently d/c after lengthy stay in hospital for septic DIP joint of right index finger. He was d/c on 10 days of PO Zyvox, which he completed. He presents today after pulling out fish catheter. Several blood clots noted. Patient septic upon arrival with hypothermia and hypotension. Temp 93.6 rectal, BP 84/68. Patient is retaining 887cc of urine and nursing staff will replace fish.     Patient started on fluids and IV Levaquin in the ED. Patient is altered at baseline. Head CT w/o contrast shows no acute abnormality. Blood cultures pending. CXR and KUB both normal       Patient is alert awake confused  Hypotensive off  Levophed    Normal temperature    4/12  Patient is off levophed but still receiving midodrine and IVF. He remain hypotensive. He is lethargic and confused this morning. Patient has mild bilateral LE edema. Seen by ID yesterday. Recommends continuing vancomycin   Seen by nephrology yesterday. Recommends discontinuing IVF in lieu of leg edema, discontinue bicarb drip, continuing weekly procrit, continuing PO KCl, midodrine for chronic hypotension. Today patient hypothermic on bear hugger  Yesterday evening patient was restless agitated    4/13    Patient is alert awake still hypothermic on shemar hugger    4/14  Patient is seen sleeping. On shemar hugger and vancomycin. He is producing 250 ml urine. No new changes seen at this time. Labs pending. Cr trending down 1.62 (on 4/13). Abdominal US on 4/13 shows ascites. UA on 4/12 positive for leukocyte esterase, WBC >100 and bacteruria. Urine culture pending.      Patient refused medication this morning    4/15    Patient sleepy obtunded /hypothermic    Not eating drinking    4/18  Patient is seen in medical telemetry with a one to one sitter. He is still confused and refusing to eat food. He is compliant with his medications. Patient had a paracentesis done on 4/15 without any complications. Labs remarkable for Cr 1.75 increasing. CBC and CMP pending. Ammonia unremarkable. Objective:     Visit Vitals  /74 (BP 1 Location: Left upper arm)   Pulse (!) 56   Temp 97.3 °F (36.3 °C)   Resp 16   Ht 5' 10\" (1.778 m)   Wt 194 lb 14.2 oz (88.4 kg)   SpO2 96%   BMI 27.96 kg/m²        Recent Results (from the past 24 hour(s))   AMMONIA    Collection Time: 04/17/22  3:49 PM   Result Value Ref Range    Ammonia, plasma 25 <32 umol/L   CREATININE    Collection Time: 04/18/22  6:17 AM   Result Value Ref Range    Creatinine 1.75 (H) 0.70 - 1.30 mg/dL   VANCOMYCIN, RANDOM    Collection Time: 04/18/22  6:17 AM   Result Value Ref Range    Vancomycin, random 13.4 ug/mL     [unfilled]      Review of Systems    Constitutional: Negative for chills and fever. HENT: Negative. Eyes: Negative. Respiratory: Negative. Cardiovascular: Negative. Gastrointestinal: Negative for abdominal pain and nausea. Skin: Negative. Neurological: Negative. Physical Exam:      Constitutional: pt is oriented to person, place, and time. HENT:   Head: Normocephalic and atraumatic. Eyes: Pupils are equal, round, and reactive to light. EOM are normal.   Cardiovascular: Normal rate, regular rhythm and normal heart sounds. Pulmonary/Chest: Breath sounds normal. No wheezes. No rales. Exhibits no tenderness. Abdominal: Soft. Bowel sounds are normal. There is no abdominal tenderness. There is no rebound and no guarding. Musculoskeletal: Normal range of motion. Neurological: pt is alert and oriented to person, place, and time.    Extremities 2+ edema    IR PARACENTESIS ABD SUBSQ   Final Result   Ultrasound guided paracentesis with no immediate complications. Patient tolerated the procedure well. XR CHEST PORT   Final Result   Endotracheal tube as above. Underexpanded lungs with bibasilar   atelectasis. Hydrostatic edema. Possible pleural effusions. US ABD LTD   Final Result   Ascites. XR HAND RT MIN 3 V   Final Result      CT HEAD WO CONT   Final Result   Age-appropriate atrophy. No acute findings. XR ABD (KUB)   Final Result   Within normal      XR CHEST PORT   Final Result   Findings/impression:   1. Low lung volumes. Bibasilar hypoventilatory change/atelectasis. Left   retrocardiac lung incompletely evaluated, cannot exclude underlying airspace   disease. 2.  Cardiac and mediastinal contours are obscured by low lung volumes. Ectatic/possible aneurysmal appearance transverse aorta with calcific   atherosclerotic plaque. Appears stable from prior radiograph. 3.  No pleural fluid. No pneumothorax. 4.  Prominent gas-filled bowel partially visualized in the left upper quadrant.          IR INSERT NON TUNL CVC OVER 5 YRS    (Results Pending)   IR PARACENTESIS ABD W IMAGE    (Results Pending)   IR US GUIDED VASCULAR ACCESS    (Results Pending)   IR FLUORO GUIDE PLC CVAD    (Results Pending)        Recent Results (from the past 24 hour(s))   AMMONIA    Collection Time: 04/17/22  3:49 PM   Result Value Ref Range    Ammonia, plasma 25 <32 umol/L   CREATININE    Collection Time: 04/18/22  6:17 AM   Result Value Ref Range    Creatinine 1.75 (H) 0.70 - 1.30 mg/dL   VANCOMYCIN, RANDOM    Collection Time: 04/18/22  6:17 AM   Result Value Ref Range    Vancomycin, random 13.4 ug/mL       Results     Procedure Component Value Units Date/Time    CULTURE, URINE [[id number]]  (Abnormal) Collected: 04/12/22 2300    Order Status: Completed Specimen: Urine Updated: 04/16/22 1331     Special Requests: No Special Requests        Pierz Count --        >100,000  colonies/ml       Culture result: Candida albicans       CULTURE, URINE [[id number]] Collected: 04/12/22 2300    Order Status: Canceled Specimen: Urine     CULTURE, URINE [[id number]] Collected: 04/07/22 1502    Order Status: Completed Specimen: Urine Updated: 04/09/22 1414     Special Requests: --        No Special Requests  Reflexed from [id number]       Culture result: No Growth (<1000 cfu/mL)       MRSA SCREEN - PCR (NASAL) [[id number]] Collected: 04/07/22 1345    Order Status: Completed Specimen: Swab Updated: 04/07/22 1516     MRSA by PCR, Nasal Not Detected       CULTURE, BLOOD, PAIRED [[id number]] Collected: 04/07/22 1001    Order Status: Completed Specimen: Blood Updated: 04/07/22 1017    C. DIFFICILE (DNA) [[id number]] Collected: 04/06/22 0919    Order Status: Completed Specimen: Stool Updated: 04/06/22 1056     C. difficile (DNA) Negative       COVID-19 WITH INFLUENZA A/B [[id number]] Collected: 04/05/22 2315    Order Status: Completed Specimen: Nasopharyngeal from Nasopharynx Updated: 04/06/22 0027     SARS-CoV-2 by PCR Not Detected        Comment: Not Detected results do not preclude SARS-CoV-2 infection and should not be used as the sole basis for patient management decisions. Results must be combined with clinical observations, patient history, and epidemiological information        Influenza A by PCR Not Detected        Influenza B by PCR Not Detected        Comment: Testing was performed using leah Concepcion SARS-CoV-2 and Influenza A/B nucleic acid assay. This test is a multiplex Real-Time Reverse Transcriptase Polymerase Chain Reaction (RT-PCR) based in vitro diagnostic test intended for the qualitative detection of nucleic acids from SARS-CoV-2, Influenza A, and Influenza B in nasopharyngeal and nasal swab specimens for use under the FDA's Emergency Use Authorization (EUA) only.    Fact sheet for Patients: FindDrives.pl Fact sheet   for Healthcare Providers: FindDrives[employer]pl                Labs:     Recent Labs 04/17/22  0144   WBC 7.3   HGB 10.1*   HCT 28.9*   PLT 58*     Recent Labs     04/18/22  0617 04/17/22  0144   NA  --  139   K  --  3.6   CL  --  111*   CO2  --  20*   BUN  --  19   CREA 1.75* 1.68*   GLU  --  95   CA  --  8.6   PHOS  --  3.6     Recent Labs     04/17/22 0144   ALB 2.2*     No results for input(s): INR, PTP, APTT, INREXT, INREXT in the last 72 hours. No results for input(s): FE, TIBC, PSAT, FERR in the last 72 hours. No results found for: FOL, RBCF   No results for input(s): PH, PCO2, PO2 in the last 72 hours. No results for input(s): CPK, CKNDX, TROIQ in the last 72 hours.     No lab exists for component: CPKMB  No results found for: CHOL, CHOLX, CHLST, CHOLV, HDL, HDLP, LDL, LDLC, DLDLP, TGLX, TRIGL, TRIGP, CHHD, CHHDX  Lab Results   Component Value Date/Time    Glucose (POC) 80 04/08/2022 10:03 AM    Glucose (POC) 84 04/04/2022 05:40 PM    Glucose (POC) 107 04/03/2022 11:12 AM    Glucose (POC) 111 03/31/2022 11:35 AM    Glucose (POC) 91 03/29/2022 11:10 AM     Lab Results   Component Value Date/Time    Color Yellow/Straw 04/12/2022 11:00 PM    Appearance Turbid (A) 04/12/2022 11:00 PM    Specific gravity 1.011 04/12/2022 11:00 PM    pH (UA) 5.0 04/12/2022 11:00 PM    Protein Negative 04/12/2022 11:00 PM    Glucose Negative 04/12/2022 11:00 PM    Ketone Negative 04/12/2022 11:00 PM    Bilirubin Negative 04/12/2022 11:00 PM    Urobilinogen 0.1 04/12/2022 11:00 PM    Nitrites Negative 04/12/2022 11:00 PM    Leukocyte Esterase Large (A) 04/12/2022 11:00 PM    Bacteria 1+ (A) 04/12/2022 11:00 PM    WBC >100 (H) 04/12/2022 11:00 PM    RBC  04/12/2022 11:00 PM         Assessment:     Sepsis  UTI  Lactic acidosis  Acute on chronic renal failure- creat 2.01  Cirrhosis with ascites  Anemia  History of septic DIP joint, right index finger  Hypothyroidism  Seizure disorder  Gout  GERD  History of etoh abuse  Hypotension  Metabolic acidosis  Hypothermia  Hypokalemia  Anemia monitor H&H  Abdominal distention/ascites    Plan:      Nakul singleton    On allopurinol 300 mg daily  Vitamin D 50,000 weekly  Pepcid 20 twice daily  Folic acid 1 mg daily  Lactulose 10 g 3 times a day  Keppra 1500 twice a day  Synthroid 25 mcg daily  Midodrine 10 mg 3 times a day  Propranolol 20 mg twice a day  Sodium bicarb 1303 times a day  Flomax 0.4 mg daily  Thiamine 100 mg daily  Vancomycin with pharmacy  Replace potassium  Discontinue IV Zosyn  Eraxis 100 mg daily   Discontinue Sodium bicarb drip    We will do psych consult for psychosis patient receiving Geodon  Psychotic behavior    Discussed with patient nurse      Current Facility-Administered Medications:     VANCOMYCIN RANDOM LAB REMINDER, , Other, ONCE, Ree Zaragoza MD    anidulafungin (ERAXIS) 100 mg in 0.9% sodium chloride 130 mL IVPB, 100 mg, IntraVENous, Q24H, Richard Rapp MD    0.9% sodium chloride infusion, 50 mL/hr, IntraVENous, CONTINUOUS, Paola Monsalve MD, Last Rate: 50 mL/hr at 04/17/22 1609, 50 mL/hr at 04/17/22 1609    hydrOXYzine (VISTARIL) injection 50 mg, 50 mg, IntraMUSCular, Q8H PRN, Eddie DENIS MD    fludrocortisone (FLORINEF) tablet 0.1 mg, 0.1 mg, Oral, DAILY, Ree Zaragoza MD, 0.1 mg at 04/15/22 1820    0.9% sodium chloride infusion 250 mL, 250 mL, IntraVENous, PRN, Jackeline Ackerman MD    ziprasidone (GEODON) 10 mg in sterile water (preservative free) 0.5 mL injection, 10 mg, IntraMUSCular, Q12H PRN, Dony Em MD, 10 mg at 04/16/22 2217    levETIRAcetam (KEPPRA) 1500 mg in saline (iso-osm) 100 ml IVPB, 1,500 mg, IntraVENous, Q12H, Lamberto Ackerman MD, Last Rate: 400 mL/hr at 04/18/22 0132, 1,500 mg at 04/18/22 0132    0.9% sodium chloride infusion 250 mL, 250 mL, IntraVENous, PRN, Lamberto Ackerman MD    epoetin luis-epbx (RETACRIT) injection 10,000 Units, 10,000 Units, SubCUTAneous, Q7D, Paola Monsalve MD, 10,000 Units at 04/16/22 1912    midodrine (PROAMATINE) tablet 10 mg, 10 mg, Oral, TID WITH MEALS, Lise Newell MD, 10 mg at 04/17/22 1700    sodium bicarbonate tablet 1,300 mg, 1,300 mg, Oral, TID, Marcello Chan MD, 1,300 mg at 04/16/22 2132    LORazepam (ATIVAN) injection 1 mg, 1 mg, IntraVENous, Q4H PRN, Lise Newell MD, 1 mg at 04/08/22 1733    sodium chloride (NS) flush 5-10 mL, 5-10 mL, IntraVENous, PRN, Lise Newell MD, 10 mL at 04/15/22 2354    lactulose (CHRONULAC) 10 gram/15 mL solution 15 mL, 10 g, Oral, TID, Lise Newell MD, 15 mL at 04/16/22 2128    tamsulosin (FLOMAX) capsule 0.4 mg, 0.4 mg, Oral, DAILY, Lamberto Ackerman MD, 0.4 mg at 04/15/22 1132    thiamine mononitrate (B-1) tablet 100 mg, 100 mg, Oral, DAILY, Lise Newell MD, 100 mg at 04/15/22 1132    allopurinoL (ZYLOPRIM) tablet 300 mg, 300 mg, Oral, DAILY, Lise Newell MD, 300 mg at 04/15/22 1132    ergocalciferol capsule 50,000 Units, 50,000 Units, Oral, Q7D, Lise Newell MD, 50,000 Units at 04/14/22 1606    famotidine (PEPCID) tablet 20 mg, 20 mg, Oral, BID, Lise Newell MD, 20 mg at 58/44/14 7566    folic acid (FOLVITE) tablet 1 mg, 1 mg, Oral, DAILY, Lise Newell MD, 1 mg at 04/15/22 1132    levothyroxine (SYNTHROID) tablet 25 mcg, 25 mcg, Oral, ACB, Lamberto Ackerman MD, 25 mcg at 04/15/22 1132    propranoloL (INDERAL) tablet 20 mg, 20 mg, Oral, BID, Lamberto Ackerman MD, 20 mg at 04/16/22 2130    acetaminophen (TYLENOL) tablet 650 mg, 650 mg, Oral, Q6H PRN **OR** acetaminophen (TYLENOL) suppository 650 mg, 650 mg, Rectal, Q6H PRN, Lamberto Ackerman MD    polyethylene glycol (MIRALAX) packet 17 g, 17 g, Oral, DAILY PRN, Lamberto Ackerman MD    ondansetron (ZOFRAN ODT) tablet 4 mg, 4 mg, Oral, Q8H PRN **OR** ondansetron (ZOFRAN) injection 4 mg, 4 mg, IntraVENous, Q6H PRN, Srini Ackerman MD    VANCOMYCIN INFORMATION NOTE, , Other, Rx Dosing/Monitoring, Yocasta Carpio MD    NOREPINephrine (LEVOPHED) 8 mg in 0.9% NS 250ml infusion, 0.5-120 mcg/min, IntraVENous, TITRATE, Dominique Jc MD, Stopped at 04/11/22 5153

## 2022-04-18 NOTE — PROGRESS NOTES
General Daily Progress Note          Patient Name:   Lexy Bhatia       YOB: 1960       Age:  60 y.o. Admit Date: 4/7/2022      Subjective:     Lexy Bhatia is a(n) 60 y.o. male with PMH significant for HTN, cirrhosis, ascites presents via EMS for removing fish catheter. Patient recently d/c after lengthy stay in hospital for septic DIP joint of right index finger. He was d/c on 10 days of PO Zyvox, which he completed. He presents today after pulling out fish catheter. Several blood clots noted. Patient septic upon arrival with hypothermia and hypotension. Temp 93.6 rectal, BP 84/68. Patient is retaining 887cc of urine and nursing staff will replace fish.     Patient started on fluids and IV Levaquin in the ED. Patient is altered at baseline. Head CT w/o contrast shows no acute abnormality. Blood cultures pending. CXR and KUB both normal       Patient is alert awake confused  Hypotensive off  Levophed    Normal temperature    4/12  Patient is off levophed but still receiving midodrine and IVF. He remain hypotensive. He is lethargic and confused this morning. Patient has mild bilateral LE edema. Seen by ID yesterday. Recommends continuing vancomycin   Seen by nephrology yesterday. Recommends discontinuing IVF in lieu of leg edema, discontinue bicarb drip, continuing weekly procrit, continuing PO KCl, midodrine for chronic hypotension. Today patient hypothermic on bear hugger  Yesterday evening patient was restless agitated    4/13    Patient is alert awake still hypothermic on shemar hugger    4/14  Patient is seen sleeping. On shemar hugger and vancomycin. He is producing 250 ml urine. No new changes seen at this time. Labs pending. Cr trending down 1.62 (on 4/13). Abdominal US on 4/13 shows ascites. UA on 4/12 positive for leukocyte esterase, WBC >100 and bacteruria. Urine culture pending.      Patient refused medication this morning    4/15    Patient sleepy obtunded /hypothermic    Not eating drinking    4/18  Patient is seen in medical telemetry with a one to one sitter. He is still confused and refusing to eat food. He is compliant with his medications  He pulled out NG tube 3 times. Patient had a paracentesis done on 4/15 without any complications. Labs remarkable for Cr 1.75 increasing. CBC and CMP pending. Ammonia unremarkable. Objective:     Visit Vitals  /74   Pulse (!) 56   Temp 97.3 °F (36.3 °C)   Resp 16   Ht 5' 10\" (1.778 m)   Wt 88.4 kg (194 lb 14.2 oz)   SpO2 96%   BMI 27.96 kg/m²        Recent Results (from the past 24 hour(s))   AMMONIA    Collection Time: 04/17/22  3:49 PM   Result Value Ref Range    Ammonia, plasma 25 <32 umol/L   CREATININE    Collection Time: 04/18/22  6:17 AM   Result Value Ref Range    Creatinine 1.75 (H) 0.70 - 1.30 mg/dL   VANCOMYCIN, RANDOM    Collection Time: 04/18/22  6:17 AM   Result Value Ref Range    Vancomycin, random 13.4 ug/mL     [unfilled]      Review of Systems    Constitutional: Negative for chills and fever. HENT: Negative. Eyes: Negative. Respiratory: Negative. Cardiovascular: Negative. Gastrointestinal: Negative for abdominal pain and nausea. Skin: Negative. Neurological: Negative. Physical Exam:      Constitutional: pt is oriented to person, place, and time. HENT:   Head: Normocephalic and atraumatic. Eyes: Pupils are equal, round, and reactive to light. EOM are normal.   Cardiovascular: Normal rate, regular rhythm and normal heart sounds. Pulmonary/Chest: Breath sounds normal. No wheezes. No rales. Exhibits no tenderness. Abdominal: Soft. Bowel sounds are normal. There is no abdominal tenderness. There is no rebound and no guarding. Musculoskeletal: Normal range of motion. Neurological: pt is alert and oriented to person, place, and time.    Extremities 2+ edema    IR PARACENTESIS ABD SUBSQ   Final Result   Ultrasound guided paracentesis with no immediate complications. Patient tolerated the procedure well. XR CHEST PORT   Final Result   Endotracheal tube as above. Underexpanded lungs with bibasilar   atelectasis. Hydrostatic edema. Possible pleural effusions. US ABD LTD   Final Result   Ascites. XR HAND RT MIN 3 V   Final Result      CT HEAD WO CONT   Final Result   Age-appropriate atrophy. No acute findings. XR ABD (KUB)   Final Result   Within normal      XR CHEST PORT   Final Result   Findings/impression:   1. Low lung volumes. Bibasilar hypoventilatory change/atelectasis. Left   retrocardiac lung incompletely evaluated, cannot exclude underlying airspace   disease. 2.  Cardiac and mediastinal contours are obscured by low lung volumes. Ectatic/possible aneurysmal appearance transverse aorta with calcific   atherosclerotic plaque. Appears stable from prior radiograph. 3.  No pleural fluid. No pneumothorax. 4.  Prominent gas-filled bowel partially visualized in the left upper quadrant.          IR INSERT NON TUNL CVC OVER 5 YRS    (Results Pending)   IR PARACENTESIS ABD W IMAGE    (Results Pending)   IR US GUIDED VASCULAR ACCESS    (Results Pending)   IR FLUORO GUIDE PLC CVAD    (Results Pending)        Recent Results (from the past 24 hour(s))   AMMONIA    Collection Time: 04/17/22  3:49 PM   Result Value Ref Range    Ammonia, plasma 25 <32 umol/L   CREATININE    Collection Time: 04/18/22  6:17 AM   Result Value Ref Range    Creatinine 1.75 (H) 0.70 - 1.30 mg/dL   VANCOMYCIN, RANDOM    Collection Time: 04/18/22  6:17 AM   Result Value Ref Range    Vancomycin, random 13.4 ug/mL       Results     Procedure Component Value Units Date/Time    CULTURE, URINE [[id number]]  (Abnormal) Collected: 04/12/22 2300    Order Status: Completed Specimen: Urine Updated: 04/16/22 1331     Special Requests: No Special Requests        [place] Count --        >100,000  colonies/ml       Culture result: Candida albicans       CULTURE, URINE [[id number]] Collected: 04/12/22 2300    Order Status: Canceled Specimen: Urine     CULTURE, URINE [[id number]] Collected: 04/07/22 1502    Order Status: Completed Specimen: Urine Updated: 04/09/22 1414     Special Requests: --        No Special Requests  Reflexed from [id number]       Culture result: No Growth (<1000 cfu/mL)       MRSA SCREEN - PCR (NASAL) [[id number]] Collected: 04/07/22 1345    Order Status: Completed Specimen: Swab Updated: 04/07/22 1516     MRSA by PCR, Nasal Not Detected       CULTURE, BLOOD, PAIRED [[id number]] Collected: 04/07/22 1001    Order Status: Completed Specimen: Blood Updated: 04/07/22 1017    C. DIFFICILE (DNA) [[id number]] Collected: 04/06/22 0919    Order Status: Completed Specimen: Stool Updated: 04/06/22 1056     C. difficile (DNA) Negative       COVID-19 WITH INFLUENZA A/B [[id number]] Collected: 04/05/22 2315    Order Status: Completed Specimen: Nasopharyngeal from Nasopharynx Updated: 04/06/22 0027     SARS-CoV-2 by PCR Not Detected        Comment: Not Detected results do not preclude SARS-CoV-2 infection and should not be used as the sole basis for patient management decisions. Results must be combined with clinical observations, patient history, and epidemiological information        Influenza A by PCR Not Detected        Influenza B by PCR Not Detected        Comment: Testing was performed using leah Concepcion SARS-CoV-2 and Influenza A/B nucleic acid assay. This test is a multiplex Real-Time Reverse Transcriptase Polymerase Chain Reaction (RT-PCR) based in vitro diagnostic test intended for the qualitative detection of nucleic acids from SARS-CoV-2, Influenza A, and Influenza B in nasopharyngeal and nasal swab specimens for use under the FDA's Emergency Use Authorization (EUA) only.    Fact sheet for Patients: FindDrives.pl Fact sheet   for Healthcare Providers: FindDrives.pl                Labs:     Recent Labs 04/17/22  0144   WBC 7.3   HGB 10.1*   HCT 28.9*   PLT 58*     Recent Labs     04/18/22  0617 04/17/22  0144   NA  --  139   K  --  3.6   CL  --  111*   CO2  --  20*   BUN  --  19   CREA 1.75* 1.68*   GLU  --  95   CA  --  8.6   PHOS  --  3.6     Recent Labs     04/17/22 0144   ALB 2.2*     No results for input(s): INR, PTP, APTT, INREXT, INREXT in the last 72 hours. No results for input(s): FE, TIBC, PSAT, FERR in the last 72 hours. No results found for: FOL, RBCF   No results for input(s): PH, PCO2, PO2 in the last 72 hours. No results for input(s): CPK, CKNDX, TROIQ in the last 72 hours.     No lab exists for component: CPKMB  No results found for: CHOL, CHOLX, CHLST, CHOLV, HDL, HDLP, LDL, LDLC, DLDLP, TGLX, TRIGL, TRIGP, CHHD, CHHDX  Lab Results   Component Value Date/Time    Glucose (POC) 80 04/08/2022 10:03 AM    Glucose (POC) 84 04/04/2022 05:40 PM    Glucose (POC) 107 04/03/2022 11:12 AM    Glucose (POC) 111 03/31/2022 11:35 AM    Glucose (POC) 91 03/29/2022 11:10 AM     Lab Results   Component Value Date/Time    Color Yellow/Straw 04/12/2022 11:00 PM    Appearance Turbid (A) 04/12/2022 11:00 PM    Specific gravity 1.011 04/12/2022 11:00 PM    pH (UA) 5.0 04/12/2022 11:00 PM    Protein Negative 04/12/2022 11:00 PM    Glucose Negative 04/12/2022 11:00 PM    Ketone Negative 04/12/2022 11:00 PM    Bilirubin Negative 04/12/2022 11:00 PM    Urobilinogen 0.1 04/12/2022 11:00 PM    Nitrites Negative 04/12/2022 11:00 PM    Leukocyte Esterase Large (A) 04/12/2022 11:00 PM    Bacteria 1+ (A) 04/12/2022 11:00 PM    WBC >100 (H) 04/12/2022 11:00 PM    RBC  04/12/2022 11:00 PM         Assessment:     Sepsis  UTI/Candida  Lactic acidosis  Acute on chronic renal failure- creat 2.01  Cirrhosis with ascites  Anemia  History of septic DIP joint, right index finger  Hypothyroidism  Seizure disorder  Gout  GERD  History of etoh abuse  Hypotension  Metabolic acidosis  Hypothermia  Hypokalemia  Anemia monitor H&H  Abdominal distention/ascites    Plan: Nakul singleton    On allopurinol 300 mg daily  Vitamin D 50,000 weekly  Pepcid 20 twice daily  Folic acid 1 mg daily  Lactulose 10 g 3 times a day  Keppra 1500 twice a day  Synthroid 25 mcg daily  Midodrine 10 mg 3 times a day  Propranolol 20 mg twice a day  Sodium bicarb 1303 times a day  Flomax 0.4 mg daily  Thiamine 100 mg daily  Vancomycin with pharmacy  Replace potassium  Discontinue IV Zosyn  Eraxis 100 mg daily   Discontinue Sodium bicarb drip    We will do psych consult for psychosis patient receiving Geodon  Psychotic behavior    Discussed with patient nurse  Will do GI consult for PEG tube feeding as patient not eating drinking        Current Facility-Administered Medications:     [START ON 4/19/2022] Vancomycin random level - please draw with AM labs.  Thanks!, , Other, ONCE, Guerda Gu MD    anidulafungin (ERAXIS) 100 mg in 0.9% sodium chloride 130 mL IVPB, 100 mg, IntraVENous, Q24H, Richard Rapp MD    0.9% sodium chloride infusion, 50 mL/hr, IntraVENous, CONTINUOUS, Zach Julio MD, Last Rate: 50 mL/hr at 04/17/22 1609, 50 mL/hr at 04/17/22 1609    hydrOXYzine (VISTARIL) injection 50 mg, 50 mg, IntraMUSCular, Q8H PRN, Randall DENIS MD    fludrocortisone (FLORINEF) tablet 0.1 mg, 0.1 mg, Oral, DAILY, Ree Zaragoza MD, 0.1 mg at 04/18/22 0900    0.9% sodium chloride infusion 250 mL, 250 mL, IntraVENous, PRN, Lino Ackerman MD    ziprasidone (GEODON) 10 mg in sterile water (preservative free) 0.5 mL injection, 10 mg, IntraMUSCular, Q12H PRN, Sam Gardner MD, 10 mg at 04/16/22 2217    levETIRAcetam (KEPPRA) 1500 mg in saline (iso-osm) 100 ml IVPB, 1,500 mg, IntraVENous, Q12H, Lamberto Ackerman MD, Last Rate: 400 mL/hr at 04/18/22 0132, 1,500 mg at 04/18/22 0132    0.9% sodium chloride infusion 250 mL, 250 mL, IntraVENous, PRN, Lamberto Ackerman MD    epoetin luis-epbx (RETACRIT) injection 10,000 Units, 10,000 Units, SubCUTAneous, Q7D, Janeth Fernandez MD, 10,000 Units at 04/16/22 1912    midodrine (PROAMATINE) tablet 10 mg, 10 mg, Oral, TID WITH MEALS, Tommy Ackerman MD, 10 mg at 04/18/22 9622    sodium bicarbonate tablet 1,300 mg, 1,300 mg, Oral, TID, aJneth Fernandez MD, 1,300 mg at 04/18/22 0900    LORazepam (ATIVAN) injection 1 mg, 1 mg, IntraVENous, Q4H PRN, Lamberto Ackerman MD, 1 mg at 04/08/22 1733    sodium chloride (NS) flush 5-10 mL, 5-10 mL, IntraVENous, PRN, Lamberto Ackerman MD, 10 mL at 04/15/22 2354    lactulose (CHRONULAC) 10 gram/15 mL solution 15 mL, 10 g, Oral, TID, Lamberto Ackerman MD, 15 mL at 04/16/22 2128    tamsulosin (FLOMAX) capsule 0.4 mg, 0.4 mg, Oral, DAILY, Lamberto Ackerman MD, 0.4 mg at 04/15/22 1132    thiamine mononitrate (B-1) tablet 100 mg, 100 mg, Oral, DAILY, Nasrin Robles MD, 100 mg at 04/18/22 0900    allopurinoL (ZYLOPRIM) tablet 300 mg, 300 mg, Oral, DAILY, Lamberto Ackerman MD, 300 mg at 04/18/22 0900    ergocalciferol capsule 50,000 Units, 50,000 Units, Oral, Q7D, Lamberto Ackerman MD, 50,000 Units at 04/14/22 1606    famotidine (PEPCID) tablet 20 mg, 20 mg, Oral, BID, Lamberto Ackerman MD, 20 mg at 21/43/50 9978    folic acid (FOLVITE) tablet 1 mg, 1 mg, Oral, DAILY, Lamberto Ackerman MD, 1 mg at 04/18/22 0900    levothyroxine (SYNTHROID) tablet 25 mcg, 25 mcg, Oral, ACB, Lamberto Ackerman MD, 25 mcg at 04/15/22 1132    propranoloL (INDERAL) tablet 20 mg, 20 mg, Oral, BID, Lamberto Ackerman MD, 20 mg at 04/16/22 2130    acetaminophen (TYLENOL) tablet 650 mg, 650 mg, Oral, Q6H PRN **OR** acetaminophen (TYLENOL) suppository 650 mg, 650 mg, Rectal, Q6H PRN, Lamberto Ackerman MD    polyethylene glycol (MIRALAX) packet 17 g, 17 g, Oral, DAILY PRN, Lamberto Ackerman MD    ondansetron (ZOFRAN ODT) tablet 4 mg, 4 mg, Oral, Q8H PRN **OR** ondansetron (ZOFRAN) injection 4 mg, 4 mg, IntraVENous, Q6H PRN, Tommy Ackerman MD    VANCOMYCIN INFORMATION NOTE, , Other, Rx Dosing/Monitoring, Samm Almanza MD    NOREPINephrine (LEVOPHED) 8 mg in 0.9% NS 250ml infusion, 0.5-120 mcg/min, IntraVENous, TITRATE, Cassandra Ackerman MD, Stopped at 04/11/22 0103

## 2022-04-18 NOTE — PROGRESS NOTES
Renal Note    NAME:  Rashaun Mixon   :   1960   MRN:   [id number]     ATTENDING: Sade Taylor MD  PCP:  Lisette Blake NP    Date/Time:  2022 12:46 PM      Subjective:     Patient seen in the room. Has been transferred out of the ICU  On IVF. Creatinine 1.7 today. Past Medical History:   Diagnosis Date    Arthritis     Hypertension       Past Surgical History:   Procedure Laterality Date    IR INSERT NON TUNL CVC OVER 5 YRS  3/25/2022    IR PARACENTESIS ABD SUBSQ  4/15/2022    IR PARACENTESIS ABD W IMAGE  2022    IR PARACENTESIS ABD W IMAGE  3/1/2022    IR PARACENTESIS ABD W IMAGE  3/30/2022     Social History     Tobacco Use    Smoking status: Never Smoker    Smokeless tobacco: Never Used   Substance Use Topics    Alcohol use: Not on file      No family history on file. No Known Allergies   Prior to Admission medications    Medication Sig Start Date End Date Taking? Authorizing Provider   thiamine mononitrate (B-1) 100 mg tablet Take 1 Tablet by mouth daily. 22   Kobe Ackerman MD   levothyroxine (SYNTHROID) 25 mcg tablet Take 25 mcg by mouth Daily (before breakfast). Provider, Historical   tamsulosin (Flomax) 0.4 mg capsule Take 1 Capsule by mouth daily. 3/4/22   Oliver Whyte PA-C   potassium chloride (K-DUR, KLOR-CON M20) 20 mEq tablet Take 1 Tablet by mouth daily. 3/2/22   David Sauceda MD   sodium bicarbonate 650 mg tablet Take 1 Tablet by mouth three (3) times daily. 3/2/22   David Sauceda MD   levETIRAcetam (Keppra) 1,000 mg tablet Take 1.5 Tablets by mouth two (2) times a day. 3/2/22   David Sauceda MD   ergocalciferol (ERGOCALCIFEROL) 1,250 mcg (50,000 unit) capsule Take 1 Capsule by mouth every seven (7) days. 22   Provider, Historical   lactulose (CHRONULAC) 10 gram/15 mL solution Take 15 mL by mouth three (3) times daily.  22   Kobe Ackerman MD   allopurinoL (ZYLOPRIM) 300 mg tablet Take 1 Tablet by mouth daily. 22   Provider, Historical   thiamine HCL (B-1) 100 mg tablet Take 100 mg by mouth daily. Provider, Historical   propranoloL (INDERAL) 20 mg tablet Take 20 mg by mouth two (2) times a day. Provider, Historical   folic acid (FOLVITE) 1 mg tablet Take 1 mg by mouth daily. Provider, Historical   famotidine (PEPCID) 20 mg tablet Take 20 mg by mouth At bedtime. Provider, Historical   aMILoride (MIDAMOR) 5 mg tablet Take 5 mg by mouth daily. Provider, Historical       REVIEW OF SYSTEMS:       She is lethargic. To obtain    Objective:   VITALS:    Visit Vitals  /74   Pulse (!) 56   Temp 97.3 °F (36.3 °C)   Resp 16   Ht 5' 10\" (1.778 m)   Wt 88.4 kg (194 lb 14.2 oz)   SpO2 96%   BMI 27.96 kg/m²     Temp (24hrs), Av.5 °F (36.4 °C), Min:97.3 °F (36.3 °C), Max:97.7 °F (36.5 °C)      PHYSICAL EXAM:     General: NAD, lethargic eyes: sclera anicteric  Oral Cavity: No thrush or ulcers  Neck: no JVD  Chest: Fair bilateral air entry. No Wheezing or Rhonchi. No rales. Heart: normal sounds  Abdomen: soft and non tender   :  Gerard+  Lower Extremities: no edema  Skin: no rash  Neuro: Lethargic psychiatric: Able to assess      LAB DATA REVIEWED:    Recent Results (from the past 24 hour(s))   AMMONIA    Collection Time: 22  3:49 PM   Result Value Ref Range    Ammonia, plasma 25 <32 umol/L   CREATININE    Collection Time: 22  6:17 AM   Result Value Ref Range    Creatinine 1.75 (H) 0.70 - 1.30 mg/dL   VANCOMYCIN, RANDOM    Collection Time: 22  6:17 AM   Result Value Ref Range    Vancomycin, random 13.4 ug/mL       Recommendations/Plan:     1 acute kidney injury on chronic kidney disease 3:  -Creatinine was 2.0 on admission   -Creatinine has improved to 1.4  . Again increased to 1.7.   Restarted IV fluids  -Baseline creatinine 1.3-1.7 based on labs during previous admission  -Has history of recent GRACIA with creatinine peaking to 2.6 few weeks ago  -GRACIA likely prerenal secondary to hypotension/sepsis  -Urine is suggestive of UTI  -No need for renal ultrasound  -Continue Gerard catheter. Urine output is improving  -Clinically noticed legs edema. 2 metabolic acidosis:  -CO2 improved to 20  -on oral bicarbonate 1300 TID  -off HCO3 drip     3 severe anemia  -Hemoglobin 6.8->7.4->8.2.->10.1  -s/p  unit blood Tx  -Continue weekly Procrit    4  Hypokalemia  -Potassium is 3.4->3.6.    -po kcl 40 meq x 1 received     5 SIRS/suspected sepsis  -Likely secondary to UTI. Recent UA was abnormal  -Lactic acid is elevated  -on iv vancomycin  -Continue midodrine 10 mg 3 times daily for chronic hypotension     5 right index finger osteomyelitis  -s/p recent antibiotics for 2 weeks. -Vancomycin has been resumed during this hospitalization  -ID is on the case    6 history of liver cirrhosis  -Has history of alcoholic liver cirrhosis. Portal hypertension  -Noted to have abdominal distention.   IR has been consulted  -Had paracentesis during his previous admission also      ________________________________________________________________________  Signed: Vandana Jhaveri MD

## 2022-04-18 NOTE — PROGRESS NOTES
CM reviewed chart. Patient is pending GI consult for possible PEG tube placement. [place] [facility] [place] to [place] there as long term care resident at discharge. CM will continue to follow.

## 2022-04-19 NOTE — PROGRESS NOTES
Infectious Disease Progress Note           Subjective:   Doing well, more alert and following commands today, no acute events since last seen   Objective:   Physical Exam:     Visit Vitals  /68   Pulse 68   Temp 97.3 °F (36.3 °C)   Resp 19   Ht 5' 10\" (1.778 m)   Wt 203 lb 4.2 oz (92.2 kg)   SpO2 100%   BMI 29.17 kg/m²      O2 Device: None (Room air)    Temp (24hrs), Av.3 °F (36.3 °C), Min:97.3 °F (36.3 °C), Max:97.4 °F (36.3 °C)    No intake/output data recorded.     1901 -  0700  In: -   Out: 275 [Urine:275]    General: NAD, alert, confused   HEENT: HEIDY, Moist mucosa   Lungs: CTA b/l, decreased at the bases, no wheeze/rhonchi   Heart: S1S2+, RRR, no murmur  Abdo: Soft, distended, NT, +BS   : + fish cath, clear urine in bag and tubing   Exts: Decreased right index finger swelling, no drainage   Skin: No wounds, No rashes or lesions    Data Review:       Recent Days:  Recent Labs     22  0332 22  0144   WBC 6.5 7.3   HGB 9.7* 10.1*   HCT 28.3* 28.9*   PLT 72* 58*     Recent Labs     22  0415 22  0617 22  0144   BUN 19  --  19   CREA 1.82* 1.75* 1.68*       Lab Results   Component Value Date/Time    C-Reactive protein 0.60 2022 05:15 AM          Microbiology     Results     Procedure Component Value Units Date/Time    CULTURE, URINE [[id number]]  (Abnormal) Collected: 22 230    Order Status: Completed Specimen: Urine Updated: 22 1331     Special Requests: No Special Requests        [place] Count --        >100,000  colonies/ml       Culture result: Candida albicans       CULTURE, URINE [[id number]] Collected: 22 230    Order Status: Canceled Specimen: Urine     CULTURE, URINE [[id number]] Collected: 22 1502    Order Status: Completed Specimen: Urine Updated: 22 1414     Special Requests: --        No Special Requests  Reflexed from [id number]       Culture result: No Growth (<1000 cfu/mL)       MRSA SCREEN - PCR (NASAL) [[id number]] Collected: 04/07/22 1345    Order Status: Completed Specimen: Swab Updated: 04/07/22 1516     MRSA by PCR, Nasal Not Detected       CULTURE, BLOOD, PAIRED [[id number]] Collected: 04/07/22 1001    Order Status: Completed Specimen: Blood Updated: 04/07/22 1017    C. DIFFICILE (DNA) [[id number]] Collected: 04/06/22 0919    Order Status: Completed Specimen: Stool Updated: 04/06/22 1056     C. difficile (DNA) Negative       COVID-19 WITH INFLUENZA A/B [[id number]] Collected: 04/05/22 2315    Order Status: Completed Specimen: Nasopharyngeal from Nasopharynx Updated: 04/06/22 0027     SARS-CoV-2 by PCR Not Detected        Comment: Not Detected results do not preclude SARS-CoV-2 infection and should not be used as the sole basis for patient management decisions. Results must be combined with clinical observations, patient history, and epidemiological information        Influenza A by PCR Not Detected        Influenza B by PCR Not Detected        Comment: Testing was performed using leah Concepcion SARS-CoV-2 and Influenza A/B nucleic acid assay. This test is a multiplex Real-Time Reverse Transcriptase Polymerase Chain Reaction (RT-PCR) based in vitro diagnostic test intended for the qualitative detection of nucleic acids from SARS-CoV-2, Influenza A, and Influenza B in nasopharyngeal and nasal swab specimens for use under the FDA's Emergency Use Authorization (EUA) only. Fact sheet for Patients: FindDrives.pl Fact sheet   for Healthcare Providers: FindDrives.pl                  Diagnostics   CXR Results  (Last 48 hours)    None             Assessment/Plan     1. Right index finger osteomyelitis. S/p debridement w isolation of Staph epidermidis from Cx      Completed 28 days of vancomycin, healing index finger, decreased swelling       Will d/c Vanc and monitor off antibiotics.  Routine labs in the morning     2. UTI, abnormal UA,  Urinary retention: + indwelling fish cath      Urine Cx from 04/07 is negative, Yeast isolated from urine Cx from 04/12      On day # 2/14 of antifungal therapy, currently fluconazole      3. AMS, h/o metabolic/hepatic encephalopathy, waxing and waning mentation     4. Ascites, S/p paracentesis on 04/08 w removal of 4,250 ml of clear fluid     Abdomen is distended but non-tender, no guarding on exam     5. Episodes of hypothermia/hypotension: Suspected adrenal insufficiency. Continue on Florinef and Midodrine     6.  Loose stools w Tessy Maloney in place: Likely from lactulose        Anastasia Sweeney MD    4/19/2022

## 2022-04-19 NOTE — PROGRESS NOTES
PHYSICAL THERAPY TREATMENT  Patient: Yaya Stokes (61 y.o. male)  Date: 4/19/2022  Diagnosis: Urinary retention [R33.9]  Lactic acidosis [E87.2]  Severe sepsis (HCC) [A41.9, R65.20]  Hypothermia [T68. XXXA]  Sepsis ([address] [A41.9] <principal problem not specified>  Procedure(s) (LRB):  PERCUTANEOUS ENDOSCOPIC GASTROSTOMY TUBE INSERTION (N/A) Day of Surgery  Precautions:    Chart, physical therapy assessment, plan of care and goals were reviewed. ASSESSMENT  Patient continues with skilled PT services and is progressing towards goals. Patient supine in bed upon approach and agreed to therapy session. Patient was in mittens at start of session with sitter present. Patient was min A for bed mobility, CGA-min A for supine> sit and CGA for scooting to EOB. Once sitting EOB patient demonstrated fair static and dynamic sitting balance while performing seated TE ( see details below). Patient did require constant VC and TC to complete exercises and perform full ROM. Once exercises were completed patient performed 1 for 2 STS at [place] [address] and stood at bedside with HHA for 2 minutes with no Lob or knee buckling or swaying while standing. Patient did have uncontrolled descent into bed when told to sit down which patient found amusing and laughed at therapists reaction to sudden descent into bed. Patient performed 2nd STS at [place] [address] and re sat down at [facility] with control this time. Patient then was CGA for sit> supine and was able to adjust himself in bed. Patient had mittens replaced on hands and left supine in bed with call bell within reach and all needs meet. Patients sitter was present at end of session. Current Level of Function Impacting Discharge (mobility/balance):general weakness, poor safety awareness impaired balance    Other factors to consider for discharge: PLOF, level of deficits, acute medical state assistance at home.          PLAN :  Patient continues to benefit from skilled intervention to address the above impairments. Continue treatment per established plan of care. to address goals. Recommendation for discharge: (in order for the patient to meet his/her long term goals)  [address]     This discharge recommendation:  Has been made in collaboration with the attending provider and/or case management    IF patient discharges home will need the following DME: to be determined (TBD)       SUBJECTIVE:   Patient stated Carlos Tijerina would like to try standing.     OBJECTIVE DATA SUMMARY:   Critical Behavior:  Neurologic State: Alert  Orientation Level: Oriented X4 (has confusion sometimes )  Cognition: Follows commands     Functional Mobility Training:  Bed Mobility:  Rolling: Minimum assistance  Supine to Sit: Contact guard assistance;Minimum assistance  Sit to Supine: Contact guard assistance  Scooting: Contact guard assistance  Transfers:  Sit to Stand: Contact guard assistance  Stand to Sit: Contact guard assistance  Balance:  Sitting: Intact; With support  Sitting - Static: Fair (occasional)  Sitting - Dynamic: Fair (occasional)  Standing: Impaired; With support  Standing - Static: Constant support; Fair    Therapeutic Exercises:   1x15 AP  1x15 LAQ  1x15 seated marches  1x15 hipADD/ABD  Pain Rating:  No pain reported    Activity Tolerance:   Good  Please refer to the flowsheet for vital signs taken during this treatment. After treatment patient left in no apparent distress:   Supine in bed, Call bell within reach, and Side rails x 3    COMMUNICATION/COLLABORATION:   The patients plan of care was discussed with: Registered nurse. Problem: Mobility Impaired (Adult and Pediatric)  Goal: *Acute Goals and Plan of Care (Insert Text)  Description: Patient will move from supine to sit and sit to supine , scoot up and down, and roll side to side in bed with minimal assistance/contact guard assist within 7 day(s).     Patient will transfer from bed to chair and chair to bed with moderate assistance  using the least restrictive device within 7 day(s). Patient will improve static standing balance to minimal assistance within 1 week(s). Patient will ambulate 10 feet with moderate assistance with least restrictive device within 1 weeks.        4/19/2022 1501 by Catrachita Spicer, PTA  Outcome: Progressing Towards Goal  4/19/2022 1501 by Catrachita Spicer, PTA  Outcome: Progressing Towards Goal       Yair Blanco PTA   Time Calculation: 31 mins

## 2022-04-19 NOTE — PROGRESS NOTES
General Daily Progress Note          Patient Name:   Sam Urena       YOB: 1960       Age:  60 y.o. Admit Date: 4/7/2022      Subjective:     Sam Urena is a(n) 60 y.o. male with PMH significant for HTN, cirrhosis, ascites presents via EMS for removing fish catheter. Patient recently d/c after lengthy stay in hospital for septic DIP joint of right index finger. He was d/c on 10 days of PO Zyvox, which he completed. He presents today after pulling out fish catheter. Several blood clots noted. Patient septic upon arrival with hypothermia and hypotension. Temp 93.6 rectal, BP 84/68. Patient is retaining 887cc of urine and nursing staff will replace fish.     Patient started on fluids and IV Levaquin in the ED. Patient is altered at baseline. Head CT w/o contrast shows no acute abnormality. Blood cultures pending. CXR and KUB both normal       Patient is alert awake confused  Hypotensive off  Levophed    Normal temperature    4/12  Patient is off levophed but still receiving midodrine and IVF. He remain hypotensive. He is lethargic and confused this morning. Patient has mild bilateral LE edema. Seen by ID yesterday. Recommends continuing vancomycin   Seen by nephrology yesterday. Recommends discontinuing IVF in lieu of leg edema, discontinue bicarb drip, continuing weekly procrit, continuing PO KCl, midodrine for chronic hypotension. Today patient hypothermic on bear hugger  Yesterday evening patient was restless agitated    4/13    Patient is alert awake still hypothermic on shemar hugger    4/14  Patient is seen sleeping. On shemar hugger and vancomycin. He is producing 250 ml urine. No new changes seen at this time. Labs pending. Cr trending down 1.62 (on 4/13). Abdominal US on 4/13 shows ascites. UA on 4/12 positive for leukocyte esterase, WBC >100 and bacteruria. Urine culture pending.      Patient refused medication this morning    4/15    Patient sleepy obtunded /hypothermic    Not eating drinking    4/18  Patient is seen in medical telemetry with a one to one sitter. He is still confused and refusing to eat food. He is compliant with his medications  He pulled out NG tube 3 times. Patient had a paracentesis done on 4/15 without any complications. Labs remarkable for Cr 1.75 increasing. CBC and CMP pending. Ammonia unremarkable. 4/19  Patient is seen at bedside with a one to one sitter. He still confused and attempting to pull out his IJ central line. He has mitts on. He is also attempting to jump out of bed. Patient is still not eating today. He is taking his medications. Labs remarkable for Cr 1.82. Abdominal US remarkable for Increased abdominal and pelvic ascites. Seen by ID. Recommends discontinuing eraxis, and starting on fluconazole.       Objective:     Visit Vitals  BP 96/66   Pulse 67   Temp 97.4 °F (36.3 °C)   Resp 18   Ht 5' 10\" (1.778 m)   Wt 92.8 kg (204 lb 9.4 oz)   SpO2 100%   BMI 29.36 kg/m²        Recent Results (from the past 24 hour(s))   VANCOMYCIN, RANDOM    Collection Time: 04/19/22  3:32 AM   Result Value Ref Range    Vancomycin, random 16.0 ug/mL   CBC W/O DIFF    Collection Time: 04/19/22  3:32 AM   Result Value Ref Range    WBC 6.5 4.1 - 11.1 K/uL    RBC 3.51 (L) 4.10 - 5.70 M/uL    HGB 9.7 (L) 12.1 - 17.0 g/dL    HCT 28.3 (L) 36.6 - 50.3 %    MCV 80.6 80.0 - 99.0 FL    MCH 27.6 26.0 - 34.0 PG    MCHC 34.3 30.0 - 36.5 g/dL    RDW 24.1 (H) 11.5 - 14.5 %    PLATELET 72 (L) 526 - 400 K/uL    NRBC 0.3 (H) 0.0  WBC    ABSOLUTE NRBC 0.02 (H) 0.00 - 2.66 K/uL   METABOLIC PANEL, COMPREHENSIVE    Collection Time: 04/19/22  4:15 AM   Result Value Ref Range    Sodium 139 136 - 145 mmol/L    Potassium 3.6 3.5 - 5.1 mmol/L    Chloride 111 (H) 97 - 108 mmol/L    CO2 18 (L) 21 - 32 mmol/L    Anion gap 10 5 - 15 mmol/L    Glucose 67 65 - 100 mg/dL    BUN 19 6 - 20 mg/dL    Creatinine 1.82 (H) 0.70 - 1.30 mg/dL    BUN/Creatinine ratio 10 (L) 12 - 20      GFR est AA 46 (L) >60 ml/min/1.73m2    GFR est non-AA 38 (L) >60 ml/min/1.73m2    Calcium 8.2 (L) 8.5 - 10.1 mg/dL    Bilirubin, total 1.4 (H) 0.2 - 1.0 mg/dL    AST (SGOT) 42 (H) 15 - 37 U/L    ALT (SGPT) 15 12 - 78 U/L    Alk. phosphatase 73 45 - 117 U/L    Protein, total 6.3 (L) 6.4 - 8.2 g/dL    Albumin 2.1 (L) 3.5 - 5.0 g/dL    Globulin 4.2 (H) 2.0 - 4.0 g/dL    A-G Ratio 0.5 (L) 1.1 - 2.2     PHOSPHORUS    Collection Time: 04/19/22  4:15 AM   Result Value Ref Range    Phosphorus 3.2 2.6 - 4.7 mg/dL     [unfilled]      Review of Systems    Constitutional: Negative for chills and fever. HENT: Negative. Eyes: Negative. Respiratory: Negative. Cardiovascular: Negative. Gastrointestinal: Negative for abdominal pain and nausea. Skin: Negative. Neurological: Negative. Physical Exam:      Constitutional: pt is oriented to person, place, and time. HENT:   Head: Normocephalic and atraumatic. Eyes: Pupils are equal, round, and reactive to light. EOM are normal.   Cardiovascular: Normal rate, regular rhythm and normal heart sounds. Pulmonary/Chest: Breath sounds normal. No wheezes. No rales. Exhibits no tenderness. Abdominal: Soft. Bowel sounds are normal. There is no abdominal tenderness. There is no rebound and no guarding. Musculoskeletal: Normal range of motion. Neurological: pt is alert and oriented to person, place, and time. Extremities 2+ edema    US ABD LTD   Final Result   Increased abdominal and pelvic ascites. IR PARACENTESIS ABD SUBSQ   Final Result   Ultrasound guided paracentesis with no immediate complications. Patient tolerated the procedure well. XR CHEST PORT   Final Result   Endotracheal tube as above. Underexpanded lungs with bibasilar   atelectasis. Hydrostatic edema. Possible pleural effusions. US ABD LTD   Final Result   Ascites.       XR HAND RT MIN 3 V   Final Result      CT HEAD WO CONT   Final Result   Age-appropriate atrophy. No acute findings. XR ABD (KUB)   Final Result   Within normal      XR CHEST PORT   Final Result   Findings/impression:   1. Low lung volumes. Bibasilar hypoventilatory change/atelectasis. Left   retrocardiac lung incompletely evaluated, cannot exclude underlying airspace   disease. 2.  Cardiac and mediastinal contours are obscured by low lung volumes. Ectatic/possible aneurysmal appearance transverse aorta with calcific   atherosclerotic plaque. Appears stable from prior radiograph. 3.  No pleural fluid. No pneumothorax. 4.  Prominent gas-filled bowel partially visualized in the left upper quadrant.          IR INSERT NON TUNL CVC OVER 5 YRS    (Results Pending)   IR PARACENTESIS ABD W IMAGE    (Results Pending)   IR US GUIDED VASCULAR ACCESS    (Results Pending)   IR FLUORO GUIDE PLC CVAD    (Results Pending)        Recent Results (from the past 24 hour(s))   VANCOMYCIN, RANDOM    Collection Time: 04/19/22  3:32 AM   Result Value Ref Range    Vancomycin, random 16.0 ug/mL   CBC W/O DIFF    Collection Time: 04/19/22  3:32 AM   Result Value Ref Range    WBC 6.5 4.1 - 11.1 K/uL    RBC 3.51 (L) 4.10 - 5.70 M/uL    HGB 9.7 (L) 12.1 - 17.0 g/dL    HCT 28.3 (L) 36.6 - 50.3 %    MCV 80.6 80.0 - 99.0 FL    MCH 27.6 26.0 - 34.0 PG    MCHC 34.3 30.0 - 36.5 g/dL    RDW 24.1 (H) 11.5 - 14.5 %    PLATELET 72 (L) 692 - 400 K/uL    NRBC 0.3 (H) 0.0  WBC    ABSOLUTE NRBC 0.02 (H) 0.00 - 0.73 K/uL   METABOLIC PANEL, COMPREHENSIVE    Collection Time: 04/19/22  4:15 AM   Result Value Ref Range    Sodium 139 136 - 145 mmol/L    Potassium 3.6 3.5 - 5.1 mmol/L    Chloride 111 (H) 97 - 108 mmol/L    CO2 18 (L) 21 - 32 mmol/L    Anion gap 10 5 - 15 mmol/L    Glucose 67 65 - 100 mg/dL    BUN 19 6 - 20 mg/dL    Creatinine 1.82 (H) 0.70 - 1.30 mg/dL    BUN/Creatinine ratio 10 (L) 12 - 20      GFR est AA 46 (L) >60 ml/min/1.73m2    GFR est non-AA 38 (L) >60 ml/min/1.73m2    Calcium 8.2 (L) 8.5 - 10.1 mg/dL    Bilirubin, total 1.4 (H) 0.2 - 1.0 mg/dL    AST (SGOT) 42 (H) 15 - 37 U/L    ALT (SGPT) 15 12 - 78 U/L    Alk. phosphatase 73 45 - 117 U/L    Protein, total 6.3 (L) 6.4 - 8.2 g/dL    Albumin 2.1 (L) 3.5 - 5.0 g/dL    Globulin 4.2 (H) 2.0 - 4.0 g/dL    A-G Ratio 0.5 (L) 1.1 - 2.2     PHOSPHORUS    Collection Time: 04/19/22  4:15 AM   Result Value Ref Range    Phosphorus 3.2 2.6 - 4.7 mg/dL       Results     Procedure Component Value Units Date/Time    CULTURE, URINE [[id number]]  (Abnormal) Collected: 04/12/22 2300    Order Status: Completed Specimen: Urine Updated: 04/16/22 1331     Special Requests: No Special Requests        [place] Count --        >100,000  colonies/ml       Culture result: Candida albicans       CULTURE, URINE [[id number]] Collected: 04/12/22 2300    Order Status: Canceled Specimen: Urine     CULTURE, URINE [[id number]] Collected: 04/07/22 1502    Order Status: Completed Specimen: Urine Updated: 04/09/22 1414     Special Requests: --        No Special Requests  Reflexed from [id number]       Culture result: No Growth (<1000 cfu/mL)       MRSA SCREEN - PCR (NASAL) [[id number]] Collected: 04/07/22 1345    Order Status: Completed Specimen: Swab Updated: 04/07/22 1516     MRSA by PCR, Nasal Not Detected       CULTURE, BLOOD, PAIRED [[id number]] Collected: 04/07/22 1001    Order Status: Completed Specimen: Blood Updated: 04/07/22 1017    C. DIFFICILE (DNA) [[id number]] Collected: 04/06/22 0919    Order Status: Completed Specimen: Stool Updated: 04/06/22 1056     C. difficile (DNA) Negative       COVID-19 WITH INFLUENZA A/B [[id number]] Collected: 04/05/22 2315    Order Status: Completed Specimen: Nasopharyngeal from Nasopharynx Updated: 04/06/22 0027     SARS-CoV-2 by PCR Not Detected        Comment: Not Detected results do not preclude SARS-CoV-2 infection and should not be used as the sole basis for patient management decisions.  Results must be combined with clinical observations, patient history, and epidemiological information        Influenza A by PCR Not Detected        Influenza B by PCR Not Detected        Comment: Testing was performed using leah Concepcion SARS-CoV-2 and Influenza A/B nucleic acid assay. This test is a multiplex Real-Time Reverse Transcriptase Polymerase Chain Reaction (RT-PCR) based in vitro diagnostic test intended for the qualitative detection of nucleic acids from SARS-CoV-2, Influenza A, and Influenza B in nasopharyngeal and nasal swab specimens for use under the FDA's Emergency Use Authorization (EUA) only. Fact sheet for Patients: FindDrives.pl Fact sheet   for Healthcare Providers: FindDrives.pl                Labs:     Recent Labs     04/19/22  0332 04/17/22  0144   WBC 6.5 7.3   HGB 9.7* 10.1*   HCT 28.3* 28.9*   PLT 72* 58*     Recent Labs     04/19/22 0415 04/18/22  0617 04/17/22 0144     --  139   K 3.6  --  3.6   *  --  111*   CO2 18*  --  20*   BUN 19  --  19   CREA 1.82* 1.75* 1.68*   GLU 67  --  95   CA 8.2*  --  8.6   PHOS 3.2  --  3.6     Recent Labs     04/19/22  0415 04/17/22 0144   ALT 15  --    AP 73  --    TBILI 1.4*  --    TP 6.3*  --    ALB 2.1* 2.2*   GLOB 4.2*  --      No results for input(s): INR, PTP, APTT, INREXT, INREXT in the last 72 hours. No results for input(s): FE, TIBC, PSAT, FERR in the last 72 hours. No results found for: FOL, RBCF   No results for input(s): PH, PCO2, PO2 in the last 72 hours. No results for input(s): CPK, CKNDX, TROIQ in the last 72 hours.     No lab exists for component: CPKMB  No results found for: CHOL, CHOLX, CHLST, CHOLV, HDL, HDLP, LDL, LDLC, DLDLP, TGLX, TRIGL, TRIGP, CHHD, [facility]  Lab Results   Component Value Date/Time    Glucose (POC) 80 04/08/2022 10:03 AM    Glucose (POC) 84 04/04/2022 05:40 PM    Glucose (POC) 107 04/03/2022 11:12 AM    Glucose (POC) 111 03/31/2022 11:35 AM    Glucose (POC) 91 03/29/2022 11:10 AM     Lab Results   Component Value Date/Time    Color Yellow/Straw 04/12/2022 11:00 PM    Appearance Turbid (A) 04/12/2022 11:00 PM    Specific gravity 1.011 04/12/2022 11:00 PM    pH (UA) 5.0 04/12/2022 11:00 PM    Protein Negative 04/12/2022 11:00 PM    Glucose Negative 04/12/2022 11:00 PM    Ketone Negative 04/12/2022 11:00 PM    Bilirubin Negative 04/12/2022 11:00 PM    Urobilinogen 0.1 04/12/2022 11:00 PM    Nitrites Negative 04/12/2022 11:00 PM    Leukocyte Esterase Large (A) 04/12/2022 11:00 PM    Bacteria 1+ (A) 04/12/2022 11:00 PM    WBC >100 (H) 04/12/2022 11:00 PM    RBC  04/12/2022 11:00 PM         Assessment:     Sepsis  UTI/Candida  Lactic acidosis  Acute on chronic renal failure- creat 2.01  Cirrhosis with ascites  Anemia  History of septic DIP joint, right index finger  Hypothyroidism  Seizure disorder  Gout  GERD  History of etoh abuse  Hypotension  Metabolic acidosis  Hypothermia  Hypokalemia  Anemia monitor H&H  Abdominal distention/ascites    Plan: On shemar singleton    On allopurinol 300 mg daily  Vitamin D 50,000 weekly  Pepcid 20 twice daily  Folic acid 1 mg daily  Lactulose 10 g 3 times a day  Keppra 1500 twice a day  Synthroid 25 mcg daily  Midodrine 10 mg 3 times a day  Propranolol 20 mg twice a day  Sodium bicarb 1303 times a day  Flomax 0.4 mg daily  Thiamine 100 mg daily  Vancomycin with pharmacy  Replace potassium  Discontinue IV Zosyn  discontinue Eraxis 100 mg daily    Discontinue Sodium bicarb drip  Continue fluconazole 200 mg daily   We will do psych consult for psychosis patient receiving Geodon  Psychotic behavior    Discussed with patient nurse  Will do GI consult for PEG tube feeding as patient not eating drinking  Awaiting PEG tube placement. Current Facility-Administered Medications:     [START ON 4/20/2022] Vancomycin random level to be drawn on 4/20/22 at 0400 am., , Other, ONCE, Ree Zaragoza MD    Vancomycin random level - please draw with AM labs.  Thanks!, , Other, ONCE, Stevie Acevedo MD    fluconazole (DIFLUCAN) tablet 200 mg, 200 mg, Oral, DAILY, Ree Zaragoza MD, 200 mg at 04/19/22 0904    0.9% sodium chloride infusion, 50 mL/hr, IntraVENous, CONTINUOUS, Scotty Ames MD, Last Rate: 50 mL/hr at 04/17/22 1609, 50 mL/hr at 04/17/22 1609    hydrOXYzine (VISTARIL) injection 50 mg, 50 mg, IntraMUSCular, Q8H PRN, Steven DENIS MD, 50 mg at 04/19/22 0417    fludrocortisone (FLORINEF) tablet 0.1 mg, 0.1 mg, Oral, DAILY, Ree Zaragoza MD, 0.1 mg at 04/19/22 0900    0.9% sodium chloride infusion 250 mL, 250 mL, IntraVENous, PRN, Roly Ackerman MD    ziprasidone (GEODON) 10 mg in sterile water (preservative free) 0.5 mL injection, 10 mg, IntraMUSCular, Q12H PRN, Leelee Gibbs MD, 10 mg at 04/16/22 2217    levETIRAcetam (KEPPRA) 1500 mg in saline (iso-osm) 100 ml IVPB, 1,500 mg, IntraVENous, Q12H, Lamberto Ackerman MD, Last Rate: 400 mL/hr at 04/19/22 1237, 1,500 mg at 04/19/22 1237    0.9% sodium chloride infusion 250 mL, 250 mL, IntraVENous, PRN, Lamberto Ackerman MD    epoetin luis-epbx (RETACRIT) injection 10,000 Units, 10,000 Units, SubCUTAneous, Q7D, Scotty Ames MD, 10,000 Units at 04/16/22 1912    midodrine (PROAMATINE) tablet 10 mg, 10 mg, Oral, TID WITH MEALS, Lamberto Ackerman MD, 10 mg at 04/19/22 1237    sodium bicarbonate tablet 1,300 mg, 1,300 mg, Oral, TID, Scotty Ames MD, 1,300 mg at 04/19/22 0903    LORazepam (ATIVAN) injection 1 mg, 1 mg, IntraVENous, Q4H PRN, Lamberto Ackerman MD, 1 mg at 04/08/22 1733    sodium chloride (NS) flush 5-10 mL, 5-10 mL, IntraVENous, PRN, Lamberto Ackerman MD, 10 mL at 04/15/22 2354    lactulose (CHRONULAC) 10 gram/15 mL solution 15 mL, 10 g, Oral, TID, Lamberto Ackerman MD, 15 mL at 04/19/22 0903    tamsulosin (FLOMAX) capsule 0.4 mg, 0.4 mg, Oral, DAILY, Lamberto Ackerman MD, 0.4 mg at 04/19/22 8710    thiamine mononitrate (B-1) tablet 100 mg, 100 mg, Oral, DAILY, Anabelle Rich MD, 100 mg at 04/19/22 0904    allopurinoL (ZYLOPRIM) tablet 300 mg, 300 mg, Oral, DAILY, Lamberto Ackerman MD, 300 mg at 04/19/22 7219    ergocalciferol capsule 50,000 Units, 50,000 Units, Oral, Q7D, Lamberto Ackerman MD, 50,000 Units at 04/14/22 1606    famotidine (PEPCID) tablet 20 mg, 20 mg, Oral, BID, Jyothi Ackerman MD, 20 mg at 54/94/70 8576    folic acid (FOLVITE) tablet 1 mg, 1 mg, Oral, DAILY, Lamberto Ackerman MD, 1 mg at 04/19/22 1089    levothyroxine (SYNTHROID) tablet 25 mcg, 25 mcg, Oral, ACB, Lamberto Ackerman MD, 25 mcg at 04/19/22 0905    propranoloL (INDERAL) tablet 20 mg, 20 mg, Oral, BID, Lamberto Ackerman MD, 20 mg at 04/19/22 6880    acetaminophen (TYLENOL) tablet 650 mg, 650 mg, Oral, Q6H PRN **OR** acetaminophen (TYLENOL) suppository 650 mg, 650 mg, Rectal, Q6H PRN, Lamberto Ackerman MD    polyethylene glycol (MIRALAX) packet 17 g, 17 g, Oral, DAILY PRN, Lamberto Ackerman MD    ondansetron (ZOFRAN ODT) tablet 4 mg, 4 mg, Oral, Q8H PRN **OR** ondansetron (ZOFRAN) injection 4 mg, 4 mg, IntraVENous, Q6H PRN, Jyothi Ackerman MD    VANCOMYCIN INFORMATION NOTE, , Other, Rx Dosing/Monitoring, Cheyenne Lepe MD    NOREPINephrine (LEVOPHED) 8 mg in 0.9% NS 250ml infusion, 0.5-120 mcg/min, IntraVENous, TITRATE, Jyothi Ackerman MD, Stopped at 04/11/22 0106

## 2022-04-19 NOTE — PROGRESS NOTES
Problem: Pressure Injury - Risk of  Goal: *Prevention of pressure injury  Description: Document Curtis Scale and appropriate interventions in the flowsheet. Outcome: Progressing Towards Goal  Note: Pressure Injury Interventions:  Sensory Interventions: Discuss PT/OT consult with provider,Float heels,Minimize linen layers    Moisture Interventions: Absorbent underpads,Check for incontinence Q2 hours and as needed    Activity Interventions: Increase time out of bed,PT/OT evaluation    Mobility Interventions: PT/OT evaluation    Nutrition Interventions: Document food/fluid/supplement intake    Friction and Shear Interventions: HOB 30 degrees or less,Minimize layers                Problem: Patient Education: Go to Patient Education Activity  Goal: Patient/Family Education  Outcome: Progressing Towards Goal     Problem: Falls - Risk of  Goal: *Absence of Falls  Description: Document Elba Fall Risk and appropriate interventions in the flowsheet.   Outcome: Progressing Towards Goal  Note: Fall Risk Interventions:  Mobility Interventions: Bed/chair exit alarm    Mentation Interventions: Bed/chair exit alarm    Medication Interventions: Bed/chair exit alarm    Elimination Interventions: Patient to call for help with toileting needs,Call light in reach,Bed/chair exit alarm              Problem: Patient Education: Go to Patient Education Activity  Goal: Patient/Family Education  Outcome: Progressing Towards Goal     Problem: Aspiration - Risk of  Goal: *Absence of aspiration  Outcome: Progressing Towards Goal     Problem: Patient Education: Go to Patient Education Activity  Goal: Patient/Family Education  Outcome: Progressing Towards Goal     Problem: Patient Education: Go to Patient Education Activity  Goal: Patient/Family Education  Outcome: Progressing Towards Goal     Problem: Impaired Skin Integrity/Pressure Injury Treatment  Goal: *Improvement of Existing Pressure Injury  Outcome: Progressing Towards Goal  Goal: *Prevention of pressure injury  Description: Document Curtis Scale and appropriate interventions in the flowsheet.   Outcome: Progressing Towards Goal  Note: Pressure Injury Interventions:  Sensory Interventions: Discuss PT/OT consult with provider,Float heels,Minimize linen layers    Moisture Interventions: Absorbent underpads,Check for incontinence Q2 hours and as needed    Activity Interventions: Increase time out of bed,PT/OT evaluation    Mobility Interventions: PT/OT evaluation    Nutrition Interventions: Document food/fluid/supplement intake    Friction and Shear Interventions: HOB 30 degrees or less,Minimize layers                Problem: Patient Education: Go to Patient Education Activity  Goal: Patient/Family Education  Outcome: Progressing Towards Goal     Problem: Non-Violent Restraints  Goal: Removal from restraints as soon as assessed to be safe  Outcome: Progressing Towards Goal  Goal: No harm/injury to patient while restraints in use  Outcome: Progressing Towards Goal  Goal: Patient's dignity will be maintained  Outcome: Progressing Towards Goal  Goal: Patient Interventions  Outcome: Progressing Towards Goal     Problem: Delirium Treatment  Goal: *Level of consciousness restored to baseline  Outcome: Progressing Towards Goal  Goal: *Level of environmental perceptions restored to baseline  Outcome: Progressing Towards Goal  Goal: *Sensory perception restored to baseline  Outcome: Progressing Towards Goal  Goal: *Emotional stability restored to baseline  Outcome: Progressing Towards Goal  Goal: *Functional assessment restored to baseline  Outcome: Progressing Towards Goal  Goal: *Absence of falls  Outcome: Progressing Towards Goal  Goal: *Will remain free of delirium, CAM Score negative  Outcome: Progressing Towards Goal  Goal: *Cognitive status will be restored to baseline  Outcome: Progressing Towards Goal  Goal: Interventions  Outcome: Progressing Towards Goal     Problem: Patient Education: Go to Patient Education Activity  Goal: Patient/Family Education  Outcome: Progressing Towards Goal     Problem: Patient Education: Go to Patient Education Activity  Goal: Patient/Family Education  Outcome: Progressing Towards Goal     Problem: Patient Education: Go to Patient Education Activity  Goal: Patient/Family Education  Outcome: Progressing Towards Goal

## 2022-04-19 NOTE — PROGRESS NOTES
CM reviewed chart. Patient does not seem to be a good candidate for PEG tube. Patient is on a pureed diet, but does not like the food on this diet. Family is encouraging patient to eat. He is having another modified swallow tomorrow to evaluate patient's swallow again. CM has sent updates to [facility]. CM will continue to follow.

## 2022-04-19 NOTE — PROGRESS NOTES
Nutrition Assessment     Type and Reason for Visit: Reassess (Interim)    Nutrition Recommendations/Plan: If PEG placed or NGT replaced:   Initiate TF via NGT of TwoCal at 20ml/hr, advance by 15ml q4hrs to goal of 55ml/hr  Flush 200ml q4hrs  Provide Sean 2x/d (180kcals, 5g pro)  Providing 2820kcals (106%), 115g pro (88%), 2124ml (90%)    Continue PO diet for comfort  D/c all ONS, pt refusing - Once pt begins accepting PO again, re-order Ensure Enlive 3x/d    Monitor and record PO intakes, supplement acceptance, and BMs in I/Os    Nutrition Assessment:  Admitted for hematuria s/p pulling out fish catheter, +hypothermia and hypotension. RD familiar with pt from previous admits. Pt inappropriate for interview at this time per RN, no family in room. Pt with minimal PO intakes, per RN pt up all night ans mostly sleeping today. RD added Ensure 3x/d this AM per pt previously stated preferences. Will f/u for tolerance and need for changes. (4/12) Pt seen in bed asleep s/p Breakfast. Per Nsg, Pt ate ~25% from tray, drank >80% of ONS w/ observation as liking Ensure shakes. (F/U) patient was asleep at the time of assessment, RD attempted to wake up patiented. off pressors, appetite remains poor >25%, observed one unopen ensure @ bedside w/ multiple unopened Pro[employer] packets. May have better acceptances w/ ensure pudding w/ meds instead of prosource pkt. Will modify ONs. May also need to downgrade diet to puree, will request a SLP consult. (4/19) NGT removed by pt 4/16- nursing attempted to replace, pt refused. Possible plan for PEG. Pt continues to refuse food. Will leave rec's if PEG is placed. Labs: Na 139, K 3.6, BUN 19, Creat 1.82, Gluc 67, Alb 2.1. Meds: famotidine, folic acid, lactulose, levothyroxine, sodium bicarbonate, thiamine mononitrate. Malnutrition Assessment:  Malnutrition Status:  Moderate malnutrition     Estimated Daily Nutrient Needs:  Energy (kcal):  2,657kcals (28kcal/kg)  Protein (g): 131g(1.4g/kg)       Fluid (ml/day):  2365(25ml/kg)    Nutrition Related Findings:  (P) No n/v/d/c. Last BM 4/17. B/l LE edema. Current Nutrition Therapies:  ADULT ORAL NUTRITION SUPPLEMENT Lunch; Wound Healing Supplement  ADULT TUBE FEEDING Nasogastric; 2.0 Calorie; Delivery Method: Continuous; Continuous Initial Rate (mL/hr): 20; Continuous Advance Tube Feeding: Yes; Advancement Volume (mL/hr): 15; Advancement Frequency: Q 4 hours; Continuous Goal Rate (mL/hr): 55... ADULT DIET Dysphagia - Pureed;  Low Fat/Low Chol/High Fiber/2 gm Na    Anthropometric Measures:  · Height:  5' 10\" (177.8 cm)  · Current Body Wt:  92.8 kg (204 lb 9.4 oz)  · BMI: 29.4    Nutrition Diagnosis:   · (P) Moderate malnutrition,In context of chronic illness related to (P) inadequate protein-energy intake,catabolic illness as evidenced by (P) poor intake prior to admission,intake 0-25%,mild muscle loss,mild loss of subcutaneous fat    Nutrition Intervention:  Food and/or Nutrient Delivery: (P) Continue current diet,Start tube feeding  Nutrition Education and Counseling: (P) Education not indicated  Coordination of Nutrition Care: (P) Continue to monitor while inpatient,Feeding assistance/environmental change,Speech therapy    Goals:  (P) Meet >/=50% of EENs in >5 days, Wt maintenance within +/-0.5kg in >5 days, lytes WNL       Nutrition Monitoring and Evaluation:   Behavioral-Environmental Outcomes: (P) None identified  Food/Nutrient Intake Outcomes: (P) Diet advancement/tolerance,Supplement intake,Enteral nutrition intake/tolerance,Food and nutrient intake  Physical Signs/Symptoms Outcomes: (P) Fluid status or edema,Meal time behavior,Weight,Skin    Discharge Planning:    (P) Too soon to determine     Electronically signed by Robert Gonzalez RD on 4/19/2022 at 12:24 PM    Contact Number: [phone]

## 2022-04-19 NOTE — PROGRESS NOTES
General Daily Progress Note          Patient Name:   Servando Walker       YOB: 1960       Age:  60 y.o. Admit Date: 4/7/2022      Subjective:     Servando Walker is a(n) 60 y.o. male with PMH significant for HTN, cirrhosis, ascites presents via EMS for removing fish catheter. Patient recently d/c after lengthy stay in hospital for septic DIP joint of right index finger. He was d/c on 10 days of PO Zyvox, which he completed. He presents today after pulling out fish catheter. Several blood clots noted. Patient septic upon arrival with hypothermia and hypotension. Temp 93.6 rectal, BP 84/68. Patient is retaining 887cc of urine and nursing staff will replace fish.     Patient started on fluids and IV Levaquin in the ED. Patient is altered at baseline. Head CT w/o contrast shows no acute abnormality. Blood cultures pending. CXR and KUB both normal       Patient is alert awake confused  Hypotensive off  Levophed    Normal temperature    4/12  Patient is off levophed but still receiving midodrine and IVF. He remain hypotensive. He is lethargic and confused this morning. Patient has mild bilateral LE edema. Seen by ID yesterday. Recommends continuing vancomycin   Seen by nephrology yesterday. Recommends discontinuing IVF in lieu of leg edema, discontinue bicarb drip, continuing weekly procrit, continuing PO KCl, midodrine for chronic hypotension. Today patient hypothermic on bear hugger  Yesterday evening patient was restless agitated    4/13    Patient is alert awake still hypothermic on shemar hugger    4/14  Patient is seen sleeping. On shemar hugger and vancomycin. He is producing 250 ml urine. No new changes seen at this time. Labs pending. Cr trending down 1.62 (on 4/13). Abdominal US on 4/13 shows ascites. UA on 4/12 positive for leukocyte esterase, WBC >100 and bacteruria. Urine culture pending.      Patient refused medication this morning    4/15    Patient sleepy obtunded /hypothermic    Not eating drinking    4/18  Patient is seen in medical telemetry with a one to one sitter. He is still confused and refusing to eat food. He is compliant with his medications  He pulled out NG tube 3 times. Patient had a paracentesis done on 4/15 without any complications. Labs remarkable for Cr 1.75 increasing. CBC and CMP pending. Ammonia unremarkable. 4/19  Patient is seen at bedside with a one to one sitter. He still confused and attempting to pull out his IJ central line. He has mitts on. He is also attempting to jump out of bed. Patient is still not eating today. He is taking his medications. Labs remarkable for Cr 1.82. Abdominal US remarkable for Increased abdominal and pelvic ascites. Seen by ID. Recommends discontinuing eraxis, and starting on fluconazole.       Objective:     Visit Vitals  /76 (BP 1 Location: Right upper arm, BP Patient Position: Lying)   Pulse 66   Temp 97.4 °F (36.3 °C)   Resp 18   Ht 5' 10\" (1.778 m)   Wt 204 lb 9.4 oz (92.8 kg)   SpO2 100%   BMI 29.36 kg/m²        Recent Results (from the past 24 hour(s))   VANCOMYCIN, RANDOM    Collection Time: 04/19/22  3:32 AM   Result Value Ref Range    Vancomycin, random 16.0 ug/mL   CBC W/O DIFF    Collection Time: 04/19/22  3:32 AM   Result Value Ref Range    WBC 6.5 4.1 - 11.1 K/uL    RBC 3.51 (L) 4.10 - 5.70 M/uL    HGB 9.7 (L) 12.1 - 17.0 g/dL    HCT 28.3 (L) 36.6 - 50.3 %    MCV 80.6 80.0 - 99.0 FL    MCH 27.6 26.0 - 34.0 PG    MCHC 34.3 30.0 - 36.5 g/dL    RDW 24.1 (H) 11.5 - 14.5 %    PLATELET 72 (L) 050 - 400 K/uL    NRBC 0.3 (H) 0.0  WBC    ABSOLUTE NRBC 0.02 (H) 0.00 - 5.64 K/uL   METABOLIC PANEL, COMPREHENSIVE    Collection Time: 04/19/22  4:15 AM   Result Value Ref Range    Sodium 139 136 - 145 mmol/L    Potassium 3.6 3.5 - 5.1 mmol/L    Chloride 111 (H) 97 - 108 mmol/L    CO2 18 (L) 21 - 32 mmol/L    Anion gap 10 5 - 15 mmol/L    Glucose 67 65 - 100 mg/dL    BUN 19 6 - 20 mg/dL Creatinine 1.82 (H) 0.70 - 1.30 mg/dL    BUN/Creatinine ratio 10 (L) 12 - 20      GFR est AA 46 (L) >60 ml/min/1.73m2    GFR est non-AA 38 (L) >60 ml/min/1.73m2    Calcium 8.2 (L) 8.5 - 10.1 mg/dL    Bilirubin, total 1.4 (H) 0.2 - 1.0 mg/dL    AST (SGOT) 42 (H) 15 - 37 U/L    ALT (SGPT) 15 12 - 78 U/L    Alk. phosphatase 73 45 - 117 U/L    Protein, total 6.3 (L) 6.4 - 8.2 g/dL    Albumin 2.1 (L) 3.5 - 5.0 g/dL    Globulin 4.2 (H) 2.0 - 4.0 g/dL    A-G Ratio 0.5 (L) 1.1 - 2.2     PHOSPHORUS    Collection Time: 04/19/22  4:15 AM   Result Value Ref Range    Phosphorus 3.2 2.6 - 4.7 mg/dL     [unfilled]      Review of Systems    Constitutional: Negative for chills and fever. HENT: Negative. Eyes: Negative. Respiratory: Negative. Cardiovascular: Negative. Gastrointestinal: Negative for abdominal pain and nausea. Skin: Negative. Neurological: Negative. Physical Exam:      Constitutional: pt is oriented to person, place, and time. HENT:   Head: Normocephalic and atraumatic. Eyes: Pupils are equal, round, and reactive to light. EOM are normal.   Cardiovascular: Normal rate, regular rhythm and normal heart sounds. Pulmonary/Chest: Breath sounds normal. No wheezes. No rales. Exhibits no tenderness. Abdominal: Soft. Bowel sounds are normal. There is no abdominal tenderness. There is no rebound and no guarding. Musculoskeletal: Normal range of motion. Neurological: pt is alert and oriented to person, place, and time. Extremities 2+ edema    US ABD LTD   Final Result   Increased abdominal and pelvic ascites. IR PARACENTESIS ABD SUBSQ   Final Result   Ultrasound guided paracentesis with no immediate complications. Patient tolerated the procedure well. XR CHEST PORT   Final Result   Endotracheal tube as above. Underexpanded lungs with bibasilar   atelectasis. Hydrostatic edema. Possible pleural effusions. US ABD LTD   Final Result   Ascites.       XR HAND RT MIN 3 V Final Result      CT HEAD WO CONT   Final Result   Age-appropriate atrophy. No acute findings. XR ABD (KUB)   Final Result   Within normal      XR CHEST PORT   Final Result   Findings/impression:   1. Low lung volumes. Bibasilar hypoventilatory change/atelectasis. Left   retrocardiac lung incompletely evaluated, cannot exclude underlying airspace   disease. 2.  Cardiac and mediastinal contours are obscured by low lung volumes. Ectatic/possible aneurysmal appearance transverse aorta with calcific   atherosclerotic plaque. Appears stable from prior radiograph. 3.  No pleural fluid. No pneumothorax. 4.  Prominent gas-filled bowel partially visualized in the left upper quadrant.          IR INSERT NON TUNL CVC OVER 5 YRS    (Results Pending)   IR PARACENTESIS ABD W IMAGE    (Results Pending)   IR US GUIDED VASCULAR ACCESS    (Results Pending)   IR FLUORO GUIDE PLC CVAD    (Results Pending)        Recent Results (from the past 24 hour(s))   VANCOMYCIN, RANDOM    Collection Time: 04/19/22  3:32 AM   Result Value Ref Range    Vancomycin, random 16.0 ug/mL   CBC W/O DIFF    Collection Time: 04/19/22  3:32 AM   Result Value Ref Range    WBC 6.5 4.1 - 11.1 K/uL    RBC 3.51 (L) 4.10 - 5.70 M/uL    HGB 9.7 (L) 12.1 - 17.0 g/dL    HCT 28.3 (L) 36.6 - 50.3 %    MCV 80.6 80.0 - 99.0 FL    MCH 27.6 26.0 - 34.0 PG    MCHC 34.3 30.0 - 36.5 g/dL    RDW 24.1 (H) 11.5 - 14.5 %    PLATELET 72 (L) 116 - 400 K/uL    NRBC 0.3 (H) 0.0  WBC    ABSOLUTE NRBC 0.02 (H) 0.00 - 8.09 K/uL   METABOLIC PANEL, COMPREHENSIVE    Collection Time: 04/19/22  4:15 AM   Result Value Ref Range    Sodium 139 136 - 145 mmol/L    Potassium 3.6 3.5 - 5.1 mmol/L    Chloride 111 (H) 97 - 108 mmol/L    CO2 18 (L) 21 - 32 mmol/L    Anion gap 10 5 - 15 mmol/L    Glucose 67 65 - 100 mg/dL    BUN 19 6 - 20 mg/dL    Creatinine 1.82 (H) 0.70 - 1.30 mg/dL    BUN/Creatinine ratio 10 (L) 12 - 20      GFR est AA 46 (L) >60 ml/min/1.73m2 GFR est non-AA 38 (L) >60 ml/min/1.73m2    Calcium 8.2 (L) 8.5 - 10.1 mg/dL    Bilirubin, total 1.4 (H) 0.2 - 1.0 mg/dL    AST (SGOT) 42 (H) 15 - 37 U/L    ALT (SGPT) 15 12 - 78 U/L    Alk. phosphatase 73 45 - 117 U/L    Protein, total 6.3 (L) 6.4 - 8.2 g/dL    Albumin 2.1 (L) 3.5 - 5.0 g/dL    Globulin 4.2 (H) 2.0 - 4.0 g/dL    A-G Ratio 0.5 (L) 1.1 - 2.2     PHOSPHORUS    Collection Time: 04/19/22  4:15 AM   Result Value Ref Range    Phosphorus 3.2 2.6 - 4.7 mg/dL       Results     Procedure Component Value Units Date/Time    CULTURE, URINE [[id number]]  (Abnormal) Collected: 04/12/22 2300    Order Status: Completed Specimen: Urine Updated: 04/16/22 1331     Special Requests: No Special Requests        Niota Count --        >100,000  colonies/ml       Culture result: Candida albicans       CULTURE, URINE [[id number]] Collected: 04/12/22 2300    Order Status: Canceled Specimen: Urine     CULTURE, URINE [[id number]] Collected: 04/07/22 1502    Order Status: Completed Specimen: Urine Updated: 04/09/22 1414     Special Requests: --        No Special Requests  Reflexed from [id number]       Culture result: No Growth (<1000 cfu/mL)       MRSA SCREEN - PCR (NASAL) [[id number]] Collected: 04/07/22 1345    Order Status: Completed Specimen: Swab Updated: 04/07/22 1516     MRSA by PCR, Nasal Not Detected       CULTURE, BLOOD, PAIRED [[id number]] Collected: 04/07/22 1001    Order Status: Completed Specimen: Blood Updated: 04/07/22 1017    C. DIFFICILE (DNA) [[id number]] Collected: 04/06/22 0919    Order Status: Completed Specimen: Stool Updated: 04/06/22 1056     C. difficile (DNA) Negative       COVID-19 WITH INFLUENZA A/B [[id number]] Collected: 04/05/22 2315    Order Status: Completed Specimen: Nasopharyngeal from Nasopharynx Updated: 04/06/22 0027     SARS-CoV-2 by PCR Not Detected        Comment: Not Detected results do not preclude SARS-CoV-2 infection and should not be used as the sole basis for patient management decisions. Results must be combined with clinical observations, patient history, and epidemiological information        Influenza A by PCR Not Detected        Influenza B by PCR Not Detected        Comment: Testing was performed using leah Concepcion SARS-CoV-2 and Influenza A/B nucleic acid assay. This test is a multiplex Real-Time Reverse Transcriptase Polymerase Chain Reaction (RT-PCR) based in vitro diagnostic test intended for the qualitative detection of nucleic acids from SARS-CoV-2, Influenza A, and Influenza B in nasopharyngeal and nasal swab specimens for use under the FDA's Emergency Use Authorization (EUA) only. Fact sheet for Patients: FindDrives.pl Fact sheet   for Healthcare Providers: FindDrives.pl                Labs:     Recent Labs     04/19/22  0332 04/17/22  0144   WBC 6.5 7.3   HGB 9.7* 10.1*   HCT 28.3* 28.9*   PLT 72* 58*     Recent Labs     04/19/22 0415 04/18/22  0617 04/17/22  0144     --  139   K 3.6  --  3.6   *  --  111*   CO2 18*  --  20*   BUN 19  --  19   CREA 1.82* 1.75* 1.68*   GLU 67  --  95   CA 8.2*  --  8.6   PHOS 3.2  --  3.6     Recent Labs     04/19/22 0415 04/17/22  0144   ALT 15  --    AP 73  --    TBILI 1.4*  --    TP 6.3*  --    ALB 2.1* 2.2*   GLOB 4.2*  --      No results for input(s): INR, PTP, APTT, INREXT, INREXT in the last 72 hours. No results for input(s): FE, TIBC, PSAT, FERR in the last 72 hours. No results found for: FOL, RBCF   No results for input(s): PH, PCO2, PO2 in the last 72 hours. No results for input(s): CPK, CKNDX, TROIQ in the last 72 hours.     No lab exists for component: CPKMB  No results found for: CHOL, CHOLX, CHLST, CHOLV, HDL, HDLP, LDL, LDLC, DLDLP, TGLX, TRIGL, TRIGP, CHHD, CHHDX  Lab Results   Component Value Date/Time    Glucose (POC) 80 04/08/2022 10:03 AM    Glucose (POC) 84 04/04/2022 05:40 PM    Glucose (POC) 107 04/03/2022 11:12 AM    Glucose (POC) 111 03/31/2022 11:35 AM Glucose (POC) 91 03/29/2022 11:10 AM     Lab Results   Component Value Date/Time    Color Yellow/Straw 04/12/2022 11:00 PM    Appearance Turbid (A) 04/12/2022 11:00 PM    Specific gravity 1.011 04/12/2022 11:00 PM    pH (UA) 5.0 04/12/2022 11:00 PM    Protein Negative 04/12/2022 11:00 PM    Glucose Negative 04/12/2022 11:00 PM    Ketone Negative 04/12/2022 11:00 PM    Bilirubin Negative 04/12/2022 11:00 PM    Urobilinogen 0.1 04/12/2022 11:00 PM    Nitrites Negative 04/12/2022 11:00 PM    Leukocyte Esterase Large (A) 04/12/2022 11:00 PM    Bacteria 1+ (A) 04/12/2022 11:00 PM    WBC >100 (H) 04/12/2022 11:00 PM    RBC  04/12/2022 11:00 PM         Assessment:     Sepsis  UTI/Candida  Lactic acidosis  Acute on chronic renal failure- creat 2.01  Cirrhosis with ascites  Anemia  History of septic DIP joint, right index finger  Hypothyroidism  Seizure disorder  Gout  GERD  History of etoh abuse  Hypotension  Metabolic acidosis  Hypothermia  Hypokalemia  Anemia monitor H&H  Abdominal distention/ascites    Plan: On shemar singleton    On allopurinol 300 mg daily  Vitamin D 50,000 weekly  Pepcid 20 twice daily  Folic acid 1 mg daily  Lactulose 10 g 3 times a day  Keppra 1500 twice a day  Synthroid 25 mcg daily  Midodrine 10 mg 3 times a day  Propranolol 20 mg twice a day  Sodium bicarb 1303 times a day  Flomax 0.4 mg daily  Thiamine 100 mg daily  Vancomycin with pharmacy  Replace potassium  Discontinue IV Zosyn  discontinue Eraxis 100 mg daily    Discontinue Sodium bicarb drip  Continue fluconazole 200 mg daily   We will do psych consult for psychosis patient receiving Geodon  Psychotic behavior    Discussed with patient nurse  Will do GI consult for PEG tube feeding as patient not eating drinking  Awaiting PEG tube placement.          Current Facility-Administered Medications:     vancomycin (VANCOCIN) 500 mg in 0.9% sodium chloride (MBP/ADV) 100 mL MBP, 500 mg, IntraVENous, ONCE, Ree Zaragoza MD, Last Rate: 100 mL/hr at 04/19/22 0903, 500 mg at 04/19/22 0903    [START ON 4/20/2022] Vancomycin random level to be drawn on 4/20/22 at 0400 am., , Other, ONCE, Ree Zaragoza MD    Vancomycin random level - please draw with AM labs.  Thanks!, , Other, ONCE, Sandra Penaloza MD    fluconazole (DIFLUCAN) tablet 200 mg, 200 mg, Oral, DAILY, Ree Zaragoza MD, 200 mg at 04/19/22 0904    0.9% sodium chloride infusion, 50 mL/hr, IntraVENous, CONTINUOUS, Ijeoma Donald MD, Last Rate: 50 mL/hr at 04/17/22 1609, 50 mL/hr at 04/17/22 1609    hydrOXYzine (VISTARIL) injection 50 mg, 50 mg, IntraMUSCular, Q8H PRN, Quita DENIS MD, 50 mg at 04/19/22 0417    fludrocortisone (FLORINEF) tablet 0.1 mg, 0.1 mg, Oral, DAILY, Ree Zaragoza MD, 0.1 mg at 04/18/22 0900    0.9% sodium chloride infusion 250 mL, 250 mL, IntraVENous, PRN, Chris Ackerman MD    ziprasidone (GEODON) 10 mg in sterile water (preservative free) 0.5 mL injection, 10 mg, IntraMUSCular, Q12H PRN, Belia Coffman MD, 10 mg at 04/16/22 2217    levETIRAcetam (KEPPRA) 1500 mg in saline (iso-osm) 100 ml IVPB, 1,500 mg, IntraVENous, Q12H, Lamberto Ackerman MD, Last Rate: 400 mL/hr at 04/19/22 0132, 1,500 mg at 04/19/22 0132    0.9% sodium chloride infusion 250 mL, 250 mL, IntraVENous, PRN, Lamberto Ackerman MD    epoetin luis-epbx (RETACRIT) injection 10,000 Units, 10,000 Units, SubCUTAneous, Q7D, Ijeoma Donald MD, 10,000 Units at 04/16/22 1912    midodrine (PROAMATINE) tablet 10 mg, 10 mg, Oral, TID WITH MEALS, Lamberto Ackerman MD, 10 mg at 04/19/22 0198    sodium bicarbonate tablet 1,300 mg, 1,300 mg, Oral, TID, Ijeoma Donald MD, 1,300 mg at 04/19/22 0903    LORazepam (ATIVAN) injection 1 mg, 1 mg, IntraVENous, Q4H PRN, Lamberto Ackerman MD, 1 mg at 04/08/22 1733    sodium chloride (NS) flush 5-10 mL, 5-10 mL, IntraVENous, PRN, Lamberto Ackerman MD, 10 mL at 04/15/22 1346    lactulose (CHRONULAC) 10 gram/15 mL solution 15 mL, 10 g, Oral, TID, Claudette Anis, MD, 15 mL at 04/19/22 0903    tamsulosin (FLOMAX) capsule 0.4 mg, 0.4 mg, Oral, DAILY, Yeimy Ackerman MD, 0.4 mg at 04/19/22 3897    thiamine mononitrate (B-1) tablet 100 mg, 100 mg, Oral, DAILY, Yeimy Ackerman MD, 100 mg at 04/19/22 9835    allopurinoL (ZYLOPRIM) tablet 300 mg, 300 mg, Oral, DAILY, Yeimy Ackerman MD, 300 mg at 04/19/22 9304    ergocalciferol capsule 50,000 Units, 50,000 Units, Oral, Q7D, Lamberto Ackerman MD, 50,000 Units at 04/14/22 1606    famotidine (PEPCID) tablet 20 mg, 20 mg, Oral, BID, Lamberto Ackerman MD, 20 mg at 26/98/28 0135    folic acid (FOLVITE) tablet 1 mg, 1 mg, Oral, DAILY, Claudette Anis, MD, 1 mg at 04/19/22 9782    levothyroxine (SYNTHROID) tablet 25 mcg, 25 mcg, Oral, ACB, Lamberto Ackerman MD, 25 mcg at 04/19/22 0905    propranoloL (INDERAL) tablet 20 mg, 20 mg, Oral, BID, Lamberto Ackerman MD, 20 mg at 04/19/22 3571    acetaminophen (TYLENOL) tablet 650 mg, 650 mg, Oral, Q6H PRN **OR** acetaminophen (TYLENOL) suppository 650 mg, 650 mg, Rectal, Q6H PRN, Lamberto Ackerman MD    polyethylene glycol (MIRALAX) packet 17 g, 17 g, Oral, DAILY PRN, Lamberto Ackerman MD    ondansetron (ZOFRAN ODT) tablet 4 mg, 4 mg, Oral, Q8H PRN **OR** ondansetron (ZOFRAN) injection 4 mg, 4 mg, IntraVENous, Q6H PRN, Yeimy Ackerman MD    VANCOMYCIN INFORMATION NOTE, , Other, Rx Dosing/Monitoring, Debra Dsouza MD    NOREPINephrine (LEVOPHED) 8 mg in 0.9% NS 250ml infusion, 0.5-120 mcg/min, IntraVENous, TITRATE, Yeimy Ackerman MD, Stopped at 04/11/22 0105

## 2022-04-19 NOTE — PROGRESS NOTES
Renal Note    NAME:  Jerica Cordova   :   1960   MRN:   [id number]     ATTENDING: Carlitos Carter MD  PCP:  Gabe Montoya NP    Date/Time:  2022 12:46 PM      Subjective:     Patient seen in the room. He is very confused. Wearing mittens today. On IVF. Creatinine 1.8 today. Pressure is low in the 90s range. On midodrine 10 mg 3 times daily    Past Medical History:   Diagnosis Date    Arthritis     Hypertension       Past Surgical History:   Procedure Laterality Date    IR INSERT NON TUNL CVC OVER 5 YRS  3/25/2022    IR PARACENTESIS ABD SUBSQ  4/15/2022    IR PARACENTESIS ABD W IMAGE  2022    IR PARACENTESIS ABD W IMAGE  3/1/2022    IR PARACENTESIS ABD W IMAGE  3/30/2022     Social History     Tobacco Use    Smoking status: Never Smoker    Smokeless tobacco: Never Used   Substance Use Topics    Alcohol use: Not on file      No family history on file. No Known Allergies   Prior to Admission medications    Medication Sig Start Date End Date Taking? Authorizing Provider   thiamine mononitrate (B-1) 100 mg tablet Take 1 Tablet by mouth daily. 22   Diana Ackerman MD   levothyroxine (SYNTHROID) 25 mcg tablet Take 25 mcg by mouth Daily (before breakfast). Provider, Historical   tamsulosin (Flomax) 0.4 mg capsule Take 1 Capsule by mouth daily. 3/4/22   Leeann Whyte PA-C   potassium chloride (K-DUR, KLOR-CON M20) 20 mEq tablet Take 1 Tablet by mouth daily. 3/2/22   Violet Briscoe MD   sodium bicarbonate 650 mg tablet Take 1 Tablet by mouth three (3) times daily. 3/2/22   Violet Briscoe MD   levETIRAcetam (Keppra) 1,000 mg tablet Take 1.5 Tablets by mouth two (2) times a day. 3/2/22   Violet Briscoe MD   ergocalciferol (ERGOCALCIFEROL) 1,250 mcg (50,000 unit) capsule Take 1 Capsule by mouth every seven (7) days.  22   Provider, Historical   lactulose (CHRONULAC) 10 gram/15 mL solution Take 15 mL by mouth three (3) times daily. 22   Zenon Ackerman MD   allopurinoL (ZYLOPRIM) 300 mg tablet Take 1 Tablet by mouth daily. 22   Provider, Historical   thiamine HCL (B-1) 100 mg tablet Take 100 mg by mouth daily. Provider, Historical   propranoloL (INDERAL) 20 mg tablet Take 20 mg by mouth two (2) times a day. Provider, Historical   folic acid (FOLVITE) 1 mg tablet Take 1 mg by mouth daily. Provider, Historical   famotidine (PEPCID) 20 mg tablet Take 20 mg by mouth At bedtime. Provider, Historical   aMILoride (MIDAMOR) 5 mg tablet Take 5 mg by mouth daily. Provider, Historical       REVIEW OF SYSTEMS:       he is lethargic. Unable to obtain    Objective:   VITALS:    Visit Vitals  BP 96/66   Pulse 67   Temp 97.4 °F (36.3 °C)   Resp 18   Ht 5' 10\" (1.778 m)   Wt 92.8 kg (204 lb 9.4 oz)   SpO2 100%   BMI 29.36 kg/m²     Temp (24hrs), Av.3 °F (36.3 °C), Min:97.3 °F (36.3 °C), Max:97.4 °F (36.3 °C)      PHYSICAL EXAM:     General: NAD, lethargic eyes: sclera anicteric  Oral Cavity: No thrush or ulcers  Neck: no JVD  Chest: Fair bilateral air entry. No Wheezing or Rhonchi. No rales.   Heart: normal sounds  Abdomen: soft and non tender   :  Gerard+  Lower Extremities: no edema  Skin: no rash  Neuro: Lethargic psychiatric: Able to assess      LAB DATA REVIEWED:    Recent Results (from the past 24 hour(s))   VANCOMYCIN, RANDOM    Collection Time: 22  3:32 AM   Result Value Ref Range    Vancomycin, random 16.0 ug/mL   CBC W/O DIFF    Collection Time: 22  3:32 AM   Result Value Ref Range    WBC 6.5 4.1 - 11.1 K/uL    RBC 3.51 (L) 4.10 - 5.70 M/uL    HGB 9.7 (L) 12.1 - 17.0 g/dL    HCT 28.3 (L) 36.6 - 50.3 %    MCV 80.6 80.0 - 99.0 FL    MCH 27.6 26.0 - 34.0 PG    MCHC 34.3 30.0 - 36.5 g/dL    RDW 24.1 (H) 11.5 - 14.5 %    PLATELET 72 (L) 257 - 400 K/uL    NRBC 0.3 (H) 0.0  WBC    ABSOLUTE NRBC 0.02 (H) 0.00 - 5.04 K/uL   METABOLIC PANEL, COMPREHENSIVE    Collection Time: 22  4:15 AM Result Value Ref Range    Sodium 139 136 - 145 mmol/L    Potassium 3.6 3.5 - 5.1 mmol/L    Chloride 111 (H) 97 - 108 mmol/L    CO2 18 (L) 21 - 32 mmol/L    Anion gap 10 5 - 15 mmol/L    Glucose 67 65 - 100 mg/dL    BUN 19 6 - 20 mg/dL    Creatinine 1.82 (H) 0.70 - 1.30 mg/dL    BUN/Creatinine ratio 10 (L) 12 - 20      GFR est AA 46 (L) >60 ml/min/1.73m2    GFR est non-AA 38 (L) >60 ml/min/1.73m2    Calcium 8.2 (L) 8.5 - 10.1 mg/dL    Bilirubin, total 1.4 (H) 0.2 - 1.0 mg/dL    AST (SGOT) 42 (H) 15 - 37 U/L    ALT (SGPT) 15 12 - 78 U/L    Alk. phosphatase 73 45 - 117 U/L    Protein, total 6.3 (L) 6.4 - 8.2 g/dL    Albumin 2.1 (L) 3.5 - 5.0 g/dL    Globulin 4.2 (H) 2.0 - 4.0 g/dL    A-G Ratio 0.5 (L) 1.1 - 2.2     PHOSPHORUS    Collection Time: 04/19/22  4:15 AM   Result Value Ref Range    Phosphorus 3.2 2.6 - 4.7 mg/dL       Recommendations/Plan:     1 acute kidney injury on chronic kidney disease 3:  -Creatinine was 2.0 on admission   -Creatinine has improved to 1.4  . Again increased to 1.8, despite restarting IV fluids. Persistent GRACIA likely because of ongoing hypotension. I will increase IV fluid rate to 100 mils daily   -Have gone through his meds list.  Not on any potential nephrotoxins   -Baseline creatinine 1.3-1.7 based on labs during previous admission  -Has history of recent GRACIA with creatinine peaking to 2.6 few weeks ago  -GRACIA likely prerenal secondary to hypotension/sepsis  -Urine is suggestive of UTI  -No need for renal ultrasound  -Continue Gerard catheter. Urine output is improving  -Clinically noticed legs edema.     2 metabolic acidosis:  -CO2 is 18  -on oral bicarbonate 1300 TID  -off HCO3 drip     3 severe anemia  -Hemoglobin 6.8->7.4->8.2.->9.7  -s/p  unit blood Tx  -Continue weekly Procrit    4  Hypokalemia  -Potassium is 3.4->3.6.    -po kcl 40 meq x 1 received     5 hypotension  -Patient is persistently low despite midodrine 10 mg TID  -I will increase IV fluid rate    5 right index finger osteomyelitis  -s/p recent antibiotics for 2 weeks. -Vancomycin has been resumed during this hospitalization  -ID is on the case    6 history of liver cirrhosis  -Has history of alcoholic liver cirrhosis. Portal hypertension  -Noted to have abdominal distention.   IR has been consulted  -Had paracentesis during his previous admission also      ________________________________________________________________________  Signed: Richard Gomez MD

## 2022-04-19 NOTE — CONSULTS
Consult    Patient: Destiny Duenas MRN: [id number]  SSN: [id number]    YOB: 1960  Age: 60 y.o. Sex: male      Subjective:      Destiny Duenas is a 60 y.o. male who is being seen for dysphagia, malnutrition. History from medical records  Patient was not able to give much  history  . Old gentleman wwith [facility] significant for HTN, cirrhosis, ascites presents via EMS for removing fish catheter. Patient was found out to be septic upon arrival with hypothermia and hypotension. Temp 93.6 rectal, BP 84/68. Patient is retaining 887cc of urine and nursing staff will replace fish. Patient admitted hospital for the treatment of sepsis has been on fluids and IV Levaquin  Head CT w/o contrast shows no acute abnormality. Blood cultures pending. CXR and KUB both normal, GI consultation placed for possible PEG tube placement. Patient has malnutrition, poor p.o. intake,       Past Medical History:   Diagnosis Date    Arthritis     Hypertension      Past Surgical History:   Procedure Laterality Date    IR INSERT NON TUNL CVC OVER 5 YRS  3/25/2022    IR PARACENTESIS ABD SUBSQ  4/15/2022    IR PARACENTESIS ABD W IMAGE  1/21/2022    IR PARACENTESIS ABD W IMAGE  3/1/2022    IR PARACENTESIS ABD W IMAGE  3/30/2022      No family history on file. Social History     Tobacco Use    Smoking status: Never Smoker    Smokeless tobacco: Never Used   Substance Use Topics    Alcohol use: Not on file      Current Facility-Administered Medications   Medication Dose Route Frequency Provider Last Rate Last Admin    [START ON 4/19/2022] Vancomycin random level - please draw with AM labs. Thanks!    Other ONCE MD Gerry Becerra [START ON 4/19/2022] fluconazole (DIFLUCAN) tablet 200 mg  200 mg Oral DAILY Ree Zaragoza MD        0.9% sodium chloride infusion  50 mL/hr IntraVENous CONTINUOUS Dalton Andrew MD 50 mL/hr at 04/17/22 1609 50 mL/hr at 04/17/22 1609    hydrOXYzine (VISTARIL) injection 50 mg  50 mg IntraMUSCular Q8H PRN Tony Valentino MD        fludrocortisone (FLORINEF) tablet 0.1 mg  0.1 mg Oral DAILY Ree Zaragoza MD   0.1 mg at 04/18/22 0900    0.9% sodium chloride infusion 250 mL  250 mL IntraVENous PRN Ambar Ackerman MD        ziprasidone (GEODON) 10 mg in sterile water (preservative free) 0.5 mL injection  10 mg IntraMUSCular Q12H PRN Zachary RUBY MD   10 mg at 04/16/22 2217    levETIRAcetam (KEPPRA) 1500 mg in saline (iso-osm) 100 ml IVPB  1,500 mg IntraVENous Q12H Lamberto Ackerman  mL/hr at 04/18/22 1308 1,500 mg at 04/18/22 1308    0.9% sodium chloride infusion 250 mL  250 mL IntraVENous PRN Ambar Ackerman MD       [facility] [place] epoetin luis-epbx (RETACRIT) injection 10,000 Units  10,000 Units SubCUTAneous Q7D Guillaume Mercado MD   10,000 Units at 04/16/22 1912    midodrine (PROAMATINE) tablet 10 mg  10 mg Oral TID WITH MEALS Lamberto Ackerman MD   10 mg at 04/18/22 1310    sodium bicarbonate tablet 1,300 mg  1,300 mg Oral TID Guillaume Mercado MD   1,300 mg at 04/18/22 2146    LORazepam (ATIVAN) injection 1 mg  1 mg IntraVENous Q4H PRN Lamberto Ackerman MD   1 mg at 04/08/22 1733    sodium chloride (NS) flush 5-10 mL  5-10 mL IntraVENous PRN Lamberto Ackerman MD   10 mL at 04/15/22 2354    lactulose (CHRONULAC) 10 gram/15 mL solution 15 mL  10 g Oral TID Ambar Ackerman MD   15 mL at 04/18/22 2145    tamsulosin (FLOMAX) capsule 0.4 mg  0.4 mg Oral DAILY Lamberto Ackerman MD   0.4 mg at 04/15/22 1132    thiamine mononitrate (B-1) tablet 100 mg  100 mg Oral DAILY Lamberto Ackerman MD   100 mg at 04/18/22 0900    allopurinoL (ZYLOPRIM) tablet 300 mg  300 mg Oral DAILY Lamberto Ackerman MD   300 mg at 04/18/22 0900    ergocalciferol capsule 50,000 Units  50,000 Units Oral Q7D Lamberto Ackerman MD   50,000 Units at 04/14/22 1606    famotidine (PEPCID) tablet 20 mg  20 mg Oral BID Lamberto Ackerman MD   20 mg at 43/19/07 2767    folic acid (Perfecto Kailey) tablet 1 mg  1 mg Oral DAILY Lamberto Ackerman MD   1 mg at 04/18/22 0900    levothyroxine (SYNTHROID) tablet 25 mcg  25 mcg Oral ACB Lamberto Ackerman MD   25 mcg at 04/15/22 1132    propranoloL (INDERAL) tablet 20 mg  20 mg Oral BID Lamberto Ackerman MD   20 mg at 04/18/22 2146    acetaminophen (TYLENOL) tablet 650 mg  650 mg Oral Q6H PRN Yeimy Ackerman MD        Or   [place] acetaminophen (TYLENOL) suppository 650 mg  650 mg Rectal Q6H PRN Yeimy Ackerman MD        polyethylene glycol (MIRALAX) packet 17 g  17 g Oral DAILY PRN Yeimy Ackerman MD        ondansetron (ZOFRAN ODT) tablet 4 mg  4 mg Oral Q8H PRN Yeimy Ackerman MD        Or    ondansetron [facility]) injection 4 mg  4 mg IntraVENous Q6H PRN Yeimy Ackerman MD        VANCOMYCIN INFORMATION NOTE   Other Rx Dosing/Monitoring Debra Dsouza MD        NOREPINephrine (LEVOPHED) 8 mg in 0.9% NS 250ml infusion  0.5-120 mcg/min IntraVENous TITRATE Yeimy Ackerman MD   Stopped at 04/11/22 0106        No Known Allergies    Review of Systems:  Review of Systems   Unable to perform ROS: Medical condition        Objective:     Vitals:    04/18/22 1510 04/18/22 1600 04/18/22 1625 04/18/22 2140   BP: (!) 89/61   123/86   Pulse: 62 62  70   Resp: 16   18   Temp: 97.3 °F (36.3 °C)   97.3 °F (36.3 °C)   SpO2: 97%   100%   Weight:   92.8 kg (204 lb 9.4 oz)    Height:            Physical Exam:  Physical Exam  Constitutional:       Appearance: He is ill-appearing. HENT:      Head: Atraumatic. Mouth/Throat:      Mouth: Mucous membranes are dry. Eyes:      General: No scleral icterus. Extraocular Movements: Extraocular movements intact. Cardiovascular:      Rate and Rhythm: Tachycardia present. Pulses: Normal pulses. Heart sounds: Normal heart sounds. Pulmonary:      Breath sounds: Normal breath sounds. Abdominal:      General: Abdomen is flat. Bowel sounds are normal.   Musculoskeletal:         General: Normal range of motion. Cervical back: Neck supple. Right lower leg: No edema. Left lower leg: Edema present. Skin:     General: Skin is warm. Neurological:      Mental Status: Mental status is at baseline. Recent Results (from the past 24 hour(s))   CREATININE    Collection Time: 04/18/22  6:17 AM   Result Value Ref Range    Creatinine 1.75 (H) 0.70 - 1.30 mg/dL   VANCOMYCIN, RANDOM    Collection Time: 04/18/22  6:17 AM   Result Value Ref Range    Vancomycin, random 13.4 ug/mL        IR PARACENTESIS ABD SUBSQ   Final Result   Ultrasound guided paracentesis with no immediate complications. Patient tolerated the procedure well. XR CHEST PORT   Final Result   Endotracheal tube as above. Underexpanded lungs with bibasilar   atelectasis. Hydrostatic edema. Possible pleural effusions. US ABD LTD   Final Result   Ascites. XR HAND RT MIN 3 V   Final Result      CT HEAD WO CONT   Final Result   Age-appropriate atrophy. No acute findings. XR ABD (KUB)   Final Result   Within normal      XR CHEST PORT   Final Result   Findings/impression:   1. Low lung volumes. Bibasilar hypoventilatory change/atelectasis. Left   retrocardiac lung incompletely evaluated, cannot exclude underlying airspace   disease. 2.  Cardiac and mediastinal contours are obscured by low lung volumes. Ectatic/possible aneurysmal appearance transverse aorta with calcific   atherosclerotic plaque. Appears stable from prior radiograph. 3.  No pleural fluid. No pneumothorax. 4.  Prominent gas-filled bowel partially visualized in the left upper quadrant.          IR INSERT NON TUNL CVC OVER 5 YRS    (Results Pending)   IR PARACENTESIS ABD W IMAGE    (Results Pending)   IR US GUIDED VASCULAR ACCESS    (Results Pending)   IR FLUORO GUIDE PLC CVAD    (Results Pending)      Assessment:     Hospital Problems  Never Reviewed          Codes Class Noted POA    Severe protein-calorie malnutrition ([facility] [address] ICD-10-CM: E43  ICD-9-CM: 400  4/8/2022 Yes        Lactic acidosis ICD-10-CM: E87.2  ICD-9-CM: 276.2  4/7/2022 Unknown        Urinary retention ICD-10-CM: R33.9  ICD-9-CM: 788.20  4/7/2022 Unknown        Severe sepsis [facility]) ICD-10-CM: A41.9, R65.20  ICD-9-CM: 038.9, 995.92  4/7/2022 Unknown        Hypothermia ICD-10-CM: T68. XXXA  ICD-9-CM: 991.6  4/7/2022 Unknown        Sepsis ([facility] [address] ICD-10-CM: A41.9  ICD-9-CM: 038.9, 995.91  4/7/2022 Unknown          Difficulty feeding, with multiple medical issue, sepsis,  Paracentesis abdominal ascites removed,  Patient sleepy obtunded /hypothermic     Not eating drinking     He is still confused and refusing to eat food.   He pulled out NG tube 3 times  Plan:   Continue Current antibiotic treatment  Nutrition consult   Continue lactulose  Continue tube feeding  Continue on GI prophylaxis with famotidine    Reviewed ultrasound to assess ascites, large amount of ascites is contra- indication for PEG tube placement,    Follow the patient with you to place a PEG tube, if the patient fails NG tube feeding  again,  High risk patient, he may pull PEG tube some as well  Signed By: Moe Nguyen MD     April 18, 2022         Thank you for allowing me to participate in this patients care  Cc Referring Physician   Flori Bennett NP

## 2022-04-19 NOTE — PROGRESS NOTES
Progress Note    Patient: Sam Urena MRN: [id number]  SSN: [id number]    YOB: 1960  Age: 60 y.o. Sex: male      Admit Date: 4/7/2022    LOS: 12 days     Subjective:     Confused, with ascites   Past Medical History:   Diagnosis Date    Arthritis     Hypertension         Current Facility-Administered Medications:     [START ON 4/20/2022] Vancomycin random level to be drawn on 4/20/22 at 0400 am., , Other, ONCE, Ree Zaragoza MD    Vancomycin random level - please draw with AM labs.  Thanks!, , Other, ONCE, Deloris Chacon MD    fluconazole (DIFLUCAN) tablet 200 mg, 200 mg, Oral, DAILY, Ree Zaragoza MD, 200 mg at 04/19/22 0904    0.9% sodium chloride infusion, 100 mL/hr, IntraVENous, CONTINUOUS, Jenniffer Garsia MD, Last Rate: 100 mL/hr at 04/19/22 1418, 100 mL/hr at 04/19/22 1418    hydrOXYzine (VISTARIL) injection 50 mg, 50 mg, IntraMUSCular, Q8H PRN, Yoandy DENIS MD, 50 mg at 04/19/22 0417    fludrocortisone (FLORINEF) tablet 0.1 mg, 0.1 mg, Oral, DAILY, Ree Zaragoza MD, 0.1 mg at 04/19/22 0900    0.9% sodium chloride infusion 250 mL, 250 mL, IntraVENous, PRN, Mohiuddin, Laban Mcardle, MD    ziprasidone (GEODON) 10 mg in sterile water (preservative free) 0.5 mL injection, 10 mg, IntraMUSCular, Q12H PRN, Phylicia Kidd MD, 10 mg at 04/16/22 2217    levETIRAcetam (KEPPRA) 1500 mg in saline (iso-osm) 100 ml IVPB, 1,500 mg, IntraVENous, Q12H, Lamberto Ackerman MD, Last Rate: 400 mL/hr at 04/19/22 1237, 1,500 mg at 04/19/22 1237    0.9% sodium chloride infusion 250 mL, 250 mL, IntraVENous, PRN, Lamberto Ackerman MD    epoetin luis-epbx (RETACRIT) injection 10,000 Units, 10,000 Units, SubCUTAneous, Q7D, Jenniffer Garsia MD, 10,000 Units at 04/16/22 1912    midodrine (PROAMATINE) tablet 10 mg, 10 mg, Oral, TID WITH MEALS, Lamberto Ackerman MD, 10 mg at 04/19/22 1237    sodium bicarbonate tablet 1,300 mg, 1,300 mg, Oral, TID, Jenniffer Garsia MD, 1,300 mg at 04/19/22 0903    LORazepam (ATIVAN) injection 1 mg, 1 mg, IntraVENous, Q4H PRN, Lamberto Ackerman MD, 1 mg at 04/08/22 1733    sodium chloride (NS) flush 5-10 mL, 5-10 mL, IntraVENous, PRN, Lamberto Ackerman MD, 10 mL at 04/15/22 2354    lactulose (CHRONULAC) 10 gram/15 mL solution 15 mL, 10 g, Oral, TID, Carlyle Ackerman MD, 15 mL at 04/19/22 0903    tamsulosin (FLOMAX) capsule 0.4 mg, 0.4 mg, Oral, DAILY, Lamberto Ackerman MD, 0.4 mg at 04/19/22 2294    thiamine mononitrate (B-1) tablet 100 mg, 100 mg, Oral, DAILY, Jamie Scott MD, 100 mg at 04/19/22 6728    allopurinoL (ZYLOPRIM) tablet 300 mg, 300 mg, Oral, DAILY, Jamie Scott MD, 300 mg at 04/19/22 6689    ergocalciferol capsule 50,000 Units, 50,000 Units, Oral, Q7D, Lamberto Ackerman MD, 50,000 Units at 04/14/22 1606    famotidine (PEPCID) tablet 20 mg, 20 mg, Oral, BID, Lamberto Ackerman MD, 20 mg at 53/22/08 8761    folic acid (FOLVITE) tablet 1 mg, 1 mg, Oral, DAILY, Jamie Scott MD, 1 mg at 04/19/22 2980    levothyroxine (SYNTHROID) tablet 25 mcg, 25 mcg, Oral, ACB, Lamberto Ackerman MD, 25 mcg at 04/19/22 0905    propranoloL (INDERAL) tablet 20 mg, 20 mg, Oral, BID, Lamberto Ackerman MD, 20 mg at 04/19/22 3676    acetaminophen (TYLENOL) tablet 650 mg, 650 mg, Oral, Q6H PRN **OR** acetaminophen (TYLENOL) suppository 650 mg, 650 mg, Rectal, Q6H PRN, Lamberto Ackerman MD    polyethylene glycol (MIRALAX) packet 17 g, 17 g, Oral, DAILY PRN, Lamberto Ackerman MD    ondansetron (ZOFRAN ODT) tablet 4 mg, 4 mg, Oral, Q8H PRN **OR** ondansetron (ZOFRAN) injection 4 mg, 4 mg, IntraVENous, Q6H PRN, Carlyle Ackerman MD    VANCOMYCIN INFORMATION NOTE, , Other, Rx Dosing/Monitoring, Sandra Hemphill MD    NOREPINephrine (LEVOPHED) 8 mg in 0.9% NS 250ml infusion, 0.5-120 mcg/min, IntraVENous, TITRATE, Carlyle Ackerman MD, Stopped at 04/11/22 0106    Objective:     Vitals:    04/19/22 0749 04/19/22 1215 04/19/22 1237 04/19/22 1454 BP: 107/76  96/66 121/68   Pulse: 66  67 68   Resp: 18   19   Temp: 97.4 °F (36.3 °C)   97.3 °F (36.3 °C)   SpO2: 100%   100%   Weight:       Height:  5' 10\" (1.778 m)          Intake and Output:  Current Shift: No intake/output data recorded. Last three shifts: 04/17 1901 - 04/19 0700  In: -   Out: 275 [Urine:275]    Physical Exam:   Physical Exam  Constitutional:       Appearance: He is ill-appearing. HENT:      Mouth/Throat:      Mouth: Mucous membranes are dry. Eyes:      General: No scleral icterus. Cardiovascular:      Rate and Rhythm: Rhythm irregular. Pulses: Normal pulses. Pulmonary:      Effort: Respiratory distress present. Abdominal:      Tenderness: There is abdominal tenderness. Genitourinary:     Rectum: Normal.   Skin:     General: Skin is dry. Neurological:      Mental Status: He is disoriented.           Lab/Data Review:  Recent Results (from the past 24 hour(s))   VANCOMYCIN, RANDOM    Collection Time: 04/19/22  3:32 AM   Result Value Ref Range    Vancomycin, random 16.0 ug/mL   CBC W/O DIFF    Collection Time: 04/19/22  3:32 AM   Result Value Ref Range    WBC 6.5 4.1 - 11.1 K/uL    RBC 3.51 (L) 4.10 - 5.70 M/uL    HGB 9.7 (L) 12.1 - 17.0 g/dL    HCT 28.3 (L) 36.6 - 50.3 %    MCV 80.6 80.0 - 99.0 FL    MCH 27.6 26.0 - 34.0 PG    MCHC 34.3 30.0 - 36.5 g/dL    RDW 24.1 (H) 11.5 - 14.5 %    PLATELET 72 (L) 659 - 400 K/uL    NRBC 0.3 (H) 0.0  WBC    ABSOLUTE NRBC 0.02 (H) 0.00 - 9.18 K/uL   METABOLIC PANEL, COMPREHENSIVE    Collection Time: 04/19/22  4:15 AM   Result Value Ref Range    Sodium 139 136 - 145 mmol/L    Potassium 3.6 3.5 - 5.1 mmol/L    Chloride 111 (H) 97 - 108 mmol/L    CO2 18 (L) 21 - 32 mmol/L    Anion gap 10 5 - 15 mmol/L    Glucose 67 65 - 100 mg/dL    BUN 19 6 - 20 mg/dL    Creatinine 1.82 (H) 0.70 - 1.30 mg/dL    BUN/Creatinine ratio 10 (L) 12 - 20      GFR est AA 46 (L) >60 ml/min/1.73m2    GFR est non-AA 38 (L) >60 ml/min/1.73m2    Calcium 8.2 (L) 8.5 - 10.1 mg/dL    Bilirubin, total 1.4 (H) 0.2 - 1.0 mg/dL    AST (SGOT) 42 (H) 15 - 37 U/L    ALT (SGPT) 15 12 - 78 U/L    Alk. phosphatase 73 45 - 117 U/L    Protein, total 6.3 (L) 6.4 - 8.2 g/dL    Albumin 2.1 (L) 3.5 - 5.0 g/dL    Globulin 4.2 (H) 2.0 - 4.0 g/dL    A-G Ratio 0.5 (L) 1.1 - 2.2     PHOSPHORUS    Collection Time: 04/19/22  4:15 AM   Result Value Ref Range    Phosphorus 3.2 2.6 - 4.7 mg/dL        US ABD LTD   Final Result   Increased abdominal and pelvic ascites. IR PARACENTESIS ABD SUBSQ   Final Result   Ultrasound guided paracentesis with no immediate complications. Patient tolerated the procedure well. XR CHEST PORT   Final Result   Endotracheal tube as above. Underexpanded lungs with bibasilar   atelectasis. Hydrostatic edema. Possible pleural effusions. US ABD LTD   Final Result   Ascites. XR HAND RT MIN 3 V   Final Result      CT HEAD WO CONT   Final Result   Age-appropriate atrophy. No acute findings. XR ABD (KUB)   Final Result   Within normal      XR CHEST PORT   Final Result   Findings/impression:   1. Low lung volumes. Bibasilar hypoventilatory change/atelectasis. Left   retrocardiac lung incompletely evaluated, cannot exclude underlying airspace   disease. 2.  Cardiac and mediastinal contours are obscured by low lung volumes. Ectatic/possible aneurysmal appearance transverse aorta with calcific   atherosclerotic plaque. Appears stable from prior radiograph. 3.  No pleural fluid. No pneumothorax. 4.  Prominent gas-filled bowel partially visualized in the left upper quadrant.          IR INSERT NON TUNL CVC OVER 5 YRS    (Results Pending)   IR PARACENTESIS ABD W IMAGE    (Results Pending)   IR US GUIDED VASCULAR ACCESS    (Results Pending)   IR FLUORO GUIDE PLC CVAD    (Results Pending)        Assessment:     Active Problems:    Lactic acidosis (4/7/2022)      Urinary retention (4/7/2022)      Severe sepsis ([address] (4/7/2022)      Hypothermia (4/7/2022)      Sepsis ([facility] [address] (4/7/2022)      Severe protein-calorie malnutrition ([facility] [address] (4/8/2022)          Difficulty feeding, with multiple medical issue, sepsis,  Paracentesis abdominal ascites removed,  Patient sleepy obtunded /hypothermic     Not eating drinking     He is still confused and refusing to eat food.   He pulled out NG tube 3 times   Plan:   Continue Current antibiotic treatment  Continue lactulose  Continue tube feeding  Continue on GI prophylaxis with famotidine     Reviewed ultrasound , large amount of ascites, which is  contra- indication for PEG tube placement,  Need another Paracentesis before the PEG tube placement         Plan:       Signed By: Lolis Blas MD     April 19, 2022        Thank you for allowing me to participate in this patients care  Cc Referring Physician   Adrián Oakes NP

## 2022-04-20 NOTE — PROGRESS NOTES
General Daily Progress Note          Patient Name:   Tiffany Tran       YOB: 1960       Age:  60 y.o. Admit Date: 4/7/2022      Subjective:     Tiffany Tran is a(n) 60 y.o. male with PMH significant for HTN, cirrhosis, ascites presents via EMS for removing fish catheter. Patient recently d/c after lengthy stay in hospital for septic DIP joint of right index finger. He was d/c on 10 days of PO Zyvox, which he completed. He presents today after pulling out fish catheter. Several blood clots noted. Patient septic upon arrival with hypothermia and hypotension. Temp 93.6 rectal, BP 84/68. Patient is retaining 887cc of urine and nursing staff will replace fish.     Patient started on fluids and IV Levaquin in the ED. Patient is altered at baseline. Head CT w/o contrast shows no acute abnormality. Blood cultures pending. CXR and KUB both normal       Patient is alert awake confused  Hypotensive off  Levophed    Normal temperature    4/12  Patient is off levophed but still receiving midodrine and IVF. He remain hypotensive. He is lethargic and confused this morning. Patient has mild bilateral LE edema. Seen by ID yesterday. Recommends continuing vancomycin   Seen by nephrology yesterday. Recommends discontinuing IVF in lieu of leg edema, discontinue bicarb drip, continuing weekly procrit, continuing PO KCl, midodrine for chronic hypotension. Today patient hypothermic on bear hugger  Yesterday evening patient was restless agitated    4/13    Patient is alert awake still hypothermic on shemar hugger    4/14  Patient is seen sleeping. On shemar hugger and vancomycin. He is producing 250 ml urine. No new changes seen at this time. Labs pending. Cr trending down 1.62 (on 4/13). Abdominal US on 4/13 shows ascites. UA on 4/12 positive for leukocyte esterase, WBC >100 and bacteruria. Urine culture pending.      Patient refused medication this morning    4/15    Patient sleepy obtunded /hypothermic    Not eating drinking    4/18  Patient is seen in medical telemetry with a one to one sitter. He is still confused and refusing to eat food. He is compliant with his medications  He pulled out NG tube 3 times. Patient had a paracentesis done on 4/15 without any complications. Labs remarkable for Cr 1.75 increasing. CBC and CMP pending. Ammonia unremarkable. 4/19  Patient is seen at bedside with a one to one sitter. He still confused and attempting to pull out his IJ central line. He has mitts on. He is also attempting to jump out of bed. Patient is still not eating today. He is taking his medications. Labs remarkable for Cr 1.82. Abdominal US remarkable for Increased abdominal and pelvic ascites. Seen by ID. Recommends discontinuing eraxis, and starting on fluconazole. 4/20  Patient is seen at bedside with a one to one sitter. She reports that he pulled out his IJ line. Patient now has an IV in his hand. He has mittens on. He is still refusing to eat any food but he continues to take his medications. Patient continues to be hypothermic with latest axillary temperature of 96.1 F. Seen by nephrology. Recommends increasing IV fluid rate. Continue fish catheter. Seen by GI. Recommends another paracentesis prior to PEG tube placement. Seen by ID. Recommends continuing fluconazole and discontinue vancomycin. Labs pending. Objective:     Visit Vitals  /75 (BP 1 Location: Right upper arm, BP Patient Position: Lying;Supine; At rest)   Pulse 65   Temp (!) 96.3 °F (35.7 °C)   Resp 18   Ht 5' 10\" (1.778 m)   Wt 203 lb 4.2 oz (92.2 kg)   SpO2 96%   BMI 29.17 kg/m²        No results found for this or any previous visit (from the past 24 hour(s)). [unfilled]      Review of Systems    Constitutional: Negative for chills and fever. HENT: Negative. Eyes: Negative. Respiratory: Negative. Cardiovascular: Negative.     Gastrointestinal: Negative for abdominal pain and nausea. Skin: Negative. Neurological: Negative. Physical Exam:      Constitutional: pt is oriented to person, place, and time. HENT:   Head: Normocephalic and atraumatic. Eyes: Pupils are equal, round, and reactive to light. EOM are normal.   Cardiovascular: Normal rate, regular rhythm and normal heart sounds. Pulmonary/Chest: Breath sounds normal. No wheezes. No rales. Exhibits no tenderness. Abdominal: Soft. Bowel sounds are normal. There is no abdominal tenderness. There is no rebound and no guarding. Musculoskeletal: Normal range of motion. Neurological: pt is alert and oriented to person, place, and time. Extremities 2+ edema      US ABD LTD   Final Result   Increased abdominal and pelvic ascites. IR PARACENTESIS ABD SUBSQ   Final Result   Ultrasound guided paracentesis with no immediate complications. Patient tolerated the procedure well. XR CHEST PORT   Final Result   Endotracheal tube as above. Underexpanded lungs with bibasilar   atelectasis. Hydrostatic edema. Possible pleural effusions. US ABD LTD   Final Result   Ascites. XR HAND RT MIN 3 V   Final Result      CT HEAD WO CONT   Final Result   Age-appropriate atrophy. No acute findings. XR ABD (KUB)   Final Result   Within normal      XR CHEST PORT   Final Result   Findings/impression:   1. Low lung volumes. Bibasilar hypoventilatory change/atelectasis. Left   retrocardiac lung incompletely evaluated, cannot exclude underlying airspace   disease. 2.  Cardiac and mediastinal contours are obscured by low lung volumes. Ectatic/possible aneurysmal appearance transverse aorta with calcific   atherosclerotic plaque. Appears stable from prior radiograph. 3.  No pleural fluid. No pneumothorax. 4.  Prominent gas-filled bowel partially visualized in the left upper quadrant.          IR INSERT NON TUNL CVC OVER 5 YRS    (Results Pending)   IR PARACENTESIS ABD W IMAGE    (Results Pending)   IR Gricel    (Results Pending)   IR FLUORO GUIDE PLC CVAD    (Results Pending)        No results found for this or any previous visit (from the past 24 hour(s)). Results     Procedure Component Value Units Date/Time    CULTURE, URINE [[id number]]  (Abnormal) Collected: 04/12/22 2300    Order Status: Completed Specimen: Urine Updated: 04/16/22 1331     Special Requests: No Special Requests        [place] Count --        >100,000  colonies/ml       Culture result: Candida albicans       CULTURE, URINE [[id number]] Collected: 04/12/22 2300    Order Status: Canceled Specimen: Urine     CULTURE, URINE [[id number]] Collected: 04/07/22 1502    Order Status: Completed Specimen: Urine Updated: 04/09/22 1414     Special Requests: --        No Special Requests  Reflexed from [id number]       Culture result: No Growth (<1000 cfu/mL)       MRSA SCREEN - PCR (NASAL) [[id number]] Collected: 04/07/22 1345    Order Status: Completed Specimen: Swab Updated: 04/07/22 1516     MRSA by PCR, Nasal Not Detected       CULTURE, BLOOD, PAIRED [[id number]] Collected: 04/07/22 1001    Order Status: Completed Specimen: Blood Updated: 04/07/22 1017    C. DIFFICILE (DNA) [[id number]] Collected: 04/06/22 0919    Order Status: Completed Specimen: Stool Updated: 04/06/22 1056     C. difficile (DNA) Negative              Labs:     Recent Labs     04/19/22  0332   WBC 6.5   HGB 9.7*   HCT 28.3*   PLT 72*     Recent Labs     04/19/22 0415 04/18/22  0617     --    K 3.6  --    *  --    CO2 18*  --    BUN 19  --    CREA 1.82* 1.75*   GLU 67  --    CA 8.2*  --    PHOS 3.2  --      Recent Labs     04/19/22 0415   ALT 15   AP 73   TBILI 1.4*   TP 6.3*   ALB 2.1*   GLOB 4.2*     No results for input(s): INR, PTP, APTT, INREXT, INREXT in the last 72 hours. No results for input(s): FE, TIBC, PSAT, FERR in the last 72 hours.    No results found for: FOL, RBCF   No results for input(s): PH, PCO2, PO2 in the last 72 hours. No results for input(s): CPK, CKNDX, TROIQ in the last 72 hours. No lab exists for component: CPKMB  No results found for: CHOL, CHOLX, CHLST, CHOLV, HDL, HDLP, LDL, LDLC, DLDLP, TGLX, TRIGL, TRIGP, CHHD, CHHDX  Lab Results   Component Value Date/Time    Glucose (POC) 80 04/08/2022 10:03 AM    Glucose (POC) 84 04/04/2022 05:40 PM    Glucose (POC) 107 04/03/2022 11:12 AM    Glucose (POC) 111 03/31/2022 11:35 AM    Glucose (POC) 91 03/29/2022 11:10 AM     Lab Results   Component Value Date/Time    Color Yellow/Straw 04/12/2022 11:00 PM    Appearance Turbid (A) 04/12/2022 11:00 PM    Specific gravity 1.011 04/12/2022 11:00 PM    pH (UA) 5.0 04/12/2022 11:00 PM    Protein Negative 04/12/2022 11:00 PM    Glucose Negative 04/12/2022 11:00 PM    Ketone Negative 04/12/2022 11:00 PM    Bilirubin Negative 04/12/2022 11:00 PM    Urobilinogen 0.1 04/12/2022 11:00 PM    Nitrites Negative 04/12/2022 11:00 PM    Leukocyte Esterase Large (A) 04/12/2022 11:00 PM    Bacteria 1+ (A) 04/12/2022 11:00 PM    WBC >100 (H) 04/12/2022 11:00 PM    RBC  04/12/2022 11:00 PM         Assessment:     Sepsis  UTI/Candida  Lactic acidosis  Acute on chronic renal failure- creat 2.01  Cirrhosis with ascites  Anemia  History of septic DIP joint, right index finger  Hypothyroidism  Seizure disorder  Gout  GERD  History of etoh abuse  Hypotension  Metabolic acidosis  Hypothermia  Hypokalemia  Anemia monitor H&H  Abdominal distention/ascites    Plan:      On shemar singleton    On allopurinol 300 mg daily  Vitamin D 50,000 weekly  Pepcid 20 twice daily  Folic acid 1 mg daily  Lactulose 10 g 3 times a day  Keppra 1500 twice a day  Synthroid 25 mcg daily  Midodrine 10 mg 3 times a day  Propranolol 20 mg twice a day  Sodium bicarb 1303 times a day  Flomax 0.4 mg daily  Thiamine 100 mg daily  Vancomycin with pharmacy  Replace potassium  Discontinue IV Zosyn  discontinue Eraxis 100 mg daily    Discontinue Sodium bicarb drip  Continue fluconazole 200 mg daily   We will do psych consult for psychosis patient receiving Geodon  Psychotic behavior    Discussed with patient nurse  Will do GI consult for PEG tube feeding as patient not eating drinking  Awaiting paracentesis and PEG tube placement.          Current Facility-Administered Medications:     fluconazole (DIFLUCAN) tablet 200 mg, 200 mg, Oral, DAILY, Ree Zaragoza MD, 200 mg at 04/19/22 0904    0.9% sodium chloride infusion, 100 mL/hr, IntraVENous, CONTINUOUS, Silvino Zazueta MD, Last Rate: 100 mL/hr at 04/20/22 0138, 100 mL/hr at 04/20/22 0138    hydrOXYzine (VISTARIL) injection 50 mg, 50 mg, IntraMUSCular, Q8H PRN, Amy DENIS MD, 50 mg at 04/19/22 0417    fludrocortisone (FLORINEF) tablet 0.1 mg, 0.1 mg, Oral, DAILY, Ree Zaragoza MD, 0.1 mg at 04/19/22 0900    0.9% sodium chloride infusion 250 mL, 250 mL, IntraVENous, PRN, Anaya Ackerman MD    ziprasidone (GEODON) 10 mg in sterile water (preservative free) 0.5 mL injection, 10 mg, IntraMUSCular, Q12H PRN, Gil Rivera MD, 10 mg at 04/16/22 2217    levETIRAcetam (KEPPRA) 1500 mg in saline (iso-osm) 100 ml IVPB, 1,500 mg, IntraVENous, Q12H, Lamberto Ackerman MD, Last Rate: 400 mL/hr at 04/19/22 2345, 1,500 mg at 04/19/22 2345    0.9% sodium chloride infusion 250 mL, 250 mL, IntraVENous, PRN, Lamberto Ackerman MD    epoetin luis-epbx (RETACRIT) injection 10,000 Units, 10,000 Units, SubCUTAneous, Q7D, Silvino Zazueta MD, 10,000 Units at 04/16/22 1912    midodrine (PROAMATINE) tablet 10 mg, 10 mg, Oral, TID WITH MEALS, Lamberto Ackerman MD, 10 mg at 04/19/22 1742    sodium bicarbonate tablet 1,300 mg, 1,300 mg, Oral, TID, Silvino Zazueta MD, 1,300 mg at 04/19/22 2344    LORazepam (ATIVAN) injection 1 mg, 1 mg, IntraVENous, Q4H PRN, Lamberto Ackerman MD, 1 mg at 04/08/22 1733    sodium chloride (NS) flush 5-10 mL, 5-10 mL, IntraVENous, PRN, Lambetro Ackerman MD, 10 mL at 04/15/22 7374    lactulose ([facility]) 10 gram/15 mL solution 15 mL, 10 g, Oral, TID, Lamberto Ackerman MD, 15 mL at 04/19/22 2344    tamsulosin (FLOMAX) capsule 0.4 mg, 0.4 mg, Oral, DAILY, Lamberto Ackerman MD, 0.4 mg at 04/19/22 4959    thiamine mononitrate (B-1) tablet 100 mg, 100 mg, Oral, DAILY, Lamberto Ackerman MD, 100 mg at 04/19/22 8546    allopurinoL (ZYLOPRIM) tablet 300 mg, 300 mg, Oral, DAILY, Lamberto Ackerman MD, 300 mg at 04/19/22 2576    ergocalciferol capsule 50,000 Units, 50,000 Units, Oral, Q7D, Lamberto Ackerman MD, 50,000 Units at 04/14/22 1606    famotidine (PEPCID) tablet 20 mg, 20 mg, Oral, BID, Lamberto Ackerman MD, 20 mg at 05/38/75 3429    folic acid (FOLVITE) tablet 1 mg, 1 mg, Oral, DAILY, Linwood Rosado MD, 1 mg at 04/19/22 8483    levothyroxine (SYNTHROID) tablet 25 mcg, 25 mcg, Oral, ACB, Lamberto Ackerman MD, 25 mcg at 04/19/22 0905    propranoloL (INDERAL) tablet 20 mg, 20 mg, Oral, BID, Lamberto Ackerman MD, 20 mg at 04/19/22 2344    acetaminophen (TYLENOL) tablet 650 mg, 650 mg, Oral, Q6H PRN **OR** acetaminophen (TYLENOL) suppository 650 mg, 650 mg, Rectal, Q6H PRN, Cassandra Ackerman MD    polyethylene glycol (MIRALAX) packet 17 g, 17 g, Oral, DAILY PRN, Cassandra Ackerman MD    ondansetron (ZOFRAN ODT) tablet 4 mg, 4 mg, Oral, Q8H PRN **OR** ondansetron (ZOFRAN) injection 4 mg, 4 mg, IntraVENous, Q6H PRN, Lamberto Ackerman MD    NOREPINephrine (LEVOPHED) 8 mg in 0.9% NS 250ml infusion, 0.5-120 mcg/min, IntraVENous, TITRATE, Cassandra Ackerman MD, Stopped at 04/11/22 0106

## 2022-04-20 NOTE — PROGRESS NOTES
Renal Note    NAME:  Florence Rodriguez   :   1960   MRN:   [id number]     ATTENDING: Adam Espinoza MD  PCP:  Krystal Saini NP    Date/Time:  2022 12:46 PM      Subjective:     Patient seen in the room. He is very confused. Wearing mittens today. On IVF. Creatinine 1.8 today. Pressure is low in the 90s range. On midodrine 10 mg 3 times daily    Past Medical History:   Diagnosis Date    Arthritis     Hypertension       Past Surgical History:   Procedure Laterality Date    IR INSERT NON TUNL CVC OVER 5 YRS  3/25/2022    IR PARACENTESIS ABD SUBSQ  4/15/2022    IR PARACENTESIS ABD W IMAGE  2022    IR PARACENTESIS ABD W IMAGE  3/1/2022    IR PARACENTESIS ABD W IMAGE  3/30/2022     Social History     Tobacco Use    Smoking status: Never Smoker    Smokeless tobacco: Never Used   Substance Use Topics    Alcohol use: Not on file      No family history on file. No Known Allergies   Prior to Admission medications    Medication Sig Start Date End Date Taking? Authorizing Provider   thiamine mononitrate (B-1) 100 mg tablet Take 1 Tablet by mouth daily. 22   Cassandra Ackerman MD   levothyroxine (SYNTHROID) 25 mcg tablet Take 25 mcg by mouth Daily (before breakfast). Provider, Historical   tamsulosin (Flomax) 0.4 mg capsule Take 1 Capsule by mouth daily. 3/4/22   Abdirizak Whyte PA-C   potassium chloride (K-DUR, KLOR-CON M20) 20 mEq tablet Take 1 Tablet by mouth daily. 3/2/22   Keiht Mckeon MD   sodium bicarbonate 650 mg tablet Take 1 Tablet by mouth three (3) times daily. 3/2/22   Keith Mckeon MD   levETIRAcetam (Keppra) 1,000 mg tablet Take 1.5 Tablets by mouth two (2) times a day. 3/2/22   eKith Mckeon MD   ergocalciferol (ERGOCALCIFEROL) 1,250 mcg (50,000 unit) capsule Take 1 Capsule by mouth every seven (7) days.  22   Provider, Historical   lactulose (CHRONULAC) 10 gram/15 mL solution Take 15 mL by mouth three (3) times daily. 22   Chris Ackerman MD   allopurinoL (ZYLOPRIM) 300 mg tablet Take 1 Tablet by mouth daily. 22   Provider, Historical   thiamine HCL (B-1) 100 mg tablet Take 100 mg by mouth daily. Provider, Historical   propranoloL (INDERAL) 20 mg tablet Take 20 mg by mouth two (2) times a day. Provider, Historical   folic acid (FOLVITE) 1 mg tablet Take 1 mg by mouth daily. Provider, Historical   famotidine (PEPCID) 20 mg tablet Take 20 mg by mouth At bedtime. Provider, Historical   aMILoride (MIDAMOR) 5 mg tablet Take 5 mg by mouth daily. Provider, Historical       REVIEW OF SYSTEMS:       he is lethargic. Unable to obtain    Objective:   VITALS:    Visit Vitals  /70 (BP 1 Location: Right upper arm, BP Patient Position: Lying;Supine)   Pulse 88   Temp (!) 96.1 °F (35.6 °C)   Resp 20   Ht 5' 10\" (1.778 m)   Wt 92.2 kg (203 lb 4.2 oz)   SpO2 96%   BMI 29.17 kg/m²     Temp (24hrs), Av.2 °F (35.7 °C), Min:96.1 °F (35.6 °C), Max:96.3 °F (35.7 °C)      PHYSICAL EXAM:     General: NAD, lethargic eyes: sclera anicteric  Oral Cavity: No thrush or ulcers  Neck: no JVD  Chest: Fair bilateral air entry. No Wheezing or Rhonchi. No rales. Heart: normal sounds  Abdomen: soft and non tender   :  Gerard+  Lower Extremities: no edema  Skin: no rash  Neuro: Lethargic psychiatric: Able to assess      LAB DATA REVIEWED:    No results found for this or any previous visit (from the past 24 hour(s)). Recommendations/Plan:     1 acute kidney injury on chronic kidney disease 3:  -Creatinine was 2.0 on admission   -Creatinine has improved to 1.4  . Again increased to 1.8, despite restarting IV fluids. Creatinine stable at 1.8 today. Persistent GRACIA likely because of ongoing hypotension. Fluid rate was increased yesterday for which I will decrease to 50 ml/d because of risk of volume overload  -Have gone through his meds list.  Not on any potential nephrotoxins , then vancomycin.   Vanco level is therapeutic  -Baseline creatinine 1.3-1.7 based on labs during previous admission  -Has history of recent GRACIA with creatinine peaking to 2.6 few weeks ago  -GRACIA likely prerenal secondary to hypotension/sepsis  -Urine is suggestive of UTI  -No need for renal ultrasound  -Continue Gerard catheter. Urine output is improving  -Clinically noticed legs edema. 2 metabolic acidosis:  -CO2 is 18  -on oral bicarbonate 1300 TID which I will change to 4 times daily  -off HCO3 drip     3 severe anemia  -Hemoglobin 6.8->7.4->8.2.->9.7  -s/p  unit blood Tx  -Continue weekly Procrit    4  Hypokalemia  -Potassium is 3.4->3.6.    -po kcl 40 meq x 1 received     5 hypotension  -Patient is persistently low despite midodrine 10 mg TID  -I will decrease IV fluid rate    5 right index finger osteomyelitis  -s/p recent antibiotics for 2 weeks. -Vancomycin has been resumed during this hospitalization  -ID is on the case    6 history of liver cirrhosis  -Has history of alcoholic liver cirrhosis. Portal hypertension  -Noted to have abdominal distention.   IR has been consulted  -Had paracentesis during his previous admission also      ________________________________________________________________________  Signed: Yuan Olivas MD

## 2022-04-20 NOTE — PROGRESS NOTES
General Daily Progress Note          Patient Name:   Sharan Lacy       YOB: 1960       Age:  60 y.o. Admit Date: 4/7/2022      Subjective:     Sharan Lacy is a(n) 60 y.o. male with PMH significant for HTN, cirrhosis, ascites presents via EMS for removing fish catheter. Patient recently d/c after lengthy stay in hospital for septic DIP joint of right index finger. He was d/c on 10 days of PO Zyvox, which he completed. He presents today after pulling out fish catheter. Several blood clots noted. Patient septic upon arrival with hypothermia and hypotension. Temp 93.6 rectal, BP 84/68. Patient is retaining 887cc of urine and nursing staff will replace fish.     Patient started on fluids and IV Levaquin in the ED. Patient is altered at baseline. Head CT w/o contrast shows no acute abnormality. Blood cultures pending. CXR and KUB both normal       Patient is alert awake confused  Hypotensive off  Levophed    Normal temperature    4/12  Patient is off levophed but still receiving midodrine and IVF. He remain hypotensive. He is lethargic and confused this morning. Patient has mild bilateral LE edema. Seen by ID yesterday. Recommends continuing vancomycin   Seen by nephrology yesterday. Recommends discontinuing IVF in lieu of leg edema, discontinue bicarb drip, continuing weekly procrit, continuing PO KCl, midodrine for chronic hypotension. Today patient hypothermic on bear hugger  Yesterday evening patient was restless agitated    4/13    Patient is alert awake still hypothermic on shemar hugger    4/14  Patient is seen sleeping. On shemar hugger and vancomycin. He is producing 250 ml urine. No new changes seen at this time. Labs pending. Cr trending down 1.62 (on 4/13). Abdominal US on 4/13 shows ascites. UA on 4/12 positive for leukocyte esterase, WBC >100 and bacteruria. Urine culture pending.      Patient refused medication this morning    4/15    Patient sleepy obtunded /hypothermic    Not eating drinking    4/18  Patient is seen in medical telemetry with a one to one sitter. He is still confused and refusing to eat food. He is compliant with his medications  He pulled out NG tube 3 times. Patient had a paracentesis done on 4/15 without any complications. Labs remarkable for Cr 1.75 increasing. CBC and CMP pending. Ammonia unremarkable. 4/19  Patient is seen at bedside with a one to one sitter. He still confused and attempting to pull out his IJ central line. He has mitts on. He is also attempting to jump out of bed. Patient is still not eating today. He is taking his medications. Labs remarkable for Cr 1.82. Abdominal US remarkable for Increased abdominal and pelvic ascites. Seen by ID. Recommends discontinuing eraxis, and starting on fluconazole. 4/20  Patient is seen at bedside with a one to one sitter. She reports that he pulled out his IJ line. Patient now has an IV in his hand. He has mittens on. He is still refusing to eat any food but he continues to take his medications. Patient continues to be hypothermic with latest axillary temperature of 96.1 F. Seen by nephrology. Recommends increasing IV fluid rate. Continue fish catheter. Seen by GI. Recommends another paracentesis prior to PEG tube placement. Seen by ID. Recommends continuing fluconazole and discontinue vancomycin. Labs pending. Objective:     Visit Vitals  /70 (BP 1 Location: Right upper arm, BP Patient Position: Lying;Supine)   Pulse 88   Temp (!) 96.1 °F (35.6 °C)   Resp 20   Ht 5' 10\" (1.778 m)   Wt 92.2 kg (203 lb 4.2 oz)   SpO2 96%   BMI 29.17 kg/m²        No results found for this or any previous visit (from the past 24 hour(s)). [unfilled]      Review of Systems    Constitutional: Negative for chills and fever. HENT: Negative. Eyes: Negative. Respiratory: Negative. Cardiovascular: Negative.     Gastrointestinal: Negative for abdominal pain and nausea. Skin: Negative. Neurological: Negative. Physical Exam:      Constitutional: pt is oriented to person, place, and time. HENT:   Head: Normocephalic and atraumatic. Eyes: Pupils are equal, round, and reactive to light. EOM are normal.   Cardiovascular: Normal rate, regular rhythm and normal heart sounds. Pulmonary/Chest: Breath sounds normal. No wheezes. No rales. Exhibits no tenderness. Abdominal: Soft. Bowel sounds are normal. There is no abdominal tenderness. There is no rebound and no guarding. Musculoskeletal: Normal range of motion. Neurological: pt is alert and oriented to person, place, and time. Extremities 2+ edema      US ABD LTD   Final Result   Increased abdominal and pelvic ascites. IR PARACENTESIS ABD SUBSQ   Final Result   Ultrasound guided paracentesis with no immediate complications. Patient tolerated the procedure well. XR CHEST PORT   Final Result   Endotracheal tube as above. Underexpanded lungs with bibasilar   atelectasis. Hydrostatic edema. Possible pleural effusions. US ABD LTD   Final Result   Ascites. XR HAND RT MIN 3 V   Final Result      CT HEAD WO CONT   Final Result   Age-appropriate atrophy. No acute findings. XR ABD (KUB)   Final Result   Within normal      XR CHEST PORT   Final Result   Findings/impression:   1. Low lung volumes. Bibasilar hypoventilatory change/atelectasis. Left   retrocardiac lung incompletely evaluated, cannot exclude underlying airspace   disease. 2.  Cardiac and mediastinal contours are obscured by low lung volumes. Ectatic/possible aneurysmal appearance transverse aorta with calcific   atherosclerotic plaque. Appears stable from prior radiograph. 3.  No pleural fluid. No pneumothorax. 4.  Prominent gas-filled bowel partially visualized in the left upper quadrant.          IR INSERT NON TUNL CVC OVER 5 YRS    (Results Pending)   IR PARACENTESIS ABD W IMAGE    (Results Pending) IR Gricel    (Results Pending)   IR FLUORO GUIDE PLC CVAD    (Results Pending)        No results found for this or any previous visit (from the past 24 hour(s)). Results     Procedure Component Value Units Date/Time    CULTURE, URINE [[id number]]  (Abnormal) Collected: 04/12/22 2300    Order Status: Completed Specimen: Urine Updated: 04/16/22 1331     Special Requests: No Special Requests        [place] Count --        >100,000  colonies/ml       Culture result: Candida albicans       CULTURE, URINE [[id number]] Collected: 04/12/22 2300    Order Status: Canceled Specimen: Urine     CULTURE, URINE [[id number]] Collected: 04/07/22 1502    Order Status: Completed Specimen: Urine Updated: 04/09/22 1414     Special Requests: --        No Special Requests  Reflexed from [id number]       Culture result: No Growth (<1000 cfu/mL)       MRSA SCREEN - PCR (NASAL) [[id number]] Collected: 04/07/22 1345    Order Status: Completed Specimen: Swab Updated: 04/07/22 1516     MRSA by PCR, Nasal Not Detected       CULTURE, BLOOD, PAIRED [[id number]] Collected: 04/07/22 1001    Order Status: Completed Specimen: Blood Updated: 04/07/22 1017           Labs:     Recent Labs     04/19/22  0332   WBC 6.5   HGB 9.7*   HCT 28.3*   PLT 72*     Recent Labs     04/19/22  0415 04/18/22  0617     --    K 3.6  --    *  --    CO2 18*  --    BUN 19  --    CREA 1.82* 1.75*   GLU 67  --    CA 8.2*  --    PHOS 3.2  --      Recent Labs     04/19/22  0415   ALT 15   AP 73   TBILI 1.4*   TP 6.3*   ALB 2.1*   GLOB 4.2*     No results for input(s): INR, PTP, APTT, INREXT, INREXT in the last 72 hours. No results for input(s): FE, TIBC, PSAT, FERR in the last 72 hours. No results found for: FOL, RBCF   No results for input(s): PH, PCO2, PO2 in the last 72 hours. No results for input(s): CPK, CKNDX, TROIQ in the last 72 hours.     No lab exists for component: CPKMB  No results found for: CHOL, CHOLX, CHLST, CHOLV, HDL, HDLP, LDL, LDLC, DLDLP, TGLX, [facility], [facility], [facility]  Lab Results   Component Value Date/Time    Glucose (POC) 80 04/08/2022 10:03 AM    Glucose (POC) 84 04/04/2022 05:40 PM    Glucose (POC) 107 04/03/2022 11:12 AM    Glucose (POC) 111 03/31/2022 11:35 AM    Glucose (POC) 91 03/29/2022 11:10 AM     Lab Results   Component Value Date/Time    Color Yellow/Straw 04/12/2022 11:00 PM    Appearance Turbid (A) 04/12/2022 11:00 PM    Specific gravity 1.011 04/12/2022 11:00 PM    pH (UA) 5.0 04/12/2022 11:00 PM    Protein Negative 04/12/2022 11:00 PM    Glucose Negative 04/12/2022 11:00 PM    Ketone Negative 04/12/2022 11:00 PM    Bilirubin Negative 04/12/2022 11:00 PM    Urobilinogen 0.1 04/12/2022 11:00 PM    Nitrites Negative 04/12/2022 11:00 PM    Leukocyte Esterase Large (A) 04/12/2022 11:00 PM    Bacteria 1+ (A) 04/12/2022 11:00 PM    WBC >100 (H) 04/12/2022 11:00 PM    RBC  04/12/2022 11:00 PM         Assessment:     Sepsis  UTI/Candida  Lactic acidosis  Acute on chronic renal failure- creat 2.01  Cirrhosis with ascites  Anemia  History of septic DIP joint, right index finger  Hypothyroidism  Seizure disorder  Gout  GERD  History of etoh abuse  Hypotension  Metabolic acidosis  Hypothermia  Hypokalemia  Anemia monitor H&H  Abdominal distention/ascites  Protein calorie malnutrition    Plan:      On shemar singleton    On allopurinol 300 mg daily  Vitamin D 50,000 weekly  Pepcid 20 twice daily  Folic acid 1 mg daily  Lactulose 10 g 3 times a day  Keppra 1500 twice a day  Synthroid 25 mcg daily  Midodrine 10 mg 3 times a day  Propranolol 20 mg twice a day  Sodium bicarb 1303 times a day  Flomax 0.4 mg daily  Thiamine 100 mg daily  Vancomycin with pharmacy  Replace potassium  Discontinue IV Zosyn  discontinue Eraxis 100 mg daily    Discontinue Sodium bicarb drip  Continue fluconazole 200 mg daily   We will do psych consult for psychosis patient receiving Geodon  Psychotic behavior    Discussed with patient nurse  Will do GI consult for PEG tube feeding as patient not eating drinking  Awaiting paracentesis and PEG tube placement.      Consult psych due to his behavior        Current Facility-Administered Medications:     fluconazole (DIFLUCAN) tablet 200 mg, 200 mg, Oral, DAILY, Ree Zaragoza MD, 200 mg at 04/20/22 1146    0.9% sodium chloride infusion, 100 mL/hr, IntraVENous, CONTINUOUS, Vj Brooks MD, Last Rate: 100 mL/hr at 04/20/22 0138, 100 mL/hr at 04/20/22 0138    hydrOXYzine (VISTARIL) injection 50 mg, 50 mg, IntraMUSCular, Q8H PRN, El DENIS MD, 50 mg at 04/19/22 0417    fludrocortisone (FLORINEF) tablet 0.1 mg, 0.1 mg, Oral, DAILY, Ree Zaragoza MD, 0.1 mg at 04/20/22 1155    0.9% sodium chloride infusion 250 mL, 250 mL, IntraVENous, PRN, Zenon Ackerman MD    ziprasidone (GEODON) 10 mg in sterile water (preservative free) 0.5 mL injection, 10 mg, IntraMUSCular, Q12H PRN, Ester Mills MD, 10 mg at 04/16/22 2217    levETIRAcetam (KEPPRA) 1500 mg in saline (iso-osm) 100 ml IVPB, 1,500 mg, IntraVENous, Q12H, Lamberto Ackerman MD, Last Rate: 400 mL/hr at 04/20/22 1156, 1,500 mg at 04/20/22 1156    0.9% sodium chloride infusion 250 mL, 250 mL, IntraVENous, PRN, Lamberto Ackerman MD    epoetin luis-epbx (RETACRIT) injection 10,000 Units, 10,000 Units, SubCUTAneous, Q7D, Vj Brooks MD, 10,000 Units at 04/16/22 1912    midodrine (PROAMATINE) tablet 10 mg, 10 mg, Oral, TID WITH MEALS, Lamberto Ackerman MD, 10 mg at 04/20/22 1147    sodium bicarbonate tablet 1,300 mg, 1,300 mg, Oral, TID, Vj Brooks MD, 1,300 mg at 04/20/22 1147    LORazepam (ATIVAN) injection 1 mg, 1 mg, IntraVENous, Q4H PRN, Lamberto Ackerman MD, 1 mg at 04/08/22 1733    sodium chloride (NS) flush 5-10 mL, 5-10 mL, IntraVENous, PRN, Lamberto Ackerman MD, 10 mL at 04/15/22 2354    lactulose (CHRONULAC) 10 gram/15 mL solution 15 mL, 10 g, Oral, TID, Lamberto Ackerman MD, 15 mL at 04/20/22 1146    tamsulosin (FLOMAX) capsule 0.4 mg, 0.4 mg, Oral, DAILY, Lamberto Ackerman MD, 0.4 mg at 04/20/22 1147    thiamine mononitrate (B-1) tablet 100 mg, 100 mg, Oral, DAILY, Lamberto Ackerman MD, 100 mg at 04/20/22 1147    allopurinoL (ZYLOPRIM) tablet 300 mg, 300 mg, Oral, DAILY, Lamberto Ackerman MD, 300 mg at 04/20/22 1155    ergocalciferol capsule 50,000 Units, 50,000 Units, Oral, Q7D, Lamberto Ackerman MD, 50,000 Units at 04/14/22 1606    famotidine (PEPCID) tablet 20 mg, 20 mg, Oral, BID, Lamberto Ackerman MD, 20 mg at 05/11/60 0169    folic acid (FOLVITE) tablet 1 mg, 1 mg, Oral, DAILY, Binu Worthington MD, 1 mg at 04/20/22 1147    levothyroxine (SYNTHROID) tablet 25 mcg, 25 mcg, Oral, ACB, Lamberto Ackerman MD, 25 mcg at 04/20/22 0846    propranoloL (INDERAL) tablet 20 mg, 20 mg, Oral, BID, Lamberto Ackerman MD, 20 mg at 04/20/22 1147    acetaminophen (TYLENOL) tablet 650 mg, 650 mg, Oral, Q6H PRN **OR** acetaminophen (TYLENOL) suppository 650 mg, 650 mg, Rectal, Q6H PRN, Bryant Ackerman MD    polyethylene glycol (MIRALAX) packet 17 g, 17 g, Oral, DAILY PRN, Bryant Ackerman MD    ondansetron (ZOFRAN ODT) tablet 4 mg, 4 mg, Oral, Q8H PRN **OR** ondansetron (ZOFRAN) injection 4 mg, 4 mg, IntraVENous, Q6H PRN, Lamberto Ackerman MD    NOREPINephrine (LEVOPHED) 8 mg in 0.9% NS 250ml infusion, 0.5-120 mcg/min, IntraVENous, TITRATE, Bryant Ackerman MD, Stopped at 04/11/22 0106

## 2022-04-20 NOTE — PROGRESS NOTES
Progress Note    Patient: Jerica Cordova MRN: [id number]  SSN: [id number]    YOB: 1960  Age: 60 y.o.   Sex: male      Admit Date: 4/7/2022    LOS: 13 days     Subjective:   Plan to do PEG tube placement after paracentesis today,  Patient had a large breakfast without issue,  Past Medical History:   Diagnosis Date    Arthritis     Hypertension         Current Facility-Administered Medications:     fluconazole (DIFLUCAN) tablet 200 mg, 200 mg, Oral, DAILY, Ree Zaragoza MD, 200 mg at 04/20/22 1146    0.9% sodium chloride infusion, 100 mL/hr, IntraVENous, CONTINUOUS, Fred Gonzalez MD, Last Rate: 100 mL/hr at 04/20/22 0138, 100 mL/hr at 04/20/22 0138    hydrOXYzine (VISTARIL) injection 50 mg, 50 mg, IntraMUSCular, Q8H PRN, Jethro DENIS MD, 50 mg at 04/19/22 0417    fludrocortisone (FLORINEF) tablet 0.1 mg, 0.1 mg, Oral, DAILY, Ree Zaragoza MD, 0.1 mg at 04/20/22 1155    0.9% sodium chloride infusion 250 mL, 250 mL, IntraVENous, PRN, Diana Ackerman MD    ziprasidone (GEODON) 10 mg in sterile water (preservative free) 0.5 mL injection, 10 mg, IntraMUSCular, Q12H PRN, Km Gayle MD, 10 mg at 04/16/22 2217    levETIRAcetam (KEPPRA) 1500 mg in saline (iso-osm) 100 ml IVPB, 1,500 mg, IntraVENous, Q12H, Lamberto Ackerman MD, Last Rate: 400 mL/hr at 04/20/22 1156, 1,500 mg at 04/20/22 1156    0.9% sodium chloride infusion 250 mL, 250 mL, IntraVENous, PRN, Lamberto Ackerman MD    epoetin luis-epbx (RETACRIT) injection 10,000 Units, 10,000 Units, SubCUTAneous, Q7D, Fred Gonazlez MD, 10,000 Units at 04/16/22 1912    midodrine (PROAMATINE) tablet 10 mg, 10 mg, Oral, TID WITH MEALS, Lamberto Ackerman MD, 10 mg at 04/20/22 1147    sodium bicarbonate tablet 1,300 mg, 1,300 mg, Oral, TID, Fred Gonzalez MD, 1,300 mg at 04/20/22 1147    LORazepam (ATIVAN) injection 1 mg, 1 mg, IntraVENous, Q4H PRN, Lamberto Ackerman MD, 1 mg at 04/08/22 1733    sodium chloride (NS) flush 5-10 mL, 5-10 mL, IntraVENous, PRN, Lamberto Ackerman MD, 10 mL at 04/15/22 2354    lactulose (CHRONULAC) 10 gram/15 mL solution 15 mL, 10 g, Oral, TID, Rashawn Ackerman MD, 15 mL at 04/20/22 1146    tamsulosin (FLOMAX) capsule 0.4 mg, 0.4 mg, Oral, DAILY, Lamberto Ackerman MD, 0.4 mg at 04/20/22 1147    thiamine mononitrate (B-1) tablet 100 mg, 100 mg, Oral, DAILY, Dexter Martinez MD, 100 mg at 04/20/22 1147    allopurinoL (ZYLOPRIM) tablet 300 mg, 300 mg, Oral, DAILY, Dexter Martinez MD, 300 mg at 04/20/22 1155    ergocalciferol capsule 50,000 Units, 50,000 Units, Oral, Q7D, Lamberto Ackerman MD, 50,000 Units at 04/14/22 1606    famotidine (PEPCID) tablet 20 mg, 20 mg, Oral, BID, Lamberto Ackerman MD, 20 mg at 15/10/53 2563    folic acid (FOLVITE) tablet 1 mg, 1 mg, Oral, DAILY, Dexter Martinez MD, 1 mg at 04/20/22 1147    levothyroxine (SYNTHROID) tablet 25 mcg, 25 mcg, Oral, ACB, Lamberto Ackerman MD, 25 mcg at 04/20/22 0846    propranoloL (INDERAL) tablet 20 mg, 20 mg, Oral, BID, Lamberto Ackerman MD, 20 mg at 04/20/22 1147    acetaminophen (TYLENOL) tablet 650 mg, 650 mg, Oral, Q6H PRN **OR** acetaminophen (TYLENOL) suppository 650 mg, 650 mg, Rectal, Q6H PRN, Rashawn Ackerman MD    polyethylene glycol (MIRALAX) packet 17 g, 17 g, Oral, DAILY PRN, Rashawn Ackerman MD    ondansetron (ZOFRAN ODT) tablet 4 mg, 4 mg, Oral, Q8H PRN **OR** ondansetron (ZOFRAN) injection 4 mg, 4 mg, IntraVENous, Q6H PRN, Lamberto Ackerman MD    NOREPINephrine (LEVOPHED) 8 mg in 0.9% NS 250ml infusion, 0.5-120 mcg/min, IntraVENous, TITRATE, Lamberto Ackerman MD, Stopped at 04/11/22 0106    Objective:     Vitals:    04/19/22 1732 04/19/22 2329 04/20/22 0900 04/20/22 1304   BP:  103/75 125/70    Pulse:  65 88    Resp:  18 20    Temp:  (!) 96.3 °F (35.7 °C) (!) 96.1 °F (35.6 °C)    SpO2:  96% 96%    Weight: 92.2 kg (203 lb 4.2 oz)      Height:    5' 10\" (1.778 m)        Intake and Output:  Current Shift: No intake/output data recorded. Last three shifts: 04/18 1901 - 04/20 0700  In: 1200 [P.O.:100; I.V.:1100]  Out: -     Physical Exam:   Physical Exam  Constitutional:       Appearance: He is ill-appearing. HENT:      Mouth/Throat:      Mouth: Mucous membranes are dry. Eyes:      General: No scleral icterus. Cardiovascular:      Rate and Rhythm: Rhythm irregular. Pulses: Normal pulses. Pulmonary:      Effort: Respiratory distress present. Abdominal:      Tenderness: There is abdominal tenderness. Genitourinary:     Rectum: Normal.   Skin:     General: Skin is dry. Neurological:      Mental Status: He is disoriented. Lab/Data Review:  No results found for this or any previous visit (from the past 24 hour(s)). US ABD LTD   Final Result   Increased abdominal and pelvic ascites. IR PARACENTESIS ABD SUBSQ   Final Result   Ultrasound guided paracentesis with no immediate complications. Patient tolerated the procedure well. XR CHEST PORT   Final Result   Endotracheal tube as above. Underexpanded lungs with bibasilar   atelectasis. Hydrostatic edema. Possible pleural effusions. US ABD LTD   Final Result   Ascites. XR HAND RT MIN 3 V   Final Result      CT HEAD WO CONT   Final Result   Age-appropriate atrophy. No acute findings. XR ABD (KUB)   Final Result   Within normal      XR CHEST PORT   Final Result   Findings/impression:   1. Low lung volumes. Bibasilar hypoventilatory change/atelectasis. Left   retrocardiac lung incompletely evaluated, cannot exclude underlying airspace   disease. 2.  Cardiac and mediastinal contours are obscured by low lung volumes. Ectatic/possible aneurysmal appearance transverse aorta with calcific   atherosclerotic plaque. Appears stable from prior radiograph. 3.  No pleural fluid. No pneumothorax. 4.  Prominent gas-filled bowel partially visualized in the left upper quadrant.          IR INSERT NON TUNL CVC OVER 5 YRS    (Results Pending)   IR PARACENTESIS ABD W IMAGE    (Results Pending)   IR US GUIDED VASCULAR ACCESS    (Results Pending)   IR FLUORO GUIDE PLC CVAD    (Results Pending)        Assessment:     Active Problems:    Lactic acidosis (4/7/2022)      Urinary retention (4/7/2022)      Severe sepsis ([address] (4/7/2022)      Hypothermia (4/7/2022)      Sepsis ([address] (4/7/2022)      Severe protein-calorie malnutrition ([address] (4/8/2022)          Difficulty feeding, with multiple medical issue, sepsis,  Paracentesis abdominal ascites removed,  Patient sleepy obtunded /hypothermic     Not eating drinking     He is still confused and refusing to eat food.   He pulled out NG tube 3 times   Plan:   Continue Current antibiotic treatment  Continue lactulose  current dysphagia diet  Continue on GI prophylaxis with famotidine     No sure he needed PEG tube placement at this point      Sign off, call us if  patient failed the nutrition intaking           Signed By: Yogesh Butts MD     April 20, 2022        Thank you for allowing me to participate in this patients care  Cc Referring Physician   Bhavik Nelson NP

## 2022-04-20 NOTE — PROGRESS NOTES
CM reveiwed chart and spoke to primary physician. If the patient is to get a PEG then he will need another paracentesis first.  GI following. Nephrology also following and increasing IV fluids at this time. Gerard catheter still in place and labs being monitored. Expected discharge back to [facility] in greater than 48 hours. CM has updated SNF with this information and most recent clinicals. CM will continue to follow.

## 2022-04-20 NOTE — PROGRESS NOTES
Infectious Disease Progress Note           Subjective:   Stable, denies new complaints, no acute events since last seen, denies new complaints   Objective:   Physical Exam:     Visit Vitals  BP 90/70 (BP 1 Location: Left lower arm, BP Patient Position: At rest)   Pulse 67   Temp (!) 95.6 °F (35.3 °C)   Resp 18   Ht 5' 10\" (1.778 m)   Wt 203 lb 4.2 oz (92.2 kg)   SpO2 100%   BMI 29.17 kg/m²      O2 Device: None (Room air)    Temp (24hrs), Av °F (35.6 °C), Min:95.6 °F (35.3 °C), Max:96.3 °F (35.7 °C)    No intake/output data recorded.  1901 -  0700  In: 1200 [P.O.:100;  I.V.:1100]  Out: -     General: NAD, alert, confused   HEENT: HEIDY, Moist mucosa   Lungs: CTA b/l, decreased at the bases, no wheeze/rhonchi   Heart: S1S2+, RRR, no murmur  Abdo: Soft, distended, NT, +BS   : + fish cath, clear urine in bag and tubing   Exts: Decreased right index finger swelling, no drainage   Skin: No wounds, No rashes or lesions    Data Review:       Recent Days:  Recent Labs     22  0332   WBC 6.5   HGB 9.7*   HCT 28.3*   PLT 72*     Recent Labs     22  0415 22  0617   BUN 19  --    CREA 1.82* 1.75*       Lab Results   Component Value Date/Time    C-Reactive protein 0.60 2022 05:15 AM          Microbiology     Results     Procedure Component Value Units Date/Time    CULTURE, URINE [[id number]]  (Abnormal) Collected: 22 230    Order Status: Completed Specimen: Urine Updated: 22 1331     Special Requests: No Special Requests        [place] Count --        >100,000  colonies/ml       Culture result: Candida albicans       CULTURE, URINE [[id number]] Collected: 22 230    Order Status: Canceled Specimen: Urine     CULTURE, URINE [[id number]] Collected: 22 1502    Order Status: Completed Specimen: Urine Updated: 22 1414     Special Requests: --        No Special Requests  Reflexed from [id number]       Culture result: No Growth (<1000 cfu/mL) MRSA SCREEN - PCR (NASAL) [[id number]] Collected: 04/07/22 1345    Order Status: Completed Specimen: Swab Updated: 04/07/22 1516     MRSA by PCR, Nasal Not Detected       CULTURE, BLOOD, PAIRED [[id number]] Collected: 04/07/22 1001    Order Status: Completed Specimen: Blood Updated: 04/07/22 1017             Diagnostics   CXR Results  (Last 48 hours)    None             Assessment/Plan     1. UTI, abnormal UA,  Urinary retention: + indwelling fish cath      Urine Cx from 04/07 is negative, Yeast isolated from urine Cx from 04/12      On day # 3/14 of antifungal therapy, currently fluconazole       Routine labs in the morning     2. Right index finger osteomyelitis. S/p debridement w isolation of Staph epidermidis from Cx      Completed 4 wks of Vancomycin, currently off antibiotics      3. AMS, h/o metabolic/hepatic encephalopathy, waxing and waning mentation     4. Ascites, S/p paracentesis on 04/08 w removal of 4,250 ml of clear fluid     Abdomen is distended but non-tender, no evidence of an acute abdomen     5. Episodes of hypothermia/hypotension: Suspected adrenal insufficiency. Continue on Florinef and Midodrine     6.  Loose stools w Bernett Simper in place: Likely from lactulose        Nette Ann MD    4/20/2022

## 2022-04-20 NOTE — PROGRESS NOTES
Comprehensive Nutrition Assessment    Type and Reason for Visit: Reassess (Goal)    Nutrition Recommendations/Plan: If PEG placed or NGT replaced:   Initiate TF via NGT of TwoCal at 20ml/hr, advance by 15ml q4hrs to goal of 55ml/hr  Flush 200ml q4hrs  Provide Sean 2x/d (180kcals, 5g pro)  Providing 2820kcals (106%), 115g pro (88%), 2124ml (90%)     Continue PO diet for comfort  D/c all ONS, pt refusing - Once pt begins accepting PO again, re-order Ensure Enlive 3x/d     Monitor and record PO intakes, supplement acceptance, and BMs in I/Os     Malnutrition Assessment:  Malnutrition Status: Moderate malnutrition (04/08/22 1454)    Context:  Chronic illness       Nutrition Assessment:    Admitted for hematuria s/p pulling out fish catheter, +hypothermia and hypotension. RD familiar with pt from previous admits. Pt inappropriate for interview at this time per RN, no family in room. Pt with minimal PO intakes, per RN pt up all night ans mostly sleeping today. RD added Ensure 3x/d this AM per pt previously stated preferences. Will f/u for tolerance and need for changes. (4/12) Pt seen in bed asleep s/p Breakfast. Per Nsg, Pt ate ~25% from tray, drank >80% of ONS w/ observation as liking Ensure shakes. (F/U) patient was asleep at the time of assessment, RD attempted to wake up patiented. off pressors, appetite remains poor >25%, observed one unopen ensure @ bedside w/ multiple unopened ProSource packets. May have better acceptances w/ ensure pudding w/ meds instead of prosource pkt. Will modify ONs. May also need to downgrade diet to puree, will request a SLP consult. (4/19) NGT removed by pt 4/16- nursing attempted to replace, pt refused. Possible plan for PEG. Pt continues to refuse food. Will leave rec's if PEG is placed. (4/20) Pt pulled IJ, now on one to one. Continues to refuse foods. Per MD would require paracentesis prior to PEG placement. Labs: Na 139, K 3.6, BUN 19, Creat 1.82, Alb 2.1.  Meds: sodium chloride, famotidine, folic acid, lactulose, levothyroxine, sodium bicarbonate, thiamine mononitrate. Nutrition Related Findings:    No n/v/d/c. Last BM 4/17. B/l LE edema. Wound Type: Skin tears,Surgical incision,Pressure injury,Stage III    Current Nutrition Intake & Therapies:  Average Meal Intake: (P) Refusing to eat  Average Supplement Intake: (P) Refusing to take  ADULT ORAL NUTRITION SUPPLEMENT Lunch; Wound Healing Supplement  ADULT TUBE FEEDING Nasogastric; 2.0 Calorie; Delivery Method: Continuous; Continuous Initial Rate (mL/hr): 20; Continuous Advance Tube Feeding: Yes; Advancement Volume (mL/hr): 15; Advancement Frequency: Q 4 hours; Continuous Goal Rate (mL/hr): 55... ADULT DIET Dysphagia - Pureed; Low Fat/Low Chol/High Fiber/2 gm Na    Anthropometric Measures:  Height: 5' 10\" (177.8 cm)  Ideal Body Weight (IBW): 166 lbs (75 kg)  Admission Body Weight: 179 lb  Current Body Wt:  92.2 kg (203 lb 4.2 oz), 122.4 % IBW. Bed scale  Current BMI (kg/m2): 29.2  Usual Body Weight:  (PAYTON)  BMI Category: Overweight (BMI 25.0-29. 9)    Estimated Daily Nutrient Needs:  Energy Requirements Based On: (P) Kcal/kg  Weight Used for Energy Requirements: (P) Current  Energy (kcal/day): (P) 2,582kcal (28kcal/kg)  Weight Used for Protein Requirements: (P) Current  Protein (g/day): (P) 129g (1.4g/kg)  Method Used for Fluid Requirements: (P) ml/kg  Fluid (ml/day): (P) 2365(25ml/kg)    Nutrition Diagnosis:   · (P) Moderate malnutrition,In context of chronic illness related to (P) inadequate protein-energy intake,catabolic illness as evidenced by (P) poor intake prior to admission,intake 0-25%,mild muscle loss,mild loss of subcutaneous fat    Nutrition Interventions:   Food and/or Nutrient Delivery: (P) Continue current diet,Start tube feeding  Nutrition Education/Counseling: (P) Education not indicated  Coordination of Nutrition Care: (P) Continue to monitor while inpatient,Feeding assistance/environmental change,Speech therapy    Goals:  Previous Goal Met: (P) No progress toward goal(s)    Nutrition Monitoring and Evaluation:   Behavioral-Environmental Outcomes: (P) None identified  Food/Nutrient Intake Outcomes: (P) Food and nutrient intake,Supplement intake,Enteral nutrition intake/tolerance,Diet advancement/tolerance  Physical Signs/Symptoms Outcomes: (P) Chewing or swallowing,Fluid status or edema,Meal time behavior,Skin,Weight    Discharge Planning:    (P) Too soon to determine    Lisette Hendricks RD  Contact: [phone]

## 2022-04-20 NOTE — PROGRESS NOTES
Problem: Pressure Injury - Risk of  Goal: *Prevention of pressure injury  Description: Document Curtis Scale and appropriate interventions in the flowsheet.   Outcome: Progressing Towards Goal  Note: Pressure Injury Interventions:  Sensory Interventions: Pressure redistribution bed/mattress (bed type)    Moisture Interventions: Internal/External fecal devices    Activity Interventions: PT/OT evaluation    Mobility Interventions: Pressure redistribution bed/mattress (bed type)    Nutrition Interventions: Document food/fluid/supplement intake    Friction and Shear Interventions: HOB 30 degrees or less

## 2022-04-21 NOTE — PROGRESS NOTES
Ot attempted to see pt at 2:42 pm, however per nursing to place pt on hold 2/2 low body temp. Will try again at a later date. Thanks!

## 2022-04-21 NOTE — PROGRESS NOTES
Problem: Pressure Injury - Risk of  Goal: *Prevention of pressure injury  Description: Document Curtis Scale and appropriate interventions in the flowsheet. Outcome: Progressing Towards Goal  Note: Pressure Injury Interventions:  Sensory Interventions: Pressure redistribution bed/mattress (bed type)    Moisture Interventions: Internal/External urinary devices    Activity Interventions: PT/OT evaluation    Mobility Interventions: Pressure redistribution bed/mattress (bed type)    Nutrition Interventions: Document food/fluid/supplement intake    Friction and Shear Interventions: HOB 30 degrees or less                Problem: Patient Education: Go to Patient Education Activity  Goal: Patient/Family Education  Outcome: Progressing Towards Goal     Problem: Falls - Risk of  Goal: *Absence of Falls  Description: Document Elba Fall Risk and appropriate interventions in the flowsheet.   Outcome: Progressing Towards Goal  Note: Fall Risk Interventions:  Mobility Interventions: PT Consult for mobility concerns    Mentation Interventions: Bed/chair exit alarm    Medication Interventions: Bed/chair exit alarm    Elimination Interventions: Bed/chair exit alarm              Problem: Patient Education: Go to Patient Education Activity  Goal: Patient/Family Education  Outcome: Progressing Towards Goal     Problem: Aspiration - Risk of  Goal: *Absence of aspiration  Outcome: Progressing Towards Goal     Problem: Patient Education: Go to Patient Education Activity  Goal: Patient/Family Education  Outcome: Progressing Towards Goal     Problem: Patient Education: Go to Patient Education Activity  Goal: Patient/Family Education  Outcome: Progressing Towards Goal     Problem: Impaired Skin Integrity/Pressure Injury Treatment  Goal: *Improvement of Existing Pressure Injury  Outcome: Progressing Towards Goal  Goal: *Prevention of pressure injury  Description: Document Curtis Scale and appropriate interventions in the flowsheet.   Outcome: Progressing Towards Goal  Note: Pressure Injury Interventions:  Sensory Interventions: Pressure redistribution bed/mattress (bed type)    Moisture Interventions: Internal/External urinary devices    Activity Interventions: PT/OT evaluation    Mobility Interventions: Pressure redistribution bed/mattress (bed type)    Nutrition Interventions: Document food/fluid/supplement intake    Friction and Shear Interventions: HOB 30 degrees or less                Problem: Patient Education: Go to Patient Education Activity  Goal: Patient/Family Education  Outcome: Progressing Towards Goal     Problem: Non-Violent Restraints  Goal: Removal from restraints as soon as assessed to be safe  Outcome: Progressing Towards Goal  Goal: No harm/injury to patient while restraints in use  Outcome: Progressing Towards Goal  Goal: Patient's dignity will be maintained  Outcome: Progressing Towards Goal  Goal: Patient Interventions  Outcome: Progressing Towards Goal     Problem: Delirium Treatment  Goal: *Level of consciousness restored to baseline  Outcome: Progressing Towards Goal  Goal: *Level of environmental perceptions restored to baseline  Outcome: Progressing Towards Goal  Goal: *Sensory perception restored to baseline  Outcome: Progressing Towards Goal  Goal: *Emotional stability restored to baseline  Outcome: Progressing Towards Goal  Goal: *Functional assessment restored to baseline  Outcome: Progressing Towards Goal  Goal: *Absence of falls  Outcome: Progressing Towards Goal  Goal: *Will remain free of delirium, CAM Score negative  Outcome: Progressing Towards Goal  Goal: *Cognitive status will be restored to baseline  Outcome: Progressing Towards Goal  Goal: Interventions  Outcome: Progressing Towards Goal     Problem: Patient Education: Go to Patient Education Activity  Goal: Patient/Family Education  Outcome: Progressing Towards Goal     Problem: Patient Education: Go to Patient Education Activity  Goal: Patient/Family Education  Outcome: Progressing Towards Goal     Problem: Patient Education: Go to Patient Education Activity  Goal: Patient/Family Education  Outcome: Progressing Towards Goal

## 2022-04-21 NOTE — PROGRESS NOTES
Renal Note    NAME:  Yaya Stokes   :   1960   MRN:   [id number]     ATTENDING: Diogenes Pruitt MD  PCP:  Lalita Acevedo NP    Date/Time:  2022 12:46 PM      Subjective:     Patient seen in the room. He is very confused. Less agitated today. Wearing mittens today. On IVF. Creatinine 1.7 today. Pressure is low in the 90s range. On midodrine 10 mg 3 times daily    Past Medical History:   Diagnosis Date    Arthritis     Hypertension       Past Surgical History:   Procedure Laterality Date    IR INSERT NON TUNL CVC OVER 5 YRS  3/25/2022    IR PARACENTESIS ABD SUBSQ  4/15/2022    IR PARACENTESIS ABD W IMAGE  2022    IR PARACENTESIS ABD W IMAGE  3/1/2022    IR PARACENTESIS ABD W IMAGE  3/30/2022     Social History     Tobacco Use    Smoking status: Never Smoker    Smokeless tobacco: Never Used   Substance Use Topics    Alcohol use: Not on file      No family history on file. No Known Allergies   Prior to Admission medications    Medication Sig Start Date End Date Taking? Authorizing Provider   thiamine mononitrate (B-1) 100 mg tablet Take 1 Tablet by mouth daily. 22   Tommy Ackerman MD   levothyroxine (SYNTHROID) 25 mcg tablet Take 25 mcg by mouth Daily (before breakfast). Provider, Historical   tamsulosin (Flomax) 0.4 mg capsule Take 1 Capsule by mouth daily. 3/4/22   Lashawn Whyte PA-C   potassium chloride (K-DUR, KLOR-CON M20) 20 mEq tablet Take 1 Tablet by mouth daily. 3/2/22   Tabitha Srinivasan MD   sodium bicarbonate 650 mg tablet Take 1 Tablet by mouth three (3) times daily. 3/2/22   Tabitha Srinivasan MD   levETIRAcetam (Keppra) 1,000 mg tablet Take 1.5 Tablets by mouth two (2) times a day. 3/2/22   Tabitha Srinivasan MD   ergocalciferol (ERGOCALCIFEROL) 1,250 mcg (50,000 unit) capsule Take 1 Capsule by mouth every seven (7) days.  22   Provider, Historical   lactulose (CHRONULAC) 10 gram/15 mL solution Take 15 mL by mouth three (3) times daily. 22   Desi Ackerman MD   allopurinoL (ZYLOPRIM) 300 mg tablet Take 1 Tablet by mouth daily. 22   Provider, Historical   thiamine HCL (B-1) 100 mg tablet Take 100 mg by mouth daily. Provider, Historical   propranoloL (INDERAL) 20 mg tablet Take 20 mg by mouth two (2) times a day. Provider, Historical   folic acid (FOLVITE) 1 mg tablet Take 1 mg by mouth daily. Provider, Historical   famotidine (PEPCID) 20 mg tablet Take 20 mg by mouth At bedtime. Provider, Historical   aMILoride (MIDAMOR) 5 mg tablet Take 5 mg by mouth daily. Provider, Historical       REVIEW OF SYSTEMS:       he is lethargic. Unable to obtain    Objective:   VITALS:    Visit Vitals  /85   Pulse 64   Temp (!) 93.6 °F (34.2 °C)   Resp 18   Ht 5' 10\" (1.778 m)   Wt 92.2 kg (203 lb 4.2 oz)   SpO2 100%   BMI 29.17 kg/m²     Temp (24hrs), Av.7 °F (35.4 °C), Min:93.2 °F (34 °C), Max:97.9 °F (36.6 °C)      PHYSICAL EXAM:     General: NAD, lethargic eyes: sclera anicteric  Oral Cavity: No thrush or ulcers  Neck: no JVD  Chest: Fair bilateral air entry. No Wheezing or Rhonchi. No rales.   Heart: normal sounds  Abdomen: soft and non tender   :  Gerard+  Lower Extremities: no edema  Skin: no rash  Neuro: Lethargic psychiatric: Able to assess      LAB DATA REVIEWED:    Recent Results (from the past 24 hour(s))   NT-PRO BNP    Collection Time: 22  6:39 AM   Result Value Ref Range    NT pro- (H) <125 pg/mL   CBC W/O DIFF    Collection Time: 22  6:39 AM   Result Value Ref Range    WBC 4.3 4.1 - 11.1 K/uL    RBC 3.17 (L) 4.10 - 5.70 M/uL    HGB 8.8 (L) 12.1 - 17.0 g/dL    HCT 24.8 (L) 36.6 - 50.3 %    MCV 78.2 (L) 80.0 - 99.0 FL    MCH 27.8 26.0 - 34.0 PG    MCHC 35.5 30.0 - 36.5 g/dL    RDW 23.5 (H) 11.5 - 14.5 %    PLATELET 67 (L) 794 - 400 K/uL    NRBC 0.0 0.0  WBC    ABSOLUTE NRBC 0.00 0.00 - 2.11 K/uL   METABOLIC PANEL, COMPREHENSIVE    Collection Time: 22 6:39 AM   Result Value Ref Range    Sodium 139 136 - 145 mmol/L    Potassium 3.3 (L) 3.5 - 5.1 mmol/L    Chloride 109 (H) 97 - 108 mmol/L    CO2 20 (L) 21 - 32 mmol/L    Anion gap 10 5 - 15 mmol/L    Glucose 93 65 - 100 mg/dL    BUN 18 6 - 20 mg/dL    Creatinine 1.74 (H) 0.70 - 1.30 mg/dL    BUN/Creatinine ratio 10 (L) 12 - 20      GFR est AA 48 (L) >60 ml/min/1.73m2    GFR est non-AA 40 (L) >60 ml/min/1.73m2    Calcium 7.7 (L) 8.5 - 10.1 mg/dL    Bilirubin, total 1.2 (H) 0.2 - 1.0 mg/dL    AST (SGOT) 35 15 - 37 U/L    ALT (SGPT) 15 12 - 78 U/L    Alk. phosphatase 72 45 - 117 U/L    Protein, total 5.5 (L) 6.4 - 8.2 g/dL    Albumin 1.9 (L) 3.5 - 5.0 g/dL    Globulin 3.6 2.0 - 4.0 g/dL    A-G Ratio 0.5 (L) 1.1 - 2.2         Recommendations/Plan:     1 acute kidney injury on chronic kidney disease 3:  -Creatinine was 2.0 on admission   -Creatinine has improved to 1.4  . Slightly increased from 1.8 -->1.7 with ivf.   -Persistent GRACIA likely because of ongoing hypotension. I will continue the same rate for now  --Have gone through his meds list.  Not on any potential nephrotoxins , then vancomycin. Vanco level is therapeutic  -Baseline creatinine 1.3-1.7 based on labs during previous admission  -Has history of recent GRACIA with creatinine peaking to 2.6 few weeks ago  -GRACIA likely prerenal secondary to hypotension/sepsis  -Urine is suggestive of UTI  -No need for renal ultrasound  -Continue Gerard catheter. Urine output is improving  -Clinically noticed legs edema. 2 metabolic acidosis:  -CO2 is 20  -on oral bicarbonate 1300 QID which I will continue  -off HCO3 drip     3 severe anemia  -Hemoglobin 6.8->7.4->8.2.->8.8  -s/p  unit blood Tx  -Continue weekly Procrit    4  Hypokalemia  -Potassium is 3.4->3.6.    -po kcl 40 meq x 1 received     5 hypotension  -BP is persistently low despite midodrine 10 mg TID  -Continue midodrine    5 right index finger osteomyelitis  -s/p recent antibiotics for 2 weeks.     -Vancomycin has been resumed during this hospitalization  -ID is on the case    6 history of liver cirrhosis  -Has history of alcoholic liver cirrhosis. Portal hypertension  -Noted to have abdominal distention.   IR has been consulted  -Had paracentesis during his previous admission also      ________________________________________________________________________  Signed: Gaetano Daniel MD

## 2022-04-21 NOTE — PROGRESS NOTES
PT treatment attempted at 1430 however, nursing told therapist to hold on patient as his body temperature was too low and she wanted him to stay under warming blanket . Will continue to follow pt and will attempt treatment at a later time.  Thank you

## 2022-04-21 NOTE — PROGRESS NOTES
PT treatment attempted at 11:40 however, Patient initially did not respond to voice, sternal rub or touch. Patient eventually opened eyes and was able to state that he did not want to do therapy at this time. Will continue to follow pt and will attempt treatment at a later time.  Thank you

## 2022-04-21 NOTE — PROGRESS NOTES
General Daily Progress Note          Patient Name:   Yaya Stokes       YOB: 1960       Age:  60 y.o. Admit Date: 4/7/2022      Subjective:     Yaya Stokes is a(n) 60 y.o. male with PMH significant for HTN, cirrhosis, ascites presents via EMS for removing fish catheter. Patient recently d/c after lengthy stay in hospital for septic DIP joint of right index finger. He was d/c on 10 days of PO Zyvox, which he completed. He presents today after pulling out fish catheter. Several blood clots noted. Patient septic upon arrival with hypothermia and hypotension. Temp 93.6 rectal, BP 84/68. Patient is retaining 887cc of urine and nursing staff will replace fish.     Patient started on fluids and IV Levaquin in the ED. Patient is altered at baseline. Head CT w/o contrast shows no acute abnormality. Blood cultures pending. CXR and KUB both normal       Patient is alert awake confused  Hypotensive off  Levophed    Normal temperature    4/12  Patient is off levophed but still receiving midodrine and IVF. He remain hypotensive. He is lethargic and confused this morning. Patient has mild bilateral LE edema. Seen by ID yesterday. Recommends continuing vancomycin   Seen by nephrology yesterday. Recommends discontinuing IVF in lieu of leg edema, discontinue bicarb drip, continuing weekly procrit, continuing PO KCl, midodrine for chronic hypotension. Today patient hypothermic on bear hugger  Yesterday evening patient was restless agitated    4/13    Patient is alert awake still hypothermic on shemar hugger    4/14  Patient is seen sleeping. On shemar hugger and vancomycin. He is producing 250 ml urine. No new changes seen at this time. Labs pending. Cr trending down 1.62 (on 4/13). Abdominal US on 4/13 shows ascites. UA on 4/12 positive for leukocyte esterase, WBC >100 and bacteruria. Urine culture pending.      Patient refused medication this morning    4/15    Patient sleepy obtunded /hypothermic    Not eating drinking    4/18  Patient is seen in medical telemetry with a one to one sitter. He is still confused and refusing to eat food. He is compliant with his medications  He pulled out NG tube 3 times. Patient had a paracentesis done on 4/15 without any complications. Labs remarkable for Cr 1.75 increasing. CBC and CMP pending. Ammonia unremarkable. 4/19  Patient is seen at bedside with a one to one sitter. He still confused and attempting to pull out his IJ central line. He has mitts on. He is also attempting to jump out of bed. Patient is still not eating today. He is taking his medications. Labs remarkable for Cr 1.82. Abdominal US remarkable for Increased abdominal and pelvic ascites. Seen by ID. Recommends discontinuing eraxis, and starting on fluconazole. 4/20  Patient is seen at bedside with a one to one sitter. She reports that he pulled out his IJ line. Patient now has an IV in his hand. He has mittens on. He is still refusing to eat any food but he continues to take his medications. Patient continues to be hypothermic with latest axillary temperature of 96.1 F. Seen by nephrology. Recommends increasing IV fluid rate. Continue fish catheter. Seen by GI. Recommends another paracentesis prior to PEG tube placement. Seen by ID. Recommends continuing fluconazole and discontinue vancomycin. Labs pending. 4/21  Patient is  With one to one sitter. He has been less agitated today but remains confused. On keppra drip. Patient has minimal oral intake. He is taking his medications. Seen by GI. Plan is to do paracentesis then place PEG tube. Seen by nephrology. No new recommendations. Seen by ID. Continue fluconazole. Labs Hgb 8.8, K 3.3, pro-bnp 990.            Objective:     Visit Vitals  /85   Pulse 64   Temp 97.9 °F (36.6 °C)   Resp 18   Ht 5' 10\" (1.778 m)   Wt 92.2 kg (203 lb 4.2 oz)   SpO2 100%   BMI 29.17 kg/m²        Recent Results (from the past 24 hour(s))   NT-PRO BNP    Collection Time: 04/21/22  6:39 AM   Result Value Ref Range    NT pro- (H) <125 pg/mL   CBC W/O DIFF    Collection Time: 04/21/22  6:39 AM   Result Value Ref Range    WBC 4.3 4.1 - 11.1 K/uL    RBC 3.17 (L) 4.10 - 5.70 M/uL    HGB 8.8 (L) 12.1 - 17.0 g/dL    HCT 24.8 (L) 36.6 - 50.3 %    MCV 78.2 (L) 80.0 - 99.0 FL    MCH 27.8 26.0 - 34.0 PG    MCHC 35.5 30.0 - 36.5 g/dL    RDW 23.5 (H) 11.5 - 14.5 %    PLATELET 67 (L) 475 - 400 K/uL    NRBC 0.0 0.0  WBC    ABSOLUTE NRBC 0.00 0.00 - 0.27 K/uL   METABOLIC PANEL, COMPREHENSIVE    Collection Time: 04/21/22  6:39 AM   Result Value Ref Range    Sodium 139 136 - 145 mmol/L    Potassium 3.3 (L) 3.5 - 5.1 mmol/L    Chloride 109 (H) 97 - 108 mmol/L    CO2 20 (L) 21 - 32 mmol/L    Anion gap 10 5 - 15 mmol/L    Glucose 93 65 - 100 mg/dL    BUN 18 6 - 20 mg/dL    Creatinine 1.74 (H) 0.70 - 1.30 mg/dL    BUN/Creatinine ratio 10 (L) 12 - 20      GFR est AA 48 (L) >60 ml/min/1.73m2    GFR est non-AA 40 (L) >60 ml/min/1.73m2    Calcium 7.7 (L) 8.5 - 10.1 mg/dL    Bilirubin, total 1.2 (H) 0.2 - 1.0 mg/dL    AST (SGOT) 35 15 - 37 U/L    ALT (SGPT) 15 12 - 78 U/L    Alk. phosphatase 72 45 - 117 U/L    Protein, total 5.5 (L) 6.4 - 8.2 g/dL    Albumin 1.9 (L) 3.5 - 5.0 g/dL    Globulin 3.6 2.0 - 4.0 g/dL    A-G Ratio 0.5 (L) 1.1 - 2.2       [unfilled]      Review of Systems    Constitutional: Negative for chills and fever. HENT: Negative. Eyes: Negative. Respiratory: Negative. Cardiovascular: Negative. Gastrointestinal: Negative for abdominal pain and nausea. Skin: Negative. Neurological: Negative. Physical Exam:      Constitutional: pt is oriented to person, place, and time. HENT:   Head: Normocephalic and atraumatic. Eyes: Pupils are equal, round, and reactive to light. EOM are normal.   Cardiovascular: Normal rate, regular rhythm and normal heart sounds.    Pulmonary/Chest: Breath sounds normal. No wheezes. No rales. Exhibits no tenderness. Abdominal: Soft. Bowel sounds are normal. There is no abdominal tenderness. There is no rebound and no guarding. Musculoskeletal: Normal range of motion. Neurological: pt is alert and oriented to person, place, and time. Extremities 2+ edema      US ABD LTD   Final Result   Increased abdominal and pelvic ascites. IR PARACENTESIS ABD SUBSQ   Final Result   Ultrasound guided paracentesis with no immediate complications. Patient tolerated the procedure well. XR CHEST PORT   Final Result   Endotracheal tube as above. Underexpanded lungs with bibasilar   atelectasis. Hydrostatic edema. Possible pleural effusions. US ABD LTD   Final Result   Ascites. XR HAND RT MIN 3 V   Final Result      CT HEAD WO CONT   Final Result   Age-appropriate atrophy. No acute findings. XR ABD (KUB)   Final Result   Within normal      XR CHEST PORT   Final Result   Findings/impression:   1. Low lung volumes. Bibasilar hypoventilatory change/atelectasis. Left   retrocardiac lung incompletely evaluated, cannot exclude underlying airspace   disease. 2.  Cardiac and mediastinal contours are obscured by low lung volumes. Ectatic/possible aneurysmal appearance transverse aorta with calcific   atherosclerotic plaque. Appears stable from prior radiograph. 3.  No pleural fluid. No pneumothorax. 4.  Prominent gas-filled bowel partially visualized in the left upper quadrant.          IR INSERT NON TUNL CVC OVER 5 YRS    (Results Pending)   IR PARACENTESIS ABD W IMAGE    (Results Pending)   IR US GUIDED VASCULAR ACCESS    (Results Pending)   IR FLUORO GUIDE PLC CVAD    (Results Pending)        Recent Results (from the past 24 hour(s))   NT-PRO BNP    Collection Time: 04/21/22  6:39 AM   Result Value Ref Range    NT pro- (H) <125 pg/mL   CBC W/O DIFF    Collection Time: 04/21/22  6:39 AM   Result Value Ref Range    WBC 4.3 4.1 - 11.1 K/uL    RBC 3.17 (L) 4.10 - 5.70 M/uL    HGB 8.8 (L) 12.1 - 17.0 g/dL    HCT 24.8 (L) 36.6 - 50.3 %    MCV 78.2 (L) 80.0 - 99.0 FL    MCH 27.8 26.0 - 34.0 PG    MCHC 35.5 30.0 - 36.5 g/dL    RDW 23.5 (H) 11.5 - 14.5 %    PLATELET 67 (L) 539 - 400 K/uL    NRBC 0.0 0.0  WBC    ABSOLUTE NRBC 0.00 0.00 - 8.03 K/uL   METABOLIC PANEL, COMPREHENSIVE    Collection Time: 04/21/22  6:39 AM   Result Value Ref Range    Sodium 139 136 - 145 mmol/L    Potassium 3.3 (L) 3.5 - 5.1 mmol/L    Chloride 109 (H) 97 - 108 mmol/L    CO2 20 (L) 21 - 32 mmol/L    Anion gap 10 5 - 15 mmol/L    Glucose 93 65 - 100 mg/dL    BUN 18 6 - 20 mg/dL    Creatinine 1.74 (H) 0.70 - 1.30 mg/dL    BUN/Creatinine ratio 10 (L) 12 - 20      GFR est AA 48 (L) >60 ml/min/1.73m2    GFR est non-AA 40 (L) >60 ml/min/1.73m2    Calcium 7.7 (L) 8.5 - 10.1 mg/dL    Bilirubin, total 1.2 (H) 0.2 - 1.0 mg/dL    AST (SGOT) 35 15 - 37 U/L    ALT (SGPT) 15 12 - 78 U/L    Alk.  phosphatase 72 45 - 117 U/L    Protein, total 5.5 (L) 6.4 - 8.2 g/dL    Albumin 1.9 (L) 3.5 - 5.0 g/dL    Globulin 3.6 2.0 - 4.0 g/dL    A-G Ratio 0.5 (L) 1.1 - 2.2         Results     Procedure Component Value Units Date/Time    CULTURE, URINE [[id number]]  (Abnormal) Collected: 04/12/22 2300    Order Status: Completed Specimen: Urine Updated: 04/16/22 2075     Special Requests: No Special Requests        [place] Count --        >100,000  colonies/ml       Culture result: Candida albicans       CULTURE, URINE [[id number]] Collected: 04/12/22 2300    Order Status: Canceled Specimen: Urine     CULTURE, URINE [[id number]] Collected: 04/07/22 1502    Order Status: Completed Specimen: Urine Updated: 04/09/22 1414     Special Requests: --        No Special Requests  Reflexed from [id number]       Culture result: No Growth (<1000 cfu/mL)       MRSA SCREEN - PCR (NASAL) [[id number]] Collected: 04/07/22 1345    Order Status: Completed Specimen: Swab Updated: 04/07/22 1516     MRSA by PCR, Nasal Not Detected Labs:     Recent Labs     04/21/22  0639 04/19/22  0332   WBC 4.3 6.5   HGB 8.8* 9.7*   HCT 24.8* 28.3*   PLT 67* 72*     Recent Labs     04/21/22  0639 04/19/22  0415    139   K 3.3* 3.6   * 111*   CO2 20* 18*   BUN 18 19   CREA 1.74* 1.82*   GLU 93 67   CA 7.7* 8.2*   PHOS  --  3.2     Recent Labs     04/21/22  0639 04/19/22  0415   ALT 15 15   AP 72 73   TBILI 1.2* 1.4*   TP 5.5* 6.3*   ALB 1.9* 2.1*   GLOB 3.6 4.2*     No results for input(s): INR, PTP, APTT, INREXT, INREXT in the last 72 hours. No results for input(s): FE, TIBC, PSAT, FERR in the last 72 hours. No results found for: FOL, RBCF   No results for input(s): PH, PCO2, PO2 in the last 72 hours. No results for input(s): CPK, CKNDX, TROIQ in the last 72 hours.     No lab exists for component: CPKMB  No results found for: CHOL, CHOLX, CHLST, CHOLV, HDL, HDLP, LDL, LDLC, DLDLP, TGLX, TRIGL, TRIGP, CHHD, CHHDX  Lab Results   Component Value Date/Time    Glucose (POC) 80 04/08/2022 10:03 AM    Glucose (POC) 84 04/04/2022 05:40 PM    Glucose (POC) 107 04/03/2022 11:12 AM    Glucose (POC) 111 03/31/2022 11:35 AM    Glucose (POC) 91 03/29/2022 11:10 AM     Lab Results   Component Value Date/Time    Color Yellow/Straw 04/12/2022 11:00 PM    Appearance Turbid (A) 04/12/2022 11:00 PM    Specific gravity 1.011 04/12/2022 11:00 PM    pH (UA) 5.0 04/12/2022 11:00 PM    Protein Negative 04/12/2022 11:00 PM    Glucose Negative 04/12/2022 11:00 PM    Ketone Negative 04/12/2022 11:00 PM    Bilirubin Negative 04/12/2022 11:00 PM    Urobilinogen 0.1 04/12/2022 11:00 PM    Nitrites Negative 04/12/2022 11:00 PM    Leukocyte Esterase Large (A) 04/12/2022 11:00 PM    Bacteria 1+ (A) 04/12/2022 11:00 PM    WBC >100 (H) 04/12/2022 11:00 PM    RBC  04/12/2022 11:00 PM         Assessment:     Sepsis  UTI/Candida  Lactic acidosis  Acute on chronic renal failure- creat 2.01  Cirrhosis with ascites  Anemia  History of septic DIP joint, right index finger  Hypothyroidism  Seizure disorder  Gout  GERD  History of etoh abuse  Hypotension  Metabolic acidosis  Hypothermia  Hypokalemia  Anemia monitor H&H  Abdominal distention/ascites  Protein calorie malnutrition  Hypokalemia    Plan: On allopurinol 300 mg daily  Vitamin D 50,000 weekly  Pepcid 20 twice daily  Folic acid 1 mg daily  Lactulose 10 g 3 times a day  Keppra 1500 twice a day  Synthroid 25 mcg daily  Midodrine 10 mg 3 times a day  Propranolol 20 mg twice a day  Sodium bicarb 1303 times a day  Flomax 0.4 mg daily  Thiamine 100 mg daily  Vancomycin with pharmacy  Replace potassium  Discontinue IV Zosyn  discontinue Eraxis 100 mg daily    Discontinue Sodium bicarb drip  Continue fluconazole 200 mg daily   keppra drip 1500 mg Q12H  We will do psych consult for psychosis patient receiving Geodon  Psychotic behavior    Discussed with patient nurse  Will do GI consult for PEG tube feeding as patient not eating drinking  Awaiting paracentesis and PEG tube placement.      Consult psych due to his behavior    Add BuSpar 15 mg twice a day        Current Facility-Administered Medications:     potassium chloride SR (KLOR-CON 10) tablet 40 mEq, 40 mEq, Oral, NOW, Lamberto Ackerman MD    sodium bicarbonate tablet 1,300 mg, 1,300 mg, Oral, QID, Gertrdue Barrientos MD, 1,300 mg at 04/21/22 0949    fluconazole (DIFLUCAN) tablet 200 mg, 200 mg, Oral, DAILY, Ree Zaragoza MD, 200 mg at 04/21/22 0949    0.9% sodium chloride infusion, 50 mL/hr, IntraVENous, CONTINUOUS, Gertrude Barrientos MD, Last Rate: 50 mL/hr at 04/21/22 0608, 50 mL/hr at 04/21/22 0608    hydrOXYzine (VISTARIL) injection 50 mg, 50 mg, IntraMUSCular, Q8H PRN, Richard Ochoa MD, 50 mg at 04/21/22 0235    fludrocortisone (FLORINEF) tablet 0.1 mg, 0.1 mg, Oral, DAILY, Ree Zaragoza MD, 0.1 mg at 04/21/22 0949    0.9% sodium chloride infusion 250 mL, 250 mL, IntraVENous, PRN, Lamberto Ackerman MD    ziprasidone (GEODON) 10 mg in sterile water (preservative free) 0.5 mL injection, 10 mg, IntraMUSCular, Q12H PRN, Enrico Mike MD, 10 mg at 04/20/22 1704    levETIRAcetam (KEPPRA) 1500 mg in saline (iso-osm) 100 ml IVPB, 1,500 mg, IntraVENous, Q12H, Lamberto Ackerman MD, Last Rate: 400 mL/hr at 04/21/22 0014, 1,500 mg at 04/21/22 0014    0.9% sodium chloride infusion 250 mL, 250 mL, IntraVENous, PRN, Lamberto Ackerman MD    epoetin luis-epbx (RETACRIT) injection 10,000 Units, 10,000 Units, SubCUTAneous, Q7D, Johnathan Dillon MD, 10,000 Units at 04/16/22 1912    midodrine (PROAMATINE) tablet 10 mg, 10 mg, Oral, TID WITH MEALS, Lamberto Ackerman MD, 10 mg at 04/21/22 0949    LORazepam (ATIVAN) injection 1 mg, 1 mg, IntraVENous, Q4H PRN, Eladio Goldberg, MD, 1 mg at 04/08/22 1733    sodium chloride (NS) flush 5-10 mL, 5-10 mL, IntraVENous, PRN, Lamberto Ackerman MD, 10 mL at 04/15/22 2354    lactulose (CHRONULAC) 10 gram/15 mL solution 15 mL, 10 g, Oral, TID, Lamberto Ackerman MD, 15 mL at 04/21/22 0949    tamsulosin (FLOMAX) capsule 0.4 mg, 0.4 mg, Oral, DAILY, Lamberto Ackerman MD, 0.4 mg at 04/21/22 4596    thiamine mononitrate (B-1) tablet 100 mg, 100 mg, Oral, DAILY, Lamberto Ackerman MD, 100 mg at 04/21/22 4172    allopurinoL (ZYLOPRIM) tablet 300 mg, 300 mg, Oral, DAILY, Lamberto Ackerman MD, 300 mg at 04/21/22 6681    ergocalciferol capsule 50,000 Units, 50,000 Units, Oral, Q7D, Lamberto Ackerman MD, 50,000 Units at 04/14/22 1606    famotidine (PEPCID) tablet 20 mg, 20 mg, Oral, BID, Lamberto Ackerman MD, 20 mg at 79/92/85 0136    folic acid (FOLVITE) tablet 1 mg, 1 mg, Oral, DAILY, Lamberto Ackerman MD, 1 mg at 04/21/22 0949    levothyroxine (SYNTHROID) tablet 25 mcg, 25 mcg, Oral, ACB, Lamberto Ackerman MD, 25 mcg at 04/21/22 0609    propranoloL (INDERAL) tablet 20 mg, 20 mg, Oral, BID, Lamberto Ackerman MD, 20 mg at 04/21/22 0949    acetaminophen (TYLENOL) tablet 650 mg, 650 mg, Oral, Q6H PRN **OR** acetaminophen (TYLENOL) suppository 650 mg, 650 mg, Rectal, Q6H PRN, Zenon Ackerman MD    polyethylene glycol (MIRALAX) packet 17 g, 17 g, Oral, DAILY PRN, Zenon Ackerman MD    ondansetron (ZOFRAN ODT) tablet 4 mg, 4 mg, Oral, Q8H PRN **OR** ondansetron (ZOFRAN) injection 4 mg, 4 mg, IntraVENous, Q6H PRN, Lamberto Ackerman MD    NOREPINephrine (LEVOPHED) 8 mg in 0.9% NS 250ml infusion, 0.5-120 mcg/min, IntraVENous, TITRATE, Zenon Ackerman MD, Stopped at 04/11/22 0106

## 2022-04-21 NOTE — PROGRESS NOTES
Pt's core temp is 93.8 rectally. Also checked axillary and groin both reading were the same as recal. Notified Dr. Zavala Challenger. Pt to be started on warming blanket.

## 2022-04-21 NOTE — PROGRESS NOTES
CM reviewed chart. Plan is still for patient to have paracentesis and then PEG tube, but currently patient is on warming blanket for hypothermia. CM has updated [place]. CM will continue to follow.

## 2022-04-21 NOTE — PROGRESS NOTES
General Daily Progress Note          Patient Name:   Houston Newton       YOB: 1960       Age:  60 y.o. Admit Date: 4/7/2022      Subjective:     Houston Newton is a(n) 60 y.o. male with PMH significant for HTN, cirrhosis, ascites presents via EMS for removing fish catheter. Patient recently d/c after lengthy stay in hospital for septic DIP joint of right index finger. He was d/c on 10 days of PO Zyvox, which he completed. He presents today after pulling out fish catheter. Several blood clots noted. Patient septic upon arrival with hypothermia and hypotension. Temp 93.6 rectal, BP 84/68. Patient is retaining 887cc of urine and nursing staff will replace fish.     Patient started on fluids and IV Levaquin in the ED. Patient is altered at baseline. Head CT w/o contrast shows no acute abnormality. Blood cultures pending. CXR and KUB both normal       Patient is alert awake confused  Hypotensive off  Levophed    Normal temperature    4/12  Patient is off levophed but still receiving midodrine and IVF. He remain hypotensive. He is lethargic and confused this morning. Patient has mild bilateral LE edema. Seen by ID yesterday. Recommends continuing vancomycin   Seen by nephrology yesterday. Recommends discontinuing IVF in lieu of leg edema, discontinue bicarb drip, continuing weekly procrit, continuing PO KCl, midodrine for chronic hypotension. Today patient hypothermic on bear hugger  Yesterday evening patient was restless agitated    4/13    Patient is alert awake still hypothermic on shemar hugger    4/14  Patient is seen sleeping. On shemar hugger and vancomycin. He is producing 250 ml urine. No new changes seen at this time. Labs pending. Cr trending down 1.62 (on 4/13). Abdominal US on 4/13 shows ascites. UA on 4/12 positive for leukocyte esterase, WBC >100 and bacteruria. Urine culture pending.      Patient refused medication this morning    4/15    Patient sleepy obtunded /hypothermic    Not eating drinking    4/18  Patient is seen in medical telemetry with a one to one sitter. He is still confused and refusing to eat food. He is compliant with his medications  He pulled out NG tube 3 times. Patient had a paracentesis done on 4/15 without any complications. Labs remarkable for Cr 1.75 increasing. CBC and CMP pending. Ammonia unremarkable. 4/19  Patient is seen at bedside with a one to one sitter. He still confused and attempting to pull out his IJ central line. He has mitts on. He is also attempting to jump out of bed. Patient is still not eating today. He is taking his medications. Labs remarkable for Cr 1.82. Abdominal US remarkable for Increased abdominal and pelvic ascites. Seen by ID. Recommends discontinuing eraxis, and starting on fluconazole. 4/20  Patient is seen at bedside with a one to one sitter. She reports that he pulled out his IJ line. Patient now has an IV in his hand. He has mittens on. He is still refusing to eat any food but he continues to take his medications. Patient continues to be hypothermic with latest axillary temperature of 96.1 F. Seen by nephrology. Recommends increasing IV fluid rate. Continue fish catheter. Seen by GI. Recommends another paracentesis prior to PEG tube placement. Seen by ID. Recommends continuing fluconazole and discontinue vancomycin. Labs pending. 4/21  Patient is  With one to one sitter. He has been less agitated today but remains confused. On keppra drip. Patient has minimal oral intake. He is taking his medications. Seen by GI. Plan is to do paracentesis then place PEG tube. Seen by nephrology. No new recommendations. Seen by ID. Continue fluconazole. Labs Hgb 8.8, K 3.3, pro-bnp 990.            Objective:     Visit Vitals  /85   Pulse 64   Temp 97.9 °F (36.6 °C)   Resp 18   Ht 5' 10\" (1.778 m)   Wt 203 lb 4.2 oz (92.2 kg)   SpO2 100%   BMI 29.17 kg/m²        Recent Results (from the past 24 hour(s))   NT-PRO BNP    Collection Time: 04/21/22  6:39 AM   Result Value Ref Range    NT pro- (H) <125 pg/mL   CBC W/O DIFF    Collection Time: 04/21/22  6:39 AM   Result Value Ref Range    WBC 4.3 4.1 - 11.1 K/uL    RBC 3.17 (L) 4.10 - 5.70 M/uL    HGB 8.8 (L) 12.1 - 17.0 g/dL    HCT 24.8 (L) 36.6 - 50.3 %    MCV 78.2 (L) 80.0 - 99.0 FL    MCH 27.8 26.0 - 34.0 PG    MCHC 35.5 30.0 - 36.5 g/dL    RDW 23.5 (H) 11.5 - 14.5 %    PLATELET 67 (L) 418 - 400 K/uL    NRBC 0.0 0.0  WBC    ABSOLUTE NRBC 0.00 0.00 - 7.78 K/uL   METABOLIC PANEL, COMPREHENSIVE    Collection Time: 04/21/22  6:39 AM   Result Value Ref Range    Sodium 139 136 - 145 mmol/L    Potassium 3.3 (L) 3.5 - 5.1 mmol/L    Chloride 109 (H) 97 - 108 mmol/L    CO2 20 (L) 21 - 32 mmol/L    Anion gap 10 5 - 15 mmol/L    Glucose 93 65 - 100 mg/dL    BUN 18 6 - 20 mg/dL    Creatinine 1.74 (H) 0.70 - 1.30 mg/dL    BUN/Creatinine ratio 10 (L) 12 - 20      GFR est AA 48 (L) >60 ml/min/1.73m2    GFR est non-AA 40 (L) >60 ml/min/1.73m2    Calcium 7.7 (L) 8.5 - 10.1 mg/dL    Bilirubin, total 1.2 (H) 0.2 - 1.0 mg/dL    AST (SGOT) 35 15 - 37 U/L    ALT (SGPT) 15 12 - 78 U/L    Alk. phosphatase 72 45 - 117 U/L    Protein, total 5.5 (L) 6.4 - 8.2 g/dL    Albumin 1.9 (L) 3.5 - 5.0 g/dL    Globulin 3.6 2.0 - 4.0 g/dL    A-G Ratio 0.5 (L) 1.1 - 2.2       [unfilled]      Review of Systems    Constitutional: Negative for chills and fever. HENT: Negative. Eyes: Negative. Respiratory: Negative. Cardiovascular: Negative. Gastrointestinal: Negative for abdominal pain and nausea. Skin: Negative. Neurological: Negative. Physical Exam:      Constitutional: pt is oriented to person, place, and time. HENT:   Head: Normocephalic and atraumatic. Eyes: Pupils are equal, round, and reactive to light. EOM are normal.   Cardiovascular: Normal rate, regular rhythm and normal heart sounds.    Pulmonary/Chest: Breath sounds normal. No wheezes. No rales. Exhibits no tenderness. Abdominal: Soft. Bowel sounds are normal. There is no abdominal tenderness. There is no rebound and no guarding. Musculoskeletal: Normal range of motion. Neurological: pt is alert and oriented to person, place, and time. Extremities 2+ edema      US ABD LTD   Final Result   Increased abdominal and pelvic ascites. IR PARACENTESIS ABD SUBSQ   Final Result   Ultrasound guided paracentesis with no immediate complications. Patient tolerated the procedure well. XR CHEST PORT   Final Result   Endotracheal tube as above. Underexpanded lungs with bibasilar   atelectasis. Hydrostatic edema. Possible pleural effusions. US ABD LTD   Final Result   Ascites. XR HAND RT MIN 3 V   Final Result      CT HEAD WO CONT   Final Result   Age-appropriate atrophy. No acute findings. XR ABD (KUB)   Final Result   Within normal      XR CHEST PORT   Final Result   Findings/impression:   1. Low lung volumes. Bibasilar hypoventilatory change/atelectasis. Left   retrocardiac lung incompletely evaluated, cannot exclude underlying airspace   disease. 2.  Cardiac and mediastinal contours are obscured by low lung volumes. Ectatic/possible aneurysmal appearance transverse aorta with calcific   atherosclerotic plaque. Appears stable from prior radiograph. 3.  No pleural fluid. No pneumothorax. 4.  Prominent gas-filled bowel partially visualized in the left upper quadrant.          IR INSERT NON TUNL CVC OVER 5 YRS    (Results Pending)   IR PARACENTESIS ABD W IMAGE    (Results Pending)   IR US GUIDED VASCULAR ACCESS    (Results Pending)   IR FLUORO GUIDE PLC CVAD    (Results Pending)        Recent Results (from the past 24 hour(s))   NT-PRO BNP    Collection Time: 04/21/22  6:39 AM   Result Value Ref Range    NT pro- (H) <125 pg/mL   CBC W/O DIFF    Collection Time: 04/21/22  6:39 AM   Result Value Ref Range    WBC 4.3 4.1 - 11.1 K/uL    RBC 3.17 (L) 4.10 - 5.70 M/uL    HGB 8.8 (L) 12.1 - 17.0 g/dL    HCT 24.8 (L) 36.6 - 50.3 %    MCV 78.2 (L) 80.0 - 99.0 FL    MCH 27.8 26.0 - 34.0 PG    MCHC 35.5 30.0 - 36.5 g/dL    RDW 23.5 (H) 11.5 - 14.5 %    PLATELET 67 (L) 042 - 400 K/uL    NRBC 0.0 0.0  WBC    ABSOLUTE NRBC 0.00 0.00 - 4.16 K/uL   METABOLIC PANEL, COMPREHENSIVE    Collection Time: 04/21/22  6:39 AM   Result Value Ref Range    Sodium 139 136 - 145 mmol/L    Potassium 3.3 (L) 3.5 - 5.1 mmol/L    Chloride 109 (H) 97 - 108 mmol/L    CO2 20 (L) 21 - 32 mmol/L    Anion gap 10 5 - 15 mmol/L    Glucose 93 65 - 100 mg/dL    BUN 18 6 - 20 mg/dL    Creatinine 1.74 (H) 0.70 - 1.30 mg/dL    BUN/Creatinine ratio 10 (L) 12 - 20      GFR est AA 48 (L) >60 ml/min/1.73m2    GFR est non-AA 40 (L) >60 ml/min/1.73m2    Calcium 7.7 (L) 8.5 - 10.1 mg/dL    Bilirubin, total 1.2 (H) 0.2 - 1.0 mg/dL    AST (SGOT) 35 15 - 37 U/L    ALT (SGPT) 15 12 - 78 U/L    Alk.  phosphatase 72 45 - 117 U/L    Protein, total 5.5 (L) 6.4 - 8.2 g/dL    Albumin 1.9 (L) 3.5 - 5.0 g/dL    Globulin 3.6 2.0 - 4.0 g/dL    A-G Ratio 0.5 (L) 1.1 - 2.2         Results     Procedure Component Value Units Date/Time    CULTURE, URINE [[id number]]  (Abnormal) Collected: 04/12/22 2300    Order Status: Completed Specimen: Urine Updated: 04/16/22 4630     Special Requests: No Special Requests        Eek Count --        >100,000  colonies/ml       Culture result: Candida albicans       CULTURE, URINE [[id number]] Collected: 04/12/22 2300    Order Status: Canceled Specimen: Urine     CULTURE, URINE [[id number]] Collected: 04/07/22 1502    Order Status: Completed Specimen: Urine Updated: 04/09/22 1414     Special Requests: --        No Special Requests  Reflexed from [id number]       Culture result: No Growth (<1000 cfu/mL)       MRSA SCREEN - PCR (NASAL) [[id number]] Collected: 04/07/22 1345    Order Status: Completed Specimen: Swab Updated: 04/07/22 1516     MRSA by PCR, Nasal Not Detected CULTURE, BLOOD, PAIRED [[id number]] Collected: 04/07/22 1001    Order Status: Completed Specimen: Blood Updated: 04/07/22 1017           Labs:     Recent Labs     04/21/22  0639 04/19/22  0332   WBC 4.3 6.5   HGB 8.8* 9.7*   HCT 24.8* 28.3*   PLT 67* 72*     Recent Labs     04/21/22 0639 04/19/22  0415    139   K 3.3* 3.6   * 111*   CO2 20* 18*   BUN 18 19   CREA 1.74* 1.82*   GLU 93 67   CA 7.7* 8.2*   PHOS  --  3.2     Recent Labs     04/21/22 0639 04/19/22  0415   ALT 15 15   AP 72 73   TBILI 1.2* 1.4*   TP 5.5* 6.3*   ALB 1.9* 2.1*   GLOB 3.6 4.2*     No results for input(s): INR, PTP, APTT, INREXT, INREXT in the last 72 hours. No results for input(s): FE, TIBC, PSAT, FERR in the last 72 hours. No results found for: FOL, RBCF   No results for input(s): PH, PCO2, PO2 in the last 72 hours. No results for input(s): CPK, CKNDX, TROIQ in the last 72 hours.     No lab exists for component: CPKMB  No results found for: CHOL, CHOLX, CHLST, CHOLV, HDL, HDLP, LDL, LDLC, DLDLP, TGLX, TRIGL, TRIGP, CHHD, CHHDX  Lab Results   Component Value Date/Time    Glucose (POC) 80 04/08/2022 10:03 AM    Glucose (POC) 84 04/04/2022 05:40 PM    Glucose (POC) 107 04/03/2022 11:12 AM    Glucose (POC) 111 03/31/2022 11:35 AM    Glucose (POC) 91 03/29/2022 11:10 AM     Lab Results   Component Value Date/Time    Color Yellow/Straw 04/12/2022 11:00 PM    Appearance Turbid (A) 04/12/2022 11:00 PM    Specific gravity 1.011 04/12/2022 11:00 PM    pH (UA) 5.0 04/12/2022 11:00 PM    Protein Negative 04/12/2022 11:00 PM    Glucose Negative 04/12/2022 11:00 PM    Ketone Negative 04/12/2022 11:00 PM    Bilirubin Negative 04/12/2022 11:00 PM    Urobilinogen 0.1 04/12/2022 11:00 PM    Nitrites Negative 04/12/2022 11:00 PM    Leukocyte Esterase Large (A) 04/12/2022 11:00 PM    Bacteria 1+ (A) 04/12/2022 11:00 PM    WBC >100 (H) 04/12/2022 11:00 PM    RBC  04/12/2022 11:00 PM         Assessment:     Sepsis  UTI/Candida  Lactic acidosis  Acute on chronic renal failure- creat 2.01  Cirrhosis with ascites  Anemia  History of septic DIP joint, right index finger  Hypothyroidism  Seizure disorder  Gout  GERD  History of etoh abuse  Hypotension  Metabolic acidosis  Hypothermia  Hypokalemia  Anemia monitor H&H  Abdominal distention/ascites  Protein calorie malnutrition    Plan: On shemar singleton    On allopurinol 300 mg daily  Vitamin D 50,000 weekly  Pepcid 20 twice daily  Folic acid 1 mg daily  Lactulose 10 g 3 times a day  Keppra 1500 twice a day  Synthroid 25 mcg daily  Midodrine 10 mg 3 times a day  Propranolol 20 mg twice a day  Sodium bicarb 1303 times a day  Flomax 0.4 mg daily  Thiamine 100 mg daily  Vancomycin with pharmacy  Replace potassium  Discontinue IV Zosyn  discontinue Eraxis 100 mg daily    Discontinue Sodium bicarb drip  Continue fluconazole 200 mg daily   keppra drip 1500 mg Q12H  We will do psych consult for psychosis patient receiving Geodon  Psychotic behavior    Discussed with patient nurse  Will do GI consult for PEG tube feeding as patient not eating drinking  Awaiting paracentesis and PEG tube placement.      Consult psych due to his behavior        Current Facility-Administered Medications:     sodium bicarbonate tablet 1,300 mg, 1,300 mg, Oral, QID, Ijeoma Donald MD, 1,300 mg at 04/20/22 2142    fluconazole (DIFLUCAN) tablet 200 mg, 200 mg, Oral, DAILY, Ree Zaragoza MD, 200 mg at 04/20/22 1146    0.9% sodium chloride infusion, 50 mL/hr, IntraVENous, CONTINUOUS, Ijeoma Donald MD, Last Rate: 50 mL/hr at 04/21/22 0608, 50 mL/hr at 04/21/22 0608    hydrOXYzine (VISTARIL) injection 50 mg, 50 mg, IntraMUSCular, Q8H PRN, Richard Gr MD, 50 mg at 04/21/22 0235    fludrocortisone (FLORINEF) tablet 0.1 mg, 0.1 mg, Oral, DAILY, Ree Zaragoza MD, 0.1 mg at 04/20/22 1155    0.9% sodium chloride infusion 250 mL, 250 mL, IntraVENous, PRN, Lamberto Ackerman MD    ziprasidone (GEODON) 10 mg in sterile water (preservative free) 0.5 mL injection, 10 mg, IntraMUSCular, Q12H PRN, Veronica Watson MD, 10 mg at 04/20/22 1704    levETIRAcetam (KEPPRA) 1500 mg in saline (iso-osm) 100 ml IVPB, 1,500 mg, IntraVENous, Q12H, Lamberto Ackerman MD, Last Rate: 400 mL/hr at 04/21/22 0014, 1,500 mg at 04/21/22 0014    0.9% sodium chloride infusion 250 mL, 250 mL, IntraVENous, PRN, Lamberto Ackerman MD    epoetin luis-epbx (RETACRIT) injection 10,000 Units, 10,000 Units, SubCUTAneous, Q7D, KristenAlisha MD, 10,000 Units at 04/16/22 1912    midodrine (PROAMATINE) tablet 10 mg, 10 mg, Oral, TID WITH MEALS, Lamberto Ackerman MD, 10 mg at 04/20/22 1713    LORazepam (ATIVAN) injection 1 mg, 1 mg, IntraVENous, Q4H PRN, Linwood Rosado MD, 1 mg at 04/08/22 1733    sodium chloride (NS) flush 5-10 mL, 5-10 mL, IntraVENous, PRN, Lamberto Ackerman MD, 10 mL at 04/15/22 2354    lactulose (CHRONULAC) 10 gram/15 mL solution 15 mL, 10 g, Oral, TID, Lamberto Ackerman MD, 15 mL at 04/20/22 2113    tamsulosin (FLOMAX) capsule 0.4 mg, 0.4 mg, Oral, DAILY, Lamberto Ackerman MD, 0.4 mg at 04/20/22 1147    thiamine mononitrate (B-1) tablet 100 mg, 100 mg, Oral, DAILY, Lamberto Ackerman MD, 100 mg at 04/20/22 1147    allopurinoL (ZYLOPRIM) tablet 300 mg, 300 mg, Oral, DAILY, Lamberto Ackerman MD, 300 mg at 04/20/22 1155    ergocalciferol capsule 50,000 Units, 50,000 Units, Oral, Q7D, Lamberto Ackerman MD, 50,000 Units at 04/14/22 1606    famotidine (PEPCID) tablet 20 mg, 20 mg, Oral, BID, Lamberto Ackerman MD, 20 mg at 80/33/63 5068    folic acid (FOLVITE) tablet 1 mg, 1 mg, Oral, DAILY, Lamberto Ackerman MD, 1 mg at 04/20/22 1147    levothyroxine (SYNTHROID) tablet 25 mcg, 25 mcg, Oral, ACB, Lamberto Ackerman MD, 25 mcg at 04/21/22 0609    propranoloL (INDERAL) tablet 20 mg, 20 mg, Oral, BID, Lamberto Ackerman MD, 20 mg at 04/20/22 2050    acetaminophen (TYLENOL) tablet 650 mg, 650 mg, Oral, Q6H PRN **OR** acetaminophen (TYLENOL) suppository 650 mg, 650 mg, Rectal, Q6H PRN, Srini Ackerman MD    polyethylene glycol (MIRALAX) packet 17 g, 17 g, Oral, DAILY PRN, Srini Ackerman MD    ondansetron (ZOFRAN ODT) tablet 4 mg, 4 mg, Oral, Q8H PRN **OR** ondansetron (ZOFRAN) injection 4 mg, 4 mg, IntraVENous, Q6H PRN, Lamberto Ackerman MD    NOREPINephrine (LEVOPHED) 8 mg in 0.9% NS 250ml infusion, 0.5-120 mcg/min, IntraVENous, TITRATE, Srini Ackerman MD, Stopped at 04/11/22 0106

## 2022-04-22 NOTE — PROGRESS NOTES
Infectious Disease Progress Note           Subjective:   Stable, confused, not following commands, underwent peg tube placement today. No acute events since last seen   Objective:   Physical Exam:     Visit Vitals  BP 93/72 (BP 1 Location: Left upper arm, BP Patient Position: At rest)   Pulse 75   Temp 97.6 °F (36.4 °C)   Resp 16   Ht 5' 10\" (1.778 m)   Wt 203 lb 4.2 oz (92.2 kg)   SpO2 94%   BMI 29.17 kg/m²      O2 Device: None (Room air)    Temp (24hrs), Av.8 °F (36.6 °C), Min:96.9 °F (36.1 °C), Max:98.6 °F (37 °C)     07 - 1900  In: -   Out: 5750    1901 -  07  In: 850 [P.O.:150; I.V.:700]  Out: 1100 [Urine:650; Drains:450]    General: NAD, alert, lethargic   HEENT: HEIDY, Moist mucosa   Lungs: CTA b/l, decreased at the bases, no wheeze/rhonchi   Heart: S1S2+, RRR, no murmur  Abdo: Soft, distended, NT, +BS   : + fish cath, clear urine in bag and tubing   Exts: Decreased right index finger swelling, + 3 pitting edema involving b/l LEs   Skin: No wounds, No rashes or lesions    Data Review:       Recent Days:  Recent Labs     22  0639   WBC 4.3   HGB 8.8*   HCT 24.8*   PLT 67*     Recent Labs     22  0639   BUN 18   CREA 1.74*       Lab Results   Component Value Date/Time    C-Reactive protein 0.60 2022 05:15 AM          Microbiology     Results     Procedure Component Value Units Date/Time    CULTURE, BODY FLUID Daralyn Senters STAIN [[id number]] Collected: 22 1400    Order Status: Completed Specimen:  Body Fluid from Ascitic Fluid Updated: 22 1500    CULTURE, URINE [[id number]]  (Abnormal) Collected: 22 2300    Order Status: Completed Specimen: Urine Updated: 22 1331     Special Requests: No Special Requests        [place] Count --        >100,000  colonies/ml       Culture result: Candida albicans       CULTURE, URINE [[id number]] Collected: 22 2300    Order Status: Canceled Specimen: Urine              Diagnostics   CXR Results  (Last 48 hours)    None             Assessment/Plan     1. UTI, abnormal UA,  Urinary retention: + indwelling fish cath      Urine Cx from 04/07 is negative, Yeast isolated from urine Cx from 04/12      Episodes of hypothermia, otherwise hemodynamically stable       On day # 5/14 of antifungal therapy, currently fluconazole    2. Right index finger osteomyelitis. S/p debridement w isolation of Staph epidermidis from Cx      Completed 4 wks of Vancomycin, currently off antibiotics       Healing incisional wound e/o evidence of acute infection      3. AMS, h/o metabolic/hepatic encephalopathy, waxing and waning mentation     4. Ascites, S/p paracentesis on 04/08 w removal of 4,250 ml of clear fluid     Abdomen is distended but non-tender, no evidence of an acute abdomen     5. Episodes of hypothermia/hypotension: Suspected adrenal insufficiency. Continue on Florinef and Midodrine     6.  Malnutrition: Underwent peg tube placement today       Anastasia Sweeney MD    4/22/2022

## 2022-04-22 NOTE — PROGRESS NOTES
Renal Note    NAME:  Jerica Cordova   :   1960   MRN:   [id number]     ATTENDING: Carlitos Carter MD  PCP:  Gabe Montoya NP    Date/Time:  2022 12:46 PM      Subjective:     Patient seen in the room. He is very confused. Less agitated today. Wearing mittens today. On IVF. Creatinine 1.7. labs today pending    Pressure is low in the 's range. On midodrine 10 mg tid    Past Medical History:   Diagnosis Date    Arthritis     Hypertension       Past Surgical History:   Procedure Laterality Date    IR INSERT NON TUNL CVC OVER 5 YRS  3/25/2022    IR PARACENTESIS ABD SUBSQ  4/15/2022    IR PARACENTESIS ABD W IMAGE  2022    IR PARACENTESIS ABD W IMAGE  3/1/2022    IR PARACENTESIS ABD W IMAGE  3/30/2022     Social History     Tobacco Use    Smoking status: Never Smoker    Smokeless tobacco: Never Used   Substance Use Topics    Alcohol use: Not on file      History reviewed. No pertinent family history. No Known Allergies   Prior to Admission medications    Medication Sig Start Date End Date Taking? Authorizing Provider   thiamine mononitrate (B-1) 100 mg tablet Take 1 Tablet by mouth daily. 22   Diana Ackerman MD   levothyroxine (SYNTHROID) 25 mcg tablet Take 25 mcg by mouth Daily (before breakfast). Provider, Historical   tamsulosin (Flomax) 0.4 mg capsule Take 1 Capsule by mouth daily. 3/4/22   Leeann Whyte PA-C   potassium chloride (K-DUR, KLOR-CON M20) 20 mEq tablet Take 1 Tablet by mouth daily. 3/2/22   Violet Briscoe MD   sodium bicarbonate 650 mg tablet Take 1 Tablet by mouth three (3) times daily. 3/2/22   Violet Briscoe MD   levETIRAcetam (Keppra) 1,000 mg tablet Take 1.5 Tablets by mouth two (2) times a day. 3/2/22   Violet Briscoe MD   ergocalciferol (ERGOCALCIFEROL) 1,250 mcg (50,000 unit) capsule Take 1 Capsule by mouth every seven (7) days.  22   Provider, Historical   lactulose ([facility]) 10 gram/15 mL solution Take 15 mL by mouth three (3) times daily. 22   Ambar Ackerman MD   allopurinoL (ZYLOPRIM) 300 mg tablet Take 1 Tablet by mouth daily. 22   Provider, Historical   thiamine HCL (B-1) 100 mg tablet Take 100 mg by mouth daily. Provider, Historical   propranoloL (INDERAL) 20 mg tablet Take 20 mg by mouth two (2) times a day. Provider, Historical   folic acid (FOLVITE) 1 mg tablet Take 1 mg by mouth daily. Provider, Historical   famotidine (PEPCID) 20 mg tablet Take 20 mg by mouth At bedtime. Provider, Historical   aMILoride (MIDAMOR) 5 mg tablet Take 5 mg by mouth daily. Provider, Historical       REVIEW OF SYSTEMS:       he is lethargic. Unable to obtain    Objective:   VITALS:    Visit Vitals  /84   Pulse 89   Temp 98.4 °F (36.9 °C)   Resp 18   Ht 5' 10\" (1.778 m)   Wt 92.2 kg (203 lb 4.2 oz)   SpO2 96%   BMI 29.17 kg/m²     Temp (24hrs), Av.8 °F (35.4 °C), Min:93.2 °F (34 °C), Max:98.6 °F (37 °C)      PHYSICAL EXAM:     General: NAD, lethargic eyes: sclera anicteric  Oral Cavity: No thrush or ulcers  Neck: no JVD  Chest: Fair bilateral air entry. No Wheezing or Rhonchi. No rales. Heart: normal sounds  Abdomen: soft and non tender   :  Gerard+  Lower Extremities: no edema  Skin: no rash  Neuro: Lethargic psychiatric: Able to assess      LAB DATA REVIEWED:    No results found for this or any previous visit (from the past 24 hour(s)). Recommendations/Plan:     1 acute kidney injury on chronic kidney disease 3:  -Creatinine was 2.0 on admission   -Creatinine stable at 1.7-1.8 with ivf. This is his baseline  -Persistent GRACIA likely because of ongoing hypotension. I will d/c ivf 2/2 edema-Have gone through his meds list.  Not on any potential nephrotoxins , then vancomycin.   Vanco level is therapeutic  -Baseline creatinine 1.3-1.7 based on labs during previous admission  -Has history of recent GRACIA with creatinine peaking to 2.6 few weeks ago  -GRACIA likely prerenal secondary to hypotension/sepsis  -Urine is suggestive of UTI  -No need for renal ultrasound  -legs edema    2 metabolic acidosis:  -CO2 is 20  -on oral bicarbonate 1300 QID which I will continue  -off HCO3 drip     3 severe anemia  -Hemoglobin 6.8->7.4->8.2.->8.8  -s/p  unit blood Tx  -Continue weekly Procrit    4  Hypokalemia  -Potassium is 3.4->3.6.    -po kcl 40 meq x 1 received     5 hypotension  -BP improved with IV fluids and midodrine 10 mg TID  -Continue midodrine    5 right index finger osteomyelitis  -s/p recent antibiotics for 2 weeks. -Vancomycin has been resumed during this hospitalization  -ID is on the case    6 history of liver cirrhosis  -Has history of alcoholic liver cirrhosis. Portal hypertension  -Noted to have abdominal distention.   IR has been consulted  -Had paracentesis during his previous admission also      ________________________________________________________________________  Signed: Meri Bennett MD

## 2022-04-22 NOTE — PROGRESS NOTES
CM reviewed chart and spoke to primary physician. Patient is getting paracentesis and PEG tube placement today. CM has sent updates to [facility]. Patient will not need authorization to return to [facility] when medically clear. CM will continue to follow.

## 2022-04-22 NOTE — ANESTHESIA PREPROCEDURE EVALUATION
Relevant Problems   NEUROLOGY   (+) Status epilepticus (HCC)      GASTROINTESTINAL   (+) Cirrhosis of liver (HCC)      RENAL FAILURE   (+) GRACIA (acute kidney injury) ([facility] [address]   (+) Acute renal failure (ARF) (HCC)       Anesthetic History   No history of anesthetic complications            Review of Systems / Medical History  Patient summary reviewed, nursing notes reviewed and pertinent labs reviewed    Pulmonary  Within defined limits                 Neuro/Psych     seizures        Comments: Altered Mental Status Cardiovascular  Within defined limits  Hypertension                Comments: 2/25/2022 TTE:      Interpretation Summary         Left Ventricle: Left ventricle size is normal. Mild mid septal thickening. Normal wall motion. Normal left ventricular systolic function with a visually estimated EF of 60 - 65%. Diastolic dysfunction present with normal LV EF.   Mitral Valve: Mild transvalvular regurgitation.     Pericardium: Small (<1 cm) pericardial effusion present.        GI/Hepatic/Renal         Renal disease (Creatinine 1.74): CRI  Liver disease     Endo/Other        Arthritis and anemia     Other Findings            Past Medical History:   Diagnosis Date    Arthritis     Hypertension        Past Surgical History:   Procedure Laterality Date    IR INSERT NON TUNL CVC OVER 5 YRS  3/25/2022    IR PARACENTESIS ABD SUBSQ  4/15/2022    IR PARACENTESIS ABD W IMAGE  1/21/2022    IR PARACENTESIS ABD W IMAGE  3/1/2022    IR PARACENTESIS ABD W IMAGE  3/30/2022       Current Outpatient Medications   Medication Instructions    allopurinoL (ZYLOPRIM) 300 mg tablet 1 Tablet, Oral, DAILY    aMILoride (MIDAMOR) 5 mg, Oral, DAILY    ergocalciferol (ERGOCALCIFEROL) 1,250 mcg (50,000 unit) capsule 1 Capsule, Oral, EVERY 7 DAYS    famotidine (PEPCID) 20 mg, Oral, AT BEDTIME    folic acid (FOLVITE) 1 mg, Oral, DAILY    lactulose (CHRONULAC) 10 gram/15 mL solution 15 mL, Oral, 3 TIMES DAILY    levETIRAcetam (KEPPRA) 1,500 mg, Oral, 2 TIMES DAILY    levothyroxine (SYNTHROID) 25 mcg, Oral, DAILY BEFORE BREAKFAST    potassium chloride (K-DUR, KLOR-CON M20) 20 mEq tablet 20 mEq, Oral, DAILY    propranoloL (INDERAL) 20 mg, Oral, 2 TIMES DAILY    sodium bicarbonate 650 mg, Oral, 3 TIMES DAILY    tamsulosin (FLOMAX) 0.4 mg, Oral, DAILY    thiamine HCL (B-1) 100 mg, Oral, DAILY    thiamine mononitrate (B-1) 100 mg, Oral, DAILY       No current facility-administered medications for this visit. No current outpatient medications on file.      Facility-Administered Medications Ordered in Other Visits   Medication Dose Route Frequency    potassium chloride SR (KLOR-CON 10) tablet 40 mEq  40 mEq Oral NOW    busPIRone (BUSPAR) tablet 15 mg  15 mg Oral BID    sodium bicarbonate tablet 1,300 mg  1,300 mg Oral QID    fluconazole (DIFLUCAN) tablet 200 mg  200 mg Oral DAILY    0.9% sodium chloride infusion  50 mL/hr IntraVENous CONTINUOUS    hydrOXYzine (VISTARIL) injection 50 mg  50 mg IntraMUSCular Q8H PRN    fludrocortisone (FLORINEF) tablet 0.1 mg  0.1 mg Oral DAILY    0.9% sodium chloride infusion 250 mL  250 mL IntraVENous PRN    ziprasidone (GEODON) 10 mg in sterile water (preservative free) 0.5 mL injection  10 mg IntraMUSCular Q12H PRN    levETIRAcetam (KEPPRA) 1500 mg in saline (iso-osm) 100 ml IVPB  1,500 mg IntraVENous Q12H    0.9% sodium chloride infusion 250 mL  250 mL IntraVENous PRN    epoetin luis-epbx (RETACRIT) injection 10,000 Units  10,000 Units SubCUTAneous Q7D    midodrine (PROAMATINE) tablet 10 mg  10 mg Oral TID WITH MEALS    LORazepam (ATIVAN) injection 1 mg  1 mg IntraVENous Q4H PRN    sodium chloride (NS) flush 5-10 mL  5-10 mL IntraVENous PRN    lactulose (CHRONULAC) 10 gram/15 mL solution 15 mL  10 g Oral TID    tamsulosin (FLOMAX) capsule 0.4 mg  0.4 mg Oral DAILY    thiamine mononitrate (B-1) tablet 100 mg  100 mg Oral DAILY    allopurinoL (ZYLOPRIM) tablet 300 mg  300 mg Oral DAILY    ergocalciferol capsule 50,000 Units  50,000 Units Oral Q7D    famotidine (PEPCID) tablet 20 mg  20 mg Oral BID    folic acid (FOLVITE) tablet 1 mg  1 mg Oral DAILY    levothyroxine (SYNTHROID) tablet 25 mcg  25 mcg Oral ACB    propranoloL (INDERAL) tablet 20 mg  20 mg Oral BID    acetaminophen (TYLENOL) tablet 650 mg  650 mg Oral Q6H PRN    Or    acetaminophen (TYLENOL) suppository 650 mg  650 mg Rectal Q6H PRN    polyethylene glycol (MIRALAX) packet 17 g  17 g Oral DAILY PRN    ondansetron (ZOFRAN ODT) tablet 4 mg  4 mg Oral Q8H PRN    Or    ondansetron (ZOFRAN) injection 4 mg  4 mg IntraVENous Q6H PRN       No data found.     Lab Results   Component Value Date/Time    WBC 4.3 04/21/2022 06:39 AM    HGB 8.8 (L) 04/21/2022 06:39 AM    HCT 24.8 (L) 04/21/2022 06:39 AM    PLATELET 67 (L) 11/05/0076 06:39 AM    MCV 78.2 (L) 04/21/2022 06:39 AM     Lab Results   Component Value Date/Time    Sodium 139 04/21/2022 06:39 AM    Potassium 3.3 (L) 04/21/2022 06:39 AM    Chloride 109 (H) 04/21/2022 06:39 AM    CO2 20 (L) 04/21/2022 06:39 AM    Anion gap 10 04/21/2022 06:39 AM    Glucose 93 04/21/2022 06:39 AM    BUN 18 04/21/2022 06:39 AM    Creatinine 1.74 (H) 04/21/2022 06:39 AM    BUN/Creatinine ratio 10 (L) 04/21/2022 06:39 AM    GFR est AA 48 (L) 04/21/2022 06:39 AM    GFR est non-AA 40 (L) 04/21/2022 06:39 AM    Calcium 7.7 (L) 04/21/2022 06:39 AM     No results found for: APTT, PTP, INR, INREXT, INREXT  Lab Results   Component Value Date/Time    Glucose 93 04/21/2022 06:39 AM    Glucose (POC) 80 04/08/2022 10:03 AM     Physical Exam    Airway    TM Distance: 4 - 6 cm  Neck ROM: normal range of motion   Mouth opening: Normal    Comments: Unable to exmaine Cardiovascular    Rhythm: regular  Rate: normal         Dental      Comments: Unable to exmaine   Pulmonary  Breath sounds clear to auscultation               Abdominal  GI exam deferred       Other Findings   Comments: Results for Va Ruby (MRN [id number]) as of 4/22/2022 12:10    4/21/2022 06:39  WBC: 4.3  NRBC: 0.0  RBC: 3.17 (L)  HGB: 8.8 (L)  HCT: 24.8 (L)  MCV: 78.2 (L)  MCH: 27.8  MCHC: 35.5  RDW: 23.5 (H)  PLATELET: 67 (L)    Results for Edilberto Hairston (MRN [id number]) as of 4/22/2022 12:10    4/21/2022 06:39  Sodium: 139  Potassium: 3.3 (L)  Chloride: 109 (H)  CO2: 20 (L)  Anion gap: 10  Glucose: 93  BUN: 18  Creatinine: 1.74 (H)  BUN/Creatinine ratio: 10 (L)  Calcium: 7.7 (L)  GFR est non-AA: 40 (L)  GFR est AA: 48 (L)  Bilirubin, total: 1.2 (H)  Protein, total: 5.5 (L)  Albumin: 1.9 (L)  Globulin: 3.6  A-G Ratio: 0.5 (L)  ALT: 15  AST: 35  Alk.  phosphatase: 72         Anesthetic Plan    ASA: 4, emergent  Anesthesia type: total IV anesthesia and MAC          Induction: Intravenous  Anesthetic plan and risks discussed with: Patient and Family

## 2022-04-22 NOTE — PROGRESS NOTES
General Daily Progress Note          Patient Name:   Adelfo Roach       YOB: 1960       Age:  60 y.o. Admit Date: 4/7/2022      Subjective:     Adelfo Roach is a(n) 60 y.o. male with PMH significant for HTN, cirrhosis, ascites presents via EMS for removing fish catheter. Patient recently d/c after lengthy stay in hospital for septic DIP joint of right index finger. He was d/c on 10 days of PO Zyvox, which he completed. He presents today after pulling out fish catheter. Several blood clots noted. Patient septic upon arrival with hypothermia and hypotension. Temp 93.6 rectal, BP 84/68. Patient is retaining 887cc of urine and nursing staff will replace fish.     Patient started on fluids and IV Levaquin in the ED. Patient is altered at baseline. Head CT w/o contrast shows no acute abnormality. Blood cultures pending. CXR and KUB both normal       Patient is alert awake confused  Hypotensive off  Levophed    Normal temperature    4/12  Patient is off levophed but still receiving midodrine and IVF. He remain hypotensive. He is lethargic and confused this morning. Patient has mild bilateral LE edema. Seen by ID yesterday. Recommends continuing vancomycin   Seen by nephrology yesterday. Recommends discontinuing IVF in lieu of leg edema, discontinue bicarb drip, continuing weekly procrit, continuing PO KCl, midodrine for chronic hypotension. Today patient hypothermic on bear hugger  Yesterday evening patient was restless agitated    4/13    Patient is alert awake still hypothermic on shemar hugger    4/14  Patient is seen sleeping. On shemar hugger and vancomycin. He is producing 250 ml urine. No new changes seen at this time. Labs pending. Cr trending down 1.62 (on 4/13). Abdominal US on 4/13 shows ascites. UA on 4/12 positive for leukocyte esterase, WBC >100 and bacteruria. Urine culture pending.      Patient refused medication this morning    4/15    Patient sleepy obtunded /hypothermic    Not eating drinking    4/18  Patient is seen in medical telemetry with a one to one sitter. He is still confused and refusing to eat food. He is compliant with his medications  He pulled out NG tube 3 times. Patient had a paracentesis done on 4/15 without any complications. Labs remarkable for Cr 1.75 increasing. CBC and CMP pending. Ammonia unremarkable. 4/19  Patient is seen at bedside with a one to one sitter. He still confused and attempting to pull out his IJ central line. He has mitts on. He is also attempting to jump out of bed. Patient is still not eating today. He is taking his medications. Labs remarkable for Cr 1.82. Abdominal US remarkable for Increased abdominal and pelvic ascites. Seen by ID. Recommends discontinuing eraxis, and starting on fluconazole. 4/20  Patient is seen at bedside with a one to one sitter. She reports that he pulled out his IJ line. Patient now has an IV in his hand. He has mittens on. He is still refusing to eat any food but he continues to take his medications. Patient continues to be hypothermic with latest axillary temperature of 96.1 F. Seen by nephrology. Recommends increasing IV fluid rate. Continue fish catheter. Seen by GI. Recommends another paracentesis prior to PEG tube placement. Seen by ID. Recommends continuing fluconazole and discontinue vancomycin. Labs pending. 4/21  Patient is with one to one sitter. He has been less agitated today but remains confused. On keppra drip. Patient has minimal oral intake. He is taking his medications. Seen by GI. Plan is to do paracentesis then place PEG tube. Seen by nephrology. No new recommendations. Seen by ID. Continue fluconazole. Labs Hgb 8.8, K 3.3, pro-bnp 990.     4/22  Patient is confused and seen with 1:1 sitter. He is diffusely edematous at this time and leaking fluids from his abdomen. Patient is NPO for paracentesis and PEG tube placement.      Labs pending. Objective:     Visit Vitals  /84   Pulse 89   Temp 98.4 °F (36.9 °C)   Resp 18   Ht 5' 10\" (1.778 m)   Wt 203 lb 4.2 oz (92.2 kg)   SpO2 96%   BMI 29.17 kg/m²        No results found for this or any previous visit (from the past 24 hour(s)). [unfilled]      Review of Systems    Constitutional: Negative for chills and fever. HENT: Negative. Eyes: Negative. Respiratory: Negative. Cardiovascular: Negative. Gastrointestinal: Negative for abdominal pain and nausea. Skin: Negative. Neurological: Negative. Physical Exam:      Constitutional: pt is oriented to person, place, and time. HENT:   Head: Normocephalic and atraumatic. Eyes: Pupils are equal, round, and reactive to light. EOM are normal.   Cardiovascular: Normal rate, regular rhythm and normal heart sounds. Pulmonary/Chest: Breath sounds normal. No wheezes. No rales. Exhibits no tenderness. Abdominal: Soft. Bowel sounds are normal. There is no abdominal tenderness. There is no rebound and no guarding. Musculoskeletal: Normal range of motion. Neurological: pt is alert and oriented to person, place, and time. Extremities 2+ edema  Anasarca    US ABD LTD   Final Result   Increased abdominal and pelvic ascites. IR PARACENTESIS ABD SUBSQ   Final Result   Ultrasound guided paracentesis with no immediate complications. Patient tolerated the procedure well. XR CHEST PORT   Final Result   Endotracheal tube as above. Underexpanded lungs with bibasilar   atelectasis. Hydrostatic edema. Possible pleural effusions. US ABD LTD   Final Result   Ascites. XR HAND RT MIN 3 V   Final Result      CT HEAD WO CONT   Final Result   Age-appropriate atrophy. No acute findings. XR ABD (KUB)   Final Result   Within normal      XR CHEST PORT   Final Result   Findings/impression:   1. Low lung volumes. Bibasilar hypoventilatory change/atelectasis.  Left   retrocardiac lung incompletely evaluated, cannot exclude underlying airspace   disease. 2.  Cardiac and mediastinal contours are obscured by low lung volumes. Ectatic/possible aneurysmal appearance transverse aorta with calcific   atherosclerotic plaque. Appears stable from prior radiograph. 3.  No pleural fluid. No pneumothorax. 4.  Prominent gas-filled bowel partially visualized in the left upper quadrant. IR INSERT NON TUNL CVC OVER 5 YRS    (Results Pending)   IR PARACENTESIS ABD W IMAGE    (Results Pending)   IR US GUIDED VASCULAR ACCESS    (Results Pending)   IR FLUORO GUIDE PLC CVAD    (Results Pending)        No results found for this or any previous visit (from the past 24 hour(s)). Results     Procedure Component Value Units Date/Time    CULTURE, URINE [[id number]]  (Abnormal) Collected: 04/12/22 2300    Order Status: Completed Specimen: Urine Updated: 04/16/22 1331     Special Requests: No Special Requests        [place] Count --        >100,000  colonies/ml       Culture result: Candida albicans       CULTURE, URINE [[id number]] Collected: 04/12/22 2300    Order Status: Canceled Specimen: Urine            Labs:     Recent Labs     04/21/22  0639   WBC 4.3   HGB 8.8*   HCT 24.8*   PLT 67*     Recent Labs     04/21/22  0639      K 3.3*   *   CO2 20*   BUN 18   CREA 1.74*   GLU 93   CA 7.7*     Recent Labs     04/21/22  0639   ALT 15   AP 72   TBILI 1.2*   TP 5.5*   ALB 1.9*   GLOB 3.6     No results for input(s): INR, PTP, APTT, INREXT, INREXT in the last 72 hours. No results for input(s): FE, TIBC, PSAT, FERR in the last 72 hours. No results found for: FOL, RBCF   No results for input(s): PH, PCO2, PO2 in the last 72 hours. No results for input(s): CPK, CKNDX, TROIQ in the last 72 hours.     No lab exists for component: CPKMB  No results found for: CHOL, CHOLX, CHLST, CHOLV, HDL, HDLP, LDL, LDLC, DLDLP, TGLX, TRIGL, TRIGP, CHHD, CHHDX  Lab Results   Component Value Date/Time    Glucose (POC) 80 04/08/2022 10:03 AM    Glucose (POC) 84 04/04/2022 05:40 PM    Glucose (POC) 107 04/03/2022 11:12 AM    Glucose (POC) 111 03/31/2022 11:35 AM    Glucose (POC) 91 03/29/2022 11:10 AM     Lab Results   Component Value Date/Time    Color Yellow/Straw 04/12/2022 11:00 PM    Appearance Turbid (A) 04/12/2022 11:00 PM    Specific gravity 1.011 04/12/2022 11:00 PM    pH (UA) 5.0 04/12/2022 11:00 PM    Protein Negative 04/12/2022 11:00 PM    Glucose Negative 04/12/2022 11:00 PM    Ketone Negative 04/12/2022 11:00 PM    Bilirubin Negative 04/12/2022 11:00 PM    Urobilinogen 0.1 04/12/2022 11:00 PM    Nitrites Negative 04/12/2022 11:00 PM    Leukocyte Esterase Large (A) 04/12/2022 11:00 PM    Bacteria 1+ (A) 04/12/2022 11:00 PM    WBC >100 (H) 04/12/2022 11:00 PM    RBC  04/12/2022 11:00 PM         Assessment:     Sepsis  UTI/Candida  Lactic acidosis  Acute on chronic renal failure- creat 2.01  Cirrhosis with ascites  Anemia  History of septic DIP joint, right index finger  Hypothyroidism  Seizure disorder  Gout  GERD  History of etoh abuse  Hypotension  Metabolic acidosis  Hypothermia  Hypokalemia  Anemia monitor H&H  Abdominal distention/ascites  Protein calorie malnutrition  Hypokalemia  Anasarca     Plan:      On shemar singleton  On allopurinol 300 mg daily  Vitamin D 50,000 weekly  Pepcid 20 twice daily  Folic acid 1 mg daily  Lactulose 10 g 3 times a day  Keppra 1500 twice a day  Synthroid 25 mcg daily  Midodrine 10 mg 3 times a day  Propranolol 20 mg twice a day  Sodium bicarb 1303 times a day  Flomax 0.4 mg daily  Thiamine 100 mg daily  Vancomycin with pharmacy  Replace potassium  Discontinue IV Zosyn  discontinue Eraxis 100 mg daily    Discontinue Sodium bicarb drip  Continue fluconazole 200 mg daily   keppra drip 1500 mg Q12H  Add BuSpar 15 mg twice a day  Lasix 40 mg IV BID   We will do psych consult for psychosis patient receiving Geodon  Psychotic behavior    Discussed with patient nurse  Will do GI consult for PEG tube feeding as patient not eating drinking  Awaiting paracentesis and PEG tube placement. Consult psych due to his behavior  Repeat labs. Repeat pro-BNP.             Current Facility-Administered Medications:     busPIRone (BUSPAR) tablet 15 mg, 15 mg, Oral, BID, Lamberto Ackerman MD, 15 mg at 04/21/22 2207    sodium bicarbonate tablet 1,300 mg, 1,300 mg, Oral, QID, Gertrude Barrientos MD, 1,300 mg at 04/21/22 2207    fluconazole (DIFLUCAN) tablet 200 mg, 200 mg, Oral, DAILY, Ree Zaragoza MD, 200 mg at 04/21/22 0949    0.9% sodium chloride infusion, 50 mL/hr, IntraVENous, CONTINUOUS, Gertrude Barrientos MD, Last Rate: 50 mL/hr at 04/21/22 0608, 50 mL/hr at 04/21/22 0608    hydrOXYzine (VISTARIL) injection 50 mg, 50 mg, IntraMUSCular, Q8H PRN, Nick Alonzo MD, 50 mg at 04/21/22 0235    fludrocortisone (FLORINEF) tablet 0.1 mg, 0.1 mg, Oral, DAILY, Ree Zaragoza MD, 0.1 mg at 04/21/22 0949    0.9% sodium chloride infusion 250 mL, 250 mL, IntraVENous, PRN, Alex Ackerman MD    ziprasidone (GEODON) 10 mg in sterile water (preservative free) 0.5 mL injection, 10 mg, IntraMUSCular, Q12H PRN, Cesar Vázquez MD, 10 mg at 04/20/22 1704    levETIRAcetam (KEPPRA) 1500 mg in saline (iso-osm) 100 ml IVPB, 1,500 mg, IntraVENous, Q12H, Lamberto Ackerman MD, Last Rate: 400 mL/hr at 04/21/22 2337, 1,500 mg at 04/21/22 2337    0.9% sodium chloride infusion 250 mL, 250 mL, IntraVENous, PRN, Lamberto Ackerman MD    epoetin luis-epbx (RETACRIT) injection 10,000 Units, 10,000 Units, SubCUTAneous, Q7D, Gertrude Barrientos MD, 10,000 Units at 04/16/22 1912    midodrine (PROAMATINE) tablet 10 mg, 10 mg, Oral, TID WITH MEALS, Lamberto Ackerman MD, 10 mg at 04/21/22 1647    LORazepam (ATIVAN) injection 1 mg, 1 mg, IntraVENous, Q4H PRN, Lamberto Ackerman MD, 1 mg at 04/08/22 1733    sodium chloride (NS) flush 5-10 mL, 5-10 mL, IntraVENous, PRN, Lamberto Ackerman MD, 10 mL at 04/15/22 5234    lactulose (CHRONULAC) 10 gram/15 mL solution 15 mL, 10 g, Oral, TID, Lamberto Ackerman MD, 15 mL at 04/21/22 2207    tamsulosin (FLOMAX) capsule 0.4 mg, 0.4 mg, Oral, DAILY, Rashawn Ackerman MD, 0.4 mg at 04/21/22 7016    thiamine mononitrate (B-1) tablet 100 mg, 100 mg, Oral, DAILY, Lamberto Ackerman MD, 100 mg at 04/21/22 8053    allopurinoL (ZYLOPRIM) tablet 300 mg, 300 mg, Oral, DAILY, Lamberto Ackerman MD, 300 mg at 04/21/22 2603    ergocalciferol capsule 50,000 Units, 50,000 Units, Oral, Q7D, Lamberto Ackerman MD, 50,000 Units at 04/21/22 1647    famotidine (PEPCID) tablet 20 mg, 20 mg, Oral, BID, Lamberto Ackerman MD, 20 mg at 59/41/62 8130    folic acid (FOLVITE) tablet 1 mg, 1 mg, Oral, DAILY, Lamberto Ackerman MD, 1 mg at 04/21/22 4262    levothyroxine (SYNTHROID) tablet 25 mcg, 25 mcg, Oral, ACB, Lamberto Ackerman MD, 25 mcg at 04/22/22 0403    propranoloL (INDERAL) tablet 20 mg, 20 mg, Oral, BID, Lamberto Ackerman MD, 20 mg at 04/21/22 2207    acetaminophen (TYLENOL) tablet 650 mg, 650 mg, Oral, Q6H PRN **OR** acetaminophen (TYLENOL) suppository 650 mg, 650 mg, Rectal, Q6H PRN, Lamberto Ackerman MD    polyethylene glycol (MIRALAX) packet 17 g, 17 g, Oral, DAILY PRN, Lamberto Ackerman MD    ondansetron (ZOFRAN ODT) tablet 4 mg, 4 mg, Oral, Q8H PRN **OR** ondansetron (ZOFRAN) injection 4 mg, 4 mg, IntraVENous, Q6H PRN, Dexter Martinez MD

## 2022-04-22 NOTE — ANESTHESIA POSTPROCEDURE EVALUATION
Procedure(s):  PARACENTESIS  PERCUTANEOUS ENDOSCOPIC GASTROSTOMY TUBE INSERTION  ESOPHAGOGASTRODUODENOSCOPY (EGD). total IV anesthesia, MAC    Anesthesia Post Evaluation      Multimodal analgesia: multimodal analgesia not used between 6 hours prior to anesthesia start to PACU discharge  Patient location during evaluation: bedside (Endoscopy suite)  Patient participation: complete - patient cannot participate  Level of consciousness: sleepy but conscious  Pain score: 0  Pain management: adequate  Airway patency: patent  Anesthetic complications: no  Cardiovascular status: acceptable  Respiratory status: acceptable and nasal cannula  Hydration status: acceptable  Comments: This patient remained on the stretcher. The patient was handed off to the endoscopy nursing team.  All questions regarding pre-, intra-, and postoperative care were answered. Post anesthesia nausea and vomiting:  none      INITIAL Post-op Vital signs: No vitals data found for the desired time range.

## 2022-04-22 NOTE — PROGRESS NOTES
General Daily Progress Note          Patient Name:   Jay Jay Ann       YOB: 1960       Age:  60 y.o. Admit Date: 4/7/2022      Subjective:     Jay Jay Ann is a(n) 60 y.o. male with PMH significant for HTN, cirrhosis, ascites presents via EMS for removing fish catheter. Patient recently d/c after lengthy stay in hospital for septic DIP joint of right index finger. He was d/c on 10 days of PO Zyvox, which he completed. He presents today after pulling out fish catheter. Several blood clots noted. Patient septic upon arrival with hypothermia and hypotension. Temp 93.6 rectal, BP 84/68. Patient is retaining 887cc of urine and nursing staff will replace fish.     Patient started on fluids and IV Levaquin in the ED. Patient is altered at baseline. Head CT w/o contrast shows no acute abnormality. Blood cultures pending. CXR and KUB both normal       Patient is alert awake confused  Hypotensive off  Levophed    Normal temperature    4/12  Patient is off levophed but still receiving midodrine and IVF. He remain hypotensive. He is lethargic and confused this morning. Patient has mild bilateral LE edema. Seen by ID yesterday. Recommends continuing vancomycin   Seen by nephrology yesterday. Recommends discontinuing IVF in lieu of leg edema, discontinue bicarb drip, continuing weekly procrit, continuing PO KCl, midodrine for chronic hypotension. Today patient hypothermic on bear hugger  Yesterday evening patient was restless agitated    4/13    Patient is alert awake still hypothermic on shemar hugger    4/14  Patient is seen sleeping. On shemar hugger and vancomycin. He is producing 250 ml urine. No new changes seen at this time. Labs pending. Cr trending down 1.62 (on 4/13). Abdominal US on 4/13 shows ascites. UA on 4/12 positive for leukocyte esterase, WBC >100 and bacteruria. Urine culture pending.      Patient refused medication this morning    4/15    Patient sleepy obtunded /hypothermic    Not eating drinking    4/18  Patient is seen in medical telemetry with a one to one sitter. He is still confused and refusing to eat food. He is compliant with his medications  He pulled out NG tube 3 times. Patient had a paracentesis done on 4/15 without any complications. Labs remarkable for Cr 1.75 increasing. CBC and CMP pending. Ammonia unremarkable. 4/19  Patient is seen at bedside with a one to one sitter. He still confused and attempting to pull out his IJ central line. He has mitts on. He is also attempting to jump out of bed. Patient is still not eating today. He is taking his medications. Labs remarkable for Cr 1.82. Abdominal US remarkable for Increased abdominal and pelvic ascites. Seen by ID. Recommends discontinuing eraxis, and starting on fluconazole. 4/20  Patient is seen at bedside with a one to one sitter. She reports that he pulled out his IJ line. Patient now has an IV in his hand. He has mittens on. He is still refusing to eat any food but he continues to take his medications. Patient continues to be hypothermic with latest axillary temperature of 96.1 F. Seen by nephrology. Recommends increasing IV fluid rate. Continue fish catheter. Seen by GI. Recommends another paracentesis prior to PEG tube placement. Seen by ID. Recommends continuing fluconazole and discontinue vancomycin. Labs pending. 4/21  Patient is with one to one sitter. He has been less agitated today but remains confused. On keppra drip. Patient has minimal oral intake. He is taking his medications. Seen by GI. Plan is to do paracentesis then place PEG tube. Seen by nephrology. No new recommendations. Seen by ID. Continue fluconazole. Labs Hgb 8.8, K 3.3, pro-bnp 990.     4/22  Patient is confused and seen with 1:1 sitter. He is diffusely edematous at this time and leaking fluids from his abdomen. Patient is NPO for paracentesis and PEG tube placement.      Labs pending. Objective:     Visit Vitals  /84   Pulse 89   Temp 98.4 °F (36.9 °C)   Resp 18   Ht 5' 10\" (1.778 m)   Wt 92.2 kg (203 lb 4.2 oz)   SpO2 96%   BMI 29.17 kg/m²        No results found for this or any previous visit (from the past 24 hour(s)). [unfilled]      Review of Systems    Constitutional: Negative for chills and fever. HENT: Negative. Eyes: Negative. Respiratory: Negative. Cardiovascular: Negative. Gastrointestinal: Negative for abdominal pain and nausea. Skin: Negative. Neurological: Negative. Physical Exam:      Constitutional: pt is oriented to person, place, and time. HENT:   Head: Normocephalic and atraumatic. Eyes: Pupils are equal, round, and reactive to light. EOM are normal.   Cardiovascular: Normal rate, regular rhythm and normal heart sounds. Pulmonary/Chest: Breath sounds normal. No wheezes. No rales. Exhibits no tenderness. Abdominal: Soft. Bowel sounds are normal. There is no abdominal tenderness. There is no rebound and no guarding. Musculoskeletal: Normal range of motion. Neurological: pt is alert and oriented to person, place, and time. Extremities 2+ edema  Anasarca    US ABD LTD   Final Result   Increased abdominal and pelvic ascites. IR PARACENTESIS ABD SUBSQ   Final Result   Ultrasound guided paracentesis with no immediate complications. Patient tolerated the procedure well. XR CHEST PORT   Final Result   Endotracheal tube as above. Underexpanded lungs with bibasilar   atelectasis. Hydrostatic edema. Possible pleural effusions. US ABD LTD   Final Result   Ascites. XR HAND RT MIN 3 V   Final Result      CT HEAD WO CONT   Final Result   Age-appropriate atrophy. No acute findings. XR ABD (KUB)   Final Result   Within normal      XR CHEST PORT   Final Result   Findings/impression:   1. Low lung volumes. Bibasilar hypoventilatory change/atelectasis.  Left   retrocardiac lung incompletely evaluated, cannot exclude underlying airspace   disease. 2.  Cardiac and mediastinal contours are obscured by low lung volumes. Ectatic/possible aneurysmal appearance transverse aorta with calcific   atherosclerotic plaque. Appears stable from prior radiograph. 3.  No pleural fluid. No pneumothorax. 4.  Prominent gas-filled bowel partially visualized in the left upper quadrant. IR INSERT NON TUNL CVC OVER 5 YRS    (Results Pending)   IR PARACENTESIS ABD W IMAGE    (Results Pending)   IR US GUIDED VASCULAR ACCESS    (Results Pending)   IR FLUORO GUIDE PLC CVAD    (Results Pending)        No results found for this or any previous visit (from the past 24 hour(s)). Results     Procedure Component Value Units Date/Time    CULTURE, URINE [[id number]]  (Abnormal) Collected: 04/12/22 2300    Order Status: Completed Specimen: Urine Updated: 04/16/22 1331     Special Requests: No Special Requests        [place] Count --        >100,000  colonies/ml       Culture result: Candida albicans       CULTURE, URINE [[id number]] Collected: 04/12/22 2300    Order Status: Canceled Specimen: Urine            Labs:     Recent Labs     04/21/22  0639   WBC 4.3   HGB 8.8*   HCT 24.8*   PLT 67*     Recent Labs     04/21/22  0639      K 3.3*   *   CO2 20*   BUN 18   CREA 1.74*   GLU 93   CA 7.7*     Recent Labs     04/21/22  0639   ALT 15   AP 72   TBILI 1.2*   TP 5.5*   ALB 1.9*   GLOB 3.6     No results for input(s): INR, PTP, APTT, INREXT, INREXT in the last 72 hours. No results for input(s): FE, TIBC, PSAT, FERR in the last 72 hours. No results found for: FOL, RBCF   No results for input(s): PH, PCO2, PO2 in the last 72 hours. No results for input(s): CPK, CKNDX, TROIQ in the last 72 hours.     No lab exists for component: CPKMB  No results found for: CHOL, CHOLX, CHLST, CHOLV, HDL, HDLP, LDL, LDLC, DLDLP, TGLX, TRIGL, TRIGP, CHHD, CHHDX  Lab Results   Component Value Date/Time    Glucose (POC) 80 04/08/2022 10:03 AM    Glucose (POC) 84 04/04/2022 05:40 PM    Glucose (POC) 107 04/03/2022 11:12 AM    Glucose (POC) 111 03/31/2022 11:35 AM    Glucose (POC) 91 03/29/2022 11:10 AM     Lab Results   Component Value Date/Time    Color Yellow/Straw 04/12/2022 11:00 PM    Appearance Turbid (A) 04/12/2022 11:00 PM    Specific gravity 1.011 04/12/2022 11:00 PM    pH (UA) 5.0 04/12/2022 11:00 PM    Protein Negative 04/12/2022 11:00 PM    Glucose Negative 04/12/2022 11:00 PM    Ketone Negative 04/12/2022 11:00 PM    Bilirubin Negative 04/12/2022 11:00 PM    Urobilinogen 0.1 04/12/2022 11:00 PM    Nitrites Negative 04/12/2022 11:00 PM    Leukocyte Esterase Large (A) 04/12/2022 11:00 PM    Bacteria 1+ (A) 04/12/2022 11:00 PM    WBC >100 (H) 04/12/2022 11:00 PM    RBC  04/12/2022 11:00 PM         Assessment:     Sepsis  UTI/Candida  Lactic acidosis  Acute on chronic renal failure- creat 2.01  Cirrhosis with ascites  Anemia  History of septic DIP joint, right index finger  Hypothyroidism  Seizure disorder  Gout  GERD  History of etoh abuse  Hypotension  Metabolic acidosis  Hypothermia  Hypokalemia  Anemia monitor H&H  Abdominal distention/ascites  Protein calorie malnutrition  Hypokalemia  Anasarca     Plan:      On shemar singleton  On allopurinol 300 mg daily  Vitamin D 50,000 weekly  Pepcid 20 twice daily  Folic acid 1 mg daily  Lactulose 10 g 3 times a day  Keppra 1500 twice a day  Synthroid 25 mcg daily  Midodrine 10 mg 3 times a day  Propranolol 20 mg twice a day  Sodium bicarb 1303 times a day  Flomax 0.4 mg daily  Thiamine 100 mg daily  Vancomycin with pharmacy  Replace potassium  Discontinue IV Zosyn  discontinue Eraxis 100 mg daily    Discontinue Sodium bicarb drip  Continue fluconazole 200 mg daily   keppra drip 1500 mg Q12H  Add BuSpar 15 mg twice a day    We will do psych consult for psychosis patient receiving Geodon  Psychotic behavior    Discussed with patient nurse  Will do GI consult for PEG tube feeding as patient not eating drinking  Awaiting paracentesis and PEG tube placement. Consult psych due to his behavior  Repeat labs. Repeat pro-BNP.   Replace potassium            Current Facility-Administered Medications:     busPIRone (BUSPAR) tablet 15 mg, 15 mg, Oral, BID, Lamberto Ackerman MD, 15 mg at 04/21/22 2207    sodium bicarbonate tablet 1,300 mg, 1,300 mg, Oral, QID, Johnathan Dillon MD, 1,300 mg at 04/21/22 2207    fluconazole (DIFLUCAN) tablet 200 mg, 200 mg, Oral, DAILY, Ree Zaragoza MD, 200 mg at 04/21/22 0949    0.9% sodium chloride infusion, 50 mL/hr, IntraVENous, CONTINUOUS, Johnathan Dillon MD, Last Rate: 50 mL/hr at 04/21/22 0608, 50 mL/hr at 04/21/22 0608    hydrOXYzine (VISTARIL) injection 50 mg, 50 mg, IntraMUSCular, Q8H PRN, Elli Uribe MD, 50 mg at 04/21/22 0235    fludrocortisone (FLORINEF) tablet 0.1 mg, 0.1 mg, Oral, DAILY, Ree Zaragoza MD, 0.1 mg at 04/21/22 0949    0.9% sodium chloride infusion 250 mL, 250 mL, IntraVENous, PRN, Reji Ackerman MD    ziprasidone (GEODON) 10 mg in sterile water (preservative free) 0.5 mL injection, 10 mg, IntraMUSCular, Q12H PRN, Enrico Mike MD, 10 mg at 04/20/22 1704    levETIRAcetam (KEPPRA) 1500 mg in saline (iso-osm) 100 ml IVPB, 1,500 mg, IntraVENous, Q12H, Lamberto Ackerman MD, Last Rate: 400 mL/hr at 04/21/22 2337, 1,500 mg at 04/21/22 2337    0.9% sodium chloride infusion 250 mL, 250 mL, IntraVENous, PRN, Lamberto Ackerman MD    epoetin luis-epbx (RETACRIT) injection 10,000 Units, 10,000 Units, SubCUTAneous, Q7D, Johnathan Dillon MD, 10,000 Units at 04/16/22 1912    midodrine (PROAMATINE) tablet 10 mg, 10 mg, Oral, TID WITH MEALS, Lamberto Ackerman MD, 10 mg at 04/21/22 1647    LORazepam (ATIVAN) injection 1 mg, 1 mg, IntraVENous, Q4H PRN, Lamberto Ackerman MD, 1 mg at 04/08/22 1733    sodium chloride (NS) flush 5-10 mL, 5-10 mL, IntraVENous, PRN, Lamberto Ackerman MD, 10 mL at 04/15/22 2284    lactulose (CHRONULAC) 10 gram/15 mL solution 15 mL, 10 g, Oral, TID, Lamberto Ackerman MD, 15 mL at 04/21/22 2207    tamsulosin (FLOMAX) capsule 0.4 mg, 0.4 mg, Oral, DAILY, Tommy Ackerman MD, 0.4 mg at 04/21/22 2523    thiamine mononitrate (B-1) tablet 100 mg, 100 mg, Oral, DAILY, Lamberto Ackerman MD, 100 mg at 04/21/22 6532    allopurinoL (ZYLOPRIM) tablet 300 mg, 300 mg, Oral, DAILY, Lamberto Ackerman MD, 300 mg at 04/21/22 0619    ergocalciferol capsule 50,000 Units, 50,000 Units, Oral, Q7D, Lamberto Ackerman MD, 50,000 Units at 04/21/22 1647    famotidine (PEPCID) tablet 20 mg, 20 mg, Oral, BID, Lamberto Ackerman MD, 20 mg at 50/43/88 6787    folic acid (FOLVITE) tablet 1 mg, 1 mg, Oral, DAILY, Lamberto Ackerman MD, 1 mg at 04/21/22 1161    levothyroxine (SYNTHROID) tablet 25 mcg, 25 mcg, Oral, ACB, Lamberto Ackerman MD, 25 mcg at 04/22/22 0403    propranoloL (INDERAL) tablet 20 mg, 20 mg, Oral, BID, Lamberto Ackerman MD, 20 mg at 04/21/22 2207    acetaminophen (TYLENOL) tablet 650 mg, 650 mg, Oral, Q6H PRN **OR** acetaminophen (TYLENOL) suppository 650 mg, 650 mg, Rectal, Q6H PRN, Lamberto Ackerman MD    polyethylene glycol (MIRALAX) packet 17 g, 17 g, Oral, DAILY PRN, Lamberto Ackerman MD    ondansetron (ZOFRAN ODT) tablet 4 mg, 4 mg, Oral, Q8H PRN **OR** ondansetron (ZOFRAN) injection 4 mg, 4 mg, IntraVENous, Q6H PRN, Nasrin Robles MD

## 2022-04-22 NOTE — PROGRESS NOTES
OT treatment attempt at 8:45 AM however spoke with RN , RN stated pt is not appropriate for tx session today d/t increased confusion and increase edema in all extremities. Pt. Is having a paracentesis performed today as well as a PEG tube placement. Will continue to follow and attempt tx session when pt is appropriate to perform in therapy session.

## 2022-04-23 NOTE — PROGRESS NOTES
Infectious Disease Progress Note           Subjective:   No change in clinical status, remains stable, denies new complaints, no acute events since last seen   Objective:   Physical Exam:     Visit Vitals  /68   Pulse 72   Temp 97.8 °F (36.6 °C)   Resp 18   Ht 5' 10\" (1.778 m)   Wt 202 lb 13.2 oz (92 kg)   SpO2 100%   BMI 29.10 kg/m²      O2 Device: None (Room air)    Temp (24hrs), Av °F (36.7 °C), Min:97.6 °F (36.4 °C), Max:98.5 °F (36.9 °C)    No intake/output data recorded.  1901 -  0700  In: 100 [P.O.:100]  Out: 6950 [Urine:700; Drains:500]    General: NAD, alert, lethargic   HEENT: HEIDY, Moist mucosa   Lungs: CTA b/l, decreased at the bases, no wheeze/rhonchi   Heart: S1S2+, RRR, no murmur  Abdo: Soft, distended, NT, +BS, + peg tube placement   : + fish cath, clear urine in bag and tubing   Exts: Decreased right index finger swelling, + 3 pitting edema involving b/l LEs   Skin: No wounds, No rashes or lesions    Data Review:       Recent Days:  Recent Labs     22  0702 22  0639   WBC 6.1 4.3   HGB 8.5* 8.8*   HCT 24.1* 24.8*   PLT 76* 67*     Recent Labs     22  0702 22  0639   BUN 19 18   CREA 1.89* 1.74*       Lab Results   Component Value Date/Time    C-Reactive protein 0.60 2022 05:15 AM          Microbiology     Results     Procedure Component Value Units Date/Time    CULTURE, BODY FLUID Floretta Momo STAIN [[id number]] Collected: 22 1400    Order Status: Completed Specimen:  Body Fluid from Body fld Updated: 22 1418     Special Requests: No Special Requests        GRAM STAIN No wbc's seen         No organisms seen        Culture result: No growth thus far       CULTURE, URINE [[id number]]  (Abnormal) Collected: 22 2300    Order Status: Completed Specimen: Urine Updated: 22 3759     Special Requests: No Special Requests        [place] Count --        >100,000  colonies/ml       Culture result: Candida albicans CULTURE, URINE [[id number]] Collected: 04/12/22 2300    Order Status: Canceled Specimen: Urine              Diagnostics   CXR Results  (Last 48 hours)    None             Assessment/Plan     1. UTI, abnormal UA,  Urinary retention: + indwelling fish cath      Urine Cx from 04/07 is negative, Yeast isolated from urine Cx from 04/12      Episodes of hypothermia, otherwise hemodynamically stable       On day # 6/14 of antifungal therapy, currently fluconazole    2. Right index finger osteomyelitis. S/p debridement and long term Vanc targeting staph epidermidis      3. AMS, h/o metabolic/hepatic encephalopathy, waxing and waning mentation     4. Ascites, due to chronic liver disease, s/p paracentesis x 2    5. Episodes of hypothermia/hypotension: Suspected adrenal insufficiency.       Off Midodrine, remains on Florinef, BP staying stable, ongoing episodes of hypothermia     Poor long term prognosis, remains a full code       Nette Humphrey MD    4/23/2022

## 2022-04-23 NOTE — PROGRESS NOTES
Problem: Pressure Injury - Risk of  Goal: *Prevention of pressure injury  Description: Document Curtis Scale and appropriate interventions in the flowsheet. Outcome: Progressing Towards Goal  Note: Pressure Injury Interventions:  Sensory Interventions: Assess changes in LOC    Moisture Interventions: Absorbent underpads    Activity Interventions: PT/OT evaluation    Mobility Interventions: HOB 30 degrees or less    Nutrition Interventions: Document food/fluid/supplement intake,Discuss nutritional consult with provider,Offer support with meals,snacks and hydration    Friction and Shear Interventions: Apply protective barrier, creams and emollients                Problem: Patient Education: Go to Patient Education Activity  Goal: Patient/Family Education  Outcome: Progressing Towards Goal     Problem: Falls - Risk of  Goal: *Absence of Falls  Description: Document [place] Fall Risk and appropriate interventions in the flowsheet.   Outcome: Progressing Towards Goal  Note: Fall Risk Interventions:  Mobility Interventions: Bed/chair exit alarm    Mentation Interventions: Bed/chair exit alarm    Medication Interventions: Bed/chair exit alarm    Elimination Interventions: Call light in reach,Bed/chair exit alarm              Problem: Patient Education: Go to Patient Education Activity  Goal: Patient/Family Education  Outcome: Progressing Towards Goal     Problem: Aspiration - Risk of  Goal: *Absence of aspiration  Outcome: Progressing Towards Goal     Problem: Patient Education: Go to Patient Education Activity  Goal: Patient/Family Education  Outcome: Progressing Towards Goal     Problem: Patient Education: Go to Patient Education Activity  Goal: Patient/Family Education  Outcome: Progressing Towards Goal     Problem: Impaired Skin Integrity/Pressure Injury Treatment  Goal: *Improvement of Existing Pressure Injury  Outcome: Progressing Towards Goal  Goal: *Prevention of pressure injury  Description: Document Curtis Scale and appropriate interventions in the flowsheet.   Outcome: Progressing Towards Goal  Note: Pressure Injury Interventions:  Sensory Interventions: Assess changes in LOC    Moisture Interventions: Absorbent underpads    Activity Interventions: PT/OT evaluation    Mobility Interventions: HOB 30 degrees or less    Nutrition Interventions: Document food/fluid/supplement intake,Discuss nutritional consult with provider,Offer support with meals,snacks and hydration    Friction and Shear Interventions: Apply protective barrier, creams and emollients                Problem: Patient Education: Go to Patient Education Activity  Goal: Patient/Family Education  Outcome: Progressing Towards Goal

## 2022-04-23 NOTE — PROGRESS NOTES
General Daily Progress Note          Patient Name:   Katherine Patient       YOB: 1960       Age:  60 y.o. Admit Date: 4/7/2022      Subjective:     Katherine Patient is a(n) 60 y.o. male with PMH significant for HTN, cirrhosis, ascites presents via EMS for removing fish catheter. Patient recently d/c after lengthy stay in hospital for septic DIP joint of right index finger. He was d/c on 10 days of PO Zyvox, which he completed. He presents today after pulling out fish catheter. Several blood clots noted. Patient septic upon arrival with hypothermia and hypotension. Temp 93.6 rectal, BP 84/68. Patient is retaining 887cc of urine and nursing staff will replace fish.     Patient started on fluids and IV Levaquin in the ED. Patient is altered at baseline. Head CT w/o contrast shows no acute abnormality. Blood cultures pending. CXR and KUB both normal       Patient is alert awake confused  Hypotensive off  Levophed    Normal temperature    4/12  Patient is off levophed but still receiving midodrine and IVF. He remain hypotensive. He is lethargic and confused this morning. Patient has mild bilateral LE edema. Seen by ID yesterday. Recommends continuing vancomycin   Seen by nephrology yesterday. Recommends discontinuing IVF in lieu of leg edema, discontinue bicarb drip, continuing weekly procrit, continuing PO KCl, midodrine for chronic hypotension. Today patient hypothermic on bear hugger  Yesterday evening patient was restless agitated    4/13    Patient is alert awake still hypothermic on shemar hugger    4/14  Patient is seen sleeping. On shemar hugger and vancomycin. He is producing 250 ml urine. No new changes seen at this time. Labs pending. Cr trending down 1.62 (on 4/13). Abdominal US on 4/13 shows ascites. UA on 4/12 positive for leukocyte esterase, WBC >100 and bacteruria. Urine culture pending.      Patient refused medication this morning    4/15    Patient sleepy obtunded /hypothermic    Not eating drinking    4/18  Patient is seen in medical telemetry with a one to one sitter. He is still confused and refusing to eat food. He is compliant with his medications  He pulled out NG tube 3 times. Patient had a paracentesis done on 4/15 without any complications. Labs remarkable for Cr 1.75 increasing. CBC and CMP pending. Ammonia unremarkable. 4/19  Patient is seen at bedside with a one to one sitter. He still confused and attempting to pull out his IJ central line. He has mitts on. He is also attempting to jump out of bed. Patient is still not eating today. He is taking his medications. Labs remarkable for Cr 1.82. Abdominal US remarkable for Increased abdominal and pelvic ascites. Seen by ID. Recommends discontinuing eraxis, and starting on fluconazole. 4/20  Patient is seen at bedside with a one to one sitter. She reports that he pulled out his IJ line. Patient now has an IV in his hand. He has mittens on. He is still refusing to eat any food but he continues to take his medications. Patient continues to be hypothermic with latest axillary temperature of 96.1 F. Seen by nephrology. Recommends increasing IV fluid rate. Continue fish catheter. Seen by GI. Recommends another paracentesis prior to PEG tube placement. Seen by ID. Recommends continuing fluconazole and discontinue vancomycin. Labs pending. 4/21  Patient is with one to one sitter. He has been less agitated today but remains confused. On keppra drip. Patient has minimal oral intake. He is taking his medications. Seen by GI. Plan is to do paracentesis then place PEG tube. Seen by nephrology. No new recommendations. Seen by ID. Continue fluconazole. Labs Hgb 8.8, K 3.3, pro-bnp 990.     4/22  Patient is confused and seen with 1:1 sitter. He is diffusely edematous at this time and leaking fluids from his abdomen. Patient is NPO for paracentesis and PEG tube placement. 4/23    Patient had PEG tube placed. Objective:     Visit Vitals  /68   Pulse 72   Temp 97.8 °F (36.6 °C)   Resp 18   Ht 5' 10\" (1.778 m)   Wt 92 kg (202 lb 13.2 oz)   SpO2 100%   BMI 29.10 kg/m²        Recent Results (from the past 24 hour(s))   CULTURE, BODY FLUID W GRAM STAIN    Collection Time: 04/22/22  2:00 PM    Specimen: Body fld; Body Fluid    PARACENTISIS   Result Value Ref Range    Special Requests: No Special Requests      GRAM STAIN No wbc's seen      GRAM STAIN No organisms seen      Culture result: PENDING    LDH, BODY FLUID    Collection Time: 04/22/22  2:45 PM   Result Value Ref Range    Fluid Type: Ascitic fluid      LD, body fld.  42 U/L   CELL COUNT, BODY FLUID    Collection Time: 04/22/22  2:45 PM   Result Value Ref Range    BODY FLUID TYPE Ascitic fluid      FLUID COLOR Yellow      FLUID APPEARANCE Clear      FLUID RBC CT. 0 0 /cu mm    FLUID NUCLEATED CELLS 16 /cu mm   CBC W/O DIFF    Collection Time: 04/23/22  7:02 AM   Result Value Ref Range    WBC 6.1 4.1 - 11.1 K/uL    RBC 3.04 (L) 4.10 - 5.70 M/uL    HGB 8.5 (L) 12.1 - 17.0 g/dL    HCT 24.1 (L) 36.6 - 50.3 %    MCV 79.3 (L) 80.0 - 99.0 FL    MCH 28.0 26.0 - 34.0 PG    MCHC 35.3 30.0 - 36.5 g/dL    RDW 23.6 (H) 11.5 - 14.5 %    PLATELET 76 (L) 461 - 400 K/uL    MPV 10.6 8.9 - 12.9 FL    NRBC 0.3 (H) 0.0  WBC    ABSOLUTE NRBC 0.02 (H) 0.00 - 7.90 K/uL   METABOLIC PANEL, COMPREHENSIVE    Collection Time: 04/23/22  7:02 AM   Result Value Ref Range    Sodium 141 136 - 145 mmol/L    Potassium 4.2 3.5 - 5.1 mmol/L    Chloride 114 (H) 97 - 108 mmol/L    CO2 21 21 - 32 mmol/L    Anion gap 6 5 - 15 mmol/L    Glucose 66 65 - 100 mg/dL    BUN 19 6 - 20 mg/dL    Creatinine 1.89 (H) 0.70 - 1.30 mg/dL    BUN/Creatinine ratio 10 (L) 12 - 20      GFR est AA 44 (L) >60 ml/min/1.73m2    GFR est non-AA 36 (L) >60 ml/min/1.73m2    Calcium 7.9 (L) 8.5 - 10.1 mg/dL    Bilirubin, total 1.6 (H) 0.2 - 1.0 mg/dL    AST (SGOT) 31 15 - 37 U/L ALT (SGPT) 13 12 - 78 U/L    Alk. phosphatase 61 45 - 117 U/L    Protein, total 5.4 (L) 6.4 - 8.2 g/dL    Albumin 1.8 (L) 3.5 - 5.0 g/dL    Globulin 3.6 2.0 - 4.0 g/dL    A-G Ratio 0.5 (L) 1.1 - 2.2     NT-PRO BNP    Collection Time: 04/23/22  7:02 AM   Result Value Ref Range    NT pro-BNP 1,765 (H) <125 pg/mL   GLUCOSE, POC    Collection Time: 04/23/22  8:37 AM   Result Value Ref Range    Glucose (POC) 72 65 - 117 mg/dL    Performed by [address], POC    Collection Time: 04/23/22 12:52 PM   Result Value Ref Range    Glucose (POC) 66 65 - 117 mg/dL    Performed by Sonja Townsend      [unfilled]      Review of Systems    Constitutional: Negative for chills and fever. HENT: Negative. Eyes: Negative. Respiratory: Negative. Cardiovascular: Negative. Gastrointestinal: Negative for abdominal pain and nausea. Skin: Negative. Neurological: Negative. Physical Exam:      Constitutional: pt is oriented to person, place, and time. HENT:   Head: Normocephalic and atraumatic. Eyes: Pupils are equal, round, and reactive to light. EOM are normal.   Cardiovascular: Normal rate, regular rhythm and normal heart sounds. Pulmonary/Chest: Breath sounds normal. No wheezes. No rales. Exhibits no tenderness. Abdominal: Soft. Bowel sounds are normal. There is no abdominal tenderness. There is no rebound and no guarding. Musculoskeletal: Normal range of motion. Neurological: pt is alert and oriented to person, place, and time. Extremities 2+ edema  Anasarca    US ABD LTD   Final Result   Increased abdominal and pelvic ascites. IR PARACENTESIS ABD SUBSQ   Final Result   Ultrasound guided paracentesis with no immediate complications. Patient tolerated the procedure well. XR CHEST PORT   Final Result   Endotracheal tube as above. Underexpanded lungs with bibasilar   atelectasis. Hydrostatic edema. Possible pleural effusions. US ABD LTD   Final Result   Ascites. XR HAND RT MIN 3 V   Final Result      CT HEAD WO CONT   Final Result   Age-appropriate atrophy. No acute findings. XR ABD (KUB)   Final Result   Within normal      XR CHEST PORT   Final Result   Findings/impression:   1. Low lung volumes. Bibasilar hypoventilatory change/atelectasis. Left   retrocardiac lung incompletely evaluated, cannot exclude underlying airspace   disease. 2.  Cardiac and mediastinal contours are obscured by low lung volumes. Ectatic/possible aneurysmal appearance transverse aorta with calcific   atherosclerotic plaque. Appears stable from prior radiograph. 3.  No pleural fluid. No pneumothorax. 4.  Prominent gas-filled bowel partially visualized in the left upper quadrant. IR INSERT NON TUNL CVC OVER 5 YRS    (Results Pending)   IR PARACENTESIS ABD W IMAGE    (Results Pending)   IR US GUIDED VASCULAR ACCESS    (Results Pending)   IR FLUORO GUIDE PLC CVAD    (Results Pending)        Recent Results (from the past 24 hour(s))   CULTURE, BODY FLUID W GRAM STAIN    Collection Time: 04/22/22  2:00 PM    Specimen: Body fld; Body Fluid    PARACENTISIS   Result Value Ref Range    Special Requests: No Special Requests      GRAM STAIN No wbc's seen      GRAM STAIN No organisms seen      Culture result: PENDING    LDH, BODY FLUID    Collection Time: 04/22/22  2:45 PM   Result Value Ref Range    Fluid Type: Ascitic fluid      LD, body fld.  42 U/L   CELL COUNT, BODY FLUID    Collection Time: 04/22/22  2:45 PM   Result Value Ref Range    BODY FLUID TYPE Ascitic fluid      FLUID COLOR Yellow      FLUID APPEARANCE Clear      FLUID RBC CT. 0 0 /cu mm    FLUID NUCLEATED CELLS 16 /cu mm   CBC W/O DIFF    Collection Time: 04/23/22  7:02 AM   Result Value Ref Range    WBC 6.1 4.1 - 11.1 K/uL    RBC 3.04 (L) 4.10 - 5.70 M/uL    HGB 8.5 (L) 12.1 - 17.0 g/dL    HCT 24.1 (L) 36.6 - 50.3 %    MCV 79.3 (L) 80.0 - 99.0 FL    MCH 28.0 26.0 - 34.0 PG    MCHC 35.3 30.0 - 36.5 g/dL    RDW 23.6 (H) 11.5 - 14.5 %    PLATELET 76 (L) 415 - 400 K/uL    MPV 10.6 8.9 - 12.9 FL    NRBC 0.3 (H) 0.0  WBC    ABSOLUTE NRBC 0.02 (H) 0.00 - 6.59 K/uL   METABOLIC PANEL, COMPREHENSIVE    Collection Time: 04/23/22  7:02 AM   Result Value Ref Range    Sodium 141 136 - 145 mmol/L    Potassium 4.2 3.5 - 5.1 mmol/L    Chloride 114 (H) 97 - 108 mmol/L    CO2 21 21 - 32 mmol/L    Anion gap 6 5 - 15 mmol/L    Glucose 66 65 - 100 mg/dL    BUN 19 6 - 20 mg/dL    Creatinine 1.89 (H) 0.70 - 1.30 mg/dL    BUN/Creatinine ratio 10 (L) 12 - 20      GFR est AA 44 (L) >60 ml/min/1.73m2    GFR est non-AA 36 (L) >60 ml/min/1.73m2    Calcium 7.9 (L) 8.5 - 10.1 mg/dL    Bilirubin, total 1.6 (H) 0.2 - 1.0 mg/dL    AST (SGOT) 31 15 - 37 U/L    ALT (SGPT) 13 12 - 78 U/L    Alk. phosphatase 61 45 - 117 U/L    Protein, total 5.4 (L) 6.4 - 8.2 g/dL    Albumin 1.8 (L) 3.5 - 5.0 g/dL    Globulin 3.6 2.0 - 4.0 g/dL    A-G Ratio 0.5 (L) 1.1 - 2.2     NT-PRO BNP    Collection Time: 04/23/22  7:02 AM   Result Value Ref Range    NT pro-BNP 1,765 (H) <125 pg/mL   GLUCOSE, POC    Collection Time: 04/23/22  8:37 AM   Result Value Ref Range    Glucose (POC) 72 65 - 117 mg/dL    [facility]    Collection Time: 04/23/22 12:52 PM   Result Value Ref Range    Glucose (POC) 66 65 - 117 mg/dL    Performed by Alexis Hutchins        Results     Procedure Component Value Units Date/Time    CULTURE, BODY FLUID JERRELL Shane [[id number]] Collected: 04/22/22 1400    Order Status: Completed Specimen:  Body Fluid from Body fld Updated: 04/23/22 0901     Special Requests: No Special Requests        GRAM STAIN No wbc's seen         No organisms seen        Culture result: PENDING    CULTURE, URINE [[id number]]  (Abnormal) Collected: 04/12/22 2300    Order Status: Completed Specimen: Urine Updated: 04/16/22 1331     Special Requests: No Special Requests        [place] Count --        >100,000  colonies/ml       Culture result: Candida albicans       CULTURE, URINE [[id number]] Collected: 04/12/22 2300    Order Status: Canceled Specimen: Urine            Labs:     Recent Labs     04/23/22  0702 04/21/22  0639   WBC 6.1 4.3   HGB 8.5* 8.8*   HCT 24.1* 24.8*   PLT 76* 67*     Recent Labs     04/23/22  0702 04/21/22  0639    139   K 4.2 3.3*   * 109*   CO2 21 20*   BUN 19 18   CREA 1.89* 1.74*   GLU 66 93   CA 7.9* 7.7*     Recent Labs     04/23/22  0702 04/21/22  0639   ALT 13 15   AP 61 72   TBILI 1.6* 1.2*   TP 5.4* 5.5*   ALB 1.8* 1.9*   GLOB 3.6 3.6     No results for input(s): INR, PTP, APTT, INREXT, INREXT in the last 72 hours. No results for input(s): FE, TIBC, PSAT, FERR in the last 72 hours. No results found for: FOL, RBCF   No results for input(s): PH, PCO2, PO2 in the last 72 hours. No results for input(s): CPK, CKNDX, TROIQ in the last 72 hours.     No lab exists for component: CPKMB  No results found for: CHOL, CHOLX, CHLST, CHOLV, HDL, HDLP, LDL, LDLC, DLDLP, TGLX, TRIGL, TRIGP, CHHD, CHHDX  Lab Results   Component Value Date/Time    Glucose (POC) 66 04/23/2022 12:52 PM    Glucose (POC) 72 04/23/2022 08:37 AM    Glucose (POC) 80 04/08/2022 10:03 AM    Glucose (POC) 84 04/04/2022 05:40 PM    Glucose (POC) 107 04/03/2022 11:12 AM     Lab Results   Component Value Date/Time    Color Yellow/Straw 04/12/2022 11:00 PM    Appearance Turbid (A) 04/12/2022 11:00 PM    Specific gravity 1.011 04/12/2022 11:00 PM    pH (UA) 5.0 04/12/2022 11:00 PM    Protein Negative 04/12/2022 11:00 PM    Glucose Negative 04/12/2022 11:00 PM    Ketone Negative 04/12/2022 11:00 PM    Bilirubin Negative 04/12/2022 11:00 PM    Urobilinogen 0.1 04/12/2022 11:00 PM    Nitrites Negative 04/12/2022 11:00 PM    Leukocyte Esterase Large (A) 04/12/2022 11:00 PM    Bacteria 1+ (A) 04/12/2022 11:00 PM    WBC >100 (H) 04/12/2022 11:00 PM    RBC  04/12/2022 11:00 PM         Assessment:     Sepsis  UTI/Candida  Lactic acidosis  Acute on chronic renal failure- creat 2.01  Cirrhosis with ascites  Anemia  History of septic DIP joint, right index finger  Hypothyroidism  Seizure disorder  Gout  GERD  History of etoh abuse  Hypotension  Metabolic acidosis  Hypothermia  Hypokalemia  Anemia monitor H&H  Abdominal distention/ascites  Protein calorie malnutrition  Hypokalemia  Anasarca   Static post PEG tube placed    Plan:      Nakul singleton  On allopurinol 300 mg daily  Vitamin D 50,000 weekly  Pepcid 20 twice daily  Folic acid 1 mg daily  Lactulose 10 g 3 times a day  Keppra 1500 twice a day  Synthroid 25 mcg daily  Midodrine 10 mg 3 times a day  Propranolol 20 mg twice a day  Sodium bicarb 1303 times a day  Flomax 0.4 mg daily  Thiamine 100 mg daily  Vancomycin with pharmacy  Replace potassium  Discontinue IV Zosyn  discontinue Eraxis 100 mg daily    Discontinue Sodium bicarb drip  Continue fluconazole 200 mg daily   keppra drip 1500 mg Q12H  Add BuSpar 15 mg twice a day    We will do psych consult for psychosis patient receiving Geodon  Psychotic behavior      Continue PEG tube feeding    Consult psych due to his behavior              Current Facility-Administered Medications:     busPIRone (BUSPAR) tablet 15 mg, 15 mg, Oral, BID, Lamberto Ackerman MD, 15 mg at 04/23/22 5045    sodium bicarbonate tablet 1,300 mg, 1,300 mg, Oral, QID, Emma Aguirre MD, 1,300 mg at 04/23/22 1245    fluconazole (DIFLUCAN) tablet 200 mg, 200 mg, Oral, DAILY, Ree Zaragoza MD, 200 mg at 04/23/22 0932    hydrOXYzine (VISTARIL) injection 50 mg, 50 mg, IntraMUSCular, Q8H PRN, Richard Rapp MD, 50 mg at 04/21/22 0235    fludrocortisone (FLORINEF) tablet 0.1 mg, 0.1 mg, Oral, DAILY, Ree Zaragoza MD, 0.1 mg at 04/23/22 0933    0.9% sodium chloride infusion 250 mL, 250 mL, IntraVENous, PRN, Robin Ackerman MD    ziprasidone (GEODON) 10 mg in sterile water (preservative free) 0.5 mL injection, 10 mg, IntraMUSCular, Q12H PRN, Aleks Willis MD, 10 mg at 04/20/22 1704    levETIRAcetam (KEPPRA) 1500 mg in saline (iso-osm) 100 ml IVPB, 1,500 mg, IntraVENous, Q12H, Lamberto Ackerman MD, Last Rate: 400 mL/hr at 04/23/22 1244, 1,500 mg at 04/23/22 1244    0.9% sodium chloride infusion 250 mL, 250 mL, IntraVENous, PRN, Zenon Ackerman MD    epoetin luis-epbx (RETACRIT) injection 10,000 Units, 10,000 Units, SubCUTAneous, Q7D, Vj Brooks MD, 10,000 Units at 04/16/22 1912    midodrine (PROAMATINE) tablet 10 mg, 10 mg, Oral, TID WITH MEALS, Lamberto Ackerman MD, 10 mg at 04/23/22 1245    LORazepam (ATIVAN) injection 1 mg, 1 mg, IntraVENous, Q4H PRN, Gisel Ag MD, 1 mg at 04/08/22 1733    sodium chloride (NS) flush 5-10 mL, 5-10 mL, IntraVENous, PRN, Gisel Ag MD, 10 mL at 04/15/22 2354    lactulose (CHRONULAC) 10 gram/15 mL solution 15 mL, 10 g, Oral, TID, Lamberto Ackerman MD, 15 mL at 04/23/22 0932    tamsulosin (FLOMAX) capsule 0.4 mg, 0.4 mg, Oral, DAILY, Gisel Ag MD, 0.4 mg at 04/23/22 1255    thiamine mononitrate (B-1) tablet 100 mg, 100 mg, Oral, DAILY, Gisel Ag MD, 100 mg at 04/23/22 6597    allopurinoL (ZYLOPRIM) tablet 300 mg, 300 mg, Oral, DAILY, Gisel Ag MD, 300 mg at 04/23/22 6130    ergocalciferol capsule 50,000 Units, 50,000 Units, Oral, Q7D, Gisel Ag MD, 50,000 Units at 04/21/22 1647    famotidine (PEPCID) tablet 20 mg, 20 mg, Oral, BID, Lamberto Ackerman MD, 20 mg at 49/75/34 9549    folic acid (FOLVITE) tablet 1 mg, 1 mg, Oral, DAILY, Gisel Ag MD, 1 mg at 04/23/22 3887    levothyroxine (SYNTHROID) tablet 25 mcg, 25 mcg, Oral, ACB, Lamberto Ackerman MD, 25 mcg at 04/23/22 8235    propranoloL (INDERAL) tablet 20 mg, 20 mg, Oral, BID, Lamberto Ackerman MD, 20 mg at 04/22/22 2026    acetaminophen (TYLENOL) tablet 650 mg, 650 mg, Oral, Q6H PRN **OR** acetaminophen (TYLENOL) suppository 650 mg, 650 mg, Rectal, Q6H PRN, Lamberto Ackerman MD    polyethylene glycol (MIRALAX) packet 17 g, 17 g, Oral, DAILY PRN, Ambar Ackerman MD    ondansetron (ZOFRAN ODT) tablet 4 mg, 4 mg, Oral, Q8H PRN **OR** ondansetron (ZOFRAN) injection 4 mg, 4 mg, IntraVENous, Q6H PRN, Lamberto Ackerman MD, 4 mg at 04/23/22 0055

## 2022-04-23 NOTE — PROGRESS NOTES
Renal Note    NAME:  Christine Weinstein   :   1960   MRN:   [id number]     ATTENDING: Lin Bamberger, MD  PCP:  Reji Rojas NP    Date/Time:  2022 12:46 PM      Subjective:     Patient seen in the room. He is  confused . Creatinine 1.8. Pressure is low in the 's range. On midodrine 10 mg tid    Past Medical History:   Diagnosis Date    Arthritis     Hypertension       Past Surgical History:   Procedure Laterality Date    IR INSERT NON TUNL CVC OVER 5 YRS  3/25/2022    IR PARACENTESIS ABD SUBSQ  4/15/2022    IR PARACENTESIS ABD W IMAGE  2022    IR PARACENTESIS ABD W IMAGE  3/1/2022    IR PARACENTESIS ABD W IMAGE  3/30/2022     Social History     Tobacco Use    Smoking status: Never Smoker    Smokeless tobacco: Never Used   Substance Use Topics    Alcohol use: Not on file      History reviewed. No pertinent family history. No Known Allergies   Prior to Admission medications    Medication Sig Start Date End Date Taking? Authorizing Provider   thiamine mononitrate (B-1) 100 mg tablet Take 1 Tablet by mouth daily. 22   Susana Ackerman MD   levothyroxine (SYNTHROID) 25 mcg tablet Take 25 mcg by mouth Daily (before breakfast). Provider, Historical   tamsulosin (Flomax) 0.4 mg capsule Take 1 Capsule by mouth daily. 3/4/22   Wen Whyte PA-C   potassium chloride (K-DUR, KLOR-CON M20) 20 mEq tablet Take 1 Tablet by mouth daily. 3/2/22   Terry Santoro MD   sodium bicarbonate 650 mg tablet Take 1 Tablet by mouth three (3) times daily. 3/2/22   Terry Santoro MD   levETIRAcetam (Keppra) 1,000 mg tablet Take 1.5 Tablets by mouth two (2) times a day. 3/2/22   Terry Santoro MD   ergocalciferol (ERGOCALCIFEROL) 1,250 mcg (50,000 unit) capsule Take 1 Capsule by mouth every seven (7) days. 22   Provider, Historical   lactulose (CHRONULAC) 10 gram/15 mL solution Take 15 mL by mouth three (3) times daily.  22 Anabella Capone MD   allopurinoL (ZYLOPRIM) 300 mg tablet Take 1 Tablet by mouth daily. 22   Provider, Historical   thiamine HCL (B-1) 100 mg tablet Take 100 mg by mouth daily. Provider, Historical   propranoloL (INDERAL) 20 mg tablet Take 20 mg by mouth two (2) times a day. Provider, Historical   folic acid (FOLVITE) 1 mg tablet Take 1 mg by mouth daily. Provider, Historical   famotidine (PEPCID) 20 mg tablet Take 20 mg by mouth At bedtime. Provider, Historical   aMILoride (MIDAMOR) 5 mg tablet Take 5 mg by mouth daily. Provider, Historical       REVIEW OF SYSTEMS:       he is lethargic. Unable to obtain    Objective:   VITALS:    Visit Vitals  /68   Pulse 72   Temp 97.8 °F (36.6 °C)   Resp 18   Ht 5' 10\" (1.778 m)   Wt 92 kg (202 lb 13.2 oz)   SpO2 100%   BMI 29.10 kg/m²     Temp (24hrs), Av.8 °F (36.6 °C), Min:97.3 °F (36.3 °C), Max:98.5 °F (36.9 °C)      PHYSICAL EXAM:     General: NAD, lethargic eyes: sclera anicteric  Oral Cavity: No thrush or ulcers  Neck: no JVD  Chest: Fair bilateral air entry. No Wheezing or Rhonchi. No rales. Heart: normal sounds  Abdomen: soft and non tender   :  Gerard+  Lower Extremities: no edema  Skin: no rash  Neuro: Lethargic psychiatric: Able to assess      LAB DATA REVIEWED:    Recent Results (from the past 24 hour(s))   CULTURE, BODY FLUID W GRAM STAIN    Collection Time: 22  2:00 PM    Specimen: Body fld; Body Fluid    PARACENTISIS   Result Value Ref Range    Special Requests: No Special Requests      GRAM STAIN No wbc's seen      GRAM STAIN No organisms seen      Culture result: PENDING    LDH, BODY FLUID    Collection Time: 22  2:45 PM   Result Value Ref Range    Fluid Type: Ascitic fluid      LD, body fld.  42 U/L   CELL COUNT, BODY FLUID    Collection Time: 22  2:45 PM   Result Value Ref Range    BODY FLUID TYPE Ascitic fluid      FLUID COLOR Yellow      FLUID APPEARANCE Clear      FLUID RBC CT. 0 0 /cu mm    FLUID NUCLEATED CELLS 16 /cu mm   CBC W/O DIFF    Collection Time: 04/23/22  7:02 AM   Result Value Ref Range    WBC 6.1 4.1 - 11.1 K/uL    RBC 3.04 (L) 4.10 - 5.70 M/uL    HGB 8.5 (L) 12.1 - 17.0 g/dL    HCT 24.1 (L) 36.6 - 50.3 %    MCV 79.3 (L) 80.0 - 99.0 FL    MCH 28.0 26.0 - 34.0 PG    MCHC 35.3 30.0 - 36.5 g/dL    RDW 23.6 (H) 11.5 - 14.5 %    PLATELET 76 (L) 182 - 400 K/uL    MPV 10.6 8.9 - 12.9 FL    NRBC 0.3 (H) 0.0  WBC    ABSOLUTE NRBC 0.02 (H) 0.00 - 8.19 K/uL   METABOLIC PANEL, COMPREHENSIVE    Collection Time: 04/23/22  7:02 AM   Result Value Ref Range    Sodium 141 136 - 145 mmol/L    Potassium 4.2 3.5 - 5.1 mmol/L    Chloride 114 (H) 97 - 108 mmol/L    CO2 21 21 - 32 mmol/L    Anion gap 6 5 - 15 mmol/L    Glucose 66 65 - 100 mg/dL    BUN 19 6 - 20 mg/dL    Creatinine 1.89 (H) 0.70 - 1.30 mg/dL    BUN/Creatinine ratio 10 (L) 12 - 20      GFR est AA 44 (L) >60 ml/min/1.73m2    GFR est non-AA 36 (L) >60 ml/min/1.73m2    Calcium 7.9 (L) 8.5 - 10.1 mg/dL    Bilirubin, total 1.6 (H) 0.2 - 1.0 mg/dL    AST (SGOT) 31 15 - 37 U/L    ALT (SGPT) 13 12 - 78 U/L    Alk. phosphatase 61 45 - 117 U/L    Protein, total 5.4 (L) 6.4 - 8.2 g/dL    Albumin 1.8 (L) 3.5 - 5.0 g/dL    Globulin 3.6 2.0 - 4.0 g/dL    A-G Ratio 0.5 (L) 1.1 - 2.2     NT-PRO BNP    Collection Time: 04/23/22  7:02 AM   Result Value Ref Range    NT pro-BNP 1,765 (H) <125 pg/mL   GLUCOSE, POC    Collection Time: 04/23/22  8:37 AM   Result Value Ref Range    Glucose (POC) 72 65 - 117 mg/dL    Performed by Elisa Sanchez        Recommendations/Plan:     1 acute kidney injury on chronic kidney disease 3:  -Creatinine was 2.0 on admission   -Creatinine stable at 1.7-1.8 with ivf.  This is his baseline  -Persistent GRACIA likely because of ongoing hypotension.     -Baseline creatinine 1.3-1.7 based on labs during previous admission  -Has history of recent GRACIA with creatinine peaking to 2.6 few weeks ago  -GRACIA likely prerenal secondary to hypotension/sepsis  -Urine is suggestive of UTI  -No need for renal ultrasound  -legs edema+, off IVF    2 metabolic acidosis:  -CO2 is 21  -on oral bicarbonate 1300 QID which I will continue  -off HCO3 drip     3 severe anemia  -Hemoglobin 6.8->7.4->8.2.->8.8  -s/p  unit blood Tx  -Continue weekly Procrit    4  Hypokalemia  -Potassium is 3.4->4.2.    -po kcl 40 meq x 1 received     5 hypotension  -BP improved with IV fluids and midodrine 10 mg TID  -Continue midodrine    5 right index finger osteomyelitis  -s/p recent antibiotics for 2 weeks. -Vancomycin has been resumed during this hospitalization  -ID is on the case    6 history of liver cirrhosis  -Has history of alcoholic liver cirrhosis. Portal hypertension  -Noted to have abdominal distention.   IR has been consulted  -Had paracentesis during his previous admission also      ________________________________________________________________________  Signed: Maureen Franz MD

## 2022-04-24 NOTE — ROUTINE PROCESS
Patient checked since flexi seal changed and fish emptied. He has urine coming out and approx 200 mls more of loose brown stool out of rectal tube. His bowel sounds are very good and no residual  Even though his abdomen is round and large but he is a large man. No tenderness noted when I listened closely. . I have upped his rate to 50  From 35 mls.

## 2022-04-24 NOTE — PROGRESS NOTES
Renal Note    NAME:  Adelfo Roach   :   1960   MRN:   [id number]     ATTENDING: Sam Taylor MD  PCP:  Kendra Lucio NP    Date/Time:  2022 12:46 PM      Subjective:     Patient seen in the room. He is  confused . Creatinine 2.0. Pressure is low in the 's range. On midodrine 10 mg tid    Past Medical History:   Diagnosis Date    Arthritis     Hypertension       Past Surgical History:   Procedure Laterality Date    IR INSERT NON TUNL CVC OVER 5 YRS  3/25/2022    IR PARACENTESIS ABD SUBSQ  4/15/2022    IR PARACENTESIS ABD W IMAGE  2022    IR PARACENTESIS ABD W IMAGE  3/1/2022    IR PARACENTESIS ABD W IMAGE  3/30/2022     Social History     Tobacco Use    Smoking status: Never Smoker    Smokeless tobacco: Never Used   Substance Use Topics    Alcohol use: Not on file      History reviewed. No pertinent family history. No Known Allergies   Prior to Admission medications    Medication Sig Start Date End Date Taking? Authorizing Provider   thiamine mononitrate (B-1) 100 mg tablet Take 1 Tablet by mouth daily. 22   Srini Ackerman MD   levothyroxine (SYNTHROID) 25 mcg tablet Take 25 mcg by mouth Daily (before breakfast). Provider, Historical   tamsulosin (Flomax) 0.4 mg capsule Take 1 Capsule by mouth daily. 3/4/22   Ana Laura Whyte PA-C   potassium chloride (K-DUR, KLOR-CON M20) 20 mEq tablet Take 1 Tablet by mouth daily. 3/2/22   Pavel Sanabria MD   sodium bicarbonate 650 mg tablet Take 1 Tablet by mouth three (3) times daily. 3/2/22   Pavel Sanabria MD   levETIRAcetam (Keppra) 1,000 mg tablet Take 1.5 Tablets by mouth two (2) times a day. 3/2/22   Pavel Sanabria MD   ergocalciferol (ERGOCALCIFEROL) 1,250 mcg (50,000 unit) capsule Take 1 Capsule by mouth every seven (7) days. 22   Provider, Historical   lactulose (CHRONULAC) 10 gram/15 mL solution Take 15 mL by mouth three (3) times daily.  22 Joseph Mensah MD   allopurinoL (ZYLOPRIM) 300 mg tablet Take 1 Tablet by mouth daily. 22   Provider, Historical   thiamine HCL (B-1) 100 mg tablet Take 100 mg by mouth daily. Provider, Historical   propranoloL (INDERAL) 20 mg tablet Take 20 mg by mouth two (2) times a day. Provider, Historical   folic acid (FOLVITE) 1 mg tablet Take 1 mg by mouth daily. Provider, Historical   famotidine (PEPCID) 20 mg tablet Take 20 mg by mouth At bedtime. Provider, Historical   aMILoride (MIDAMOR) 5 mg tablet Take 5 mg by mouth daily. Provider, Historical       REVIEW OF SYSTEMS:       he is lethargic. Unable to obtain    Objective:   VITALS:    Visit Vitals  /75   Pulse 80   Temp 98.7 °F (37.1 °C)   Resp 18   Ht 5' 10\" (1.778 m)   Wt 92 kg (202 lb 13.2 oz)   SpO2 100%   BMI 29.10 kg/m²     Temp (24hrs), Av.5 °F (36.9 °C), Min:98.3 °F (36.8 °C), Max:98.7 °F (37.1 °C)      PHYSICAL EXAM:     General: NAD, lethargic eyes: sclera anicteric  Oral Cavity: No thrush or ulcers  Neck: no JVD  Chest: Fair bilateral air entry. No Wheezing or Rhonchi. No rales.   Heart: normal sounds  Abdomen: soft and non tender   :  Gerard+  Lower Extremities: no edema  Skin: no rash  Neuro: Lethargic psychiatric: Able to assess      LAB DATA REVIEWED:    Recent Results (from the past 24 hour(s))   GLUCOSE, POC    Collection Time: 22  4:42 PM   Result Value Ref Range    Glucose (POC) 79 65 - 117 mg/dL    Performed by [address], POC    Collection Time: 22  8:00 PM   Result Value Ref Range    Glucose (POC) 87 65 - 117 mg/dL    Performed by [address] [place] [address], POC    Collection Time: 22  7:10 AM   Result Value Ref Range    Glucose (POC) 106 65 - 117 mg/dL    Performed by Meri Campbell    RENAL FUNCTION PANEL    Collection Time: 22  7:48 AM   Result Value Ref Range    Sodium 140 136 - 145 mmol/L    Potassium 3.9 3.5 - 5.1 mmol/L    Chloride 113 (H) 97 - 108 mmol/L    CO2 20 (L) 21 - 32 mmol/L    Anion gap 7 5 - 15 mmol/L    Glucose 111 (H) 65 - 100 mg/dL    BUN 21 (H) 6 - 20 mg/dL    Creatinine 2.05 (H) 0.70 - 1.30 mg/dL    BUN/Creatinine ratio 10 (L) 12 - 20      GFR est AA 40 (L) >60 ml/min/1.73m2    GFR est non-AA 33 (L) >60 ml/min/1.73m2    Calcium 8.2 (L) 8.5 - 10.1 mg/dL    Phosphorus 2.5 (L) 2.6 - 4.7 mg/dL    Albumin 1.8 (L) 3.5 - 5.0 g/dL   GLUCOSE, POC    Collection Time: 04/24/22 11:03 AM   Result Value Ref Range    Glucose (POC) 104 65 - 117 mg/dL    Performed by Ortiz Bradley        Recommendations/Plan:     1 acute kidney injury on chronic kidney disease 3:  -Creatinine was 2.0 on admission   -Creatinine stable at 1.7-1.8 with ivf. This is his baseline  -Cr bumped at 2.0  -Persistent GRACIA likely because of ongoing hypotension.     -Baseline creatinine 1.3-1.7 based on labs during previous admission  -Has history of recent GRACIA with creatinine peaking to 2.6 few weeks ago  -GRACIA likely prerenal secondary to hypotension/sepsis  -Urine is suggestive of UTI  -No need for renal ultrasound  -legs edema+, off IVF    2 metabolic acidosis:  -CO2 is 20  -on oral bicarbonate 1300 QID which I will continue  -off HCO3 drip     3 severe anemia  -Hemoglobin 6.8->7.4->8.2.->8.8->8.5  -s/p  unit blood Tx  -Continue weekly Procrit    4  Hypokalemia  -Potassium is 3.4->3.9  -po kcl 40 meq x 1 received     5 hypotension  -BP improved with IV fluids and midodrine 10 mg TID  -Continue midodrine    5 right index finger osteomyelitis  -s/p recent antibiotics for 2 weeks. -Vancomycin has been resumed during this hospitalization  -ID is on the case    6 history of liver cirrhosis  -Has history of alcoholic liver cirrhosis. Portal hypertension  -Noted to have abdominal distention.   IR has been consulted  -Had paracentesis during his previous admission also      ________________________________________________________________________  Signed: Estefani Needles, MD

## 2022-04-24 NOTE — PROGRESS NOTES
RN removed mittens from patient at 1335 to trial patient's compliance without them. Patient was cooperative and calm for about 2 hours until starting to pull on fish. Mittens reapplied. Patient stable. Will continue to monitor.

## 2022-04-24 NOTE — PROGRESS NOTES
Problem: Pressure Injury - Risk of  Goal: *Prevention of pressure injury  Description: Document Curtis Scale and appropriate interventions in the flowsheet. Outcome: Progressing Towards Goal  Note: Pressure Injury Interventions:  Sensory Interventions: Assess changes in LOC    Moisture Interventions: Absorbent underpads    Activity Interventions: Increase time out of bed    Mobility Interventions: HOB 30 degrees or less    Nutrition Interventions: Document food/fluid/supplement intake,Discuss nutritional consult with provider,Offer support with meals,snacks and hydration    Friction and Shear Interventions: Apply protective barrier, creams and emollients,Feet elevated on foot rest,Foam dressings/transparent film/skin sealants,HOB 30 degrees or less,Lift sheet                Problem: Patient Education: Go to Patient Education Activity  Goal: Patient/Family Education  Outcome: Progressing Towards Goal     Problem: Falls - Risk of  Goal: *Absence of Falls  Description: Document Elba Fall Risk and appropriate interventions in the flowsheet.   Outcome: Progressing Towards Goal  Note: Fall Risk Interventions:  Mobility Interventions: Bed/chair exit alarm    Mentation Interventions: Adequate sleep, hydration, pain control,Bed/chair exit alarm    Medication Interventions: Bed/chair exit alarm    Elimination Interventions: Bed/chair exit alarm,Call light in reach              Problem: Patient Education: Go to Patient Education Activity  Goal: Patient/Family Education  Outcome: Progressing Towards Goal     Problem: Aspiration - Risk of  Goal: *Absence of aspiration  Outcome: Progressing Towards Goal     Problem: Patient Education: Go to Patient Education Activity  Goal: Patient/Family Education  Outcome: Progressing Towards Goal     Problem: Patient Education: Go to Patient Education Activity  Goal: Patient/Family Education  Outcome: Progressing Towards Goal     Problem: Impaired Skin Integrity/Pressure Injury Treatment  Goal: *Improvement of Existing Pressure Injury  Outcome: Progressing Towards Goal  Goal: *Prevention of pressure injury  Description: Document Curtis Scale and appropriate interventions in the flowsheet.   Outcome: Progressing Towards Goal  Note: Pressure Injury Interventions:  Sensory Interventions: Assess changes in LOC    Moisture Interventions: Absorbent underpads    Activity Interventions: Increase time out of bed    Mobility Interventions: HOB 30 degrees or less    Nutrition Interventions: Document food/fluid/supplement intake,Discuss nutritional consult with provider,Offer support with meals,snacks and hydration    Friction and Shear Interventions: Apply protective barrier, creams and emollients,Feet elevated on foot rest,Foam dressings/transparent film/skin sealants,HOB 30 degrees or less,Lift sheet                Problem: Patient Education: Go to Patient Education Activity  Goal: Patient/Family Education  Outcome: Progressing Towards Goal     Problem: Non-Violent Restraints  Goal: Removal from restraints as soon as assessed to be safe  Outcome: Progressing Towards Goal  Goal: No harm/injury to patient while restraints in use  Outcome: Progressing Towards Goal  Goal: Patient's dignity will be maintained  Outcome: Progressing Towards Goal  Goal: Patient Interventions  Outcome: Progressing Towards Goal     Problem: Delirium Treatment  Goal: *Level of consciousness restored to baseline  Outcome: Progressing Towards Goal  Goal: *Level of environmental perceptions restored to baseline  Outcome: Progressing Towards Goal  Goal: *Sensory perception restored to baseline  Outcome: Progressing Towards Goal  Goal: *Emotional stability restored to baseline  Outcome: Progressing Towards Goal  Goal: *Functional assessment restored to baseline  Outcome: Progressing Towards Goal  Goal: *Absence of falls  Outcome: Progressing Towards Goal  Goal: *Will remain free of delirium, CAM Score negative  Outcome: Progressing Towards Goal  Goal: *Cognitive status will be restored to baseline  Outcome: Progressing Towards Goal  Goal: Interventions  Outcome: Progressing Towards Goal     Problem: Patient Education: Go to Patient Education Activity  Goal: Patient/Family Education  Outcome: Progressing Towards Goal     Problem: Patient Education: Go to Patient Education Activity  Goal: Patient/Family Education  Outcome: Progressing Towards Goal     Problem: Patient Education: Go to Patient Education Activity  Goal: Patient/Family Education  Outcome: Progressing Towards Goal

## 2022-04-24 NOTE — PROGRESS NOTES
Progress Note    Patient: Todd Rosario MRN: [id number]  SSN: [id number]    YOB: 1960  Age: 60 y.o.   Sex: male      Admit Date: 4/7/2022    LOS: 16 days     Subjective:   Patient examined    confused  Past Medical History:   Diagnosis Date    Arthritis     Hypertension         Current Facility-Administered Medications:     busPIRone (BUSPAR) tablet 15 mg, 15 mg, Oral, BID, Lamberto Ackerman MD, 15 mg at 04/23/22 0128    sodium bicarbonate tablet 1,300 mg, 1,300 mg, Oral, QID, Rowan Coleman MD, 1,300 mg at 04/23/22 1818    fluconazole (DIFLUCAN) tablet 200 mg, 200 mg, Oral, DAILY, Ree Zaragoza MD, 200 mg at 04/23/22 0932    hydrOXYzine (VISTARIL) injection 50 mg, 50 mg, IntraMUSCular, Q8H PRN, Sunny Murcia MD, 50 mg at 04/21/22 0235    fludrocortisone (FLORINEF) tablet 0.1 mg, 0.1 mg, Oral, DAILY, Ree Zaragoza MD, 0.1 mg at 04/23/22 0933    0.9% sodium chloride infusion 250 mL, 250 mL, IntraVENous, PRN, Damien Ackerman MD    ziprasidone (GEODON) 10 mg in sterile water (preservative free) 0.5 mL injection, 10 mg, IntraMUSCular, Q12H PRN, Leonides South MD, 10 mg at 04/20/22 1704    levETIRAcetam (KEPPRA) 1500 mg in saline (iso-osm) 100 ml IVPB, 1,500 mg, IntraVENous, Q12H, Lamberto Ackerman MD, Last Rate: 400 mL/hr at 04/23/22 1244, 1,500 mg at 04/23/22 1244    0.9% sodium chloride infusion 250 mL, 250 mL, IntraVENous, PRN, Lamberto Ackerman MD    epoetin luis-epbx (RETACRIT) injection 10,000 Units, 10,000 Units, SubCUTAneous, Q7D, Rowan Coleman MD, 10,000 Units at 04/16/22 1912    midodrine (PROAMATINE) tablet 10 mg, 10 mg, Oral, TID WITH MEALS, Stacey Murray MD, 10 mg at 04/23/22 1818    LORazepam (ATIVAN) injection 1 mg, 1 mg, IntraVENous, Q4H PRN, Stacey Murray MD, 1 mg at 04/08/22 1733    sodium chloride (NS) flush 5-10 mL, 5-10 mL, IntraVENous, PRN, Lamberto Ackerman MD, 10 mL at 04/15/22 7994    lactulose (CHRONULAC) 10 gram/15 mL solution 15 mL, 10 g, Oral, TID, Lamberto Ackerman MD, 15 mL at 04/23/22 1818    tamsulosin (FLOMAX) capsule 0.4 mg, 0.4 mg, Oral, DAILY, Lamberto Ackerman MD, 0.4 mg at 04/23/22 1255    thiamine mononitrate (B-1) tablet 100 mg, 100 mg, Oral, DAILY, Claudette Anis, MD, 100 mg at 04/23/22 1948    allopurinoL (ZYLOPRIM) tablet 300 mg, 300 mg, Oral, DAILY, Lamberto Ackerman MD, 300 mg at 04/23/22 6596    ergocalciferol capsule 50,000 Units, 50,000 Units, Oral, Q7D, Claudette Anis, MD, 50,000 Units at 04/21/22 1647    famotidine (PEPCID) tablet 20 mg, 20 mg, Oral, BID, Claudette Anis, MD, 20 mg at 86/62/74 7652    folic acid (FOLVITE) tablet 1 mg, 1 mg, Oral, DAILY, Claudette Anis, MD, 1 mg at 04/23/22 4581    levothyroxine (SYNTHROID) tablet 25 mcg, 25 mcg, Oral, ACB, Lamberto Ackerman MD, 25 mcg at 04/23/22 6995    propranoloL (INDERAL) tablet 20 mg, 20 mg, Oral, BID, Lamberto Ackerman MD, 20 mg at 04/22/22 2026    acetaminophen (TYLENOL) tablet 650 mg, 650 mg, Oral, Q6H PRN **OR** acetaminophen (TYLENOL) suppository 650 mg, 650 mg, Rectal, Q6H PRN, Yeimy Ackerman MD    polyethylene glycol (MIRALAX) packet 17 g, 17 g, Oral, DAILY PRN, Yeimy Ackerman MD    ondansetron (ZOFRAN ODT) tablet 4 mg, 4 mg, Oral, Q8H PRN **OR** ondansetron (ZOFRAN) injection 4 mg, 4 mg, IntraVENous, Q6H PRN, Claudette Anis, MD, 4 mg at 04/23/22 0050    Objective:     Vitals:    04/23/22 0830 04/23/22 1238 04/23/22 1639 04/23/22 1929   BP: 96/68 108/68 109/73 110/74   Pulse: 72  70 77   Resp: 18  16 20   Temp: 97.8 °F (36.6 °C)  98.6 °F (37 °C) 98.3 °F (36.8 °C)   SpO2:   94% 98%   Weight:       Height:            Intake and Output:  Current Shift: No intake/output data recorded. Last three shifts: 04/22 0701 - 04/23 1900  In: 100 [P.O.:100]  Out: 6650 [Urine:400; Drains:500]    Physical Exam:   Physical Exam  Constitutional:       Appearance: He is ill-appearing. HENT:      Head: Atraumatic.       Mouth/Throat: Mouth: Mucous membranes are dry. Cardiovascular:      Rate and Rhythm: Normal rate. Pulmonary:      Effort: Pulmonary effort is normal.      Breath sounds: Normal breath sounds. Abdominal:      General: There is distension. Comments: Peg site no leaking or bleeding    Skin:     General: Skin is warm. Neurological:      Mental Status: Mental status is at baseline. Lab/Data Review:  Recent Results (from the past 24 hour(s))   CBC W/O DIFF    Collection Time: 04/23/22  7:02 AM   Result Value Ref Range    WBC 6.1 4.1 - 11.1 K/uL    RBC 3.04 (L) 4.10 - 5.70 M/uL    HGB 8.5 (L) 12.1 - 17.0 g/dL    HCT 24.1 (L) 36.6 - 50.3 %    MCV 79.3 (L) 80.0 - 99.0 FL    MCH 28.0 26.0 - 34.0 PG    MCHC 35.3 30.0 - 36.5 g/dL    RDW 23.6 (H) 11.5 - 14.5 %    PLATELET 76 (L) 267 - 400 K/uL    MPV 10.6 8.9 - 12.9 FL    NRBC 0.3 (H) 0.0  WBC    ABSOLUTE NRBC 0.02 (H) 0.00 - 6.50 K/uL   METABOLIC PANEL, COMPREHENSIVE    Collection Time: 04/23/22  7:02 AM   Result Value Ref Range    Sodium 141 136 - 145 mmol/L    Potassium 4.2 3.5 - 5.1 mmol/L    Chloride 114 (H) 97 - 108 mmol/L    CO2 21 21 - 32 mmol/L    Anion gap 6 5 - 15 mmol/L    Glucose 66 65 - 100 mg/dL    BUN 19 6 - 20 mg/dL    Creatinine 1.89 (H) 0.70 - 1.30 mg/dL    BUN/Creatinine ratio 10 (L) 12 - 20      GFR est AA 44 (L) >60 ml/min/1.73m2    GFR est non-AA 36 (L) >60 ml/min/1.73m2    Calcium 7.9 (L) 8.5 - 10.1 mg/dL    Bilirubin, total 1.6 (H) 0.2 - 1.0 mg/dL    AST (SGOT) 31 15 - 37 U/L    ALT (SGPT) 13 12 - 78 U/L    Alk.  phosphatase 61 45 - 117 U/L    Protein, total 5.4 (L) 6.4 - 8.2 g/dL    Albumin 1.8 (L) 3.5 - 5.0 g/dL    Globulin 3.6 2.0 - 4.0 g/dL    A-G Ratio 0.5 (L) 1.1 - 2.2     NT-PRO BNP    Collection Time: 04/23/22  7:02 AM   Result Value Ref Range    NT pro-BNP 1,765 (H) <125 pg/mL   GLUCOSE, POC    Collection Time: 04/23/22  8:37 AM   Result Value Ref Range    Glucose (POC) 72 65 - 117 mg/dL    Performed by [address] [place] [address], POC    Collection Time: 04/23/22 12:52 PM   Result Value Ref Range    Glucose (POC) 66 65 - 117 mg/dL    Performed by [address], POC    Collection Time: 04/23/22  4:42 PM   Result Value Ref Range    Glucose (POC) 79 65 - 117 mg/dL    Performed by [address] ABD LTD   Final Result   Increased abdominal and pelvic ascites. IR PARACENTESIS ABD SUBSQ   Final Result   Ultrasound guided paracentesis with no immediate complications. Patient tolerated the procedure well. XR CHEST PORT   Final Result   Endotracheal tube as above. Underexpanded lungs with bibasilar   atelectasis. Hydrostatic edema. Possible pleural effusions. US ABD LTD   Final Result   Ascites. XR HAND RT MIN 3 V   Final Result      CT HEAD WO CONT   Final Result   Age-appropriate atrophy. No acute findings. XR ABD (KUB)   Final Result   Within normal      XR CHEST PORT   Final Result   Findings/impression:   1. Low lung volumes. Bibasilar hypoventilatory change/atelectasis. Left   retrocardiac lung incompletely evaluated, cannot exclude underlying airspace   disease. 2.  Cardiac and mediastinal contours are obscured by low lung volumes. Ectatic/possible aneurysmal appearance transverse aorta with calcific   atherosclerotic plaque. Appears stable from prior radiograph. 3.  No pleural fluid. No pneumothorax. 4.  Prominent gas-filled bowel partially visualized in the left upper quadrant.          IR INSERT NON TUNL CVC OVER 5 YRS    (Results Pending)   IR PARACENTESIS ABD W IMAGE    (Results Pending)   IR US GUIDED VASCULAR ACCESS    (Results Pending)   IR FLUORO GUIDE PLC CVAD    (Results Pending)      peritoneal fluids: wbc: less than 40  Assessment:     Active Problems:    Lactic acidosis (4/7/2022)      Urinary retention (4/7/2022)      Severe sepsis ([address] (4/7/2022)      Hypothermia (4/7/2022)      Sepsis ([address] (4/7/2022)      Severe protein-calorie malnutrition ([address] (4/8/2022)      S/p peg placement  Plan:   Start tube feeding  On lactulose and beta blocker for portal hypertension,    Signed By: Jamison Thomas MD     April 23, 2022        Thank you for allowing me to participate in this patients care  Cc Referring Physician   Jeff Bee NP

## 2022-04-24 NOTE — PROGRESS NOTES
Infectious Disease Progress Note           Subjective:   Pt seen and examined at bedside. No new complaints, no acute events since last seen    Objective:   Physical Exam:     Visit Vitals  /63   Pulse 73   Temp 98.1 °F (36.7 °C)   Resp 18   Ht 5' 10\" (1.778 m)   Wt 202 lb 13.2 oz (92 kg)   SpO2 98%   BMI 29.10 kg/m²      O2 Device: None (Room air)    Temp (24hrs), Av.4 °F (36.9 °C), Min:98.1 °F (36.7 °C), Max:98.7 °F (37.1 °C)    No intake/output data recorded.  1901 -  0700  In: 200 [P.O.:100]  Out: 1950 [Urine:650; Drains:1300]    General: NAD, alert, lethargic   HEENT: HEIDY, Moist mucosa   Lungs: CTA b/l, decreased at the bases, no wheeze/rhonchi   Heart: S1S2+, RRR, no murmur  Abdo: Soft, distended, NT, +BS, + peg tube placement   : + fish cath, clear urine in bag and tubing   Exts: Decreased right index finger swelling, + 3 pitting edema involving b/l LEs   Skin: No wounds, No rashes or lesions    Data Review:       Recent Days:  Recent Labs     22  0702   WBC 6.1   HGB 8.5*   HCT 24.1*   PLT 76*     Recent Labs     22  0748 22  0702   BUN 21* 19   CREA 2.05* 1.89*       Lab Results   Component Value Date/Time    C-Reactive protein 0.60 2022 05:15 AM          Microbiology     Results     Procedure Component Value Units Date/Time    CULTURE, BODY FLUID Niya Quince STAIN [[id number]] Collected: 22 1400    Order Status: Completed Specimen:  Body Fluid from Body fld Updated: 22 1418     Special Requests: No Special Requests        GRAM STAIN No wbc's seen         No organisms seen        Culture result: No growth thus far       CULTURE, URINE [[id number]]  (Abnormal) Collected: 22 2300    Order Status: Completed Specimen: Urine Updated: 22 1331     Special Requests: No Special Requests        [place] Count --        >100,000  colonies/ml       Culture result: Candida albicans       CULTURE, URINE [[id number]] Collected: 22 2300    Order Status: Canceled Specimen: Urine              Diagnostics   CXR Results  (Last 48 hours)    None             Assessment/Plan     1. UTI, abnormal UA,  Urinary retention: + indwelling fish cath      Urine Cx from 04/07 is negative, Yeast isolated from urine Cx from 04/12      Afebrile w a normal wbc on routine labs       On day # 7/14 of antifungal therapy, currently fluconazole    2. Right index finger osteomyelitis. S/p debridement       Completed long term Vanc targeting staph epidermidis      3. AMS, h/o metabolic/hepatic encephalopathy, very confused     4. Ascites, due to chronic liver disease, s/p paracentesis x 2    5. Episodes of hypothermia/hypotension: Suspected adrenal insufficiency.       Normothermic, BP normalized, will leave on Guerline Franco MD    4/24/2022

## 2022-04-24 NOTE — ROUTINE PROCESS
Patient cleaned around rectal flexi seal area cream applied, bag changed got 800 mls loose brown stool. His fish emptied got 250 mls jacqueline urine. His abdomen is large rounded but bowel sounds very good, new peg tube.

## 2022-04-24 NOTE — PROGRESS NOTES
General Daily Progress Note          Patient Name:   Jerica Cordova       YOB: 1960       Age:  60 y.o. Admit Date: 4/7/2022      Subjective:     Jerica Cordova is a(n) 60 y.o. male with PMH significant for HTN, cirrhosis, ascites presents via EMS for removing fish catheter. Patient recently d/c after lengthy stay in hospital for septic DIP joint of right index finger. He was d/c on 10 days of PO Zyvox, which he completed. He presents today after pulling out fish catheter. Several blood clots noted. Patient septic upon arrival with hypothermia and hypotension. Temp 93.6 rectal, BP 84/68. Patient is retaining 887cc of urine and nursing staff will replace fish.     Patient started on fluids and IV Levaquin in the ED. Patient is altered at baseline. Head CT w/o contrast shows no acute abnormality. Blood cultures pending. CXR and KUB both normal       Patient is alert awake confused  Hypotensive off  Levophed    Normal temperature    4/12  Patient is off levophed but still receiving midodrine and IVF. He remain hypotensive. He is lethargic and confused this morning. Patient has mild bilateral LE edema. Seen by ID yesterday. Recommends continuing vancomycin   Seen by nephrology yesterday. Recommends discontinuing IVF in lieu of leg edema, discontinue bicarb drip, continuing weekly procrit, continuing PO KCl, midodrine for chronic hypotension. Today patient hypothermic on bear hugger  Yesterday evening patient was restless agitated    4/13    Patient is alert awake still hypothermic on shemar hugger    4/14  Patient is seen sleeping. On shemar hugger and vancomycin. He is producing 250 ml urine. No new changes seen at this time. Labs pending. Cr trending down 1.62 (on 4/13). Abdominal US on 4/13 shows ascites. UA on 4/12 positive for leukocyte esterase, WBC >100 and bacteruria. Urine culture pending.      Patient refused medication this morning    4/15    Patient sleepy obtunded /hypothermic    Not eating drinking    4/18  Patient is seen in medical telemetry with a one to one sitter. He is still confused and refusing to eat food. He is compliant with his medications  He pulled out NG tube 3 times. Patient had a paracentesis done on 4/15 without any complications. Labs remarkable for Cr 1.75 increasing. CBC and CMP pending. Ammonia unremarkable. 4/19  Patient is seen at bedside with a one to one sitter. He still confused and attempting to pull out his IJ central line. He has mitts on. He is also attempting to jump out of bed. Patient is still not eating today. He is taking his medications. Labs remarkable for Cr 1.82. Abdominal US remarkable for Increased abdominal and pelvic ascites. Seen by ID. Recommends discontinuing eraxis, and starting on fluconazole. 4/20  Patient is seen at bedside with a one to one sitter. She reports that he pulled out his IJ line. Patient now has an IV in his hand. He has mittens on. He is still refusing to eat any food but he continues to take his medications. Patient continues to be hypothermic with latest axillary temperature of 96.1 F. Seen by nephrology. Recommends increasing IV fluid rate. Continue fish catheter. Seen by GI. Recommends another paracentesis prior to PEG tube placement. Seen by ID. Recommends continuing fluconazole and discontinue vancomycin. Labs pending. 4/21  Patient is with one to one sitter. He has been less agitated today but remains confused. On keppra drip. Patient has minimal oral intake. He is taking his medications. Seen by GI. Plan is to do paracentesis then place PEG tube. Seen by nephrology. No new recommendations. Seen by ID. Continue fluconazole. Labs Hgb 8.8, K 3.3, pro-bnp 990.     4/22  Patient is confused and seen with 1:1 sitter. He is diffusely edematous at this time and leaking fluids from his abdomen. Patient is NPO for paracentesis and PEG tube placement. 4/23    Patient had PEG tube placed. Objective:     Visit Vitals  /73   Pulse 79   Temp 98.7 °F (37.1 °C)   Resp 18   Ht 5' 10\" (1.778 m)   Wt 92 kg (202 lb 13.2 oz)   SpO2 100%   BMI 29.10 kg/m²        Recent Results (from the past 24 hour(s))   GLUCOSE, POC    Collection Time: 04/23/22 12:52 PM   Result Value Ref Range    Glucose (POC) 66 65 - 117 mg/dL    Performed by [address], POC    Collection Time: 04/23/22  4:42 PM   Result Value Ref Range    Glucose (POC) 79 65 - 117 mg/dL    Performed by [address], POC    Collection Time: 04/23/22  8:00 PM   Result Value Ref Range    Glucose (POC) 87 65 - 117 mg/dL    Performed by Ros Reyes    GLUCOSE, POC    Collection Time: 04/24/22  7:10 AM   Result Value Ref Range    Glucose (POC) 106 65 - 117 mg/dL    Performed by Phyllis Councilman    RENAL FUNCTION PANEL    Collection Time: 04/24/22  7:48 AM   Result Value Ref Range    Sodium 140 136 - 145 mmol/L    Potassium 3.9 3.5 - 5.1 mmol/L    Chloride 113 (H) 97 - 108 mmol/L    CO2 20 (L) 21 - 32 mmol/L    Anion gap 7 5 - 15 mmol/L    Glucose 111 (H) 65 - 100 mg/dL    BUN 21 (H) 6 - 20 mg/dL    Creatinine 2.05 (H) 0.70 - 1.30 mg/dL    BUN/Creatinine ratio 10 (L) 12 - 20      GFR est AA 40 (L) >60 ml/min/1.73m2    GFR est non-AA 33 (L) >60 ml/min/1.73m2    Calcium 8.2 (L) 8.5 - 10.1 mg/dL    Phosphorus 2.5 (L) 2.6 - 4.7 mg/dL    Albumin 1.8 (L) 3.5 - 5.0 g/dL   GLUCOSE, POC    Collection Time: 04/24/22 11:03 AM   Result Value Ref Range    Glucose (POC) 104 65 - 117 mg/dL    Performed by Phyllis Councilman      [unfilled]      Review of Systems    Constitutional: Negative for chills and fever. HENT: Negative. Eyes: Negative. Respiratory: Negative. Cardiovascular: Negative. Gastrointestinal: Negative for abdominal pain and nausea. Skin: Negative. Neurological: Negative. Physical Exam:      Constitutional: pt is oriented to person, place, and time. HENT:   Head: Normocephalic and atraumatic. Eyes: Pupils are equal, round, and reactive to light. EOM are normal.   Cardiovascular: Normal rate, regular rhythm and normal heart sounds. Pulmonary/Chest: Breath sounds normal. No wheezes. No rales. Exhibits no tenderness. Abdominal: Soft. Bowel sounds are normal. There is no abdominal tenderness. There is no rebound and no guarding. Musculoskeletal: Normal range of motion. Neurological: pt is alert and oriented to person, place, and time. Extremities 2+ edema  Anasarca    US ABD LTD   Final Result   Increased abdominal and pelvic ascites. IR PARACENTESIS ABD SUBSQ   Final Result   Ultrasound guided paracentesis with no immediate complications. Patient tolerated the procedure well. XR CHEST PORT   Final Result   Endotracheal tube as above. Underexpanded lungs with bibasilar   atelectasis. Hydrostatic edema. Possible pleural effusions. US ABD LTD   Final Result   Ascites. XR HAND RT MIN 3 V   Final Result      CT HEAD WO CONT   Final Result   Age-appropriate atrophy. No acute findings. XR ABD (KUB)   Final Result   Within normal      XR CHEST PORT   Final Result   Findings/impression:   1. Low lung volumes. Bibasilar hypoventilatory change/atelectasis. Left   retrocardiac lung incompletely evaluated, cannot exclude underlying airspace   disease. 2.  Cardiac and mediastinal contours are obscured by low lung volumes. Ectatic/possible aneurysmal appearance transverse aorta with calcific   atherosclerotic plaque. Appears stable from prior radiograph. 3.  No pleural fluid. No pneumothorax. 4.  Prominent gas-filled bowel partially visualized in the left upper quadrant.          IR INSERT NON TUNL CVC OVER 5 YRS    (Results Pending)   IR PARACENTESIS ABD W IMAGE    (Results Pending)   IR US GUIDED VASCULAR ACCESS    (Results Pending)   IR FLUORO GUIDE PLC CVAD    (Results Pending)        Recent Results (from the past 24 hour(s))   GLUCOSE, POC    Collection Time: 04/23/22 12:52 PM   Result Value Ref Range    Glucose (POC) 66 65 - 117 mg/dL    Performed by [address], POC    Collection Time: 04/23/22  4:42 PM   Result Value Ref Range    Glucose (POC) 79 65 - 117 mg/dL    Performed by [address], POC    Collection Time: 04/23/22  8:00 PM   Result Value Ref Range    Glucose (POC) 87 65 - 117 mg/dL    Performed by Juan Vazquez    GLUCOSE, POC    Collection Time: 04/24/22  7:10 AM   Result Value Ref Range    Glucose (POC) 106 65 - 117 mg/dL    Performed by Nicolas Decker    RENAL FUNCTION PANEL    Collection Time: 04/24/22  7:48 AM   Result Value Ref Range    Sodium 140 136 - 145 mmol/L    Potassium 3.9 3.5 - 5.1 mmol/L    Chloride 113 (H) 97 - 108 mmol/L    CO2 20 (L) 21 - 32 mmol/L    Anion gap 7 5 - 15 mmol/L    Glucose 111 (H) 65 - 100 mg/dL    BUN 21 (H) 6 - 20 mg/dL    Creatinine 2.05 (H) 0.70 - 1.30 mg/dL    BUN/Creatinine ratio 10 (L) 12 - 20      GFR est AA 40 (L) >60 ml/min/1.73m2    GFR est non-AA 33 (L) >60 ml/min/1.73m2    Calcium 8.2 (L) 8.5 - 10.1 mg/dL    Phosphorus 2.5 (L) 2.6 - 4.7 mg/dL    Albumin 1.8 (L) 3.5 - 5.0 g/dL   GLUCOSE, POC    Collection Time: 04/24/22 11:03 AM   Result Value Ref Range    Glucose (POC) 104 65 - 117 mg/dL    Performed by pipo Dural        Results     Procedure Component Value Units Date/Time    CULTURE, BODY FLUID JERRELL Shane [[id number]] Collected: 04/22/22 1400    Order Status: Completed Specimen:  Body Fluid from Body fld Updated: 04/23/22 1418     Special Requests: No Special Requests        GRAM STAIN No wbc's seen         No organisms seen        Culture result: No growth thus far       CULTURE, URINE [[id number]]  (Abnormal) Collected: 04/12/22 2300    Order Status: Completed Specimen: Urine Updated: 04/16/22 1331     Special Requests: No Special Requests        [place] Count --        >100,000  colonies/ml       Culture result: Candida albicans       CULTURE, URINE [[id number]] Collected: 04/12/22 2300    Order Status: Canceled Specimen: Urine            Labs:     Recent Labs     04/23/22  0702   WBC 6.1   HGB 8.5*   HCT 24.1*   PLT 76*     Recent Labs     04/24/22  0748 04/23/22  0702    141   K 3.9 4.2   * 114*   CO2 20* 21   BUN 21* 19   CREA 2.05* 1.89*   * 66   CA 8.2* 7.9*   PHOS 2.5*  --      Recent Labs     04/24/22  0748 04/23/22  0702   ALT  --  13   AP  --  61   TBILI  --  1.6*   TP  --  5.4*   ALB 1.8* 1.8*   GLOB  --  3.6     No results for input(s): INR, PTP, APTT, INREXT, INREXT in the last 72 hours. No results for input(s): FE, TIBC, PSAT, FERR in the last 72 hours. No results found for: FOL, RBCF   No results for input(s): PH, PCO2, PO2 in the last 72 hours. No results for input(s): CPK, CKNDX, TROIQ in the last 72 hours.     No lab exists for component: CPKMB  No results found for: CHOL, CHOLX, CHLST, CHOLV, HDL, HDLP, LDL, LDLC, DLDLP, TGLX, TRIGL, TRIGP, CHHD, CHHDX  Lab Results   Component Value Date/Time    Glucose (POC) 104 04/24/2022 11:03 AM    Glucose (POC) 106 04/24/2022 07:10 AM    Glucose (POC) 87 04/23/2022 08:00 PM    Glucose (POC) 79 04/23/2022 04:42 PM    Glucose (POC) 66 04/23/2022 12:52 PM     Lab Results   Component Value Date/Time    Color Yellow/Straw 04/12/2022 11:00 PM    Appearance Turbid (A) 04/12/2022 11:00 PM    Specific gravity 1.011 04/12/2022 11:00 PM    pH (UA) 5.0 04/12/2022 11:00 PM    Protein Negative 04/12/2022 11:00 PM    Glucose Negative 04/12/2022 11:00 PM    Ketone Negative 04/12/2022 11:00 PM    Bilirubin Negative 04/12/2022 11:00 PM    Urobilinogen 0.1 04/12/2022 11:00 PM    Nitrites Negative 04/12/2022 11:00 PM    Leukocyte Esterase Large (A) 04/12/2022 11:00 PM    Bacteria 1+ (A) 04/12/2022 11:00 PM    WBC >100 (H) 04/12/2022 11:00 PM    RBC  04/12/2022 11:00 PM         Assessment:     Sepsis  UTI/Candida  Lactic acidosis  Acute on chronic renal failure- creat 2.01  Cirrhosis with ascites  Anemia  History of septic DIP joint, right index finger  Hypothyroidism  Seizure disorder  Gout  GERD  History of etoh abuse  Hypotension  Metabolic acidosis  Hypothermia  Hypokalemia  Anemia monitor H&H  Abdominal distention/ascites  Protein calorie malnutrition  Hypokalemia  Anasarca   Static post PEG tube placed    Plan:      On shemar singleton  On allopurinol 300 mg daily  Vitamin D 50,000 weekly  Pepcid 20 twice daily  Folic acid 1 mg daily  Lactulose 10 g 3 times a day  Keppra 1500 twice a day  Synthroid 25 mcg daily  Midodrine 10 mg 3 times a day  Propranolol 20 mg twice a day  Sodium bicarb 1303 times a day  Flomax 0.4 mg daily  Thiamine 100 mg daily  Vancomycin with pharmacy  Replace potassium  Discontinue IV Zosyn  discontinue Eraxis 100 mg daily    Discontinue Sodium bicarb drip  Continue fluconazole 200 mg daily   keppra drip 1500 mg Q12H  Add BuSpar 15 mg twice a day    We will do psych consult for psychosis patient receiving Geodon  Psychotic behavior      Continue PEG tube feeding    Consult psych due to his behavior              Current Facility-Administered Medications:     zinc oxide-white petrolatum 20-75 % topical paste, , Topical, PRN, Tres Quevedo MD, Given at 04/24/22 0245    busPIRone (BUSPAR) tablet 15 mg, 15 mg, Oral, BID, Lamberto Ackerman MD, 15 mg at 04/24/22 1706    sodium bicarbonate tablet 1,300 mg, 1,300 mg, Oral, QID, Ashley Brush MD, 1,300 mg at 04/24/22 0917    fluconazole (DIFLUCAN) tablet 200 mg, 200 mg, Oral, DAILY, Ree Zaragoza MD, 200 mg at 04/24/22 0917    hydrOXYzine (VISTARIL) injection 50 mg, 50 mg, IntraMUSCular, Q8H PRN, Richard Linares MD, 50 mg at 04/21/22 0235    fludrocortisone (FLORINEF) tablet 0.1 mg, 0.1 mg, Oral, DAILY, Ree Zaragoza MD, 0.1 mg at 04/24/22 0917    0.9% sodium chloride infusion 250 mL, 250 mL, IntraVENous, PRN, Lamberto Ackerman MD    ziprasidone (GEODON) 10 mg in sterile water (preservative free) 0.5 mL injection, 10 mg, IntraMUSCular, Q12H PRN, Sam Gardner MD, 10 mg at 04/20/22 1704    levETIRAcetam (KEPPRA) 1500 mg in saline (iso-osm) 100 ml IVPB, 1,500 mg, IntraVENous, Q12H, Lamberto Ackerman MD, Last Rate: 400 mL/hr at 04/24/22 0013, 1,500 mg at 04/24/22 0013    0.9% sodium chloride infusion 250 mL, 250 mL, IntraVENous, PRN, Lamberto Ackerman MD    epoetin luis-epbx (RETACRIT) injection 10,000 Units, 10,000 Units, SubCUTAneous, Q7D, Zach Julio MD, 10,000 Units at 04/23/22 2107    midodrine (PROAMATINE) tablet 10 mg, 10 mg, Oral, TID WITH MEALS, Lamberto Ackerman MD, 10 mg at 04/24/22 8444    LORazepam (ATIVAN) injection 1 mg, 1 mg, IntraVENous, Q4H PRN, Anabella Capone MD, 1 mg at 04/08/22 1733    sodium chloride (NS) flush 5-10 mL, 5-10 mL, IntraVENous, PRN, Lamberto Ackerman MD, 10 mL at 04/15/22 2354    lactulose (CHRONULAC) 10 gram/15 mL solution 15 mL, 10 g, Oral, TID, Lamberto Ackerman MD, 15 mL at 04/24/22 0917    tamsulosin (FLOMAX) capsule 0.4 mg, 0.4 mg, Oral, DAILY, Lamberto Ackerman MD, 0.4 mg at 04/24/22 3297    thiamine mononitrate (B-1) tablet 100 mg, 100 mg, Oral, DAILY, Lamberto Ackerman MD, 100 mg at 04/24/22 8229    allopurinoL (ZYLOPRIM) tablet 300 mg, 300 mg, Oral, DAILY, Lamberto Ackerman MD, 300 mg at 04/24/22 5828    ergocalciferol capsule 50,000 Units, 50,000 Units, Oral, Q7D, Lamberto Ackerman MD, 50,000 Units at 04/21/22 1647    famotidine (PEPCID) tablet 20 mg, 20 mg, Oral, BID, Lamberto Ackerman MD, 20 mg at 96/20/10 1453    folic acid (FOLVITE) tablet 1 mg, 1 mg, Oral, DAILY, Lamberto Ackerman MD, 1 mg at 04/24/22 5094    levothyroxine (SYNTHROID) tablet 25 mcg, 25 mcg, Oral, ACB, Lamberto Ackerman MD, 25 mcg at 04/24/22 0919    propranoloL (INDERAL) tablet 20 mg, 20 mg, Oral, BID, Lamberto Ackerman MD, 20 mg at 04/23/22 2103    acetaminophen (TYLENOL) tablet 650 mg, 650 mg, Oral, Q6H PRN **OR** acetaminophen (TYLENOL) suppository 650 mg, 650 mg, Rectal, Q6H PRN, Cassandra Ackerman MD    polyethylene glycol (MIRALAX) packet 17 g, 17 g, Oral, DAILY PRN, Cassandra Ackerman MD    ondansetron (ZOFRAN ODT) tablet 4 mg, 4 mg, Oral, Q8H PRN **OR** ondansetron (ZOFRAN) injection 4 mg, 4 mg, IntraVENous, Q6H PRN, Lamberto Ackerman MD, 4 mg at 04/23/22 005

## 2022-04-24 NOTE — ROUTINE PROCESS
Patient 0 residual at 2100 turned to right cleaned small amount of stool from rectal tube and changed his mepilex to sacral area no drainage noted.

## 2022-04-25 NOTE — PROGRESS NOTES
Physician Progress Note      Damion Alberto  CSN #:                  [id number]  :                       1960  ADMIT DATE:       3/22/2022 8:58 PM  [address] DATE:        2022 12:17 PM  RESPONDING  PROVIDER #:        Louis Plaza MD          QUERY TEXT:    Pt admitted with Pyogenic Arthritis. Pt noted to have Severe Sepsis also. If possible, please document in progress notes and discharge summary the present on admission status of Severe Sepsis:    The medical record reflects the following:  Risk Factors: Pyogenic arthritis, Severe Sepsis, encephalopathy, GRACIA, CKD  Clinical Indicators: Admitted 3/22, on 3/24 PN: \"Getting transferred to ICU for possible sepsis. \" On admission 3/22: WBC WNL, Temp WNL, HR WNL, RR WNL. 3/24- Temp 90.9, 92.8, 93.9, BP 72/58, 87/64, RR WNL, 3/23 LAC 3.4, 3/22 CRP 3.15, 3/22- CR 2.51 (GRACIA POA). Treatment: ID & Ortho consulted, Transferred to ICU on 3/24, Wound Cx on 3/25, IV ABX, Lab monitoring. Thank you,  HUDSON DemarcoN, RN, [facility], CDI Specialist  [phone] or [email or web address]  Options provided:  -- No, Severe Sepsis was not present on admission and developed during the inpatient stay  -- Yes, Severe Sepsis was present at the time of the order to admit to the hospital  -- Other - I will add my own diagnosis  -- Disagree - Not applicable / Not valid  -- Disagree - Clinically unable to determine / Unknown  -- Refer to Clinical Documentation Reviewer    PROVIDER RESPONSE TEXT:    No, Severe Sepsis was not present on admission and developed during the inpatient stay.     Query created by: Binta Younger on 2022 11:46 AM      Electronically signed by:  Louis Plaza MD 2022 12:46 PM

## 2022-04-25 NOTE — PROGRESS NOTES
Mittens removed from patient to determine level of cooperation without them. Patient stable. 1 to 1 sitter. Will continue to monitor.

## 2022-04-25 NOTE — PROGRESS NOTES
General Daily Progress Note          Patient Name:   Milagros South       YOB: 1960       Age:  60 y.o. Admit Date: 4/7/2022      Subjective:     Milagros South is a(n) 60 y.o. male with PMH significant for HTN, cirrhosis, ascites presents via EMS for removing fish catheter. Patient recently d/c after lengthy stay in hospital for septic DIP joint of right index finger. He was d/c on 10 days of PO Zyvox, which he completed. He presents today after pulling out fish catheter. Several blood clots noted. Patient septic upon arrival with hypothermia and hypotension. Temp 93.6 rectal, BP 84/68. Patient is retaining 887cc of urine and nursing staff will replace fish.     Patient started on fluids and IV Levaquin in the ED. Patient is altered at baseline. Head CT w/o contrast shows no acute abnormality. Blood cultures pending. CXR and KUB both normal       Patient is alert awake confused  Hypotensive off  Levophed    Normal temperature    4/12  Patient is off levophed but still receiving midodrine and IVF. He remain hypotensive. He is lethargic and confused this morning. Patient has mild bilateral LE edema. Seen by ID yesterday. Recommends continuing vancomycin   Seen by nephrology yesterday. Recommends discontinuing IVF in lieu of leg edema, discontinue bicarb drip, continuing weekly procrit, continuing PO KCl, midodrine for chronic hypotension. Today patient hypothermic on bear hugger  Yesterday evening patient was restless agitated    4/13    Patient is alert awake still hypothermic on shemar hugger    4/14  Patient is seen sleeping. On shemar hugger and vancomycin. He is producing 250 ml urine. No new changes seen at this time. Labs pending. Cr trending down 1.62 (on 4/13). Abdominal US on 4/13 shows ascites. UA on 4/12 positive for leukocyte esterase, WBC >100 and bacteruria. Urine culture pending.      Patient refused medication this morning    4/15    Patient sleepy obtunded /hypothermic    Not eating drinking    4/18  Patient is seen in medical telemetry with a one to one sitter. He is still confused and refusing to eat food. He is compliant with his medications  He pulled out NG tube 3 times. Patient had a paracentesis done on 4/15 without any complications. Labs remarkable for Cr 1.75 increasing. CBC and CMP pending. Ammonia unremarkable. 4/19  Patient is seen at bedside with a one to one sitter. He still confused and attempting to pull out his IJ central line. He has mitts on. He is also attempting to jump out of bed. Patient is still not eating today. He is taking his medications. Labs remarkable for Cr 1.82. Abdominal US remarkable for Increased abdominal and pelvic ascites. Seen by ID. Recommends discontinuing eraxis, and starting on fluconazole. 4/20  Patient is seen at bedside with a one to one sitter. She reports that he pulled out his IJ line. Patient now has an IV in his hand. He has mittens on. He is still refusing to eat any food but he continues to take his medications. Patient continues to be hypothermic with latest axillary temperature of 96.1 F. Seen by nephrology. Recommends increasing IV fluid rate. Continue fish catheter. Seen by GI. Recommends another paracentesis prior to PEG tube placement. Seen by ID. Recommends continuing fluconazole and discontinue vancomycin. Labs pending. 4/21  Patient is with one to one sitter. He has been less agitated today but remains confused. On keppra drip. Patient has minimal oral intake. He is taking his medications. Seen by GI. Plan is to do paracentesis then place PEG tube. Seen by nephrology. No new recommendations. Seen by ID. Continue fluconazole. Labs Hgb 8.8, K 3.3, pro-bnp 990.     4/22  Patient is confused and seen with 1:1 sitter. He is diffusely edematous at this time and leaking fluids from his abdomen. Patient is NPO for paracentesis and PEG tube placement. 4/23    Patient had PEG tube placed. Objective:     Visit Vitals  /75   Pulse 83   Temp 98.7 °F (37.1 °C)   Resp 18   Ht 5' 10\" (1.778 m)   Wt 92 kg (202 lb 13.2 oz)   SpO2 99%   BMI 29.10 kg/m²        Recent Results (from the past 24 hour(s))   GLUCOSE, POC    Collection Time: 04/24/22  3:50 PM   Result Value Ref Range    Glucose (POC) 91 65 - 117 mg/dL    Performed by Carmina Skelton    CBC W/O DIFF    Collection Time: 04/25/22 10:09 AM   Result Value Ref Range    WBC 7.2 4.1 - 11.1 K/uL    RBC 3.95 (L) 4.10 - 5.70 M/uL    HGB 11.1 (L) 12.1 - 17.0 g/dL    HCT 31.7 (L) 36.6 - 50.3 %    MCV 80.3 80.0 - 99.0 FL    MCH 28.1 26.0 - 34.0 PG    MCHC 35.0 30.0 - 36.5 g/dL    RDW 23.7 (H) 11.5 - 14.5 %    PLATELET 81 (L) 861 - 400 K/uL    NRBC 0.4 (H) 0.0  WBC    ABSOLUTE NRBC 0.03 (H) 0.00 - 9.67 K/uL   METABOLIC PANEL, COMPREHENSIVE    Collection Time: 04/25/22 10:09 AM   Result Value Ref Range    Sodium 138 136 - 145 mmol/L    Potassium 5.3 (H) 3.5 - 5.1 mmol/L    Chloride 110 (H) 97 - 108 mmol/L    CO2 23 21 - 32 mmol/L    Anion gap 5 5 - 15 mmol/L    Glucose 96 65 - 100 mg/dL    BUN 22 (H) 6 - 20 mg/dL    Creatinine 2.16 (H) 0.70 - 1.30 mg/dL    BUN/Creatinine ratio 10 (L) 12 - 20      GFR est AA 38 (L) >60 ml/min/1.73m2    GFR est non-AA 31 (L) >60 ml/min/1.73m2    Calcium 8.3 (L) 8.5 - 10.1 mg/dL    Bilirubin, total 1.0 0.2 - 1.0 mg/dL    AST (SGOT) 51 (H) 15 - 37 U/L    ALT (SGPT) 17 12 - 78 U/L    Alk.  phosphatase 189 (H) 45 - 117 U/L    Protein, total 6.3 (L) 6.4 - 8.2 g/dL    Albumin 1.8 (L) 3.5 - 5.0 g/dL    Globulin 4.5 (H) 2.0 - 4.0 g/dL    A-G Ratio 0.4 (L) 1.1 - 2.2     RENAL FUNCTION PANEL    Collection Time: 04/25/22 10:11 AM   Result Value Ref Range    Sodium 139 136 - 145 mmol/L    Potassium 5.3 (H) 3.5 - 5.1 mmol/L    Chloride 110 (H) 97 - 108 mmol/L    CO2 23 21 - 32 mmol/L    Anion gap 6 5 - 15 mmol/L    Glucose 97 65 - 100 mg/dL    BUN 22 (H) 6 - 20 mg/dL    Creatinine 2.17 (H) 0.70 - 1.30 mg/dL    BUN/Creatinine ratio 10 (L) 12 - 20      GFR est AA 37 (L) >60 ml/min/1.73m2    GFR est non-AA 31 (L) >60 ml/min/1.73m2    Calcium 8.2 (L) 8.5 - 10.1 mg/dL    Phosphorus 2.9 2.6 - 4.7 mg/dL    Albumin 1.8 (L) 3.5 - 5.0 g/dL     [unfilled]      Review of Systems    Constitutional: Negative for chills and fever. HENT: Negative. Eyes: Negative. Respiratory: Negative. Cardiovascular: Negative. Gastrointestinal: Negative for abdominal pain and nausea. Skin: Negative. Neurological: Negative. Physical Exam:      Constitutional: pt is oriented to person, place, and time. HENT:   Head: Normocephalic and atraumatic. Eyes: Pupils are equal, round, and reactive to light. EOM are normal.   Cardiovascular: Normal rate, regular rhythm and normal heart sounds. Pulmonary/Chest: Breath sounds normal. No wheezes. No rales. Exhibits no tenderness. Abdominal: Soft. Bowel sounds are normal. There is no abdominal tenderness. There is no rebound and no guarding. Musculoskeletal: Normal range of motion. Neurological: pt is alert and oriented to person, place, and time. Extremities 2+ edema  Anasarca    US ABD LTD   Final Result   Increased abdominal and pelvic ascites. IR PARACENTESIS ABD SUBSQ   Final Result   Ultrasound guided paracentesis with no immediate complications. Patient tolerated the procedure well. XR CHEST PORT   Final Result   Endotracheal tube as above. Underexpanded lungs with bibasilar   atelectasis. Hydrostatic edema. Possible pleural effusions. US ABD LTD   Final Result   Ascites. XR HAND RT MIN 3 V   Final Result      CT HEAD WO CONT   Final Result   Age-appropriate atrophy. No acute findings. XR ABD (KUB)   Final Result   Within normal      XR CHEST PORT   Final Result   Findings/impression:   1. Low lung volumes. Bibasilar hypoventilatory change/atelectasis.  Left   retrocardiac lung incompletely evaluated, cannot exclude underlying airspace   disease. 2.  Cardiac and mediastinal contours are obscured by low lung volumes. Ectatic/possible aneurysmal appearance transverse aorta with calcific   atherosclerotic plaque. Appears stable from prior radiograph. 3.  No pleural fluid. No pneumothorax. 4.  Prominent gas-filled bowel partially visualized in the left upper quadrant. IR INSERT NON TUNL CVC OVER 5 YRS    (Results Pending)   IR PARACENTESIS ABD W IMAGE    (Results Pending)   IR US GUIDED VASCULAR ACCESS    (Results Pending)   IR FLUORO GUIDE PLC CVAD    (Results Pending)        Recent Results (from the past 24 hour(s))   GLUCOSE, POC    Collection Time: 04/24/22  3:50 PM   Result Value Ref Range    Glucose (POC) 91 65 - 117 mg/dL    Performed by Calvin Cat    CBC W/O DIFF    Collection Time: 04/25/22 10:09 AM   Result Value Ref Range    WBC 7.2 4.1 - 11.1 K/uL    RBC 3.95 (L) 4.10 - 5.70 M/uL    HGB 11.1 (L) 12.1 - 17.0 g/dL    HCT 31.7 (L) 36.6 - 50.3 %    MCV 80.3 80.0 - 99.0 FL    MCH 28.1 26.0 - 34.0 PG    MCHC 35.0 30.0 - 36.5 g/dL    RDW 23.7 (H) 11.5 - 14.5 %    PLATELET 81 (L) 093 - 400 K/uL    NRBC 0.4 (H) 0.0  WBC    ABSOLUTE NRBC 0.03 (H) 0.00 - 3.21 K/uL   METABOLIC PANEL, COMPREHENSIVE    Collection Time: 04/25/22 10:09 AM   Result Value Ref Range    Sodium 138 136 - 145 mmol/L    Potassium 5.3 (H) 3.5 - 5.1 mmol/L    Chloride 110 (H) 97 - 108 mmol/L    CO2 23 21 - 32 mmol/L    Anion gap 5 5 - 15 mmol/L    Glucose 96 65 - 100 mg/dL    BUN 22 (H) 6 - 20 mg/dL    Creatinine 2.16 (H) 0.70 - 1.30 mg/dL    BUN/Creatinine ratio 10 (L) 12 - 20      GFR est AA 38 (L) >60 ml/min/1.73m2    GFR est non-AA 31 (L) >60 ml/min/1.73m2    Calcium 8.3 (L) 8.5 - 10.1 mg/dL    Bilirubin, total 1.0 0.2 - 1.0 mg/dL    AST (SGOT) 51 (H) 15 - 37 U/L    ALT (SGPT) 17 12 - 78 U/L    Alk.  phosphatase 189 (H) 45 - 117 U/L    Protein, total 6.3 (L) 6.4 - 8.2 g/dL    Albumin 1.8 (L) 3.5 - 5.0 g/dL Globulin 4.5 (H) 2.0 - 4.0 g/dL    A-G Ratio 0.4 (L) 1.1 - 2.2     RENAL FUNCTION PANEL    Collection Time: 04/25/22 10:11 AM   Result Value Ref Range    Sodium 139 136 - 145 mmol/L    Potassium 5.3 (H) 3.5 - 5.1 mmol/L    Chloride 110 (H) 97 - 108 mmol/L    CO2 23 21 - 32 mmol/L    Anion gap 6 5 - 15 mmol/L    Glucose 97 65 - 100 mg/dL    BUN 22 (H) 6 - 20 mg/dL    Creatinine 2.17 (H) 0.70 - 1.30 mg/dL    BUN/Creatinine ratio 10 (L) 12 - 20      GFR est AA 37 (L) >60 ml/min/1.73m2    GFR est non-AA 31 (L) >60 ml/min/1.73m2    Calcium 8.2 (L) 8.5 - 10.1 mg/dL    Phosphorus 2.9 2.6 - 4.7 mg/dL    Albumin 1.8 (L) 3.5 - 5.0 g/dL       Results     Procedure Component Value Units Date/Time    CULTURE, BODY FLUID Shirleya Kobever STAIN [[id number]] Collected: 04/22/22 1400    Order Status: Completed Specimen:  Body Fluid from Body fld Updated: 04/23/22 1418     Special Requests: No Special Requests        GRAM STAIN No wbc's seen         No organisms seen        Culture result: No growth thus far       CULTURE, URINE [[id number]]  (Abnormal) Collected: 04/12/22 2300    Order Status: Completed Specimen: Urine Updated: 04/16/22 1331     Special Requests: No Special Requests        [place] Count --        >100,000  colonies/ml       Culture result: Candida albicans       CULTURE, URINE [[id number]] Collected: 04/12/22 2300    Order Status: Canceled Specimen: Urine            Labs:     Recent Labs     04/25/22  1009 04/23/22  0702   WBC 7.2 6.1   HGB 11.1* 8.5*   HCT 31.7* 24.1*   PLT 81* 76*     Recent Labs     04/25/22  1011 04/25/22  1009 04/24/22  0748    138 140   K 5.3* 5.3* 3.9   * 110* 113*   CO2 23 23 20*   BUN 22* 22* 21*   CREA 2.17* 2.16* 2.05*   GLU 97 96 111*   CA 8.2* 8.3* 8.2*   PHOS 2.9  --  2.5*     Recent Labs     04/25/22  1011 04/25/22  1009 04/24/22  0748 04/23/22  0702 04/23/22  0702   ALT  --  17  --   --  13   AP  --  189*  --   --  61   TBILI  --  1.0  --   --  1.6*   TP  --  6.3*  --   --  5.4*   ALB 1. 8* 1.8* 1.8*   < > 1.8*   GLOB  --  4.5*  --   --  3.6    < > = values in this interval not displayed. No results for input(s): INR, PTP, APTT, INREXT, INREXT in the last 72 hours. No results for input(s): FE, TIBC, PSAT, FERR in the last 72 hours. No results found for: FOL, RBCF   No results for input(s): PH, PCO2, PO2 in the last 72 hours. No results for input(s): CPK, CKNDX, TROIQ in the last 72 hours. No lab exists for component: CPKMB  No results found for: CHOL, CHOLX, CHLST, CHOLV, HDL, HDLP, LDL, LDLC, DLDLP, TGLX, TRIGL, TRIGP, CHHD, CHHDX  Lab Results   Component Value Date/Time    Glucose (POC) 91 04/24/2022 03:50 PM    Glucose (POC) 104 04/24/2022 11:03 AM    Glucose (POC) 106 04/24/2022 07:10 AM    Glucose (POC) 87 04/23/2022 08:00 PM    Glucose (POC) 79 04/23/2022 04:42 PM     Lab Results   Component Value Date/Time    Color Yellow/Straw 04/12/2022 11:00 PM    Appearance Turbid (A) 04/12/2022 11:00 PM    Specific gravity 1.011 04/12/2022 11:00 PM    pH (UA) 5.0 04/12/2022 11:00 PM    Protein Negative 04/12/2022 11:00 PM    Glucose Negative 04/12/2022 11:00 PM    Ketone Negative 04/12/2022 11:00 PM    Bilirubin Negative 04/12/2022 11:00 PM    Urobilinogen 0.1 04/12/2022 11:00 PM    Nitrites Negative 04/12/2022 11:00 PM    Leukocyte Esterase Large (A) 04/12/2022 11:00 PM    Bacteria 1+ (A) 04/12/2022 11:00 PM    WBC >100 (H) 04/12/2022 11:00 PM    RBC  04/12/2022 11:00 PM         Assessment:     Sepsis  UTI/Candida  Lactic acidosis  Acute on chronic renal failure- creat 2.01  Cirrhosis with ascites  Anemia  History of septic DIP joint, right index finger  Hypothyroidism  Seizure disorder  Gout  GERD  History of etoh abuse  Hypotension  Metabolic acidosis  Hypothermia  Hypokalemia  Anemia monitor H&H  Abdominal distention/ascites  Protein calorie malnutrition  Hypokalemia  Anasarca   Static post PEG tube placed    Plan:      On shemar singleton  On allopurinol 300 mg daily  Vitamin D 50,000 weekly  Pepcid 20 twice daily  Folic acid 1 mg daily  Lactulose 10 g 3 times a day  Keppra 1500 twice a day  Synthroid 25 mcg daily  Midodrine 10 mg 3 times a day  Propranolol 20 mg twice a day  Sodium bicarb 1303 times a day  Flomax 0.4 mg daily  Thiamine 100 mg daily  Vancomycin with pharmacy  Replace potassium  Discontinue IV Zosyn  discontinue Eraxis 100 mg daily    Discontinue Sodium bicarb drip  Continue fluconazole 200 mg daily   keppra drip 1500 mg Q12H  Add BuSpar 15 mg twice a day  kayoxlate 30 gm    We will do psych consult for psychosis patient receiving Geodon  Psychotic behavior      Continue PEG tube feeding    Consult psych due to his behavior              Current Facility-Administered Medications:     zinc oxide-white petrolatum 20-75 % topical paste, , Topical, PRN, Fili Morgan MD, Given at 04/24/22 0245    busPIRone (BUSPAR) tablet 15 mg, 15 mg, Oral, BID, Lamberto Ackerman MD, 15 mg at 04/25/22 0913    sodium bicarbonate tablet 1,300 mg, 1,300 mg, Oral, QID, Anitra Gamboa MD, 1,300 mg at 04/25/22 1219    fluconazole (DIFLUCAN) tablet 200 mg, 200 mg, Oral, DAILY, Ree Zaragoza MD, 200 mg at 04/25/22 0914    hydrOXYzine (VISTARIL) injection 50 mg, 50 mg, IntraMUSCular, Q8H PRN, Richard Means MD, 50 mg at 04/21/22 0235    fludrocortisone (FLORINEF) tablet 0.1 mg, 0.1 mg, Oral, DAILY, Ree Zaragoza MD, 0.1 mg at 04/25/22 0913    0.9% sodium chloride infusion 250 mL, 250 mL, IntraVENous, PRN, Yong Ackerman MD    ziprasidone (GEODON) 10 mg in sterile water (preservative free) 0.5 mL injection, 10 mg, IntraMUSCular, Q12H PRN, Mallory Denise MD, 10 mg at 04/20/22 1704    levETIRAcetam (KEPPRA) 1500 mg in saline (iso-osm) 100 ml IVPB, 1,500 mg, IntraVENous, Q12H, Lamberto Ackerman MD, Last Rate: 400 mL/hr at 04/25/22 1219, 1,500 mg at 04/25/22 1219    0.9% sodium chloride infusion 250 mL, 250 mL, IntraVENous, PRN, Yong Ackerman MD    epoetin luis-epbx (RETACRIT) injection 10,000 Units, 10,000 Units, SubCUTAneous, Q7D, Vj Brooks MD, 10,000 Units at 04/23/22 2107    midodrine (PROAMATINE) tablet 10 mg, 10 mg, Oral, TID WITH MEALS, Zenon Ackerman MD, 10 mg at 04/25/22 1219    LORazepam (ATIVAN) injection 1 mg, 1 mg, IntraVENous, Q4H PRN, Lamberto Ackerman MD, 1 mg at 04/08/22 1733    sodium chloride (NS) flush 5-10 mL, 5-10 mL, IntraVENous, PRN, Lamberto Ackerman MD, 10 mL at 04/15/22 2354    lactulose (CHRONULAC) 10 gram/15 mL solution 15 mL, 10 g, Oral, TID, Zenon Ackerman MD, 15 mL at 04/25/22 0913    tamsulosin (FLOMAX) capsule 0.4 mg, 0.4 mg, Oral, DAILY, Lamberto Ackerman MD, 0.4 mg at 04/25/22 9620    thiamine mononitrate (B-1) tablet 100 mg, 100 mg, Oral, DAILY, Gisel Ag MD, 100 mg at 04/25/22 0913    allopurinoL (ZYLOPRIM) tablet 300 mg, 300 mg, Oral, DAILY, Gisel Ag MD, 300 mg at 04/25/22 1542    ergocalciferol capsule 50,000 Units, 50,000 Units, Oral, Q7D, Gisel Ag MD, 50,000 Units at 04/21/22 1647    famotidine (PEPCID) tablet 20 mg, 20 mg, Oral, BID, Gisel Ag MD, 20 mg at 34/37/17 8812    folic acid (FOLVITE) tablet 1 mg, 1 mg, Oral, DAILY, Gisel Ag MD, 1 mg at 04/25/22 0900    levothyroxine (SYNTHROID) tablet 25 mcg, 25 mcg, Oral, ACB, Lamberto Ackerman MD, 25 mcg at 04/25/22 0913    propranoloL (INDERAL) tablet 20 mg, 20 mg, Oral, BID, Lamberto Ackerman MD, 20 mg at 04/24/22 1069    acetaminophen (TYLENOL) tablet 650 mg, 650 mg, Oral, Q6H PRN **OR** acetaminophen (TYLENOL) suppository 650 mg, 650 mg, Rectal, Q6H PRN, Zenon Ackerman MD    polyethylene glycol (MIRALAX) packet 17 g, 17 g, Oral, DAILY PRN, Zenon Ackerman MD    ondansetron (ZOFRAN ODT) tablet 4 mg, 4 mg, Oral, Q8H PRN **OR** ondansetron (ZOFRAN) injection 4 mg, 4 mg, IntraVENous, Q6H PRN, Gisel Ag MD, 4 mg at 04/23/22 0050

## 2022-04-25 NOTE — PROGRESS NOTES
Patient complained of being hot. RN checked temperature. 80 F orally. Bear hugger removed from patient. Will continue to monitor.

## 2022-04-25 NOTE — PROGRESS NOTES
Problem: Mobility Impaired (Adult and Pediatric)  Goal: *Acute Goals and Plan of Care (Insert Text)  Description: Patient will move from supine to sit and sit to supine , scoot up and down, and roll side to side in bed with minimal assistance/contact guard assist within 7 day(s). Patient will transfer from bed to chair and chair to bed with moderate assistance  using the least restrictive device within 7 day(s). Patient will improve static standing balance to minimal assistance within 1 week(s). Patient will ambulate 10 feet with moderate assistance with least restrictive device within 1 weeks. Outcome: Progressing Towards Goal   PHYSICAL THERAPY TREATMENT  Patient: Cassandra Prieto (86 y.o. male)  Date: 4/25/2022  Diagnosis: Urinary retention [R33.9]  Lactic acidosis [E87.2]  Severe sepsis (HCC) [A41.9, R65.20]  Hypothermia [T68. XXXA]  Sepsis ([address] [A41.9] <principal problem not specified>  Procedure(s) (LRB):  PARACENTESIS (N/A)  PERCUTANEOUS ENDOSCOPIC GASTROSTOMY TUBE INSERTION (N/A)  ESOPHAGOGASTRODUODENOSCOPY (EGD) (N/A) 3 Days Post-Op  Precautions:    Chart, physical therapy assessment, plan of care and goals were reviewed. ASSESSMENT  Patient continues with skilled PT services and is progressing towards goals. Pt. Supine upon entering room and agreeable to work with therapy. Supine to sit with min assist X 2. Pt. Sat a few minutes on the side of the bed. Worked on sitting upright and posture. Pt. With forward, flexed posture initially. Sit to stand with Min assist X 2. Pt. Was able to stand approx 1 minute with RW and then took 3 side steps along EOB with RW and Mod assist X 2. Pt. Needed to sit EOB immediately due to unsteadiness and fatigue. Pt. Was able to perform LE exercises once pt. Back to bed. Pt. Unable to tolerate sitting EOB to perform  any more exercises due to fatigue.      Current Level of Function Impacting Discharge (mobility/balance): impaired mobility/A X 2    Other factors to consider for discharge: Safety         PLAN :  Patient continues to benefit from skilled intervention to address the above impairments. Continue treatment per established plan of care. to address goals. Recommendation for discharge: (in order for the patient to meet his/her long term goals)  SNF vs LTC    This discharge recommendation:  Has been made in collaboration with the attending provider and/or case management    IF patient discharges home will need the following DME: rolling walker       SUBJECTIVE:   Patient stated I want to get up. \"  Mitts on hands upon entering. Pt. On 1: 1 with sitter. OBJECTIVE DATA SUMMARY:   Critical Behavior:  Neurologic State: Alert,Confused  Orientation Level: Disoriented to place,Disoriented to situation,Disoriented to time,Oriented to person  Cognition: Follows commands     Functional Mobility Training:  Bed Mobility:  Rolling: Contact guard assistance  Supine to Sit: Minimum assistance;Assist x2  Sit to Supine: Minimum assistance  Scooting: Stand-by assistance     Assisted with changing linens and pt. Pt. Needed to be cleaned up a couple  different times throughout session. Transfers:  Sit to Stand: Minimum assistance;Assist x2  Stand to Sit: Minimum assistance;Assist x2           Pt. Took 3 side steps with RW and mod assist X 2. Balance:  Sitting: Intact; With support  Sitting - Static: Good (unsupported)  Sitting - Dynamic: Fair (occasional)  Standing: Impaired; With support  Standing - Static: Constant support; Fair  Standing - Dynamic : Constant support; Fair  Ambulation/Gait Training:              Therapeutic Exercises: Therapeutic Exercises:       EXERCISE   Sets   Reps   Active Active Assist   Passive Self ROM   Comments   Ankle Pumps  12 [x] [] [] []    Quad Sets/Glut Sets  12 [x] [] [] []    Hamstring Sets   [] [] [] []    Short Arc Quads   [] [] [] []    Heel Slides  10 [x] [] [] []    Straight Leg Raises   [] [] [] []    Hip abd/add  10 [x] [] [] []    Long Arc Quads   [] [] [] []    Marching   [] [] [] []       [] [] [] []     RN stated OK to leave mitts off since 1:1 sitter present. Reviewed all LE exericses with pt. To do in bed daily. Pain Ratin/10      Activity Tolerance:   Fair and requires rest breaks  Please refer to the flowsheet for vital signs taken during this treatment. After treatment patient left in no apparent distress:   Supine in bed, Call bell within reach, Bed / chair alarm activated, Side rails x 3, and 1:1 sitter present    COMMUNICATION/COLLABORATION:   The patients plan of care was discussed with: Occupational therapy assistant and Registered nurse. Co RX with HARPER for increased mobility and safety.     Sera Mittal   Time Calculation: 39 mins

## 2022-04-25 NOTE — PROGRESS NOTES
Infectious Disease Progress Note           Subjective:   More alert today, still not following commands, 1:1 sitter at bedside, no acute events since last seen   Objective:   Physical Exam:     Visit Vitals  /75   Pulse 76   Temp 98.3 °F (36.8 °C)   Resp 18   Ht 5' 10\" (1.778 m)   Wt 202 lb 13.2 oz (92 kg)   SpO2 98%   BMI 29.10 kg/m²      O2 Device: None (Room air)    Temp (24hrs), Av.6 °F (36.4 °C), Min:96.3 °F (35.7 °C), Max:98.7 °F (37.1 °C)    No intake/output data recorded.  1901 -  0700  In: 900   Out: 3050 [Urine:1450; Drains:1600]    General: NAD, alert, lethargic   HEENT: HEIDY, Moist mucosa   Lungs: CTA b/l, decreased at the bases, no wheeze/rhonchi   Heart: S1S2+, RRR, no murmur  Abdo: Soft, distended, NT, +BS, + peg tube placement   : + fish cath, clear urine in bag and tubing   Exts: Decreased right index finger swelling, + 3 pitting edema involving b/l LEs   Skin: No wounds, No rashes or lesions    Data Review:       Recent Days:  Recent Labs     22  1009 22  0702   WBC 7.2 6.1   HGB 11.1* 8.5*   HCT 31.7* 24.1*   PLT 81* 76*     Recent Labs     22  1011 22  1009 22  0748   BUN 22* 22* 21*   CREA 2.17* 2.16* 2.05*       Lab Results   Component Value Date/Time    C-Reactive protein 0.60 2022 05:15 AM          Microbiology     Results     Procedure Component Value Units Date/Time    CULTURE, BODY FLUID Dai Javad STAIN [[id number]] Collected: 22 1400    Order Status: Completed Specimen:  Body Fluid from Body fld Updated: 22 1418     Special Requests: No Special Requests        GRAM STAIN No wbc's seen         No organisms seen        Culture result: No growth thus far       CULTURE, URINE [[id number]]  (Abnormal) Collected: 22 2300    Order Status: Completed Specimen: Urine Updated: 22 6702     Special Requests: No Special Requests        [place] Count --        >100,000  colonies/ml       Culture result: Candida albicans       CULTURE, URINE [[id number]] Collected: 04/12/22 2300    Order Status: Canceled Specimen: Urine              Diagnostics   CXR Results  (Last 48 hours)    None             Assessment/Plan     1. UTI, abnormal UA,  Urinary retention: + indwelling fish cath      Urine Cx from 04/07 is negative, Yeast isolated from urine Cx from 04/12      Normothermic, WBC remains WNLs       On day # 8/14 of antifungal therapy, currently fluconazole      Routine labs in the morning     2. Right index finger osteomyelitis. S/p debridement       Completed long term Vanc targeting staph epidermidis      3. AMS, h/o metabolic/hepatic encephalopathy, remains confused     4. Ascites, due to chronic liver disease, s/p paracentesis x 2    5.  Episodes of hypothermia/hypotension: Suspected adrenal insufficiency      Continue on Florinef Loa Lombard, MD    4/25/2022

## 2022-04-25 NOTE — PROGRESS NOTES
Patient successful without warming blanket throughout day shift. Last temperature 98.3 orally. Mittens were off throughout the day and continuing into night shift currently. Report given to LUIS MIGUEL Gonzales for continuum of care.

## 2022-04-25 NOTE — PROGRESS NOTES
Comprehensive Nutrition Assessment    Type and Reason for Visit: Reassess (Interim)    Nutrition Recommendations/Plan:   1. NPO, advance as rec'd by SLP. 2. Modify TF via PEG to Two Erick bolus feeds of 330 mL Q6H.  3. Flush with 200 mL H2O Q4H via PEG. TF provides 2820 kcal (106%), 115 gm PRO (88%), 2124 mL H2O(90%). 4. Continue Sean x2/d via PEG. (180 kcal, 5 gm PRO). 5. Please document TF rate/infusion, tolerance, BMs in I/Os. Malnutrition Assessment:  Malnutrition Status: Moderate malnutrition (04/25/22 1145)    Context:  Chronic illness       Nutrition Assessment:    Admitted for hematuria s/p pulling out fish catheter, +hypothermia and hypotension. RD familiar w/ pt from previous admits. Pt inappropriate for interview at this time per RN, no family in room. Minimal PO intakes, per RN pt up all night and mostly sleeping today. RD added Ensure 3x/d this AM per pt previously stated preferences. Will f/u for tolerance and need for changes. (4/12) Pt seen in bed asleep s/p Breakfast. Per Nsg, Pt ate ~25% from tray, drank >80% of ONS. (F/U) patient was asleep at the time of assessment, RD attempted to wake up patiented. off pressors, appetite remains poor >25%, observed 1 unopen ONS @ bedside w/ multiple unopened ProSource pkts. May have better acceptances w/ E.pudding w/ meds vs prosource pkt. Will modify ONs. May need to downgrade diet to puree, will request SLP consult. (4/19) NGTself-extubated 4/16- Nsg attempted to replace, pt refused. Possible plan for PEG, continues to refuse food. Will leave rec's if PEG is placed. (4/20) Pt pulled IJ, now on 1:1. Continues to refuse foods. Per MD would require paracentesis prior to PEG placement. (4/25) Pt w/ 1:1 sitter, RN reported tolerating TF, but decreased from goal rate to 15 mL/hr this a.m. given possible leaking, to be returned to goal rate.  MD communicated concern for self-extubation of PEG given hx and inability to close binder 2/2 abd distention, rec'd bolus feeds. RD to provide rec's for bolus feeds and monitor. Labs: Cl 110, K 5.3, BUN 22, Cr 2.17, GFR 37, Ca 8.2, Alb 1.8. Meds: B9, B1, Vit D, Na bicarbonate, lactulose, inderal, midodrine, synthroid, pepcid, allopurinol. Nutrition Related Findings:    No N/V/D/C, +Abd distention, likely 2/2 ascites per NFPE. +1 BLE edema. Mild muscle loss. SLP tx noted- no diet order. Wound Type: Skin tears,Surgical incision,Pressure injury,Stage III     Current Nutrition Intake & Therapies:  Average Meal Intake: NPO  Average Supplement Intake: None ordered  ADULT TUBE FEEDING PEG; Other Tube Feeding (Specify); TwoCal; Delivery Method: Continuous; Continuous Initial Rate (mL/hr): 20; Continuous Advance Tube Feeding: Yes; Advancement Volume (mL/hr): 15; Advancement Frequency: Q 4 hours; Continuous Goal... Anthropometric Measures:  Height: 5' 10\" (177.8 cm)  Ideal Body Weight (IBW): 166 lbs (75 kg)  Admission Body Weight: 179 lb  Current Body Wt:  101.1 kg (222 lb 14.2 oz) (4/25, RD obtained), 134.3 % IBW. Bed scale  Current BMI (kg/m2): 32  Usual Body Weight:  (PAY[place])  Weight Adjustment: No adjustment   BMI Category: Obese class 1 (BMI 30.0-34. 9)    Estimated Daily Nutrient Needs:  Energy Requirements Based On: Kcal/kg  Weight Used for Energy Requirements: Current  Energy (kcal/day): 2,582kcal (28kcal/kg)  Weight Used for Protein Requirements: Current  Protein (g/day): 129g (1.4g/kg)  Method Used for Fluid Requirements: ml/kg  Fluid (ml/day): 2365(25ml/kg)    Nutrition Diagnosis:   · Moderate malnutrition,In context of chronic illness related to inadequate protein-energy intake,catabolic illness as evidenced by poor intake prior to admission,intake 0-25%,mild muscle loss,mild loss of subcutaneous fat    Nutrition Interventions:   Food and/or Nutrient Delivery: Continue NPO,Modify tube feeding  Nutrition Education/Counseling: No recommendations at this time  Coordination of Nutrition Care: Continue to monitor while inpatient,Feeding assistance/environmental change,Speech therapy  Plan of Care discussed with: RN, Attending MD.    Goals:  Previous Goal Met: Progressing toward goal(s)  Goals: Meet at least 75% of estimated needs,Tolerate nutrition support at goal rate,within 7 days    Nutrition Monitoring and Evaluation:   Behavioral-Environmental Outcomes: None identified  Food/Nutrient Intake Outcomes: Enteral nutrition intake/tolerance,Diet advancement/tolerance  Physical Signs/Symptoms Outcomes: Biochemical data,Fluid status or edema,Weight,GI status    Discharge Planning: Too soon to determine    Cory Mtz RD  Contact: ext. [phone] or [employer].

## 2022-04-25 NOTE — PROGRESS NOTES
General Daily Progress Note          Patient Name:   Raymond Negrete       YOB: 1960       Age:  60 y.o. Admit Date: 4/7/2022      Subjective:     Raymond Negrete is a(n) 60 y.o. male with PMH significant for HTN, cirrhosis, ascites presents via EMS for removing fish catheter. Patient recently d/c after lengthy stay in hospital for septic DIP joint of right index finger. He was d/c on 10 days of PO Zyvox, which he completed. He presents today after pulling out fish catheter. Several blood clots noted. Patient septic upon arrival with hypothermia and hypotension. Temp 93.6 rectal, BP 84/68. Patient is retaining 887cc of urine and nursing staff will replace fish.     Patient started on fluids and IV Levaquin in the ED. Patient is altered at baseline. Head CT w/o contrast shows no acute abnormality. Blood cultures pending. CXR and KUB both normal       Patient is alert awake confused  Hypotensive off  Levophed    Normal temperature    4/12  Patient is off levophed but still receiving midodrine and IVF. He remain hypotensive. He is lethargic and confused this morning. Patient has mild bilateral LE edema. Seen by ID yesterday. Recommends continuing vancomycin   Seen by nephrology yesterday. Recommends discontinuing IVF in lieu of leg edema, discontinue bicarb drip, continuing weekly procrit, continuing PO KCl, midodrine for chronic hypotension. Today patient hypothermic on bear hugger  Yesterday evening patient was restless agitated    4/13    Patient is alert awake still hypothermic on shemar hugger    4/14  Patient is seen sleeping. On shemar hugger and vancomycin. He is producing 250 ml urine. No new changes seen at this time. Labs pending. Cr trending down 1.62 (on 4/13). Abdominal US on 4/13 shows ascites. UA on 4/12 positive for leukocyte esterase, WBC >100 and bacteruria. Urine culture pending.      Patient refused medication this morning    4/15    Patient sleepy obtunded /hypothermic    Not eating drinking    4/18  Patient is seen in medical telemetry with a one to one sitter. He is still confused and refusing to eat food. He is compliant with his medications  He pulled out NG tube 3 times. Patient had a paracentesis done on 4/15 without any complications. Labs remarkable for Cr 1.75 increasing. CBC and CMP pending. Ammonia unremarkable. 4/19  Patient is seen at bedside with a one to one sitter. He still confused and attempting to pull out his IJ central line. He has mitts on. He is also attempting to jump out of bed. Patient is still not eating today. He is taking his medications. Labs remarkable for Cr 1.82. Abdominal US remarkable for Increased abdominal and pelvic ascites. Seen by ID. Recommends discontinuing eraxis, and starting on fluconazole. 4/20  Patient is seen at bedside with a one to one sitter. She reports that he pulled out his IJ line. Patient now has an IV in his hand. He has mittens on. He is still refusing to eat any food but he continues to take his medications. Patient continues to be hypothermic with latest axillary temperature of 96.1 F. Seen by nephrology. Recommends increasing IV fluid rate. Continue fish catheter. Seen by GI. Recommends another paracentesis prior to PEG tube placement. Seen by ID. Recommends continuing fluconazole and discontinue vancomycin. Labs pending. 4/21  Patient is with one to one sitter. He has been less agitated today but remains confused. On keppra drip. Patient has minimal oral intake. He is taking his medications. Seen by GI. Plan is to do paracentesis then place PEG tube. Seen by nephrology. No new recommendations. Seen by ID. Continue fluconazole. Labs Hgb 8.8, K 3.3, pro-bnp 990.     4/22  Patient is confused and seen with 1:1 sitter. He is diffusely edematous at this time and leaking fluids from his abdomen. Patient is NPO for paracentesis and PEG tube placement. 4/23    Patient had PEG tube placed. 4/25  Patient talking, difficult to understand. Has 1:1 sitter. Labs notable for:  Cr 2.17 (increasing)   BNP 1765     Objective:     Visit Vitals  /75 (BP 1 Location: Right upper arm, BP Patient Position: Lying)   Pulse 80   Temp 98.7 °F (37.1 °C)   Resp 18   Ht 5' 10\" (1.778 m)   Wt 202 lb 13.2 oz (92 kg)   SpO2 99%   BMI 29.10 kg/m²        Recent Results (from the past 24 hour(s))   GLUCOSE, POC    Collection Time: 04/24/22 11:03 AM   Result Value Ref Range    Glucose (POC) 104 65 - 117 mg/dL    Performed by [id number], POC    Collection Time: 04/24/22  3:50 PM   Result Value Ref Range    Glucose (POC) 91 65 - 117 mg/dL    Performed by Ifeanyi Mike      [unfilled]      Review of Systems    Constitutional: Negative for chills and fever. HENT: Negative. Eyes: Negative. Respiratory: Negative. Cardiovascular: Negative. Gastrointestinal: Negative for abdominal pain and nausea. Skin: Negative. Neurological: Negative. Physical Exam:      Constitutional: pt is oriented to person, place, and time. HENT:   Head: Normocephalic and atraumatic. Eyes: Pupils are equal, round, and reactive to light. EOM are normal.   Cardiovascular: Normal rate, regular rhythm and normal heart sounds. Pulmonary/Chest: Breath sounds normal. No wheezes. No rales. Exhibits no tenderness. Abdominal: PEG site wihtout leaking or bleeding. Soft. Bowel sounds are normal. There is no abdominal tenderness. There is no rebound and no guarding. Musculoskeletal: Normal range of motion. Neurological: pt is alert and oriented to person, place, and time. Extremities 2+ edema  Anasarca    US ABD LTD   Final Result   Increased abdominal and pelvic ascites. IR PARACENTESIS ABD SUBSQ   Final Result   Ultrasound guided paracentesis with no immediate complications. Patient tolerated the procedure well.       XR CHEST PORT   Final Result Endotracheal tube as above. Underexpanded lungs with bibasilar   atelectasis. Hydrostatic edema. Possible pleural effusions. US ABD LTD   Final Result   Ascites. XR HAND RT MIN 3 V   Final Result      CT HEAD WO CONT   Final Result   Age-appropriate atrophy. No acute findings. XR ABD (KUB)   Final Result   Within normal      XR CHEST PORT   Final Result   Findings/impression:   1. Low lung volumes. Bibasilar hypoventilatory change/atelectasis. Left   retrocardiac lung incompletely evaluated, cannot exclude underlying airspace   disease. 2.  Cardiac and mediastinal contours are obscured by low lung volumes. Ectatic/possible aneurysmal appearance transverse aorta with calcific   atherosclerotic plaque. Appears stable from prior radiograph. 3.  No pleural fluid. No pneumothorax. 4.  Prominent gas-filled bowel partially visualized in the left upper quadrant. IR INSERT NON TUNL CVC OVER 5 YRS    (Results Pending)   IR PARACENTESIS ABD W IMAGE    (Results Pending)   IR US GUIDED VASCULAR ACCESS    (Results Pending)   IR FLUORO GUIDE PLC CVAD    (Results Pending)        Recent Results (from the past 24 hour(s))   GLUCOSE, POC    Collection Time: 04/24/22 11:03 AM   Result Value Ref Range    Glucose (POC) 104 65 - 117 mg/dL    Performed by Ellis Kelly, POC    Collection Time: 04/24/22  3:50 PM   Result Value Ref Range    Glucose (POC) 91 65 - 117 mg/dL    Performed by Branden Gavin        Results     Procedure Component Value Units Date/Time    CULTURE, BODY FLUID W Mireya Artis [[id number]] Collected: 04/22/22 1400    Order Status: Completed Specimen:  Body Fluid from Body fld Updated: 04/23/22 1418     Special Requests: No Special Requests        GRAM STAIN No wbc's seen         No organisms seen        Culture result: No growth thus far       CULTURE, URINE [[id number]]  (Abnormal) Collected: 04/12/22 2300    Order Status: Completed Specimen: Urine Updated: 04/16/22 1331     Special Requests: No Special Requests        Meta Count --        >100,000  colonies/ml       Culture result: Candida albicans       CULTURE, URINE [[id number]] Collected: 04/12/22 2300    Order Status: Canceled Specimen: Urine            Labs:     Recent Labs     04/23/22  0702   WBC 6.1   HGB 8.5*   HCT 24.1*   PLT 76*     Recent Labs     04/24/22  0748 04/23/22  0702    141   K 3.9 4.2   * 114*   CO2 20* 21   BUN 21* 19   CREA 2.05* 1.89*   * 66   CA 8.2* 7.9*   PHOS 2.5*  --      Recent Labs     04/24/22  0748 04/23/22  0702   ALT  --  13   AP  --  61   TBILI  --  1.6*   TP  --  5.4*   ALB 1.8* 1.8*   GLOB  --  3.6     No results for input(s): INR, PTP, APTT, INREXT, INREXT in the last 72 hours. No results for input(s): FE, TIBC, PSAT, FERR in the last 72 hours. No results found for: FOL, RBCF   No results for input(s): PH, PCO2, PO2 in the last 72 hours. No results for input(s): CPK, CKNDX, TROIQ in the last 72 hours.     No lab exists for component: CPKMB  No results found for: CHOL, CHOLX, CHLST, CHOLV, HDL, HDLP, LDL, LDLC, DLDLP, TGLX, TRIGL, TRIGP, CHHD, CHHDX  Lab Results   Component Value Date/Time    Glucose (POC) 91 04/24/2022 03:50 PM    Glucose (POC) 104 04/24/2022 11:03 AM    Glucose (POC) 106 04/24/2022 07:10 AM    Glucose (POC) 87 04/23/2022 08:00 PM    Glucose (POC) 79 04/23/2022 04:42 PM     Lab Results   Component Value Date/Time    Color Yellow/Straw 04/12/2022 11:00 PM    Appearance Turbid (A) 04/12/2022 11:00 PM    Specific gravity 1.011 04/12/2022 11:00 PM    pH (UA) 5.0 04/12/2022 11:00 PM    Protein Negative 04/12/2022 11:00 PM    Glucose Negative 04/12/2022 11:00 PM    Ketone Negative 04/12/2022 11:00 PM    Bilirubin Negative 04/12/2022 11:00 PM    Urobilinogen 0.1 04/12/2022 11:00 PM    Nitrites Negative 04/12/2022 11:00 PM    Leukocyte Esterase Large (A) 04/12/2022 11:00 PM    Bacteria 1+ (A) 04/12/2022 11:00 PM    WBC >100 (H) 04/12/2022 11:00 PM    RBC  04/12/2022 11:00 PM         Assessment:     Sepsis  UTI/Candida  Lactic acidosis  Acute on chronic renal failure- creat 2.01  Cirrhosis with ascites  Anemia  History of septic DIP joint, right index finger  Hypothyroidism  Seizure disorder  Gout  GERD  History of etoh abuse  Hypotension  Metabolic acidosis  Hypothermia  Hypokalemia  Anemia monitor H&H  Abdominal distention/ascites  Protein calorie malnutrition  Hypokalemia  Anasarca   Static post PEG tube placed    Plan:     Retacrit 10,000 q7 days   Allopurinol 300 mg daily  Vitamin D 50,000 weekly  Pepcid 20 twice daily  Folic acid 1 mg daily  Lactulose 10 g 3 times a day  Keppra 1500 twice a day  Synthroid 25 mcg daily  Midodrine 10 mg 3 times a day  Propranolol 20 mg twice a day  Sodium bicarb 1303 times a day  Flomax 0.4 mg daily  Thiamine 100 mg daily  Vancomycin with pharmacy  Replace potassium  keppra drip 1500 mg Q12H  BuSpar 15 mg twice a day    We will do psych consult for psychosis patient receiving Geodon  Psychotic behavior    Continue PEG tube feeding                Current Facility-Administered Medications:     zinc oxide-white petrolatum 20-75 % topical paste, , Topical, PRN, Lamberto Ackerman MD, Given at 04/24/22 0245    busPIRone (BUSPAR) tablet 15 mg, 15 mg, Oral, BID, Lamberto Ackerman MD, 15 mg at 04/25/22 0913    sodium bicarbonate tablet 1,300 mg, 1,300 mg, Oral, QID, Devora Varela MD, 1,300 mg at 04/25/22 0913    fluconazole (DIFLUCAN) tablet 200 mg, 200 mg, Oral, DAILY, Ree Zaragoza MD, 200 mg at 04/25/22 0914    hydrOXYzine (VISTARIL) injection 50 mg, 50 mg, IntraMUSCular, Q8H PRN, Gauri Godinez MD, 50 mg at 04/21/22 0235    fludrocortisone (FLORINEF) tablet 0.1 mg, 0.1 mg, Oral, DAILY, Ree Zaragoza MD, 0.1 mg at 04/25/22 0913    0.9% sodium chloride infusion 250 mL, 250 mL, IntraVENous, PRN, Lamberto Ackerman MD    ziprasidone (GEODON) 10 mg in sterile water (preservative free) 0.5 mL injection, 10 mg, IntraMUSCular, Q12H PRN, Belia Coffman MD, 10 mg at 04/20/22 1704    levETIRAcetam (KEPPRA) 1500 mg in saline (iso-osm) 100 ml IVPB, 1,500 mg, IntraVENous, Q12H, Lamberto Ackerman MD, Last Rate: 400 mL/hr at 04/25/22 0205, 1,500 mg at 04/25/22 0205    0.9% sodium chloride infusion 250 mL, 250 mL, IntraVENous, PRN, Chris Ackerman MD    epoetin luis-epbx (RETACRIT) injection 10,000 Units, 10,000 Units, SubCUTAneous, Q7D, Elliot Peterson MD, 10,000 Units at 04/23/22 2107    midodrine (PROAMATINE) tablet 10 mg, 10 mg, Oral, TID WITH MEALS, Lamberto Ackerman MD, 10 mg at 04/25/22 0914    LORazepam (ATIVAN) injection 1 mg, 1 mg, IntraVENous, Q4H PRN, Naomi Ann MD, 1 mg at 04/08/22 1733    sodium chloride (NS) flush 5-10 mL, 5-10 mL, IntraVENous, PRN, Lamberto Ackerman MD, 10 mL at 04/15/22 2354    lactulose (CHRONULAC) 10 gram/15 mL solution 15 mL, 10 g, Oral, TID, Lamberto Ackerman MD, 15 mL at 04/25/22 0913    tamsulosin (FLOMAX) capsule 0.4 mg, 0.4 mg, Oral, DAILY, Lamberto Ackerman MD, 0.4 mg at 04/25/22 3798    thiamine mononitrate (B-1) tablet 100 mg, 100 mg, Oral, DAILY, Naomi Ann MD, 100 mg at 04/25/22 0913    allopurinoL (ZYLOPRIM) tablet 300 mg, 300 mg, Oral, DAILY, Lamberto Ackerman MD, 300 mg at 04/25/22 9283    ergocalciferol capsule 50,000 Units, 50,000 Units, Oral, Q7D, Lamberto Ackerman MD, 50,000 Units at 04/21/22 1647    famotidine (PEPCID) tablet 20 mg, 20 mg, Oral, BID, Lamberto Ackerman MD, 20 mg at 23/57/39 9623    folic acid (FOLVITE) tablet 1 mg, 1 mg, Oral, DAILY, Lamberto Ackerman MD, 1 mg at 04/25/22 0900    levothyroxine (SYNTHROID) tablet 25 mcg, 25 mcg, Oral, ACB, Lamberto Ackerman MD, 25 mcg at 04/25/22 0913    propranoloL (INDERAL) tablet 20 mg, 20 mg, Oral, BID, Lamberto Ackerman MD, 20 mg at 04/24/22 2135    acetaminophen (TYLENOL) tablet 650 mg, 650 mg, Oral, Q6H PRN **OR** acetaminophen (TYLENOL) suppository 650 mg, 650 mg, Rectal, Q6H PRN, Kobe Ackerman MD    polyethylene glycol (MIRALAX) packet 17 g, 17 g, Oral, DAILY PRN, Kobe Ackerman MD    ondansetron (ZOFRAN ODT) tablet 4 mg, 4 mg, Oral, Q8H PRN **OR** ondansetron (ZOFRAN) injection 4 mg, 4 mg, IntraVENous, Q6H PRN, Lamberto Ackerman MD, 4 mg at 04/23/22 0056

## 2022-04-25 NOTE — PROGRESS NOTES
CM reviewed chart and spoke to primary physician. Psychiatry has been consulted for patient's behaviors. Patient's labs are also being followed by nephrology. ALFRED has updated [facility] with most recent clinicals. CM will continue to follow.

## 2022-04-25 NOTE — PROGRESS NOTES
Problem: Pressure Injury - Risk of  Goal: *Prevention of pressure injury  Description: Document Curtis Scale and appropriate interventions in the flowsheet. Outcome: Progressing Towards Goal  Note: Pressure Injury Interventions:  Sensory Interventions: Assess changes in LOC    Moisture Interventions: Absorbent underpads    Activity Interventions: Increase time out of bed,PT/OT evaluation    Mobility Interventions: HOB 30 degrees or less    Nutrition Interventions: Document food/fluid/supplement intake    Friction and Shear Interventions: Apply protective barrier, creams and emollients,Feet elevated on foot rest,Foam dressings/transparent film/skin sealants,HOB 30 degrees or less,Lift sheet                Problem: Patient Education: Go to Patient Education Activity  Goal: Patient/Family Education  Outcome: Progressing Towards Goal     Problem: Falls - Risk of  Goal: *Absence of Falls  Description: Document Elba Fall Risk and appropriate interventions in the flowsheet.   Outcome: Progressing Towards Goal  Note: Fall Risk Interventions:  Mobility Interventions: Bed/chair exit alarm    Mentation Interventions: Adequate sleep, hydration, pain control,Bed/chair exit alarm    Medication Interventions: Bed/chair exit alarm    Elimination Interventions: Call light in reach,Bed/chair exit alarm              Problem: Patient Education: Go to Patient Education Activity  Goal: Patient/Family Education  Outcome: Progressing Towards Goal     Problem: Aspiration - Risk of  Goal: *Absence of aspiration  Outcome: Progressing Towards Goal     Problem: Patient Education: Go to Patient Education Activity  Goal: Patient/Family Education  Outcome: Progressing Towards Goal     Problem: Patient Education: Go to Patient Education Activity  Goal: Patient/Family Education  Outcome: Progressing Towards Goal     Problem: Impaired Skin Integrity/Pressure Injury Treatment  Goal: *Improvement of Existing Pressure Injury  Outcome: Progressing Towards Goal  Goal: *Prevention of pressure injury  Description: Document Curtis Scale and appropriate interventions in the flowsheet.   Outcome: Progressing Towards Goal  Note: Pressure Injury Interventions:  Sensory Interventions: Assess changes in LOC    Moisture Interventions: Absorbent underpads    Activity Interventions: Increase time out of bed,PT/OT evaluation    Mobility Interventions: HOB 30 degrees or less    Nutrition Interventions: Document food/fluid/supplement intake    Friction and Shear Interventions: Apply protective barrier, creams and emollients,Feet elevated on foot rest,Foam dressings/transparent film/skin sealants,HOB 30 degrees or less,Lift sheet                Problem: Patient Education: Go to Patient Education Activity  Goal: Patient/Family Education  Outcome: Progressing Towards Goal     Problem: Non-Violent Restraints  Goal: Removal from restraints as soon as assessed to be safe  Outcome: Progressing Towards Goal  Goal: No harm/injury to patient while restraints in use  Outcome: Progressing Towards Goal  Goal: Patient's dignity will be maintained  Outcome: Progressing Towards Goal  Goal: Patient Interventions  Outcome: Progressing Towards Goal     Problem: Delirium Treatment  Goal: *Level of consciousness restored to baseline  Outcome: Progressing Towards Goal  Goal: *Level of environmental perceptions restored to baseline  Outcome: Progressing Towards Goal  Goal: *Sensory perception restored to baseline  Outcome: Progressing Towards Goal  Goal: *Emotional stability restored to baseline  Outcome: Progressing Towards Goal  Goal: *Functional assessment restored to baseline  Outcome: Progressing Towards Goal  Goal: *Absence of falls  Outcome: Progressing Towards Goal  Goal: *Will remain free of delirium, CAM Score negative  Outcome: Progressing Towards Goal  Goal: *Cognitive status will be restored to baseline  Outcome: Progressing Towards Goal  Goal: Interventions  Outcome: Progressing Towards Goal     Problem: Patient Education: Go to Patient Education Activity  Goal: Patient/Family Education  Outcome: Progressing Towards Goal     Problem: Patient Education: Go to Patient Education Activity  Goal: Patient/Family Education  Outcome: Progressing Towards Goal     Problem: Patient Education: Go to Patient Education Activity  Goal: Patient/Family Education  Outcome: Progressing Towards Goal

## 2022-04-25 NOTE — PROGRESS NOTES
OCCUPATIONAL THERAPY TREATMENT  Patient: Cassandra Prieto (32 y.o. male)  Date: 4/25/2022  Diagnosis: Urinary retention [R33.9]  Lactic acidosis [E87.2]  Severe sepsis (HCC) [A41.9, R65.20]  Hypothermia [T68. XXXA]  Sepsis ([facility] [address] [A41.9] <principal problem not specified>  Procedure(s) (LRB):  PARACENTESIS (N/A)  PERCUTANEOUS ENDOSCOPIC GASTROSTOMY TUBE INSERTION (N/A)  ESOPHAGOGASTRODUODENOSCOPY (EGD) (N/A) 3 Days Post-Op  Precautions:    Chart, occupational therapy assessment, plan of care, and goals were reviewed. ASSESSMENT  Patient continues with skilled OT services and is progressing towards goals. Pt. Received semi-supine in bed upon arrival and agreeable to tx session. 1:1 in pts room. Pt. Performed supine to sit with min assist x 2. Pt. Demonstrates with forward flexed posture initially. Pt. Able to tolerate sitting at EOB for increased time today to increased up-right posture and sitting balance. Sit> stand with Min assist x 2. Pt. Was able to stand approx 1 minute with use of RW for support and able to perform approx. 3 side steps to [facility] with Mod assist x 2. Pt. Requesting to return seated at EOB d/t increased fatigue and  unsteadiness. Pt. unable to tolerate sitting EOB to perform UE and LE therex due to fatigue. Pt. able to perform a few UE therex  once pt. Returned semi-supine in bed. Pt. Performed rolling R/L to change chux padding and total A for whitney-care. Pt. Left semi-supine in bed with 1:1 in pts room. Current Level of Function Impacting Discharge (ADLs): assistance with all ADL's, Min A x2 for bed mobility, total A for whitney-care, increased fatigue    Other factors to consider for discharge: PLOF, time since on set         PLAN :  Patient continues to benefit from skilled intervention to address the above impairments. Continue treatment per established plan of care. to address goals.     Recommendation for discharge: (in order for the patient to meet his/her long term goals)  Therapy up to 5 days/week in SNF setting    This discharge recommendation:  Has been made in collaboration with the attending provider and/or case management    IF patient discharges home will need the following DME: TBD       SUBJECTIVE:   Patient stated I need to lay back down .     OBJECTIVE DATA SUMMARY:   Cognitive/Behavioral Status:  Neurologic State: Alert;Confused  Orientation Level: Disoriented to place; Disoriented to situation;Disoriented to time;Oriented to person  Cognition: Decreased attention/concentration; Follows commands    Functional Mobility and Transfers for ADLs:  Bed Mobility:  Rolling: Contact guard assistance  Supine to Sit: Minimum assistance;Assist x2  Sit to Supine: Minimum assistance  Scooting: Stand-by assistance    Transfers:  Sit to Stand: Minimum assistance;Assist x2      Balance:  Sitting: Intact; With support  Sitting - Static: Good (unsupported)  Sitting - Dynamic: Fair (occasional)  Standing: Impaired; With support  Standing - Static: Constant support; Fair  Standing - Dynamic : Constant support; Fair    ADL Intervention:    Toileting  Bowel Hygiene: Total assistance (dependent)    Therapeutic Exercises: georgi Ue's  Exercise Sets Reps AROM AAROM PROM Self PROM Comments   Shoulder flex/ext 1 5 [x] [] [] []    Elbow flex/ext 1 5 [x] [] [] []    Wrist flex/ext   [] [] [] []       [] [] [] []      Pain:  0/ 10 pain reported    Activity Tolerance:   Fair and requires rest breaks  Please refer to the flowsheet for vital signs taken during this treatment. After treatment patient left in no apparent distress:   Supine in bed, Call bell within reach, Bed / chair alarm activated and Caregiver / family present    COMMUNICATION/COLLABORATION:   The patients plan of care was discussed with: Physical therapy assistant, Registered nurse and Certified nursing assistant/patient care technician.  Co-tx with PTA for increased assistance with bed mobility and transfers, decreased activity tolerance to perform in two separate tx sessions at this time. Belkys Leggett  Time Calculation: 39 mins    Problem: Self Care Deficits Care Plan (Adult)  Goal: *Acute Goals and Plan of Care (Insert Text)  Description: 1. Pt will be SBA sup <> sit in prep for EOB ADLs  2. Pt will be SBA grooming sitting EOB  3. Pt will be SBA LE dressing sitting EOB/long sit  4. Pt will be SBA sit <>  prep for toileting LRAD  5. Pt will be SBA toileting/toilet transfer/cloth mgmt LRAD  6.  Pt will be SBA following UE HEP in prep for self care tasks      Outcome: Progressing Towards Goal

## 2022-04-25 NOTE — PROGRESS NOTES
Renal Note    NAME:  Zaid Gonzalez   :   1960   MRN:   [id number]     ATTENDING: Mary Neff MD  PCP:  Jan Marroquin NP    Date/Time:  2022 12:46 PM      Subjective:     Patient seen in the room. He is  confused . Creatinine 2.1. Pressure is low in the 's range. On midodrine 10 mg tid    Past Medical History:   Diagnosis Date    Arthritis     Hypertension       Past Surgical History:   Procedure Laterality Date    IR INSERT NON TUNL CVC OVER 5 YRS  3/25/2022    IR PARACENTESIS ABD SUBSQ  4/15/2022    IR PARACENTESIS ABD W IMAGE  2022    IR PARACENTESIS ABD W IMAGE  3/1/2022    IR PARACENTESIS ABD W IMAGE  3/30/2022     Social History     Tobacco Use    Smoking status: Never Smoker    Smokeless tobacco: Never Used   Substance Use Topics    Alcohol use: Not on file      History reviewed. No pertinent family history. No Known Allergies   Prior to Admission medications    Medication Sig Start Date End Date Taking? Authorizing Provider   thiamine mononitrate (B-1) 100 mg tablet Take 1 Tablet by mouth daily. 22   Gloria Ackerman MD   levothyroxine (SYNTHROID) 25 mcg tablet Take 25 mcg by mouth Daily (before breakfast). Provider, Historical   tamsulosin (Flomax) 0.4 mg capsule Take 1 Capsule by mouth daily. 3/4/22   Kirk Whyte PA-C   potassium chloride (K-DUR, KLOR-CON M20) 20 mEq tablet Take 1 Tablet by mouth daily. 3/2/22   Richard Flores MD   sodium bicarbonate 650 mg tablet Take 1 Tablet by mouth three (3) times daily. 3/2/22   Richard Flores MD   levETIRAcetam (Keppra) 1,000 mg tablet Take 1.5 Tablets by mouth two (2) times a day. 3/2/22   Richard Flores MD   ergocalciferol (ERGOCALCIFEROL) 1,250 mcg (50,000 unit) capsule Take 1 Capsule by mouth every seven (7) days. 22   Provider, Historical   lactulose (CHRONULAC) 10 gram/15 mL solution Take 15 mL by mouth three (3) times daily.  22 Anselmo Jasso MD   allopurinoL (ZYLOPRIM) 300 mg tablet Take 1 Tablet by mouth daily. 22   Provider, Historical   thiamine HCL (B-1) 100 mg tablet Take 100 mg by mouth daily. Provider, Historical   propranoloL (INDERAL) 20 mg tablet Take 20 mg by mouth two (2) times a day. Provider, Historical   folic acid (FOLVITE) 1 mg tablet Take 1 mg by mouth daily. Provider, Historical   famotidine (PEPCID) 20 mg tablet Take 20 mg by mouth At bedtime. Provider, Historical   aMILoride (MIDAMOR) 5 mg tablet Take 5 mg by mouth daily. Provider, Historical       REVIEW OF SYSTEMS:       he is lethargic. Unable to obtain    Objective:   VITALS:    Visit Vitals  /75   Pulse 83   Temp 98.7 °F (37.1 °C)   Resp 18   Ht 5' 10\" (1.778 m)   Wt 92 kg (202 lb 13.2 oz)   SpO2 99%   BMI 29.10 kg/m²     Temp (24hrs), Av.5 °F (36.4 °C), Min:96.3 °F (35.7 °C), Max:98.7 °F (37.1 °C)      PHYSICAL EXAM:     General: NAD, lethargic eyes: sclera anicteric  Oral Cavity: No thrush or ulcers  Neck: no JVD  Chest: Fair bilateral air entry. No Wheezing or Rhonchi. No rales.   Heart: normal sounds  Abdomen: soft and non tender   :  Gerard+  Lower Extremities: no edema  Skin: no rash  Neuro: Lethargic psychiatric: Able to assess      LAB DATA REVIEWED:    Recent Results (from the past 24 hour(s))   GLUCOSE, POC    Collection Time: 22  3:50 PM   Result Value Ref Range    Glucose (POC) 91 65 - 117 mg/dL    Performed by Fatemeh Barrera    CBC W/O DIFF    Collection Time: 22 10:09 AM   Result Value Ref Range    WBC 7.2 4.1 - 11.1 K/uL    RBC 3.95 (L) 4.10 - 5.70 M/uL    HGB 11.1 (L) 12.1 - 17.0 g/dL    HCT 31.7 (L) 36.6 - 50.3 %    MCV 80.3 80.0 - 99.0 FL    MCH 28.1 26.0 - 34.0 PG    MCHC 35.0 30.0 - 36.5 g/dL    RDW 23.7 (H) 11.5 - 14.5 %    PLATELET 81 (L) 040 - 400 K/uL    NRBC 0.4 (H) 0.0  WBC    ABSOLUTE NRBC 0.03 (H) 0.00 - 8.39 K/uL   METABOLIC PANEL, COMPREHENSIVE    Collection Time: 22 10:09 AM Result Value Ref Range    Sodium 138 136 - 145 mmol/L    Potassium 5.3 (H) 3.5 - 5.1 mmol/L    Chloride 110 (H) 97 - 108 mmol/L    CO2 23 21 - 32 mmol/L    Anion gap 5 5 - 15 mmol/L    Glucose 96 65 - 100 mg/dL    BUN 22 (H) 6 - 20 mg/dL    Creatinine 2.16 (H) 0.70 - 1.30 mg/dL    BUN/Creatinine ratio 10 (L) 12 - 20      GFR est AA 38 (L) >60 ml/min/1.73m2    GFR est non-AA 31 (L) >60 ml/min/1.73m2    Calcium 8.3 (L) 8.5 - 10.1 mg/dL    Bilirubin, total 1.0 0.2 - 1.0 mg/dL    AST (SGOT) 51 (H) 15 - 37 U/L    ALT (SGPT) 17 12 - 78 U/L    Alk. phosphatase 189 (H) 45 - 117 U/L    Protein, total 6.3 (L) 6.4 - 8.2 g/dL    Albumin 1.8 (L) 3.5 - 5.0 g/dL    Globulin 4.5 (H) 2.0 - 4.0 g/dL    A-G Ratio 0.4 (L) 1.1 - 2.2     RENAL FUNCTION PANEL    Collection Time: 04/25/22 10:11 AM   Result Value Ref Range    Sodium 139 136 - 145 mmol/L    Potassium 5.3 (H) 3.5 - 5.1 mmol/L    Chloride 110 (H) 97 - 108 mmol/L    CO2 23 21 - 32 mmol/L    Anion gap 6 5 - 15 mmol/L    Glucose 97 65 - 100 mg/dL    BUN 22 (H) 6 - 20 mg/dL    Creatinine 2.17 (H) 0.70 - 1.30 mg/dL    BUN/Creatinine ratio 10 (L) 12 - 20      GFR est AA 37 (L) >60 ml/min/1.73m2    GFR est non-AA 31 (L) >60 ml/min/1.73m2    Calcium 8.2 (L) 8.5 - 10.1 mg/dL    Phosphorus 2.9 2.6 - 4.7 mg/dL    Albumin 1.8 (L) 3.5 - 5.0 g/dL       Recommendations/Plan:     1 acute kidney injury on chronic kidney disease 3:  -Creatinine was 2.0 on admission   -Creatinine stable at 1.7-1.8 with ivf.  This is his baseline  -Cr bumped at 2.1  -Persistent GRACIA likely because of ongoing hypotension.     -Baseline creatinine 1.3-1.7 based on labs during previous admission  -Has history of recent GRACIA with creatinine peaking to 2.6 few weeks ago  -GRACIA likely prerenal secondary to hypotension/sepsis  -Urine is suggestive of UTI  -No need for renal ultrasound  -legs edema+, off IVF    2 metabolic acidosis:  -CO2 is 20  -on oral bicarbonate 1300 QID which I will continue  -off HCO3 drip     3 severe anemia  -Hemoglobin 6.8->7.4->8.2.->8.8->8.5  -s/p  unit blood Tx  -Continue weekly Procrit    4  Hyperkalemia  -Potassium is 5.2  -SPS was ordered      5 hypotension  -BP improved with IV fluids and midodrine 10 mg TID  -Continue midodrine  -will dc flurinef (retaisn fluid and hyperkalmia)    5 right index finger osteomyelitis  -s/p recent antibiotics for 2 weeks. -Vancomycin has been resumed during this hospitalization  -ID is on the case    6 history of liver cirrhosis  -Has history of alcoholic liver cirrhosis. Portal hypertension  -Noted to have abdominal distention.   IR has been consulted  -Had paracentesis during his previous admission also      ________________________________________________________________________  Signed: Barbara Aponte MD

## 2022-04-26 NOTE — PROGRESS NOTES
General Daily Progress Note          Patient Name:   Deniz Stallworth       YOB: 1960       Age:  60 y.o. Admit Date: 4/7/2022      Subjective:     Deniz Stallworth is a(n) 60 y.o. male with PMH significant for HTN, cirrhosis, ascites presents via EMS for removing fish catheter. Patient recently d/c after lengthy stay in hospital for septic DIP joint of right index finger. He was d/c on 10 days of PO Zyvox, which he completed. He presents today after pulling out fish catheter. Several blood clots noted. Patient septic upon arrival with hypothermia and hypotension. Temp 93.6 rectal, BP 84/68. Patient is retaining 887cc of urine and nursing staff will replace fish.     Patient started on fluids and IV Levaquin in the ED. Patient is altered at baseline. Head CT w/o contrast shows no acute abnormality. Blood cultures pending. CXR and KUB both normal       Patient is alert awake confused  Hypotensive off  Levophed    Normal temperature    4/12  Patient is off levophed but still receiving midodrine and IVF. He remain hypotensive. He is lethargic and confused this morning. Patient has mild bilateral LE edema. Seen by ID yesterday. Recommends continuing vancomycin   Seen by nephrology yesterday. Recommends discontinuing IVF in lieu of leg edema, discontinue bicarb drip, continuing weekly procrit, continuing PO KCl, midodrine for chronic hypotension. Today patient hypothermic on bear hugger  Yesterday evening patient was restless agitated    4/13    Patient is alert awake still hypothermic on shemar hugger    4/14  Patient is seen sleeping. On shemar hugger and vancomycin. He is producing 250 ml urine. No new changes seen at this time. Labs pending. Cr trending down 1.62 (on 4/13). Abdominal US on 4/13 shows ascites. UA on 4/12 positive for leukocyte esterase, WBC >100 and bacteruria. Urine culture pending.      Patient refused medication this morning    4/15    Patient sleepy obtunded /hypothermic    Not eating drinking    4/18  Patient is seen in medical telemetry with a one to one sitter. He is still confused and refusing to eat food. He is compliant with his medications  He pulled out NG tube 3 times. Patient had a paracentesis done on 4/15 without any complications. Labs remarkable for Cr 1.75 increasing. CBC and CMP pending. Ammonia unremarkable. 4/19  Patient is seen at bedside with a one to one sitter. He still confused and attempting to pull out his IJ central line. He has mitts on. He is also attempting to jump out of bed. Patient is still not eating today. He is taking his medications. Labs remarkable for Cr 1.82. Abdominal US remarkable for Increased abdominal and pelvic ascites. Seen by ID. Recommends discontinuing eraxis, and starting on fluconazole. 4/20  Patient is seen at bedside with a one to one sitter. She reports that he pulled out his IJ line. Patient now has an IV in his hand. He has mittens on. He is still refusing to eat any food but he continues to take his medications. Patient continues to be hypothermic with latest axillary temperature of 96.1 F. Seen by nephrology. Recommends increasing IV fluid rate. Continue fish catheter. Seen by GI. Recommends another paracentesis prior to PEG tube placement. Seen by ID. Recommends continuing fluconazole and discontinue vancomycin. Labs pending. 4/21  Patient is with one to one sitter. He has been less agitated today but remains confused. On keppra drip. Patient has minimal oral intake. He is taking his medications. Seen by GI. Plan is to do paracentesis then place PEG tube. Seen by nephrology. No new recommendations. Seen by ID. Continue fluconazole. Labs Hgb 8.8, K 3.3, pro-bnp 990.     4/22  Patient is confused and seen with 1:1 sitter. He is diffusely edematous at this time and leaking fluids from his abdomen. Patient is NPO for paracentesis and PEG tube placement. 4/23    Patient had PEG tube placed. 4/25  Patient talking, difficult to understand. Has 1:1 sitter. Labs notable for:  Cr 2.17 (increasing)   BNP 1765     4/26  Patient talking, difficult to understand. Has 1:1 sitter. Hypothermic today. Labs notable for:   K 3.7    Patient vomited last night feeding was on hold  Started on Reglan        Objective:     Visit Vitals  /77 (BP 1 Location: Right upper arm, BP Patient Position: At rest)   Pulse 87   Temp (!) 96.6 °F (35.9 °C)   Resp 19   Ht 5' 10\" (1.778 m)   Wt 92.6 kg (204 lb 2.3 oz)   SpO2 100%   BMI 29.29 kg/m²        Recent Results (from the past 24 hour(s))   RENAL FUNCTION PANEL    Collection Time: 04/26/22  7:56 AM   Result Value Ref Range    Sodium 138 136 - 145 mmol/L    Potassium 3.7 3.5 - 5.1 mmol/L    Chloride 109 (H) 97 - 108 mmol/L    CO2 21 21 - 32 mmol/L    Anion gap 8 5 - 15 mmol/L    Glucose 107 (H) 65 - 100 mg/dL    BUN 22 (H) 6 - 20 mg/dL    Creatinine 2.18 (H) 0.70 - 1.30 mg/dL    BUN/Creatinine ratio 10 (L) 12 - 20      GFR est AA 37 (L) >60 ml/min/1.73m2    GFR est non-AA 31 (L) >60 ml/min/1.73m2    Calcium 8.8 8.5 - 10.1 mg/dL    Phosphorus 3.1 2.6 - 4.7 mg/dL    Albumin 1.8 (L) 3.5 - 5.0 g/dL   METABOLIC PANEL, COMPREHENSIVE    Collection Time: 04/26/22  7:57 AM   Result Value Ref Range    Sodium 139 136 - 145 mmol/L    Potassium 3.7 3.5 - 5.1 mmol/L    Chloride 109 (H) 97 - 108 mmol/L    CO2 22 21 - 32 mmol/L    Anion gap 8 5 - 15 mmol/L    Glucose 108 (H) 65 - 100 mg/dL    BUN 22 (H) 6 - 20 mg/dL    Creatinine 2.18 (H) 0.70 - 1.30 mg/dL    BUN/Creatinine ratio 10 (L) 12 - 20      GFR est AA 37 (L) >60 ml/min/1.73m2    GFR est non-AA 31 (L) >60 ml/min/1.73m2    Calcium 8.6 8.5 - 10.1 mg/dL    Bilirubin, total 1.0 0.2 - 1.0 mg/dL    AST (SGOT) 38 (H) 15 - 37 U/L    ALT (SGPT) 16 12 - 78 U/L    Alk.  phosphatase 244 (H) 45 - 117 U/L    Protein, total 6.5 6.4 - 8.2 g/dL    Albumin 1.8 (L) 3.5 - 5.0 g/dL    Globulin 4.7 (H) 2.0 - 4.0 g/dL    A-G Ratio 0.4 (L) 1.1 - 2.2     GLUCOSE, POC    Collection Time: 04/26/22 11:19 AM   Result Value Ref Range    Glucose (POC) 114 65 - 117 mg/dL    Performed by Gladys Cespedes      [unfilled]      Review of Systems    Constitutional: Negative for chills and fever. HENT: Negative. Eyes: Negative. Respiratory: Negative. Cardiovascular: Negative. Gastrointestinal: Negative for abdominal pain and nausea. Skin: Negative. Neurological: Negative. Physical Exam:      Constitutional: pt is oriented to person, place, and time. HENT:   Head: Normocephalic and atraumatic. Eyes: Pupils are equal, round, and reactive to light. EOM are normal.   Cardiovascular: Normal rate, regular rhythm and normal heart sounds. Pulmonary/Chest: Breath sounds normal. No wheezes. No rales. Exhibits no tenderness. Abdominal: Soft. Bowel sounds are normal. There is no abdominal tenderness. There is no rebound and no guarding. Musculoskeletal: Normal range of motion. Neurological: pt is alert and oriented to person, place, and time. Extremities 2+ edema  Anasarca    US ABD LTD   Final Result   Increased abdominal and pelvic ascites. IR PARACENTESIS ABD SUBSQ   Final Result   Ultrasound guided paracentesis with no immediate complications. Patient tolerated the procedure well. XR CHEST PORT   Final Result   Endotracheal tube as above. Underexpanded lungs with bibasilar   atelectasis. Hydrostatic edema. Possible pleural effusions. US ABD LTD   Final Result   Ascites. XR HAND RT MIN 3 V   Final Result      CT HEAD WO CONT   Final Result   Age-appropriate atrophy. No acute findings. XR ABD (KUB)   Final Result   Within normal      XR CHEST PORT   Final Result   Findings/impression:   1. Low lung volumes. Bibasilar hypoventilatory change/atelectasis. Left   retrocardiac lung incompletely evaluated, cannot exclude underlying airspace   disease.       2. Cardiac and mediastinal contours are obscured by low lung volumes. Ectatic/possible aneurysmal appearance transverse aorta with calcific   atherosclerotic plaque. Appears stable from prior radiograph. 3.  No pleural fluid. No pneumothorax. 4.  Prominent gas-filled bowel partially visualized in the left upper quadrant. IR INSERT NON TUNL CVC OVER 5 YRS    (Results Pending)   IR PARACENTESIS ABD W IMAGE    (Results Pending)   IR US GUIDED VASCULAR ACCESS    (Results Pending)   IR FLUORO GUIDE PLC CVAD    (Results Pending)        Recent Results (from the past 24 hour(s))   RENAL FUNCTION PANEL    Collection Time: 04/26/22  7:56 AM   Result Value Ref Range    Sodium 138 136 - 145 mmol/L    Potassium 3.7 3.5 - 5.1 mmol/L    Chloride 109 (H) 97 - 108 mmol/L    CO2 21 21 - 32 mmol/L    Anion gap 8 5 - 15 mmol/L    Glucose 107 (H) 65 - 100 mg/dL    BUN 22 (H) 6 - 20 mg/dL    Creatinine 2.18 (H) 0.70 - 1.30 mg/dL    BUN/Creatinine ratio 10 (L) 12 - 20      GFR est AA 37 (L) >60 ml/min/1.73m2    GFR est non-AA 31 (L) >60 ml/min/1.73m2    Calcium 8.8 8.5 - 10.1 mg/dL    Phosphorus 3.1 2.6 - 4.7 mg/dL    Albumin 1.8 (L) 3.5 - 5.0 g/dL   METABOLIC PANEL, COMPREHENSIVE    Collection Time: 04/26/22  7:57 AM   Result Value Ref Range    Sodium 139 136 - 145 mmol/L    Potassium 3.7 3.5 - 5.1 mmol/L    Chloride 109 (H) 97 - 108 mmol/L    CO2 22 21 - 32 mmol/L    Anion gap 8 5 - 15 mmol/L    Glucose 108 (H) 65 - 100 mg/dL    BUN 22 (H) 6 - 20 mg/dL    Creatinine 2.18 (H) 0.70 - 1.30 mg/dL    BUN/Creatinine ratio 10 (L) 12 - 20      GFR est AA 37 (L) >60 ml/min/1.73m2    GFR est non-AA 31 (L) >60 ml/min/1.73m2    Calcium 8.6 8.5 - 10.1 mg/dL    Bilirubin, total 1.0 0.2 - 1.0 mg/dL    AST (SGOT) 38 (H) 15 - 37 U/L    ALT (SGPT) 16 12 - 78 U/L    Alk.  phosphatase 244 (H) 45 - 117 U/L    Protein, total 6.5 6.4 - 8.2 g/dL    Albumin 1.8 (L) 3.5 - 5.0 g/dL    Globulin 4.7 (H) 2.0 - 4.0 g/dL    A-G Ratio 0.4 (L) 1.1 - 2.2 GLUCOSE, POC    Collection Time: 04/26/22 11:19 AM   Result Value Ref Range    Glucose (POC) 114 65 - 117 mg/dL    Performed by Quita Gilbert        Results     Procedure Component Value Units Date/Time    CULTURE, BODY FLUID W Mireya Artis [[id number]] Collected: 04/22/22 1400    Order Status: Completed Specimen: Body Fluid from Body fld Updated: 04/23/22 1418     Special Requests: No Special Requests        GRAM STAIN No wbc's seen         No organisms seen        Culture result: No growth thus far       CULTURE, URINE [[id number]]  (Abnormal) Collected: 04/12/22 2300    Order Status: Completed Specimen: Urine Updated: 04/16/22 1331     Special Requests: No Special Requests        [place] Count --        >100,000  colonies/ml       Culture result: Candida albicans       CULTURE, URINE [[id number]] Collected: 04/12/22 2300    Order Status: Canceled Specimen: Urine            Labs:     Recent Labs     04/25/22  1009   WBC 7.2   HGB 11.1*   HCT 31.7*   PLT 81*     Recent Labs     04/26/22  0757 04/26/22  0756 04/25/22  1011 04/25/22  1009 04/24/22  0748    138 139   < > 140   K 3.7 3.7 5.3*   < > 3.9   * 109* 110*   < > 113*   CO2 22 21 23   < > 20*   BUN 22* 22* 22*   < > 21*   CREA 2.18* 2.18* 2.17*   < > 2.05*   * 107* 97   < > 111*   CA 8.6 8.8 8.2*   < > 8.2*   PHOS  --  3.1 2.9  --  2.5*    < > = values in this interval not displayed. Recent Labs     04/26/22  0757 04/26/22  0756 04/25/22  1011 04/25/22  1009 04/25/22  1009   ALT 16  --   --   --  17   *  --   --   --  189*   TBILI 1.0  --   --   --  1.0   TP 6.5  --   --   --  6.3*   ALB 1.8* 1.8* 1.8*   < > 1.8*   GLOB 4.7*  --   --   --  4.5*    < > = values in this interval not displayed. No results for input(s): INR, PTP, APTT, INREXT, INREXT in the last 72 hours. No results for input(s): FE, TIBC, PSAT, FERR in the last 72 hours.    No results found for: FOL, RBCF   No results for input(s): PH, PCO2, PO2 in the last 72 hours. No results for input(s): CPK, CKNDX, TROIQ in the last 72 hours. No lab exists for component: CPKMB  No results found for: CHOL, CHOLX, CHLST, CHOLV, HDL, HDLP, LDL, LDLC, DLDLP, TGLX, TRIGL, TRIGP, CHHD, CHHDX  Lab Results   Component Value Date/Time    Glucose (POC) 114 04/26/2022 11:19 AM    Glucose (POC) 91 04/24/2022 03:50 PM    Glucose (POC) 104 04/24/2022 11:03 AM    Glucose (POC) 106 04/24/2022 07:10 AM    Glucose (POC) 87 04/23/2022 08:00 PM     Lab Results   Component Value Date/Time    Color Yellow/Straw 04/12/2022 11:00 PM    Appearance Turbid (A) 04/12/2022 11:00 PM    Specific gravity 1.011 04/12/2022 11:00 PM    pH (UA) 5.0 04/12/2022 11:00 PM    Protein Negative 04/12/2022 11:00 PM    Glucose Negative 04/12/2022 11:00 PM    Ketone Negative 04/12/2022 11:00 PM    Bilirubin Negative 04/12/2022 11:00 PM    Urobilinogen 0.1 04/12/2022 11:00 PM    Nitrites Negative 04/12/2022 11:00 PM    Leukocyte Esterase Large (A) 04/12/2022 11:00 PM    Bacteria 1+ (A) 04/12/2022 11:00 PM    WBC >100 (H) 04/12/2022 11:00 PM    RBC  04/12/2022 11:00 PM         Assessment:     Sepsis  UTI/Candida  Lactic acidosis  Acute on chronic renal failure- creat 2.01  Cirrhosis with ascites  Anemia  History of septic DIP joint, right index finger  Hypothyroidism  Seizure disorder  Gout  GERD  History of etoh abuse  Hypotension  Metabolic acidosis  Hypothermia  Hypokalemia  Anemia monitor H&H  Abdominal distention/ascites  Protein calorie malnutrition  Hypokalemia  Anasarca   Static post PEG tube placed    Plan:      On allopurinol 300 mg daily  Vitamin D 50,000 weekly  Pepcid 20 twice daily  Folic acid 1 mg daily  Lactulose 10 g 3 times a day  Keppra 1500 twice a day  Synthroid 25 mcg daily  Midodrine 10 mg 3 times a day  Propranolol 20 mg twice a day  Sodium bicarb 1303 times a day  Flomax 0.4 mg daily  Thiamine 100 mg daily  Vancomycin with pharmacy  Replace potassium  fluconazole 200 mg daily   keppra drip 1500 mg Q12H  BuSpar 15 mg twice a day    kayoxlate 30 gm given yesterday   Reglan given today     Reorder fluconazole, day #8/14 scheduled to end today     We will do psych consult for psychosis patient receiving Geodon  Psychotic behavior      Continue PEG tube feeding/S with dietitian will start on likely bolus feeding     Consult psych due to his behavior              Current Facility-Administered Medications:     metoclopramide HCl (REGLAN) injection 10 mg, 10 mg, IntraVENous, QID, Lamberto Ackerman MD, 10 mg at 04/26/22 1244    zinc oxide-white petrolatum 20-75 % topical paste, , Topical, PRN, Maddison Murphy MD, Given at 04/24/22 0245    busPIRone (BUSPAR) tablet 15 mg, 15 mg, Oral, BID, Maddison Murphy MD, 15 mg at 04/25/22 0913    sodium bicarbonate tablet 1,300 mg, 1,300 mg, Oral, QID, Aicha Perez MD, 1,300 mg at 04/26/22 1244    hydrOXYzine (VISTARIL) injection 50 mg, 50 mg, IntraMUSCular, Q8H PRN, Ian Hernandez MD, 50 mg at 04/21/22 0235    fludrocortisone (FLORINEF) tablet 0.1 mg, 0.1 mg, Oral, DAILY, Ree Zaragoza MD, 0.1 mg at 04/25/22 0913    0.9% sodium chloride infusion 250 mL, 250 mL, IntraVENous, PRN, Susana Ackerman MD    ziprasidone (GEODON) 10 mg in sterile water (preservative free) 0.5 mL injection, 10 mg, IntraMUSCular, Q12H PRN, Marianna Jane MD, 10 mg at 04/20/22 1704    levETIRAcetam (KEPPRA) 1500 mg in saline (iso-osm) 100 ml IVPB, 1,500 mg, IntraVENous, Q12H, Lamberto Ackerman MD, Last Rate: 400 mL/hr at 04/26/22 1244, 1,500 mg at 04/26/22 1244    0.9% sodium chloride infusion 250 mL, 250 mL, IntraVENous, PRN, Lamberto Ackerman MD    midodrine (PROAMATINE) tablet 10 mg, 10 mg, Oral, TID WITH MEALS, Lamberto Ackerman MD, 10 mg at 04/26/22 1244    LORazepam (ATIVAN) injection 1 mg, 1 mg, IntraVENous, Q4H PRN, Maddison Murphy MD, 1 mg at 04/08/22 1733    sodium chloride (NS) flush 5-10 mL, 5-10 mL, IntraVENous, PRN, Lamberto Ackerman MD, 10 mL at 04/15/22 2354    lactulose (CHRONULAC) 10 gram/15 mL solution 15 mL, 10 g, Oral, TID, Lamberto Ackerman MD, 15 mL at 04/25/22 1639    tamsulosin (FLOMAX) capsule 0.4 mg, 0.4 mg, Oral, DAILY, Lamberto Ackerman MD, 0.4 mg at 04/25/22 7832    thiamine mononitrate (B-1) tablet 100 mg, 100 mg, Oral, DAILY, Lamberto Ackerman MD, 100 mg at 04/25/22 0913    allopurinoL (ZYLOPRIM) tablet 300 mg, 300 mg, Oral, DAILY, Lamberto Ackerman MD, 300 mg at 04/25/22 1451    ergocalciferol capsule 50,000 Units, 50,000 Units, Oral, Q7D, Flaca Crawford MD, 50,000 Units at 04/21/22 1647    famotidine (PEPCID) tablet 20 mg, 20 mg, Oral, BID, Lamberto Ackerman MD, 20 mg at 83/31/45 3572    folic acid (FOLVITE) tablet 1 mg, 1 mg, Oral, DAILY, Flaca Crawford MD, 1 mg at 04/25/22 0900    levothyroxine (SYNTHROID) tablet 25 mcg, 25 mcg, Oral, ACB, Lamberto Ackerman MD, 25 mcg at 04/25/22 0913    propranoloL (INDERAL) tablet 20 mg, 20 mg, Oral, BID, Lamberto Ackerman MD, 20 mg at 04/24/22 2135    acetaminophen (TYLENOL) tablet 650 mg, 650 mg, Oral, Q6H PRN **OR** acetaminophen (TYLENOL) suppository 650 mg, 650 mg, Rectal, Q6H PRN, Jyothi Ackerman MD    polyethylene glycol (MIRALAX) packet 17 g, 17 g, Oral, DAILY PRN, Jyothi Ackerman MD    ondansetron (ZOFRAN ODT) tablet 4 mg, 4 mg, Oral, Q8H PRN **OR** ondansetron (ZOFRAN) injection 4 mg, 4 mg, IntraVENous, Q6H PRN, Lamberto Ackerman MD, 4 mg at 04/23/22 0050

## 2022-04-26 NOTE — PROGRESS NOTES
General Daily Progress Note          Patient Name:   Raymond Negrete       YOB: 1960       Age:  60 y.o. Admit Date: 4/7/2022      Subjective:     Raymond Negrete is a(n) 60 y.o. male with PMH significant for HTN, cirrhosis, ascites presents via EMS for removing fish catheter. Patient recently d/c after lengthy stay in hospital for septic DIP joint of right index finger. He was d/c on 10 days of PO Zyvox, which he completed. He presents today after pulling out fish catheter. Several blood clots noted. Patient septic upon arrival with hypothermia and hypotension. Temp 93.6 rectal, BP 84/68. Patient is retaining 887cc of urine and nursing staff will replace fish.     Patient started on fluids and IV Levaquin in the ED. Patient is altered at baseline. Head CT w/o contrast shows no acute abnormality. Blood cultures pending. CXR and KUB both normal       Patient is alert awake confused  Hypotensive off  Levophed    Normal temperature    4/12  Patient is off levophed but still receiving midodrine and IVF. He remain hypotensive. He is lethargic and confused this morning. Patient has mild bilateral LE edema. Seen by ID yesterday. Recommends continuing vancomycin   Seen by nephrology yesterday. Recommends discontinuing IVF in lieu of leg edema, discontinue bicarb drip, continuing weekly procrit, continuing PO KCl, midodrine for chronic hypotension. Today patient hypothermic on bear hugger  Yesterday evening patient was restless agitated    4/13    Patient is alert awake still hypothermic on shemar hugger    4/14  Patient is seen sleeping. On shemar hugger and vancomycin. He is producing 250 ml urine. No new changes seen at this time. Labs pending. Cr trending down 1.62 (on 4/13). Abdominal US on 4/13 shows ascites. UA on 4/12 positive for leukocyte esterase, WBC >100 and bacteruria. Urine culture pending.      Patient refused medication this morning    4/15    Patient sleepy obtunded /hypothermic    Not eating drinking    4/18  Patient is seen in medical telemetry with a one to one sitter. He is still confused and refusing to eat food. He is compliant with his medications  He pulled out NG tube 3 times. Patient had a paracentesis done on 4/15 without any complications. Labs remarkable for Cr 1.75 increasing. CBC and CMP pending. Ammonia unremarkable. 4/19  Patient is seen at bedside with a one to one sitter. He still confused and attempting to pull out his IJ central line. He has mitts on. He is also attempting to jump out of bed. Patient is still not eating today. He is taking his medications. Labs remarkable for Cr 1.82. Abdominal US remarkable for Increased abdominal and pelvic ascites. Seen by ID. Recommends discontinuing eraxis, and starting on fluconazole. 4/20  Patient is seen at bedside with a one to one sitter. She reports that he pulled out his IJ line. Patient now has an IV in his hand. He has mittens on. He is still refusing to eat any food but he continues to take his medications. Patient continues to be hypothermic with latest axillary temperature of 96.1 F. Seen by nephrology. Recommends increasing IV fluid rate. Continue fish catheter. Seen by GI. Recommends another paracentesis prior to PEG tube placement. Seen by ID. Recommends continuing fluconazole and discontinue vancomycin. Labs pending. 4/21  Patient is with one to one sitter. He has been less agitated today but remains confused. On keppra drip. Patient has minimal oral intake. He is taking his medications. Seen by GI. Plan is to do paracentesis then place PEG tube. Seen by nephrology. No new recommendations. Seen by ID. Continue fluconazole. Labs Hgb 8.8, K 3.3, pro-bnp 990.     4/22  Patient is confused and seen with 1:1 sitter. He is diffusely edematous at this time and leaking fluids from his abdomen. Patient is NPO for paracentesis and PEG tube placement. 4/23    Patient had PEG tube placed. 4/25  Patient talking, difficult to understand. Has 1:1 sitter. Labs notable for:  Cr 2.17 (increasing)   BNP 1765     4/26  Patient talking, difficult to understand. Has 1:1 sitter. Hypothermic today. Labs notable for:   K 3.7        Objective:     Visit Vitals  /77 (BP 1 Location: Right upper arm, BP Patient Position: At rest)   Pulse 87   Temp (!) 96.6 °F (35.9 °C)   Resp 19   Ht 5' 10\" (1.778 m)   Wt 204 lb 2.3 oz (92.6 kg)   SpO2 100%   BMI 29.29 kg/m²        Recent Results (from the past 24 hour(s))   RENAL FUNCTION PANEL    Collection Time: 04/26/22  7:56 AM   Result Value Ref Range    Sodium 138 136 - 145 mmol/L    Potassium 3.7 3.5 - 5.1 mmol/L    Chloride 109 (H) 97 - 108 mmol/L    CO2 21 21 - 32 mmol/L    Anion gap 8 5 - 15 mmol/L    Glucose 107 (H) 65 - 100 mg/dL    BUN 22 (H) 6 - 20 mg/dL    Creatinine 2.18 (H) 0.70 - 1.30 mg/dL    BUN/Creatinine ratio 10 (L) 12 - 20      GFR est AA 37 (L) >60 ml/min/1.73m2    GFR est non-AA 31 (L) >60 ml/min/1.73m2    Calcium 8.8 8.5 - 10.1 mg/dL    Phosphorus 3.1 2.6 - 4.7 mg/dL    Albumin 1.8 (L) 3.5 - 5.0 g/dL   METABOLIC PANEL, COMPREHENSIVE    Collection Time: 04/26/22  7:57 AM   Result Value Ref Range    Sodium 139 136 - 145 mmol/L    Potassium 3.7 3.5 - 5.1 mmol/L    Chloride 109 (H) 97 - 108 mmol/L    CO2 22 21 - 32 mmol/L    Anion gap 8 5 - 15 mmol/L    Glucose 108 (H) 65 - 100 mg/dL    BUN 22 (H) 6 - 20 mg/dL    Creatinine 2.18 (H) 0.70 - 1.30 mg/dL    BUN/Creatinine ratio 10 (L) 12 - 20      GFR est AA 37 (L) >60 ml/min/1.73m2    GFR est non-AA 31 (L) >60 ml/min/1.73m2    Calcium 8.6 8.5 - 10.1 mg/dL    Bilirubin, total 1.0 0.2 - 1.0 mg/dL    AST (SGOT) 38 (H) 15 - 37 U/L    ALT (SGPT) 16 12 - 78 U/L    Alk.  phosphatase 244 (H) 45 - 117 U/L    Protein, total 6.5 6.4 - 8.2 g/dL    Albumin 1.8 (L) 3.5 - 5.0 g/dL    Globulin 4.7 (H) 2.0 - 4.0 g/dL    A-G Ratio 0.4 (L) 1.1 - 2.2     GLUCOSE, POC Collection Time: 04/26/22 11:19 AM   Result Value Ref Range    Glucose (POC) 114 65 - 117 mg/dL    Performed by Belkys Keller      [unfilled]      Review of Systems    Constitutional: Negative for chills and fever. HENT: Negative. Eyes: Negative. Respiratory: Negative. Cardiovascular: Negative. Gastrointestinal: Negative for abdominal pain and nausea. Skin: Negative. Neurological: Negative. Physical Exam:      Constitutional: pt is oriented to person, place, and time. HENT:   Head: Normocephalic and atraumatic. Eyes: Pupils are equal, round, and reactive to light. EOM are normal.   Cardiovascular: Normal rate, regular rhythm and normal heart sounds. Pulmonary/Chest: Breath sounds normal. No wheezes. No rales. Exhibits no tenderness. Abdominal: Soft. Bowel sounds are normal. There is no abdominal tenderness. There is no rebound and no guarding. Musculoskeletal: Normal range of motion. Neurological: pt is alert and oriented to person, place, and time. Extremities 2+ edema  Anasarca    US ABD LTD   Final Result   Increased abdominal and pelvic ascites. IR PARACENTESIS ABD SUBSQ   Final Result   Ultrasound guided paracentesis with no immediate complications. Patient tolerated the procedure well. XR CHEST PORT   Final Result   Endotracheal tube as above. Underexpanded lungs with bibasilar   atelectasis. Hydrostatic edema. Possible pleural effusions. US ABD LTD   Final Result   Ascites. XR HAND RT MIN 3 V   Final Result      CT HEAD WO CONT   Final Result   Age-appropriate atrophy. No acute findings. XR ABD (KUB)   Final Result   Within normal      XR CHEST PORT   Final Result   Findings/impression:   1. Low lung volumes. Bibasilar hypoventilatory change/atelectasis. Left   retrocardiac lung incompletely evaluated, cannot exclude underlying airspace   disease. 2.  Cardiac and mediastinal contours are obscured by low lung volumes. Ectatic/possible aneurysmal appearance transverse aorta with calcific   atherosclerotic plaque. Appears stable from prior radiograph. 3.  No pleural fluid. No pneumothorax. 4.  Prominent gas-filled bowel partially visualized in the left upper quadrant. IR INSERT NON TUNL CVC OVER 5 YRS    (Results Pending)   IR PARACENTESIS ABD W IMAGE    (Results Pending)   IR US GUIDED VASCULAR ACCESS    (Results Pending)   IR FLUORO GUIDE PLC CVAD    (Results Pending)        Recent Results (from the past 24 hour(s))   RENAL FUNCTION PANEL    Collection Time: 04/26/22  7:56 AM   Result Value Ref Range    Sodium 138 136 - 145 mmol/L    Potassium 3.7 3.5 - 5.1 mmol/L    Chloride 109 (H) 97 - 108 mmol/L    CO2 21 21 - 32 mmol/L    Anion gap 8 5 - 15 mmol/L    Glucose 107 (H) 65 - 100 mg/dL    BUN 22 (H) 6 - 20 mg/dL    Creatinine 2.18 (H) 0.70 - 1.30 mg/dL    BUN/Creatinine ratio 10 (L) 12 - 20      GFR est AA 37 (L) >60 ml/min/1.73m2    GFR est non-AA 31 (L) >60 ml/min/1.73m2    Calcium 8.8 8.5 - 10.1 mg/dL    Phosphorus 3.1 2.6 - 4.7 mg/dL    Albumin 1.8 (L) 3.5 - 5.0 g/dL   METABOLIC PANEL, COMPREHENSIVE    Collection Time: 04/26/22  7:57 AM   Result Value Ref Range    Sodium 139 136 - 145 mmol/L    Potassium 3.7 3.5 - 5.1 mmol/L    Chloride 109 (H) 97 - 108 mmol/L    CO2 22 21 - 32 mmol/L    Anion gap 8 5 - 15 mmol/L    Glucose 108 (H) 65 - 100 mg/dL    BUN 22 (H) 6 - 20 mg/dL    Creatinine 2.18 (H) 0.70 - 1.30 mg/dL    BUN/Creatinine ratio 10 (L) 12 - 20      GFR est AA 37 (L) >60 ml/min/1.73m2    GFR est non-AA 31 (L) >60 ml/min/1.73m2    Calcium 8.6 8.5 - 10.1 mg/dL    Bilirubin, total 1.0 0.2 - 1.0 mg/dL    AST (SGOT) 38 (H) 15 - 37 U/L    ALT (SGPT) 16 12 - 78 U/L    Alk.  phosphatase 244 (H) 45 - 117 U/L    Protein, total 6.5 6.4 - 8.2 g/dL    Albumin 1.8 (L) 3.5 - 5.0 g/dL    Globulin 4.7 (H) 2.0 - 4.0 g/dL    A-G Ratio 0.4 (L) 1.1 - 2.2     GLUCOSE, POC    Collection Time: 04/26/22 11:19 AM   Result Value Ref Range    Glucose (POC) 114 65 - 117 mg/dL    Performed by Brady Licona        Results     Procedure Component Value Units Date/Time    CULTURE, BODY FLUID W [place] Expose [[id number]] Collected: 04/22/22 1400    Order Status: Completed Specimen: Body Fluid from Body fld Updated: 04/23/22 1418     Special Requests: No Special Requests        GRAM STAIN No wbc's seen         No organisms seen        Culture result: No growth thus far       CULTURE, URINE [[id number]]  (Abnormal) Collected: 04/12/22 2300    Order Status: Completed Specimen: Urine Updated: 04/16/22 1331     Special Requests: No Special Requests        [place] Count --        >100,000  colonies/ml       Culture result: Candida albicans       CULTURE, URINE [[id number]] Collected: 04/12/22 2300    Order Status: Canceled Specimen: Urine            Labs:     Recent Labs     04/25/22  1009   WBC 7.2   HGB 11.1*   HCT 31.7*   PLT 81*     Recent Labs     04/26/22 0757 04/26/22  0756 04/25/22  1011 04/25/22  1009 04/24/22  0748    138 139   < > 140   K 3.7 3.7 5.3*   < > 3.9   * 109* 110*   < > 113*   CO2 22 21 23   < > 20*   BUN 22* 22* 22*   < > 21*   CREA 2.18* 2.18* 2.17*   < > 2.05*   * 107* 97   < > 111*   CA 8.6 8.8 8.2*   < > 8.2*   PHOS  --  3.1 2.9  --  2.5*    < > = values in this interval not displayed. Recent Labs     04/26/22 0757 04/26/22  0756 04/25/22  1011 04/25/22  1009 04/25/22  1009   ALT 16  --   --   --  17   *  --   --   --  189*   TBILI 1.0  --   --   --  1.0   TP 6.5  --   --   --  6.3*   ALB 1.8* 1.8* 1.8*   < > 1.8*   GLOB 4.7*  --   --   --  4.5*    < > = values in this interval not displayed. No results for input(s): INR, PTP, APTT, INREXT, INREXT in the last 72 hours. No results for input(s): FE, TIBC, PSAT, FERR in the last 72 hours. No results found for: FOL, RBCF   No results for input(s): PH, PCO2, PO2 in the last 72 hours.   No results for input(s): CPK, CKNDX, TROIQ in the last 72 hours.    No lab exists for component: CPKMB  No results found for: CHOL, CHOLX, CHLST, CHOLV, HDL, HDLP, LDL, LDLC, DLDLP, TGLX, TRIGL, TRIGP, CHHD, CHHDX  Lab Results   Component Value Date/Time    Glucose (POC) 114 04/26/2022 11:19 AM    Glucose (POC) 91 04/24/2022 03:50 PM    Glucose (POC) 104 04/24/2022 11:03 AM    Glucose (POC) 106 04/24/2022 07:10 AM    Glucose (POC) 87 04/23/2022 08:00 PM     Lab Results   Component Value Date/Time    Color Yellow/Straw 04/12/2022 11:00 PM    Appearance Turbid (A) 04/12/2022 11:00 PM    Specific gravity 1.011 04/12/2022 11:00 PM    pH (UA) 5.0 04/12/2022 11:00 PM    Protein Negative 04/12/2022 11:00 PM    Glucose Negative 04/12/2022 11:00 PM    Ketone Negative 04/12/2022 11:00 PM    Bilirubin Negative 04/12/2022 11:00 PM    Urobilinogen 0.1 04/12/2022 11:00 PM    Nitrites Negative 04/12/2022 11:00 PM    Leukocyte Esterase Large (A) 04/12/2022 11:00 PM    Bacteria 1+ (A) 04/12/2022 11:00 PM    WBC >100 (H) 04/12/2022 11:00 PM    RBC  04/12/2022 11:00 PM         Assessment:     Sepsis  UTI/Candida  Lactic acidosis  Acute on chronic renal failure- creat 2.01  Cirrhosis with ascites  Anemia  History of septic DIP joint, right index finger  Hypothyroidism  Seizure disorder  Gout  GERD  History of etoh abuse  Hypotension  Metabolic acidosis  Hypothermia  Hypokalemia  Anemia monitor H&H  Abdominal distention/ascites  Protein calorie malnutrition  Hypokalemia  Anasarca   Static post PEG tube placed    Plan:      On allopurinol 300 mg daily  Vitamin D 50,000 weekly  Pepcid 20 twice daily  Folic acid 1 mg daily  Lactulose 10 g 3 times a day  Keppra 1500 twice a day  Synthroid 25 mcg daily  Midodrine 10 mg 3 times a day  Propranolol 20 mg twice a day  Sodium bicarb 1303 times a day  Flomax 0.4 mg daily  Thiamine 100 mg daily  Vancomycin with pharmacy  Replace potassium  fluconazole 200 mg daily   keppra drip 1500 mg Q12H  BuSpar 15 mg twice a day    kayoxlate 30 gm given yesterday Reglan given today     Reorder fluconazole, day #8/14 scheduled to end today     We will do psych consult for psychosis patient receiving Geodon  Psychotic behavior      Continue PEG tube feeding    Consult psych due to his behavior              Current Facility-Administered Medications:     metoclopramide HCl (REGLAN) injection 10 mg, 10 mg, IntraVENous, QID, Lamberto Ackerman MD, 10 mg at 04/26/22 0931    zinc oxide-white petrolatum 20-75 % topical paste, , Topical, PRN, Lamberto Ackerman MD, Given at 04/24/22 0245    busPIRone (BUSPAR) tablet 15 mg, 15 mg, Oral, BID, Lamberto Ackerman MD, 15 mg at 04/25/22 0913    sodium bicarbonate tablet 1,300 mg, 1,300 mg, Oral, QID, Ariela Wisdom MD, 1,300 mg at 04/25/22 1639    hydrOXYzine (VISTARIL) injection 50 mg, 50 mg, IntraMUSCular, Q8H PRN, Pranav Alexander MD, 50 mg at 04/21/22 0235    fludrocortisone (FLORINEF) tablet 0.1 mg, 0.1 mg, Oral, DAILY, [facility], 0.1 mg at 04/25/22 0913    0.9% sodium chloride infusion 250 mL, 250 mL, IntraVENous, PRN, Carlyle Ackerman MD    ziprasidone (GEODON) 10 mg in sterile water (preservative free) 0.5 mL injection, 10 mg, IntraMUSCular, Q12H PRN, Antonio Mendez MD, 10 mg at 04/20/22 1704    levETIRAcetam (KEPPRA) 1500 mg in saline (iso-osm) 100 ml IVPB, 1,500 mg, IntraVENous, Q12H, Lamberto Ackerman MD, Last Rate: 400 mL/hr at 04/25/22 2358, 1,500 mg at 04/25/22 2358    0.9% sodium chloride infusion 250 mL, 250 mL, IntraVENous, PRN, Lamberto Ackerman MD    midodrine (PROAMATINE) tablet 10 mg, 10 mg, Oral, TID WITH MEALS, Lamberto Ackerman MD, 10 mg at 04/25/22 1639    LORazepam (ATIVAN) injection 1 mg, 1 mg, IntraVENous, Q4H PRN, Jamie Scott MD, 1 mg at 04/08/22 1733    sodium chloride (NS) flush 5-10 mL, 5-10 mL, IntraVENous, PRN, Lamberto Ackerman MD, 10 mL at 04/15/22 6787    lactulose (CHRONULAC) 10 gram/15 mL solution 15 mL, 10 g, Oral, TID, Lamberto Ackerman MD, 15 mL at 04/25/22 1639    tamsulosin (FLOMAX) capsule 0.4 mg, 0.4 mg, Oral, DAILY, Lamberto Ackerman MD, 0.4 mg at 04/25/22 2728    thiamine mononitrate (B-1) tablet 100 mg, 100 mg, Oral, DAILY, Judah Aguilar MD, 100 mg at 04/25/22 0913    allopurinoL (ZYLOPRIM) tablet 300 mg, 300 mg, Oral, DAILY, Jef Ackerman MD, 300 mg at 04/25/22 8819    ergocalciferol capsule 50,000 Units, 50,000 Units, Oral, Q7D, Judah Aguilar MD, 50,000 Units at 04/21/22 1647    famotidine (PEPCID) tablet 20 mg, 20 mg, Oral, BID, Lamberto Ackerman MD, 20 mg at 75/72/96 3700    folic acid (FOLVITE) tablet 1 mg, 1 mg, Oral, DAILY, Judah Aguilar MD, 1 mg at 04/25/22 0900    levothyroxine (SYNTHROID) tablet 25 mcg, 25 mcg, Oral, ACB, Lamberto Ackerman MD, 25 mcg at 04/25/22 0913    propranoloL (INDERAL) tablet 20 mg, 20 mg, Oral, BID, Lamberto Ackerman MD, 20 mg at 04/24/22 2135    acetaminophen (TYLENOL) tablet 650 mg, 650 mg, Oral, Q6H PRN **OR** acetaminophen (TYLENOL) suppository 650 mg, 650 mg, Rectal, Q6H PRN, Jef Ackerman MD    polyethylene glycol (MIRALAX) packet 17 g, 17 g, Oral, DAILY PRN, Jef Ackerman MD    ondansetron (ZOFRAN ODT) tablet 4 mg, 4 mg, Oral, Q8H PRN **OR** ondansetron (ZOFRAN) injection 4 mg, 4 mg, IntraVENous, Q6H PRN, Judah Aguilar MD, 4 mg at 04/23/22 0050

## 2022-04-26 NOTE — PROGRESS NOTES
PHYSICAL THERAPY TREATMENT  Patient: Deanna Castillo (83 y.o. male)  Date: 4/26/2022  Diagnosis: Urinary retention [R33.9]  Lactic acidosis [E87.2]  Severe sepsis (HCC) [A41.9, R65.20]  Hypothermia [T68. XXXA]  Sepsis ([facility] [address] [A41.9] <principal problem not specified>  Procedure(s) (LRB):  PARACENTESIS (N/A)  PERCUTANEOUS ENDOSCOPIC GASTROSTOMY TUBE INSERTION (N/A)  ESOPHAGOGASTRODUODENOSCOPY (EGD) (N/A) 4 Days Post-Op  Precautions:    Chart, physical therapy assessment, plan of care and goals were reviewed. ASSESSMENT  Patient continues with skilled PT services and is progressing towards goals. Patient supine in bed upon approach and agreed to therapy session today. Patient had mittens on ALBER hands with sitter present. Therapist noted that patient had bloated/inflated stomach that was hard to the touch. Patient also reported that his stomach did hurt today. Nursing was asked if patient should be held or not and she told therapists to hold on EOB and OOB mobility due to patients stomach and patient needing to be accessed be doctors. Patient only performed supine TE ( see details below). Patient left supine in bed with mittens on, sitter present with call bell within reach and all needs meet. Current Level of Function Impacting Discharge (mobility/balance): general weakness, poor activity tolerance , AMS    Other factors to consider for discharge: PLOF, assistance at home, PMH, level of deficits, acute medical state         PLAN :  Patient continues to benefit from skilled intervention to address the above impairments. Continue treatment per established plan of care. to address goals.     Recommendation for discharge: (in order for the patient to meet his/her long term goals)  Skilled Nursing Facility/LTC    This discharge recommendation:  Has been made in collaboration with the attending provider and/or case management    IF patient discharges home will need the following DME: to be determined (TBD) SUBJECTIVE:   Patient stated my stomach hurts today.     OBJECTIVE DATA SUMMARY:   Critical Behavior:  Neurologic State: Alert,Confused  Orientation Level: Oriented to person  Cognition: Decreased attention/concentration,Follows commands     Therapeutic Exercises:   1x12 AP  1x12 heel slides  See OT note for OT exercises  Pain Rating:  Patient reported that stomach hurts but did not give measured pain reading     Activity Tolerance:   Good  Please refer to the flowsheet for vital signs taken during this treatment. After treatment patient left in no apparent distress:   Supine in bed, Call bell within reach, Bed / chair alarm activated, and Side rails x 3    COMMUNICATION/COLLABORATION:   The patients plan of care was discussed with: Occupational therapy assistant and Registered nurse. Treatment occurred with OT due to patient requiring ax2 for mobility and safety       Problem: Mobility Impaired (Adult and Pediatric)  Goal: *Acute Goals and Plan of Care (Insert Text)  Description: Patient will move from supine to sit and sit to supine , scoot up and down, and roll side to side in bed with minimal assistance/contact guard assist within 7 day(s). Patient will transfer from bed to chair and chair to bed with moderate assistance  using the least restrictive device within 7 day(s). Patient will improve static standing balance to minimal assistance within 1 week(s). Patient will ambulate 10 feet with moderate assistance with least restrictive device within 1 weeks.        Outcome: Progressing Towards Goal       Arthurine Poor, PTA   Time Calculation: 15 mins

## 2022-04-26 NOTE — WOUND CARE
IP WOUND CONSULT    [address] [place] [address] RECORD NUMBER:  [id number]  AGE: 60 y.o. GENDER: male  : 1960  TODAY'S DATE:  2022    GENERAL     [x] Follow-up   [] New Consult    Ede Xiong is a 60 y.o. male referred by:   [] Physician  [] Nursing  [] Other:         PAST MEDICAL HISTORY    Past Medical History:   Diagnosis Date    Arthritis     Hypertension         PAST SURGICAL HISTORY    Past Surgical History:   Procedure Laterality Date    IR INSERT NON TUNL CVC OVER 5 YRS  3/25/2022    IR PARACENTESIS ABD SUBSQ  4/15/2022    IR PARACENTESIS ABD W IMAGE  2022    IR PARACENTESIS ABD W IMAGE  3/1/2022    IR PARACENTESIS ABD W IMAGE  3/30/2022       FAMILY HISTORY    History reviewed. No pertinent family history. ALLERGIES    No Known Allergies    MEDICATIONS    No current facility-administered medications on file prior to encounter. Current Outpatient Medications on File Prior to Encounter   Medication Sig Dispense Refill    thiamine mononitrate (B-1) 100 mg tablet Take 1 Tablet by mouth daily. 30 Tablet 0    levothyroxine (SYNTHROID) 25 mcg tablet Take 25 mcg by mouth Daily (before breakfast).  tamsulosin (Flomax) 0.4 mg capsule Take 1 Capsule by mouth daily. 30 Capsule 1    potassium chloride (K-DUR, KLOR-CON M20) 20 mEq tablet Take 1 Tablet by mouth daily. 30 Tablet 0    sodium bicarbonate 650 mg tablet Take 1 Tablet by mouth three (3) times daily. 90 Tablet 0    levETIRAcetam (Keppra) 1,000 mg tablet Take 1.5 Tablets by mouth two (2) times a day. 60 Tablet 0    ergocalciferol (ERGOCALCIFEROL) 1,250 mcg (50,000 unit) capsule Take 1 Capsule by mouth every seven (7) days.  lactulose (CHRONULAC) 10 gram/15 mL solution Take 15 mL by mouth three (3) times daily. 200 mL 0    allopurinoL (ZYLOPRIM) 300 mg tablet Take 1 Tablet by mouth daily.  thiamine HCL (B-1) 100 mg tablet Take 100 mg by mouth daily.       propranoloL (INDERAL) 20 mg tablet Take 20 mg by mouth two (2) times a day.  folic acid (FOLVITE) 1 mg tablet Take 1 mg by mouth daily.  famotidine (PEPCID) 20 mg tablet Take 20 mg by mouth At bedtime.  aMILoride (MIDAMOR) 5 mg tablet Take 5 mg by mouth daily. [unfilled]  Visit Vitals  /77 (BP 1 Location: Right upper arm, BP Patient Position: At rest)   Pulse 87   Temp (!) 96.6 °F (35.9 °C)   Resp 19   Ht 5' 10\" (1.778 m)   Wt 92.6 kg (204 lb 2.3 oz)   SpO2 100%   BMI 29.29 kg/m²       ASSESSMENT     Wound Identification & Type: Stage 3 PI to sacrum, Fluid filled blister to right thigh  Dressing change:No  Verbal consent for picture: Yes    Contributing Factors: chronic pressure, decreased mobility, shear force, incontinence of stool, incontinence of urine and malnutrition    Wound Buttocks Medial (Active)   Wound Image   04/26/22 1202   Wound Etiology Pressure Stage 3 04/26/22 1202   Dressing Status New dressing applied 04/26/22 1202   Cleansed Cleansed with saline 04/26/22 1202   Dressing/Treatment Foam;Honey gel/honey paste 04/26/22 1202   Dressing Change Due 04/27/22 04/26/22 1202   Wound Length (cm) 5.2 cm 04/26/22 1202   Wound Width (cm) 1.7 cm 04/26/22 1202   Wound Depth (cm) 0.4 cm 04/26/22 1202   Wound Surface Area (cm^2) 8.84 cm^2 04/26/22 1202   Change in Wound Size % 38.61 04/26/22 1202   Wound Volume (cm^3) 3.536 cm^3 04/26/22 1202   Wound Healing % 18 04/26/22 1202   Wound Assessment Slough;[place]/red 04/26/22 1202   Drainage Amount Scant 04/26/22 1202   Drainage Description Serosanguinous 04/26/22 1202   Wound Odor None 04/26/22 1202   Peyton-Wound/Incision Assessment Intact 04/26/22 1202   Edges Defined edges 04/26/22 1202   Wound Thickness Description Full thickness 04/26/22 1202   Number of days: 21       Wound Thigh Anterior;Distal;Right quarter-sized skin tear (Active)   Wound Image   04/26/22 1200   Wound Etiology Other (Comment) 04/26/22 1200   Dressing Status Dry; Intact; Clean 04/26/22 1200   Cleansed Not cleansed 04/13/22 1614   Dressing/Treatment Open to air 04/26/22 1200   Wound Assessment Fluid filled blister 04/26/22 1200   Drainage Amount None 04/26/22 1200   Drainage Description Serosanguinous 04/13/22 1614   Wound Odor None 04/26/22 1200   Peyton-Wound/Incision Assessment Intact 04/26/22 1200   Edges Attached edges 04/26/22 1200   Number of days: 13       Incision 04/07/22 Finger (Comment which one) Anterior;Right (Active)   Dressing Status Clean;Dry; Intact 04/13/22 0700   Cleansed Cleansed with saline 04/07/22 1743   Dressing/Treatment Open to air 04/19/22 2328   Wound Odor None 04/13/22 0700   Number of days: 19       [REMOVED] Wound Gluteal fold/cleft Posterior blisters noted 02/25/22 (Removed)   Number of days: 38       [REMOVED] Wound Arm upper Anterior;Right open blister from edema 03/25/22 (Removed)   Number of days: 10       [REMOVED] Wound Arm upper Anterior;Distal;Right blister open from edema 03/25/22 (Removed)   Number of days: 10       [REMOVED] Wound Foot Right skin tear 04/03/22 (Removed)   Number of days: 1       [REMOVED] Incision 03/25/22 Hand Right (Removed)   Number of days: 10       [REMOVED] Incision Right (Removed)   Number of days:           PLAN     Skin Care & Pressure Relief Recommendations  Minimize layers of linen  Pads under patient to optimize support surface  Turn/reposition approximately every 2 hours  Pillow wedges  Manage incontinence   Promote continence; Skin Protective lotion/cream to buttocks and sacrum daily and as needed with incontinence care  Offload heels pillows    Curtis 13  Blood Glucose: 91                             Albumin:1.8  WBCs:7.2    Support Surface: Gel mattress with Isoflex air pump    Physician/Provider notified:   Recommendations: Follow up wound assessment. PI to sacrum is improving with some epithelealization noted to superior aspect of wound bed. Wound bed is pink/red with less slough then last assessment, measuring smaller.  Continue with [place] to wound bed and cover with Optifoam sacral dressing. Fluid filled blister to right thigh, leave open to air at this time. Use foam body alignment wedge to turn patient q2h at 30 degree angle or more to offload sacrum and buttocks to prevent pressure related skin injury. HOB to be maintained at 30 degrees or less, if not contraindicated, to aide in reducing pressure on sacrum. Elevate FOB to reduce friction and shear from patient sliding down in bed. Perry Caruso will continue to monitor, please re-consult for any changes in skin condition.      Discharge Wound Care Needs:    Teaching completed with:   [] Patient           [] Family member       [] Caregiver          [] Nursing  [] Other    Patient/Caregiver Teaching:  Level of patient/caregiver understanding able to:   [] Indicates understanding       [] Needs reinforcement  [] Unsuccessful      [] Verbal Understanding  [] Demonstrated understanding       [] No evidence of learning  [] Refused teaching         [] N/A       Electronically signed by Les White on 4/26/2022 at 12:03 PM

## 2022-04-26 NOTE — PROGRESS NOTES
Upon entering patient's room, patient had vomited. Alert but stated that he does not feel well. Held tube feed while cleaning patient up. Checked gastric residual: quickly filled up 60 mL syringe with additional residual left. Stopped tube feedings and have not administered evening medications through peg. Also noted that patient's paracentesis site had saturated dressing. Fluid continuously trickling out from site, new dressing applied at site. Contacted Dr. Emi Cheadle. Orders to hold feedings & medications through peg for the night. Also ordered 10 mg IV reglan 4 times a day. Spoke to Dr. Kim Richard to also inform of situation and again was told to hold feedings and he would see him in the morning.

## 2022-04-26 NOTE — PERIOP NOTES
Per request by Dr. Baylee Yeung. Paracentesis sites assessed at bedside in room 568. Site on R side of abdomen dry and intact with dermabond noted to site. Dressing removed from left side paracentesis site. No bleeding or drainage noted. Dressing Dry. Site left open to air. Discussed with primary nurse. Instructions to call if any further drainage is noted. Dr. Baylee Yeung made aware.

## 2022-04-26 NOTE — PROGRESS NOTES
Orders received from Dr Arnel Lu to change tube feedings to bolus feeds instead of continuous feeds, per dietician recommendation.

## 2022-04-26 NOTE — PROGRESS NOTES
Renal Note    NAME:  Florence Rodriguez   :   1960   MRN:   [id number]     ATTENDING: Adam Espinoza MD  PCP:  Krystal Saini NP    Date/Time:  2022 12:46 PM      Subjective:     Patient seen in the room. He is  confused . Creatinine 2.1. Pressure is low in the 's range. On midodrine 10 mg tid    Past Medical History:   Diagnosis Date    Arthritis     Hypertension       Past Surgical History:   Procedure Laterality Date    IR INSERT NON TUNL CVC OVER 5 YRS  3/25/2022    IR PARACENTESIS ABD SUBSQ  4/15/2022    IR PARACENTESIS ABD W IMAGE  2022    IR PARACENTESIS ABD W IMAGE  3/1/2022    IR PARACENTESIS ABD W IMAGE  3/30/2022     Social History     Tobacco Use    Smoking status: Never Smoker    Smokeless tobacco: Never Used   Substance Use Topics    Alcohol use: Not on file      History reviewed. No pertinent family history. No Known Allergies   Prior to Admission medications    Medication Sig Start Date End Date Taking? Authorizing Provider   thiamine mononitrate (B-1) 100 mg tablet Take 1 Tablet by mouth daily. 22   Cassandra Ackerman MD   levothyroxine (SYNTHROID) 25 mcg tablet Take 25 mcg by mouth Daily (before breakfast). Provider, Historical   tamsulosin (Flomax) 0.4 mg capsule Take 1 Capsule by mouth daily. 3/4/22   Abdirizak Whyte PA-C   potassium chloride (K-DUR, KLOR-CON M20) 20 mEq tablet Take 1 Tablet by mouth daily. 3/2/22   Keith Mckeon MD   sodium bicarbonate 650 mg tablet Take 1 Tablet by mouth three (3) times daily. 3/2/22   Keith Mckeon MD   levETIRAcetam (Keppra) 1,000 mg tablet Take 1.5 Tablets by mouth two (2) times a day. 3/2/22   Keith Mckeon MD   ergocalciferol (ERGOCALCIFEROL) 1,250 mcg (50,000 unit) capsule Take 1 Capsule by mouth every seven (7) days. 22   Provider, Historical   lactulose (CHRONULAC) 10 gram/15 mL solution Take 15 mL by mouth three (3) times daily.  22 Claudette Anis, MD   allopurinoL (ZYLOPRIM) 300 mg tablet Take 1 Tablet by mouth daily. 22   Provider, Historical   thiamine HCL (B-1) 100 mg tablet Take 100 mg by mouth daily. Provider, Historical   propranoloL (INDERAL) 20 mg tablet Take 20 mg by mouth two (2) times a day. Provider, Historical   folic acid (FOLVITE) 1 mg tablet Take 1 mg by mouth daily. Provider, Historical   famotidine (PEPCID) 20 mg tablet Take 20 mg by mouth At bedtime. Provider, Historical   aMILoride (MIDAMOR) 5 mg tablet Take 5 mg by mouth daily. Provider, Historical       REVIEW OF SYSTEMS:       he is lethargic. Unable to obtain    Objective:   VITALS:    Visit Vitals  /77 (BP 1 Location: Right upper arm, BP Patient Position: At rest)   Pulse 87   Temp (!) 96.6 °F (35.9 °C)   Resp 19   Ht 5' 10\" (1.778 m)   Wt 92.6 kg (204 lb 2.3 oz)   SpO2 100%   BMI 29.29 kg/m²     Temp (24hrs), Av.7 °F (36.5 °C), Min:96.6 °F (35.9 °C), Max:98.3 °F (36.8 °C)      PHYSICAL EXAM:     General: NAD, lethargic eyes: sclera anicteric  Oral Cavity: No thrush or ulcers  Neck: no JVD  Chest: Fair bilateral air entry. No Wheezing or Rhonchi. No rales.   Heart: normal sounds  Abdomen: soft and non tender   :  Gerard+  Lower Extremities: no edema  Skin: no rash  Neuro: Lethargic psychiatric: Able to assess      LAB DATA REVIEWED:    Recent Results (from the past 24 hour(s))   RENAL FUNCTION PANEL    Collection Time: 22  7:56 AM   Result Value Ref Range    Sodium 138 136 - 145 mmol/L    Potassium 3.7 3.5 - 5.1 mmol/L    Chloride 109 (H) 97 - 108 mmol/L    CO2 21 21 - 32 mmol/L    Anion gap 8 5 - 15 mmol/L    Glucose 107 (H) 65 - 100 mg/dL    BUN 22 (H) 6 - 20 mg/dL    Creatinine 2.18 (H) 0.70 - 1.30 mg/dL    BUN/Creatinine ratio 10 (L) 12 - 20      GFR est AA 37 (L) >60 ml/min/1.73m2    GFR est non-AA 31 (L) >60 ml/min/1.73m2    Calcium 8.8 8.5 - 10.1 mg/dL    Phosphorus 3.1 2.6 - 4.7 mg/dL    Albumin 1.8 (L) 3.5 - 5.0 g/dL METABOLIC PANEL, COMPREHENSIVE    Collection Time: 04/26/22  7:57 AM   Result Value Ref Range    Sodium 139 136 - 145 mmol/L    Potassium 3.7 3.5 - 5.1 mmol/L    Chloride 109 (H) 97 - 108 mmol/L    CO2 22 21 - 32 mmol/L    Anion gap 8 5 - 15 mmol/L    Glucose 108 (H) 65 - 100 mg/dL    BUN 22 (H) 6 - 20 mg/dL    Creatinine 2.18 (H) 0.70 - 1.30 mg/dL    BUN/Creatinine ratio 10 (L) 12 - 20      GFR est AA 37 (L) >60 ml/min/1.73m2    GFR est non-AA 31 (L) >60 ml/min/1.73m2    Calcium 8.6 8.5 - 10.1 mg/dL    Bilirubin, total 1.0 0.2 - 1.0 mg/dL    AST (SGOT) 38 (H) 15 - 37 U/L    ALT (SGPT) 16 12 - 78 U/L    Alk. phosphatase 244 (H) 45 - 117 U/L    Protein, total 6.5 6.4 - 8.2 g/dL    Albumin 1.8 (L) 3.5 - 5.0 g/dL    Globulin 4.7 (H) 2.0 - 4.0 g/dL    A-G Ratio 0.4 (L) 1.1 - 2.2     GLUCOSE, POC    Collection Time: 04/26/22 11:19 AM   Result Value Ref Range    Glucose (POC) 114 65 - 117 mg/dL    Performed by Ronak Gallegos        Recommendations/Plan:     1 acute kidney injury on chronic kidney disease 3:  -Creatinine was 2.0 on admission   -Creatinine stable at 1.7-1.8 with ivf.  This is his baseline  -Cr bumped at 2.1  -Persistent GRACIA likely because of ongoing hypotension.     -Baseline creatinine 1.3-1.7 based on labs during previous admission  -Has history of recent GRACIA with creatinine peaking to 2.6 few weeks ago  -GRACIA likely prerenal secondary to hypotension/sepsis  -Urine is suggestive of UTI  -No need for renal ultrasound  -legs edema+, off IVF    2 metabolic acidosis:  -CO2 is 20  -on oral bicarbonate 1300 QID which I will continue  -off HCO3 drip     3 severe anemia  -Hemoglobin 6.8->7.4->8.2.->8.8->8.5  -s/p  unit blood Tx  -Continue weekly Procrit    4  Hyperkalemia  -Potassium is 5.2  -SPS was ordered      5 hypotension  -BP improved with IV fluids and midodrine 10 mg TID  -Continue midodrine  -will dc flurinef (retaisn fluid and hyperkalmia)    5 right index finger osteomyelitis  -s/p recent antibiotics for 2 weeks. -Vancomycin has been resumed during this hospitalization  -ID is on the case    6 history of liver cirrhosis  -Has history of alcoholic liver cirrhosis. Portal hypertension  -Noted to have abdominal distention.   IR has been consulted  -Had paracentesis during his previous admission also      ________________________________________________________________________  Signed: Messi Peterson MD

## 2022-04-26 NOTE — PROGRESS NOTES
OCCUPATIONAL THERAPY TREATMENT  Patient: Sharan Lacy (73 y.o. male)  Date: 4/26/2022  Diagnosis: Urinary retention [R33.9]  Lactic acidosis [E87.2]  Severe sepsis (HCC) [A41.9, R65.20]  Hypothermia [T68. XXXA]  Sepsis ([facility] [address] [A41.9] <principal problem not specified>  Procedure(s) (LRB):  PARACENTESIS (N/A)  PERCUTANEOUS ENDOSCOPIC GASTROSTOMY TUBE INSERTION (N/A)  ESOPHAGOGASTRODUODENOSCOPY (EGD) (N/A) 4 Days Post-Op  Precautions:    Chart, occupational therapy assessment, plan of care, and goals were reviewed. ASSESSMENT  Patient continues with skilled OT services and is slowly progressing towards goals. Pt  received semi-supine in bed upon approach and agreed to therapy session. Pt with 1:1 in room and georgi mitts donned. Therapist noted that pt had descended/ tightened abdomen and pt reporting that abdominal was painful - RN made aware. RN stated to hold on EOB and OOB mobility d/t painful abdominal and pt needing to be assessed by . Patient performed semi-supine TE ( see details below). Pt. Required total A for facial grooming and applying moisturizer to lips. Patient left supine in bed with mittens on, sitter present with call bell within reach and all needs meet. Other factors to consider for discharge: PLOF, time since on set         PLAN :  Patient continues to benefit from skilled intervention to address the above impairments. Continue treatment per established plan of care. to address goals.   Recommendation for discharge: (in order for the patient to meet his/her long term goals)  Therapy up to 5 days/week in SNF setting    This discharge recommendation:  Has been made in collaboration with the attending provider and/or case management    IF patient discharges home will need the following DME: TBD       SUBJECTIVE:   Patient stated my stomach is hurting\"     OBJECTIVE DATA SUMMARY:   Cognitive/Behavioral Status:  Neurologic State: Alert;Confused  Orientation Level: Oriented to person  Cognition: Decreased attention/concentration; Follows commands  ADL Intervention:    Grooming  Grooming Assistance: Total assistance(dependent)  Position Performed: Other (comment) (sitting up in bed)  Washing Face: Total assistance (dependent)    Therapeutic Exercises: georgi  UE's  Exercise Sets Reps AROM AAROM PROM Self PROM Comments   Shoulder flex/ext 1 15 [x] [] [] []    Elbow flex/ext 1 15 [x] [] [] []    Wrist flex/ext 1 10 [x] [] [] []       [] [] [] []      Pain:  Pt stating pain in abdomen, no formal pain rating provided. Activity Tolerance:   Fair  Please refer to the flowsheet for vital signs taken during this treatment. After treatment patient left in no apparent distress:   Supine in bed, Call bell within reach, Bed / chair alarm activated, Caregiver / family present, and Side rails x 3    COMMUNICATION/COLLABORATION:   The patients plan of care was discussed with: Physical therapy assistant, Registered nurse, and Certified nursing assistant/patient care technician. Wallene Lindsay  Time Calculation: 14 mins    Problem: Self Care Deficits Care Plan (Adult)  Goal: *Acute Goals and Plan of Care (Insert Text)  Description: 1. Pt will be SBA sup <> sit in prep for EOB ADLs  2. Pt will be SBA grooming sitting EOB  3. Pt will be SBA LE dressing sitting EOB/long sit  4. Pt will be SBA sit <>  prep for toileting LRAD  5. Pt will be SBA toileting/toilet transfer/cloth mgmt LRAD  6.  Pt will be SBA following UE HEP in prep for self care tasks      Outcome: Progressing Towards Goal

## 2022-04-26 NOTE — PROGRESS NOTES
CM reviewed chart. Overnight patient was not tolerating tube feeds and tube feeds had to be held. ALFRED has updated [employer] with most recent clinicals. ALFRED will continue to follow.

## 2022-04-27 NOTE — PROGRESS NOTES
CM reviewed chart and spoke to primary physician. Physician is going to speak to family about possible hospice at [facility]. CM received call from physician stating that wife is agreeable to meeting with hospice. CM spoke with patient's wife and she is agreeable to meeting with [facility] to possibly pursue hospice at [facility]. Choice letter signed and placed on chart. Referral sent. CM spoke with Hospice RN to make them aware of consult as well. CM has also updated [facility] of patient's status and current plans. CM will continue to follow.

## 2022-04-27 NOTE — PROGRESS NOTES
Renal Note    NAME:  April English   :   1960   MRN:   [id number]     ATTENDING: Lakeisha Henriquez MD  PCP:  Afshin Will NP    Date/Time:  2022 12:46 PM      Subjective:     Patient seen in the room. He is  confused . Creatinine 2.1. Pressure is low in the 's range. On midodrine 10 mg tid    Past Medical History:   Diagnosis Date    Arthritis     Hypertension       Past Surgical History:   Procedure Laterality Date    IR INSERT NON TUNL CVC OVER 5 YRS  3/25/2022    IR PARACENTESIS ABD SUBSQ  4/15/2022    IR PARACENTESIS ABD W IMAGE  2022    IR PARACENTESIS ABD W IMAGE  3/1/2022    IR PARACENTESIS ABD W IMAGE  3/30/2022     Social History     Tobacco Use    Smoking status: Never Smoker    Smokeless tobacco: Never Used   Substance Use Topics    Alcohol use: Not on file      History reviewed. No pertinent family history. No Known Allergies   Prior to Admission medications    Medication Sig Start Date End Date Taking? Authorizing Provider   thiamine mononitrate (B-1) 100 mg tablet Take 1 Tablet by mouth daily. 22   Robin Ackerman MD   levothyroxine (SYNTHROID) 25 mcg tablet Take 25 mcg by mouth Daily (before breakfast). Provider, Historical   tamsulosin (Flomax) 0.4 mg capsule Take 1 Capsule by mouth daily. 3/4/22   Donal Whyte PA-C   potassium chloride (K-DUR, KLOR-CON M20) 20 mEq tablet Take 1 Tablet by mouth daily. 3/2/22   Dank Jordan MD   sodium bicarbonate 650 mg tablet Take 1 Tablet by mouth three (3) times daily. 3/2/22   Dank Jordan MD   levETIRAcetam (Keppra) 1,000 mg tablet Take 1.5 Tablets by mouth two (2) times a day. 3/2/22   Dank Jordan MD   ergocalciferol (ERGOCALCIFEROL) 1,250 mcg (50,000 unit) capsule Take 1 Capsule by mouth every seven (7) days. 22   Provider, Historical   lactulose (CHRONULAC) 10 gram/15 mL solution Take 15 mL by mouth three (3) times daily.  22 Laura Armenta MD   allopurinoL (ZYLOPRIM) 300 mg tablet Take 1 Tablet by mouth daily. 22   Provider, Historical   thiamine HCL (B-1) 100 mg tablet Take 100 mg by mouth daily. Provider, Historical   propranoloL (INDERAL) 20 mg tablet Take 20 mg by mouth two (2) times a day. Provider, Historical   folic acid (FOLVITE) 1 mg tablet Take 1 mg by mouth daily. Provider, Historical   famotidine (PEPCID) 20 mg tablet Take 20 mg by mouth At bedtime. Provider, Historical   aMILoride (MIDAMOR) 5 mg tablet Take 5 mg by mouth daily. Provider, Historical       REVIEW OF SYSTEMS:       he is lethargic. Unable to obtain    Objective:   VITALS:    Visit Vitals  /73   Pulse 89   Temp 97.7 °F (36.5 °C)   Resp 19   Ht 5' 10\" (1.778 m)   Wt 92.6 kg (204 lb 2.3 oz)   SpO2 97%   BMI 29.29 kg/m²     Temp (24hrs), Av °F (36.7 °C), Min:97.6 °F (36.4 °C), Max:99.2 °F (37.3 °C)      PHYSICAL EXAM:     General: NAD, lethargic eyes: sclera anicteric  Oral Cavity: No thrush or ulcers  Neck: no JVD  Chest: Fair bilateral air entry. No Wheezing or Rhonchi. No rales. Heart: normal sounds  Abdomen: soft and non tender   :  Gerard+  Lower Extremities: no edema  Skin: no rash  Neuro: Lethargic psychiatric: Able to assess      LAB DATA REVIEWED:    No results found for this or any previous visit (from the past 24 hour(s)). Recommendations/Plan:     1 acute kidney injury on chronic kidney disease 3:  -Creatinine was 2.0 on admission   -Creatinine stable at 1.7-1.8 with ivf.  This is his baseline  -Cr bumped at 2.1  -Persistent GRACIA likely because of ongoing hypotension.     -Baseline creatinine 1.3-1.7 based on labs during previous admission  -Has history of recent GRACIA with creatinine peaking to 2.6 few weeks ago  -GRACIA likely prerenal secondary to hypotension/sepsis  -Urine is suggestive of UTI  -No need for renal ultrasound  -legs edema+, off IVF    2 metabolic acidosis:  -CO2 is 20  -on oral bicarbonate 1300 QID which I will continue  -off HCO3 drip     3 severe anemia  -Hemoglobin 6.8->7.4->8.2.->8.8->8.5  -s/p  unit blood Tx  -Continue weekly Procrit    4  Hyperkalemia  -Potassium is 5.2  -SPS was ordered      5 hypotension  -BP improved with IV fluids and midodrine 10 mg TID  -Continue midodrine  -will dc flurinef (retaisn fluid and hyperkalmia)    5 right index finger osteomyelitis  -s/p recent antibiotics for 2 weeks. -Vancomycin has been resumed during this hospitalization  -ID is on the case    6 history of liver cirrhosis  -Has history of alcoholic liver cirrhosis. Portal hypertension  -Noted to have abdominal distention.   IR has been consulted  -Had paracentesis during his previous admission also      ________________________________________________________________________  Signed: Jayjay Suh MD

## 2022-04-27 NOTE — PROGRESS NOTES
Infectious Disease Progress Note           Subjective:   Stable, no change in clinical status, lethargic, not following commands   Objective:   Physical Exam:     Visit Vitals  /73   Pulse 89   Temp 97.7 °F (36.5 °C)   Resp 19   Ht 5' 10\" (1.778 m)   Wt 204 lb 2.3 oz (92.6 kg)   SpO2 97%   BMI 29.29 kg/m²      O2 Device: None (Room air)    Temp (24hrs), Av °F (36.7 °C), Min:97.6 °F (36.4 °C), Max:99.2 °F (37.3 °C)    No intake/output data recorded.  1901 -  0700  In: 1320   Out: 300 [Drains:300]    General: NAD, alert, lethargic   HEENT: HEIDY, Moist mucosa   Lungs: CTA b/l, decreased at the bases, no wheeze/rhonchi   Heart: S1S2+, RRR, no murmur  Abdo: Soft, distended, NT, +BS, + peg tube placement   : + fish cath, clear urine in bag and tubing   Exts: Decreased right index finger swelling, + 3 pitting edema involving b/l LEs   Skin: No wounds, No rashes or lesions    Data Review:       Recent Days:  Recent Labs     22  1009   WBC 7.2   HGB 11.1*   HCT 31.7*   PLT 81*     Recent Labs     22  0757 22  0756 22  1011   BUN 22* 22* 22*   CREA 2.18* 2.18* 2.17*       Lab Results   Component Value Date/Time    C-Reactive protein 0.60 2022 05:15 AM        Microbiology     Results     Procedure Component Value Units Date/Time    CULTURE, BODY FLUID Damien Living STAIN [[id number]] Collected: 22 1400    Order Status: Completed Specimen: Body Fluid from Body fld Updated: 22 0742     Special Requests: No Special Requests        GRAM STAIN No wbc's seen         No organisms seen        Culture result: No growth 4 days             Diagnostics   CXR Results  (Last 48 hours)    None             Assessment/Plan     1. UTI, abnormal UA,  Urinary retention: + indwelling fish cath      Urine Cx from  is negative, Yeast isolated from urine Cx from       No longer on fluconazole, fish cath remains in place, clear urine in bag and tubing     2. Right index finger osteomyelitis. S/p debridement       Completed long term Vanc targeting staph epidermidis       Healing finger w/o evidence of acute infection     3. AMS, h/o metabolic/hepatic encephalopathy, lethargic, not following commands     4. Ascites w markedly distended and tense abdomen on exam, may need repeat paracentesis     5. Episodes of hypothermia/hypotension: Suspected adrenal insufficiency      Ongoing episodes of hypothermic, but less frequent.  Continue on Marge Barajas MD    4/27/2022

## 2022-04-27 NOTE — PROGRESS NOTES
OT treatment attempt at 10:38 however per RN, pt agitated and given ativan this morning. Pt comfortable and resting in bed at this time. RN stated to hold at this time to allow pt to rest. Will continue to follow and attempt tx session at a later time.

## 2022-04-27 NOTE — HOSPICE
This LMSW called pt's wife Elise Mcgowan to schedule hospice info session. No answer. LMSW left voicemail with contact information. LMSW will attempt contact again later this afternoon. 5:15 pm:  LMSW spoke with wife Elise Mcgowan via telephone.   Hospice info meeting scheduled for 4/28 between 10 am - 11 am at bedside      Tony Rizzo, [address] [place] [address]    [phone]

## 2022-04-27 NOTE — PROGRESS NOTES
Pt. Not appropriate today. Pt. Was agitated and given Ativan per RN this morning. Will see pt. At a later time.

## 2022-04-27 NOTE — PROGRESS NOTES
General Daily Progress Note          Patient Name:   Destiny Duenas       YOB: 1960       Age:  60 y.o. Admit Date: 4/7/2022      Subjective:     Destiny Duenas is a(n) 60 y.o. male with PMH significant for HTN, cirrhosis, ascites presents via EMS for removing fish catheter. Patient recently d/c after lengthy stay in hospital for septic DIP joint of right index finger. He was d/c on 10 days of PO Zyvox, which he completed. He presents today after pulling out fish catheter. Several blood clots noted. Patient septic upon arrival with hypothermia and hypotension. Temp 93.6 rectal, BP 84/68. Patient is retaining 887cc of urine and nursing staff will replace fish.     Patient started on fluids and IV Levaquin in the ED. Patient is altered at baseline. Head CT w/o contrast shows no acute abnormality. Blood cultures pending. CXR and KUB both normal       Patient is alert awake confused  Hypotensive off  Levophed    Normal temperature    4/12  Patient is off levophed but still receiving midodrine and IVF. He remain hypotensive. He is lethargic and confused this morning. Patient has mild bilateral LE edema. Seen by ID yesterday. Recommends continuing vancomycin   Seen by nephrology yesterday. Recommends discontinuing IVF in lieu of leg edema, discontinue bicarb drip, continuing weekly procrit, continuing PO KCl, midodrine for chronic hypotension. Today patient hypothermic on bear hugger  Yesterday evening patient was restless agitated    4/13    Patient is alert awake still hypothermic on shemar hugger    4/14  Patient is seen sleeping. On shemar hugger and vancomycin. He is producing 250 ml urine. No new changes seen at this time. Labs pending. Cr trending down 1.62 (on 4/13). Abdominal US on 4/13 shows ascites. UA on 4/12 positive for leukocyte esterase, WBC >100 and bacteruria. Urine culture pending.      Patient refused medication this morning    4/15    Patient sleepy obtunded /hypothermic    Not eating drinking    4/18  Patient is seen in medical telemetry with a one to one sitter. He is still confused and refusing to eat food. He is compliant with his medications  He pulled out NG tube 3 times. Patient had a paracentesis done on 4/15 without any complications. Labs remarkable for Cr 1.75 increasing. CBC and CMP pending. Ammonia unremarkable. 4/19  Patient is seen at bedside with a one to one sitter. He still confused and attempting to pull out his IJ central line. He has mitts on. He is also attempting to jump out of bed. Patient is still not eating today. He is taking his medications. Labs remarkable for Cr 1.82. Abdominal US remarkable for Increased abdominal and pelvic ascites. Seen by ID. Recommends discontinuing eraxis, and starting on fluconazole. 4/20  Patient is seen at bedside with a one to one sitter. She reports that he pulled out his IJ line. Patient now has an IV in his hand. He has mittens on. He is still refusing to eat any food but he continues to take his medications. Patient continues to be hypothermic with latest axillary temperature of 96.1 F. Seen by nephrology. Recommends increasing IV fluid rate. Continue fish catheter. Seen by GI. Recommends another paracentesis prior to PEG tube placement. Seen by ID. Recommends continuing fluconazole and discontinue vancomycin. Labs pending. 4/21  Patient is with one to one sitter. He has been less agitated today but remains confused. On keppra drip. Patient has minimal oral intake. He is taking his medications. Seen by GI. Plan is to do paracentesis then place PEG tube. Seen by nephrology. No new recommendations. Seen by ID. Continue fluconazole. Labs Hgb 8.8, K 3.3, pro-bnp 990.     4/22  Patient is confused and seen with 1:1 sitter. He is diffusely edematous at this time and leaking fluids from his abdomen. Patient is NPO for paracentesis and PEG tube placement. 4/23    Patient had PEG tube placed. 4/25  Patient talking, difficult to understand. Has 1:1 sitter. Labs notable for:  Cr 2.17 (increasing)   BNP 1765     4/26  Patient talking, difficult to understand. Has 1:1 sitter. Hypothermic today. Labs notable for:   K 3.7    Patient vomited last night feeding was on hold  Started on Reglan    4/27    Patient has also large residual yesterday    Discussed with the dietitian to start bolus feeding instead of continuous feed  Patient still one-to-one because of his behavior  Received Ativan this morning sleeping        Objective:     Visit Vitals  /73   Pulse 87   Temp 97.6 °F (36.4 °C)   Resp 20   Ht 5' 10\" (1.778 m)   Wt 92.6 kg (204 lb 2.3 oz)   SpO2 98%   BMI 29.29 kg/m²        No results found for this or any previous visit (from the past 24 hour(s)). [unfilled]      Review of Systems    Constitutional: Negative for chills and fever. HENT: Negative. Eyes: Negative. Respiratory: Negative. Cardiovascular: Negative. Gastrointestinal: Negative for abdominal pain and nausea. Skin: Negative. Neurological: Negative. Physical Exam:      Constitutional: pt is oriented to person, place, and time. HENT:   Head: Normocephalic and atraumatic. Eyes: Pupils are equal, round, and reactive to light. EOM are normal.   Cardiovascular: Normal rate, regular rhythm and normal heart sounds. Pulmonary/Chest: Breath sounds normal. No wheezes. No rales. Exhibits no tenderness. Abdominal: Soft. Bowel sounds are normal. There is no abdominal tenderness. There is no rebound and no guarding. Musculoskeletal: Normal range of motion. Neurological: pt is alert and oriented to person, place, and time. Extremities 2+ edema  Anasarca    US ABD LTD   Final Result   Increased abdominal and pelvic ascites. IR PARACENTESIS ABD SUBSQ   Final Result   Ultrasound guided paracentesis with no immediate complications.    Patient tolerated the procedure well. XR CHEST PORT   Final Result   Endotracheal tube as above. Underexpanded lungs with bibasilar   atelectasis. Hydrostatic edema. Possible pleural effusions. US ABD LTD   Final Result   Ascites. XR HAND RT MIN 3 V   Final Result      CT HEAD WO CONT   Final Result   Age-appropriate atrophy. No acute findings. XR ABD (KUB)   Final Result   Within normal      XR CHEST PORT   Final Result   Findings/impression:   1. Low lung volumes. Bibasilar hypoventilatory change/atelectasis. Left   retrocardiac lung incompletely evaluated, cannot exclude underlying airspace   disease. 2.  Cardiac and mediastinal contours are obscured by low lung volumes. Ectatic/possible aneurysmal appearance transverse aorta with calcific   atherosclerotic plaque. Appears stable from prior radiograph. 3.  No pleural fluid. No pneumothorax. 4.  Prominent gas-filled bowel partially visualized in the left upper quadrant. IR INSERT NON TUNL CVC OVER 5 YRS    (Results Pending)   IR PARACENTESIS ABD W IMAGE    (Results Pending)   IR US GUIDED VASCULAR ACCESS    (Results Pending)   IR FLUORO GUIDE PLC CVAD    (Results Pending)        No results found for this or any previous visit (from the past 24 hour(s)). Results     Procedure Component Value Units Date/Time    CULTURE, BODY FLUID W Adrian Arias [[id number]] Collected: 04/22/22 1400    Order Status: Completed Specimen:  Body Fluid from Body fld Updated: 04/27/22 0742     Special Requests: No Special Requests        GRAM STAIN No wbc's seen         No organisms seen        Culture result: No growth 4 days              Labs:     Recent Labs     04/25/22  1009   WBC 7.2   HGB 11.1*   HCT 31.7*   PLT 81*     Recent Labs     04/26/22  0757 04/26/22  0756 04/25/22  1011    138 139   K 3.7 3.7 5.3*   * 109* 110*   CO2 22 21 23   BUN 22* 22* 22*   CREA 2.18* 2.18* 2.17*   * 107* 97   CA 8.6 8.8 8.2*   PHOS  --  3.1 2.9 Recent Labs     04/26/22  0757 04/26/22  0756 04/25/22  1011 04/25/22  1009 04/25/22  1009   ALT 16  --   --   --  17   *  --   --   --  189*   TBILI 1.0  --   --   --  1.0   TP 6.5  --   --   --  6.3*   ALB 1.8* 1.8* 1.8*   < > 1.8*   GLOB 4.7*  --   --   --  4.5*    < > = values in this interval not displayed. No results for input(s): INR, PTP, APTT, INREXT, INREXT in the last 72 hours. No results for input(s): FE, TIBC, PSAT, FERR in the last 72 hours. No results found for: FOL, RBCF   No results for input(s): PH, PCO2, PO2 in the last 72 hours. No results for input(s): CPK, CKNDX, TROIQ in the last 72 hours.     No lab exists for component: CPKMB  No results found for: CHOL, CHOLX, CHLST, CHOLV, HDL, HDLP, LDL, LDLC, DLDLP, TGLX, TRIGL, TRIGP, CHHD, CHHDX  Lab Results   Component Value Date/Time    Glucose (POC) 114 04/26/2022 11:19 AM    Glucose (POC) 91 04/24/2022 03:50 PM    Glucose (POC) 104 04/24/2022 11:03 AM    Glucose (POC) 106 04/24/2022 07:10 AM    Glucose (POC) 87 04/23/2022 08:00 PM     Lab Results   Component Value Date/Time    Color Yellow/Straw 04/12/2022 11:00 PM    Appearance Turbid (A) 04/12/2022 11:00 PM    Specific gravity 1.011 04/12/2022 11:00 PM    pH (UA) 5.0 04/12/2022 11:00 PM    Protein Negative 04/12/2022 11:00 PM    Glucose Negative 04/12/2022 11:00 PM    Ketone Negative 04/12/2022 11:00 PM    Bilirubin Negative 04/12/2022 11:00 PM    Urobilinogen 0.1 04/12/2022 11:00 PM    Nitrites Negative 04/12/2022 11:00 PM    Leukocyte Esterase Large (A) 04/12/2022 11:00 PM    Bacteria 1+ (A) 04/12/2022 11:00 PM    WBC >100 (H) 04/12/2022 11:00 PM    RBC  04/12/2022 11:00 PM         Assessment:     Sepsis  UTI/Candida  Lactic acidosis  Acute on chronic renal failure- creat 2.01  Cirrhosis with ascites  Anemia  History of septic DIP joint, right index finger  Hypothyroidism  Seizure disorder  Gout  GERD  History of etoh abuse  Hypotension  Metabolic acidosis  Hypothermia  Hypokalemia  Anemia monitor H&H  Abdominal distention/ascites  Protein calorie malnutrition  Hypokalemia  Anasarca   Static post PEG tube placed    Plan:      On allopurinol 300 mg daily  Vitamin D 50,000 weekly  Pepcid 20 twice daily  Folic acid 1 mg daily  Lactulose 10 g 3 times a day  Keppra 1500 twice a day  Synthroid 25 mcg daily  Midodrine 10 mg 3 times a day  Propranolol 20 mg twice a day  Sodium bicarb 1303 times a day  Flomax 0.4 mg daily  Thiamine 100 mg daily  Vancomycin with pharmacy  Replace potassium  fluconazole 200 mg daily   keppra drip 1500 mg Q12H  BuSpar 15 mg twice a day    kayoxlate 30 gm given yesterday   Reglan given today     Reorder fluconazole, day #8/14 scheduled to end today     We will do psych consult for psychosis patient receiving Geodon  Psychotic behavior      Continue PEG tube feeding/S with dietitian will start on likely bolus feeding     Consult psych due to his behavior    Discussed with the patient's wife she agree with hospice consult              Current Facility-Administered Medications:     metoclopramide HCl (REGLAN) injection 10 mg, 10 mg, IntraVENous, QID, Lamberto Ackerman MD, 10 mg at 04/27/22 0901    zinc oxide-white petrolatum 20-75 % topical paste, , Topical, PRN, Maryanne Falk MD, Given at 04/24/22 0245    busPIRone (BUSPAR) tablet 15 mg, 15 mg, Oral, BID, Lamberto Ackerman MD, 15 mg at 04/27/22 0901    sodium bicarbonate tablet 1,300 mg, 1,300 mg, Oral, QID, Ken Gabriel MD, 1,300 mg at 04/27/22 0901    hydrOXYzine (VISTARIL) injection 50 mg, 50 mg, IntraMUSCular, Q8H PRN, Naman Padgett MD, 50 mg at 04/21/22 0235    fludrocortisone (FLORINEF) tablet 0.1 mg, 0.1 mg, Oral, DAILY, Ree Zaragoza MD, 0.1 mg at 04/27/22 0906    0.9% sodium chloride infusion 250 mL, 250 mL, IntraVENous, PRN, Lamberto Ackerman MD    ziprasidone (GEODON) 10 mg in sterile water (preservative free) 0.5 mL injection, 10 mg, IntraMUSCular, Q12H PRN, Jennifer Sanchez MD, 10 mg at 04/26/22 1600    levETIRAcetam (KEPPRA) 1500 mg in saline (iso-osm) 100 ml IVPB, 1,500 mg, IntraVENous, Q12H, Lamberto Ackerman MD, Last Rate: 400 mL/hr at 04/26/22 2332, 1,500 mg at 04/26/22 2332    0.9% sodium chloride infusion 250 mL, 250 mL, IntraVENous, PRN, Mohiuddin, Estanislado Pallas, MD    midodrine (PROAMATINE) tablet 10 mg, 10 mg, Oral, TID WITH MEALS, Laura Armenta MD, 10 mg at 04/27/22 0901    LORazepam (ATIVAN) injection 1 mg, 1 mg, IntraVENous, Q4H PRN, Laura Armenta MD, 1 mg at 04/27/22 0906    sodium chloride (NS) flush 5-10 mL, 5-10 mL, IntraVENous, PRN, Laura Armenta MD, 10 mL at 04/15/22 2354    lactulose (CHRONULAC) 10 gram/15 mL solution 15 mL, 10 g, Oral, TID, Laura Armenta MD, 15 mL at 04/27/22 0901    tamsulosin (FLOMAX) capsule 0.4 mg, 0.4 mg, Oral, DAILY, Laura Armenta MD, 0.4 mg at 04/27/22 0901    thiamine mononitrate (B-1) tablet 100 mg, 100 mg, Oral, DAILY, Laura Armenta MD, 100 mg at 04/27/22 0901    allopurinoL (ZYLOPRIM) tablet 300 mg, 300 mg, Oral, DAILY, Laura Armenta MD, 300 mg at 04/27/22 0901    ergocalciferol capsule 50,000 Units, 50,000 Units, Oral, Q7D, Laura Armenta MD, 50,000 Units at 04/21/22 1647    famotidine (PEPCID) tablet 20 mg, 20 mg, Oral, BID, Laura Armenta MD, 20 mg at 21/31/95 8200    folic acid (FOLVITE) tablet 1 mg, 1 mg, Oral, DAILY, Laura Armenta MD, 1 mg at 04/27/22 0901    levothyroxine (SYNTHROID) tablet 25 mcg, 25 mcg, Oral, ACB, Lamberto Ackerman MD, 25 mcg at 04/27/22 0556    propranoloL (INDERAL) tablet 20 mg, 20 mg, Oral, BID, Lamberto Ackerman MD, 20 mg at 04/27/22 0900    acetaminophen (TYLENOL) tablet 650 mg, 650 mg, Oral, Q6H PRN **OR** acetaminophen (TYLENOL) suppository 650 mg, 650 mg, Rectal, Q6H PRN, Lamberto Ackerman MD    polyethylene glycol (MIRALAX) packet 17 g, 17 g, Oral, DAILY PRN, Lamberto Ackerman MD    ondansetron (ZOFRAN ODT) tablet 4 mg, 4 mg, Oral, Q8H PRN **OR** ondansetron (ZOFRAN) injection 4 mg, 4 mg, IntraVENous, Q6H PRN, Lamberto Ackerman MD, 4 mg at 04/23/22 0058

## 2022-04-27 NOTE — HOSPICE
Hospice RN Note: consult received, chart reviewed and discussed with CM. Will contact wife for hospice info session today.

## 2022-04-27 NOTE — PROGRESS NOTES
SPEECH LANGUAGE PATHOLOGY DYSPHAGIA TREATMENT  Patient: Katherine Patient (34 y.o. male)  Date: 4/27/2022  Diagnosis: Urinary retention [R33.9]  Lactic acidosis [E87.2]  Severe sepsis (HCC) [A41.9, R65.20]  Hypothermia [T68. XXXA]  Sepsis ([facility] [address] [A41.9] Procedure(s) (LRB):  PARACENTESIS (N/A)  PERCUTANEOUS ENDOSCOPIC GASTROSTOMY TUBE INSERTION (N/A)  ESOPHAGOGASTRODUODENOSCOPY (EGD) (N/A) 5 Days Post-Op  Precautions:      ASSESSMENT :  Patient is alert and agitated requesting water upon arrival. Per RN, patient w/ intermittent high TF residuals but reports patient is clear for PO trials. Patient initially refused pushing clinician away. RN provided ativan patient more calm allows single sips of thin via cup x2 then tightly purses lips and states \"bye\". Extensive education provided patient not responsive to education. Unable to assess PO appropriateness s/t cooperation. PLAN :  Recommendations and Planned Interventions:  Rec cont NPO, if patient transitions hospice consider liberalizing and allowing diet for QOL. Frequency/Duration: Patient will be followed by speech-language pathology 2 times a week to address goals. Discharge Recommendations: To Be Determined     SUBJECTIVE:   Nursing and patient report requesting water all morning. OBJECTIVE:     Past Medical History:   Diagnosis Date    Arthritis     Hypertension        CXR Results  (Last 48 hours)      None            Current Diet:  DIET ADULT TUBE FEEDING  DIET NPO     Cognitive and Communication Status:  Neurologic State: Confused,Alert  Orientation Level: Oriented to place,Oriented to person  Cognition: Impaired decision making,Poor safety awareness         Pain:  Pain Scale 1: Numeric (0 - 10)  Pain Intensity 1: 0       After treatment:   Patient left in no apparent distress in bed, Call bell within reach, Nursing notified, Bed / chair alarm activated, and 1:1 nsg at bedside.      COMMUNICATION/EDUCATION:   Patient not receptive of education s/t agitation.      Thank you for this referral.  Krista Peralta M.S., M.Ed., CCC-SLP  Time Calculation: 10 mins

## 2022-04-27 NOTE — PROGRESS NOTES
Comprehensive Nutrition Assessment    Type and Reason for Visit: Reassess,Consult (Goal, PEG)    Nutrition Recommendations/Plan:   1. NPO, advance as rec'd by SLP. 1. Modify TF via PEG to  Two Erick bolus feeds of 330 mL Q6H.  2. Flush with 200 mL H2O Q4H via PEG. TF provides 2820 kcal (106%), 115 gm PRO (88%), 2124 mL H2O(90%). 4. Continue Sean x2/d via PEG. (180 kcal, 5 gm PRO). 3. Please document TF rate/infusion, tolerance, BMs in I/Os. Malnutrition Assessment:  Malnutrition Status: Moderate malnutrition (04/25/22 1145)    Context:  Chronic illness       Nutrition Assessment:    Admitted for hematuria s/p pulling out fish catheter, +hypothermia and hypotension. RD familiar w/ pt from previous admits. Pt inappropriate for interview at this time per RN, no family in room. Minimal PO intakes, per RN pt up all night and mostly sleeping today. RD added Ensure 3x/d this AM per pt previously stated preferences. (4/12) Pt seen in bed asleep s/p Breakfast. Per Nsg, Pt ate ~25% from tray, drank >80% of ONS. Off pressors, appetite remains poor >25%, observed multiple unopened ONS @ bedside. May need to downgrade diet to puree, will request SLP consult. (4/19) NGTself-extubated 4/16- Nsg attempted to replace, pt refused. Possible plan for PEG, continues to refuse food. (4/25) Pt w/ 1:1 sitter, RN reported tolerating TF, but decreased from goal rate to 15 mL/hr r/t given possible leaking, MD communicated concern for self-extubation of PEG given hx and inability to close binder 2/2 abd distention, rec'd bolus feeds. RD to provide rec's for bolus feeds. (4/27) Noted TF held 2/2 vomiting x1 and GRV >60 mL?? Bolus feeds not ongoing- MD placed consult for Bolus rec's; RD reviewed rec's w/ Nsg on 4/26 via phone. TF resumed on continuous to be modified to bolus today per Nsg. RD will modify orders. Hospice consult noted- will monitor. Labs: Cl 109, BUN 22, Cr 2.18, GFR 37, Alb 1.8.  Meds: Synthroid, B9, Vit D, B1, reglan, pepcid, allopurinol, midodrine, inderal, lactulose, ativan. Nutrition Related Findings:    +V x1 on 4/26, no N/D/C nor s/s apsiration reported. TF tolerated at this time. Pt refused SLP tx- rec'd NPO. [address] in place. +1 BUE, BLE edema noted. Wound Type: Skin tears,Surgical incision,Pressure injury,Stage III     Current Nutrition Intake & Therapies:  Average Meal Intake: NPO  Average Supplement Intake: NPO  ADULT TUBE FEEDING PEG; Other Tube Feeding (Specify); TwoCal; Delivery Method: Continuous; Continuous Initial Rate (mL/hr): 20; Continuous Advance Tube Feeding: Yes; Advancement Volume (mL/hr): 15; Advancement Frequency: Q 4 hours; Continuous Goal... DIET NPO    Anthropometric Measures:  Height: 5' 10\" (177.8 cm)  Ideal Body Weight (IBW): 166 lbs (75 kg)  Admission Body Weight: 179 lb  Current Body Wt:  92.6 kg (204 lb 2.3 oz) (4/26), 123 % IBW. Bed scale  Current BMI (kg/m2): 29.3  Usual Body Weight:  (PAYTON)  Weight Adjustment: No adjustment  BMI Category: Overweight (BMI 25.0-29. 9)    Estimated Daily Nutrient Needs:  Energy Requirements Based On: Kcal/kg  Weight Used for Energy Requirements: Current  Energy (kcal/day): 2,582kcal (28kcal/kg)  Weight Used for Protein Requirements: Current  Protein (g/day): 129g (1.4g/kg)  Method Used for Fluid Requirements: ml/kg  Fluid (ml/day): 2365(25ml/kg)    Nutrition Diagnosis:   · Moderate malnutrition,In context of chronic illness related to inadequate protein-energy intake,catabolic illness as evidenced by poor intake prior to admission,intake 0-25%,mild muscle loss,mild loss of subcutaneous fat    Nutrition Interventions:   Food and/or Nutrient Delivery: Continue NPO,Modify tube feeding  Nutrition Education/Counseling: No recommendations at this time  Coordination of Nutrition Care: Continue to monitor while inpatient,Speech therapy,Feeding assistance/environmental change  Plan of Care discussed with: RN, Attending MD    Goals:  Previous Goal Met: No progress toward goal(s)  Goals: Tolerate nutrition support at goal rate,Initiate nutrition support,Meet at least 75% of estimated needs,by next RD assessment       Nutrition Monitoring and Evaluation:   Behavioral-Environmental Outcomes: None identified  Food/Nutrient Intake Outcomes: Enteral nutrition intake/tolerance  Physical Signs/Symptoms Outcomes: Biochemical data,Meal time behavior,Chewing or swallowing,Weight,GI status,Nausea/vomiting,Fluid status or edema    Discharge Planning: Too soon to determine    Debbora Sandhoff, RD  Contact: ext. [phone] or [employer].

## 2022-04-27 NOTE — PROGRESS NOTES
PT is refusing to have feeding at this time. Pt was given ativan to help pt relax. PT also refused speech eval/ swallow screen after asking for water all morning. RN will try again later with PeG tube feeding when pt is less agitated.

## 2022-04-28 PROBLEM — G93.41 METABOLIC ENCEPHALOPATHY: Status: ACTIVE | Noted: 2022-01-01

## 2022-04-28 NOTE — HOSPICE
[facility]  [phone]    Social Work Admission Note  Patient Name: Houston Newton  YOB: 1960  Age: 60 y.o. Date of Visit: 22  Facility of Care: [facility]  Patient Room: [address] [place] [address] Attending: Scottie Armas MD  Hospice Diagnosis: metobolic encephalopathy    Level of Care:    [x]  GIP    []  Respite   []  Routine    Consents/NCD Documentation:     Consents Reviewed:   []  Yes  [x]  No, completed by other hospice staff member. Person Reviewed/Signed with:  []  Patient   []  Pts NOK/MPOA  Name:     Right to NCD Reviewed:   []  Yes  []  No, completed by other hospice staff member. NCD Requested:   []  Yes  []  No    Admission Nurse/Intake Notified NCD was requested:  []  Yes  []  No  []  Not requested    Planned Start of Care Date: 2022    Hospice Witness Representative:    NARRATIVE   This LCSW visited the room of pt, who is unresponsive, wife Kip Patiño was at bedside. Pt is a 59 y/o AAM. Pt has a hospice diagnosis of metabolic encephalopathy. Pt has comorbid HTN, cirrhosis, ascites, UTI, and severe sepsis. Pt was admitted to ED at [facility] from [facility] due to complications with his fish. Pt was recently hospitalized from 3/22 to . Pt is a LTC resident at [facility]. Wife is very pleasant. LCSW and wife discussed pts inpt hospice status. LCSW provided supportive listening as wife talked about pts medical course and event that led from pt being at home, then going to [facility], and now in pt hospice. LCSW provided emotional support for wife in coping with pts EOL due to metabolic encephalopathy. LCSW and wife discussed her feelings of being \"overwhelmed\". LCSW normalized feelings and provided support. LCSW and wife discussed her concerns re limited fiances. LCSW and wife discussed resources. LCSW shared SS spousal  benefit, and [employer] on Aging. Steven Rojas LCSW and wife discussed her supports.  Wife reports she has limited family, those that are still alive are elderly and live out of the area. Wife reports pt has 5 children, 3 local, 1 in [place], and 1 in [facility]5 [place] Isabel  provided reassurance of my support to wife. LCSW and wife discussed Bereavement services. Wife would like more information at another time. LCSW will continue to assess and monitor pt and family needs.      ADVANCE CARE PLANNING    Advance Directive:  []  Yes  [x]  No   []  Would like to complete  Primary MPOA:  Secondary MPOA:   Amandeep Sanchez: wife Wayne Lilly  Code Status: DNR  Durable DNR: _ Yes  X_ No  Advance Care Planning 2022   Confirm Advance Directive None   Patient Would Like to Complete Advance Directive -       Relationship Status:  []  Single     [x]        []      []  Domestic Partner     []  /  []  Common Law  []    []  Unknown    If in a relationship, name of partner/spouse:Jennifer   Duration of relationship: 22 yrs     Rastafari/Spirituality: Muslim   [x]  Active In Rastafari/Spirituality   []  Not Active   []  N/A  Notes:     Home: [address]  Resources Provided:  []  [employer]  []  Information on applying for disability  []  FMLA Paperwork  []  Letters Requested by Agnes   []  Markc Sweet  []  Final Arrangements Resources   []  Outside counseling services (individual or group therapy)  []  Grief resources   []  Jeremy Mckeon  []  [employer]   []  [address] [facility]   [x]  [facility]   []  Referral Sent to [employer]   []  Referral Sent to [address] [place] [address]   []  Referral Sent to [facility]  [x]  Other [address] [place], [place] [id number] [facility] on [address] Work Initial Assessment     Sex:  male    Pronoun Preference:   [x]  He/Him/His   []  She/Her/Hers   []  They/Them/Theirs   []   Patient Name   []   Decline to Answer  []   Unknown  []   Other   Notes:     Race/Ethnicity: (shaka all that apply)  []  American [place]) or [place] Native  []    [x]  Black or [place] American  []   or   []  or [facility]  []  White  []  Unknown  []  Other     Inpatient Financial Agreement Completed:   []  Yes  []  No    Insurance:   []  Medicare   [x]  Medicaid     []  [employer]    []  Self-Pay/Uninsured   Notes:      Has pt applied for FAP? []  Needs to Apply  []  Application Completed and Submitted   []  Approved/ Expiration Date:   [x]  N/A  Notes:     Has pt applied for Medicaid? []  Needs to Apply  []  Application Completed and Submitted/Application Number:   [x]  N/A  Notes:     Has a long term care screening ([facility]) been requested? []  Requested   []  Not Requested, Needs follow up  [x]  Completed  []  N/A  Notes:     Does the pt have a long term care insurance policy? []  Yes  [x]  No  []  Yes, Needing assistance with paperwork  Notes:      Service:    []  Yes   [x]  No       []  Unknown    Appropriate for Pinning Ceremony:   []  Yes      [x]  No    Is patient using VA benefits? []  Yes      [x]  No  []  Needs assistance with accessing benefits  Notes:       Work History:   []  Full-Time/Part-Time  [x]  Retired   []  Other  Notes:     Primary Language: English  []   Needed  []   utilized during visit  []   Waived/ form completed    Ability to express thoughts/needs/feelings  []  Expressed thoughts/feelings/needs without difficulty  []  Requires extra time and cuing  []  Speech limited single words  []  Uses only gestures (eye, blinking eye or head movement/pointing)  []  Unable to express thoughts/feelings/needs (speech unintelligible or inappropriate)  [x]  Unresponsive  Notes:      Mental Status:  []  Alert-oriented to:     []  Person     []  Place     []  Time  []  Comatose-responds to:    []   Verbal stimuli    []  Tactile stimuli    []  Painful stimuli  []  Forgetful  []  Disoriented/Confused  []  Lethargic  []  Agitated  [x]  Unresponsive  []  Other (specify):    Notes:      Patients description of Illness/Current Health Status: [x]  Patient unable to discussUnresponsive    []  Patient unwilling to discuss  []  (Specify)        Knowledge/Understanding of Disease Process  Patient:    []  Demonstrates knowledge/understanding of disease process   []  Demonstrates knowledge/understanding of treatment plan   []  Demonstrates knowledge/understanding of prognosis   []  Demonstrates acceptance of prognosis   []  Demonstrates knowledge/understanding of resuscitation status   [x]  Other (specify)Unresponsive    Caregiver:   [x]  Demonstrates knowledge/understanding of disease process   [x]  Demonstrates knowledge/understanding of treatment plan   [x]  Demonstrates knowledge/understanding of prognosis   [x]  Demonstrates acceptance of prognosis   [x]  Demonstrates knowledge/understanding of resuscitation status   []  Other (specify)  Notes:      Patients living arrangement/care setting:  Use the PRIOR COLUMN when the PATIENTS current health status necessitated a change in his/her primary residence.     Prior Current Response              []             []    Patients own home/residence              []             []    Home of family member/friend              []             []    Boarding home/Group Home              []             []    Assisted living facility/intermediate center              []             []    Hospital/Acute care facility              []             []    Skilled nursing facility              [x]             []    Long term care facility/Nursing home              []             [x]    Hospice in Patient      Primary Caregiver:  []  No Primary Caregiver  Name of Primary Caregiver: Ce Chato   Primary Caregiver Phone Number: [phone]  Relationship or Primary Caregiver:    [x]  Spouse/Significant other       []  Child        []  Step child       []  Sibling   []  Parent   []  Friend/Neighbor   []  Community/Mandaen Volunteer   []  Paid help   []  Other (specify):___________  Notes:       Family members/Significant others:  Name:  Relationship:  Phone Number:  Actively involved in care? []  Yes  []  No    Name:  Relationship:  Phone Number:  Actively involved in care? []  Yes  []  No    Social support systems: (select ONE best description)  []  Excellent social support system which includes three or more family members or friends  [x]  Good social support system which includes two or less members or friends  []  [address] support which includes one family member or friend  []  Poor social support; no family members or friends; basically ALONE  Notes:      Emotional Status: (shaka all that apply)    Patient Caregiver Response                 [x]                [x]    Mood/Affect stable and appropriate                   []                []    Angry                 []                []    Anxious                 []                []    Apprehensive                 []                []    Avoidant                 []                []    Clinging                 []                []    Depressed                 []                []    Distraught                 []                []    Fearful                 []                []    Flat Affect                 []                []    Helpless                 []                []    Hostile                 []                []    Impulsive                 []                []    Irritable                 []                []    Labile                 []                []    Manic                 []                []    Restlessness                 []                [x]    Sad                 []                []    Strain/Stress                 []                []    Suspicious                 []                []     Tearful                 []                 []     Withdrawn          Notes:     Coping Skills (strengths/weakness):    Patient: Coping Skills (strength/weakness):  Unresponsive     Family/caregiver (strength/weakness): Lives near by, limited support, limited finances, pt hx of ETOH   Haviland of care upon discharge (shaka all that apply):     []  No burden evident   []  Family must administer medications   [x]  Illness causing financial strain   [x]  Family/Support feels overwhelmed   []  Family/Support sleep disturbed with patients care   []  Patients care causes extra physical stress  of death  []  Illness causes changes in family lifestyle  []  Illness impacting family/support employment  []  Family experiencing increased time demands  []  Patients behavior endangers family  []  Denial of patients illness  []  Concern over outcome of illness/fear  []  Patients behavior embarrassing to family   Notes:      Risk Factors: (shaka all that apply):    []  No burden evident   [x]  Alcohol abuse  []  Financial resources inadequate to meet basic needs (food/house/etc)  []  Financial resources inadequate to meet health care needs (supplies/equipment/medications)  []  Food/nutrition resources inadequate  []  Home environment unsafe/inadequate for home care  []  Homicidal risk  []  Lives alone or without concerned relatives  []  Multiple medications/complex schedule  []  Physical limitations increase likelihood of falls  []  Plan of care/treatments complicated  []  Substance use/abuse  []  Suicidal risk  []  Visual impairment threatens safety/ability to perform self-care  []  Other (specify):     Abuse/Neglect (actual/potential risks):  [x]  No signs of abuse/neglect  []  History of abuse/neglect                 []  Physical          []  Sexual  []  History of domestic violence  []  Lacks adequate physical care  []  Lacks emotional nurturing/support  []  Lacks appropriate stimulation/cognitive experiences  []  Left alone inappropriately  []  Lacks necessary supervision  []  Inadequate or delayed medical care  []  Unsafe environment (i.e guns/drug use/history of violence in the home/etc.)  []  Bruising or other physical signs of injury present  []  Refer to child/adult protective services  []  Other (specify):   Notes:      Current Sources of Stress (in Addition to Current Illness):   []  None reported  [x]  Bills/Debt    []  Career/Job change    []   (short term)    []   (long term)    []  Death of a child (recent)    []  Death of a parent (recent)   []  Death of a spouse (recent)   []  Employment status changed   []  Family discord    []  Financial loss/Inadequate inther (specify):come  []  Job loss  []  Legal issues unresolved  []  Lifestyle change  []  Marital discord  []  Marriage within the last year  []  Paperwork (insurance/legal/etc) overwhelming  []  Separation/Divorce  []  Other (specify):  Notes:       Interventions/Plan of Care   []  MSW will assess social and emotional factors related to coping with end of life issues  [x]  MSW will provide community resource planning/referral   []  MSW to assist with relocation to different care setting if/when symptoms stabilize  [x]  Pt/Pts family will receive emotional support. []  Pt/Pts family will share the details surrounding the pts disease progression  []  Pt/Pts Family will show expression of grief by participating in life review. []  Pt/Pts Family will be educated and able to verbalize understanding of mental, emotional, and physical symptoms of grief. []  Pt/Pts Family will be educated and able to identify skills and social support to help cope with grief. []  Pts family will receive support for grief with emphasis on developmentally appropriate language.   [] Other:   Notes:       Discharge Planning   []  Home with family member    [x]  Return back to nursing home/facility  []  Needs assistance with placement/paid caregivers  []  Short Stay under routine level of care at [facility]   []  Other  Notes:     MSW Assessment Completed by: Lu Limon  04/28/22    Time In:2: 00 pm       Time Out:3: 30 pm

## 2022-04-28 NOTE — DISCHARGE SUMMARY
General Daily Progress Note          Patient Name:   Destiny Duenas       YOB: 1960       Age:  60 y.o. Admit Date: 4/28/2022      Subjective:     Destiny Duenas is a(n) 60 y.o. male with PMH significant for HTN, cirrhosis, ascites presents via EMS for removing fish catheter. Patient recently d/c after lengthy stay in hospital for septic DIP joint of right index finger. He was d/c on 10 days of PO Zyvox, which he completed. He presents today after pulling out fish catheter. Several blood clots noted. Patient septic upon arrival with hypothermia and hypotension. Temp 93.6 rectal, BP 84/68. Patient is retaining 887cc of urine and nursing staff will replace fish.     Patient started on fluids and IV Levaquin in the ED. Patient is altered at baseline. Head CT w/o contrast shows no acute abnormality. Blood cultures pending. CXR and KUB both normal       Patient is alert awake confused  Hypotensive off  Levophed    Normal temperature    4/12  Patient is off levophed but still receiving midodrine and IVF. He remain hypotensive. He is lethargic and confused this morning. Patient has mild bilateral LE edema. Seen by ID yesterday. Recommends continuing vancomycin   Seen by nephrology yesterday. Recommends discontinuing IVF in lieu of leg edema, discontinue bicarb drip, continuing weekly procrit, continuing PO KCl, midodrine for chronic hypotension. Today patient hypothermic on bear hugger  Yesterday evening patient was restless agitated    4/13    Patient is alert awake still hypothermic on shemar hugger    4/14  Patient is seen sleeping. On shemar hugger and vancomycin. He is producing 250 ml urine. No new changes seen at this time. Labs pending. Cr trending down 1.62 (on 4/13). Abdominal US on 4/13 shows ascites. UA on 4/12 positive for leukocyte esterase, WBC >100 and bacteruria. Urine culture pending.      Patient refused medication this morning    4/15    Patient sleepy obtunded /hypothermic    Not eating drinking    4/18  Patient is seen in medical telemetry with a one to one sitter. He is still confused and refusing to eat food. He is compliant with his medications  He pulled out NG tube 3 times. Patient had a paracentesis done on 4/15 without any complications. Labs remarkable for Cr 1.75 increasing. CBC and CMP pending. Ammonia unremarkable. 4/19  Patient is seen at bedside with a one to one sitter. He still confused and attempting to pull out his IJ central line. He has mitts on. He is also attempting to jump out of bed. Patient is still not eating today. He is taking his medications. Labs remarkable for Cr 1.82. Abdominal US remarkable for Increased abdominal and pelvic ascites. Seen by ID. Recommends discontinuing eraxis, and starting on fluconazole. 4/20  Patient is seen at bedside with a one to one sitter. She reports that he pulled out his IJ line. Patient now has an IV in his hand. He has mittens on. He is still refusing to eat any food but he continues to take his medications. Patient continues to be hypothermic with latest axillary temperature of 96.1 F. Seen by nephrology. Recommends increasing IV fluid rate. Continue fish catheter. Seen by GI. Recommends another paracentesis prior to PEG tube placement. Seen by ID. Recommends continuing fluconazole and discontinue vancomycin. Labs pending. 4/21  Patient is with one to one sitter. He has been less agitated today but remains confused. On keppra drip. Patient has minimal oral intake. He is taking his medications. Seen by GI. Plan is to do paracentesis then place PEG tube. Seen by nephrology. No new recommendations. Seen by ID. Continue fluconazole. Labs Hgb 8.8, K 3.3, pro-bnp 990.     4/22  Patient is confused and seen with 1:1 sitter. He is diffusely edematous at this time and leaking fluids from his abdomen. Patient is NPO for paracentesis and PEG tube placement. 4/23    Patient had PEG tube placed. 4/25  Patient talking, difficult to understand. Has 1:1 sitter. Labs notable for:  Cr 2.17 (increasing)   BNP 1765     4/26  Patient talking, difficult to understand. Has 1:1 sitter. Hypothermic today. Labs notable for:   K 3.7    Patient vomited last night feeding was on hold  Started on Reglan    4/27    Patient has also large residual yesterday    Discussed with the dietitian to start bolus feeding instead of continuous feed  Patient still one-to-one because of his behavior  Received Ativan this morning sleeping    4/28/22  Patient with 1:1 sitter. Abdomen distended     Objective:     Visit Vitals  Ht 5' 10\" (1.778 m)   Wt 92.6 kg (204 lb 2.3 oz)   BMI 29.29 kg/m²        Recent Results (from the past 24 hour(s))   GLUCOSE, POC    Collection Time: 04/28/22  7:29 AM   Result Value Ref Range    Glucose (POC) 84 65 - 117 mg/dL    Performed by Carmina Skelton      [unfilled]      Review of Systems    Constitutional: Negative for chills and fever. HENT: Negative. Eyes: Negative. Respiratory: Negative. Cardiovascular: Negative. Gastrointestinal: Negative for abdominal pain and nausea. Skin: Negative. Neurological: Negative. Physical Exam:      Constitutional: pt is oriented to person, place, and time. HENT:   Head: Normocephalic and atraumatic. Eyes: Pupils are equal, round, and reactive to light. EOM are normal.   Cardiovascular: Normal rate, regular rhythm and normal heart sounds. Pulmonary/Chest: Breath sounds normal. No wheezes. No rales. Exhibits no tenderness. Abdominal: Soft. Bowel sounds are normal. There is no abdominal tenderness. There is no rebound and no guarding. Musculoskeletal: Normal range of motion. Neurological: pt is alert and oriented to person, place, and time.    Extremities 2+ edema  Anasarca    No orders to display        Recent Results (from the past 24 hour(s))   GLUCOSE, POC    Collection Time: 04/28/22  7:29 AM   Result Value Ref Range    Glucose (POC) 84 65 - 117 mg/dL    Performed by Concepcion Ortez        Results     Procedure Component Value Units Date/Time    CULTURE, BODY FLUID JERRELL Juarez [[id number]] Collected: 04/22/22 1400    Order Status: Completed Specimen: Body Fluid from Body fld Updated: 04/27/22 0742     Special Requests: No Special Requests        GRAM STAIN No wbc's seen         No organisms seen        Culture result: No growth 4 days              Labs:     No results for input(s): WBC, HGB, HCT, PLT, HGBEXT, HCTEXT, PLTEXT, HGBEXT, HCTEXT, PLTEXT in the last 72 hours. Recent Labs     04/26/22  0757 04/26/22  0756    138   K 3.7 3.7   * 109*   CO2 22 21   BUN 22* 22*   CREA 2.18* 2.18*   * 107*   CA 8.6 8.8   PHOS  --  3.1     Recent Labs     04/26/22  0757 04/26/22 0756   ALT 16  --    *  --    TBILI 1.0  --    TP 6.5  --    ALB 1.8* 1.8*   GLOB 4.7*  --      No results for input(s): INR, PTP, APTT, INREXT, INREXT in the last 72 hours. No results for input(s): FE, TIBC, PSAT, FERR in the last 72 hours. No results found for: FOL, RBCF   No results for input(s): PH, PCO2, PO2 in the last 72 hours. No results for input(s): CPK, CKNDX, TROIQ in the last 72 hours.     No lab exists for component: CPKMB  No results found for: CHOL, CHOLX, CHLST, CHOLV, HDL, HDLP, LDL, LDLC, DLDLP, TGLX, TRIGL, TRIGP, CHHD, CHHDX  Lab Results   Component Value Date/Time    Glucose (POC) 84 04/28/2022 07:29 AM    Glucose (POC) 114 04/26/2022 11:19 AM    Glucose (POC) 91 04/24/2022 03:50 PM    Glucose (POC) 104 04/24/2022 11:03 AM    Glucose (POC) 106 04/24/2022 07:10 AM     Lab Results   Component Value Date/Time    Color Yellow/Straw 04/12/2022 11:00 PM    Appearance Turbid (A) 04/12/2022 11:00 PM    Specific gravity 1.011 04/12/2022 11:00 PM    pH (UA) 5.0 04/12/2022 11:00 PM    Protein Negative 04/12/2022 11:00 PM    Glucose Negative 04/12/2022 11:00 PM    Ketone Negative 04/12/2022 11:00 PM    Bilirubin Negative 04/12/2022 11:00 PM    Urobilinogen 0.1 04/12/2022 11:00 PM    Nitrites Negative 04/12/2022 11:00 PM    Leukocyte Esterase Large (A) 04/12/2022 11:00 PM    Bacteria 1+ (A) 04/12/2022 11:00 PM    WBC >100 (H) 04/12/2022 11:00 PM    RBC  04/12/2022 11:00 PM         Assessment:     Sepsis  UTI/Candida  Lactic acidosis  Acute on chronic renal failure- creat 2.01  Cirrhosis with ascites  Anemia  History of septic DIP joint, right index finger  Hypothyroidism  Seizure disorder  Gout  GERD  History of etoh abuse  Hypotension  Metabolic acidosis  Hypothermia  Hypokalemia  Anemia monitor H&H  Abdominal distention/ascites  Protein calorie malnutrition  Hypokalemia  Anasarca   Static post PEG tube placed    Plan:     Patient family agreed for hospice care  Patient discharged to hospice service

## 2022-04-28 NOTE — PROGRESS NOTES
Progress Note  Date:2022       Room:[facility]  Patient Name:Waldo Rangel     YOB: 1960     Age:62 y.o. Subjective    Subjective   Review of Systems  Objective         Vitals Last 24 Hours:  TEMPERATURE:  Temp  Av.8 °F (36.6 °C)  Min: 97.6 °F (36.4 °C)  Max: 98.2 °F (36.8 °C)  RESPIRATIONS RANGE: Resp  Av.4  Min: 18  Max: 20  PULSE OXIMETRY RANGE: SpO2  Av.3 %  Min: 96 %  Max: 98 %  PULSE RANGE: Pulse  Av.8  Min: 87  Max: 99  BLOOD PRESSURE RANGE: Systolic (11JKQ), PRASANTH:390 , Min:103 , THL:837   ; Diastolic (40FLM), ZR, Min:72, Max:73    I/O (24Hr): Intake/Output Summary (Last 24 hours) at 2022 2307  Last data filed at 2022 0428  Gross per 24 hour   Intake 530 ml   Output 300 ml   Net 230 ml     Objective  Labs/Imaging/Diagnostics    Labs:  CBC:  Recent Labs     22  1009   WBC 7.2   RBC 3.95*   HGB 11.1*   HCT 31.7*   MCV 80.3   RDW 23.7*   PLT 81*     CHEMISTRIES:  Recent Labs     22  0757 22  0756 22  1011    138 139   K 3.7 3.7 5.3*   * 109* 110*   CO2 22 21 23   BUN 22* 22* 22*   CA 8.6 8.8 8.2*   PHOS  --  3.1 2.9   PT/INR:No results for input(s): INR, INREXT in the last 72 hours. No lab exists for component: PROTIME  APTT:No results for input(s): APTT in the last 72 hours. LIVER PROFILE:  Recent Labs     22  0757 22  1009   AST 38* 51*   ALT 16 17     Lab Results   Component Value Date/Time    ALT (SGPT) 16 2022 07:57 AM    AST (SGOT) 38 (H) 2022 07:57 AM    Alk. phosphatase 244 (H) 2022 07:57 AM    Bilirubin, direct 1.2 (H) 2022 08:52 AM    Bilirubin, total 1.0 2022 07:57 AM       Imaging Last 24 Hours:  No results found.   Assessment//Plan   Active Problems:    Lactic acidosis (2022)      Urinary retention (2022)      Severe sepsis ([address] (2022)      Hypothermia (2022)      Sepsis ([address] (2022)      Severe protein-calorie malnutrition ([facility] (4/8/2022)      Assessment & Plan  I was called by the nursing staff notified that need to see him reconsult in reference to his behavior so that medication management can help him prevent him having a one-to-one staff for placement so the patient chart reviewed medication reviewed when I saw the rounds around 1:00 with the one-to-one staff indicated he was earlier in the agitated and restless but around with the time I see he was resting would not wake him up he is medication reviewed he is already on a buspirone 15 mg twice increased to 3 times a day and as needed Ativan at the time of this dictation a new order was placed for hydroxyzine 25 mg every 6 hours as needed hopefully this will suffice vital signs are stable no new labs resulted I will reassess the case tomorrow thank you  Electronically signed by Elise Rose MD on 4/27/2022 at 11:07 PM

## 2022-04-28 NOTE — HOSPICE
[facility]  [phone]    Inpatient Nursing Admission   Patient Name: Milagros South  YOB: 1960  Age: 60 y.o. Date of Hospice Admission: 4/28/2022  Hospice Attending Elected by Patient: Duane Fletcher MD  Primary Care Physician: Helen Burgos NP  Admitting RN: Samaria Landeros RN  : Mikki Godinez LCSW     Level of Care (GIP/Routine/Respite): GIP  Facility of Care: [facility]  Patient Room: 568/01     HOSPICE SUMMARY   ER Visits/ Hospitalizations in past year: 5  Hospice Diagnosis: Metabolic encephalopathy [R59.56]  Onset Date of Hospice Diagnosis: 4/28/22  Summary of Disease Progression Leading to Hospice Diagnosis:   HPI: Milagros South is a(n) 60 y.o. male with PMH significant for HTN, cirrhosis, ascites presents via EMS for removing fish catheter. Patient recently d/c after lengthy stay in hospital for septic DIP joint of right index finger. He was d/c on 10 days of PO Zyvox, which he completed. He presents today after pulling out fish catheter. Several blood clots noted. Patient septic upon arrival with hypothermia and hypotension. Temp 93.6 rectal, BP 84/68. Patient is retaining 887cc of urine and nursing staff will replace fish.      Sepsis  UTI/Candida  Lactic acidosis  Acute on chronic renal failure- creat 2.01  Cirrhosis with ascites  Anemia  History of septic DIP joint, right index finger  Hypothyroidism  Seizure disorder  Gout  GERD  History of etoh abuse  Hypotension  Metabolic acidosis  Hypothermia  Hypokalemia  Anemia monitor H&H  Abdominal distention/ascites  Protein calorie malnutrition  Hypokalemia  Anasarca   Static post PEG tube placed  1. UTI, abnormal UA,  Urinary retention: + indwelling fish cath      Urine Cx from 04/07 is negative, Yeast isolated from urine Cx from 04/12      S/p 7 days of fluconazole, currently off antimicrobials      2. Right index finger osteomyelitis.  S/p debridement       Completed long term Vanc targeting staph epidermidis       Healing finger w/o evidence of acute infection     3. AMS, h/o metabolic/hepatic encephalopathy, lethargic, not following commands      4. Abdominal distension, more tense, uncomfortable      IR consulted for therapeutic thoracentesis      5. Episodes of hypothermia/hypotension: Suspected adrenal insufficiency      Decreased episodes of hypothermia/hypotension      Co-Morbidities:   Patient Active Problem List   Diagnosis Code    Acute renal failure (ARF) ([facility] N17.9    Cirrhosis of liver ([facility] K74.60    GRACIA (acute kidney injury) ([address] N17.9    Status epilepticus ([address] G40.901    Finger infection L08.9    Lactic acidosis E87.2    Urinary retention R33.9    Severe sepsis (HCC) A41.9, R65.20    Hypothermia T68. XXXA    Sepsis ([facility] A41.9    Severe protein-calorie malnutrition ([facility] [address] [facility] Q18    Metabolic encephalopathy G61.25     Diagnoses RELATED to the terminal prognosis: hepatic encephalopathy, sepsis, PCM, metabolic encephalopathy  Other Diagnoses: Seizure disorder    Rationale for a prognosis of life expectancy of 6 months or less if the disease follows its normal course (Disease Specific History):     Clarita Geiger is a 60 y.o. who was admitted to [address] [place] [address]. The patient's principle diagnosis of metabolic encephalopathy has resulted in functional decline, pain, agitation, dyspnea and weakness. Functionally, the patient's Palliative Performance Scale has declined over a period of three weeks and is estimated at 10. The patient's spouse  has chosen comfort measures with the support of Hospice. Patient meets for GIP LOC as evidenced by need for management of symptoms with use of IV medication that cannot be administered in the home. Patient is at high risk for rapid decline and is unsafe to transport to home/facility.     Objective information that support this patients limited prognosis includes:     Recent Labs     04/26/22  0757 04/26/22  0756    138 K 3.7 3.7   * 109*   CO2 22 21   BUN 22* 22*   CREA 2.18* 2.18*   * 107*   CA 8.6 8.8   PHOS  --  3.1           Recent Labs     04/26/22  0757 04/26/22  0756   ALT 16  --    *  --    TBILI 1.0  --    TP 6.5  --    ALB 1.8* 1.8*   GLOB 4.7*  --      Results for Spencer Headley (MRN [id number]) as of 4/28/2022 13:08   Ref. Range 4/26/2022 07:57   Sodium Latest Ref Range: 136 - 145 mmol/L 139   Potassium Latest Ref Range: 3.5 - 5.1 mmol/L 3.7   Chloride Latest Ref Range: 97 - 108 mmol/L 109 (H)   CO2 Latest Ref Range: 21 - 32 mmol/L 22   Anion gap Latest Ref Range: 5 - 15 mmol/L 8   Glucose Latest Ref Range: 65 - 100 mg/dL 108 (H)   BUN Latest Ref Range: 6 - 20 mg/dL 22 (H)   Creatinine Latest Ref Range: 0.70 - 1.30 mg/dL 2.18 (H)   BUN/Creatinine ratio Latest Ref Range: 12 - 20   10 (L)   Calcium Latest Ref Range: 8.5 - 10.1 mg/dL 8.6   GFR est non-AA Latest Ref Range: >60 ml/min/1.73m2 31 (L)   GFR est AA Latest Ref Range: >60 ml/min/1.73m2 37 (L)   Bilirubin, total Latest Ref Range: 0.2 - 1.0 mg/dL 1.0   Protein, total Latest Ref Range: 6.4 - 8.2 g/dL 6.5   Albumin Latest Ref Range: 3.5 - 5.0 g/dL 1.8 (L)   Globulin Latest Ref Range: 2.0 - 4.0 g/dL 4.7 (H)   A-G Ratio Latest Ref Range: 1.1 - 2.2   0.4 (L)   ALT Latest Ref Range: 12 - 78 U/L 16   AST Latest Ref Range: 15 - 37 U/L 38 (H)   Alk. phosphatase Latest Ref Range: 45 - 117 U/L 244 (H)   Study Result    Narrative & Impression   Procedures:  1. Limited Nonvascular ultrasound  2.  Ultrasound guided paracentesis.     Provider: KARTHIKEYAN Tello  Supervising Physician: Bailey Montes M.D.     History: Ascites.     Abdominal ultrasound and ultrasound guidance: Transverse and sagittal ultrasound  examination of the abdomen with several of these images permanently stored in  PACS, demonstrates a moderate amount of ascitic fluid      Risks benefits and alternatives of a peritoneal drainage were thoroughly  discussed with the patient.      The anterolateral abdomen was prepped and draped in sterile fashion. Following  subcutaneous administration of 10 ml of 1% lidocaine,  a needle was used to  enter the peritoneal cavity under ultrasound guidance. Utilizing vacuum sterile  bottles, a total of 3250 of clear/yellowuid was drained.     IMPRESSION  Ultrasound guided paracentesis with no immediate complications. Patient tolerated the procedure well. Prognosis estimated based on 04/28/22 clinical assessment is:   [] Few to Many Hours  [] Hours to Days   [] Few to Many Days   [] Days to Weeks   [] Few to Many Weeks   [] Weeks to Months   [] Few to Many Months    ASSESSMENT    Patient self-reports:  []  Yes    [x] No    SYMPTOMS: flailing arms, unable to follow commands, grimacing    SIGNS/PHYSICAL FINDINGS: agitation, AMS, pain    KARNOFSKY: 10    Learning Assessment:  Patient  Is patient willing/able to learn? Unable  What is the highest level of education completed? Learning preference (written material, demonstration, visual)? Learning barriers (ESOL, [place], poor vision)? Caregiver  Is caregiver willing to learn care for patient? Unable  What is the highest level of education completed? Learning preference (written material, demonstration, visual)? Learning barriers (ESOL, [place], poor vision)? Currently this patient has:  [] Supplemental O2 [x] Peripheral IV  [] PICC    [] PORT   [x] Gerard Catheter [] NG Tube   [x] PEG Tube [x] Flexiseal   [] AICD: Has ICD been deactivated? [] Yes [] No:______    PLAN     1. Admit GIP for management of symptoms (pain, agitation, AMS)  2. Hydromorphone 1 mg IV every 3 hours and prn   3. Lorazepam 2 mg IV every 3 hours and prn   4. Gerard  5. Discontinue flexi seal  6. Other comfort meds as needed for fever, secretions, etc.  7. Seizure precautions  8.   and SW to support needs of family    Hospice Team Frequency Orders:  Skilled Nurse - Daily x 7 days /every other day x 7 days with 5 PRN visits for symptom control. MSW - 1 visit for initial assessment/evaluation for family support and need for volunteer services. Bonita Teresita - 1 visit for initial assessment/evaluation for spiritual support. ADVANCE CARE PLANNING (Complete in ACP Flow Sheet)   Code Status: DNR  Durable DNR: [x]  Yes pending MD signature  Code Status Discussed/Confirmed: Yes  Preference for Other Life Sustaining Treatment Discussed/Confirmed: Yes  Hospitalization Preference:  Family would like patient  to receive end of life care in the hospital. Will transition to LTC with hospice should condition stabilize.  Service: [] Yes  [x]  No      [] Unknown    Adventism: No Preference   Home: Shawn Chadwick ([phone])    DISCHARGE PLANNING     1. Discharge Plan: Discharge to LTC with hospice  2. Patient/Family teaching: EOL process, goals of care, symptom management  3. Response to patient/family teaching: Spouse verbalized understanding     SOCIAL/EMOTIONAL/SPIRITUAL NEEDS     Spiritual Issues Identified: None identified. Hospital and hospice chaplains to offer spiritual support. Psych/ Social/ Emotional Issues Identified: Spouse of 22 yrs reports they recently moved into apartment as patient was no longer able to maintain property. Has not worked since 2018. Caregiver Support:  [x] Provided information on End of Life Care   [x] Material Provided: [facility] [employer] [facility] [employer] contacted, agrees to serve as attending provider for hospice and provided verbal certification of terminal illness with life expectancy of 6 months or less. 2nd CTI obtained from OLI Willis MD. Orders for hospice admission, medications and plan of treatment received. Medication reconciliation completed.   MEDS: See medication list below  DME: Per hospital  Supplies: Per hospital  IDT communication to include MD, SN, SW, CH and support team    ALLERGIES AND MEDICATIONS     Allergies: No Known Allergies  Current Facility-Administered Medications   Medication Dose Route Frequency    LORazepam (ATIVAN) injection 1 mg  1 mg IntraVENous Q15MIN PRN    ketorolac (TORADOL) injection 30 mg  30 mg IntraVENous Q8H PRN    glycopyrrolate (ROBINUL) injection 0.2 mg  0.2 mg IntraVENous Q4H PRN    bisacodyL (DULCOLAX) suppository 10 mg  10 mg Rectal DAILY PRN    HYDROmorphone (DILAUDID) injection 1 mg  1 mg IntraVENous Q15MIN PRN    LORazepam (ATIVAN) injection 2 mg  2 mg IntraVENous Q3H    HYDROmorphone (DILAUDID) injection 1 mg  1 mg IntraVENous Q3H    saline peripheral flush soln 5 mL  5 mL InterCATHeter PRN

## 2022-04-28 NOTE — PROGRESS NOTES
General Daily Progress Note          Patient Name:   Jercia Cordova       YOB: 1960       Age:  60 y.o. Admit Date: 4/7/2022      Subjective:     Jerica Cordova is a(n) 60 y.o. male with PMH significant for HTN, cirrhosis, ascites presents via EMS for removing fish catheter. Patient recently d/c after lengthy stay in hospital for septic DIP joint of right index finger. He was d/c on 10 days of PO Zyvox, which he completed. He presents today after pulling out fish catheter. Several blood clots noted. Patient septic upon arrival with hypothermia and hypotension. Temp 93.6 rectal, BP 84/68. Patient is retaining 887cc of urine and nursing staff will replace fish.     Patient started on fluids and IV Levaquin in the ED. Patient is altered at baseline. Head CT w/o contrast shows no acute abnormality. Blood cultures pending. CXR and KUB both normal       Patient is alert awake confused  Hypotensive off  Levophed    Normal temperature    4/12  Patient is off levophed but still receiving midodrine and IVF. He remain hypotensive. He is lethargic and confused this morning. Patient has mild bilateral LE edema. Seen by ID yesterday. Recommends continuing vancomycin   Seen by nephrology yesterday. Recommends discontinuing IVF in lieu of leg edema, discontinue bicarb drip, continuing weekly procrit, continuing PO KCl, midodrine for chronic hypotension. Today patient hypothermic on bear hugger  Yesterday evening patient was restless agitated    4/13    Patient is alert awake still hypothermic on shemar hugger    4/14  Patient is seen sleeping. On shemar hugger and vancomycin. He is producing 250 ml urine. No new changes seen at this time. Labs pending. Cr trending down 1.62 (on 4/13). Abdominal US on 4/13 shows ascites. UA on 4/12 positive for leukocyte esterase, WBC >100 and bacteruria. Urine culture pending.      Patient refused medication this morning    4/15    Patient sleepy obtunded /hypothermic    Not eating drinking    4/18  Patient is seen in medical telemetry with a one to one sitter. He is still confused and refusing to eat food. He is compliant with his medications  He pulled out NG tube 3 times. Patient had a paracentesis done on 4/15 without any complications. Labs remarkable for Cr 1.75 increasing. CBC and CMP pending. Ammonia unremarkable. 4/19  Patient is seen at bedside with a one to one sitter. He still confused and attempting to pull out his IJ central line. He has mitts on. He is also attempting to jump out of bed. Patient is still not eating today. He is taking his medications. Labs remarkable for Cr 1.82. Abdominal US remarkable for Increased abdominal and pelvic ascites. Seen by ID. Recommends discontinuing eraxis, and starting on fluconazole. 4/20  Patient is seen at bedside with a one to one sitter. She reports that he pulled out his IJ line. Patient now has an IV in his hand. He has mittens on. He is still refusing to eat any food but he continues to take his medications. Patient continues to be hypothermic with latest axillary temperature of 96.1 F. Seen by nephrology. Recommends increasing IV fluid rate. Continue fish catheter. Seen by GI. Recommends another paracentesis prior to PEG tube placement. Seen by ID. Recommends continuing fluconazole and discontinue vancomycin. Labs pending. 4/21  Patient is with one to one sitter. He has been less agitated today but remains confused. On keppra drip. Patient has minimal oral intake. He is taking his medications. Seen by GI. Plan is to do paracentesis then place PEG tube. Seen by nephrology. No new recommendations. Seen by ID. Continue fluconazole. Labs Hgb 8.8, K 3.3, pro-bnp 990.     4/22  Patient is confused and seen with 1:1 sitter. He is diffusely edematous at this time and leaking fluids from his abdomen. Patient is NPO for paracentesis and PEG tube placement. 4/23    Patient had PEG tube placed. 4/25  Patient talking, difficult to understand. Has 1:1 sitter. Labs notable for:  Cr 2.17 (increasing)   BNP 1765     4/26  Patient talking, difficult to understand. Has 1:1 sitter. Hypothermic today. Labs notable for:   K 3.7    Patient vomited last night feeding was on hold  Started on Reglan    4/27    Patient has also large residual yesterday    Discussed with the dietitian to start bolus feeding instead of continuous feed  Patient still one-to-one because of his behavior  Received Ativan this morning sleeping    4/28/22    ID- may need repeat paracentesis,       Objective:     Visit Vitals  /80 (BP 1 Location: Right upper arm, BP Patient Position: At rest;Semi fowlers)   Pulse 76   Temp 98.6 °F (37 °C)   Resp 18   Ht 5' 10\" (1.778 m)   Wt 204 lb 2.3 oz (92.6 kg)   SpO2 98%   BMI 29.29 kg/m²        Recent Results (from the past 24 hour(s))   GLUCOSE, POC    Collection Time: 04/28/22  7:29 AM   Result Value Ref Range    Glucose (POC) 84 65 - 117 mg/dL    Performed by Concepcion Ortez      [unfilled]      Review of Systems    Constitutional: Negative for chills and fever. HENT: Negative. Eyes: Negative. Respiratory: Negative. Cardiovascular: Negative. Gastrointestinal: Negative for abdominal pain and nausea. Skin: Negative. Neurological: Negative. Physical Exam:      Constitutional: pt is oriented to person, place, and time. HENT:   Head: Normocephalic and atraumatic. Eyes: Pupils are equal, round, and reactive to light. EOM are normal.   Cardiovascular: Normal rate, regular rhythm and normal heart sounds. Pulmonary/Chest: Breath sounds normal. No wheezes. No rales. Exhibits no tenderness. Abdominal: Soft. Bowel sounds are normal. There is no abdominal tenderness. There is no rebound and no guarding. Musculoskeletal: Normal range of motion. Neurological: pt is alert and oriented to person, place, and time. Extremities 2+ edema  Anasarca    US ABD LTD   Final Result   Increased abdominal and pelvic ascites. IR PARACENTESIS ABD SUBSQ   Final Result   Ultrasound guided paracentesis with no immediate complications. Patient tolerated the procedure well. XR CHEST PORT   Final Result   Endotracheal tube as above. Underexpanded lungs with bibasilar   atelectasis. Hydrostatic edema. Possible pleural effusions. US ABD LTD   Final Result   Ascites. XR HAND RT MIN 3 V   Final Result      CT HEAD WO CONT   Final Result   Age-appropriate atrophy. No acute findings. XR ABD (KUB)   Final Result   Within normal      XR CHEST PORT   Final Result   Findings/impression:   1. Low lung volumes. Bibasilar hypoventilatory change/atelectasis. Left   retrocardiac lung incompletely evaluated, cannot exclude underlying airspace   disease. 2.  Cardiac and mediastinal contours are obscured by low lung volumes. Ectatic/possible aneurysmal appearance transverse aorta with calcific   atherosclerotic plaque. Appears stable from prior radiograph. 3.  No pleural fluid. No pneumothorax. 4.  Prominent gas-filled bowel partially visualized in the left upper quadrant. IR INSERT NON TUNL CVC OVER 5 YRS    (Results Pending)   IR PARACENTESIS ABD W IMAGE    (Results Pending)   IR US GUIDED VASCULAR ACCESS    (Results Pending)   IR FLUORO GUIDE PLC CVAD    (Results Pending)        Recent Results (from the past 24 hour(s))   GLUCOSE, POC    Collection Time: 04/28/22  7:29 AM   Result Value Ref Range    Glucose (POC) 84 65 - 117 mg/dL    Performed by Bárbara Wilde        Results     Procedure Component Value Units Date/Time    CULTURE, BODY FLUID W Damaris Wen [[id number]] Collected: 04/22/22 1400    Order Status: Completed Specimen:  Body Fluid from Body fld Updated: 04/27/22 0742     Special Requests: No Special Requests        GRAM STAIN No wbc's seen         No organisms seen        Culture result: No growth 4 days              Labs:     No results for input(s): WBC, HGB, HCT, PLT, HGBEXT, HCTEXT, PLTEXT, HGBEXT, HCTEXT, PLTEXT in the last 72 hours. Recent Labs     04/26/22 0757 04/26/22 0756    138   K 3.7 3.7   * 109*   CO2 22 21   BUN 22* 22*   CREA 2.18* 2.18*   * 107*   CA 8.6 8.8   PHOS  --  3.1     Recent Labs     04/26/22 0757 04/26/22 0756   ALT 16  --    *  --    TBILI 1.0  --    TP 6.5  --    ALB 1.8* 1.8*   GLOB 4.7*  --      No results for input(s): INR, PTP, APTT, INREXT, INREXT in the last 72 hours. No results for input(s): FE, TIBC, PSAT, FERR in the last 72 hours. No results found for: FOL, RBCF   No results for input(s): PH, PCO2, PO2 in the last 72 hours. No results for input(s): CPK, CKNDX, TROIQ in the last 72 hours.     No lab exists for component: CPKMB  No results found for: CHOL, CHOLX, CHLST, CHOLV, HDL, HDLP, LDL, LDLC, DLDLP, TGLX, TRIGL, TRIGP, CHHD, CHHDX  Lab Results   Component Value Date/Time    Glucose (POC) 84 04/28/2022 07:29 AM    Glucose (POC) 114 04/26/2022 11:19 AM    Glucose (POC) 91 04/24/2022 03:50 PM    Glucose (POC) 104 04/24/2022 11:03 AM    Glucose (POC) 106 04/24/2022 07:10 AM     Lab Results   Component Value Date/Time    Color Yellow/Straw 04/12/2022 11:00 PM    Appearance Turbid (A) 04/12/2022 11:00 PM    Specific gravity 1.011 04/12/2022 11:00 PM    pH (UA) 5.0 04/12/2022 11:00 PM    Protein Negative 04/12/2022 11:00 PM    Glucose Negative 04/12/2022 11:00 PM    Ketone Negative 04/12/2022 11:00 PM    Bilirubin Negative 04/12/2022 11:00 PM    Urobilinogen 0.1 04/12/2022 11:00 PM    Nitrites Negative 04/12/2022 11:00 PM    Leukocyte Esterase Large (A) 04/12/2022 11:00 PM    Bacteria 1+ (A) 04/12/2022 11:00 PM    WBC >100 (H) 04/12/2022 11:00 PM    RBC  04/12/2022 11:00 PM         Assessment:     Sepsis  UTI/Candida  Lactic acidosis  Acute on chronic renal failure- creat 2.01  Cirrhosis with ascites  Anemia  History of septic DIP joint, right index finger  Hypothyroidism  Seizure disorder  Gout  GERD  History of etoh abuse  Hypotension  Metabolic acidosis  Hypothermia  Hypokalemia  Anemia monitor H&H  Abdominal distention/ascites  Protein calorie malnutrition  Hypokalemia  Anasarca   Static post PEG tube placed    Plan: On allopurinol 300 mg daily  BuSpar 15 mg twice a day  Vitamin D 50,000 weekly  Pepcid 20 twice daily  Florinef 8.3RJ every day   Folic acid 1 mg daily  Lactulose 10 g 3 times a day  Keppra 1500 twice a day  Synthroid 25 mcg daily  Reglan 10mg IV QID   Midodrine 10 mg 3 times a day  Propranolol 20 mg twice a day  Sodium bicarb 1303 times a day  Flomax 0.4 mg daily  Thiamine 100 mg daily      Psych, nephrology, ID, Hospice, following     Continue PEG tube feeding/S with dietitian will start on likely bolus feeding     Discussed with the patient's wife she agree with hospice consult      Nephrology recommended discontinuing flurinef, continuing weekly procrit. Patient still on flurinef, no procrit ordered. Confirm with nephrology.    Obtain labs         Current Facility-Administered Medications:     hydrOXYzine pamoate (VISTARIL) capsule 25 mg, 25 mg, Oral, Q6H PRN, Lamberto Ackerman MD, 25 mg at 04/28/22 1139    levETIRAcetam (KEPPRA) oral solution 1,500 mg, 1,500 mg, Oral, BID, Lamberto Ackerman MD, 1,500 mg at 04/28/22 0836    metoclopramide HCl (REGLAN) injection 10 mg, 10 mg, IntraVENous, QID, Lamberto Ackerman MD, 10 mg at 04/28/22 0831    zinc oxide-white petrolatum 20-75 % topical paste, , Topical, PRN, Lamberto Ackerman MD, Given at 04/24/22 0245    busPIRone (BUSPAR) tablet 15 mg, 15 mg, Oral, BID, Lamberto Ackerman MD, 15 mg at 04/28/22 0830    sodium bicarbonate tablet 1,300 mg, 1,300 mg, Oral, QID, Marcello Chan MD, 1,300 mg at 04/28/22 0830    fludrocortisone (FLORINEF) tablet 0.1 mg, 0.1 mg, Oral, DAILY, Ree Zaragoza MD, 0.1 mg at 04/28/22 8841    0.9% sodium chloride infusion 250 mL, 250 mL, IntraVENous, PRN, Roly Ackerman MD    ziprasidone (GEODON) 10 mg in sterile water (preservative free) 0.5 mL injection, 10 mg, IntraMUSCular, Q12H PRN, Leelee Gibbs MD, 10 mg at 04/26/22 1600    0.9% sodium chloride infusion 250 mL, 250 mL, IntraVENous, PRN, Roly Ackerman MD    midodrine (PROAMATINE) tablet 10 mg, 10 mg, Oral, TID WITH MEALS, Lamberto Ackerman MD, 10 mg at 04/28/22 0830    LORazepam (ATIVAN) injection 1 mg, 1 mg, IntraVENous, Q4H PRN, Lamberto Ackerman MD, 1 mg at 04/27/22 0906    sodium chloride (NS) flush 5-10 mL, 5-10 mL, IntraVENous, PRN, Lamberto Ackerman MD, 10 mL at 04/15/22 2354    lactulose (CHRONULAC) 10 gram/15 mL solution 15 mL, 10 g, Oral, TID, Roly Ackerman MD, 15 mL at 04/28/22 0836    tamsulosin (FLOMAX) capsule 0.4 mg, 0.4 mg, Oral, DAILY, Lamberto Ackerman MD, 0.4 mg at 04/28/22 0830    thiamine mononitrate (B-1) tablet 100 mg, 100 mg, Oral, DAILY, Lamberto Ackerman MD, 100 mg at 04/28/22 0830    allopurinoL (ZYLOPRIM) tablet 300 mg, 300 mg, Oral, DAILY, Lamberto Ackerman MD, 300 mg at 04/28/22 2755    ergocalciferol capsule 50,000 Units, 50,000 Units, Oral, Q7D, Lamberto Ackerman MD, 50,000 Units at 04/21/22 1647    famotidine (PEPCID) tablet 20 mg, 20 mg, Oral, BID, Lamberto Ackerman MD, 20 mg at 31/77/23 6200    folic acid (FOLVITE) tablet 1 mg, 1 mg, Oral, DAILY, Lamberto Ackerman MD, 1 mg at 04/28/22 0830    levothyroxine (SYNTHROID) tablet 25 mcg, 25 mcg, Oral, ACB, Lamberto Ackerman MD, 25 mcg at 04/28/22 0532    propranoloL (INDERAL) tablet 20 mg, 20 mg, Oral, BID, Lamberto Ackerman MD, 20 mg at 04/28/22 0830    acetaminophen (TYLENOL) tablet 650 mg, 650 mg, Oral, Q6H PRN **OR** acetaminophen (TYLENOL) suppository 650 mg, 650 mg, Rectal, Q6H PRN, Lamberto Ackerman MD    polyethylene glycol (MIRALAX) packet 17 g, 17 g, Oral, DAILY PRN, Lamberto Ackerman MD    ondansetron (ZOFRAN ODT) tablet 4 mg, 4 mg, Oral, Q8H PRN **OR** ondansetron (ZOFRAN) injection 4 mg, 4 mg, IntraVENous, Q6H PRN, Lamberto Ackerman MD, 4 mg at 04/23/22 005

## 2022-04-28 NOTE — PROGRESS NOTES
CM reviewed chart and spoke to Hospice RN. Patient's wife is agreeable to hospice and patient is going to be admitted under [facility]. If patient's aggitation has gotten better there could be the possibility of transferring patient back to [facility] under hospice. ALFRED has made [facility] with this information. ALFRED will continue to follow.

## 2022-04-28 NOTE — PROGRESS NOTES
CM received call from hospice nurse Chris Candelaria requesting patient receive drain to prevent tapping for ascites. ALFRED spoke with Dr. Jacki Zuleta and received order. Order for IR evaluation for aspira drain placed and IR dept notified.

## 2022-04-28 NOTE — PROGRESS NOTES
Infectious Disease Progress Note           Subjective:   Stable, appears uncomfortable, denies new complaints, no acute events since last seen, no fever//chills   Objective:   Physical Exam:     There were no vitals taken for this visit. Temp (24hrs), Av.1 °F (36.7 °C), Min:97.7 °F (36.5 °C), Max:98.6 °F (37 °C)    No intake/output data recorded. No intake/output data recorded. General: NAD, alert, lethargic, some discomfort from abdominal distension   HEENT: HEIDY, Moist mucosa   Lungs: CTA b/l, decreased at the bases, no wheeze/rhonchi   Heart: S1S2+, RRR, no murmur  Abdo: Soft, distended, NT, +BS, + peg tube placement   : + fish cath, clear urine in bag and tubing   Exts: Decreased right index finger swelling, + 3 pitting edema involving b/l LEs   Skin: No wounds, No rashes or lesions    Data Review:       Recent Days:  No results for input(s): WBC, HGB, HCT, PLT, HGBEXT, HCTEXT, PLTEXT, HGBEXT, HCTEXT, PLTEXT in the last 72 hours. Recent Labs     22  0757 22  0756   BUN 22* 22*   CREA 2.18* 2.18*       Lab Results   Component Value Date/Time    C-Reactive protein 0.60 2022 05:15 AM        Microbiology     Results     Procedure Component Value Units Date/Time    CULTURE, BODY FLUID Sushma Menard STAIN [[id number]] Collected: 22 1400    Order Status: Completed Specimen: Body Fluid from Body fld Updated: 22 0742     Special Requests: No Special Requests        GRAM STAIN No wbc's seen         No organisms seen        Culture result: No growth 4 days             Diagnostics   CXR Results  (Last 48 hours)    None             Assessment/Plan     1. UTI, abnormal UA,  Urinary retention: + indwelling fish cath      Urine Cx from  is negative, Yeast isolated from urine Cx from       S/p 7 days of fluconazole, currently off antimicrobials     2. Right index finger osteomyelitis.  S/p debridement       Completed long term Vanc targeting staph epidermidis       Healing finger w/o evidence of acute infection     3. AMS, h/o metabolic/hepatic encephalopathy, lethargic, not following commands     4. Abdominal distension, more tense, uncomfortable      IR consulted for therapeutic thoracentesis     5.  Episodes of hypothermia/hypotension: Suspected adrenal insufficiency      Decreased episodes of hypothermia/hypotension     Dispo: Pt is transitioning to hospice per staff      Cindy Santana MD    4/28/2022

## 2022-04-28 NOTE — PROGRESS NOTES
Problem: Pain  Goal: *Control of acute pain  Outcome: Progressing Towards Goal     Problem: Anxiety/Agitation  Goal: Verbalize or staff assess the ability to manage anxiety  Description: The patient/family/caregiver will verbalize and demonstrate ability to manage the patient's anxiety throughout hospice care. Outcome: Progressing Towards Goal     Problem: Seizures  Goal: *STG: Remains free of seizure activity  Outcome: Progressing Towards Goal     Problem: Infection - Risk of, Urinary Catheter-Associated Urinary Tract Infection  Goal: *Absence of infection signs and symptoms  Outcome: Progressing Towards Goal     Problem: Imminent Death  Goal: Collaborate with patient/family/caregiver/interdisciplinary team to minimize and manage end of life symptoms  Outcome: Progressing Towards Goal     Problem: Discharge Planning  Goal: *Participates in discharge planning  Outcome: Progressing Towards Goal     Problem: Falls - Risk of  Goal: *Absence of Falls  Description: Document Elba Fall Risk and appropriate interventions in the flowsheet.   Outcome: Progressing Towards Goal  Note: Fall Risk Interventions:

## 2022-04-28 NOTE — [facility] IDG SOCIAL WORKER NOTES
Patient: Deniz Stallworth    Date: 04/28/22  Time: 3:44 PM    [facility]  Notes  Problem: Need for emotional support. Intervention: LCSW will provide emotional support to pt and wife Tamie Garcia in coping with pts EOL due to metabolic encephalopathy. Plan: LCSW will provide emotional support to pt and wife aTmie Garcia. Problem: Need for connection to community resources. Intervention: LCSW will provide connection to community resources including [employer] [facility] A[facility]. Plan: LCSW will continue to connect wife to community resources. Moderate risk for bereavement for wife Tamie Garcia due to hx of ETOH, limited finances and limited supports.      Darvin RAY to serve ( [phone])           Signed by: Betty Beckett

## 2022-04-29 NOTE — PROGRESS NOTES
Spiritual Care Assessment/Progress Note  [facility]      NAME: Raymond Negrete      MRN: [id number]  AGE: 60 y.o. SEX: male  Judaism Affiliation: No preference   Language: English     4/29/2022     Total Time (in minutes): 25     Spiritual Assessment begun in [facility] through conversation with:         [x]Patient        [x] Family    [] Friend(s)        Reason for Consult: Death, Inpatient     Spiritual beliefs: (Please include comment if needed)     [x] Identifies with a maribel tradition:  Sabianist      [x] Supported by a maribel community:            [] Claims no spiritual orientation:           [] Seeking spiritual identity:                [] Adheres to an individual form of spirituality:           [] Not able to assess:                           Identified resources for coping:      [x] Prayer                               [] Music                  [] Guided Imagery     [x] Family/friends                 [] Pet visits     [] Devotional reading                         [] Unknown     [] Other:                                               Interventions offered during this visit: (See comments for more details)    Patient Interventions: Prayer (actual),Other (comment) (Silent support)     Family/Friend(s):  Affirmation of emotions/emotional suffering,Affirmation of maribel,Catharsis/review of pertinent events in supportive environment,Bridging,Iconic (affirming the presence of God/Higher Power),Normalization of emotional/spiritual concerns,Life review/legacy,Prayer (actual),Judaism beliefs/image of God discussed     Plan of Care:     [] Support spiritual and/or cultural needs    [] Support AMD and/or advance care planning process      [] Support grieving process   [] Coordinate Rites and/or Rituals    [] Coordination with community clergy   [] No spiritual needs identified at this time   [] Detailed Plan of Care below (See Comments)  [] Make referral to Music Therapy  [] Make referral to Pet Therapy     [] Make referral to Addiction services  [] Make referral to [facility]  [] Make referral to Spiritual Care Partner  [] No future visits requested        [x] Contact Spiritual Care for further referrals     Comments:  Visited patient in [place] [address] for in-patient death per staff request.  Patient, wife and mother were present during the visit. Wife shared about their marriage and his involvement in the local [employer]. Mother stated her son served as a  and seemed proud of him. Both shared about the patient's memories, assurance of his eternal rest and seemed to process their emotions verbally, spiritually and tearfully. Provided supportive presence and grief support while facilitating storytelling as well as exploring their needs. Normalized their emotions of grief and loss as well as affirmed their familial bond, his spiritual connections and their spiritual convictions. Offered and provided prayer per their request which they seemed to appreciate. Advised of  availability. Contact chaplains for further referrals. satish Hamptonr, M.Div.    can be reached by calling the  at [facility]  [phone]

## 2022-04-29 NOTE — H&P
History and Physical    NAME: Alba Mosqueda   :  1960   MRN:  [id number]     Date/Time:  2022 11:16 AM    Patient PCP: Adrián Oakes NP  ______________________________________________________________________             Subjective:     CHIEF COMPLAINT:         HISTORY OF PRESENT ILLNESS:       Patient is a 60y.o. year old male  Kalpana Rangel is a(n) 60 y. o. male with PMH significant for HTN, cirrhosis, ascites patient admitted to the hospital with sepsis ascites due to cirrhosis of liver 1 in the hospital patient condition never improved episode of hypothermia hypotension seen in the ICU then transferred to medical floor patient not eating drinking well PEG tube was placed still hypotensive hypothermic aspirin should condition not improving patient family decided for comfort care with hospice patient admitted to hospice service    Past Medical History:   Diagnosis Date    Arthritis     Hypertension         Past Surgical History:   Procedure Laterality Date    IR INSERT NON TUNL CVC OVER 5 YRS  3/25/2022    IR PARACENTESIS ABD SUBSQ  4/15/2022    IR PARACENTESIS ABD W IMAGE  2022    IR PARACENTESIS ABD W IMAGE  3/1/2022    IR PARACENTESIS ABD W IMAGE  3/30/2022       Social History     Tobacco Use    Smoking status: Never Smoker    Smokeless tobacco: Never Used   Substance Use Topics    Alcohol use: Not on file        No family history on file. No Known Allergies     Prior to Admission medications    Medication Sig Start Date End Date Taking? Authorizing Provider   thiamine mononitrate (B-1) 100 mg tablet Take 1 Tablet by mouth daily. 22   Chris Ackerman MD   levothyroxine (SYNTHROID) 25 mcg tablet Take 25 mcg by mouth Daily (before breakfast). Provider, Historical   tamsulosin (Flomax) 0.4 mg capsule Take 1 Capsule by mouth daily. 3/4/22   Zoie Whyte PA-C   potassium chloride (K-DUR, KLOR-CON M20) 20 mEq tablet Take 1 Tablet by mouth daily.  3/2/22   Myron Quintana Dai Davis MD   sodium bicarbonate 650 mg tablet Take 1 Tablet by mouth three (3) times daily. 3/2/22   Richard Flores MD   levETIRAcetam (Keppra) 1,000 mg tablet Take 1.5 Tablets by mouth two (2) times a day. 3/2/22   Richard Flores MD   ergocalciferol (ERGOCALCIFEROL) 1,250 mcg (50,000 unit) capsule Take 1 Capsule by mouth every seven (7) days. 2/9/22   Provider, Historical   lactulose (CHRONULAC) 10 gram/15 mL solution Take 15 mL by mouth three (3) times daily. 1/23/22   Gloria Ackerman MD   allopurinoL (ZYLOPRIM) 300 mg tablet Take 1 Tablet by mouth daily. 1/18/22   Provider, Historical   thiamine HCL (B-1) 100 mg tablet Take 100 mg by mouth daily. Provider, Historical   propranoloL (INDERAL) 20 mg tablet Take 20 mg by mouth two (2) times a day. Provider, Historical   folic acid (FOLVITE) 1 mg tablet Take 1 mg by mouth daily. Provider, Historical   famotidine (PEPCID) 20 mg tablet Take 20 mg by mouth At bedtime. Provider, Historical   aMILoride (MIDAMOR) 5 mg tablet Take 5 mg by mouth daily.     Provider, Historical         Current Facility-Administered Medications:     LORazepam (ATIVAN) injection 1 mg, 1 mg, IntraVENous, Q15MIN PRN, Lamberto Ackerman MD    ketorolac (TORADOL) injection 30 mg, 30 mg, IntraVENous, Q8H PRN, Lamberto Ackerman MD, 30 mg at 04/28/22 1956    glycopyrrolate (ROBINUL) injection 0.2 mg, 0.2 mg, IntraVENous, Q4H PRN, Gloria Ackerman MD    bisacodyL (DULCOLAX) suppository 10 mg, 10 mg, Rectal, DAILY PRN, Lamberto Ackerman MD    HYDROmorphone (DILAUDID) injection 1 mg, 1 mg, IntraVENous, Q15MIN PRN, Lamberto Ackerman MD    LORazepam (ATIVAN) injection 2 mg, 2 mg, IntraVENous, Q3H, Lamberto Ackerman MD, 2 mg at 04/29/22 6475    HYDROmorphone (DILAUDID) injection 1 mg, 1 mg, IntraVENous, Q3H, Lamberto Ackerman MD, 1 mg at 04/29/22 0622    saline peripheral flush soln 5 mL, 5 mL, InterCATHeter, PRN, Gloria Ackerman MD    LAB DATA REVIEWED:    No results found for this or any previous visit (from the past 24 hour(s)). XR Results (most recent):  Results from Hospital Encounter encounter on 04/07/22    XR CHEST PORT    Narrative  Chest, 2 frontal views, 4/15/2022    History: NG tube placement. Comparison: Including chest 4/7/2022. Findings: Endotracheal tube side-port is at the proximal stomach; the tip is  excluded from the field-of-view. The cardiac silhouette is enlarged. The lungs  are underexpanded with bibasilar atelectasis. There is pulmonary vascular  congestion and hydrostatic edema. Pleural effusions are possible. No  pneumothorax is identified. The osseous structures are grossly stable. Impression  Endotracheal tube as above. Underexpanded lungs with bibasilar  atelectasis. Hydrostatic edema. Possible pleural effusions. No orders to display        Review of Systems:  Unable to obtain  Objective:   VITALS:    Visit Vitals  BP (!) (P) 45/34 (BP 1 Location: Right upper arm)   Pulse (P) 70   Temp (P) 97.4 °F (36.3 °C)   Resp (P) 16   Ht 5' 10\" (1.778 m)   Wt 92.6 kg (204 lb 2.3 oz)   SpO2 93%   BMI 29.29 kg/m²       Physical Exam:   Constitutional: Lethargic  HENT:   Head: Normocephalic and atraumatic. Eyes: Pupils are equal, round, and reactive to light. EOM are normal.   Cardiovascular: Normal rate, regular rhythm and normal heart sounds. Pulmonary/Chest: Breath sounds normal. No wheezes. No rales. Exhibits no tenderness. Abdominal: Soft. Bowel sounds are normal. There is no abdominal tenderness. There is no rebound and no guarding. Musculoskeletal: Normal range of motion.    Neurological: Lethargic      ASSESSMENT & PLAN:    Sepsis  Cirrhosis of liver  Seizure disorder      Admit to hospital service  Continue comfort care medication including Dilaudid lorazepam        ________________________________________________________________________    Signed: Ronnell Moseley MD

## 2022-04-29 NOTE — HOSPICE
This was an initial visit to assess needs and offer support. Pt was in bed and appeared to be sleeping. He did not respond to knock on his door or attempts at conversation in the room. There was no family present. [facility] of presence and a blessing for peace and love for pt and his family. Spoke with   who informed me family was in visiting earlier today. Followed visit up with a call to wife. There was no answer and voicemail did not pick -up.

## 2022-04-29 NOTE — HOSPICE
[facility]  [phone]     Patient Name: Yaya Stokes  YOB: 1960  Age: 60 y.o. [facility] RN Note:  TC to spouse to inform of hypotension. Encouraged her to come and say her goodbyes. Juan Ramon and patient's mother will be here shortly.     Izzy Gomez RN  Clinical Nurse Liaison  [phone]

## 2022-04-29 NOTE — PROGRESS NOTES
At 1410 patient  verified x2 RN, Sugar Lorenzo and Bradly Savage. Dr. Chris Gonzalez notified of death, patient's family notified. ,notified nursing supervisor  Nicole Monroy. [facility] called and notified. Removed IV and fish catheter. Family and  at bedside at this time.  Record of death paper filled out

## 2022-04-29 NOTE — HOSPICE
[address] [facility]  [phone]    Discharge/Death Nursing Note   Patient Name: Katherine Patient  YOB: 1960  Age: 60 y.o.     Date of Death: 22  Admitted Date: 2022  Time of Death: Jerry 82: [facility]  Level of Care: GIP  Patient Room: [address] [place] [address] Attending: Melissa Hoover MD  Hospice Diagnosis: Metabolic encephalopathy [N87.37]    Death Pronouncement   Pronouncement of death completed by: Stephen Gordon, LUIS MIGUEL and Moiz Chris RN    Agency staff was present at the time of death    At the time of death the patient was documented as \"\"    The pt  within [facility]    The following were notified of the patient's death: Spouse     Medications were disposed of per facility protocol     Discharge Summary   Discharge Reason: Death    Summary of Care Provided:    [x] Post mortem care provided by bedside nurse  [x] Notification of  home by nursing supervisor  [] Referrals/Community resources provided:   [] Goals completed  [] Durable Medical Equipment vendor notified     Disciplines involved: [x] RN [x] SW [x]  [] LOPEZ [] Vol [] PT [] OT [] ST [] [facility]    [x] IDT communication/notification    Attending Physician, Dr. Jacki Zuleta, notified of death    Bereaved   Verify bereaved identified with name, address, telephone number and risk level      Advance Care Planning 2022   Confirm Advance Directive None   Patient Would Like to Complete Advance Directive -

## 2022-04-29 NOTE — PROGRESS NOTES
CM reviewed chart and spoke to Hospice RN. Patient is still under [facility]. CM will continue to follow patient. CM has updated [facility] with patient's status.

## 2022-04-30 ENCOUNTER — HOME CARE VISIT (OUTPATIENT)
Dept: HOSPICE | Facility: HOSPICE | Age: 62
End: 2022-04-30
Payer: COMMERCIAL

## 2022-05-19 LAB
DATE LAST DOSE: NORMAL
REPORTED DOSE,DOSE: NORMAL UNITS
VANCOMYCIN SERPL-MCNC: 13.9 UG/ML

## 2023-05-31 NOTE — PROGRESS NOTES
Infectious Disease Progress Note           Subjective:   Stable, minimally responsive, not following commands. Less hypothermic, not on pressor support   Objective:   Physical Exam:     Visit Vitals  /70   Pulse 80   Temp 97.2 °F (36.2 °C)   Resp 16   Ht 6' 2\" (1.88 m)   Wt 185 lb (83.9 kg)   SpO2 99%   BMI 23.75 kg/m²      O2 Device: None (Room air)    Temp (24hrs), Av.5 °F (35.8 °C), Min:91.9 °F (33.3 °C), Max:97.9 °F (36.6 °C)    No intake/output data recorded.  1901 -  0700  In: -   Out: 750 [Urine:750]    General: NAD, decreased responsiveness   HEENT: HEIDY, Moist mucosa   Lungs: CTA b/l\, decreased at the bases, no wheeze/rhonchi   Heart: S1S2+, RRR, no murmur  Abdo: Soft, NT, ND, +BS   : No fish   Exts:Right index finger dressing in place   Skin: No pressure ulcers       Data Review:       Recent Days:  Recent Labs     22  0451 22  1021 22  2219   WBC 7.4 6.2 6.6   HGB 9.2* 9.0* 9.6*   HCT 26.6* 26.0* 28.0*   PLT 80* 79* 110*     Recent Labs     22  0451 22  1021 22  1453   BUN 32* 35* 34*   CREA 2.59* 2.40* 2.47*       Lab Results   Component Value Date/Time    C-Reactive protein 3.15 (H) 2022 10:20 PM        Microbiology     Results     Procedure Component Value Units Date/Time    MRSA SCREEN - PCR (NASAL) [[id number]] Collected: 22 1256    Order Status: Completed Specimen: Swab Updated: 22 1456     MRSA by PCR, Nasal Not Detected       COVID-19 RAPID TEST [[id number]] Collected: 22 0918    Order Status: Completed Specimen: Nasopharyngeal Updated: 22 0955     COVID-19 rapid test Not Detected        Comment: Rapid Abbott ID Now   Rapid NAAT:  The specimen is NEGATIVE for SARS-CoV-2, the novel coronavirus associated with COVID-19.    Negative results should be treated as presumptive and, if inconsistent with clinical signs and symptoms or necessary for patient management, should be tested with an alternative molecular assay. Negative results do not preclude SARS-CoV-2 infection and should not be used as the sole basis for patient management decisions. This test has been authorized by the FDA under   an Emergency Use Authorization (EUA) for use by authorized laboratories. Fact sheet for Healthcare Providers: ConventionUpdate.co.nz Fact sheet for Patients: ConventionUpdate.co.nz   Methodology: Isothermal Nucleic Acid Amplification         CULTURE, BLOOD, PAIRED [[id number]] Collected: 03/23/22 0000    Order Status: Completed Specimen: Blood Updated: 03/23/22 0021    CULTURE, Allyne Ruffing STAIN [[id number]] Collected: 03/22/22 2219    Order Status: Completed Specimen: Wound Updated: 03/25/22 0850     Special Requests: No Special Requests        GRAM STAIN Few WBCs seen         No organisms seen        Culture result:       Few Staphylococcus species, coagulase negative                   Diagnostics   CXR Results  (Last 48 hours)               03/25/22 1220  XR CHEST PORT Final result    Impression:  The cardiomediastinal silhouette is appropriate for age, technique,   and lung expansion. Pulmonary vasculature is not congested. The lungs are   essentially clear. No effusion or pneumothorax is seen. Narrative:  1 new comparison February 28       Right IJ line with tip in lower SVC                    Assessment/Plan     1. Right index finger swelling/tenderness, soft tissue swelling/septic arthritis and osteomyelitis on X-ray       WBC remains WNLs. Coag neg staph isolated from wound Cx       Debridement postponed on 03/24 due to medical instability       Continue on Vanc and Zosyn for now       Routine labs in the morning         2. Hypothermia, improved, hemodynamically stable off pressor support      Normal random cortisol, will check tyroid panel     3.  Acute renal failure: Cr staying stable      4. H/o gouty arthritis, no acute flare, will continue to monitor    5. H/o alcoholism w resulting liver cirrhosis     Miguelina Jordan MD    3/25/2022 Calcipotriene Counseling:  I discussed with the patient the risks of calcipotriene including but not limited to erythema, scaling, itching, and irritation.

## 2023-08-18 NOTE — PROGRESS NOTES
General Daily Progress Note          Patient Name:   Javier Beach       YOB: 1960       Age:  60 y.o. Admit Date: 4/28/2022      Subjective:     Javier Beach is a(n) 60 y.o. male with PMH significant for HTN, cirrhosis, ascites presents via EMS for removing fish catheter. Patient recently d/c after lengthy stay in hospital for septic DIP joint of right index finger. He was d/c on 10 days of PO Zyvox, which he completed. He presents today after pulling out fish catheter. Several blood clots noted. Patient septic upon arrival with hypothermia and hypotension. Temp 93.6 rectal, BP 84/68. Patient is retaining 887cc of urine and nursing staff will replace fish.     Patient started on fluids and IV Levaquin in the ED. Patient is altered at baseline. Head CT w/o contrast shows no acute abnormality. Blood cultures pending. CXR and KUB both normal       Patient is alert awake confused  Hypotensive off  Levophed    Normal temperature    4/12  Patient is off levophed but still receiving midodrine and IVF. He remain hypotensive. He is lethargic and confused this morning. Patient has mild bilateral LE edema. Seen by ID yesterday. Recommends continuing vancomycin   Seen by nephrology yesterday. Recommends discontinuing IVF in lieu of leg edema, discontinue bicarb drip, continuing weekly procrit, continuing PO KCl, midodrine for chronic hypotension. Today patient hypothermic on bear hugger  Yesterday evening patient was restless agitated    4/13    Patient is alert awake still hypothermic on shemar hugger    4/14  Patient is seen sleeping. On shemar hugger and vancomycin. He is producing 250 ml urine. No new changes seen at this time. Labs pending. Cr trending down 1.62 (on 4/13). Abdominal US on 4/13 shows ascites. UA on 4/12 positive for leukocyte esterase, WBC >100 and bacteruria. Urine culture pending.      Patient refused medication this morning    4/15    Patient sleepy obtunded /hypothermic    Not eating drinking    4/18  Patient is seen in medical telemetry with a one to one sitter. He is still confused and refusing to eat food. He is compliant with his medications  He pulled out NG tube 3 times. Patient had a paracentesis done on 4/15 without any complications. Labs remarkable for Cr 1.75 increasing. CBC and CMP pending. Ammonia unremarkable. 4/19  Patient is seen at bedside with a one to one sitter. He still confused and attempting to pull out his IJ central line. He has mitts on. He is also attempting to jump out of bed. Patient is still not eating today. He is taking his medications. Labs remarkable for Cr 1.82. Abdominal US remarkable for Increased abdominal and pelvic ascites. Seen by ID. Recommends discontinuing eraxis, and starting on fluconazole. 4/20  Patient is seen at bedside with a one to one sitter. She reports that he pulled out his IJ line. Patient now has an IV in his hand. He has mittens on. He is still refusing to eat any food but he continues to take his medications. Patient continues to be hypothermic with latest axillary temperature of 96.1 F. Seen by nephrology. Recommends increasing IV fluid rate. Continue fish catheter. Seen by GI. Recommends another paracentesis prior to PEG tube placement. Seen by ID. Recommends continuing fluconazole and discontinue vancomycin. Labs pending. 4/21  Patient is with one to one sitter. He has been less agitated today but remains confused. On keppra drip. Patient has minimal oral intake. He is taking his medications. Seen by GI. Plan is to do paracentesis then place PEG tube. Seen by nephrology. No new recommendations. Seen by ID. Continue fluconazole. Labs Hgb 8.8, K 3.3, pro-bnp 990.     4/22  Patient is confused and seen with 1:1 sitter. He is diffusely edematous at this time and leaking fluids from his abdomen. Patient is NPO for paracentesis and PEG tube placement. 4/23    Patient had PEG tube placed. 4/25  Patient talking, difficult to understand. Has 1:1 sitter. Labs notable for:  Cr 2.17 (increasing)   BNP 1765     4/26  Patient talking, difficult to understand. Has 1:1 sitter. Hypothermic today. Labs notable for:   K 3.7    Patient vomited last night feeding was on hold  Started on Reglan    4/27    Patient has also large residual yesterday    Discussed with the dietitian to start bolus feeding instead of continuous feed  Patient still one-to-one because of his behavior  Received Ativan this morning sleeping    4/28/22  Patient with 1:1 sitter. Abdomen distended     Objective:     Visit Vitals  Ht 5' 10\" (1.778 m)   Wt 204 lb 2.3 oz (92.6 kg)   BMI 29.29 kg/m²        Recent Results (from the past 24 hour(s))   GLUCOSE, POC    Collection Time: 04/28/22  7:29 AM   Result Value Ref Range    Glucose (POC) 84 65 - 117 mg/dL    Performed by Branden Gavin      [unfilled]      Review of Systems    Constitutional: Negative for chills and fever. HENT: Negative. Eyes: Negative. Respiratory: Negative. Cardiovascular: Negative. Gastrointestinal: Negative for abdominal pain and nausea. Skin: Negative. Neurological: Negative. Physical Exam:      Constitutional: pt is oriented to person, place, and time. HENT:   Head: Normocephalic and atraumatic. Eyes: Pupils are equal, round, and reactive to light. EOM are normal.   Cardiovascular: Normal rate, regular rhythm and normal heart sounds. Pulmonary/Chest: Breath sounds normal. No wheezes. No rales. Exhibits no tenderness. Abdominal: Soft. Bowel sounds are normal. There is no abdominal tenderness. There is no rebound and no guarding. Musculoskeletal: Normal range of motion. Neurological: pt is alert and oriented to person, place, and time.    Extremities 2+ edema  Anasarca    No orders to display        Recent Results (from the past 24 hour(s))   GLUCOSE, POC    Collection Time: 04/28/22  7:29 AM   Result Value Ref Range    Glucose (POC) 84 65 - 117 mg/dL    Performed by Fortino Saini        Results     Procedure Component Value Units Date/Time    CULTURE, BODY FLUID JERRELL Lazaro [[id number]] Collected: 04/22/22 1400    Order Status: Completed Specimen: Body Fluid from Body fld Updated: 04/27/22 0742     Special Requests: No Special Requests        GRAM STAIN No wbc's seen         No organisms seen        Culture result: No growth 4 days              Labs:     No results for input(s): WBC, HGB, HCT, PLT, HGBEXT, HCTEXT, PLTEXT, HGBEXT, HCTEXT, PLTEXT in the last 72 hours. Recent Labs     04/26/22  0757 04/26/22  0756    138   K 3.7 3.7   * 109*   CO2 22 21   BUN 22* 22*   CREA 2.18* 2.18*   * 107*   CA 8.6 8.8   PHOS  --  3.1     Recent Labs     04/26/22 0757 04/26/22 0756   ALT 16  --    *  --    TBILI 1.0  --    TP 6.5  --    ALB 1.8* 1.8*   GLOB 4.7*  --      No results for input(s): INR, PTP, APTT, INREXT, INREXT in the last 72 hours. No results for input(s): FE, TIBC, PSAT, FERR in the last 72 hours. No results found for: FOL, RBCF   No results for input(s): PH, PCO2, PO2 in the last 72 hours. No results for input(s): CPK, CKNDX, TROIQ in the last 72 hours.     No lab exists for component: CPKMB  No results found for: CHOL, CHOLX, CHLST, CHOLV, HDL, HDLP, LDL, LDLC, DLDLP, TGLX, TRIGL, TRIGP, CHHD, CHHDX  Lab Results   Component Value Date/Time    Glucose (POC) 84 04/28/2022 07:29 AM    Glucose (POC) 114 04/26/2022 11:19 AM    Glucose (POC) 91 04/24/2022 03:50 PM    Glucose (POC) 104 04/24/2022 11:03 AM    Glucose (POC) 106 04/24/2022 07:10 AM     Lab Results   Component Value Date/Time    Color Yellow/Straw 04/12/2022 11:00 PM    Appearance Turbid (A) 04/12/2022 11:00 PM    Specific gravity 1.011 04/12/2022 11:00 PM    pH (UA) 5.0 04/12/2022 11:00 PM    Protein Negative 04/12/2022 11:00 PM    Glucose Negative 04/12/2022 11:00 PM    Ketone Negative 04/12/2022 11:00 PM    Bilirubin Negative 04/12/2022 11:00 PM    Urobilinogen 0.1 04/12/2022 11:00 PM    Nitrites Negative 04/12/2022 11:00 PM    Leukocyte Esterase Large (A) 04/12/2022 11:00 PM    Bacteria 1+ (A) 04/12/2022 11:00 PM    WBC >100 (H) 04/12/2022 11:00 PM    RBC  04/12/2022 11:00 PM         Assessment:     Sepsis  UTI/Candida  Lactic acidosis  Acute on chronic renal failure- creat 2.01  Cirrhosis with ascites  Anemia  History of septic DIP joint, right index finger  Hypothyroidism  Seizure disorder  Gout  GERD  History of etoh abuse  Hypotension  Metabolic acidosis  Hypothermia  Hypokalemia  Anemia monitor H&H  Abdominal distention/ascites  Protein calorie malnutrition  Hypokalemia  Anasarca   Static post PEG tube placed    Plan: On allopurinol 300 mg daily  BuSpar 15 mg twice a day  Vitamin D 50,000 weekly  Pepcid 20 twice daily  Florinef 9.0UK every day   Folic acid 1 mg daily  Lactulose 10 g 3 times a day  Keppra 1500 twice a day  Synthroid 25 mcg daily  Reglan 10mg IV QID   Midodrine 10 mg 3 times a day  Propranolol 20 mg twice a day  Sodium bicarb 1303 times a day  Flomax 0.4 mg daily  Thiamine 100 mg daily  Ativan 2mg q3h   Dilaysis 1mg q3 h     Psych, nephrology, ID, Hospice, following     Continue PEG tube feeding/S with dietitian will start on likely bolus feeding     Discussed with the patient's wife she agree with hospice consult    IR consult for paracentesis   Nephrology recommended discontinuing flurinef, continuing weekly procrit. Patient still on flurinef, no procrit ordered. Confirm with nephrology.    Obtain labs         Current Facility-Administered Medications:     LORazepam (ATIVAN) injection 1 mg, 1 mg, IntraVENous, Q15MIN PRN, Lamberto Ackerman MD    ketorolac (TORADOL) injection 30 mg, 30 mg, IntraVENous, Q8H PRN, Lamberto Ackerman MD    glycopyrrolate (ROBINUL) injection 0.2 mg, 0.2 mg, IntraVENous, Q4H PRN, Jef Ackerman MD    bisacodyL (DULCOLAX) suppository 10 mg, 10 mg, Rectal, DAILY PRN, Gloria Ackerman MD    HYDROmorphone (DILAUDID) injection 1 mg, 1 mg, IntraVENous, Q15MIN PRN, Lamberto Ackerman MD    LORazepam (ATIVAN) injection 2 mg, 2 mg, IntraVENous, Q3H, Lamberto Ackerman MD    HYDROmorphone (DILAUDID) injection 1 mg, 1 mg, IntraVENous, Q3H, Lamberto Ackerman MD    saline peripheral flush soln 5 mL, 5 mL, InterCATHeter, PRN, Yordan Darnell MD PAST MEDICAL HISTORY:  Type 2 diabetes mellitus

## (undated) DEVICE — MOUTHPIECE ENDOSCP 20X27MM --

## (undated) DEVICE — ENDO KIT 1: Brand: MEDLINE INDUSTRIES, INC.

## (undated) DEVICE — GLOVE SURG SZ 85 L12IN FNGR THK79MIL GRN LTX FREE

## (undated) DEVICE — LIGATOR ENDOSCP DIA8.6-11.5MM MULT DISP SPDBND LIGATOR SUP

## (undated) DEVICE — SOLUTION IRRIG 1000ML 0.9% SOD CHL USP POUR PLAS BTL

## (undated) DEVICE — REM POLYHESIVE ADULT PATIENT RETURN ELECTRODE: Brand: VALLEYLAB

## (undated) DEVICE — GARMENT,MEDLINE,DVT,INT,CALF,MED, GEN2: Brand: MEDLINE

## (undated) DEVICE — CANNULA NSL O2 AD 7 FT END-TIDAL CARBON DIOX VENTFLO

## (undated) DEVICE — TUBING, SUCTION, 1/4" X 12', STRAIGHT: Brand: MEDLINE

## (undated) DEVICE — ROCKER SWITCH PENCIL BLADE ELECTRODE, HOLSTER: Brand: EDGE

## (undated) DEVICE — HANDPIECE SET WITH HIGH FLOW TIP AND SUCTION TUBE: Brand: INTERPULSE

## (undated) DEVICE — TRAY PARACENT 8FR 4.75IN --

## (undated) DEVICE — INTENDED FOR TISSUE SEPARATION, AND OTHER PROCEDURES THAT REQUIRE A SHARP SURGICAL BLADE TO PUNCTURE OR CUT.: Brand: BARD-PARKER ® CARBON RIB-BACK BLADES

## (undated) DEVICE — BASIC SINGLE BASIN-LF: Brand: MEDLINE INDUSTRIES, INC.

## (undated) DEVICE — MINOR EXTREMITY PACK: Brand: MEDLINE INDUSTRIES, INC.

## (undated) DEVICE — GAUZE,PACKING STRIP,PLAIN,1"X5YD,STRL,LF: Brand: CURAD

## (undated) DEVICE — BINDER ABD H12IN COT FOR 45-62IN WAIST UNIV PREM 4 PNL DSGN

## (undated) DEVICE — QUIKLYTE DILUENT CHECK, 60ML: Brand: DIMENSION® QUIKLYTE® INTEGRATED MULTISENSOR DILUENT CHECK

## (undated) DEVICE — KIT GASTMY PERC PEG PULL 20FR -- ENDOVIVE BX/2

## (undated) DEVICE — COVER LT HNDL BLU PLAS

## (undated) DEVICE — YANKAUER,BULB TIP,W/O VENT,RIGID,STERILE: Brand: MEDLINE

## (undated) DEVICE — EVACUATED CONTAINER, 1000 ML

## (undated) DEVICE — PADDING CAST COHESIVE 4 YDX3 IN HND TEARABLE COTTON SPEC 100

## (undated) DEVICE — GOWN,SIRUS,POLYRNF,BRTHSLV,XL,30/CS: Brand: MEDLINE

## (undated) DEVICE — STERILE POLYISOPRENE POWDER-FREE SURGICAL GLOVES: Brand: PROTEXIS

## (undated) DEVICE — SOUTHSIDE TURNOVER: Brand: MEDLINE INDUSTRIES, INC.

## (undated) DEVICE — TOWEL SURG W17XL27IN STD BLU COT NONFENESTRATED PREWASHED